# Patient Record
Sex: MALE | Race: WHITE | Employment: OTHER | URBAN - METROPOLITAN AREA
[De-identification: names, ages, dates, MRNs, and addresses within clinical notes are randomized per-mention and may not be internally consistent; named-entity substitution may affect disease eponyms.]

---

## 2018-12-08 ENCOUNTER — APPOINTMENT (OUTPATIENT)
Dept: ULTRASOUND IMAGING | Age: 78
DRG: 312 | End: 2018-12-08
Payer: MEDICARE

## 2018-12-08 ENCOUNTER — APPOINTMENT (OUTPATIENT)
Dept: CT IMAGING | Age: 78
DRG: 312 | End: 2018-12-08
Payer: MEDICARE

## 2018-12-08 ENCOUNTER — APPOINTMENT (OUTPATIENT)
Dept: GENERAL RADIOLOGY | Age: 78
DRG: 312 | End: 2018-12-08
Payer: MEDICARE

## 2018-12-08 ENCOUNTER — HOSPITAL ENCOUNTER (INPATIENT)
Age: 78
LOS: 1 days | Discharge: HOME | DRG: 312 | End: 2018-12-09
Attending: EMERGENCY MEDICINE | Admitting: INTERNAL MEDICINE
Payer: MEDICARE

## 2018-12-08 DIAGNOSIS — R55 SYNCOPE AND COLLAPSE: Primary | ICD-10-CM

## 2018-12-08 DIAGNOSIS — R53.1 GENERALIZED WEAKNESS: ICD-10-CM

## 2018-12-08 LAB
ALBUMIN SERPL-MCNC: 3.5 G/DL (ref 3.9–4.9)
ALP BLD-CCNC: 34 U/L (ref 35–104)
ALT SERPL-CCNC: 11 U/L (ref 0–41)
ANION GAP SERPL CALCULATED.3IONS-SCNC: 14 MEQ/L (ref 7–13)
AST SERPL-CCNC: 20 U/L (ref 0–40)
BACTERIA: NEGATIVE /HPF
BASOPHILS ABSOLUTE: 0 K/UL (ref 0–0.2)
BASOPHILS RELATIVE PERCENT: 0.6 %
BILIRUB SERPL-MCNC: 0.4 MG/DL (ref 0–1.2)
BILIRUBIN URINE: NEGATIVE
BLOOD, URINE: ABNORMAL
BUN BLDV-MCNC: 17 MG/DL (ref 8–23)
CALCIUM SERPL-MCNC: 8.4 MG/DL (ref 8.6–10.2)
CHLORIDE BLD-SCNC: 105 MEQ/L (ref 98–107)
CHP ED QC CHECK: YES
CLARITY: CLEAR
CO2: 18 MEQ/L (ref 22–29)
COLOR: YELLOW
CREAT SERPL-MCNC: 0.83 MG/DL (ref 0.7–1.2)
EKG ATRIAL RATE: 61 BPM
EKG P-R INTERVAL: 268 MS
EKG Q-T INTERVAL: 430 MS
EKG QRS DURATION: 90 MS
EKG QTC CALCULATION (BAZETT): 432 MS
EKG R AXIS: 62 DEGREES
EKG T AXIS: 38 DEGREES
EKG VENTRICULAR RATE: 61 BPM
EOSINOPHILS ABSOLUTE: 0.1 K/UL (ref 0–0.7)
EOSINOPHILS RELATIVE PERCENT: 2.3 %
EPITHELIAL CELLS, UA: ABNORMAL /HPF (ref 0–5)
GFR AFRICAN AMERICAN: >60
GFR NON-AFRICAN AMERICAN: >60
GLOBULIN: 2.6 G/DL (ref 2.3–3.5)
GLUCOSE BLD-MCNC: 102 MG/DL (ref 60–115)
GLUCOSE BLD-MCNC: 123 MG/DL (ref 74–109)
GLUCOSE BLD-MCNC: 125 MG/DL
GLUCOSE BLD-MCNC: 125 MG/DL (ref 60–115)
GLUCOSE BLD-MCNC: 126 MG/DL (ref 60–115)
GLUCOSE BLD-MCNC: 133 MG/DL (ref 60–115)
GLUCOSE URINE: NEGATIVE MG/DL
HCT VFR BLD CALC: 38.4 % (ref 42–52)
HEMOGLOBIN: 12.6 G/DL (ref 14–18)
HYALINE CASTS: ABNORMAL /HPF (ref 0–5)
KETONES, URINE: NEGATIVE MG/DL
LACTIC ACID: 2.4 MMOL/L (ref 0.5–2.2)
LEUKOCYTE ESTERASE, URINE: NEGATIVE
LYMPHOCYTES ABSOLUTE: 1.1 K/UL (ref 1–4.8)
LYMPHOCYTES RELATIVE PERCENT: 17.3 %
MAGNESIUM: 1.9 MG/DL (ref 1.7–2.3)
MCH RBC QN AUTO: 31.5 PG (ref 27–31.3)
MCHC RBC AUTO-ENTMCNC: 32.9 % (ref 33–37)
MCV RBC AUTO: 95.9 FL (ref 80–100)
MONOCYTES ABSOLUTE: 0.4 K/UL (ref 0.2–0.8)
MONOCYTES RELATIVE PERCENT: 6.8 %
NEUTROPHILS ABSOLUTE: 4.7 K/UL (ref 1.4–6.5)
NEUTROPHILS RELATIVE PERCENT: 73 %
NITRITE, URINE: NEGATIVE
PDW BLD-RTO: 13.8 % (ref 11.5–14.5)
PERFORMED ON: ABNORMAL
PERFORMED ON: NORMAL
PH UA: 5 (ref 5–9)
PLATELET # BLD: 279 K/UL (ref 130–400)
POTASSIUM SERPL-SCNC: 3.9 MEQ/L (ref 3.5–5.1)
PROTEIN UA: NEGATIVE MG/DL
RBC # BLD: 4.01 M/UL (ref 4.7–6.1)
RBC UA: ABNORMAL /HPF (ref 0–5)
SODIUM BLD-SCNC: 137 MEQ/L (ref 132–144)
SPECIFIC GRAVITY UA: 1.02 (ref 1–1.03)
TOTAL PROTEIN: 6.1 G/DL (ref 6.4–8.1)
TROPONIN: <0.01 NG/ML (ref 0–0.01)
TROPONIN: <0.01 NG/ML (ref 0–0.01)
URINE REFLEX TO CULTURE: YES
UROBILINOGEN, URINE: 0.2 E.U./DL
WBC # BLD: 6.4 K/UL (ref 4.8–10.8)
WBC UA: ABNORMAL /HPF (ref 0–5)

## 2018-12-08 PROCEDURE — 94640 AIRWAY INHALATION TREATMENT: CPT

## 2018-12-08 PROCEDURE — 84484 ASSAY OF TROPONIN QUANT: CPT

## 2018-12-08 PROCEDURE — 93978 VASCULAR STUDY: CPT

## 2018-12-08 PROCEDURE — 87040 BLOOD CULTURE FOR BACTERIA: CPT

## 2018-12-08 PROCEDURE — 87086 URINE CULTURE/COLONY COUNT: CPT

## 2018-12-08 PROCEDURE — 96368 THER/DIAG CONCURRENT INF: CPT

## 2018-12-08 PROCEDURE — 96365 THER/PROPH/DIAG IV INF INIT: CPT

## 2018-12-08 PROCEDURE — 81001 URINALYSIS AUTO W/SCOPE: CPT

## 2018-12-08 PROCEDURE — 99285 EMERGENCY DEPT VISIT HI MDM: CPT

## 2018-12-08 PROCEDURE — 2060000000 HC ICU INTERMEDIATE R&B

## 2018-12-08 PROCEDURE — 83735 ASSAY OF MAGNESIUM: CPT

## 2018-12-08 PROCEDURE — 6370000000 HC RX 637 (ALT 250 FOR IP): Performed by: STUDENT IN AN ORGANIZED HEALTH CARE EDUCATION/TRAINING PROGRAM

## 2018-12-08 PROCEDURE — 83605 ASSAY OF LACTIC ACID: CPT

## 2018-12-08 PROCEDURE — 70450 CT HEAD/BRAIN W/O DYE: CPT

## 2018-12-08 PROCEDURE — 2580000003 HC RX 258: Performed by: EMERGENCY MEDICINE

## 2018-12-08 PROCEDURE — 6360000002 HC RX W HCPCS: Performed by: EMERGENCY MEDICINE

## 2018-12-08 PROCEDURE — 6360000002 HC RX W HCPCS: Performed by: STUDENT IN AN ORGANIZED HEALTH CARE EDUCATION/TRAINING PROGRAM

## 2018-12-08 PROCEDURE — 80053 COMPREHEN METABOLIC PANEL: CPT

## 2018-12-08 PROCEDURE — 2580000003 HC RX 258: Performed by: STUDENT IN AN ORGANIZED HEALTH CARE EDUCATION/TRAINING PROGRAM

## 2018-12-08 PROCEDURE — 85025 COMPLETE CBC W/AUTO DIFF WBC: CPT

## 2018-12-08 PROCEDURE — 71045 X-RAY EXAM CHEST 1 VIEW: CPT

## 2018-12-08 PROCEDURE — 93005 ELECTROCARDIOGRAM TRACING: CPT

## 2018-12-08 PROCEDURE — 36415 COLL VENOUS BLD VENIPUNCTURE: CPT

## 2018-12-08 PROCEDURE — 93880 EXTRACRANIAL BILAT STUDY: CPT

## 2018-12-08 RX ORDER — FENOFIBRATE 145 MG/1
145 TABLET, COATED ORAL NIGHTLY
Status: DISCONTINUED | OUTPATIENT
Start: 2018-12-08 | End: 2018-12-08 | Stop reason: CLARIF

## 2018-12-08 RX ORDER — SODIUM CHLORIDE 0.9 % (FLUSH) 0.9 %
10 SYRINGE (ML) INJECTION PRN
Status: DISCONTINUED | OUTPATIENT
Start: 2018-12-08 | End: 2018-12-09 | Stop reason: HOSPADM

## 2018-12-08 RX ORDER — ASPIRIN 81 MG/1
81 TABLET, CHEWABLE ORAL DAILY
Status: DISCONTINUED | OUTPATIENT
Start: 2018-12-08 | End: 2018-12-09 | Stop reason: HOSPADM

## 2018-12-08 RX ORDER — ONDANSETRON 4 MG/1
4 TABLET, ORALLY DISINTEGRATING ORAL EVERY 8 HOURS PRN
Status: DISCONTINUED | OUTPATIENT
Start: 2018-12-08 | End: 2018-12-09 | Stop reason: HOSPADM

## 2018-12-08 RX ORDER — ACETAMINOPHEN 325 MG/1
650 TABLET ORAL EVERY 4 HOURS PRN
Status: DISCONTINUED | OUTPATIENT
Start: 2018-12-08 | End: 2018-12-09 | Stop reason: HOSPADM

## 2018-12-08 RX ORDER — NICOTINE POLACRILEX 4 MG
15 LOZENGE BUCCAL PRN
Status: DISCONTINUED | OUTPATIENT
Start: 2018-12-08 | End: 2018-12-09 | Stop reason: HOSPADM

## 2018-12-08 RX ORDER — DEXTROSE MONOHYDRATE 25 G/50ML
12.5 INJECTION, SOLUTION INTRAVENOUS PRN
Status: DISCONTINUED | OUTPATIENT
Start: 2018-12-08 | End: 2018-12-09 | Stop reason: HOSPADM

## 2018-12-08 RX ORDER — SODIUM CHLORIDE 0.9 % (FLUSH) 0.9 %
10 SYRINGE (ML) INJECTION EVERY 12 HOURS SCHEDULED
Status: DISCONTINUED | OUTPATIENT
Start: 2018-12-08 | End: 2018-12-09 | Stop reason: HOSPADM

## 2018-12-08 RX ORDER — ONDANSETRON 2 MG/ML
4 INJECTION INTRAMUSCULAR; INTRAVENOUS EVERY 6 HOURS PRN
Status: DISCONTINUED | OUTPATIENT
Start: 2018-12-08 | End: 2018-12-09 | Stop reason: HOSPADM

## 2018-12-08 RX ORDER — SODIUM CHLORIDE 0.9 % (FLUSH) 0.9 %
3 SYRINGE (ML) INJECTION EVERY 8 HOURS
Status: DISCONTINUED | OUTPATIENT
Start: 2018-12-08 | End: 2018-12-09 | Stop reason: HOSPADM

## 2018-12-08 RX ORDER — FENOFIBRATE 160 MG/1
160 TABLET ORAL DAILY
Status: DISCONTINUED | OUTPATIENT
Start: 2018-12-08 | End: 2018-12-09 | Stop reason: HOSPADM

## 2018-12-08 RX ORDER — 0.9 % SODIUM CHLORIDE 0.9 %
1000 INTRAVENOUS SOLUTION INTRAVENOUS ONCE
Status: COMPLETED | OUTPATIENT
Start: 2018-12-08 | End: 2018-12-08

## 2018-12-08 RX ORDER — NITROGLYCERIN 0.4 MG/1
0.4 TABLET SUBLINGUAL EVERY 5 MIN PRN
Status: DISCONTINUED | OUTPATIENT
Start: 2018-12-08 | End: 2018-12-09 | Stop reason: HOSPADM

## 2018-12-08 RX ORDER — DEXTROSE MONOHYDRATE 50 MG/ML
100 INJECTION, SOLUTION INTRAVENOUS PRN
Status: DISCONTINUED | OUTPATIENT
Start: 2018-12-08 | End: 2018-12-09 | Stop reason: HOSPADM

## 2018-12-08 RX ADMIN — CEFTRIAXONE SODIUM 1 G: 1 INJECTION, POWDER, FOR SOLUTION INTRAMUSCULAR; INTRAVENOUS at 02:21

## 2018-12-08 RX ADMIN — Medication 10 ML: at 21:31

## 2018-12-08 RX ADMIN — SODIUM CHLORIDE 1000 ML: 9 INJECTION, SOLUTION INTRAVENOUS at 01:46

## 2018-12-08 RX ADMIN — ALBUTEROL SULFATE 5 MG: 2.5 SOLUTION RESPIRATORY (INHALATION) at 01:54

## 2018-12-08 RX ADMIN — Medication 400 MG: at 14:07

## 2018-12-08 RX ADMIN — FENOFIBRATE 160 MG: 160 TABLET ORAL at 21:31

## 2018-12-08 RX ADMIN — ONDANSETRON 4 MG: 2 INJECTION INTRAMUSCULAR; INTRAVENOUS at 12:25

## 2018-12-08 RX ADMIN — ENOXAPARIN SODIUM 40 MG: 40 INJECTION SUBCUTANEOUS at 11:12

## 2018-12-08 RX ADMIN — ASPIRIN 81 MG 81 MG: 81 TABLET ORAL at 14:07

## 2018-12-08 RX ADMIN — METFORMIN HYDROCHLORIDE 1000 MG: 500 TABLET ORAL at 16:35

## 2018-12-08 RX ADMIN — AZITHROMYCIN MONOHYDRATE 500 MG: 500 INJECTION, POWDER, LYOPHILIZED, FOR SOLUTION INTRAVENOUS at 02:19

## 2018-12-08 NOTE — ED TRIAGE NOTES
Patient to ER for LOC. He states he felt dizzy this evening and fell to the floor. Positive LOC. Per squad he was in the 24V systolic PTA. Half a liter NS infused PTA. Denies hitting his head. Denies any chest pain or SOB. EKG completed at bedside: NSR with 1st degree block. POC glucose: 125.

## 2018-12-08 NOTE — ED NOTES
Bed: 15  Expected date: 12/8/18  Expected time: 12:38 AM  Means of arrival: [facility]  Comments:  706 Rafa Torres RN  12/08/18 [id number]

## 2018-12-08 NOTE — ED PROVIDER NOTES
Urine is not infected. Blood work is normal.  Patient nonetheless had syncopal episode and is quite weak. Hospitalist contacted regarding this situation. Patient now admits to having syncopal episodes in the past.  First-degree AV block with nothing to compare.  service is contacted, his partner answered, advised to admit to Dr. Brent Parish for syncope, observation status and monitored    CRITICAL CARE TIME   Total Critical Care time was  minutes, excluding separately reportableprocedures. There was a high probability of clinicallysignificant/life threatening deterioration in the patient's condition which required my urgent intervention. CONSULTS:  None    PROCEDURES:  Unless otherwise noted below, none     Procedures    FINAL IMPRESSION      1. Syncope and collapse    2. Generalized weakness          DISPOSITION/PLAN   DISPOSITION Decision To Admit 12/08/2018 04:53:47 AM      PATIENT REFERRED TO:  No follow-up provider specified.     DISCHARGE MEDICATIONS:  New Prescriptions    No medications on file          (Please note that portions of this note were completed with a voice recognition program.  Efforts were made to edit the dictations but occasionally words are mis-transcribed.)    Candace Freeman MD (electronically signed)  Attending Emergency Physician          Candace Freeman MD  12/08/18 [facility] Mone Vasquez MD  01/21/19 0274

## 2018-12-09 VITALS
WEIGHT: 147.9 LBS | TEMPERATURE: 97.1 F | DIASTOLIC BLOOD PRESSURE: 70 MMHG | HEART RATE: 72 BPM | OXYGEN SATURATION: 97 % | HEIGHT: 68 IN | RESPIRATION RATE: 16 BRPM | BODY MASS INDEX: 22.42 KG/M2 | SYSTOLIC BLOOD PRESSURE: 121 MMHG

## 2018-12-09 LAB
ALBUMIN SERPL-MCNC: 3.3 G/DL (ref 3.9–4.9)
ALP BLD-CCNC: 28 U/L (ref 35–104)
ALT SERPL-CCNC: 10 U/L (ref 0–41)
ANION GAP SERPL CALCULATED.3IONS-SCNC: 9 MEQ/L (ref 7–13)
AST SERPL-CCNC: 21 U/L (ref 0–40)
BASOPHILS ABSOLUTE: 0 K/UL (ref 0–0.2)
BASOPHILS RELATIVE PERCENT: 0.7 %
BILIRUB SERPL-MCNC: 0.7 MG/DL (ref 0–1.2)
BUN BLDV-MCNC: 13 MG/DL (ref 8–23)
CALCIUM SERPL-MCNC: 9 MG/DL (ref 8.6–10.2)
CHLORIDE BLD-SCNC: 106 MEQ/L (ref 98–107)
CO2: 26 MEQ/L (ref 22–29)
CREAT SERPL-MCNC: 1 MG/DL (ref 0.7–1.2)
EOSINOPHILS ABSOLUTE: 0.3 K/UL (ref 0–0.7)
EOSINOPHILS RELATIVE PERCENT: 5.2 %
GFR AFRICAN AMERICAN: >60
GFR NON-AFRICAN AMERICAN: >60
GLOBULIN: 2.6 G/DL (ref 2.3–3.5)
GLUCOSE BLD-MCNC: 101 MG/DL (ref 60–115)
GLUCOSE BLD-MCNC: 104 MG/DL (ref 60–115)
GLUCOSE BLD-MCNC: 105 MG/DL (ref 74–109)
GLUCOSE BLD-MCNC: 150 MG/DL (ref 60–115)
HCT VFR BLD CALC: 35.6 % (ref 42–52)
HEMOGLOBIN: 12.2 G/DL (ref 14–18)
LYMPHOCYTES ABSOLUTE: 1.3 K/UL (ref 1–4.8)
LYMPHOCYTES RELATIVE PERCENT: 20.4 %
MAGNESIUM: 2.1 MG/DL (ref 1.7–2.3)
MCH RBC QN AUTO: 32.4 PG (ref 27–31.3)
MCHC RBC AUTO-ENTMCNC: 34.4 % (ref 33–37)
MCV RBC AUTO: 94.3 FL (ref 80–100)
MONOCYTES ABSOLUTE: 0.6 K/UL (ref 0.2–0.8)
MONOCYTES RELATIVE PERCENT: 9.4 %
NEUTROPHILS ABSOLUTE: 4 K/UL (ref 1.4–6.5)
NEUTROPHILS RELATIVE PERCENT: 64.3 %
PDW BLD-RTO: 13.1 % (ref 11.5–14.5)
PERFORMED ON: ABNORMAL
PERFORMED ON: NORMAL
PERFORMED ON: NORMAL
PLATELET # BLD: 261 K/UL (ref 130–400)
POTASSIUM REFLEX MAGNESIUM: 4.4 MEQ/L (ref 3.5–5.1)
POTASSIUM SERPL-SCNC: 4.4 MEQ/L (ref 3.5–5.1)
RBC # BLD: 3.78 M/UL (ref 4.7–6.1)
SODIUM BLD-SCNC: 141 MEQ/L (ref 132–144)
TOTAL PROTEIN: 5.9 G/DL (ref 6.4–8.1)
TSH REFLEX: 1.56 UIU/ML (ref 0.27–4.2)
URINE CULTURE, ROUTINE: NORMAL
WBC # BLD: 6.3 K/UL (ref 4.8–10.8)

## 2018-12-09 PROCEDURE — 84443 ASSAY THYROID STIM HORMONE: CPT

## 2018-12-09 PROCEDURE — 6360000002 HC RX W HCPCS: Performed by: EMERGENCY MEDICINE

## 2018-12-09 PROCEDURE — 6370000000 HC RX 637 (ALT 250 FOR IP): Performed by: INTERNAL MEDICINE

## 2018-12-09 PROCEDURE — 2580000003 HC RX 258: Performed by: STUDENT IN AN ORGANIZED HEALTH CARE EDUCATION/TRAINING PROGRAM

## 2018-12-09 PROCEDURE — 83735 ASSAY OF MAGNESIUM: CPT

## 2018-12-09 PROCEDURE — 85025 COMPLETE CBC W/AUTO DIFF WBC: CPT

## 2018-12-09 PROCEDURE — 80053 COMPREHEN METABOLIC PANEL: CPT

## 2018-12-09 PROCEDURE — 36415 COLL VENOUS BLD VENIPUNCTURE: CPT

## 2018-12-09 PROCEDURE — 6370000000 HC RX 637 (ALT 250 FOR IP): Performed by: STUDENT IN AN ORGANIZED HEALTH CARE EDUCATION/TRAINING PROGRAM

## 2018-12-09 RX ORDER — METOPROLOL SUCCINATE 25 MG/1
25 TABLET, EXTENDED RELEASE ORAL 2 TIMES DAILY
Status: DISCONTINUED | OUTPATIENT
Start: 2018-12-09 | End: 2018-12-09

## 2018-12-09 RX ADMIN — Medication 10 ML: at 10:12

## 2018-12-09 RX ADMIN — METFORMIN HYDROCHLORIDE 1000 MG: 500 TABLET ORAL at 08:17

## 2018-12-09 RX ADMIN — ENOXAPARIN SODIUM 40 MG: 40 INJECTION SUBCUTANEOUS at 08:17

## 2018-12-09 RX ADMIN — ASPIRIN 81 MG 81 MG: 81 TABLET ORAL at 08:18

## 2018-12-09 RX ADMIN — Medication 400 MG: at 08:18

## 2018-12-09 RX ADMIN — METOPROLOL TARTRATE 25 MG: 25 TABLET ORAL at 12:23

## 2018-12-09 RX ADMIN — METFORMIN HYDROCHLORIDE 1000 MG: 500 TABLET ORAL at 17:13

## 2018-12-09 ASSESSMENT — PAIN SCALES - GENERAL: PAINLEVEL_OUTOF10: 0

## 2018-12-09 NOTE — CONSULTS
Consults   Assessment:  Pt with a falling episode last night. States he thinks he feinted, then not sure and describes \"tripping\". Has had two other syncopal episodes. Also states that he gets orthostatic regularly. Denies any anginal symptoms. Also states that he has a great deal of trouble with his balance and may have some element of diabetic neuropathy  No objective evidence of ischemia  Performed a carotid massage without evidence of significant slowing  History of coronary artery disease, diabetes, and percutaneous angioplasty.  Failed angioplasty in 2014, but the patient has been asymptomatic  Plan:  We'll continue to observe for significant arrhythmias  Recent echo showed LV function or roughly 45% without evidence of significant valvular pathology  Given the question of ataxia, orthostasis, or possible arrhythmia, consideration of loop monitoring device could be made  Carotid ultrasound  See orders and dictation on December 8, 2018  Gladys Dove MD
showed mild cerebral  atrophy and age-related findings, no acute findings are noted, and  suspicious for \"colloid cyst\". Chest x-ray seen and reviewed and has  evidence of prior open heart surgery but no particular disease is noted. He has got a few red cells in his urine. His electrolytes are  reasonable, although there is a mildly elevated glucose. His liver  functions are normal.  Troponins are negative. He has got mild anemia,  but other than that relatively normal CBC. Negative troponins. His EKG  is not in the chart for my review at the time of this dictation. ASSESSMENT:  This patient certainly had a falling spell. Certainly,  initially sounded like syncope, but indeed that might be part of it;  however, this is all \"colored\" by the fact that he has almost chronic  orthostasis and also he has got ataxia and diabetic neuropathy. As  described above, we performed a carotid massage with no significant  slowing. PLAN:  1. In general, I believe the patient does need a carotid ultrasound,  although etiology of these spells would be low. 2.  Continue to monitor. 3.  Abdominal ultrasound could be made at some point in time; however,  this would not be due to pulsatile mass; however this would not lead to  his syncopal spell. 4. In general, he would be a good candidate for [employer]Q or loop  recorder because the etiology of his falling spells is unclear and  happen rarely. This is described to the patient and patient's wife. He  has already volunteered to give a _____ until there is a solution. 5.  Further recommendations are pending.         Jaja Brink MD    D: 12/08/2018 16:11:29       T: 12/09/2018 0:41:47     [email or web address]  Job#: [id number]     Doc#: [id number]    CC:

## 2018-12-09 NOTE — FLOWSHEET NOTE
Discharge instructions reviewed with pt and wife. Reviewed meds and follow ups. SL and tele dc'd questions answered.  Transport called

## 2018-12-09 NOTE — PROGRESS NOTES
[facility] Note      Patient: Breanna Coldiron  Unit/Bed: G387/E673-61  YOB: 1940  MRN: [id number]  Admit Date:  12/8/2018  Hospital Day: 1    Subjective Complaint:   Denies any chest pain with exertion or at rest, palpitations, syncope, or edema. .     Physical Examination:     BP (!) 150/78   Pulse 66   Temp 97.8 °F (36.6 °C) (Oral)   Resp 16   Ht 5' 8\" (1.727 m)   Wt 147 lb 14.4 oz (67.1 kg)   SpO2 99%   BMI 22.49 kg/m²       Intake/Output Summary (Last 24 hours) at 12/09/18 1127  Last data filed at 12/09/18 0805   Gross per 24 hour   Intake              560 ml   Output              600 ml   Net              -40 ml                  Breanna Coldiron examined at bedside in in no apparent distress. Focused exam reveals:     Cardiac: Heart regular rate and rhythm     Lungs:  clear to auscultation bilaterally- no wheezes, rales or rhonchi, normal air movement, no respiratory distress    Extremities:   negative    Telemetry:      normal sinus  and ventricular tachycardia         LABS:   CBC:   Recent Labs      12/08/18   0146  12/09/18   0548   WBC  6.4  6.3   HGB  12.6*  12.2*   PLT  279  261      BMP:  Recent Labs      12/08/18   0058  12/08/18   0146  12/09/18   0548   NA   --   137  141   K   --   3.9  4.4  4.4   CL   --   105  106   CO2   --   18*  26   BUN   --   17  13   CREATININE   --   0.83  1.00   GLUCOSE  125  123*  105              Troponin: No results for input(s): TROPONINT in the last 72 hours. BNP: No results for input(s): PROBNP in the last 72 hours. INR: No results for input(s): INR in the last 72 hours. Mg:   Recent Labs      12/09/18   0548   MG  2.1       Cardiac Testing:     Carotid is normal but AAA of 4.5 cm  Orthostasis vs ataxia vs arrhythmia    Assessment:  Patient is just mildly orthostatic today, but did have some tachycardia upon ambulation.   Patient had a short burst of 4 beats of ventricular tachycardia, but nothing that would cause him to

## 2018-12-10 PROCEDURE — 93010 ELECTROCARDIOGRAM REPORT: CPT | Performed by: INTERNAL MEDICINE

## 2018-12-12 ENCOUNTER — HOSPITAL ENCOUNTER (OUTPATIENT)
Dept: CARDIAC CATH/INVASIVE PROCEDURES | Age: 78
Discharge: HOME | End: 2018-12-12
Attending: INTERNAL MEDICINE | Admitting: INTERNAL MEDICINE
Payer: MEDICARE

## 2018-12-12 VITALS
HEART RATE: 56 BPM | DIASTOLIC BLOOD PRESSURE: 67 MMHG | BODY MASS INDEX: 22.42 KG/M2 | HEIGHT: 68 IN | RESPIRATION RATE: 22 BRPM | WEIGHT: 147.93 LBS | SYSTOLIC BLOOD PRESSURE: 144 MMHG | OXYGEN SATURATION: 99 %

## 2018-12-12 PROCEDURE — C1764 EVENT RECORDER, CARDIAC: HCPCS

## 2018-12-12 PROCEDURE — 33282 HC IMPLANTABLE LOOP RECORDER: CPT | Performed by: INTERNAL MEDICINE

## 2018-12-12 PROCEDURE — 2500000003 HC RX 250 WO HCPCS

## 2018-12-12 PROCEDURE — 6370000000 HC RX 637 (ALT 250 FOR IP)

## 2018-12-12 RX ORDER — SODIUM CHLORIDE 9 MG/ML
INJECTION, SOLUTION INTRAVENOUS CONTINUOUS
Status: DISCONTINUED | OUTPATIENT
Start: 2018-12-12 | End: 2018-12-12 | Stop reason: HOSPADM

## 2018-12-12 RX ORDER — SODIUM CHLORIDE 0.9 % (FLUSH) 0.9 %
10 SYRINGE (ML) INJECTION PRN
Status: DISCONTINUED | OUTPATIENT
Start: 2018-12-12 | End: 2018-12-12 | Stop reason: HOSPADM

## 2018-12-12 RX ORDER — SULFAMETHOXAZOLE AND TRIMETHOPRIM 800; 160 MG/1; MG/1
1 TABLET ORAL EVERY 12 HOURS SCHEDULED
Qty: 6 TABLET | Refills: 0 | Status: SHIPPED | OUTPATIENT
Start: 2018-12-12 | End: 2018-12-15

## 2018-12-12 RX ORDER — SULFAMETHOXAZOLE AND TRIMETHOPRIM 800; 160 MG/1; MG/1
1 TABLET ORAL EVERY 12 HOURS SCHEDULED
Status: DISCONTINUED | OUTPATIENT
Start: 2018-12-12 | End: 2018-12-12 | Stop reason: HOSPADM

## 2018-12-12 RX ORDER — SODIUM CHLORIDE 0.9 % (FLUSH) 0.9 %
10 SYRINGE (ML) INJECTION EVERY 12 HOURS SCHEDULED
Status: DISCONTINUED | OUTPATIENT
Start: 2018-12-12 | End: 2018-12-12 | Stop reason: HOSPADM

## 2018-12-12 NOTE — PROGRESS NOTES
Returned from procedure. Pt is awake, alert, oriented x 4. Left upper chest wound dressing is dry and intact, no swelling noted. Christiano Martin, rep from [employer] at bedside to instruct pt and wife on instructions for Loop recorder.

## 2018-12-13 LAB
BLOOD CULTURE, ROUTINE: NORMAL
CULTURE, BLOOD 2: NORMAL

## 2019-02-11 LAB
ANION GAP SERPL CALCULATED.3IONS-SCNC: 15 MEQ/L (ref 9–15)
BUN BLDV-MCNC: 21 MG/DL (ref 8–23)
CALCIUM SERPL-MCNC: 9.9 MG/DL (ref 8.5–9.9)
CHLORIDE BLD-SCNC: 103 MEQ/L (ref 95–107)
CO2: 25 MEQ/L (ref 20–31)
CREAT SERPL-MCNC: 1.21 MG/DL (ref 0.7–1.2)
GFR AFRICAN AMERICAN: >60
GFR NON-AFRICAN AMERICAN: 57.7
GLUCOSE BLD-MCNC: 106 MG/DL (ref 70–99)
POTASSIUM SERPL-SCNC: 4.7 MEQ/L (ref 3.4–4.9)
SODIUM BLD-SCNC: 143 MEQ/L (ref 135–144)

## 2019-03-12 ENCOUNTER — HOSPITAL ENCOUNTER (OUTPATIENT)
Dept: CARDIOLOGY | Age: 79
Discharge: HOME | End: 2019-03-12
Payer: MEDICARE

## 2019-03-12 PROCEDURE — 93299 HC INTERROG REMOTE LOOP RECORDER: CPT

## 2019-06-10 ENCOUNTER — HOSPITAL ENCOUNTER (OUTPATIENT)
Dept: CARDIOLOGY | Age: 79
Discharge: HOME | End: 2019-06-10
Payer: MEDICARE

## 2019-06-10 PROCEDURE — 93299 HC INTERROG REMOTE LOOP RECORDER: CPT

## 2019-06-19 ENCOUNTER — HOSPITAL ENCOUNTER (INPATIENT)
Age: 79
LOS: 2 days | Discharge: HOME | DRG: 310 | End: 2019-06-21
Attending: INTERNAL MEDICINE | Admitting: INTERNAL MEDICINE
Payer: MEDICARE

## 2019-06-19 PROBLEM — Z79.899 HIGH RISK MEDICATION USE: Status: ACTIVE | Noted: 2019-06-19

## 2019-06-19 PROBLEM — I48.91 A-FIB (HCC): Status: ACTIVE | Noted: 2019-06-19

## 2019-06-19 LAB
GLUCOSE BLD-MCNC: 117 MG/DL (ref 60–115)
GLUCOSE BLD-MCNC: 92 MG/DL (ref 60–115)
PERFORMED ON: ABNORMAL
PERFORMED ON: NORMAL

## 2019-06-19 PROCEDURE — 2060000000 HC ICU INTERMEDIATE R&B

## 2019-06-19 PROCEDURE — 93005 ELECTROCARDIOGRAM TRACING: CPT | Performed by: INTERNAL MEDICINE

## 2019-06-19 PROCEDURE — 6370000000 HC RX 637 (ALT 250 FOR IP): Performed by: INTERNAL MEDICINE

## 2019-06-19 RX ORDER — NITROGLYCERIN 0.4 MG/1
0.4 TABLET SUBLINGUAL EVERY 5 MIN PRN
Status: DISCONTINUED | OUTPATIENT
Start: 2019-06-19 | End: 2019-06-22 | Stop reason: HOSPADM

## 2019-06-19 RX ORDER — ASPIRIN 81 MG/1
81 TABLET, CHEWABLE ORAL DAILY
Status: DISCONTINUED | OUTPATIENT
Start: 2019-06-19 | End: 2019-06-22 | Stop reason: HOSPADM

## 2019-06-19 RX ORDER — ACETAMINOPHEN 80 MG
TABLET,CHEWABLE ORAL ONCE
Status: DISCONTINUED | OUTPATIENT
Start: 2019-06-19 | End: 2019-06-22 | Stop reason: HOSPADM

## 2019-06-19 RX ORDER — FENOFIBRATE 54 MG/1
54 TABLET ORAL DAILY
Status: DISCONTINUED | OUTPATIENT
Start: 2019-06-19 | End: 2019-06-22 | Stop reason: HOSPADM

## 2019-06-19 RX ORDER — SOTALOL HYDROCHLORIDE 120 MG/1
60 TABLET ORAL 2 TIMES DAILY
Status: DISCONTINUED | OUTPATIENT
Start: 2019-06-19 | End: 2019-06-22 | Stop reason: HOSPADM

## 2019-06-19 RX ADMIN — METFORMIN HYDROCHLORIDE 1000 MG: 500 TABLET ORAL at 17:47

## 2019-06-19 RX ADMIN — APIXABAN 5 MG: 5 TABLET, FILM COATED ORAL at 22:03

## 2019-06-19 RX ADMIN — SOTALOL HYDROCHLORIDE 60 MG: 120 TABLET ORAL at 22:03

## 2019-06-19 ASSESSMENT — ENCOUNTER SYMPTOMS
VOMITING: 0
PHOTOPHOBIA: 0
WHEEZING: 0
CHOKING: 0
ABDOMINAL PAIN: 0
NAUSEA: 0
CHEST TIGHTNESS: 0
BLOOD IN STOOL: 0
TROUBLE SWALLOWING: 0
COUGH: 0
STRIDOR: 0
COLOR CHANGE: 0
SHORTNESS OF BREATH: 1
VOICE CHANGE: 0

## 2019-06-19 NOTE — CARE COORDINATION
LSW met with the pt. Pt states he is from home with his wife. Pt states he does not have home O2 and no equipment at home. Pt denies any discharge needs.   Electronically signed by Jaret Johnson on 6/19/19 at 4:23 PM

## 2019-06-19 NOTE — H&P
creatinine 0.96 GFR greater than 60 ALT 11 AST 24 CRP 5.8. He denies any chest pain symptoms. He denies any dizziness near-syncope or syncope. Reviewed current clinical status with patient and wife and discussed the above with Dr. Ty Sosa in office. In sinus rhythm his heart rate is 60 making it difficult to adjust beta blocker therapy for his A. fib episodes with RVR. Would recommend ischemic workup with getting a Lexiscan perfusion study as patient doesn't feel he can walk on the treadmill. Discussed with patient that he is at increased for CVA and will need to be anticoagulated. We'll initiate Eliquis 5 mg twice a day. He would benefit from antiarrhythmic therapy and will plan to admit patient next week for sotalol loading per Dr. Ty Sosa. Patient demonstrates understanding and agreement with plan of care with instructions to call for any interval problems. Past Medical History:    Past Medical History:   Diagnosis Date    CAD (coronary artery disease)     cardiac stents    Diabetes mellitus ([facility] [address]     Hyperlipidemia     Hypertension        Past Surgical History:    Past Surgical History:   Procedure Laterality Date    APPENDECTOMY      CORONARY ARTERY BYPASS GRAFT      triple bypass    EYE SURGERY Bilateral     cataract surgery    INSERTABLE CARDIAC MONITOR Left 12/2018        Allergies:    Lipitor [atorvastatin]; Niaspan [niacin er]; Vitamin e; and Zocor [simvastatin]    Home Medications:    Prior to Admission medications    Medication Sig Start Date End Date Taking?  Authorizing Provider   apixaban (ELIQUIS) 5 MG TABS tablet Take 5 mg by mouth 2 times daily   Yes Historical Provider, MD   metoprolol tartrate (LOPRESSOR) 25 MG tablet Take 1 tablet by mouth 2 times daily 12.5mg po bid 12/9/18  Yes Kalani Holder MD   magnesium oxide (MAG-OX) 400 MG tablet Take 400 mg by mouth daily   Yes Historical Provider, MD   pitavastatin (LIVALO) 4 MG TABS tablet Take 4 mg by mouth nightly   Yes Historical Provider, MD   fenofibrate (TRICOR) 145 MG tablet Take 145 mg by mouth nightly   Yes Historical Provider, MD   aspirin 81 MG tablet Take 81 mg by mouth daily   Yes Historical Provider, MD   metFORMIN (GLUCOPHAGE) 1000 MG tablet Take 1,000 mg by mouth 2 times daily (with meals)   Yes Historical Provider, MD   nitroGLYCERIN (NITROSTAT) 0.4 MG SL tablet Place 0.4 mg under the tongue every 5 minutes as needed for Chest pain    Historical Provider, MD       Current Medications: Reviewed patient's medications, which includes high risk medication Eliquis 5 mg twice a day, Glucophage aspirin and metoprolol tartarate 25 mg twice daily. No current facility-administered medications for this encounter. SocialHistory:   Social History     Tobacco Use    Smoking status: Current Every Day Smoker     Types: Cigarettes    Tobacco comment: 3 cigarettes/month   Substance Use Topics    Alcohol use: No        Family History:   No family history on file. Review of Systems   Constitutional: Positive for activity change and fatigue. Negative for appetite change, fever and unexpected weight change. HENT: Negative for congestion, trouble swallowing and voice change. Eyes: Negative for photophobia. Respiratory: Positive for shortness of breath. Negative for cough, choking, chest tightness, wheezing and stridor. Cardiovascular: Negative for chest pain, palpitations and leg swelling. Gastrointestinal: Negative for abdominal pain, blood in stool, nausea and vomiting. Endocrine: Negative for cold intolerance, heat intolerance, polydipsia, polyphagia and polyuria. Genitourinary: Negative for dysuria and hematuria. Musculoskeletal: Negative for arthralgias, myalgias and neck stiffness. Skin: Negative for color change, pallor and rash. Neurological: Negative for dizziness, syncope, speech difficulty, weakness and headaches. Hematological: Does not bruise/bleed easily.    Psychiatric/Behavioral: Negative for agitation, behavioral problems, confusion, hallucinations, sleep disturbance and suicidal ideas. Physical Examination:  /60   Pulse 55   Temp 97.2 °F (36.2 °C) (Oral)   Resp 18   Ht 5' 8\" (1.727 m)   Wt 142 lb (64.4 kg)   SpO2 99%   BMI 21.59 kg/m²      Intake/Output Summary (Last 24 hours) at 6/19/2019 1420  Last data filed at 6/19/2019 1227  Gross per 24 hour   Intake 360 ml   Output --   Net 360 ml     Patient Vitals for the past 96 hrs (Last 3 readings):   Weight   06/19/19 1034 142 lb (64.4 kg)        Physical Exam   Constitutional: He is oriented to person, place, and time. He appears well-nourished. No distress. HENT:   Head: Normocephalic and atraumatic. Eyes: No scleral icterus. Neck: Normal range of motion. Neck supple. No JVD present. No thyromegaly present. Cardiovascular: Regular rhythm. Exam reveals no gallop and no friction rub. No murmur heard. Pulmonary/Chest: Effort normal and breath sounds normal. No respiratory distress. He has no wheezes. He has no rales. Abdominal: Soft. He exhibits no distension and no mass. There is no tenderness. Musculoskeletal: He exhibits no edema or deformity. Neurological: He is alert and oriented to person, place, and time. No cranial nerve deficit. Skin: Skin is warm and dry. He is not diaphoretic. Psychiatric: He has a normal mood and affect. His behavior is normal. Judgment and thought content normal.            Diagnostics:    EKG: sinus bradycardia, unchanged from previous tracings, normal EKG, normalsinus rhythm. Telemetry: normal sinus . Lab Data:  BMP:  No results for input(s): NA, K, CL, CO2, BUN, CREATININE, LABGLOM in the last 72 hours. Invalid input(s): GLU,  CA    Cardiac Enzymes:  No results for input(s): CKTOTAL, CKMB, CKMBINDEX, TROPONINI in the last 72 hours.     CBC:   Lab Results   Component Value Date    WBC 6.6 05/24/2019    RBC 4.32 05/24/2019    RBC 4.79 10/19/2011    HGB 13.7 05/24/2019    HCT 40.7 05/24/2019     05/24/2019       CMP:    Lab Results   Component Value Date     05/24/2019    K 4.1 05/24/2019    K 4.4 12/09/2018     05/24/2019    CO2 23 05/24/2019    BUN 17 05/24/2019    CREATININE 0.96 05/24/2019    GFRAA >60.0 05/24/2019    LABGLOM >60.0 05/24/2019    GLUCOSE 99 05/24/2019    CALCIUM 9.2 05/24/2019       Hepatic Function Panel:   Lab Results   Component Value Date    ALKPHOS 35 05/24/2019    ALT 11 05/24/2019    AST 24 05/24/2019    PROT 6.6 05/24/2019    BILITOT 0.6 05/24/2019    BILIDIR 0.2 10/13/2015    IBILI 0.3 10/13/2015    LABALBU 3.9 05/24/2019    LABALBU 4.4 05/08/2012       Magnesium:    Lab Results   Component Value Date    MG 2.1 12/09/2018       PT/INR:   Lab Results   Component Value Date    PROTIME 10.7 06/01/2014    INR 1.0 06/01/2014     No results for input(s): INR in the last 72 hours. HgBA1c:    Lab Results   Component Value Date    LABA1C 6.0 05/24/2019       Lipid Profile:    Lab Results   Component Value Date    TRIG 118 10/22/2018    HDL 24 10/22/2018    LDLCALC 49 10/22/2018       TSH:    Lab Results   Component Value Date    TSH 1.330 05/24/2019       ABG:  No results found for: PH, PO2, PCO2    PRO-BNP:   Lab Results   Component Value Date    PROBNP 243 05/31/2014        Radiology:   No results found. .    Impression:    Active Problems:    * No active hospital problems. *  Resolved Problems:    * No resolved hospital problems. *  Patient with symptomatic atrial fibrillation, with tachycardia and bradycardia. Probable sinus node dysfunction, however heart rate variability may improve with sustenance of sinus rhythm. High risk medication-Eliquis  2 falls in the past year, both related to loss of balance. Last fall was over 6 months ago. Admitted for initiation of high risk medication sotalol. Atherosclerotic native vessel coronary artery disease with remote coronary artery bypass grafting.   Had perfusion imaging study recently, results are pending and are being requested. 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  2 falls in the last year. Continue to observe. Shortness of breath and fatigue over the last week, most likely related to atrial fibrillation with rapid ventricular rate. If perfusion imaging study warrants, low threshold for proceeding with cardiac catheterization    Patient Active Problem List   Diagnosis    Syncope and collapse       Recommendations:  1. Discontinue metoprolol  2. Initiate sotalol 60 mg p.o. twice daily with parameters to hold based on heart rate and QTc  3. Check d-dimer, and if elevated proceed with work-up for pulmonary embolism given new shortness of breath, which most likely is related to the atrial fibrillation. 4.   Mexitil 5 doses of sotalol are completed. 5.  I have discussed with patient that there is a possibility he may end up with a permanent pacemaker. He would like to avoid as much as possible. 6.   PT OT eval.  7.   Additional  recommendations to be based on clinical course. Orders Placed This Encounter   Procedures    DIET CARDIAC; Carb Control: 4 carb choices (60 gms)/meal; Low Sodium (2 GM)    Inpatient consult to Spiritual Services       Discussed with patient and nursing.     Electronically signed by Philippe France MD, [facility] on 6/19/2019 at 2:20 PM                                      Philippe France M.D.                              [phone]

## 2019-06-20 ENCOUNTER — APPOINTMENT (OUTPATIENT)
Dept: ULTRASOUND IMAGING | Age: 79
DRG: 310 | End: 2019-06-20
Attending: INTERNAL MEDICINE
Payer: MEDICARE

## 2019-06-20 ENCOUNTER — APPOINTMENT (OUTPATIENT)
Dept: CT IMAGING | Age: 79
DRG: 310 | End: 2019-06-20
Attending: INTERNAL MEDICINE
Payer: MEDICARE

## 2019-06-20 LAB
ANION GAP SERPL CALCULATED.3IONS-SCNC: 13 MEQ/L (ref 9–15)
BUN BLDV-MCNC: 23 MG/DL (ref 8–23)
CALCIUM SERPL-MCNC: 9.1 MG/DL (ref 8.5–9.9)
CHLORIDE BLD-SCNC: 104 MEQ/L (ref 95–107)
CO2: 22 MEQ/L (ref 20–31)
CREAT SERPL-MCNC: 0.94 MG/DL (ref 0.7–1.2)
D DIMER: 1.46 MG/L FEU (ref 0–0.5)
GFR AFRICAN AMERICAN: >60
GFR NON-AFRICAN AMERICAN: >60
GLUCOSE BLD-MCNC: 101 MG/DL (ref 70–99)
GLUCOSE BLD-MCNC: 120 MG/DL (ref 60–115)
GLUCOSE BLD-MCNC: 120 MG/DL (ref 60–115)
GLUCOSE BLD-MCNC: 139 MG/DL (ref 60–115)
GLUCOSE BLD-MCNC: 95 MG/DL (ref 60–115)
HCT VFR BLD CALC: 34.8 % (ref 42–52)
HEMOGLOBIN: 11.9 G/DL (ref 14–18)
MCH RBC QN AUTO: 31.9 PG (ref 27–31.3)
MCHC RBC AUTO-ENTMCNC: 34.3 % (ref 33–37)
MCV RBC AUTO: 93.3 FL (ref 80–100)
PDW BLD-RTO: 13.9 % (ref 11.5–14.5)
PERFORMED ON: ABNORMAL
PERFORMED ON: NORMAL
PLATELET # BLD: 315 K/UL (ref 130–400)
POTASSIUM SERPL-SCNC: 4.6 MEQ/L (ref 3.4–4.9)
PRO-BNP: 936 PG/ML
RBC # BLD: 3.74 M/UL (ref 4.7–6.1)
SODIUM BLD-SCNC: 139 MEQ/L (ref 135–144)
WBC # BLD: 6.5 K/UL (ref 4.8–10.8)

## 2019-06-20 PROCEDURE — 97165 OT EVAL LOW COMPLEX 30 MIN: CPT

## 2019-06-20 PROCEDURE — 85027 COMPLETE CBC AUTOMATED: CPT

## 2019-06-20 PROCEDURE — 6360000004 HC RX CONTRAST MEDICATION: Performed by: INTERNAL MEDICINE

## 2019-06-20 PROCEDURE — 6370000000 HC RX 637 (ALT 250 FOR IP): Performed by: INTERNAL MEDICINE

## 2019-06-20 PROCEDURE — 36415 COLL VENOUS BLD VENIPUNCTURE: CPT

## 2019-06-20 PROCEDURE — 2060000000 HC ICU INTERMEDIATE R&B

## 2019-06-20 PROCEDURE — 97161 PT EVAL LOW COMPLEX 20 MIN: CPT

## 2019-06-20 PROCEDURE — 71275 CT ANGIOGRAPHY CHEST: CPT

## 2019-06-20 PROCEDURE — 83880 ASSAY OF NATRIURETIC PEPTIDE: CPT

## 2019-06-20 PROCEDURE — 93005 ELECTROCARDIOGRAM TRACING: CPT | Performed by: INTERNAL MEDICINE

## 2019-06-20 PROCEDURE — 93970 EXTREMITY STUDY: CPT

## 2019-06-20 PROCEDURE — 85379 FIBRIN DEGRADATION QUANT: CPT

## 2019-06-20 PROCEDURE — 80048 BASIC METABOLIC PNL TOTAL CA: CPT

## 2019-06-20 RX ADMIN — SOTALOL HYDROCHLORIDE 60 MG: 120 TABLET ORAL at 21:34

## 2019-06-20 RX ADMIN — APIXABAN 5 MG: 5 TABLET, FILM COATED ORAL at 08:44

## 2019-06-20 RX ADMIN — ASPIRIN 81 MG 81 MG: 81 TABLET ORAL at 08:44

## 2019-06-20 RX ADMIN — APIXABAN 5 MG: 5 TABLET, FILM COATED ORAL at 21:34

## 2019-06-20 RX ADMIN — MAGNESIUM OXIDE TAB 400 MG (241.3 MG ELEMENTAL MG) 400 MG: 400 (241.3 MG) TAB at 08:44

## 2019-06-20 RX ADMIN — FENOFIBRATE 54 MG: 54 TABLET ORAL at 08:44

## 2019-06-20 RX ADMIN — IOPAMIDOL 100 ML: 755 INJECTION, SOLUTION INTRAVENOUS at 09:03

## 2019-06-20 RX ADMIN — SOTALOL HYDROCHLORIDE 60 MG: 120 TABLET ORAL at 08:44

## 2019-06-20 ASSESSMENT — PAIN SCALES - GENERAL
PAINLEVEL_OUTOF10: 0

## 2019-06-20 NOTE — PROGRESS NOTES
Physical Therapy Med Surg Initial Assessment  [facility]       NAME: Marc Stevens  : 1940 ([de-identified] y.o.)  MRN: [id number]  CODE STATUS: Prior    Date of Service: 2019    Patient Diagnosis(es): [facility]) [I48.91]  High risk medication use [Z79.899]   No chief complaint on file.     Patient Active Problem List    Diagnosis Date Noted    [facility]) 2019     Priority: High    High risk medication use 2019     Priority: High    Syncope and collapse 2018        Past Medical History:   Diagnosis Date    CAD (coronary artery disease)     cardiac stents    Diabetes mellitus ([facility] [address]     Hyperlipidemia     Hypertension      Past Surgical History:   Procedure Laterality Date    APPENDECTOMY      CORONARY ARTERY BYPASS GRAFT      triple bypass    EYE SURGERY Bilateral     cataract surgery    INSERTABLE CARDIAC MONITOR Left 2018       Chart Reviewed: Yes  Patient assessed for rehabilitation services?: Yes  Family / Caregiver Present: No  General Comment  Comments: Pt up at EOB, agreeable to PT eval    Restrictions:  Restrictions/Precautions: Up Ad Adina     SUBJECTIVE:    Pre Treatment Pain Screening  Pain at present: 0    Post Treatment Pain Screening:   Pain Screening  Patient Currently in Pain: No    Prior Level of Function:  Social/Functional History  Lives With: Spouse  Type of Home: House  Home Layout: One level  Home Access: Stairs to enter with rails  Entrance Stairs - Number of Steps: 3  Entrance Stairs - Rails: Both  Bathroom Shower/Tub: Walk-in shower  Home Equipment: Cane, Crutches  ADL Assistance: Independent(spouse provides SBA for shower transfers)  Homemaking Assistance: Independent(shares responsibilities with spouse)  Ambulation Assistance: Independent(no AD)  Transfer Assistance: Independent  Active : Yes(except at night)  Additional Comments: reports 1 fall about 6 months ago    OBJECTIVE:   Vision/Hearing:  Vision: MOBILITY  AM-PAC Inpatient Mobility Raw Score : 24     Therapy Time:   Individual   Time In 1328   Time Out 1340   Minutes 4000 Cheryl Sadler, 06/20/19 at 2:27 PM

## 2019-06-20 NOTE — PROGRESS NOTES
[facility] OCCUPATIONAL THERAPY EVALUATION - ACUTE     Date: 2019  Patient Name: Astrid Tobar        MRN: [id number]  Account: [de-identified]   : 1940  ([de-identified] y.o.)  Room: [place] [address]    Chart Review:  Diagnosis:  There were no encounter diagnoses. Past Medical History:   Diagnosis Date    CAD (coronary artery disease)     cardiac stents    Diabetes mellitus ([address]     Hyperlipidemia     Hypertension      Past Surgical History:   Procedure Laterality Date    APPENDECTOMY      CORONARY ARTERY BYPASS GRAFT      triple bypass    EYE SURGERY Bilateral     cataract surgery    INSERTABLE CARDIAC MONITOR Left 2018       Restrictions  Restrictions/Precautions: Up Ad Adina     Safety Devices: Safety Devices  Safety Devices in place: Not Applicable    Subjective       Pain Reassessment:           Orientation  Orientation  Overall Orientation Status: Within Functional Limits    Prior Level of Function:  Social/Functional History  Lives With: Spouse  Type of Home: House  Home Layout: One level  Home Access: Stairs to enter with rails  Entrance Stairs - Number of Steps: 3  Entrance Stairs - Rails: Both  Bathroom Shower/Tub: Walk-in shower  Home Equipment: Cane, Crutches  ADL Assistance: Independent  Homemaking Assistance: Independent  Homemaking Responsibilities: (pt completes meal prep and light cleaning)  Ambulation Assistance: Independent  Transfer Assistance: Independent  Active :  Yes  Additional Comments: reports 1 fall about 6 months ago    OBJECTIVE:     Orientation Status:  Orientation  Overall Orientation Status: Within Functional Limits    Observation:  Observation/Palpation  Posture: Good  Observation: Pt cooperative    Cognition Status:  Cognition  Overall Cognitive Status: WFL    Perception Status:  Perception  Overall Perceptual Status: WFL    Sensation Status:  Sensation  Overall Sensation Status: Impaired  Light Touch: Partial deficits in the LLE, Partial deficits in the RLE    Vision and Hearing Status:  Vision  Vision: Impaired  Vision Exceptions: Wears glasses for reading  Hearing  Hearing: Within functional limits     ROM:   LUE AROM (degrees)  LUE AROM : WFL  Left Hand AROM (degrees)  Left Hand AROM: WFL  RUE AROM (degrees)  RUE AROM : WFL  Right Hand AROM (degrees)  Right Hand AROM: WFL    Strength:  LUE Strength  Gross LUE Strength: WFL  L Hand General: 4+/5  RUE Strength  Gross RUE Strength: WFL  R Hand General: 4+/5    Coordination, Tone, Quality of Movement:    Tone RUE  RUE Tone: Normotonic  Tone LUE  LUE Tone: Normotonic  Coordination  Movements Are Fluid And Coordinated: Yes    Hand Dominance:  Hand Dominance  Hand Dominance: Right    ADL Status:  ADL  Feeding: Independent  Grooming: Independent  UE Bathing: Independent  LE Bathing: Independent  UE Dressing: Independent  LE Dressing: Independent  Toileting: Unable to assess(comment)          Functional Mobility:          Bed Mobility       Seated and Standing Balance:  Balance  Sitting Balance: Independent  Standing Balance: Independent    Functional Endurance:  Activity Tolerance  Activity Tolerance: Patient Tolerated treatment well    D/C Recommendations:home    Equipment Recommendations:      OT Follow Up:  OT D/C RECOMMENDATIONS  REQUIRES OT FOLLOW UP: No       Assessment/Discharge Disposition:     Prognosis: Good  Discharge Recommendations: Continue to assess pending progress  History: 3 complexities  Exam: no deficits  Assistance / Modification: no assist    Six Click Score    How much help for putting on and taking off regular lower body clothing?: None  How much help for Bathing?: None  How much help for Toileting?: None  How much help for putting on and taking off regular upper body clothing?: None  How much help for taking care of personal grooming?: None  How much help for eating meals?: None  AM-[facility] Inpatient Daily Activity Raw Score: 24  AM-PAC Inpatient ADL T-Scale Score : 57.54  ADL Inpatient CMS 0-100% Score: 0    Plan:  Plan  Times per week: N/A    Goals:   Patient will:      Patient Goal: Patient goals :  To return to home with spouse      Discussed and agreed upon: Yes Comments:     Therapy Time:   OT Individual Minutes  Time In: 7758  Time Out: [address] [facility]  Minutes: 12    Electronically signed by:    Paralee Simmonds, OTR/L Electronically signed by Paralee Simmonds, OTR/L on 0/88/09 at 2:00 PM

## 2019-06-20 NOTE — PROGRESS NOTES
[facility] OF [facility] Heart [place] Note      Patient: Laura Emanuel  Unit/Bed: R731/N263-93  YOB: 1940  MRN: [id number]  Admit Date:  6/19/2019  HOSPITAL day #       Subjective Complaint:   Denies any chest pain with exertion or at rest, palpitations, syncope, or edema. .     Physical Examination:     /71   Pulse 51   Temp 97.3 °F (36.3 °C)   Resp 18   Ht 5' 8\" (1.727 m)   Wt 142 lb (64.4 kg)   SpO2 96%   BMI 21.59 kg/m²     No intake or output data in the 24 hours ending 06/20/19 1257  Weights  Wt Readings from Last 3 Encounters:   06/19/19 142 lb (64.4 kg)   12/12/18 147 lb 14.9 oz (67.1 kg)   12/08/18 147 lb 14.4 oz (67.1 kg)       General:  Awake, alert, oriented X 3. No apparent distress. Heart:  Regular rate Regular rhythm, S1> S2 no murmurs, gallops, or rubs. Lungs: CTA bilaterally, bilat symmetrical expansion, no wheeze, rales, or rhonchi. Abdomen: Bowel sounds present, soft, nontender,  no peritoneal signs  Extremities:  No clubbing No edema Distal pulses are palpable  Skin: Warm and dry  Mood and affect: Appropriate for the circumstance. Telemetry:      sinus bradycardia  50s,no advanced AV block. WI 222msec. LABS:   CBC:   Recent Labs     06/20/19  0550   WBC 6.5   HGB 11.9*         BMP:    Recent Labs     06/20/19  0550      K 4.6      CO2 22   BUN 23   CREATININE 0.94   GLUCOSE 101*              Troponin: No results for input(s): TROPONINT in the last 72 hours. BNP:   Recent Labs     06/20/19  0550   PROBNP 936      INR: No results for input(s): INR in the last 72 hours. Mg: No results for input(s): MG in the last 72 hours. Cardiac Testing:    EKG today with Sinus bradycardia at 54bpm,WI interval 222msec,QRS 92msec,QTc 400msec. Assessment:      Patient with symptomatic atrial fibrillation, with tachycardia and bradycardia. Probable sinus node dysfunction, however heart rate variability may improve with sustenance of sinus rhythm.   High risk medication-Eliquis  2 falls in the past year, both related to loss of balance. Last fall was over 6 months ago. Admitted for initiation of high risk medication sotalol. Atherosclerotic native vessel coronary artery disease with remote coronary artery bypass grafting. Had perfusion imaging study recently, results are pending and are being requested. 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  2 falls in the last year. Continue to observe. Shortness of breath and fatigue over the last week, most likely related to atrial fibrillation with rapid ventricular rate. If perfusion imaging study warrants, low threshold for proceeding with cardiac catheterization,can be done as OP ,pt needs to hold Eliquis for 24hrs. He has PAF. So far no indication for pacemaker. Elevated d-dimer, recent increase in shortness of breath and decreased activity level. Spiral CT of the chest for pulmonary embolism was negative. Venous duplex of lower extremities was negative for deep vein thrombosis.       Plan:    Continue 5 doses of sotalol under telemetry. Anticipate discharge 6/21/2019, with outpatient Holter monitor, and to follow-up with me in the near future. Once I reconcile the initial post discharge medication and clinical scenario, patient will subsequently follow-up with Dr. Martha Segura  this primary cardiologist.  EKG in am   Have discussed possibility of permanent pacemaker with him, he would prefer to avoid at all costs at the present time, but will agree if absolutely needed.   Electronically signed by Tito Rivero MD on 6/20/2019 at 12:57 PM

## 2019-06-21 VITALS
HEIGHT: 68 IN | WEIGHT: 142 LBS | DIASTOLIC BLOOD PRESSURE: 66 MMHG | OXYGEN SATURATION: 98 % | TEMPERATURE: 98.1 F | SYSTOLIC BLOOD PRESSURE: 108 MMHG | HEART RATE: 56 BPM | RESPIRATION RATE: 18 BRPM | BODY MASS INDEX: 21.52 KG/M2

## 2019-06-21 LAB
ANION GAP SERPL CALCULATED.3IONS-SCNC: 10 MEQ/L (ref 9–15)
BUN BLDV-MCNC: 22 MG/DL (ref 8–23)
CALCIUM SERPL-MCNC: 8.8 MG/DL (ref 8.5–9.9)
CHLORIDE BLD-SCNC: 105 MEQ/L (ref 95–107)
CO2: 24 MEQ/L (ref 20–31)
CREAT SERPL-MCNC: 1.12 MG/DL (ref 0.7–1.2)
EKG ATRIAL RATE: 47 BPM
EKG ATRIAL RATE: 50 BPM
EKG ATRIAL RATE: 54 BPM
EKG ATRIAL RATE: 56 BPM
EKG ATRIAL RATE: 58 BPM
EKG ATRIAL RATE: 59 BPM
EKG P AXIS: 29 DEGREES
EKG P AXIS: 68 DEGREES
EKG P AXIS: 69 DEGREES
EKG P AXIS: 69 DEGREES
EKG P AXIS: 84 DEGREES
EKG P AXIS: 95 DEGREES
EKG P-R INTERVAL: 222 MS
EKG P-R INTERVAL: 224 MS
EKG P-R INTERVAL: 226 MS
EKG P-R INTERVAL: 236 MS
EKG P-R INTERVAL: 240 MS
EKG P-R INTERVAL: 242 MS
EKG Q-T INTERVAL: 422 MS
EKG Q-T INTERVAL: 428 MS
EKG Q-T INTERVAL: 436 MS
EKG Q-T INTERVAL: 454 MS
EKG Q-T INTERVAL: 480 MS
EKG Q-T INTERVAL: 494 MS
EKG QRS DURATION: 84 MS
EKG QRS DURATION: 88 MS
EKG QRS DURATION: 90 MS
EKG QRS DURATION: 90 MS
EKG QRS DURATION: 92 MS
EKG QRS DURATION: 92 MS
EKG QTC CALCULATION (BAZETT): 400 MS
EKG QTC CALCULATION (BAZETT): 413 MS
EKG QTC CALCULATION (BAZETT): 424 MS
EKG QTC CALCULATION (BAZETT): 428 MS
EKG QTC CALCULATION (BAZETT): 449 MS
EKG QTC CALCULATION (BAZETT): 450 MS
EKG R AXIS: 63 DEGREES
EKG R AXIS: 64 DEGREES
EKG R AXIS: 66 DEGREES
EKG R AXIS: 67 DEGREES
EKG R AXIS: 70 DEGREES
EKG R AXIS: 73 DEGREES
EKG T AXIS: 14 DEGREES
EKG T AXIS: 17 DEGREES
EKG T AXIS: 19 DEGREES
EKG T AXIS: 33 DEGREES
EKG T AXIS: 39 DEGREES
EKG T AXIS: 49 DEGREES
EKG VENTRICULAR RATE: 47 BPM
EKG VENTRICULAR RATE: 50 BPM
EKG VENTRICULAR RATE: 54 BPM
EKG VENTRICULAR RATE: 56 BPM
EKG VENTRICULAR RATE: 58 BPM
EKG VENTRICULAR RATE: 59 BPM
GFR AFRICAN AMERICAN: >60
GFR NON-AFRICAN AMERICAN: >60
GLUCOSE BLD-MCNC: 107 MG/DL (ref 70–99)
GLUCOSE BLD-MCNC: 121 MG/DL (ref 60–115)
GLUCOSE BLD-MCNC: 91 MG/DL (ref 60–115)
GLUCOSE BLD-MCNC: 92 MG/DL (ref 60–115)
PERFORMED ON: ABNORMAL
PERFORMED ON: NORMAL
PERFORMED ON: NORMAL
POTASSIUM SERPL-SCNC: 4.6 MEQ/L (ref 3.4–4.9)
SODIUM BLD-SCNC: 139 MEQ/L (ref 135–144)

## 2019-06-21 PROCEDURE — 80048 BASIC METABOLIC PNL TOTAL CA: CPT

## 2019-06-21 PROCEDURE — 36415 COLL VENOUS BLD VENIPUNCTURE: CPT

## 2019-06-21 PROCEDURE — 6370000000 HC RX 637 (ALT 250 FOR IP): Performed by: INTERNAL MEDICINE

## 2019-06-21 RX ORDER — SOTALOL HYDROCHLORIDE 120 MG/1
60 TABLET ORAL 2 TIMES DAILY
Qty: 60 TABLET | Refills: 3 | Status: ON HOLD | OUTPATIENT
Start: 2019-06-21 | End: 2020-03-15 | Stop reason: HOSPADM

## 2019-06-21 RX ORDER — FENOFIBRATE 54 MG/1
54 TABLET ORAL DAILY
Qty: 30 TABLET | Refills: 3 | Status: SHIPPED | OUTPATIENT
Start: 2019-06-22 | End: 2020-03-13 | Stop reason: ALTCHOICE

## 2019-06-21 RX ADMIN — SOTALOL HYDROCHLORIDE 60 MG: 120 TABLET ORAL at 08:16

## 2019-06-21 RX ADMIN — APIXABAN 5 MG: 5 TABLET, FILM COATED ORAL at 08:17

## 2019-06-21 RX ADMIN — FENOFIBRATE 54 MG: 54 TABLET ORAL at 08:16

## 2019-06-21 RX ADMIN — MAGNESIUM OXIDE TAB 400 MG (241.3 MG ELEMENTAL MG) 400 MG: 400 (241.3 MG) TAB at 08:17

## 2019-06-21 RX ADMIN — SOTALOL HYDROCHLORIDE 60 MG: 120 TABLET ORAL at 19:23

## 2019-06-21 RX ADMIN — ASPIRIN 81 MG 81 MG: 81 TABLET ORAL at 08:16

## 2019-06-21 ASSESSMENT — PAIN SCALES - GENERAL
PAINLEVEL_OUTOF10: 0

## 2019-06-21 NOTE — DISCHARGE SUMMARY
[facility] Heart Discharge Note        Patient: Meliton Draper  Unit/Bed: W309/R233-92  YOB: 1940  MRN: [id number]  Admit Date:  6/19/2019  Discharge Date: 6/21/2019     Final Diagnoses:  Symptomatic paroxysmal atrial fibrillation, with periods of tachycardia and bradycardia.  Probable sinus node dysfunction, however heart rate variability improved with maintenance of normal sinus rhythm.     High risk medication-Eliquis.     History of 2 previous falls the last being more than 6 months ago.     Atherosclerotic native vessel coronary artery disease with remote coronary artery bypass grafting.     Myocardial perfusion study done recently as an outpatient was negative for ischemia. Possible inferior myocardial infarctions versus diaphragmatic attenuation artifact. LV ejection fraction 47%.     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     Shortness of breath and fatigue over the last week, most likely related to atrial fibrillation with rapid ventricular rate.     Elevated d-dimer, recent increase in shortness of breath and decreased activity level. Spiral CT of the chest for pulmonary embolism was negative. Venous duplex of lower extremities was negative for deep vein thrombosis.       Subjective Complaint:              He feels well today. He would like to go home this evening.   No chest pain or shortness of breath.     Physical Examination:                BP (!) 101/58   Pulse 58   Temp 97.2 °F (36.2 °C) (Oral)   Resp 18   Ht 5' 8\" (1.727 m)   Wt 142 lb (64.4 kg)   SpO2 98%   BMI 21.59 kg/m²                     Intake/Output Summary (Last 24 hours) at 6/21/2019 1250  Last data filed at 6/21/2019 0824      Gross per 24 hour   Intake 240 ml   Output --   Net 240 ml      Weights      Wt Readings from Last 3 Encounters:   06/19/19 142 lb (64.4 kg)   12/12/18 147 lb 14.9 oz (67.1 kg)   12/08/18 147 lb 14.4 oz (67.1 kg)         General:  Awake, alert, oriented X 3.  No apparent distress. Heart:  Regular rate Regular rhythm, S1> S2 no murmurs, gallops, or rubs. Lungs: CTA bilaterally, bilat symmetrical expansion, no wheeze, rales, or rhonchi. Abdomen: Bowel sounds present, soft, nontender,  no peritoneal signs  Extremities:  No clubbing No edema Distal pulses are palpable  Skin: Warm and dry  Mood and affect: Appropriate for the circumstance.     Telemetry:                  sinus bradycardia  50's, no advanced AV block. OK 222msec. QT corrected less than 450 ms.                   LABS:              CBC:       Recent Labs     06/20/19  0550   WBC 6.5   HGB 11.9*                     BMP:         Recent Labs     06/20/19  0550 06/21/19  0544    139   K 4.6 4.6    105   CO2 22 24   BUN 23 22   CREATININE 0.94 1.12   GLUCOSE 101* 107*                            BNP:       Recent Labs     06/20/19  0550   PROBNP 936         Cardiac Testing:               EKG today with Sinus bradycardia at 54bpm,OK interval 222msec,QRS 92msec,QTc 400msec.     Assessment:     Symptomatic paroxysmal atrial fibrillation, with periods of tachycardia and bradycardia.  Probable sinus node dysfunction, however heart rate variability improved with maintenance of normal sinus rhythm.     High risk medication-Eliquis.     History of 2 previous falls the last being more than 6 months ago.     Atherosclerotic native vessel coronary artery disease with remote coronary artery bypass grafting.     Myocardial perfusion study done recently as an outpatient was negative for ischemia. Possible inferior myocardial infarctions versus diaphragmatic attenuation artifact. LV ejection fraction 47%.     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     Shortness of breath and fatigue over the last week, most likely related to atrial fibrillation with rapid ventricular rate.     Elevated d-dimer, recent increase in shortness of breath and decreased activity level.

## 2019-06-21 NOTE — DISCHARGE INSTR - DIET

## 2019-06-21 NOTE — PROGRESS NOTES
normal sinus rhythm. High risk medication-Eliquis. History of 2 previous falls the last being more than 6 months ago. Atherosclerotic native vessel coronary artery disease with remote coronary artery bypass grafting. Myocardial perfusion study done recently as an outpatient was negative for ischemia. Possible inferior myocardial infarctions versus diaphragmatic attenuation artifact. LV ejection fraction 47%. 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 Shortness of breath and fatigue over the last week, most likely related to atrial fibrillation with rapid ventricular rate. Elevated d-dimer, recent increase in shortness of breath and decreased activity level. Spiral CT of the chest for pulmonary embolism was negative. Venous duplex of lower extremities was negative for deep vein thrombosis.         Plan:  Okay to discharge home this evening. He will be scheduled for an outpatient Holter monitor and follow-up with Dr. José Luis Moreland. Eventual follow-up with Dr. Raymond Pena. The patient has expressed a desire to avoid a pacemaker if at all possible. Sotalol 60 mg p.o. twice daily and Eliquis 5 mg p.o. twice daily.       Electronically signed by Johnnie Mathur MD on 6/21/2019 at 12:50 PM

## 2019-09-09 ENCOUNTER — HOSPITAL ENCOUNTER (OUTPATIENT)
Dept: CARDIOLOGY | Age: 79
Discharge: HOME | End: 2019-09-09
Payer: MEDICARE

## 2019-09-09 PROCEDURE — 93299 HC INTERROG REMOTE LOOP RECORDER: CPT

## 2019-09-18 ENCOUNTER — HOSPITAL ENCOUNTER (EMERGENCY)
Age: 79
Discharge: HOME | End: 2019-09-18
Payer: MEDICARE

## 2019-09-18 VITALS
DIASTOLIC BLOOD PRESSURE: 72 MMHG | OXYGEN SATURATION: 97 % | BODY MASS INDEX: 22.73 KG/M2 | WEIGHT: 150 LBS | HEIGHT: 68 IN | SYSTOLIC BLOOD PRESSURE: 132 MMHG | TEMPERATURE: 97.6 F | HEART RATE: 60 BPM | RESPIRATION RATE: 16 BRPM

## 2019-09-18 DIAGNOSIS — S51.811A SKIN TEAR OF FOREARM WITHOUT COMPLICATION, RIGHT, INITIAL ENCOUNTER: Primary | ICD-10-CM

## 2019-09-18 PROCEDURE — 90715 TDAP VACCINE 7 YRS/> IM: CPT | Performed by: PHYSICIAN ASSISTANT

## 2019-09-18 PROCEDURE — 90471 IMMUNIZATION ADMIN: CPT | Performed by: PHYSICIAN ASSISTANT

## 2019-09-18 PROCEDURE — 6370000000 HC RX 637 (ALT 250 FOR IP): Performed by: PHYSICIAN ASSISTANT

## 2019-09-18 PROCEDURE — 99282 EMERGENCY DEPT VISIT SF MDM: CPT

## 2019-09-18 PROCEDURE — 6360000002 HC RX W HCPCS: Performed by: PHYSICIAN ASSISTANT

## 2019-09-18 RX ORDER — BACITRACIN, NEOMYCIN, POLYMYXIN B 400; 3.5; 5 [USP'U]/G; MG/G; [USP'U]/G
OINTMENT TOPICAL ONCE
Status: COMPLETED | OUTPATIENT
Start: 2019-09-18 | End: 2019-09-18

## 2019-09-18 RX ORDER — SULFAMETHOXAZOLE AND TRIMETHOPRIM 800; 160 MG/1; MG/1
1 TABLET ORAL 2 TIMES DAILY
Qty: 10 TABLET | Refills: 0 | Status: SHIPPED | OUTPATIENT
Start: 2019-09-18 | End: 2019-09-23

## 2019-09-18 RX ADMIN — TETANUS TOXOID, REDUCED DIPHTHERIA TOXOID AND ACELLULAR PERTUSSIS VACCINE, ADSORBED 0.5 ML: 5; 2.5; 8; 8; 2.5 SUSPENSION INTRAMUSCULAR at 09:18

## 2019-09-18 RX ADMIN — BACITRACIN ZINC, NEOMYCIN, POLYMYXIN B 1 G: 400; 3.5; 5 OINTMENT TOPICAL at 09:18

## 2019-09-18 ASSESSMENT — ENCOUNTER SYMPTOMS
ABDOMINAL DISTENTION: 0
RHINORRHEA: 0
ROS SKIN COMMENTS: SKIN TEAR RIGHT FOREARM
ABDOMINAL PAIN: 0
SORE THROAT: 0
COUGH: 0
CHOKING: 0
EYE DISCHARGE: 0
SHORTNESS OF BREATH: 0
CONSTIPATION: 0
COLOR CHANGE: 0

## 2019-09-18 NOTE — ED PROVIDER NOTES
Transportation needs:     Medical: None     Non-medical: None   Tobacco Use    Smoking status: Former Smoker     Types: Cigarettes    Smokeless tobacco: Never Used    Tobacco comment: 3 cigarettes/month   Substance and Sexual Activity    Alcohol use: No    Drug use: No    Sexual activity: None   Lifestyle    Physical activity:     Days per week: None     Minutes per session: None    Stress: None   Relationships    Social connections:     Talks on phone: None     Gets together: None     Attends Sikhism service: None     Active member of club or organization: None     Attends meetings of clubs or organizations: None     Relationship status: None    Intimate partner violence:     Fear of current or ex partner: None     Emotionally abused: None     Physically abused: None     Forced sexual activity: None   Other Topics Concern    None   Social History Narrative    None       SCREENINGS      @FLOW([id number])@      PHYSICAL EXAM    (up to 7 for level 4, 8 or more for level 5)     ED Triage Vitals [09/18/19 0849]   BP Temp Temp Source Pulse Resp SpO2 Height Weight   132/72 97.6 °F (36.4 °C) Oral 60 16 97 % 5' 8\" (1.727 m) 150 lb (68 kg)       Physical Exam   Constitutional: He is oriented to person, place, and time. He appears well-developed and well-nourished. HENT:   Head: Normocephalic. Eyes: Pupils are equal, round, and reactive to light. Neck: Neck supple. No JVD present. No tracheal deviation present. Cardiovascular: Normal rate. Pulmonary/Chest: Effort normal. No respiratory distress. He has no wheezes. He has no rales. He exhibits no tenderness. Abdominal: He exhibits no distension and no mass. There is no tenderness. There is no guarding. Musculoskeletal: He exhibits no edema or deformity. Neurological: He is oriented to person, place, and time. Coordination normal.   Skin:   2 cm area of skin tear noted to right dorsal forearm. No bleeding no discharge.   No signs of infection at this otherwise noted below, none     Procedures    FINAL IMPRESSION      1. Skin tear of forearm without complication, right, initial encounter          DISPOSITION/PLAN   DISPOSITION Decision To Discharge 09/18/2019 09:10:48 AM      PATIENT REFERRED TO:  Mayelin Corbin DO  [address] [facility]  [address]  [address] [facility] [address]  [phone]    In 2 days        DISCHARGE MEDICATIONS:  New Prescriptions    SULFAMETHOXAZOLE-TRIMETHOPRIM (BACTRIM DS) 800-160 MG PER TABLET    Take 1 tablet by mouth 2 times daily for 5 days          (Please note that portions of this note were completed with a voice recognition program.  Efforts were made to edit the dictations but occasionally words are mis-transcribed.)    Natali Mays PA-C (electronically signed)  Attending Emergency Physician         Natali Mays PA-C  09/18/19 0915    Attending Supervisory Note/Shared Visit   I have personally performed a face to face diagnostic evaluation on this patient. I have reviewed the mid-levels findings and agree.   History and Exam by me shows skin tear      Tessa Maynard DO  Attending Emergency Physician         Tessa Maynard DO  09/18/19 6968

## 2019-12-16 ENCOUNTER — HOSPITAL ENCOUNTER (OUTPATIENT)
Dept: CARDIOLOGY | Age: 79
Discharge: HOME | End: 2019-12-16
Payer: MEDICARE

## 2019-12-16 PROCEDURE — 93299 HC INTERROG REMOTE LOOP RECORDER: CPT

## 2020-02-03 ENCOUNTER — OFFICE VISIT (OUTPATIENT)
Dept: NEUROLOGY | Age: 80
End: 2020-02-03
Payer: MEDICARE

## 2020-02-03 VITALS
DIASTOLIC BLOOD PRESSURE: 76 MMHG | BODY MASS INDEX: 23.32 KG/M2 | WEIGHT: 153.4 LBS | SYSTOLIC BLOOD PRESSURE: 108 MMHG | HEART RATE: 65 BPM

## 2020-02-03 PROBLEM — R55 VASOVAGAL SYNCOPE: Status: ACTIVE | Noted: 2020-02-03

## 2020-02-03 PROBLEM — I25.10 ATHEROSCLEROSIS OF NATIVE CORONARY ARTERY OF NATIVE HEART WITHOUT ANGINA PECTORIS: Status: ACTIVE | Noted: 2018-11-26

## 2020-02-03 PROBLEM — Q04.6 COLLOID CYST OF THIRD VENTRICLE (HCC): Status: ACTIVE | Noted: 2020-02-03

## 2020-02-03 PROBLEM — R42 DIZZINESS AND GIDDINESS: Status: ACTIVE | Noted: 2020-02-03

## 2020-02-03 PROBLEM — Q04.6 COLLOID CYST OF BRAIN (HCC): Status: ACTIVE | Noted: 2020-02-03

## 2020-02-03 PROBLEM — I35.9 NONRHEUMATIC AORTIC VALVE DISORDER, UNSPECIFIED: Status: ACTIVE | Noted: 2018-11-26

## 2020-02-03 PROBLEM — Z95.1 PRESENCE OF AORTOCORONARY BYPASS GRAFT: Status: ACTIVE | Noted: 2018-11-26

## 2020-02-03 PROBLEM — I25.5 ISCHEMIC CARDIOMYOPATHY: Status: ACTIVE | Noted: 2018-11-26

## 2020-02-03 PROBLEM — I10 ESSENTIAL HYPERTENSION: Status: ACTIVE | Noted: 2018-11-26

## 2020-02-03 PROBLEM — Z87.891 PERSONAL HISTORY OF NICOTINE DEPENDENCE: Status: ACTIVE | Noted: 2018-11-26

## 2020-02-03 PROBLEM — I25.10 CORONARY ARTERY DISEASE INVOLVING NATIVE CORONARY ARTERY OF NATIVE HEART WITHOUT ANGINA PECTORIS: Status: ACTIVE | Noted: 2018-11-26

## 2020-02-03 PROBLEM — I25.10 CAD (CORONARY ARTERY DISEASE): Status: ACTIVE | Noted: 2018-11-26

## 2020-02-03 PROCEDURE — 99214 OFFICE O/P EST MOD 30 MIN: CPT | Performed by: PSYCHIATRY & NEUROLOGY

## 2020-02-03 PROCEDURE — G8420 CALC BMI NORM PARAMETERS: HCPCS | Performed by: PSYCHIATRY & NEUROLOGY

## 2020-02-03 PROCEDURE — G8427 DOCREV CUR MEDS BY ELIG CLIN: HCPCS | Performed by: PSYCHIATRY & NEUROLOGY

## 2020-02-03 PROCEDURE — 1036F TOBACCO NON-USER: CPT | Performed by: PSYCHIATRY & NEUROLOGY

## 2020-02-03 PROCEDURE — G8484 FLU IMMUNIZE NO ADMIN: HCPCS | Performed by: PSYCHIATRY & NEUROLOGY

## 2020-02-03 PROCEDURE — 4040F PNEUMOC VAC/ADMIN/RCVD: CPT | Performed by: PSYCHIATRY & NEUROLOGY

## 2020-02-03 PROCEDURE — 1123F ACP DISCUSS/DSCN MKR DOCD: CPT | Performed by: PSYCHIATRY & NEUROLOGY

## 2020-02-03 RX ORDER — CALCIUM CITRATE/VITAMIN D3 200MG-6.25
TABLET ORAL
Status: ON HOLD | COMMUNITY
Start: 2019-12-12 | End: 2022-04-21 | Stop reason: HOSPADM

## 2020-02-03 ASSESSMENT — ENCOUNTER SYMPTOMS
CHOKING: 0
TROUBLE SWALLOWING: 0
PHOTOPHOBIA: 0
BACK PAIN: 0
VOMITING: 0
NAUSEA: 0
SHORTNESS OF BREATH: 0

## 2020-02-03 NOTE — PROGRESS NOTES
Subjective:      Patient ID: Sandra Alicia is a [de-identified] y.o. male who presents today for:  Chief Complaint   Patient presents with    6 Month Follow-Up     Patient states that he has not had any episodes of syncope and he just feels like he has no peep. Patient states that he feels okay other then that but just gets tire quick. HPI syncopal episode like related to underlying medications. Patient since last seen has not been hypotensive we continue to discuss Northera patient also has a loop recorder. MRI showed a colloid cyst of the third ventricle is only 5 mm quite away from the foramen of Monro and likely not related to the passing out episode. Blood pressure is only 108/76 and has maintained his blood pressures on most occasions and has  Not had any further episodes of lightheadedness or dizziness. He is walking without a device. He feels safer when he is in the grocery store behind a cart. Is not developed any parkinsonian features.     Past Medical History:   Diagnosis Date    CAD (coronary artery disease)     cardiac stents    Diabetes mellitus ([address]     Hyperlipidemia     Hypertension      Past Surgical History:   Procedure Laterality Date    APPENDECTOMY      CORONARY ARTERY BYPASS GRAFT      triple bypass    EYE SURGERY Bilateral     cataract surgery    INSERTABLE CARDIAC MONITOR Left 12/2018     Social History     Socioeconomic History    Marital status:      Spouse name: Not on file    Number of children: Not on file    Years of education: Not on file    Highest education level: Not on file   Occupational History    Not on file   Social Needs    Financial resource strain: Not on file    Food insecurity:     Worry: Not on file     Inability: Not on file    Transportation needs:     Medical: Not on file     Non-medical: Not on file   Tobacco Use    Smoking status: Former Smoker     Types: Cigarettes    Smokeless tobacco: Never Used    Tobacco comment: 3 cigarettes/month for: [place] Phlegm, LABBENZ, CANNAB, COCAINESCRN, LABMETH, OPIATESCREENURINE, PHENCYCLIDINESCREENURINE, PPXUR, ETOH  No results found for: LITHIUM, DILFRTOT, VALPROATE    Assessment:       Diagnosis Orders   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 Vasovagal syncope     3. Dizziness and giddiness     Syncopal episode likely related to underlying medications. Patient has not any further hypotensive episodes. He does monitor his blood pressures at home and some of them have been systolic 125 and 168. He rarely gets below 100. He has a loop recorder which does not show any thing significant findings  followed by Dr. José Lazo. Pt has not   Developed any parkinsonian features and he actually is walking without a device is not of any major memory issues    Of note he has a colloid cyst of the third ventricle which is 5 mm and away from the foramen of Monro and not likely the cause of his syncope. This may require follow-up in a year or 2. I recommended that he continue to monitor his blood pressures and if they continue to remain low and he does not have systemic hypertension he may be a candidate for Northera. He though has blood pressures reaching 150 at times and we would like to know how many times that happens over the course of a month or 2 he will keep a diary and let me know. Plan:      No orders of the defined types were placed in this encounter. No orders of the defined types were placed in this encounter. Return in about 6 months (around 8/3/2020).       Emilia Calix MD

## 2020-03-13 ENCOUNTER — HOSPITAL ENCOUNTER (INPATIENT)
Age: 80
LOS: 2 days | Discharge: HOME | DRG: 155 | End: 2020-03-15
Attending: INTERNAL MEDICINE | Admitting: INTERNAL MEDICINE
Payer: MEDICARE

## 2020-03-13 ENCOUNTER — APPOINTMENT (OUTPATIENT)
Dept: MRI IMAGING | Age: 80
DRG: 155 | End: 2020-03-13
Payer: MEDICARE

## 2020-03-13 ENCOUNTER — APPOINTMENT (OUTPATIENT)
Dept: CT IMAGING | Age: 80
DRG: 155 | End: 2020-03-13
Payer: MEDICARE

## 2020-03-13 ENCOUNTER — APPOINTMENT (OUTPATIENT)
Dept: GENERAL RADIOLOGY | Age: 80
DRG: 155 | End: 2020-03-13
Payer: MEDICARE

## 2020-03-13 PROBLEM — I63.9 ACUTE CVA (CEREBROVASCULAR ACCIDENT) (HCC): Status: ACTIVE | Noted: 2020-03-13

## 2020-03-13 PROBLEM — R42 DIZZINESS: Status: ACTIVE | Noted: 2020-03-13

## 2020-03-13 LAB
ALBUMIN SERPL-MCNC: 4 G/DL (ref 3.5–4.6)
ALP BLD-CCNC: 63 U/L (ref 35–104)
ALT SERPL-CCNC: 9 U/L (ref 0–41)
ANION GAP SERPL CALCULATED.3IONS-SCNC: 11 MEQ/L (ref 9–15)
APTT: 31.7 SEC (ref 24.4–36.8)
AST SERPL-CCNC: 14 U/L (ref 0–40)
BASOPHILS ABSOLUTE: 0.1 K/UL (ref 0–0.2)
BASOPHILS RELATIVE PERCENT: 0.9 %
BILIRUB SERPL-MCNC: 0.3 MG/DL (ref 0.2–0.7)
BILIRUBIN URINE: NEGATIVE
BLOOD, URINE: ABNORMAL
BUN BLDV-MCNC: 23 MG/DL (ref 8–23)
C-REACTIVE PROTEIN, HIGH SENSITIVITY: 3.2 MG/L (ref 0–5)
CALCIUM SERPL-MCNC: 9.1 MG/DL (ref 8.5–9.9)
CHLORIDE BLD-SCNC: 103 MEQ/L (ref 95–107)
CLARITY: CLEAR
CO2: 26 MEQ/L (ref 20–31)
COLOR: YELLOW
CREAT SERPL-MCNC: 1.09 MG/DL (ref 0.7–1.2)
EKG ATRIAL RATE: 57 BPM
EKG ATRIAL RATE: 60 BPM
EKG P AXIS: 57 DEGREES
EKG P AXIS: 83 DEGREES
EKG P-R INTERVAL: 218 MS
EKG P-R INTERVAL: 232 MS
EKG Q-T INTERVAL: 426 MS
EKG Q-T INTERVAL: 466 MS
EKG QRS DURATION: 88 MS
EKG QRS DURATION: 92 MS
EKG QTC CALCULATION (BAZETT): 426 MS
EKG QTC CALCULATION (BAZETT): 453 MS
EKG R AXIS: 60 DEGREES
EKG R AXIS: 69 DEGREES
EKG T AXIS: 87 DEGREES
EKG T AXIS: 99 DEGREES
EKG VENTRICULAR RATE: 57 BPM
EKG VENTRICULAR RATE: 60 BPM
EOSINOPHILS ABSOLUTE: 0.5 K/UL (ref 0–0.7)
EOSINOPHILS RELATIVE PERCENT: 7.2 %
EPITHELIAL CELLS, UA: ABNORMAL /HPF
GFR AFRICAN AMERICAN: >60
GFR NON-AFRICAN AMERICAN: >60
GLOBULIN: 2.5 G/DL (ref 2.3–3.5)
GLUCOSE BLD-MCNC: 127 MG/DL (ref 60–115)
GLUCOSE BLD-MCNC: 178 MG/DL (ref 70–99)
GLUCOSE URINE: 100 MG/DL
HCT VFR BLD CALC: 41.4 % (ref 42–52)
HEMOGLOBIN: 13.7 G/DL (ref 14–18)
INR BLD: 1.3
KETONES, URINE: NEGATIVE MG/DL
LEUKOCYTE ESTERASE, URINE: NEGATIVE
LYMPHOCYTES ABSOLUTE: 1.4 K/UL (ref 1–4.8)
LYMPHOCYTES RELATIVE PERCENT: 19.1 %
MAGNESIUM: 2.2 MG/DL (ref 1.7–2.4)
MCH RBC QN AUTO: 30.2 PG (ref 27–31.3)
MCHC RBC AUTO-ENTMCNC: 33.1 % (ref 33–37)
MCV RBC AUTO: 91.2 FL (ref 80–100)
MONOCYTES ABSOLUTE: 0.7 K/UL (ref 0.2–0.8)
MONOCYTES RELATIVE PERCENT: 9.5 %
NEUTROPHILS ABSOLUTE: 4.7 K/UL (ref 1.4–6.5)
NEUTROPHILS RELATIVE PERCENT: 63.3 %
NITRITE, URINE: NEGATIVE
PDW BLD-RTO: 13.3 % (ref 11.5–14.5)
PERFORMED ON: ABNORMAL
PH UA: 6.5 (ref 5–9)
PLATELET # BLD: 260 K/UL (ref 130–400)
POTASSIUM SERPL-SCNC: 4.5 MEQ/L (ref 3.4–4.9)
PROTEIN UA: ABNORMAL MG/DL
PROTHROMBIN TIME: 16.8 SEC (ref 12.3–14.9)
RBC # BLD: 4.54 M/UL (ref 4.7–6.1)
RBC UA: ABNORMAL /HPF (ref 0–2)
SODIUM BLD-SCNC: 140 MEQ/L (ref 135–144)
SPECIFIC GRAVITY UA: 1.02 (ref 1–1.03)
TOTAL PROTEIN: 6.5 G/DL (ref 6.3–8)
TROPONIN: <0.01 NG/ML (ref 0–0.01)
URINE REFLEX TO CULTURE: YES
UROBILINOGEN, URINE: 0.2 E.U./DL
WBC # BLD: 7.5 K/UL (ref 4.8–10.8)
WBC UA: ABNORMAL /HPF (ref 0–5)

## 2020-03-13 PROCEDURE — 86141 C-REACTIVE PROTEIN HS: CPT

## 2020-03-13 PROCEDURE — 1210000000 HC MED SURG R&B

## 2020-03-13 PROCEDURE — 85610 PROTHROMBIN TIME: CPT

## 2020-03-13 PROCEDURE — 93005 ELECTROCARDIOGRAM TRACING: CPT | Performed by: PHYSICIAN ASSISTANT

## 2020-03-13 PROCEDURE — 6370000000 HC RX 637 (ALT 250 FOR IP): Performed by: PHYSICIAN ASSISTANT

## 2020-03-13 PROCEDURE — 70551 MRI BRAIN STEM W/O DYE: CPT

## 2020-03-13 PROCEDURE — 85025 COMPLETE CBC W/AUTO DIFF WBC: CPT

## 2020-03-13 PROCEDURE — 71046 X-RAY EXAM CHEST 2 VIEWS: CPT

## 2020-03-13 PROCEDURE — 99285 EMERGENCY DEPT VISIT HI MDM: CPT

## 2020-03-13 PROCEDURE — 2580000003 HC RX 258: Performed by: INTERNAL MEDICINE

## 2020-03-13 PROCEDURE — 84484 ASSAY OF TROPONIN QUANT: CPT

## 2020-03-13 PROCEDURE — 80053 COMPREHEN METABOLIC PANEL: CPT

## 2020-03-13 PROCEDURE — 70450 CT HEAD/BRAIN W/O DYE: CPT

## 2020-03-13 PROCEDURE — 70544 MR ANGIOGRAPHY HEAD W/O DYE: CPT

## 2020-03-13 PROCEDURE — 87086 URINE CULTURE/COLONY COUNT: CPT

## 2020-03-13 PROCEDURE — 81001 URINALYSIS AUTO W/SCOPE: CPT

## 2020-03-13 PROCEDURE — 83735 ASSAY OF MAGNESIUM: CPT

## 2020-03-13 PROCEDURE — 85730 THROMBOPLASTIN TIME PARTIAL: CPT

## 2020-03-13 PROCEDURE — 36415 COLL VENOUS BLD VENIPUNCTURE: CPT

## 2020-03-13 PROCEDURE — 6370000000 HC RX 637 (ALT 250 FOR IP): Performed by: INTERNAL MEDICINE

## 2020-03-13 RX ORDER — ASPIRIN 81 MG/1
81 TABLET ORAL DAILY
Status: DISCONTINUED | OUTPATIENT
Start: 2020-03-14 | End: 2020-03-15 | Stop reason: HOSPADM

## 2020-03-13 RX ORDER — NITROGLYCERIN 0.4 MG/1
0.4 TABLET SUBLINGUAL EVERY 5 MIN PRN
Status: DISCONTINUED | OUTPATIENT
Start: 2020-03-13 | End: 2020-03-15 | Stop reason: HOSPADM

## 2020-03-13 RX ORDER — SODIUM CHLORIDE 0.9 % (FLUSH) 0.9 %
10 SYRINGE (ML) INJECTION EVERY 12 HOURS SCHEDULED
Status: DISCONTINUED | OUTPATIENT
Start: 2020-03-13 | End: 2020-03-15 | Stop reason: HOSPADM

## 2020-03-13 RX ORDER — LANOLIN ALCOHOL/MO/W.PET/CERES
400 CREAM (GRAM) TOPICAL DAILY
Status: DISCONTINUED | OUTPATIENT
Start: 2020-03-14 | End: 2020-03-15 | Stop reason: HOSPADM

## 2020-03-13 RX ORDER — SOTALOL HYDROCHLORIDE 120 MG/1
60 TABLET ORAL 2 TIMES DAILY
Status: DISCONTINUED | OUTPATIENT
Start: 2020-03-13 | End: 2020-03-14

## 2020-03-13 RX ORDER — SODIUM CHLORIDE 0.9 % (FLUSH) 0.9 %
10 SYRINGE (ML) INJECTION PRN
Status: DISCONTINUED | OUTPATIENT
Start: 2020-03-13 | End: 2020-03-15 | Stop reason: HOSPADM

## 2020-03-13 RX ORDER — MECLIZINE HYDROCHLORIDE 25 MG/1
25 TABLET ORAL ONCE
Status: COMPLETED | OUTPATIENT
Start: 2020-03-13 | End: 2020-03-13

## 2020-03-13 RX ORDER — ACETAMINOPHEN 325 MG/1
650 TABLET ORAL EVERY 4 HOURS PRN
Status: DISCONTINUED | OUTPATIENT
Start: 2020-03-13 | End: 2020-03-15 | Stop reason: HOSPADM

## 2020-03-13 RX ADMIN — Medication 10 ML: at 23:06

## 2020-03-13 RX ADMIN — MECLIZINE HYDROCHLORIDE 25 MG: 25 TABLET ORAL at 17:27

## 2020-03-13 RX ADMIN — APIXABAN 5 MG: 5 TABLET, FILM COATED ORAL at 23:06

## 2020-03-13 ASSESSMENT — ENCOUNTER SYMPTOMS
APNEA: 0
SHORTNESS OF BREATH: 0
VOICE CHANGE: 0
ABDOMINAL DISTENTION: 0
PHOTOPHOBIA: 0
COUGH: 0
BACK PAIN: 0
EYE DISCHARGE: 0
ANAL BLEEDING: 0
NAUSEA: 0
VOMITING: 0

## 2020-03-13 NOTE — ACP (ADVANCE CARE PLANNING)
This patient has both a [address] document and a Living Will Declaration. A copy of both documents can be located in the [employer] Tab.     Shaunna Bey BCC

## 2020-03-13 NOTE — ED PROVIDER NOTES
MRI BRAIN WO CONTRAST    (Results Pending)   MRA HEAD WO CONTRAST    (Results Pending)         ED BEDSIDE ULTRASOUND:   Performed by ED Physician - none    LABS:  Labs Reviewed   COMPREHENSIVE METABOLIC PANEL - Abnormal; Notable for the following components:       Result Value    Glucose 178 (*)     All other components within normal limits   CBC WITH AUTO DIFFERENTIAL - Abnormal; Notable for the following components:    RBC 4.54 (*)     Hemoglobin 13.7 (*)     Hematocrit 41.4 (*)     All other components within normal limits   PROTIME-INR - Abnormal; Notable for the following components:    Protime 16.8 (*)     All other components within normal limits   TROPONIN   MAGNESIUM   APTT   HIGH SENSITIVITY CRP   URINE RT REFLEX TO CULTURE       All other labs were within normal range or not returned as of this dictation. EMERGENCY DEPARTMENT COURSE and DIFFERENTIAL DIAGNOSIS/MDM:   Vitals:    Vitals:    03/13/20 1600 03/13/20 1700 03/13/20 1751 03/13/20 1952   BP: (!) 155/101 (!) 157/94 (!) 155/101 (!) 157/104   Pulse: 55 58 59 58   Resp: 16 16 16 16   Temp:       TempSrc:       SpO2: 98% 97% 99% 98%   Weight:       Height:                    MDM  Number of Diagnoses or Management Options  Cerebrovascular accident (CVA), unspecified mechanism [facility]):   Diagnosis management comments: Denies stroke scale performed. TPA not considered appropriate patient on Eliquis and symptoms of 2 days duration. Discussed with neurology nurse practitioner she states if we admit patient there would like us to see if we can get the MRI MRI scheduled. As with Dr. Bartolo Mullins covering for Dr. Gokul France he accepts patient Dr. Delroy Arizmendi service MRI/MRA ordered. Amount and/or Complexity of Data Reviewed  Clinical lab tests: reviewed        CRITICAL CARE TIME       CONSULTS:  None    PROCEDURES:  Unless otherwise noted below, none     Procedures    FINAL IMPRESSION      1.  Cerebrovascular accident (CVA), unspecified mechanism [facility])          DISPOSITION/PLAN   DISPOSITION        PATIENT REFERRED TO:  Anai Ramsey,   [address] [facility]  [address]  [address] [place] [address] [phone]            DISCHARGE MEDICATIONS:  New Prescriptions    No medications on file          (Please note that portions of this note were completed with a voice recognition program.  Efforts were made to edit the dictations but occasionally words are mis-transcribed.)    Mahendra Saunders PA-C (electronically signed)  Attending Emergency Physician       Mahendra Saunders PA-C  03/13/20 2039

## 2020-03-14 ENCOUNTER — APPOINTMENT (OUTPATIENT)
Dept: ULTRASOUND IMAGING | Age: 80
DRG: 155 | End: 2020-03-14
Payer: MEDICARE

## 2020-03-14 LAB
ANION GAP SERPL CALCULATED.3IONS-SCNC: 12 MEQ/L (ref 9–15)
BUN BLDV-MCNC: 18 MG/DL (ref 8–23)
CALCIUM SERPL-MCNC: 8.9 MG/DL (ref 8.5–9.9)
CHLORIDE BLD-SCNC: 104 MEQ/L (ref 95–107)
CHOLESTEROL, TOTAL: 94 MG/DL (ref 0–199)
CO2: 26 MEQ/L (ref 20–31)
CREAT SERPL-MCNC: 0.89 MG/DL (ref 0.7–1.2)
GFR AFRICAN AMERICAN: >60
GFR NON-AFRICAN AMERICAN: >60
GLUCOSE BLD-MCNC: 101 MG/DL (ref 60–115)
GLUCOSE BLD-MCNC: 102 MG/DL (ref 60–115)
GLUCOSE BLD-MCNC: 107 MG/DL (ref 70–99)
GLUCOSE BLD-MCNC: 177 MG/DL (ref 60–115)
GLUCOSE BLD-MCNC: 183 MG/DL (ref 60–115)
HCT VFR BLD CALC: 39.3 % (ref 42–52)
HDLC SERPL-MCNC: 36 MG/DL (ref 40–59)
HEMOGLOBIN: 12.9 G/DL (ref 14–18)
LDL CHOLESTEROL CALCULATED: 39 MG/DL (ref 0–129)
MCH RBC QN AUTO: 30.3 PG (ref 27–31.3)
MCHC RBC AUTO-ENTMCNC: 33 % (ref 33–37)
MCV RBC AUTO: 91.9 FL (ref 80–100)
PDW BLD-RTO: 13.1 % (ref 11.5–14.5)
PERFORMED ON: ABNORMAL
PERFORMED ON: ABNORMAL
PERFORMED ON: NORMAL
PERFORMED ON: NORMAL
PLATELET # BLD: 233 K/UL (ref 130–400)
POTASSIUM REFLEX MAGNESIUM: 4.6 MEQ/L (ref 3.4–4.9)
RBC # BLD: 4.27 M/UL (ref 4.7–6.1)
SODIUM BLD-SCNC: 142 MEQ/L (ref 135–144)
TRIGL SERPL-MCNC: 93 MG/DL (ref 0–150)
WBC # BLD: 8 K/UL (ref 4.8–10.8)

## 2020-03-14 PROCEDURE — 6370000000 HC RX 637 (ALT 250 FOR IP): Performed by: INTERNAL MEDICINE

## 2020-03-14 PROCEDURE — 1210000000 HC MED SURG R&B

## 2020-03-14 PROCEDURE — 92523 SPEECH SOUND LANG COMPREHEN: CPT

## 2020-03-14 PROCEDURE — 93880 EXTRACRANIAL BILAT STUDY: CPT

## 2020-03-14 PROCEDURE — 97162 PT EVAL MOD COMPLEX 30 MIN: CPT

## 2020-03-14 PROCEDURE — 92610 EVALUATE SWALLOWING FUNCTION: CPT

## 2020-03-14 PROCEDURE — 97166 OT EVAL MOD COMPLEX 45 MIN: CPT

## 2020-03-14 PROCEDURE — 6370000000 HC RX 637 (ALT 250 FOR IP): Performed by: NURSE PRACTITIONER

## 2020-03-14 PROCEDURE — 95992 CANALITH REPOSITIONING PROC: CPT

## 2020-03-14 PROCEDURE — 80061 LIPID PANEL: CPT

## 2020-03-14 PROCEDURE — 99222 1ST HOSP IP/OBS MODERATE 55: CPT | Performed by: PSYCHIATRY & NEUROLOGY

## 2020-03-14 PROCEDURE — 85027 COMPLETE CBC AUTOMATED: CPT

## 2020-03-14 PROCEDURE — 97535 SELF CARE MNGMENT TRAINING: CPT

## 2020-03-14 PROCEDURE — 2580000003 HC RX 258: Performed by: INTERNAL MEDICINE

## 2020-03-14 PROCEDURE — 36415 COLL VENOUS BLD VENIPUNCTURE: CPT

## 2020-03-14 PROCEDURE — 80048 BASIC METABOLIC PNL TOTAL CA: CPT

## 2020-03-14 RX ORDER — MECLIZINE HYDROCHLORIDE 25 MG/1
25 TABLET ORAL 3 TIMES DAILY
Status: DISCONTINUED | OUTPATIENT
Start: 2020-03-14 | End: 2020-03-15 | Stop reason: HOSPADM

## 2020-03-14 RX ORDER — PREDNISONE 20 MG/1
40 TABLET ORAL DAILY
Status: DISCONTINUED | OUTPATIENT
Start: 2020-03-14 | End: 2020-03-15 | Stop reason: HOSPADM

## 2020-03-14 RX ORDER — DEXTROSE MONOHYDRATE 50 MG/ML
100 INJECTION, SOLUTION INTRAVENOUS PRN
Status: CANCELLED | OUTPATIENT
Start: 2020-03-14

## 2020-03-14 RX ORDER — NICOTINE POLACRILEX 4 MG
15 LOZENGE BUCCAL PRN
Status: CANCELLED | OUTPATIENT
Start: 2020-03-14

## 2020-03-14 RX ORDER — DEXTROSE MONOHYDRATE 25 G/50ML
12.5 INJECTION, SOLUTION INTRAVENOUS PRN
Status: CANCELLED | OUTPATIENT
Start: 2020-03-14

## 2020-03-14 RX ORDER — SOTALOL HYDROCHLORIDE 80 MG/1
40 TABLET ORAL 2 TIMES DAILY
Status: DISCONTINUED | OUTPATIENT
Start: 2020-03-14 | End: 2020-03-15 | Stop reason: HOSPADM

## 2020-03-14 RX ADMIN — Medication 10 ML: at 09:34

## 2020-03-14 RX ADMIN — ACETAMINOPHEN 650 MG: 325 TABLET ORAL at 13:58

## 2020-03-14 RX ADMIN — Medication 400 MG: at 09:34

## 2020-03-14 RX ADMIN — METFORMIN HYDROCHLORIDE 500 MG: 500 TABLET ORAL at 09:34

## 2020-03-14 RX ADMIN — PREDNISONE 40 MG: 20 TABLET ORAL at 13:50

## 2020-03-14 RX ADMIN — MECLIZINE HYDROCHLORIDE 25 MG: 25 TABLET ORAL at 21:30

## 2020-03-14 RX ADMIN — Medication 10 ML: at 21:30

## 2020-03-14 RX ADMIN — APIXABAN 5 MG: 5 TABLET, FILM COATED ORAL at 09:34

## 2020-03-14 RX ADMIN — APIXABAN 5 MG: 5 TABLET, FILM COATED ORAL at 21:30

## 2020-03-14 RX ADMIN — SOTALOL HYDROCHLORIDE 40 MG: 80 TABLET ORAL at 21:30

## 2020-03-14 RX ADMIN — ASPIRIN 81 MG: 81 TABLET, COATED ORAL at 09:34

## 2020-03-14 RX ADMIN — MECLIZINE HYDROCHLORIDE 25 MG: 25 TABLET ORAL at 13:50

## 2020-03-14 ASSESSMENT — PAIN SCALES - GENERAL
PAINLEVEL_OUTOF10: 0
PAINLEVEL_OUTOF10: 0
PAINLEVEL_OUTOF10: 7

## 2020-03-14 ASSESSMENT — ENCOUNTER SYMPTOMS
SINUS PRESSURE: 1
VOMITING: 0
ABDOMINAL DISTENTION: 0
ABDOMINAL PAIN: 0
DIARRHEA: 0
CONSTIPATION: 0
WHEEZING: 0
COUGH: 0
NAUSEA: 1
RHINORRHEA: 1
COLOR CHANGE: 0
SORE THROAT: 0
CHEST TIGHTNESS: 0
SHORTNESS OF BREATH: 0
TROUBLE SWALLOWING: 0
PHOTOPHOBIA: 0
EYE REDNESS: 0
SINUS PAIN: 1

## 2020-03-14 ASSESSMENT — PAIN - FUNCTIONAL ASSESSMENT: PAIN_FUNCTIONAL_ASSESSMENT: 0-10

## 2020-03-14 NOTE — CONSULTS
[id number] [facility] Neurology Consult Note  Name: Orly Lemus  Age: [de-identified] y.o. Gender: male  CodeStatus: Full Code  Allergies: Lipitor [Atorvastatin]  Niaspan [Niacin Er]  Vitamin E  Zocor [Simvastatin]    Chief Complaint:Dizziness    Primary Care Provider: Daisha Dominguez DO  InpatientTreatment Team: Treatment Team: Attending Provider: Daisha Dominguez DO; Consulting Physician: RACHEL Villafuerte CNP; Registered Nurse: Carol Masters RN; Consulting Physician: Pam Garces MD  Admission Date: 3/13/2020      HPI   Pt seen and examined for neurology consult. [de-identified] yr old cauc male with PMH of movement disorder, HTN ,HLD, DMII, COPD, CAD s/p CABG and stents, AF on eliquis and ASA who presented to [facility] [place] [facility] on 3/13 with c/o dizziness x 2 days with moving his head. ER doc noted mild facial droop. /91 on arrival. Pt with noted hx of vertigo and vasovagal syncope. Recent Loop recorder without significant findings. Pt was being considered for Northera but SBP too high. Pt seen last by neurology on 2/3/20 and no noted parkinson features. Admitted to the hospital on 6/19/19 for symptomatic AF with periods of tachy/jack and probable sinus node dysfunction. Also seen in hospital on 12/9/19 for syncope and orthostatic hypotension. Labs/diagnostics: CRP 3.2, INR 1.3, Mag 2.2, UA neg UTI, WBC 7.5, hgb 13.7, hct 41.4, platelets 580, Na 187, K 4.5, BUN 23, cr 1.09, CT head no acute findings, MRI brain no acute findings, MRA brain neg  Pt currently A&O x3, NAD, cooperative. Pt reports sudden onset dizziness/ sever spinning of room that started yesterday with associated nausea. Had another episode last night with turning of his head. He reports recent ear pain bilat. Denies tinnitus. C?o runny nose, sinus pain. Headache over frontal region. No focal deficits reported. No cp/pressure /palps.    Pt given meclizine in ED which he reports helped a lot      Vitals:    03/14/20 0930   BP: (!) 142/65   Pulse: 59   Resp: 16   Temp: Gastrointestinal: Positive for nausea. Negative for abdominal distention, abdominal pain, constipation, diarrhea and vomiting. Musculoskeletal: Negative for gait problem. Skin: Negative for color change and rash. Neurological: Positive for dizziness. Negative for tremors, seizures, syncope, facial asymmetry, speech difficulty, weakness, light-headedness, numbness and headaches. Psychiatric/Behavioral: Negative for agitation, confusion and hallucinations. The patient is not nervous/anxious. Physical Exam  Vitals signs and nursing note reviewed. Constitutional:       General: He is not in acute distress. Appearance: He is not diaphoretic. HENT:      Head: Normocephalic and atraumatic. Eyes:      General: No visual field deficit. Extraocular Movements: Extraocular movements intact. Pupils: Pupils are equal, round, and reactive to light. Cardiovascular:      Rate and Rhythm: Normal rate and regular rhythm. Pulmonary:      Effort: Pulmonary effort is normal. No respiratory distress. Breath sounds: Normal breath sounds. Abdominal:      General: Bowel sounds are normal. There is no distension. Palpations: Abdomen is soft. Tenderness: There is no abdominal tenderness. Skin:     General: Skin is warm and dry. Neurological:      General: No focal deficit present. Mental Status: He is alert and oriented to person, place, and time. Cranial Nerves: No cranial nerve deficit, dysarthria or facial asymmetry. Sensory: No sensory deficit. Motor: No weakness, tremor or seizure activity.       Coordination: Coordination normal.       Medications:  Reviewed    Infusion Medications:   Scheduled Medications:    sodium chloride flush  10 mL Intravenous 2 times per day    insulin lispro  0-6 Units Subcutaneous 4x Daily AC & HS    insulin lispro  0-3 Units Subcutaneous Nightly    apixaban  5 mg Oral BID    aspirin  81 mg Oral Daily    magnesium oxide  400 mg Oral Daily    metFORMIN  500 mg Oral Daily with breakfast    pitavastatin  4 mg Oral Nightly    sotalol  60 mg Oral BID     PRN Meds: sodium chloride flush, acetaminophen, nitroGLYCERIN    Labs:   Recent Labs     03/13/20  1530 03/14/20  0531   WBC 7.5 8.0   HGB 13.7* 12.9*   HCT 41.4* 39.3*    233     Recent Labs     03/13/20  1530 03/14/20  0531    142   K 4.5 4.6    104   CO2 26 26   BUN 23 18   CREATININE 1.09 0.89   CALCIUM 9.1 8.9     Recent Labs     03/13/20  1530   AST 14   ALT 9   BILITOT 0.3   ALKPHOS 63     Recent Labs     03/13/20  1530   INR 1.3     Recent Labs     03/13/20  1530   TROPONINI <0.010       Urinalysis:   Lab Results   Component Value Date    NITRU Negative 03/13/2020    WBCUA 0-2 03/13/2020    BACTERIA Negative 12/08/2018    RBCUA 3-5 03/13/2020    BLOODU TRACE 03/13/2020    SPECGRAV 1.021 03/13/2020    GLUCOSEU 100 03/13/2020       Radiology:   Most recent    EEG No procedure found. MRI of Brain No results found for this or any previous visit. Results for orders placed during the hospital encounter of 03/13/20   MRI BRAIN WO CONTRAST    Narrative EXAM:     MRI of the brain without contrast    MRA of the brain without contrast    History: CVA. Dizziness. Technique: Multiplanar multisequence MRI of the brain was performed without contrast. Axial 3-D time-of-flight images of the brain were obtained without contrast. 3-D volume rendered images were performed for evaluation of the cerebral vasculature. Comparison: CT brain from March 13, 2020    Findings:    MRI brain:      Confluent periventricular hyperintense T2/FLAIR signal, a few small hyperintense T2/FLAIR signal within the bilateral supratentorial white matter, and subtle patchy hyperintense T2/FLAIR signal within the akhil are nonspecific findings most better with   chronic small vessel ischemic changes in a patient of this age.     Prominence of the sulci and ventricles compatible with moderate high-grade stenosis of the visualized cerebral vasculature. CT of the Head:   Results for orders placed during the hospital encounter of 03/13/20   CT Head WO Contrast    Narrative EXAMINATION:  CT HEAD WO CONTRAST    HISTORY:   dizziness with facial droop    dizzy     TECHNIQUE:  Serial axial images without IV contrast were obtained from the vertex to the foramen magnum. Sagittal and coronal reconstructions. All CT scans at this facility use dose modulation, iterative reconstruction, and/or weight based dosing when appropriate to reduce radiation dose to as low as reasonably achievable. COMPARISON:  12/8/2013      RESULT:    Post-operative change:  None. Acute change:   No evidence of an acute infarct or other acute parenchymal process. Hemorrhage:    No evidence of acute intracranial hemorrhage. Mass Lesion / Mass Effect:   5 mm rounded hyperdensity in the region of the foramen of Monro, unchanged, likely colloid cyst. Otherwise there is no evidence of an intracranial mass or extraaxial fluid collection. No significant mass effect. Chronic change:   Scattered patchy foci of low attenuation are present within supratentorial white matter which is a nonspecific finding but likely represents mild microvascular ischemia. Parenchyma: There is mild generalized volume loss. The brain parenchyma is otherwise within normal limits for age. Ventricles:   Ventricular enlargement concordant with the degree of parenchymal volume loss. Paranasal sinuses and skull base: The visualized paranasal sinuses are grossly clear. Mastoid air cells are clear. The skull base is unremarkable. Soft tissues unremarkable. Impression No acute intracranial process or significant interval change from prior. No results found for this or any previous visit. No results found for this or any previous visit.     Carotid duplex: No results found for this or any previous visit. No results found for this or any previous visit. Results for orders placed during the hospital encounter of 12/08/18   US CAROTID ARTERY BILATERAL    Narrative EXAMINATION: US CAROTID ARTERY BILATERAL    DATE AND TIME:12/8/2018 4:15 PM    CLINICAL HISTORY: Carotid artery stenosis   CAROTID STENOSIS     COMPARISON:None    TECHNIQUE:Carotid duplex sonograms which include gray scale and color flow evaluation are complimented with spectral waveform analysis. Please refer to chart below for specific carotid velocity measurements. The degree of stenosis recorded on this exam   uses the same method of stratification used in the NASCET trials. This complies with ACR practice guidelines and the Society of radiology in ultrasound consensus statement and provides adequate information for clinical decision making. Society of   Radiologists in Ultrasound (SRU) consensus statement was used to estimate internal carotid artery stenosis. FINDINGS: There is no significant plaque identified within the internal carotid arteries bilaterally. No significant increase in systolic flow or turbulence to indicate any hemodynamically significant narrowing in the right or left internal carotid   arteries. There is antegrade flow identified in both vertebral arteries.     ARTERIAL BLOOD FLOW VELOCITY    RIGHT PS                                                   Prox CCA    62.3 cm/s               Mid CCA     82.5 cm/s                Dist CCA    78.6 cm/s                Prox ICA    92.9 cm/s                 Mid ICA     86.5 cm/s              Dist ICA    71.3 cm/s               Prox ECA    107 cm/s               Prox VERT   48.6 cm/s                  ICA/CCA     1.13                            LEFT PS    Prox CCA    82.9 cm/s  Mid CCA     60.9 cm/s  Dist CCA    68.0 cm/s  Prox ICA    75.7 cm/s  Mid ICA     106 cm/s  Dist ICA    74.6 cm/s  Prox ECA    137 cm/s  Prox VERT   51.0 cm/s    ICA/CCA     1.74        Impression  <50  %

## 2020-03-14 NOTE — CONSULTS
[facility] Heart consult Note      Patient:  Kristina Landers  YOB: 1940  MRN: [id number]   Acct: [de-identified]  Admit Date:  3/13/2020  Primary Cardiologist:Dr. Lam Anderson    Reason for Consult: bradycardia, persistent atr fib    Rounding Cardiologist: Cherelle Moreno      Impression/Plan:     1. Dizziness/vertigo: His symptoms are classic vertigo. No symptoms to suggest this is dysrhythmia. Indeed, recent evaluation of his loop recorder has shown no dysrhythmia. Does have sinus bradycardia but this is no different than when he was in outpatient setting in October. Vertigo evaluation as per neurology  2. Fatigue/sinus bradycardia: He does have sick sinus syndrome and heart rate is excessively controlled on this low dose of sotalol. At night heart rate further reduces to 40. He has a minimal heart rate variability and I suspect his fatigue is related to excess heart rate control. While he is here, would drop sotalol dose from 60 to 40 mg twice a day. If there were to be recurrent atrial fibrillation may need to consider pacemaker and then resume sotalol at a higher dose. This can be further evaluated as an outpatient he could still go home tomorrow if neurologic evaluation is complete. 3. Coronary artery disease: No angina. Perfusion study less than 9 months ago unremarkable    Chief Complaint/Active Symptoms: 28-year-old man with known coronary artery disease and persistent atrial fibrillation on chronic sotalol therapy. Scribed 2-day history of significant dizziness anytime he moved his head or rolled in the bed a certain way. Unsteady as well. Any motion of his upper body or head causes dizziness. Hence his presentation to the emergency room. Review of Systems:   12 point review neg except as above    CARDIAC HISTORY:  Persistent atrial fibrillation sotalol begun June 2019. Sick sinus syndrome  Coronary artery disease/remote CABG. 1990 7L-LAD. Radial-CX. SVG-RCA.   Attempted PCI CX 2014 unsuccessful. Perfusion scan June 2019 inferolateral infarct 47% EF.  12/2018 Loop recorder    Past Medical History:   Diagnosis Date    Atrial fibrillation ([facility] [address]     CAD (coronary artery disease)     cardiac stents    COPD (chronic obstructive pulmonary disease) (HCC)     Diabetes mellitus ([facility] [address]     Hyperlipidemia     Hypertension     Movement disorder     Pneumonia      Family History   1. Family history of Cancer of spine : Father   2. Family history of coronary artery disease (V17.3) (Z82.49) : Brother   3. Family history of exposure to 2,4,5-trichlorophenoxyacetic acid and   2,4-dichlorophenoxyacetic acid (V19.8) (Z84.89) : Brother   4. Family history of hypertension (V17.49) (Z82.49) : Mother   5. Family history of malignant neoplasm of urinary bladder (V16.52) (Z80.52) : Brother   6. Family history of myocardial infarction (V17.3) (Z82.49) : Father   7. Family history of pancreatic cancer (V16.0) (Z80.0) : Mother   8. Family history of valvular heart disease (V17.49) (Z82.49) : Brother   9. History of coronary artery stent placement : Brother    Social History   · Daily caffeine consumption, 2-3 servings a day   · Former smoker (V15.82) (B09.367)   · No alcohol use   · No drug use    Surgical History   1. History of Appendectomy   2. CABG (CABG)   3. History of cardiac event recorder insertion   4. History of Cataract Surgery   5. History of loop electrosurgical excision procedure    Current Meds   1. Acetaminophen 325 MG Oral Tablet; 1/2 - 1 tablet daily as needed; Therapy: (Recorded:20Mar2019) to Recorded   2. Aspirin EC 81 MG Oral Tablet Delayed Release; take 1 tablet daily; Therapy: [id number] to (Evaluate:35Skl6855)  Requested for: 39Bsu3375; Last   Rx:[id number] Ordered   3. Eliquis 5 MG Oral Tablet; TAKE 1 TABLET TWICE DAILY; Therapy: 57MSC2740 to (Evaluate:56Jzn4602)  Requested for: 13Jun2019; Last   Rx:13Jun2019 Ordered   4.  Livalo 4 MG Oral Tablet; take 1 tablet daily at (35.6 °C)   TempSrc: Oral Oral  Oral   SpO2: 97% 98% 99% 100%   Weight:       Height:          Wt Readings from Last 3 Encounters:   03/13/20 143 lb 15.4 oz (65.3 kg)   02/03/20 153 lb 6.4 oz (69.6 kg)   09/18/19 150 lb (68 kg)       TELEMETRY: normal sinus                             Rhythm Strip: Sinus jack to 40 with sleep. [address] awake. Physical Exam:  General Appearance: alert and oriented to person, place and time, in no acute distress, vertigo if moves  Cardiovascular: slow rate, regular rhythm, normal S1 and S2, no murmurs, rubs, clicks, or gallops,  no JVD  Pulmonary/Chest: clear to auscultation bilaterally- no wheezes, rales or rhonchi, normal air movement, no respiratory distress  Abdomen: soft, non-tender, non-distended, normal bowel sounds, no masses   Extremities: no cyanosis, clubbing or edema  Skin: warm and dry, no rash or erythema  Eyes: EOMI  Neck: supple and non-tender without mass, no thyromegaly   Neurological: alert, oriented, normal speech, no focal findings or movement disorder noted    Allergies: Allergies   Allergen Reactions    Lipitor [Atorvastatin] Other (See Comments)     Body pain    Niaspan [Niacin Er] Other (See Comments)     Body pain    Vitamin E Other (See Comments)     Nose bleeds    Zocor [Simvastatin] Other (See Comments)     Sharp body pain        Medications:    predniSONE  40 mg Oral Daily    meclizine  25 mg Oral TID    sodium chloride flush  10 mL Intravenous 2 times per day    insulin lispro  0-6 Units Subcutaneous 4x Daily AC & HS    insulin lispro  0-3 Units Subcutaneous Nightly    apixaban  5 mg Oral BID    aspirin  81 mg Oral Daily    magnesium oxide  400 mg Oral Daily    metFORMIN  500 mg Oral Daily with breakfast    pitavastatin  4 mg Oral Nightly    sotalol  60 mg Oral BID       sodium chloride flush, 10 mL, PRN  acetaminophen, 650 mg, Q4H PRN  nitroGLYCERIN, 0.4 mg, Q5 Min PRN        Diagnostics:  EKG:  Sinus braady with first degree av block. accident) [facility])    Dizziness  Resolved Problems:    * No resolved hospital problems.  *           Electronically signed by Levon Granda MD on 3/14/2020 at 3:52 PM

## 2020-03-14 NOTE — PROGRESS NOTES
Safety Devices  Safety Devices in place: Yes  Type of devices: All fall risk precautions in place    Subjective       Pain Reassessment:   Pain Assessment  Patient Currently in Pain: Denies       Prior Level of Function:  Social/Functional History  Lives With: Spouse  Type of Home: (Modular home )  Home Layout: One level  Home Access: Stairs to enter with rails  Entrance Stairs - Number of Steps: 3  Entrance Stairs - Rails: Both  Bathroom Shower/Tub: Tub/Shower unit  Bathroom Toilet: Standard  Home Equipment: Cane  ADL Assistance: Needs assistance(pt stated wife assist with LB dressing and tansfer into shower, pt reported able to bathe self )  Ambulation Assistance: Independent(with cane)  Transfer Assistance: Independent  Active : No(pt stated has stopped driving by choice for past month )  Additional Comments: pt stated in process of obtaining w/w     OBJECTIVE:     Orientation Status:  Orientation  Overall Orientation Status: Within Functional Limits    Observation:  Observation/Palpation  Observation: Pt supine in bed with no signs of distress. Cognition Status:  Cognition  Cognition Comment: pt follows commands consistently,    Perception Status:  Perception  Overall Perceptual Status: WFL    Sensation Status:  Sensation  Overall Sensation Status: WFL    Vision and Hearing Status:  Vision  Vision: Impaired  Hearing  Hearing: Exceptions to [facility]  Hearing Exceptions: Hard of hearing/hearing concerns     ROM:   LUE AROM (degrees)  LUE AROM : WFL  Left Hand AROM (degrees)  Left Hand AROM: WFL  RUE AROM (degrees)  RUE AROM : WFL  Right Hand AROM (degrees)  Right Hand AROM: WFL    Strength:  LUE Strength  L Hand General: 4/5  LUE Strength Comment: 4/5  RUE Strength  R Hand General: 4/5  RUE Strength Comment: 4/5    Coordination, Tone, Quality of Movement:    Tone RUE  RUE Tone: Normotonic  Tone LUE  LUE Tone: Normotonic  Coordination  Movements Are Fluid And Coordinated: Yes    Hand Dominance:  Hand

## 2020-03-14 NOTE — PROGRESS NOTES
Physical Therapy Med Surg Initial Assessment  [facility]  [facility]       NAME: Zulema Dc  : 1940 ([de-identified] y.o.)  MRN: [id number]  CODE STATUS: Full Code    Date of Service: 3/14/2020    Patient Diagnosis(es): Acute CVA (cerebrovascular accident) [facility]) [I63.9]  Dizziness [R42]   Chief Complaint   Patient presents with    Dizziness     Patient Active Problem List    Diagnosis Date Noted    A-fib [facility]) 2019     Priority: High    High risk medication use 2019     Priority: High    Acute CVA (cerebrovascular accident) ([facility] [address] 2020    Dizziness 2020    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[facility] [address] 2020    Vasovagal syncope 2020    Dizziness and giddiness 2020    Syncope and collapse 2018    Atherosclerosis of native coronary artery of native heart without angina pectoris 2018    CAD (coronary artery disease) 2018    Coronary artery disease involving native coronary artery of native heart without angina pectoris 2018    Essential hypertension 2018    Ischemic cardiomyopathy 2018    Nonrheumatic aortic valve disorder, unspecified 2018    Nonrheumatic aortic valve disorder, unspecified 2018    Personal history of nicotine dependence 2018    Presence of aortocoronary bypass graft 2018        Past Medical History:   Diagnosis Date    Atrial fibrillation ([facility] [address]     CAD (coronary artery disease)     cardiac stents    COPD (chronic obstructive pulmonary disease) ([facility] [address]     Diabetes mellitus ([facility] [address]     Hyperlipidemia     Hypertension     Movement disorder     Pneumonia      Past Surgical History:   Procedure Laterality Date    APPENDECTOMY      CORONARY ANGIOPLASTY WITH STENT PLACEMENT      4    CORONARY ARTERY BYPASS GRAFT      triple bypass    EYE SURGERY Bilateral     cataract surgery    INSERTABLE CARDIAC MONITOR Left 2018       Chart Reviewed: Yes  Family / Caregiver Present: No  General Comment  Comments: Pt resting in bed - agreeable to PT evaluation    Restrictions:  Restrictions/Precautions: Fall Risk     SUBJECTIVE: Subjective: \"My cem felt all plugged up and then I got up and my head started spinning! \"    Pain  Pre Treatment Pain Screening  Comments / Details: denies    Post Treatment Pain Screening:   Pain Assessment  Pain Assessment: (denies)    Prior Level of Function:  Social/Functional History  Lives With: Spouse  Type of Home: (Modular home )  Home Layout: One level  Home Access: Stairs to enter with rails  Entrance Stairs - Number of Steps: 3  Entrance Stairs - Rails: Both  Bathroom Shower/Tub: Tub/Shower unit  Bathroom Toilet: Standard  Home Equipment: Cane  ADL Assistance: Needs assistance(pt stated wife assist with LB dressing and tansfer into shower, pt reported able to bathe self )  Ambulation Assistance: Independent(with cane)  Transfer Assistance: Independent  Active : No(pt stated has stopped driving by choice for past month )  Additional Comments: pt stated in process of obtaining w/w       Current complaint: pt states that recently he had experienced feeling like both his ears were plugged. Then states that one day he went to stand up and he felt an overwhelming dizziness described as spinning come over him. Lasting aprox a few minutes. OBJECTIVE:   Vision: (smooth pursuits and saccades WFL. Unable to complete VOR/HIT d/t increased dizziness)  Hearing Exceptions: Hard of hearing/hearing concerns    Cognition:  Overall Orientation Status: Within Normal Limits  Follows Commands: Within Functional Limits    Observation/Palpation  Observation: Pt supine in bed with no signs of distress.      ROM:  RLE AROM: WFL  LLE AROM : WFL  Spine  Cervical: Limited extension to just beyond neutral. Rotation WFL    Strength:  Strength RLE  Strength RLE: WFL  Strength LLE  Strength LLE: WFL    Neuro:  Balance  Sitting - Static: Good  Sitting - Dynamic:

## 2020-03-14 NOTE — H&P
History of present illness  Patient presented to our facility for further evaluation and management of relatively sudden onset and progressive course of facial droop associated with dizziness  Prior to this episode the patient has been dizzy for sometimes described as instability and walking that was started to be noticed as the patient started to have upper respiratory symptoms in the form of rhinorrhea and ear aches patient had he remembered that when he got ear problems he got the dizziness as it happened before    System review  Denied any fever or chills no productive nonproductive cough no nausea emesis or diarrhea no dysuria no near syncope or syncope no dyspnea orthopnea or paroxysmal nocturnal dyspnea or chest pain no skin or joint involvement and no any other organ system related symptoms    Family history  No family history contribute to the patient current admission    Social history  No current substance abuse usually independent in his ADLs and instrumental ADL    Past medical history  Ischemic cardiomyopathy  Status post PTCA and stenting  Hypertension  A.  Fib    Clinical assessment    Patient is fully alert oriented providing history exchanging information in no apparent pain or distress no mood swings or abnormal pathological behavior sharp in providing details    General  No skin rash no joint deformity no central or peripheral cyanosis no jaundice no pallor no clubbing  Cardiopulmonary  Normal chest wall expansion with no chest wall deformity diminished air entry bibasilar no rub or distant heart sounds or gallop  Abdominal  No nodes lax positive bowel sounds no suprapubic tenderness or fullness  Extremity  No lower extremity edema    Vitals  Pressure 142/65 heart rate 59 respiratory rate 16 SPO2 99 temperature 36.3    Labs  No anemia or leukocytosis adequate platelets  No electrolyte or acid-base imbalance no hypercholesterolemia or dyslipidemia no elevation in troponin normal

## 2020-03-14 NOTE — PROGRESS NOTES
of Evaluation: 3/14/2020   Evaluating Therapist: Nick Oliver, SLP      Assessment:  Cognitive Diagnosis: Pt presents with STM deficits, which he states are premobid. Recommendations:  Requires SLP Intervention: No  Duration/Frequency of Treatment: N.A.             Goals:  Short-term Goals  Timeframe for Short-term Goals: NA  Long-term Goals  Timeframe for Long-term Goals: NA   Patient/family involved in developing goals and treatment plan:   Pt aware of  STM difficulties. He stated that he does crosswords and other   activities at home. Subjective:   Previous level of function and limitations:   Pt reports prior STM difficulties the past 2 years. Vision  Vision: Impaired  Hearing  Hearing: Exceptions to [facility]  Hearing Exceptions: Hard of hearing/hearing concerns            Objective:     Oral/Motor  Oral Motor: Within functional limits    Auditory Comprehension  Comprehension: Within Functional Limits  BDAE commands 4 step WFL  Pointing  To named objects in the room:  MTDDA Y/N: 15/15  Recalling a brief story was difficult for the patient secondary to his STM.  2/4 items with the patient stating that he could not remember. Expression  Primary Mode of Expression: Verbal  Speech automatics: GOLD, MO,  counting, alphabet:  WFL. Responsive naming: [place]:  correct  Confrontational naming: 10/10  Divergent abstract namin%. Verbal Expression  Verbal Expression: Within functional limits       Reading Comprehension  BDAE reading sentences and paragraphs:  9/10 correct. Written Expression  Dominant Hand: Right  Written Expression: (Not tested.)   Patient stated that he doesn't write much anymore. Motor Speech  Motor Speech:  Within Functional Limits    Pragmatics/Social Functioning  Pragmatics: Within functional limits    Cognition:      Orientation  Overall Orientation Status: Within Normal Limits  Memory  Memory: Exceptions to [facility]  Short-term Memory: Mild(Patient reports that he has been having difficulty for past couple of years and is at baseline. Pt reports that spouse does meds and finances.)  Problem Solving  Problem Solving: Within Functional Limits  Numeric Reasoning  Numeric Reasoning: (Not assessed.   Spouse does finances.)         Prognosis:  Individuals consulted  Consulted and agree with results and recommendations: Patient;RN(Jing RN)    Education:  Patient Education: Patient educated re: results of SLE  Patient Education Response: Verbalizes understanding  Safety Devices in place: Yes  Type of devices: Bed alarm in place    Pain:  Pain Assessment  Patient Currently in Pain: Denies  Pain Assessment: 0-10  Pain Level: 0      Therapy Time  SLP Individual Minutes  Time In: 1010  Time Out: 1030  Minutes: 20     Electronically signed by ANUJ Nguyen on 3/14/2020 at 12:00 PM

## 2020-03-14 NOTE — PROGRESS NOTES
[Atorvastatin] Other (See Comments)     Body pain    Niaspan [Niacin Er] Other (See Comments)     Body pain    Vitamin E Other (See Comments)     Nose bleeds    Zocor [Simvastatin] Other (See Comments)     Sharp body pain       DATE ONSET: 3/13/2020    Date of Evaluation: 3/14/2020   Evaluating Therapist: Luzmaria Oliver, SLP    Recommended Diet and Intervention  Diet Solids Recommendation: Dental Soft  Liquid Consistency Recommendation: Thin  Recommended Form of Meds: PO  Recommendations: Self feed       Compensatory Swallowing Strategies  Compensatory Swallowing Strategies: Upright as possible for all oral intake;Small bites/sips    Reason for Referral  Arpita Perez was referred for a bedside swallow evaluation to assess the efficiency of his swallow function, identify signs and symptoms of aspiration and make recommendations regarding safe dietary consistencies, effective compensatory strategies, and safe eating environment. General  Behavior/Cognition: Cooperative; Alert;Pleasant mood  Respiratory Status: Room air  O2 Device: None (Room air)  Follows Directions: Simple  Dentition: Dentures top;Dentures bottom(Pt only had uppers with him.)  Patient Positioning: Upright in bed  Baseline Vocal Quality: Normal  Prior Dysphagia History: None stated by patient. Consistencies Administered: Reg solid; Thin    Current Diet level:  Current Diet : (Dental soft)  Current Liquid Diet : Thin    Oral Motor Deficits  Oral/Motor  Oral Motor: Within functional limits    Oral Phase Dysfunction  Oral Phase  Oral Phase: Exceptions  Oral Phase Dysfunction  Impaired Mastication: Reg Solid(Prolonged.)  Oral Phase  Oral Phase - Comment: Patient presents with prolonged mastication secondary to missing bottom teeth. Indicators of Pharyngeal Phase Dysfunction   Pharyngeal Phase  Pharyngeal Phase: WFL  Pharyngeal Phase   Pharyngeal: No overt s/s of aspirataion noted.     Impression  Dysphagia Diagnosis: Mild oral stage

## 2020-03-15 VITALS
OXYGEN SATURATION: 98 % | HEART RATE: 75 BPM | SYSTOLIC BLOOD PRESSURE: 160 MMHG | TEMPERATURE: 97.9 F | RESPIRATION RATE: 16 BRPM | DIASTOLIC BLOOD PRESSURE: 81 MMHG | HEIGHT: 68 IN | WEIGHT: 143.96 LBS | BODY MASS INDEX: 21.82 KG/M2

## 2020-03-15 LAB
GLUCOSE BLD-MCNC: 165 MG/DL (ref 60–115)
GLUCOSE BLD-MCNC: 237 MG/DL (ref 60–115)
PERFORMED ON: ABNORMAL
PERFORMED ON: ABNORMAL
URINE CULTURE, ROUTINE: NORMAL

## 2020-03-15 PROCEDURE — 6370000000 HC RX 637 (ALT 250 FOR IP): Performed by: INTERNAL MEDICINE

## 2020-03-15 PROCEDURE — 6370000000 HC RX 637 (ALT 250 FOR IP): Performed by: NURSE PRACTITIONER

## 2020-03-15 PROCEDURE — 2580000003 HC RX 258: Performed by: INTERNAL MEDICINE

## 2020-03-15 PROCEDURE — 93005 ELECTROCARDIOGRAM TRACING: CPT | Performed by: INTERNAL MEDICINE

## 2020-03-15 RX ORDER — MECLIZINE HYDROCHLORIDE 25 MG/1
25 TABLET ORAL 3 TIMES DAILY
Qty: 30 TABLET | Refills: 0 | Status: SHIPPED | OUTPATIENT
Start: 2020-03-15 | End: 2020-03-25

## 2020-03-15 RX ORDER — SOTALOL HYDROCHLORIDE 80 MG/1
40 TABLET ORAL 2 TIMES DAILY
Qty: 60 TABLET | Refills: 3 | Status: ON HOLD | OUTPATIENT
Start: 2020-03-15 | End: 2021-01-26 | Stop reason: HOSPADM

## 2020-03-15 RX ADMIN — Medication 400 MG: at 08:12

## 2020-03-15 RX ADMIN — METFORMIN HYDROCHLORIDE 500 MG: 500 TABLET ORAL at 08:12

## 2020-03-15 RX ADMIN — PREDNISONE 40 MG: 20 TABLET ORAL at 08:12

## 2020-03-15 RX ADMIN — MECLIZINE HYDROCHLORIDE 25 MG: 25 TABLET ORAL at 08:12

## 2020-03-15 RX ADMIN — Medication 10 ML: at 08:13

## 2020-03-15 RX ADMIN — APIXABAN 5 MG: 5 TABLET, FILM COATED ORAL at 08:12

## 2020-03-15 RX ADMIN — SOTALOL HYDROCHLORIDE 40 MG: 80 TABLET ORAL at 08:12

## 2020-03-15 RX ADMIN — ASPIRIN 81 MG: 81 TABLET, COATED ORAL at 08:12

## 2020-03-15 ASSESSMENT — PAIN SCALES - GENERAL: PAINLEVEL_OUTOF10: 0

## 2020-03-15 NOTE — FLOWSHEET NOTE
0830 AM med pass and assessment completed at this time. Pt is sitting in bed comfortably with no c/o pain. Pt states he still get's very dizzy even in bed when he turns his head to the right. Very slight facial droop to the left noted. PERRLA.  moderate. NIH 1. Inspiratory wheezing heard on the left. Pulses all palpable. Bowel sounds active X4.     [id number] new #20 placed in the left forearm. 1992 pt would like to be d/c home with a script for the antivert. Pt req I speak with the MD regarding this. Page sent to Dr. Karolyn Whitehead called and stated pt could go home with a script for antivert. Perfect serve sent to Dr. Gabby Santiago who stated he would be in after [phone] pt's script's have been called into his pharmacy. I spoke with Dr. Gabby Santiago again who stated the pt could go before he sees him. D/c instructions went over with pt. He voiced understanding. IV and tele removed. Pt left with all belongings.

## 2020-03-15 NOTE — PLAN OF CARE
Problem: Falls - Risk of:  Goal: Will remain free from falls  Description: Will remain free from falls  3/15/2020 1108 by Iris Sloan RN  Outcome: Completed  3/15/2020 1107 by Iris Sloan RN  Outcome: Ongoing  3/15/2020 0821 by Iris Sloan RN  Outcome: Ongoing  Goal: Absence of physical injury  Description: Absence of physical injury  3/15/2020 1108 by Iris Sloan RN  Outcome: Completed  3/15/2020 1107 by Iris Sloan RN  Outcome: Ongoing  3/15/2020 0821 by Iris Sloan RN  Outcome: Ongoing     Problem: HEMODYNAMIC STATUS  Goal: Patient has stable vital signs and fluid balance  3/15/2020 1108 by Iris Sloan RN  Outcome: Completed  3/15/2020 1107 by Iris Sloan RN  Outcome: Ongoing  3/15/2020 0821 by Iris Sloan RN  Outcome: Ongoing     Problem: IP SWALLOWING  Goal: LTG - patient will tolerate the least restrictive diet consistency to allow for safe consumption of daily meals  3/15/2020 1108 by Iris Sloan RN  Outcome: Completed  3/15/2020 1107 by Iris Sloan RN  Outcome: Ongoing  3/15/2020 0821 by Iris Sloan RN  Outcome: Ongoing     Problem: IP BALANCE  Goal: LTG - patient will maintain standing balance to allow for completion of daily activities  3/15/2020 1108 by Iris Sloan RN  Outcome: Completed  3/15/2020 1107 by Iris Sloan RN  Outcome: Ongoing  3/15/2020 0821 by Iris Sloan RN  Outcome: Ongoing

## 2020-03-15 NOTE — PLAN OF CARE
Problem: Falls - Risk of:  Goal: Will remain free from falls  Description: Will remain free from falls  Outcome: Ongoing  Goal: Absence of physical injury  Description: Absence of physical injury  Outcome: Ongoing     Problem: HEMODYNAMIC STATUS  Goal: Patient has stable vital signs and fluid balance  Outcome: Ongoing     Problem: IP SWALLOWING  Goal: LTG - patient will tolerate the least restrictive diet consistency to allow for safe consumption of daily meals  Outcome: Ongoing     Problem: IP BALANCE  Goal: LTG - patient will maintain standing balance to allow for completion of daily activities  Outcome: Ongoing

## 2020-03-15 NOTE — PROGRESS NOTES
[facility] Heart consult Note      Patient:  Reta Weber  YOB: 1940  MRN: [id number]   Acct: [de-identified]  Admit Date:  3/13/2020  Primary Cardiologist:Dr. Tano Graff    Reason for Consult: bradycardia, persistent atr fib    Rounding Cardiologist: Antony Alicia      Impression/Plan:     1. Dizziness/vertigo: His symptoms are classic vertigo. No symptoms to suggest this is dysrhythmia. Indeed, recent evaluation of his loop recorder has shown no dysrhythmia. Has responded to antivert. 2. Fatigue/sinus bradycardia: Remains in sinus with ave rate now 70 just this am with decrease in past 2 doses of sotalol to 40. Would continue. 3. Coronary artery disease: No angina. Perfusion study less than 9 months ago unremarkable  4. HTN: controlled as outpatient. Will have him see cardio soon to further asses. 5. OK for discharge home. Sotalol reconciled for discharge. Follow with dr Rosa Knight will be arranged tomorrow. Chief Complaint/Active Symptoms: No angina/dyspnea/palpitations. Vertigo nearly resolved with antivert. Consult HX  76-year-old man with known coronary artery disease and persistent atrial fibrillation on chronic sotalol therapy. Scribed 2-day history of significant dizziness anytime he moved his head or rolled in the bed a certain way. Unsteady as well. Any motion of his upper body or head causes dizziness. Hence his presentation to the emergency room. Review of Systems:   12 point review neg except as above    CARDIAC HISTORY:  Persistent atrial fibrillation sotalol begun June 2019. Sick sinus syndrome  Coronary artery disease/remote CABG. 1990 7L-LAD. Radial-CX. SVG-RCA. Attempted PCI CX 2014 unsuccessful.   Perfusion scan June 2019 inferolateral infarct 47% EF.  12/2018 Loop recorder    Past Medical History:   Diagnosis Date    [facility] [address]     CAD (coronary artery disease)     cardiac stents    COPD (chronic obstructive pulmonary disease) [facility] [address]  Diabetes mellitus ([facility] [address]     Hyperlipidemia     Hypertension     Movement disorder     Pneumonia          Objective:   Vitals:    03/14/20 2024 03/15/20 0008 03/15/20 0434 03/15/20 0717   BP: (!) 175/79 (!) 154/93 (!) 151/94 (!) 175/80   Pulse: 57 64 68 59   Resp: 16 16 15 16   Temp: 97.3 °F (36.3 °C)   97.9 °F (36.6 °C)   TempSrc: Oral   Oral   SpO2: 98% 96% 95% 99%   Weight:       Height:          Wt Readings from Last 3 Encounters:   03/13/20 143 lb 15.4 oz (65.3 kg)   02/03/20 153 lb 6.4 oz (69.6 kg)   09/18/19 150 lb (68 kg)       TELEMETRY: normal sinus                             Rhythm Strip: Sinus jack now not <50. ave 70 awake. Physical Exam:  General Appearance: alert and oriented to person, place and time, in no acute distress, no further vertigo  Cardiovascular: slow rate, regular rhythm, normal S1 and S2, no murmurs, rubs, clicks, or gallops,  no JVD  Pulmonary/Chest: clear to auscultation bilaterally- no wheezes, rales or rhonchi, normal air movement, no respiratory distress  Abdomen: soft, non-tender, non-distended, normal bowel sounds, no masses   Extremities: no cyanosis, clubbing or edema  Skin: warm and dry, no rash or erythema  Eyes: EOMI  Neck: supple and non-tender without mass, no thyromegaly   Neurological: alert, oriented, normal speech, no focal findings or movement disorder noted    Allergies:   Allergies   Allergen Reactions    Lipitor [Atorvastatin] Other (See Comments)     Body pain    Niaspan [Niacin Er] Other (See Comments)     Body pain    Vitamin E Other (See Comments)     Nose bleeds    Zocor [Simvastatin] Other (See Comments)     Sharp body pain        Medications:    predniSONE  40 mg Oral Daily    meclizine  25 mg Oral TID    sotalol  40 mg Oral BID    sodium chloride flush  10 mL Intravenous 2 times per day    insulin lispro  0-6 Units Subcutaneous 4x Daily AC & HS    insulin lispro  0-3 Units Subcutaneous Nightly    apixaban  5 mg Oral BID    aspirin  81 mg Oral Daily    magnesium oxide  400 mg Oral Daily    metFORMIN  500 mg Oral Daily with breakfast    pitavastatin  4 mg Oral Nightly       sodium chloride flush, 10 mL, PRN  acetaminophen, 650 mg, Q4H PRN  nitroGLYCERIN, 0.4 mg, Q5 Min PRN        Diagnostics:  EKG:  3/15 sinus jack 60. QTc 426  Sinus braady with first degree av block. Rate 59    MRI brain:  MRI brain:       No acute ischemia or acute intracranial process.       Generalized parenchymal volume loss and nonspecific white matter findings most compatible with chronic small vessel ischemic changes in a patient of this age.       MRA brain:       No aneurysm or high-grade stenosis of the visualized cerebral vasculature. Carotid duplex  LESS THAN 50% STENOSIS OF THE BILATERAL INTERNAL CAROTID ARTERIES.       ANTEGRADE FLOW OF THE BILATERAL VERTEBRAL ARTERIES.               CXR: 2-view:   Results for orders placed during the hospital encounter of 03/13/20   XR CHEST STANDARD (2 VW)    Narrative EXAMINATION: XR CHEST (2 VW). DATE AND TIME:3/13/2020 3:30 PM     CLINICAL HISTORY: Shortness of breath   dizziness      COMPARISONS: December 8, 2018     FINDINGS: Clips in the left lung apex. Lungs clear of any fresh infiltrate. No signs of pulmonary edema. Heart and mediastinum are within normal limits. Bones intact. Impression NO  ACTIVE LUNG DISEASE.            Lab Data:    Cardiac Enzymes:  Recent Labs     03/13/20  1530   TROPONINI <0.010     ProBNP:   Lab Results   Component Value Date    PROBNP 936 06/20/2019       CBC:   Recent Labs     03/13/20  1530 03/14/20  0531   WBC 7.5 8.0   RBC 4.54* 4.27*   HGB 13.7* 12.9*   HCT 41.4* 39.3*    233       CMP:    Recent Labs     03/13/20  1530 03/14/20  0531    142   K 4.5 4.6    104   CO2 26 26   BUN 23 18   CREATININE 1.09 0.89   GFRAA >60.0 >60.0   LABGLOM >60.0 >60.0   GLUCOSE 178* 107*   CALCIUM 9.1 8.9       Hepatic Function Panel:    Recent Labs     03/13/20  1530 ALKPHOS 63   ALT 9   AST 14   PROT 6.5   BILITOT 0.3   LABALBU 4.0       Magnesium:    Recent Labs     03/13/20  1530   MG 2.2       PT/INR:    Recent Labs     03/13/20  1530   PROTIME 16.8*   INR 1.3       Lipids:    Recent Labs     03/14/20  0531   CHOL 94   TRIG 93   HDL 36*   LDLCALC 39       Active Problems:    Acute CVA (cerebrovascular accident) ([facility] [address]    Dizziness  Resolved Problems:    * No resolved hospital problems.  *           Electronically signed by Bill Bolaños MD on 3/15/2020 at 10:22 AM

## 2020-03-16 ENCOUNTER — HOSPITAL ENCOUNTER (OUTPATIENT)
Dept: CARDIOLOGY | Age: 80
Discharge: HOME | End: 2020-03-16
Payer: MEDICARE

## 2020-03-16 PROCEDURE — G2066 INTER DEVC REMOTE 30D: HCPCS

## 2020-05-06 ENCOUNTER — HOSPITAL ENCOUNTER (OUTPATIENT)
Dept: CARDIOLOGY | Age: 80
Discharge: HOME | End: 2020-05-06
Payer: MEDICARE

## 2020-05-06 PROCEDURE — G2066 INTER DEVC REMOTE 30D: HCPCS

## 2020-08-03 ENCOUNTER — OFFICE VISIT (OUTPATIENT)
Dept: NEUROLOGY | Age: 80
End: 2020-08-03
Payer: MEDICARE

## 2020-08-03 VITALS
DIASTOLIC BLOOD PRESSURE: 70 MMHG | SYSTOLIC BLOOD PRESSURE: 137 MMHG | WEIGHT: 154 LBS | BODY MASS INDEX: 23.42 KG/M2 | HEART RATE: 63 BPM

## 2020-08-03 PROBLEM — F19.21 H/O: DRUG DEPENDENCY (HCC): Status: ACTIVE | Noted: 2018-11-26

## 2020-08-03 PROBLEM — R42 DIZZINESS: Status: ACTIVE | Noted: 2020-02-03

## 2020-08-03 PROCEDURE — 1036F TOBACCO NON-USER: CPT | Performed by: PSYCHIATRY & NEUROLOGY

## 2020-08-03 PROCEDURE — 4040F PNEUMOC VAC/ADMIN/RCVD: CPT | Performed by: PSYCHIATRY & NEUROLOGY

## 2020-08-03 PROCEDURE — G8420 CALC BMI NORM PARAMETERS: HCPCS | Performed by: PSYCHIATRY & NEUROLOGY

## 2020-08-03 PROCEDURE — G8427 DOCREV CUR MEDS BY ELIG CLIN: HCPCS | Performed by: PSYCHIATRY & NEUROLOGY

## 2020-08-03 PROCEDURE — 99214 OFFICE O/P EST MOD 30 MIN: CPT | Performed by: PSYCHIATRY & NEUROLOGY

## 2020-08-03 PROCEDURE — 1123F ACP DISCUSS/DSCN MKR DOCD: CPT | Performed by: PSYCHIATRY & NEUROLOGY

## 2020-08-03 ASSESSMENT — ENCOUNTER SYMPTOMS
SHORTNESS OF BREATH: 0
BACK PAIN: 0
COLOR CHANGE: 0
NAUSEA: 0
CHOKING: 0
TROUBLE SWALLOWING: 0
PHOTOPHOBIA: 0
VOMITING: 0

## 2020-08-03 NOTE — PROGRESS NOTES
Subjective:      Patient ID: Cayden Jerez is a 79 y.o. male who presents today for:  Chief Complaint   Patient presents with    Follow-up       HPI 79 yrs old right-handed gentleman with a history of syncopal episodes like related to medications. Transition we continue to discuss Northera and patient also has a loop device. MRI shows a colloid cyst of the third ventricle which is only 5 mm quite away from the foramen of Monro. Loop device has not shown anything significant. Patient actually been doing quite well his blood pressure is 433 systolic this morning as he tells me. Is not been orthostatic at all. Has not passed out. He still has some loss of balance though not dizzy or vertiginous.   He denies any chest pain shortness of breath Akram bleeding bruising choking drooling    Past Medical History:   Diagnosis Date    Atrial fibrillation (HCC)     CAD (coronary artery disease)     cardiac stents    [facility]) [facility] [address]     Diabetes mellitus ([facility] [address]     Hyperlipidemia     Hypertension     Movement disorder     Pneumonia      Past Surgical History:   Procedure Laterality Date    APPENDECTOMY      CORONARY ANGIOPLASTY WITH STENT PLACEMENT      4    CORONARY ARTERY BYPASS GRAFT      triple bypass    EYE SURGERY Bilateral     cataract surgery    INSERTABLE CARDIAC MONITOR Left 12/2018     Social History     Socioeconomic History    Marital status:      Spouse name: Not on file    Number of children: Not on file    Years of education: Not on file    Highest education level: Not on file   Occupational History    Not on file   Social Needs    Financial resource strain: Not on file    Food insecurity     Worry: Not on file     Inability: Not on file   [employer] needs     Medical: Not on file     Non-medical: Not on file   Tobacco Use    Smoking status: Former Smoker     Types: Cigarettes    Smokeless tobacco: Never Used    Tobacco comment: 3 cigarettes/month   Substance and Sexual Activity    Alcohol use: No    Drug use: No    Sexual activity: Not on file   Lifestyle    Physical activity     Days per week: Not on file     Minutes per session: Not on file    Stress: Not on file   Relationships    Social connections     Talks on phone: Not on file     Gets together: Not on file     Attends [employer] service: Not on file     Active member of club or organization: Not on file     Attends meetings of clubs or organizations: Not on file     Relationship status: Not on file    Intimate partner violence     Fear of current or ex partner: Not on file     Emotionally abused: Not on file     Physically abused: Not on file     Forced sexual activity: Not on file   Other Topics Concern    Not on file   Social History Narrative    Not on file     No family history on file. Allergies   Allergen Reactions    Lipitor [Atorvastatin] Other (See Comments)     Body pain    Niacin Other (See Comments)    Niaspan [Niacin Er] Other (See Comments)     Body pain    Vitamin E Other (See Comments)     Nose bleeds    Zocor [Simvastatin] Other (See Comments)     Sharp body pain       Current Outpatient Medications   Medication Sig Dispense Refill    sotalol (BETAPACE) 80 MG tablet Take 0.5 tablets by mouth 2 times daily 60 tablet 3    metFORMIN (GLUCOPHAGE) 500 MG tablet Take 500 mg by mouth daily (with breakfast)       TRUE METRIX BLOOD GLUCOSE TEST strip check blood sugar twice daily as directed      apixaban (ELIQUIS) 5 MG TABS tablet Take 1 tablet by mouth 2 times daily 60 tablet 5    magnesium oxide (MAG-OX) 400 MG tablet Take 400 mg by mouth daily      pitavastatin (LIVALO) 4 MG TABS tablet Take 4 mg by mouth nightly      nitroGLYCERIN (NITROSTAT) 0.4 MG SL tablet Place 0.4 mg under the tongue every 5 minutes as needed for Chest pain      aspirin 81 MG tablet Take 81 mg by mouth daily       No current facility-administered medications for this visit. Review of Systems   Constitutional: Negative for fever. HENT: Negative for ear pain, tinnitus and trouble swallowing. Eyes: Negative for photophobia and visual disturbance. Respiratory: Negative for choking and shortness of breath. Cardiovascular: Negative for chest pain and palpitations. Gastrointestinal: Negative for nausea and vomiting. Musculoskeletal: Negative for back pain, gait problem, joint swelling, myalgias, neck pain and neck stiffness. Skin: Negative for color change. Allergic/Immunologic: Negative for food allergies. Neurological: Negative for dizziness, tremors, seizures, syncope, facial asymmetry, speech difficulty, weakness, light-headedness, numbness and headaches. Psychiatric/Behavioral: Negative for behavioral problems, confusion, hallucinations and sleep disturbance. Objective:   /70 (Site: Right Upper Arm, Position: Sitting, Cuff Size: Medium Adult)   Pulse 63   Wt 154 lb (69.9 kg)   BMI 23.42 kg/m²     Physical Exam  Vitals signs reviewed. Eyes:      Pupils: Pupils are equal, round, and reactive to light. Neck:      Musculoskeletal: Normal range of motion. Cardiovascular:      Rate and Rhythm: Normal rate and regular rhythm. Heart sounds: No murmur. Pulmonary:      Effort: Pulmonary effort is normal.      Breath sounds: Normal breath sounds. Abdominal:      General: Bowel sounds are normal.   Musculoskeletal: Normal range of motion. Skin:     General: Skin is warm. Neurological:      Mental Status: He is alert and oriented to person, place, and time. Cranial Nerves: No cranial nerve deficit. Sensory: No sensory deficit. Motor: No abnormal muscle tone. Coordination: Coordination normal.      Deep Tendon Reflexes: Reflexes are normal and symmetric. Babinski sign absent on the right side. Babinski sign absent on the left side.    Psychiatric:         Mood and Affect: Mood normal.     She is areflexic in the lower extremities with some loss of balance. No results found. Lab Results   Component Value Date    WBC 8.0 03/14/2020    RBC 4.27 03/14/2020    RBC 4.79 10/19/2011    HGB 12.9 03/14/2020    HCT 39.3 03/14/2020    MCV 91.9 03/14/2020    MCH 30.3 03/14/2020    MCHC 33.0 03/14/2020    RDW 13.1 03/14/2020     03/14/2020    MPV 9.7 10/13/2015     Lab Results   Component Value Date     03/14/2020    K 4.6 03/14/2020     03/14/2020    CO2 26 03/14/2020    BUN 18 03/14/2020    CREATININE 0.89 03/14/2020    GFRAA >60.0 03/14/2020    LABGLOM >60.0 03/14/2020    GLUCOSE 107 03/14/2020    PROT 6.5 03/13/2020    LABALBU 4.0 03/13/2020    LABALBU 4.4 05/08/2012    CALCIUM 8.9 03/14/2020    BILITOT 0.3 03/13/2020    ALKPHOS 63 03/13/2020    AST 14 03/13/2020    ALT 9 03/13/2020     Lab Results   Component Value Date    PROTIME 16.8 03/13/2020    INR 1.3 03/13/2020     Lab Results   Component Value Date    TSH 1.330 05/24/2019    IRON 105 05/24/2019    TIBC 361 05/24/2019     Lab Results   Component Value Date    TRIG 93 03/14/2020    HDL 36 03/14/2020    LDLCALC 39 03/14/2020     No results found for: LABAMPH, BARBSCNU, LABBENZ, CANNAB, COCAINESCRN, LABMETH, OPIATESCREENURINE, PHENCYCLIDINESCREENURINE, PPXUR, ETOH  No results found for: LITHIUM, DILFRTOT, VALPROATE    Assessment:       Diagnosis Orders   1. Orthostatic dizziness     2. 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HCC)     3. Vasovagal syncope     4. Dizziness and giddiness     Orthostatic hypotension  with loss of consciousness in the past.  Has not any further loss of consciousness is able to keep his blood pressures around 120 and above . Patient has a loop recorder at least over 2 years. Is not any cardiac arrhythmias is noted. He continues on blood thinners. Has recent falls injuries or trauma. His main issues appears to be loss of balance and he may harbor an underlying mild neuropathy which may cause orthostasis as well.   A primary doctor

## 2020-08-04 ENCOUNTER — HOSPITAL ENCOUNTER (OUTPATIENT)
Dept: CARDIOLOGY | Age: 80
Discharge: HOME | End: 2020-08-04
Payer: OTHER GOVERNMENT

## 2020-08-04 PROCEDURE — G2066 INTER DEVC REMOTE 30D: HCPCS

## 2020-11-03 PROBLEM — R55 SYNCOPE AND COLLAPSE: Status: RESOLVED | Noted: 2018-12-08 | Resolved: 2020-11-03

## 2020-11-03 PROBLEM — I25.10 CAD (CORONARY ARTERY DISEASE): Status: RESOLVED | Noted: 2018-11-26 | Resolved: 2020-11-03

## 2020-11-03 PROBLEM — I25.10 ARTERIOSCLEROSIS OF CORONARY ARTERY: Status: RESOLVED | Noted: 2018-11-26 | Resolved: 2020-11-03

## 2021-01-21 ENCOUNTER — APPOINTMENT (OUTPATIENT)
Dept: CT IMAGING | Age: 81
DRG: 149 | End: 2021-01-21
Payer: MEDICARE

## 2021-01-21 ENCOUNTER — HOSPITAL ENCOUNTER (INPATIENT)
Age: 81
LOS: 5 days | Discharge: HOME | DRG: 149 | End: 2021-01-26
Attending: EMERGENCY MEDICINE | Admitting: INTERNAL MEDICINE
Payer: MEDICARE

## 2021-01-21 ENCOUNTER — APPOINTMENT (OUTPATIENT)
Dept: GENERAL RADIOLOGY | Age: 81
DRG: 149 | End: 2021-01-21
Payer: MEDICARE

## 2021-01-21 ENCOUNTER — APPOINTMENT (OUTPATIENT)
Dept: MRI IMAGING | Age: 81
DRG: 149 | End: 2021-01-21
Payer: MEDICARE

## 2021-01-21 DIAGNOSIS — R26.9 GAIT DISTURBANCE: ICD-10-CM

## 2021-01-21 DIAGNOSIS — R42 DIZZINESS: Primary | ICD-10-CM

## 2021-01-21 DIAGNOSIS — H81.11 BENIGN PAROXYSMAL POSITIONAL VERTIGO OF RIGHT EAR: ICD-10-CM

## 2021-01-21 DIAGNOSIS — R27.0 ATAXIA: ICD-10-CM

## 2021-01-21 DIAGNOSIS — H81.01 MENIERE'S DISEASE OF RIGHT EAR: ICD-10-CM

## 2021-01-21 LAB
ALBUMIN SERPL-MCNC: 4.1 G/DL (ref 3.5–4.6)
ALP BLD-CCNC: 62 U/L (ref 35–104)
ALT SERPL-CCNC: 10 U/L (ref 0–41)
ANION GAP SERPL CALCULATED.3IONS-SCNC: 14 MEQ/L (ref 9–15)
AST SERPL-CCNC: 19 U/L (ref 0–40)
BASOPHILS ABSOLUTE: 0.1 K/UL (ref 0–0.2)
BASOPHILS RELATIVE PERCENT: 1.4 %
BILIRUB SERPL-MCNC: 0.4 MG/DL (ref 0.2–0.7)
BUN BLDV-MCNC: 18 MG/DL (ref 8–23)
CALCIUM SERPL-MCNC: 9.7 MG/DL (ref 8.5–9.9)
CHLORIDE BLD-SCNC: 101 MEQ/L (ref 95–107)
CO2: 25 MEQ/L (ref 20–31)
CREAT SERPL-MCNC: 0.95 MG/DL (ref 0.7–1.2)
EOSINOPHILS ABSOLUTE: 0.7 K/UL (ref 0–0.7)
EOSINOPHILS RELATIVE PERCENT: 7.9 %
GFR AFRICAN AMERICAN: >60
GFR NON-AFRICAN AMERICAN: >60
GLOBULIN: 3.1 G/DL (ref 2.3–3.5)
GLUCOSE BLD-MCNC: 185 MG/DL (ref 70–99)
HCT VFR BLD CALC: 40 % (ref 42–52)
HEMOGLOBIN: 13.4 G/DL (ref 14–18)
INR BLD: 1.3
LACTIC ACID: 1.8 MMOL/L (ref 0.5–2.2)
LYMPHOCYTES ABSOLUTE: 1.4 K/UL (ref 1–4.8)
LYMPHOCYTES RELATIVE PERCENT: 16.8 %
MAGNESIUM: 2 MG/DL (ref 1.7–2.4)
MCH RBC QN AUTO: 29.3 PG (ref 27–31.3)
MCHC RBC AUTO-ENTMCNC: 33.4 % (ref 33–37)
MCV RBC AUTO: 87.6 FL (ref 80–100)
MONOCYTES ABSOLUTE: 0.8 K/UL (ref 0.2–0.8)
MONOCYTES RELATIVE PERCENT: 9 %
NEUTROPHILS ABSOLUTE: 5.5 K/UL (ref 1.4–6.5)
NEUTROPHILS RELATIVE PERCENT: 64.9 %
PDW BLD-RTO: 13.7 % (ref 11.5–14.5)
PLATELET # BLD: 293 K/UL (ref 130–400)
POTASSIUM SERPL-SCNC: 4.5 MEQ/L (ref 3.4–4.9)
PROTHROMBIN TIME: 16.1 SEC (ref 12.3–14.9)
RBC # BLD: 4.57 M/UL (ref 4.7–6.1)
SARS-COV-2, NAAT: NOT DETECTED
SODIUM BLD-SCNC: 140 MEQ/L (ref 135–144)
TOTAL PROTEIN: 7.2 G/DL (ref 6.3–8)
TROPONIN: <0.01 NG/ML (ref 0–0.01)
WBC # BLD: 8.5 K/UL (ref 4.8–10.8)

## 2021-01-21 PROCEDURE — 70498 CT ANGIOGRAPHY NECK: CPT

## 2021-01-21 PROCEDURE — 84484 ASSAY OF TROPONIN QUANT: CPT

## 2021-01-21 PROCEDURE — U0002 COVID-19 LAB TEST NON-CDC: HCPCS

## 2021-01-21 PROCEDURE — 97166 OT EVAL MOD COMPLEX 45 MIN: CPT

## 2021-01-21 PROCEDURE — 6360000004 HC RX CONTRAST MEDICATION: Performed by: INTERNAL MEDICINE

## 2021-01-21 PROCEDURE — 83605 ASSAY OF LACTIC ACID: CPT

## 2021-01-21 PROCEDURE — 6370000000 HC RX 637 (ALT 250 FOR IP): Performed by: INTERNAL MEDICINE

## 2021-01-21 PROCEDURE — 83735 ASSAY OF MAGNESIUM: CPT

## 2021-01-21 PROCEDURE — 85025 COMPLETE CBC W/AUTO DIFF WBC: CPT

## 2021-01-21 PROCEDURE — 6360000002 HC RX W HCPCS: Performed by: INTERNAL MEDICINE

## 2021-01-21 PROCEDURE — 93005 ELECTROCARDIOGRAM TRACING: CPT | Performed by: EMERGENCY MEDICINE

## 2021-01-21 PROCEDURE — 80053 COMPREHEN METABOLIC PANEL: CPT

## 2021-01-21 PROCEDURE — 36415 COLL VENOUS BLD VENIPUNCTURE: CPT

## 2021-01-21 PROCEDURE — 70450 CT HEAD/BRAIN W/O DYE: CPT

## 2021-01-21 PROCEDURE — 97161 PT EVAL LOW COMPLEX 20 MIN: CPT

## 2021-01-21 PROCEDURE — 1210000000 HC MED SURG R&B

## 2021-01-21 PROCEDURE — 2580000003 HC RX 258: Performed by: INTERNAL MEDICINE

## 2021-01-21 PROCEDURE — 70496 CT ANGIOGRAPHY HEAD: CPT

## 2021-01-21 PROCEDURE — 70551 MRI BRAIN STEM W/O DYE: CPT

## 2021-01-21 PROCEDURE — 85610 PROTHROMBIN TIME: CPT

## 2021-01-21 PROCEDURE — 99283 EMERGENCY DEPT VISIT LOW MDM: CPT

## 2021-01-21 PROCEDURE — 71045 X-RAY EXAM CHEST 1 VIEW: CPT

## 2021-01-21 RX ORDER — ONDANSETRON 2 MG/ML
4 INJECTION INTRAMUSCULAR; INTRAVENOUS EVERY 6 HOURS PRN
Status: DISCONTINUED | OUTPATIENT
Start: 2021-01-21 | End: 2021-01-24

## 2021-01-21 RX ORDER — ATORVASTATIN CALCIUM 20 MG/1
20 TABLET, FILM COATED ORAL NIGHTLY
Status: DISCONTINUED | OUTPATIENT
Start: 2021-01-21 | End: 2021-01-22

## 2021-01-21 RX ORDER — SOTALOL HYDROCHLORIDE 80 MG/1
40 TABLET ORAL 2 TIMES DAILY
Status: DISCONTINUED | OUTPATIENT
Start: 2021-01-21 | End: 2021-01-24

## 2021-01-21 RX ORDER — PROMETHAZINE HYDROCHLORIDE 12.5 MG/1
12.5 TABLET ORAL EVERY 6 HOURS PRN
Status: DISCONTINUED | OUTPATIENT
Start: 2021-01-21 | End: 2021-01-26 | Stop reason: HOSPADM

## 2021-01-21 RX ORDER — ASPIRIN 81 MG/1
81 TABLET, CHEWABLE ORAL DAILY
Status: DISCONTINUED | OUTPATIENT
Start: 2021-01-21 | End: 2021-01-21 | Stop reason: SDUPTHER

## 2021-01-21 RX ORDER — SODIUM CHLORIDE 0.9 % (FLUSH) 0.9 %
10 SYRINGE (ML) INJECTION EVERY 12 HOURS SCHEDULED
Status: DISCONTINUED | OUTPATIENT
Start: 2021-01-21 | End: 2021-01-26 | Stop reason: HOSPADM

## 2021-01-21 RX ORDER — SODIUM CHLORIDE 0.9 % (FLUSH) 0.9 %
10 SYRINGE (ML) INJECTION PRN
Status: DISCONTINUED | OUTPATIENT
Start: 2021-01-21 | End: 2021-01-26 | Stop reason: HOSPADM

## 2021-01-21 RX ORDER — NITROGLYCERIN 0.4 MG/1
0.4 TABLET SUBLINGUAL EVERY 5 MIN PRN
Status: DISCONTINUED | OUTPATIENT
Start: 2021-01-21 | End: 2021-01-26 | Stop reason: HOSPADM

## 2021-01-21 RX ORDER — POLYETHYLENE GLYCOL 3350 17 G/17G
17 POWDER, FOR SOLUTION ORAL DAILY PRN
Status: DISCONTINUED | OUTPATIENT
Start: 2021-01-21 | End: 2021-01-26 | Stop reason: HOSPADM

## 2021-01-21 RX ORDER — LANOLIN ALCOHOL/MO/W.PET/CERES
400 CREAM (GRAM) TOPICAL DAILY
Status: DISCONTINUED | OUTPATIENT
Start: 2021-01-21 | End: 2021-01-25 | Stop reason: SDUPTHER

## 2021-01-21 RX ORDER — LABETALOL HYDROCHLORIDE 5 MG/ML
10 INJECTION, SOLUTION INTRAVENOUS EVERY 10 MIN PRN
Status: DISCONTINUED | OUTPATIENT
Start: 2021-01-21 | End: 2021-01-26 | Stop reason: HOSPADM

## 2021-01-21 RX ORDER — ASPIRIN 81 MG/1
81 TABLET ORAL DAILY
Status: DISCONTINUED | OUTPATIENT
Start: 2021-01-21 | End: 2021-01-26 | Stop reason: HOSPADM

## 2021-01-21 RX ORDER — ASPIRIN 300 MG/1
300 SUPPOSITORY RECTAL DAILY
Status: DISCONTINUED | OUTPATIENT
Start: 2021-01-21 | End: 2021-01-26 | Stop reason: HOSPADM

## 2021-01-21 RX ADMIN — Medication 10 ML: at 20:09

## 2021-01-21 RX ADMIN — SOTALOL HYDROCHLORIDE 40 MG: 80 TABLET ORAL at 20:08

## 2021-01-21 RX ADMIN — ASPIRIN 81 MG: 81 TABLET, COATED ORAL at 16:26

## 2021-01-21 RX ADMIN — APIXABAN 5 MG: 5 TABLET, FILM COATED ORAL at 20:08

## 2021-01-21 RX ADMIN — ENOXAPARIN SODIUM 40 MG: 100 INJECTION SUBCUTANEOUS at 16:26

## 2021-01-21 RX ADMIN — IOPAMIDOL 100 ML: 612 INJECTION, SOLUTION INTRAVENOUS at 18:48

## 2021-01-21 ASSESSMENT — ENCOUNTER SYMPTOMS
CHEST TIGHTNESS: 0
SORE THROAT: 0
VOMITING: 0
NAUSEA: 0
SHORTNESS OF BREATH: 0
EYE PAIN: 0
ABDOMINAL PAIN: 0

## 2021-01-21 ASSESSMENT — PAIN SCALES - GENERAL: PAINLEVEL_OUTOF10: 0

## 2021-01-21 NOTE — FLOWSHEET NOTE
1445- Pt arrived to floor via cart from ED, pt oriented to room and call light functions, bed alarm engaged-KGW  1456- Admission assessment completed at this time, pt A&Ox4, denies chest pain/SOB, denies nausea/vomiting, denies blurry vision/double vision, PERRLA, strength noted to be equal in bilateral upper and lower extremities, pt denies difficulty urinating and having bowel movements, pt denies further needs at this time-KGW  1620- Perfectserv message sent to Dr. Angela Berman to make her aware of pt blood pressure being 181/88 and 184/92 as well as the room spinning, no new orders received-KGW  Electronically signed by Carol Sellers RN on 1/21/2021

## 2021-01-21 NOTE — PROGRESS NOTES
Physical Therapy Med Surg Initial Assessment  Facility/Department: [facility]  Room: [place] [address]       NAME: Amanda Bird  : 1940 (79 y.o.)  MRN: [id number]  CODE STATUS: Full Code    Date of Service: 2021    Patient Diagnosis(es): Ataxia [R27.0]   Chief Complaint   Patient presents with    Dizziness     patient falling and feeling dizzy since , ear ringing and foggy hearing to right ear     Patient Active Problem List    Diagnosis Date Noted    Atrial fibrillation ([address] 2019     Priority: High    High risk medication use 2019     Priority: High    Ataxia 2021    Cerebrovascular accident ([address] 2020    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[address] 2020    Vasovagal syncope 2020    Dizziness and giddiness 2020    Orthostatic dizziness 2020    Atherosclerosis of native coronary artery of native heart without angina pectoris 2018    Essential hypertension 2018    Ischemic cardiomyopathy 2018    Nonrheumatic aortic valve disorder, unspecified 2018    Nonrheumatic aortic valve disorder, unspecified 2018    H/O: drug dependency ([address] 2018    Presence of aortocoronary bypass graft 2018        Past Medical History:   Diagnosis Date    Atrial fibrillation ([address]     CAD (coronary artery disease)     cardiac stents    COPD (chronic obstructive pulmonary disease) ([address]     Diabetes mellitus ([address]     Hyperlipidemia     Hypertension     Movement disorder     Pneumonia      Past Surgical History:   Procedure Laterality Date    APPENDECTOMY      CORONARY ANGIOPLASTY WITH STENT PLACEMENT      4    CORONARY ARTERY BYPASS GRAFT      triple bypass    EYE SURGERY Bilateral     cataract surgery    INSERTABLE CARDIAC MONITOR Left 2018       Chart Reviewed: Yes  Patient assessed for rehabilitation services?: Yes  Family / Caregiver Present: No  General Comment  Comments: PT/OT co-eval    Restrictions:  Restrictions/Precautions: Fall Risk(high cummings score)     SUBJECTIVE: Subjective: \"I can't handle this constant ringing in my ear\"    Pain  Pre Treatment Pain Screening  Pain at present: 0  Intervention List: Patient able to continue with treatment    Post Treatment Pain Screening:   Pain Screening  Patient Currently in Pain: No    Prior Level of Function:  Social/Functional History  Lives With: Spouse  Type of Home: Trailer  Home Layout: One level  Home Access: Stairs to enter with rails  Entrance Stairs - Number of Steps: 2  Entrance Stairs - Rails: Both  Bathroom Shower/Tub: Tub/Shower unit  Home Equipment: Rolling walker  ADL Assistance: Needs assistance(wife washes pts back, dries pt off)  Homemaking Assistance: Independent(cooks breakfast; wife completes all other ADLs)  Homemaking Responsibilities: Yes  Ambulation Assistance: Independent(WW)  Transfer Assistance: Independent  Active : No  Additional Comments: multiple falls at home-per pt report    OBJECTIVE:   Vision: Within Functional Limits  Hearing: Within functional limits    Cognition:  Overall Orientation Status: Within Functional Limits  Follows Commands: Within Functional Limits    Observation/Palpation  Observation: Pt exhibits SOB with minimal exertion  Edema: mild edema noted left LE near ankle    ROM:  RLE AROM: WFL  LLE AROM : WFL    Strength:  Strength RLE  Comment: grossly 4+/5  Strength LLE  Comment: grossly 4+/5    Neuro:  Balance  Posture: Fair(slouched posture)  Sitting - Static: Good  Sitting - Dynamic: Good  Standing - Static: Fair;-(mod increased sway without UE support)  Standing - Dynamic: Fair;-(pt requires B UE support to maintain steadiness during amb)     Tone RLE  RLE Tone: Normotonic  Tone LLE  LLE Tone: Normotonic  Motor Control  Gross Motor?: WFL  Sensation  Overall Sensation Status: WFL    Bed mobility  Supine to Sit: Stand by assistance  Sit to Supine: Stand by assistance  Comment: HOB elevated; c/o minimal dizziness- recovery in 10 sec    Transfers  Sit to Stand: Stand by assistance  Stand to sit: Stand by assistance  Comment: HHA; no c/o dizziness    Ambulation  Ambulation?: Yes  Ambulation 1  Surface: level tile  Device: No Device;Hand-Held Assist  Assistance: Contact guard assistance  Gait Deviations: Decreased step length;Decreased step height;Slow Cleo; Increased FERNANDO  Distance: 15 ft X 2  Comments: recommend use of [address] for pt safety    Activity Tolerance  Activity Tolerance: Patient limited by fatigue  Activity Tolerance: Pt exhibiting mod SOB after short distance gait- SPO2 assessed at 95%; pt also limited by dizziness with changes in position    PT Education  PT Education: Goals;PT Role;Transfer Training;Plan of Care;Gait Training;General Safety    ASSESSMENT:   Body structures, Functions, Activity limitations: Decreased functional mobility ; Decreased balance;Decreased endurance  Decision Making: Low Complexity  History: high  Exam: low  Clinical Presentation: med    Prognosis: Good    DISCHARGE RECOMMENDATIONS:  Discharge Recommendations: Continue to assess pending progress, Patient would benefit from continued therapy after discharge    Assessment: Pt exhibits dizziness with changes in position and decreased activity tolerance evidenced by increased SOB with minimal exertion, which increases pt need for hands on assistance to decrease risk for falls at home. Therefore, continued PT indicated.       REQUIRES PT FOLLOW UP: Yes      PLAN OF CARE:  Plan  Times per week: 3-6  Current Treatment Recommendations: Strengthening, Transfer Training, Endurance Training, Neuromuscular Re-education, Patient/Caregiver Education & Training, ROM, Manual Therapy - Soft Tissue Mobilization, Pain Management, Equipment Evaluation, Education, & procurement, Balance Training, Gait Training, Home Exercise Program, Functional Mobility Training, Stair training, Safety Education & Training  Safety Devices  Type of devices: Bed alarm in place, Call light within reach, Left in bed    Goals:  Patient goals : \"go home with my wife\"  Long term goals  Long term goal 1: Pt will demonstrate transfers mod indep with [facility] for safe return to OF. Long term goal 2: Pt will demonstrate amb mod indep 150ft with WW to allow pt to amb inside his home with safety. Long term goal 3: Pt will demonstrate stair negotiation 2 steps with 2 rails mod indep to allow pt to enter and exit his home safely. [facility] (6 CLICK) BASIC MOBILITY  AM-PAC Inpatient Mobility Raw Score : 20    Therapy Time:   Individual   Time In 1520   Time Out 7229   Minutes [address] [place], [place], 01/21/21 at 4:19 PM         Definitions for assistance levels  Independent = pt does not require any physical supervision or assistance from another person for activity completion. Device may be needed.   Stand by assistance = pt requires verbal cues or instructions from another person, close to but not touching, to perform the activity  Minimal assistance= pt performs 75% or more of the activity; assistance is required to complete the activity  Moderate assistance= pt performs 50% of the activity; assistance is required to complete the activity  Maximal assistance = pt performs 25% of the activity; assistance is required to complete the activity  Dependent = pt requires total physical assistance to accomplish the task

## 2021-01-21 NOTE — CARE COORDINATION
I SPOKE TO PT AND HIS WIFE ABOUT THE IMPORTANCE OF GRAB BARS AND SHOWER CHAIR THAT IS ESPECIALLY IMPORTANT FOR A PT WHO EXPERIENCES DIZZINESS. GRAB BARS ARE A CHALLENGE IN THEIR TRAILER HOME SO I MENTIONED THE KIND OF GRAB BARS THAT GO OVER THE SIDE OF THE BATH TUB.

## 2021-01-21 NOTE — PROGRESS NOTES
[facility] OCCUPATIONAL THERAPY EVALUATION - ACUTE     NAME: Tasia Nice  : 1940 (79 y.o.)  MRN: [id number]  CODE STATUS: Full Code  Room: K593/M432-73    Date of Service: 2021    Patient Diagnosis(es): Ataxia [R27.0]   Chief Complaint   Patient presents with    Dizziness     patient falling and feeling dizzy since , ear ringing and foggy hearing to right ear     Patient Active Problem List    Diagnosis Date Noted    Atrial fibrillation ([address] 2019     Priority: High    High risk medication use 2019     Priority: High    Ataxia 2021    Cerebrovascular accident ([address] 2020    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[address] 2020    Vasovagal syncope 2020    Dizziness and giddiness 2020    Orthostatic dizziness 2020    Atherosclerosis of native coronary artery of native heart without angina pectoris 2018    Essential hypertension 2018    Ischemic cardiomyopathy 2018    Nonrheumatic aortic valve disorder, unspecified 2018    Nonrheumatic aortic valve disorder, unspecified 2018    H/O: drug dependency ([address] 2018    Presence of aortocoronary bypass graft 2018        Past Medical History:   Diagnosis Date    Atrial fibrillation ([address]     CAD (coronary artery disease)     cardiac stents    COPD (chronic obstructive pulmonary disease) ([address]     Diabetes mellitus ([address]     Hyperlipidemia     Hypertension     Movement disorder     Pneumonia      Past Surgical History:   Procedure Laterality Date    APPENDECTOMY      CORONARY ANGIOPLASTY WITH STENT PLACEMENT      4    CORONARY ARTERY BYPASS GRAFT      triple bypass    EYE SURGERY Bilateral     cataract surgery    INSERTABLE CARDIAC MONITOR Left 2018        Restrictions:Fall        Safety Devices: Safety Devices  Safety Devices in place: Yes  Type of devices:  All fall risk precautions in place   Initially in place: No    Subjective:\"I get real short of breath easy. \"       Pain Reassessment: 0/10 pain reported during session. Prior Level of Function:  Social/Functional History  Lives With: Spouse  Type of Home: Trailer  Home Layout: One level  Home Access: Stairs to enter with rails  Entrance Stairs - Number of Steps: 2  Entrance Stairs - Rails: Both  Bathroom Shower/Tub: Tub/Shower unit  Home Equipment: Rolling walker  ADL Assistance: Needs assistance(wife washes pts back, dries pt off)  Homemaking Assistance: Independent(cooks breakfast; wife completes all other ADLs)  Homemaking Responsibilities: Yes  Ambulation Assistance: Independent(WW)  Transfer Assistance: Independent  Active : No  Additional Comments: multiple falls at home-per pt report    OBJECTIVE:     Orientation Status:  Orientation  Overall Orientation Status: Within Functional Limits    Observation:  Observation/Palpation  Posture: Good  Observation: Pt exhibits SOB with minimal exertion  Edema: mild edema noted left LE near ankle    Cognition Status:  Cognition  Overall Cognitive Status: WFL    Perception Status:  Perception  Overall Perceptual Status: WFL    Sensation Status:  Sensation  Overall Sensation Status: WFL    Vision and Hearing Status:  Vision  Vision: Within Functional Limits  Hearing  Hearing: Within functional limits     ROM:   LUE AROM (degrees)  LUE AROM : WFL  Left Hand AROM (degrees)  Left Hand AROM: WFL  RUE AROM (degrees)  RUE AROM : WFL  Right Hand AROM (degrees)  Right Hand AROM: WFL    Strength:  LUE Strength  Gross LUE Strength: WFL  L Hand General: 4/5  RUE Strength  Gross RUE Strength: WFL  R Hand General: 4/5    Coordination, Tone, Quality of Movement: Tone RUE  RUE Tone: Normotonic  Tone LUE  LUE Tone: Normotonic  Coordination  Movements Are Fluid And Coordinated: Yes    Hand Dominance:  Hand Dominance  Hand Dominance: Right    ADL Status:  ADL  Feeding: Unable to assess(comment)  Grooming: Setup  UE Bathing: Setup  LE Bathing:  Moderate assistance  UE Dressing: Setup  LE Dressing: Moderate assistance  Toileting: Unable to assess(comment)          Therapy key for assistance levels -   Independent = Pt. is able to perform task with no assistance but may require a device   Stand by assistance = Pt. does not perform task at an independent level but does not need physical assistance, requires verbal cues  Minimal, Moderate, Maximal Assistance = Pt. requires physical assistance (25%, 50%, 75% assist from helper) for task but is able to actively participate in task   Dependent = Pt. requires total assistance with task and is not able to actively participate with task completion     Functional Mobility:     Transfers  Sit to stand: Contact guard assistance  Stand to sit: Stand by assistance    Bed Mobility  Bed mobility  Supine to Sit: Stand by assistance  Sit to Supine: Stand by assistance    Seated and Standing Balance:  Balance  Sitting Balance: Supervision  Standing Balance: Stand by assistance    Functional Endurance:  Activity Tolerance  Activity Tolerance: Patient limited by fatigue    D/C Recommendations:  OT D/C RECOMMENDATIONS  REQUIRES OT FOLLOW UP: Yes    Equipment Recommendations:       OT Education:        OT Follow Up:  OT D/C RECOMMENDATIONS  REQUIRES OT FOLLOW UP: Yes       Assessment/Discharge Disposition:     Performance deficits / Impairments: Decreased functional mobility , Decreased strength, Decreased endurance, Decreased ADL status, Decreased high-level IADLs, Decreased balance  Prognosis: Good  Discharge Recommendations: Continue to assess pending progress  Decision Making: Medium Complexity  History: 8 complexities  Exam: 6 deficits  Assistance / Modification:  Mod A    Six Click Score    How much help for putting on and taking off regular lower body clothing?: A Lot  How much help for Bathing?: A Little  How much help for Toileting?: None  How much help for putting on and taking off regular upper body clothing?: None  How much help for taking care of personal grooming?: None  How much help for eating meals?: None  AM-PAC Inpatient Daily Activity Raw Score: 21  AM-PAC Inpatient ADL T-Scale Score : 44.27  ADL Inpatient CMS 0-100% Score: 32.79    Plan:  Plan  Times per week: 1-4x/wk  Current Treatment Recommendations: Strengthening, Functional Mobility Training, Endurance Training, Balance Training, Neuromuscular Re-education, Self-Care / ADL, Equipment Evaluation, Education, & procurement    Goals:   Patient will:    - Improve functional endurance to tolerate/complete 10-15 mins of ADL's  - Be independent in UB ADLs   - Be independenti in LB ADLs  - Be ndependent in ADL transfers without LOB  - Be indpendent in toileting tasks  - Improve bilateral UE strength and endurance to Fair+ in order to participate in self-care activities as projected. - Access appropriate D/C site with as few architectural barriers as possible. Patient Goal: Patient goals :  To return to home when ready      Discussed and agreed upon: Yes Comments:     Therapy Time:   OT Individual Minutes  Time In: 1520  Time Out: 1544  Minutes: 24    Eval: 24 minutes     Electronically signed by:    MCKAYLA Balderrama/NITHYA  8/98/7461, 3:55 PM Electronically signed by MCKAYLA Balderrama/NITHYA on 5/47/30 at 3:54 PM EST

## 2021-01-21 NOTE — ACP (ADVANCE CARE PLANNING)
Advance Care Planning     Advance Care Planning Activator (Inpatient)  Conversation Note      Date of ACP Conversation: 3/13/2020    Conversation Conducted with: Patient with Decision Making Capacity and his wife. ACP Activator: Cherie Gomez    *When Decision Maker makes decisions on behalf of the incapacitated patient: Decision Maker is asked to consider and make decisions based on patient values, known preferences, or best interests. Health Care Decision Maker:     Current Designated Health Care Decision Maker:   Primary Decision Maker: Lety Trinity - [phone]  (If there is a valid Health Care Decision Maker named in the [facility] Makers\" box in the ACP activity, but it is not visible above, be sure to open that field and then select the health care decision maker relationship (ie \"primary\") in the blank space to the right of the name.) Validate  this information as still accurate & up-to-date; edit [address] field as needed. )    PT HAS A HC POA AND LIVING WILL SCANNED INTO MEDIA SECTION OF EMR OF Epic. I WAS IN THE ROOM WITH THE PT AND THE WIFE WHEN DR Gayle Hartman EXPLAINED CODE STATUS FULL CODE VS DNRCC-A. PT AND HIS WIFE CHOSE DNRCC-A. Note: If the relationship of these Decision-Makers to the patient does NOT follow your state's Next of Kin hierarchy, recommend that patient complete ACP document that meets state-specific requirements to allow them to act on the patient's behalf when appropriate. NOTE: If the patient has a valid advance directive AND now provides care preference(s) that are inconsistent with that prior directive, advise the patient to consider either: creating a new advance directive that complies with state-specific requirements; or, if that is not possible, orally revoking that prior directive in accordance with state-specific requirements, which must be documented in the EHR. [x] Yes   [] No   Educated Patient / Gay Barreto regarding differences between Advance Directives and portable DNR orders.     Length of ACP Conversation in minutes:      Conversation Outcomes:  [x] ACP discussion completed  [] Existing advance directive reviewed with patient; no changes to patient's previously recorded wishes  [] New Advance Directive completed  [] Portable Do Not Rescitate prepared for Provider review and signature  [] POLST/POST/MOLST/MOST prepared for Provider review and signature      Follow-up plan:    [] Schedule follow-up conversation to continue planning  [] Referred individual to Provider for additional questions/concerns   [] Advised patient/agent/surrogate to review completed ACP document and update if needed with changes in condition, patient preferences or care setting    [x] This note routed to one or more involved healthcare providers

## 2021-01-21 NOTE — H&P
Department of Internal Medicine   History and Physical          HISTORY OF PRESENT ILLNESS:      The patient is a 79 y.o. male with significant past medical history of coronary artery disease, hypertension, hyperlipidemia, type 2 diabetes, COPD, atrial fibrillation who presents with dizziness and ataxia for several weeks. The patient states he periodically becomes dizzy- this is described as feeling as if the room is spinning, and is associated with diplopia. No relieving or exacerbating factors. He does report similar symptoms about 1 year ago when she was admitted and diagnosed with vertigo following a  neurological work-up which included a CT brain and MRI. CT brain this admission is nonacute. Patient also describes associated tinnitus of the right ear. 12 point review of systems was negative unless noted above    Past Medical History:        Diagnosis Date    [facility]     CAD (coronary artery disease)     cardiac stents    COPD (chronic obstructive pulmonary disease) ([facility]     Diabetes mellitus ([facility]     Hyperlipidemia     Hypertension     Movement disorder     Pneumonia      Past Surgical History:        Procedure Laterality Date    APPENDECTOMY      CORONARY ANGIOPLASTY WITH STENT PLACEMENT      4    CORONARY ARTERY BYPASS GRAFT      triple bypass    EYE SURGERY Bilateral     cataract surgery    INSERTABLE CARDIAC MONITOR Left 12/2018         Medications Prior to Admission:    Not in a hospital admission. Allergies:  Lipitor [atorvastatin], Niacin, Niaspan [niacin er], Vitamin e, and Zocor [simvastatin]    Social History: Former tobacco user, 50-pack-year history, quit 1 year ago  Denies alcohol or illicit drug use    Family History:   No family history on file.   REVIEW OF SYSTEMS:    PHYSICAL EXAM:    Vitals:  BP (!) 156/102   Pulse 61   Temp 97.3 °F (36.3 °C) (Oral)   Resp 18   Ht 5' 8\" (1.727 m)   Wt 153 lb (69.4 kg)   SpO2 98%   BMI 23.26 kg/m² CONSTITUTIONAL:  awake, alert, cooperative, no apparent distress, and appears stated age  EYES:  Lids and lashes normal, pupils equal, round and reactive to light, extra ocular muscles intact, sclera clear, conjunctiva normal  ENT:  tympanic membranes intact bilaterally, normal color, normal light reflex bilaterally  LUNGS:  No increased work of breathing, good air exchange, clear to auscultation bilaterally, no crackles or wheezing  CARDIOVASCULAR:  Normal apical impulse, regular rate and rhythm, normal S1 and S2, no S3 or S4, and no murmur noted  ABDOMEN: Abdomen is soft, nontender to palpation, bowel sounds are present  MUSCULOSKELETAL:  There is no redness, warmth, or swelling of the joints. Full range of motion noted. Motor strength is 5 out of 5 all extremities bilaterally. Tone is normal.  NEUROLOGIC:  Awake, alert, oriented to name, place and time. Cranial nerves II-XII are grossly intact. Motor is 5 out of 5 bilaterally. Sensory is intact.   Gait assessment deferred    DATA:  CBC:   Lab Results   Component Value Date    WBC 8.5 01/21/2021    RBC 4.57 01/21/2021    RBC 4.79 10/19/2011    HGB 13.4 01/21/2021    HCT 40.0 01/21/2021    MCV 87.6 01/21/2021    MCH 29.3 01/21/2021    MCHC 33.4 01/21/2021    RDW 13.7 01/21/2021     01/21/2021    MPV 9.7 10/13/2015     CMP:    Lab Results   Component Value Date     01/21/2021    K 4.5 01/21/2021    K 4.6 03/14/2020     01/21/2021    CO2 25 01/21/2021    BUN 18 01/21/2021    CREATININE 0.95 01/21/2021    GFRAA >60.0 01/21/2021    LABGLOM >60.0 01/21/2021    GLUCOSE 185 01/21/2021    PROT 7.2 01/21/2021    LABALBU 4.1 01/21/2021    LABALBU 4.4 05/08/2012    CALCIUM 9.7 01/21/2021    BILITOT 0.4 01/21/2021    ALKPHOS 62 01/21/2021    AST 19 01/21/2021    ALT 10 01/21/2021     BMP:    Lab Results   Component Value Date     01/21/2021    K 4.5 01/21/2021    K 4.6 03/14/2020     01/21/2021    CO2 25 01/21/2021    BUN 18 01/21/2021

## 2021-01-21 NOTE — ED PROVIDER NOTES
[address] ED  EMERGENCY DEPARTMENT ENCOUNTER      Pt Name: Helena Chen  MRN: [id number]  Catalinagfghanshyam 1940  Date of evaluation: 1/21/2021  Provider: Sundeep Pedersen, [address] [place] [address]       Chief Complaint   Patient presents with    Dizziness     patient falling and feeling dizzy since jan 6th, ear ringing and foggy hearing to right ear         HISTORY OF PRESENT ILLNESS   (Location/Symptom, Timing/Onset, Context/Setting, Quality, Duration, Modifying Factors, Severity)  Note limiting factors. Helena Chen is a 79 y.o. male who presents to the emergency department . Patient with history of atrial fibrillation, CAD, hypertension, ischemic cardiomyopathy, aortic valve disorder and CVA presents with increasing dizziness since January 6. Patient has had a couple of falls since then but has not hit his head. He is on Eliquis. Patient is describing a metallic beeping sound in his right ear. Movement makes him very dizzy and describes it as a spinning sensation. He feels very off-balance. Patient had vertigo once in the past and does have 1 dizzy pill left. Patient was able to get a lot of wax out of his left ear yesterday but did not get anything out of the right. Feels like his hearing is down in the right ear. No chest pain or palpitations. History of A. fib. HPI    Nursing Notes were reviewed. REVIEW OF SYSTEMS    (2-9 systems for level 4, 10 or more for level 5)     Review of Systems   Constitutional: Negative for activity change, appetite change and fatigue. HENT: Positive for hearing loss and tinnitus. Negative for congestion and sore throat. Eyes: Negative for pain and visual disturbance. Respiratory: Negative for chest tightness and shortness of breath. Cardiovascular: Negative for chest pain. Gastrointestinal: Negative for abdominal pain, nausea and vomiting. Endocrine: Negative for polydipsia. Genitourinary: Negative for flank pain and urgency. Musculoskeletal: Positive for gait problem. Negative for neck stiffness. Skin: Negative for rash. Neurological: Positive for dizziness. Negative for tremors, seizures, syncope, facial asymmetry, speech difficulty, weakness, light-headedness, numbness and headaches. Psychiatric/Behavioral: Negative for confusion and sleep disturbance. Except as noted above the remainder of the review of systems was reviewed and negative. PAST MEDICAL HISTORY     Past Medical History:   Diagnosis Date    [facility]     CAD (coronary artery disease)     cardiac stents    COPD (chronic obstructive pulmonary disease) ([facility]     Diabetes mellitus ([facility]     Hyperlipidemia     Hypertension     Movement disorder     Pneumonia          SURGICAL HISTORY       Past Surgical History:   Procedure Laterality Date    APPENDECTOMY      CORONARY ANGIOPLASTY WITH STENT PLACEMENT      4    CORONARY ARTERY BYPASS GRAFT      triple bypass    EYE SURGERY Bilateral     cataract surgery    INSERTABLE CARDIAC MONITOR Left 12/2018         CURRENT MEDICATIONS       Previous Medications    APIXABAN (ELIQUIS) 5 MG TABS TABLET    Take 1 tablet by mouth 2 times daily    ASPIRIN 81 MG TABLET    Take 81 mg by mouth daily    MAGNESIUM OXIDE (MAG-OX) 400 MG TABLET    Take 400 mg by mouth daily    METFORMIN (GLUCOPHAGE) 500 MG TABLET    Take 500 mg by mouth daily (with breakfast)     NITROGLYCERIN (NITROSTAT) 0.4 MG SL TABLET    Place 0.4 mg under the tongue every 5 minutes as needed for Chest pain    PITAVASTATIN (LIVALO) 4 MG TABS TABLET    Take 4 mg by mouth nightly    SOTALOL (BETAPACE) 80 MG TABLET    Take 0.5 tablets by mouth 2 times daily    TRUE METRIX BLOOD GLUCOSE TEST STRIP    check blood sugar twice daily as directed       ALLERGIES     Lipitor [atorvastatin], Niacin, Niaspan [niacin er], Vitamin e, and Zocor [simvastatin]    FAMILY HISTORY     No family history on file.        SOCIAL HISTORY       Social normal.      Left Ear: External ear normal.      Mouth/Throat:      Pharynx: No oropharyngeal exudate. Eyes:      Extraocular Movements: Extraocular movements intact. Conjunctiva/sclera: Conjunctivae normal.      Pupils: Pupils are equal, round, and reactive to light. Comments: No nystagmus. Neck:      Musculoskeletal: Normal range of motion and neck supple. Thyroid: No thyromegaly. Vascular: No JVD. Trachea: No tracheal deviation. Cardiovascular:      Rate and Rhythm: Normal rate. Heart sounds: Normal heart sounds. No murmur. Pulmonary:      Effort: Pulmonary effort is normal. No respiratory distress. Breath sounds: Normal breath sounds. No wheezing. Abdominal:      General: Bowel sounds are normal.      Palpations: Abdomen is soft. Tenderness: There is no abdominal tenderness. There is no guarding. Musculoskeletal: Normal range of motion. Skin:     General: Skin is warm and dry. Findings: No rash. Neurological:      General: No focal deficit present. Mental Status: He is alert and oriented to person, place, and time. Cranial Nerves: No cranial nerve deficit. Sensory: No sensory deficit. Psychiatric:         Behavior: Behavior normal.         DIAGNOSTIC RESULTS     EKG: All EKG's are interpreted by the Emergency Department Physician who either signs or Co-signs this chart in the absence of a cardiologist.    Sinus rhythm with first-degree block nonspecific anterior changes.     RADIOLOGY:   Non-plain film images such as CT, Ultrasound and MRI are read by the radiologist. Plain radiographic images are visualized and preliminarily interpreted by the emergency physician with the below findings:    Chest x-ray no acute pulmonary disease  CT brain no acute pathology    Interpretation per the Radiologist below, if available at the time of this note:    XR CHEST PORTABLE    (Results Pending)   CT Head WO Contrast    (Results Pending)         ED BEDSIDE ULTRASOUND:   Performed by ED Physician - none    LABS:  Labs Reviewed   CBC WITH AUTO DIFFERENTIAL   COMPREHENSIVE METABOLIC PANEL   LACTIC ACID, PLASMA   MAGNESIUM   TROPONIN   PROTIME-INR       All other labs were within normal range or not returned as of this dictation. EMERGENCY DEPARTMENT COURSE and DIFFERENTIAL DIAGNOSIS/MDM:   Vitals:    Vitals:    01/21/21 1047   BP: 128/77   Pulse: 67   Resp: 18   Temp: 97.3 °F (36.3 °C)   TempSrc: Oral   SpO2: 96%   Weight: 153 lb (69.4 kg)   Height: 5' 8\" (1.727 m)       Patient came in with severe dizziness since January 6. He has fallen 3 times. He may have some tinnitus on the right. Decreased hearing on the right. No nystagmus. Neurologically intact but patient not able to ambulate without falling at this time. Will admit for further evaluation    MDM      REASSESSMENT          CRITICAL CARE TIME   Total Critical Care time was 30 minutes, excluding separately reportable procedures. There was a high probability of clinically significant/life threatening deterioration in the patient's condition which required my urgent intervention. CONSULTS:  None    PROCEDURES:  Unless otherwise noted below, none     Procedures        FINAL IMPRESSION      1. Dizziness    2. Gait disturbance          DISPOSITION/PLAN   DISPOSITION  Admit      PATIENT REFERRED TO:  No follow-up provider specified. DISCHARGE MEDICATIONS:  New Prescriptions    No medications on file     Controlled Substances Monitoring:     No flowsheet data found.     (Please note that portions of this note were completed with a voice recognition program.  Efforts were made to edit the dictations but occasionally words are mis-transcribed.)    Jamil Cheney DO (electronically signed)  Attending Emergency Physician            Jamil Cheney DO  01/22/21 0934

## 2021-01-21 NOTE — ED TRIAGE NOTES
Pt c/o dizziness, ringing in ears and difficulty hearing. Pt was c/o dizziness when moving positions. A&Ox4, skin intact, msps intact, afebrile, breaths are equal and unlabored. Wife at bedside.

## 2021-01-21 NOTE — PLAN OF CARE
Problem: Falls - Risk of:  Goal: Will remain free from falls  Description: Will remain free from falls  Outcome: Ongoing  Goal: Absence of physical injury  Description: Absence of physical injury  Outcome: Ongoing     Problem: IP DRESSINGS LOWER EXTREMITIES  Goal: LTG - patient will dress lower body with or without assistive device  Outcome: Ongoing     Problem: IP BALANCE  Goal: LTG - patient will maintain standing balance to allow for completion of daily activities  Outcome: Ongoing     Problem: HEMODYNAMIC STATUS  Goal: Patient has stable vital signs and fluid balance  Outcome: Ongoing     Problem: ACTIVITY INTOLERANCE/IMPAIRED MOBILITY  Goal: Mobility/activity is maintained at optimum level for patient  Outcome: Ongoing     Problem: COMMUNICATION IMPAIRMENT  Goal: Ability to express needs and understand communication  Outcome: Ongoing

## 2021-01-22 LAB
CHOLESTEROL, TOTAL: 113 MG/DL (ref 0–199)
HBA1C MFR BLD: 6.9 % (ref 4.8–5.9)
HCT VFR BLD CALC: 36.8 % (ref 42–52)
HDLC SERPL-MCNC: 38 MG/DL (ref 40–59)
HEMOGLOBIN: 12 G/DL (ref 14–18)
LDL CHOLESTEROL CALCULATED: 54 MG/DL (ref 0–129)
LV EF: 50 %
LVEF MODALITY: NORMAL
MCH RBC QN AUTO: 28.6 PG (ref 27–31.3)
MCHC RBC AUTO-ENTMCNC: 32.7 % (ref 33–37)
MCV RBC AUTO: 87.4 FL (ref 80–100)
PDW BLD-RTO: 13.9 % (ref 11.5–14.5)
PLATELET # BLD: 260 K/UL (ref 130–400)
RBC # BLD: 4.2 M/UL (ref 4.7–6.1)
TRIGL SERPL-MCNC: 103 MG/DL (ref 0–150)
WBC # BLD: 7.6 K/UL (ref 4.8–10.8)

## 2021-01-22 PROCEDURE — 6370000000 HC RX 637 (ALT 250 FOR IP): Performed by: PSYCHIATRY & NEUROLOGY

## 2021-01-22 PROCEDURE — 80061 LIPID PANEL: CPT

## 2021-01-22 PROCEDURE — 93010 ELECTROCARDIOGRAM REPORT: CPT | Performed by: INTERNAL MEDICINE

## 2021-01-22 PROCEDURE — 97116 GAIT TRAINING THERAPY: CPT

## 2021-01-22 PROCEDURE — 6370000000 HC RX 637 (ALT 250 FOR IP): Performed by: INTERNAL MEDICINE

## 2021-01-22 PROCEDURE — 93306 TTE W/DOPPLER COMPLETE: CPT

## 2021-01-22 PROCEDURE — 85027 COMPLETE CBC AUTOMATED: CPT

## 2021-01-22 PROCEDURE — 92523 SPEECH SOUND LANG COMPREHEN: CPT

## 2021-01-22 PROCEDURE — 92610 EVALUATE SWALLOWING FUNCTION: CPT

## 2021-01-22 PROCEDURE — 99222 1ST HOSP IP/OBS MODERATE 55: CPT | Performed by: PSYCHIATRY & NEUROLOGY

## 2021-01-22 PROCEDURE — 1210000000 HC MED SURG R&B

## 2021-01-22 PROCEDURE — 2580000003 HC RX 258: Performed by: INTERNAL MEDICINE

## 2021-01-22 PROCEDURE — 83036 HEMOGLOBIN GLYCOSYLATED A1C: CPT

## 2021-01-22 PROCEDURE — 36415 COLL VENOUS BLD VENIPUNCTURE: CPT

## 2021-01-22 RX ORDER — MECLIZINE HCL 12.5 MG/1
12.5 TABLET ORAL 3 TIMES DAILY PRN
Status: DISCONTINUED | OUTPATIENT
Start: 2021-01-22 | End: 2021-01-22

## 2021-01-22 RX ORDER — MECLIZINE HYDROCHLORIDE 25 MG/1
25 TABLET ORAL 3 TIMES DAILY
Status: DISCONTINUED | OUTPATIENT
Start: 2021-01-22 | End: 2021-01-26 | Stop reason: HOSPADM

## 2021-01-22 RX ORDER — MECLIZINE HYDROCHLORIDE CHEWABLE TABLETS 25 MG/1
25 TABLET, CHEWABLE ORAL 3 TIMES DAILY
Status: DISCONTINUED | OUTPATIENT
Start: 2021-01-22 | End: 2021-01-22

## 2021-01-22 RX ADMIN — ASPIRIN 81 MG: 81 TABLET, COATED ORAL at 10:27

## 2021-01-22 RX ADMIN — APIXABAN 5 MG: 5 TABLET, FILM COATED ORAL at 10:27

## 2021-01-22 RX ADMIN — Medication 10 ML: at 10:29

## 2021-01-22 RX ADMIN — SOTALOL HYDROCHLORIDE 40 MG: 80 TABLET ORAL at 21:41

## 2021-01-22 RX ADMIN — Medication 400 MG: at 10:27

## 2021-01-22 RX ADMIN — SOTALOL HYDROCHLORIDE 40 MG: 80 TABLET ORAL at 10:27

## 2021-01-22 RX ADMIN — Medication 10 ML: at 21:44

## 2021-01-22 RX ADMIN — APIXABAN 5 MG: 5 TABLET, FILM COATED ORAL at 21:41

## 2021-01-22 RX ADMIN — MECLIZINE HYDROCHLORIDE 25 MG: 25 TABLET ORAL at 21:41

## 2021-01-22 ASSESSMENT — ENCOUNTER SYMPTOMS
TROUBLE SWALLOWING: 0
NAUSEA: 0
PHOTOPHOBIA: 0
COLOR CHANGE: 0
BACK PAIN: 0
SHORTNESS OF BREATH: 0
CHOKING: 0
VOMITING: 0

## 2021-01-22 ASSESSMENT — PAIN SCALES - GENERAL: PAINLEVEL_OUTOF10: 0

## 2021-01-22 NOTE — PROGRESS NOTES
[facility]   Facility/Department: [facility]  [facility]  Speech Language Pathology  Clinical Bedside Swallow Evaluation    Amy Mackey  : 1940 (79 y.o.)   MRN: [id number]  [facility]  ADMISSION DATE: 2021  PATIENT DIAGNOSIS(ES): Ataxia [R27.0]  Chief Complaint   Patient presents with    Dizziness     patient falling and feeling dizzy since , ear ringing and foggy hearing to right ear     Patient Active Problem List    Diagnosis Date Noted    Atrial fibrillation ([address] 2019     Priority: High    High risk medication use 2019     Priority: High    Ataxia 2021    Cerebrovascular accident ([address] 2020    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[address] 2020    Vasovagal syncope 2020    Dizziness and giddiness 2020    Orthostatic dizziness 2020    Atherosclerosis of native coronary artery of native heart without angina pectoris 2018    Essential hypertension 2018    Ischemic cardiomyopathy 2018    Nonrheumatic aortic valve disorder, unspecified 2018    Nonrheumatic aortic valve disorder, unspecified 2018    H/O: drug dependency ([address] 2018    Presence of aortocoronary bypass graft 2018     Past Medical History:   Diagnosis Date    Atrial fibrillation ([address]     CAD (coronary artery disease)     cardiac stents    COPD (chronic obstructive pulmonary disease) ([address]     Diabetes mellitus ([address]     Hyperlipidemia     Hypertension     Movement disorder     Pneumonia      Past Surgical History:   Procedure Laterality Date    APPENDECTOMY      CORONARY ANGIOPLASTY WITH STENT PLACEMENT      4    CORONARY ARTERY BYPASS GRAFT      triple bypass    EYE SURGERY Bilateral     cataract surgery    INSERTABLE CARDIAC MONITOR Left 2018     Allergies   Allergen Reactions    Lipitor [Atorvastatin] Other (See Comments)     Body pain    Niacin Other (See Comments)    Niaspan [Niacin Er] Other (See Comments)     Body pain    Vitamin E Other (See Comments)     Nose bleeds    Zocor [Simvastatin] Other (See Comments)     Sharp body pain       DATE ONSET: 1/21/2021    Date of Evaluation: 1/22/2021   Evaluating Therapist: Zulay Delong. Brown Yin, SLP    Recommended Diet and Intervention  Diet Solids Recommendation: Regular  Liquid Consistency Recommendation: Thin  Recommended Form of Meds: PO  Recommendations: Self feed       Compensatory Swallowing Strategies  Compensatory Swallowing Strategies: Upright as possible for all oral intake    Reason for Referral  Raghu Turpin was referred for a bedside swallow evaluation to assess the efficiency of his swallow function, identify signs and symptoms of aspiration and make recommendations regarding safe dietary consistencies, effective compensatory strategies, and safe eating environment. General  Chart Reviewed: Yes  Behavior/Cognition: Alert; Cooperative;Pleasant mood  Respiratory Status: Room air  Communication Observation: Functional  Follows Directions: Complex  Dentition: Dentures bottom; Dentures top  Patient Positioning: Upright in bed  Baseline Vocal Quality: Normal  Prior Dysphagia History: Pt denies  Consistencies Administered: Reg solid; Dysphagia Pureed (Dysphagia I); Thin - cup    Current Diet level:  Current Diet : Regular  Current Liquid Diet : Thin    Oral Motor Deficits  Oral/Motor  Oral Motor: Within functional limits    Oral Phase Dysfunction  Oral Phase  Oral Phase: WFL  Oral Phase  Oral Phase - Comment: Oral stage WFL     Indicators of Pharyngeal Phase Dysfunction   Pharyngeal Phase  Pharyngeal Phase: WFL  Pharyngeal Phase   Pharyngeal: Pharyngeal stage WFL    Impression  Dysphagia Diagnosis: Swallow function appears grossly intact  Dysphagia Impression : Functional oropharyngeal swallow. Adequate bolus prep and transfer and no overt s/s of aspiration.   Dysphagia Outcome Severity Scale: Level 7: Normal in all situations     Treatment Plan  Requires SLP Intervention: No       Prognosis  Individuals consulted  Consulted and agree with results and recommendations: Patient;RN(Gisela CHOUDHARY)    Education  Patient Education: Educated pt on results  Patient Education Response: Verbalizes understanding  Safety Devices in place: Yes  Type of devices: Bed alarm in place;Call light within reach    Pain Assessment:  Initial Assessment:  Patient denies pain. Re-assessment:  Patient denies pain. Therapy Time  SLP Individual Minutes  Time In: 1125  Time Out: 8958  Minutes: 10              Signature: Electronically signed by Henrique Nick.  ANUJ Koch on 1/22/2021 at 12:10 PM

## 2021-01-22 NOTE — CARE COORDINATION
Met with wife Kirit Jung. Pt is getting and ECHO at this time. She states she had HHC a couple of years ago and they didn't need it. She states if indicated they would do HHC, but don't prefer too.

## 2021-01-22 NOTE — FLOWSHEET NOTE
1000- Shift assessment completed at this time, Pt A&Ox4, denies chest pain/SOB, denies nausea/vomiting, PERRLA, pt denies dizziness/blurred vision, strength equal in bilateral upper and lower extremities, denies numbness/tingling to bilateral upper and lower extremities, denies difficulty urinating and having bowel movements, denies pain, bed alarm engaged, call light reach in reach-KGW  Electronically signed by Cecil Ward RN on 1/22/2021

## 2021-01-22 NOTE — PROGRESS NOTES
Physical Therapy Med Surg Daily Treatment Note  Facility/Department: [facility]  Room: [facility]       NAME: Suma Brown  : 1940 (79 y.o.)  MRN: [id number]  CODE STATUS: Full Code    Date of Service: 2021    Patient Diagnosis(es): Ataxia [R27.0]   Chief Complaint   Patient presents with    Dizziness     patient falling and feeling dizzy since , ear ringing and foggy hearing to right ear     Patient Active Problem List    Diagnosis Date Noted    Atrial fibrillation ([address] 2019     Priority: High    High risk medication use 2019     Priority: High    Ataxia 2021    Cerebrovascular accident ([address] 2020    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[address] 2020    Vasovagal syncope 2020    Dizziness and giddiness 2020    Orthostatic dizziness 2020    Atherosclerosis of native coronary artery of native heart without angina pectoris 2018    Essential hypertension 2018    Ischemic cardiomyopathy 2018    Nonrheumatic aortic valve disorder, unspecified 2018    Nonrheumatic aortic valve disorder, unspecified 2018    H/O: drug dependency ([address] 2018    Presence of aortocoronary bypass graft 2018        Past Medical History:   Diagnosis Date    Atrial fibrillation ([address]     CAD (coronary artery disease)     cardiac stents    COPD (chronic obstructive pulmonary disease) ([address]     Diabetes mellitus ([address]     Hyperlipidemia     Hypertension     Movement disorder     Pneumonia      Past Surgical History:   Procedure Laterality Date    APPENDECTOMY      CORONARY ANGIOPLASTY WITH STENT PLACEMENT      4    CORONARY ARTERY BYPASS GRAFT      triple bypass    EYE SURGERY Bilateral     cataract surgery    INSERTABLE CARDIAC MONITOR Left 2018       Restrictions  Restrictions/Precautions: Fall Risk(high cummings score)    SUBJECTIVE   General  Chart Reviewed: Yes  Family / Caregiver Present: Time   Individual   Time In 0819   Time Out 0834   Minutes 15      Gait: 10  BM/Trsf: 5        Thee Yepez PTA, 01/22/21 at 8:40 AM         Definitions for assistance levels  Independent = pt does not require any physical supervision or assistance from another person for activity completion. Device may be needed.   Stand by assistance = pt requires verbal cues or instructions from another person, close to but not touching, to perform the activity  Minimal assistance= pt performs 75% or more of the activity; assistance is required to complete the activity  Moderate assistance= pt performs 50% of the activity; assistance is required to complete the activity  Maximal assistance = pt performs 25% of the activity; assistance is required to complete the activity  Dependent = pt requires total physical assistance to accomplish the task

## 2021-01-22 NOTE — DISCHARGE SUMMARY
Discharge Summary    Suma Brown  :  1940  MRN:  [id number]    ADMIT DATE:  2021  DISCHARGE DATE:  2021    PRIMARY CARE PHYSICIAN:  Mor Santillan DO    VISIT STATUS: Admission    CODE STATUS:  Full Code    DISCHARGE DIAGNOSES:  Active Problems:    Ataxia  Resolved Problems:    * No resolved hospital problems. *      HOSPITAL COURSE:  Patient with a history of vertigo was admitted for evaluation of gait ataxia and frequent falls. CT of the brain was nonacute. CTA without stenosis. Echo without thrombosis EF of 50%, no valvular abnormality noted. MRI also was nonacute. Patient be continued on his home statin, Eliquis. Neurology consulted. Suspect the patient's symptoms are consistent with his underlying vertigo. He was started on meclizine, PT OT consulted. Patient is stable for discharge home once cleared by the neurology service. SIGNIFICANT DIAGNOSTIC STUDIES:  CT angiogram head with and without contrast   Final Result   The major intracranial arterial structures are patent without high-grade stenosis, large vessel cut off, or aneurysm. EXAMINATION: CTA HEAD W WO CONTRAST, CTA NECK W WO CONTRAST      DATE AND TIME:2021 6:40 PM      CLINICAL HISTORY: Stroke symptoms   ataxia        COMPARISON: None      TECHNIQUE: Helical CTA of the neck was performed from the aortic arch to the foramen magnum following the uneventful intravenous administration of 100 cc of nonionic contrast without incident. 2-D images were reconstructed in the sagittal and coronal    planes. Three Dimensional Maximum Intensity Projection (3D-MIP) images were created. All images were reviewed and primarily archived to PACS workstation. All CT scans at this facility use dose modulation, iterative reconstruction, and/or weight based    dosing when appropriate to reduce radiation dose to as low as reasonably achievable.  NASCET Criteria were utilized      FINDINGS CTA NECK:      Aortic Arch: The visualized portions of the aortic arch and proximal arch vessels demonstrates no focal stenosis aneurysm or dissection. Carotids: The common carotid arteries, proximal internal and external carotid arteries are normal in course and caliber. There is evidence of arteriosclerosis and calcifications of the bilateral carotid arteries without hemodynamically significant    stenoses. Right  Proximal Internal Carotid Stenosis (% by NASCET Criteria): There is no hemodynamically significant stenosis. Left  Proximal Internal Carotid Stenosis (% by NASCET Criteria): There is no hemodynamically significant stenosis. Vertebral Arteries:      Patency: The vertebral arteries are well visualized to the level of the basilar artery. There is no focal stenosis aneurysm or dissection. Vertebral arteries are codominant. IMPRESSION: There are no hemodynamically significant stenosis. 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 CT angiogram neck with and without contrast   Final Result   The major intracranial arterial structures are patent without high-grade stenosis, large vessel cut off, or aneurysm. EXAMINATION: CTA HEAD W WO CONTRAST, CTA NECK W WO CONTRAST      DATE AND TIME:1/21/2021 6:40 PM      CLINICAL HISTORY: Stroke symptoms   ataxia        COMPARISON: None      TECHNIQUE: Helical CTA of the neck was performed from the aortic arch to the foramen magnum following the uneventful intravenous administration of 100 cc of nonionic contrast without incident. 2-D images were reconstructed in the sagittal and coronal    planes. Three Dimensional Maximum Intensity Projection (3D-MIP) images were created. All images were reviewed and primarily archived to PACS workstation.   All CT scans at this facility use dose modulation, iterative reconstruction, and/or weight based    dosing when appropriate to reduce radiation dose to as low as reasonably achievable. NASCET Criteria were utilized      FINDINGS CTA NECK:      Aortic Arch: The visualized portions of the aortic arch and proximal arch vessels demonstrates no focal stenosis aneurysm or dissection. Carotids: The common carotid arteries, proximal internal and external carotid arteries are normal in course and caliber. There is evidence of arteriosclerosis and calcifications of the bilateral carotid arteries without hemodynamically significant    stenoses. Right  Proximal Internal Carotid Stenosis (% by NASCET Criteria): There is no hemodynamically significant stenosis. Left  Proximal Internal Carotid Stenosis (% by NASCET Criteria): There is no hemodynamically significant stenosis. Vertebral Arteries:      Patency: The vertebral arteries are well visualized to the level of the basilar artery. There is no focal stenosis aneurysm or dissection. Vertebral arteries are codominant. IMPRESSION: There are no hemodynamically significant stenosis. 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 MRI brain without contrast   Final Result      No acute ischemia or acute intracranial process. Generalized parenchymal volume loss and nonspecific white matter findings most compatible with chronic small vessel ischemic changes in a patient of this age. CT Head WO Contrast   Final Result      NO ACUTE INTRACRANIAL PROCESS OR SIGNIFICANT CHANGE FROM 3/13/2020 IDENTIFIED.                   XR CHEST PORTABLE   Final Result   NO ACUTE CARDIOPULMONARY DISEASE          CONSULTANTS:  Neurology  RECOMMENDED NEXT STEPS:        DISCHARGE MEDICATIONS:       Bucky Mello   Home Medication Instructions [id number]    Printed on:01/22/21 [id number]   Medication Information                      apixaban (ELIQUIS) 5 MG TABS tablet  Take 1 tablet by mouth 2 times daily             aspirin 81 MG tablet  Take 81 mg by mouth daily             magnesium oxide (MAG-OX) 400 MG tablet  Take 400 mg by mouth daily             metFORMIN (GLUCOPHAGE) 500 MG tablet  Take 500 mg by mouth daily (with breakfast)              nitroGLYCERIN (NITROSTAT) 0.4 MG SL tablet  Place 0.4 mg under the tongue every 5 minutes as needed for Chest pain             pitavastatin (LIVALO) 4 MG TABS tablet  Take 4 mg by mouth nightly             sotalol (BETAPACE) 80 MG tablet  Take 0.5 tablets by mouth 2 times daily             TRUE METRIX BLOOD GLUCOSE TEST strip  check blood sugar twice daily as directed                 GEN: Alert and oriented. NAD, well nourished  HEENT: Normocephalic, atraumatic. HUGO, Neck supple. Resp: CTA b/l no wheezing or rhonchi. No respiratory distress  Cardio: Regular rate and rhythm  Abd: Soft, nondistended. NTTP. BS present  Ext: No erythema or edema. LE NTTP  Cranial nerves II through XII intact. No motor or sensory deficits noted      DIET: DIET GENERAL;    ACTIVITY: No restriction. ______________________________________________________________________  COMPLEXITY OF FOLLOW UP:   [x] Moderate Complexity: follow up within 7-14 calendar days (21427)   [] Severe Complexity: follow up within 7 calendar days (24861)    FOLLOW UP TESTING, PENDING RESULTS OR REFERRALS AT TRANSITIONAL CARE VISIT:   []  Yes    []  No    PENDING STUDIES:       DISPOSITION: Home    FACILITY/HOME CARE AGENCY NAME:     Follow up with Joslyn Arreola DO  [phone] [phone]    In 1 week        INSTRUCTIONS TO MA/SW: Please call patient on day after discharge (must document patient  contacted within 2 business days of discharge). FOLLOW UP QUESTIONS FOR MA/SW:  1. Did you get medications filled and taking them as instructed from discharge? 2. Are you following your discharge instructions from your hospital stay? 3. Please confirm patient is scheduled for a follow up appointment within the above time frame.     DISCHARGE TIME: > 30 minutes    SIGNED:  Ruth Valdez DO   1/22/2021, 3:58 PM

## 2021-01-22 NOTE — CONSULTS
Subjective:      Patient ID: Noreen Vivas is a 79 y.o. male who presents today for dizziness    HPI 79year-old right-handed gentleman who we had seen for syncopal episodes related to medication. Patient has a loop recorder. 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 Loop device has not shown anything significant. Patient had dizzy spells reported fell 3 times in the last 2 weeks. Most of the symptoms appears to be mechanical.  The dizziness appears to be postural.  He is improved with some central gait ataxia. Does inform to me that he is becoming more irritable and somewhat angry at times according to the nurse from his wife I was not able to contact. Patient feels better. Is not any recent head injury trauma. Patient denies any neck pain. Patient has tinnitus in the right ear and some hearing loss    Review of Systems   Constitutional: Negative for fever. HENT: Negative for ear pain, tinnitus and trouble swallowing. Eyes: Negative for photophobia and visual disturbance. Respiratory: Negative for choking and shortness of breath. Cardiovascular: Negative for chest pain and palpitations. Gastrointestinal: Negative for nausea and vomiting. Musculoskeletal: Positive for gait problem. Negative for back pain, joint swelling, myalgias, neck pain and neck stiffness. Skin: Negative for color change. Allergic/Immunologic: Negative for food allergies. Neurological: Positive for dizziness, weakness and light-headedness. Negative for tremors, seizures, syncope, facial asymmetry, speech difficulty, numbness and headaches. Psychiatric/Behavioral: Negative for behavioral problems, confusion, hallucinations and sleep disturbance.        Past Medical History:   Diagnosis Date    [facility] ([facility]     CAD (coronary artery disease)     cardiac stents    COPD (chronic obstructive pulmonary disease) ([facility]     Diabetes mellitus ([facility]  Hyperlipidemia     Hypertension     Movement disorder     Pneumonia      Past Surgical History:   Procedure Laterality Date    APPENDECTOMY      CORONARY ANGIOPLASTY WITH STENT PLACEMENT      4    CORONARY ARTERY BYPASS GRAFT      triple bypass    EYE SURGERY Bilateral     cataract surgery    INSERTABLE CARDIAC MONITOR Left 12/2018     Social History     Socioeconomic History    Marital status:      Spouse name: Not on file    Number of children: Not on file    Years of education: Not on file    Highest education level: Not on file   Occupational History    Not on file   Social Needs    Financial resource strain: Not on file    Food insecurity     Worry: Not on file     Inability: Not on file    Transportation needs     Medical: Not on file     Non-medical: Not on file   Tobacco Use    Smoking status: Former Smoker     Types: Cigarettes    Smokeless tobacco: Never Used    Tobacco comment: 3 cigarettes/month   Substance and Sexual Activity    Alcohol use: No    Drug use: No    Sexual activity: Not on file   Lifestyle    Physical activity     Days per week: Not on file     Minutes per session: Not on file    Stress: Not on file   Relationships    Social connections     Talks on phone: Not on file     Gets together: Not on file     Attends Jew service: Not on file     Active member of club or organization: Not on file     Attends meetings of clubs or organizations: Not on file     Relationship status: Not on file    Intimate partner violence     Fear of current or ex partner: Not on file     Emotionally abused: Not on file     Physically abused: Not on file     Forced sexual activity: Not on file   Other Topics Concern    Not on file   Social History Narrative    Not on file     History reviewed. No pertinent family history.   Allergies   Allergen Reactions    Lipitor [Atorvastatin] Other (See Comments)     Body pain    Niacin Other (See Comments)    Niaspan [Niacin Er] Other (See Comments)     Body pain    Vitamin E Other (See Comments)     Nose bleeds    Zocor [Simvastatin] Other (See Comments)     Sharp body pain     Current Facility-Administered Medications   Medication Dose Route Frequency Provider Last Rate Last Admin    meclizine (ANTIVERT) tablet 25 mg  25 mg Oral TID King Hart MD        sodium chloride flush 0.9 % injection 10 mL  10 mL Intravenous 2 times per day Temitope Samayoa, DO   10 mL at 01/22/21 1029    sodium chloride flush 0.9 % injection 10 mL  10 mL Intravenous PRN Temitope Samayoa, DO        promethazine (PHENERGAN) tablet 12.5 mg  12.5 mg Oral Q6H PRN Temitope Samayoa, DO        Or    ondansetron (ZOFRAN) injection 4 mg  4 mg Intravenous Q6H PRN Temitope Samayoa, DO        polyethylene glycol (GLYCOLAX) packet 17 g  17 g Oral Daily PRN Temitope Samayoa, DO        aspirin EC tablet 81 mg  81 mg Oral Daily Temitope Samayoa, DO   81 mg at 01/22/21 1027    Or    aspirin suppository 300 mg  300 mg Rectal Daily Temitope Samayoa DO        labetalol (NORMODYNE;TRANDATE) injection 10 mg  10 mg Intravenous Q10 Min PRN Temitope Samayoa, DO        magnesium oxide (MAG-OX) tablet 400 mg  400 mg Oral Daily Temitope Samayoa, DO   400 mg at 01/22/21 1027    nitroGLYCERIN (NITROSTAT) SL tablet 0.4 mg  0.4 mg Sublingual Q5 Min PRN Temitope Samayoa, DO        apixaban (ELIQUIS) tablet 5 mg  5 mg Oral BID Temitope Samayoa DO   5 mg at 01/22/21 1027    [Held by provider] metFORMIN (GLUCOPHAGE) tablet 500 mg  500 mg Oral Daily with breakfast Temitope Samayoa DO   Stopped at 01/22/21 1028    sotalol (BETAPACE) tablet 40 mg  40 mg Oral BID Temitope Samayoa DO   40 mg at 01/22/21 1027        Objective:   BP (!) 142/89   Pulse 67   Temp 97.3 °F (36.3 °C) (Oral)   Resp 18   Ht 5' 8\" (1.727 m)   Wt 153 lb (69.4 kg)   SpO2 98%   BMI 23.26 kg/m²     Physical Exam  Vitals signs reviewed. Eyes:      Pupils: Pupils are equal, round, and reactive to light. Neck:      Musculoskeletal: Normal range of motion. Cardiovascular:      Rate and Rhythm: Normal rate and regular rhythm. Heart sounds: No murmur. Pulmonary:      Effort: Pulmonary effort is normal.      Breath sounds: Normal breath sounds. Abdominal:      General: Bowel sounds are normal.   Musculoskeletal: Normal range of motion. Skin:     General: Skin is warm. Neurological:      Mental Status: He is alert and oriented to person, place, and time. Cranial Nerves: No cranial nerve deficit. Sensory: No sensory deficit. Motor: Weakness present. No abnormal muscle tone. Coordination: Coordination normal.      Gait: Gait abnormal.      Deep Tendon Reflexes: Reflexes are normal and symmetric. Babinski sign absent on the right side. Babinski sign absent on the left side. Psychiatric:         Mood and Affect: Mood normal.     Patient has central gait ataxia. He has a positive Hallpike maneuver    Echocardiogram Complete 2d With Doppler With Color    Result Date: 1/22/2021  Transthoracic Echocardiography Report (TTE)  Demographics  Patient Name     Edward Carbone Gender                Male  Patient Number   [id number]     Race                                                  Ethnicity  Visit Number     [id number]    Room Number           W279  Corporate ID                  Date of Study         01/22/2021  Accession Number [id number]   Referring Physician  Date of Birth    1940   Sonographer           Enrique Madrigal  Age              79 year(s)   Interpreting          [id number]                                Physician             Opal Goel MD Procedure Type of Study  TTE procedure:ECHO COMPLETE 2D W/DOP W/COLOR. Procedure Date Date: 01/22/2021 Start: 10:42 AM Study Location: Portable Technical Quality: Adequate visualization Indications:CVA. Patient Status: Routine Height: 68 inches Weight: 153 pounds BSA: 1.82 m^2 BMI: 23.26 kg/m^2 BP: 181/99 mmHg Allergies   - Other allergy:(triplex lipitor, niaspan,zocor, vit e). 0.73 m/s  AV Mean Gradient: 3.62 mmHg  AV Area (Continuity):1.93 cm^2          MV P1/2t: 62.3 msec  TR Velocity:2.36 m/s                    MVA by PHT3.53 cm^2  TR Gradient:22.26 mmHg                  Estimated RAP:3 mmHg                                          RVSP:25.26 mmHg Valves  Mitral Valve  Peak E-Wave: 0.43 m/s                  Peak A-Wave: 0.73 m/s  P1/2t: 62.3 msec                       E/A Ratio: 0.59                                         Peak Gradient: 0.73 mmHg  Area (PHT): 3.53 cm^2                  Deceleration Time: 391 msec  Tissue Doppler  E' Septal Velocity: 0.06 m/s  E' Lateral Velocity: 0.07 m/s  Aortic Valve  Peak Velocity: 1.48 m/s                Mean Velocity: 0.85 m/s  Peak Gradient: 8.79 mmHg               Mean Gradient: 3.62 mmHg  Area (continuity): 1.93 cm^2  AV VTI: 20.65 cm  Cusp Separation: 1.68 cm  Tricuspid Valve  Estimated RVSP: 25.26 mmHg              Estimated RAP: 3 mmHg  TR Velocity: 2.36 m/s                   TR Gradient: 22.26 mmHg  Pulmonic Valve  Peak Velocity: 1.18 m/s           Peak Gradient: 5.54 mmHg                                    Estimated PASP: 25.26 mmHg  LVOT  Peak Velocity: 0.78 m/s              Mean Velocity: 0.49 m/s  Peak Gradient: 2.46 mmHg             Mean Gradient: 1.14 mmHg  LVOT Diameter: 1.99 cm               LVOT VTI: 12.84 cm Structures  Left Atrium  LA Dimension: 4.5 cm                         LA Area: 11.55 cm^2  LA/Aorta: 1.46  LA Volume/Index: 20.94 ml /12 m^2  Left Ventricle  Diastolic Dimension: 5.25 cm         Systolic Dimension: 3.04 cm  Septum Diastolic: 5.11 cm            Septum Systolic: 9.68 cm  PW Diastolic: 5.16 cm                PW Systolic: 5.05 cm                                       FS: 35.9 %  LV EDV/LV EDV Index: 62.63 ml/34 m^2 LV ESV/LV ESV Index: 21.22 ml/12 m^2  EF Calculated: 66.1 %                LV Length: 6.67 cm  LVOT Diameter: 1.99 cm  Right Atrium  RA Systolic Pressure: 3 mmHg  Right Ventricle  Diastolic Dimension: 3.63 cm                                    RV Systolic Pressure: 19.27 mmHg Aorta/ Miscellaneous Aorta  Aortic Root: 3.09 cm  LVOT Diameter: 1.99 cm    Ct Angiogram Head With And Without Contrast    Result Date: 1/21/2021  EXAMINATION: CTA HEAD W WO CONTRAST, CTA NECK W WO CONTRAST DATE AND TIME:1/21/2021 6:40 PM CLINICAL HISTORY: Stroke  ataxia  COMPARISON: None TECHNIQUE:Helical CTA of the head was performed from the vertex to the foramen magnum following the uneventful intravenous administration of 100 cc of nonionic contrast without incident. 2-D images were reconstructed in the sagittal and coronal planes. Three Dimensional Maximum Intensity Projection (3D-MIP) images were created. All images were reviewed and primarily archived to PACS workstation. All CT scans at this facility use dose modulation, iterative reconstruction, and/or weight based dosing when appropriate to reduce radiation dose to as low as reasonably achievable. FINDINGS CTA HEAD: Intracranial ICAs: Flow is visualized within the precavernous, cavernous, clinoid and supraclinoid segments of the internal carotid arteries bilaterally    Anterior Cerebral Arteries: The bilaterals  A1 and A2 segments are patent. Middle Cerebral Arteries: Bilateral horizontal, insular, opercular, and cortical segments of the right and left middle cerebral cerebral arteries are patent. Vertebral Arteries And Basilar Artery: There is adequate flow in the intracranial portions of the vertebral arteries and in the basilar artery. Posterior Cerebral Arteries: Bilateral posterior cerebral arteries are patent. Aneurysm No aneurysm or dissection in the anterior or posterior circulations. . Neurocranium The visualized neurocranium is intact. Dural Sinus: As visualized the opacified dural venous sinuses are unremarkable. The major intracranial arterial structures are patent without high-grade stenosis, large vessel cut off, or aneurysm.  EXAMINATION: CTA HEAD W WO CONTRAST, CTA NECK W WO CONTRAST DATE AND TIME:1/21/2021 6:40 PM CLINICAL HISTORY: Stroke symptoms   ataxia  COMPARISON: None TECHNIQUE: Helical CTA of the neck was performed from the aortic arch to the foramen magnum following the uneventful intravenous administration of 100 cc of nonionic contrast without incident. 2-D images were reconstructed in the sagittal and coronal planes. Three Dimensional Maximum Intensity Projection (3D-MIP) images were created. All images were reviewed and primarily archived to PACS workstation. All CT scans at this facility use dose modulation, iterative reconstruction, and/or weight based dosing when appropriate to reduce radiation dose to as low as reasonably achievable. NASCET Criteria were utilized FINDINGS CTA NECK: Aortic Arch: The visualized portions of the aortic arch and proximal arch vessels demonstrates no focal stenosis aneurysm or dissection. Carotids: The common carotid arteries, proximal internal and external carotid arteries are normal in course and caliber. There is evidence of arteriosclerosis and calcifications of the bilateral carotid arteries without hemodynamically significant stenoses. Right  Proximal Internal Carotid Stenosis (% by NASCET Criteria): There is no hemodynamically significant stenosis. Left  Proximal Internal Carotid Stenosis (% by NASCET Criteria): There is no hemodynamically significant stenosis. Vertebral Arteries: Patency: The vertebral arteries are well visualized to the level of the basilar artery. There is no focal stenosis aneurysm or dissection. Vertebral arteries are codominant. IMPRESSION: There are no hemodynamically significant stenosis. 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 Ct Head Wo Contrast    Result Date: 1/21/2021  CT HEAD WO CONTRAST : 1/21/2021 CLINICAL HISTORY:  dizzy . COMPARISON: Head CT and MRI 3/13/2020.  TECHNIQUE: Spiral unenhanced images were obtained of the head, with routine multiplanar reconstructions performed. All CT scans at this facility use dose modulation, iterative reconstruction, and/or weight based dosing when appropriate to reduce radiation dose to as low as reasonably achievable. FINDINGS: There is no intracranial hemorrhage, mass effect, midline shift, extra-axial collection, evidence of hydrocephalus, recent ischemic infarct, or skull fracture identified. 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 The mastoid air cells and visualized paranasal sinuses are essentially clear. NO ACUTE INTRACRANIAL PROCESS OR SIGNIFICANT CHANGE FROM 3/13/2020 IDENTIFIED. Ct Angiogram Neck With And Without Contrast    Result Date: 1/21/2021  EXAMINATION: CTA HEAD W WO CONTRAST, CTA NECK W WO CONTRAST DATE AND TIME:1/21/2021 6:40 PM CLINICAL HISTORY: Stroke  ataxia  COMPARISON: None TECHNIQUE:Helical CTA of the head was performed from the vertex to the foramen magnum following the uneventful intravenous administration of 100 cc of nonionic contrast without incident. 2-D images were reconstructed in the sagittal and coronal planes. Three Dimensional Maximum Intensity Projection (3D-MIP) images were created. All images were reviewed and primarily archived to PACS workstation. All CT scans at this facility use dose modulation, iterative reconstruction, and/or weight based dosing when appropriate to reduce radiation dose to as low as reasonably achievable. FINDINGS CTA HEAD: Intracranial ICAs: Flow is visualized within the precavernous, cavernous, clinoid and supraclinoid segments of the internal carotid arteries bilaterally    Anterior Cerebral Arteries: The bilaterals  A1 and A2 segments are patent.   Middle Cerebral Arteries: Bilateral horizontal, insular, opercular, and cortical segments of the right and left middle cerebral cerebral arteries are patent. Vertebral Arteries And Basilar Artery: There is adequate flow in the intracranial portions of the vertebral arteries and in the basilar artery. Posterior Cerebral Arteries: Bilateral posterior cerebral arteries are patent. Aneurysm No aneurysm or dissection in the anterior or posterior circulations. . Neurocranium The visualized neurocranium is intact. Dural Sinus: As visualized the opacified dural venous sinuses are unremarkable. The major intracranial arterial structures are patent without high-grade stenosis, large vessel cut off, or aneurysm. EXAMINATION: CTA HEAD W WO CONTRAST, CTA NECK W WO CONTRAST DATE AND TIME:1/21/2021 6:40 PM CLINICAL HISTORY: Stroke symptoms   ataxia  COMPARISON: None TECHNIQUE: Helical CTA of the neck was performed from the aortic arch to the foramen magnum following the uneventful intravenous administration of 100 cc of nonionic contrast without incident. 2-D images were reconstructed in the sagittal and coronal planes. Three Dimensional Maximum Intensity Projection (3D-MIP) images were created. All images were reviewed and primarily archived to PACS workstation. All CT scans at this facility use dose modulation, iterative reconstruction, and/or weight based dosing when appropriate to reduce radiation dose to as low as reasonably achievable. NASCET Criteria were utilized FINDINGS CTA NECK: Aortic Arch: The visualized portions of the aortic arch and proximal arch vessels demonstrates no focal stenosis aneurysm or dissection. Carotids: The common carotid arteries, proximal internal and external carotid arteries are normal in course and caliber. There is evidence of arteriosclerosis and calcifications of the bilateral carotid arteries without hemodynamically significant stenoses. Right  Proximal Internal Carotid Stenosis (% by NASCET Criteria): There is no hemodynamically significant stenosis. Left  Proximal Internal Carotid Stenosis (% by NASCET Criteria):   There is Component Value Date    WBC 7.6 01/22/2021    RBC 4.20 01/22/2021    RBC 4.79 10/19/2011    HGB 12.0 01/22/2021    HCT 36.8 01/22/2021    MCV 87.4 01/22/2021    MCH 28.6 01/22/2021    MCHC 32.7 01/22/2021    RDW 13.9 01/22/2021     01/22/2021    MPV 9.7 10/13/2015     Lab Results   Component Value Date     01/21/2021    K 4.5 01/21/2021    K 4.6 03/14/2020     01/21/2021    CO2 25 01/21/2021    BUN 18 01/21/2021    CREATININE 0.95 01/21/2021    GFRAA >60.0 01/21/2021    LABGLOM >60.0 01/21/2021    GLUCOSE 185 01/21/2021    PROT 7.2 01/21/2021    LABALBU 4.1 01/21/2021    LABALBU 4.4 05/08/2012    CALCIUM 9.7 01/21/2021    BILITOT 0.4 01/21/2021    ALKPHOS 62 01/21/2021    AST 19 01/21/2021    ALT 10 01/21/2021     Lab Results   Component Value Date    PROTIME 16.1 01/21/2021    INR 1.3 01/21/2021     Lab Results   Component Value Date    TSH 2.020 01/04/2021    IRON 58 08/24/2020    TIBC 356 08/24/2020     Lab Results   Component Value Date    TRIG 103 01/22/2021    HDL 38 01/22/2021    LDLCALC 54 01/22/2021     No results found for: LABAMPH, BARBSCNU, LABBENZ, CANNAB, COCAINESCRN, LABMETH, OPIATESCREENURINE, PHENCYCLIDINESCREENURINE, PPXUR, ETOH  No results found for: LITHIUM, DILFRTOT, VALPROATE    Assessment:   Dizziness consistent with a benign patient vertigo with Ménière's disease. Patient has had dizziness for quite some time with syncopal episodes he has a loop device recorder as he had syncopal episode in the past.  For now this appears to be different pathology. Also reported to me that he is having irritability and anger behaviors and may be developing very early dementia. We will need to address this as an outpatient. For now recommended Antivert continuously for a few days. We will arrange for orthostatic blood pressure recordings as well.   This patient has a colloid cyst of the third ventricle not contributing to his episodes as this is a 5 mm colloid cyst away from the ventricular system and foramen of Monro. Patient be watched today may be discharged tomorrow patient does have history status of atrial fibrillation and continues on Eliquis  Patient had MRI of the brain which does not show anything significant and his CT angiograms did not show any stenosis    Patient will be discharged tomorrow as I just finished evaluating his records he is somewhat ataxic      Rahul Ramirez MD, Kasey Keller, American Board of Psychiatry & Neurology  Board Certified in Vascular Neurology  Board Certified in Neuromuscular Medicine  Certified in [facility]:

## 2021-01-23 PROCEDURE — 6370000000 HC RX 637 (ALT 250 FOR IP): Performed by: PSYCHIATRY & NEUROLOGY

## 2021-01-23 PROCEDURE — 6370000000 HC RX 637 (ALT 250 FOR IP): Performed by: INTERNAL MEDICINE

## 2021-01-23 PROCEDURE — 2580000003 HC RX 258: Performed by: INTERNAL MEDICINE

## 2021-01-23 PROCEDURE — 1210000000 HC MED SURG R&B

## 2021-01-23 PROCEDURE — 99232 SBSQ HOSP IP/OBS MODERATE 35: CPT | Performed by: NURSE PRACTITIONER

## 2021-01-23 RX ORDER — MECLIZINE HYDROCHLORIDE 25 MG/1
25 TABLET ORAL 3 TIMES DAILY PRN
Qty: 30 TABLET | Refills: 0 | Status: SHIPPED | OUTPATIENT
Start: 2021-01-23 | End: 2021-02-02

## 2021-01-23 RX ADMIN — APIXABAN 5 MG: 5 TABLET, FILM COATED ORAL at 22:37

## 2021-01-23 RX ADMIN — SOTALOL HYDROCHLORIDE 40 MG: 80 TABLET ORAL at 18:04

## 2021-01-23 RX ADMIN — Medication 10 ML: at 10:04

## 2021-01-23 RX ADMIN — MECLIZINE HYDROCHLORIDE 25 MG: 25 TABLET ORAL at 13:37

## 2021-01-23 RX ADMIN — APIXABAN 5 MG: 5 TABLET, FILM COATED ORAL at 10:00

## 2021-01-23 RX ADMIN — Medication 10 ML: at 22:39

## 2021-01-23 RX ADMIN — MECLIZINE HYDROCHLORIDE 25 MG: 25 TABLET ORAL at 22:37

## 2021-01-23 RX ADMIN — MECLIZINE HYDROCHLORIDE 25 MG: 25 TABLET ORAL at 10:00

## 2021-01-23 RX ADMIN — Medication 400 MG: at 10:00

## 2021-01-23 RX ADMIN — ASPIRIN 81 MG: 81 TABLET, COATED ORAL at 10:01

## 2021-01-23 ASSESSMENT — ENCOUNTER SYMPTOMS
COLOR CHANGE: 0
NAUSEA: 0
WHEEZING: 0
CHEST TIGHTNESS: 0
TROUBLE SWALLOWING: 0
VOMITING: 0
COUGH: 0
SHORTNESS OF BREATH: 0

## 2021-01-23 NOTE — PROGRESS NOTES
Hospitalist Progress Note      PCP: Homer Art DO    Date of Admission: 1/21/2021    Chief Complaint:      Subjective: :    Pt seen and examined at bedside this morning. Pt denies complaints, CP or SOB. He does admit to dizziness with quick movement of his head to the left. Notified by nursing of PAC. Later notified of afib in 140s. Solatol held by nursing staff this am for /51. Will place cardiology consult. Medications:  Reviewed    Infusion Medications   Scheduled Medications    meclizine  25 mg Oral TID    sodium chloride flush  10 mL Intravenous 2 times per day    aspirin  81 mg Oral Daily    Or    aspirin  300 mg Rectal Daily    magnesium oxide  400 mg Oral Daily    apixaban  5 mg Oral BID    [Held by provider] metFORMIN  500 mg Oral Daily with breakfast    sotalol  40 mg Oral BID     PRN Meds: sodium chloride flush, promethazine **OR** ondansetron, polyethylene glycol, labetalol, nitroGLYCERIN    No intake or output data in the 24 hours ending 01/23/21 1740    Exam:    BP (!) 102/55   Pulse 68   Temp 97.5 °F (36.4 °C) (Oral)   Resp 18   Ht 5' 8\" (1.727 m)   Wt 153 lb (69.4 kg)   SpO2 100%   BMI 23.26 kg/m²       GEN: Alert and oriented. NAD, well nourished  HEENT: Normocephalic, atraumatic. HUGO, Neck supple. Resp: CTA b/l no wheezing or rhonchi. No respiratory distress  Cardio: RRR. No murmur  Abd: Soft, nondistended. NTTP. BS present  Ext: No erythema or edema.  LE NTTP  Skin is warm and dry      Labs:   Recent Labs     01/21/21  1100 01/22/21  0515   WBC 8.5 7.6   HGB 13.4* 12.0*   HCT 40.0* 36.8*    260     Recent Labs     01/21/21  1100      K 4.5      CO2 25   BUN 18   CREATININE 0.95   CALCIUM 9.7     Recent Labs     01/21/21  1100   AST 19   ALT 10   BILITOT 0.4   ALKPHOS 62     Recent Labs     01/21/21  1100   INR 1.3     Recent Labs     01/21/21  1100   TROPONINI <0.010       Urinalysis:      Lab Results   Component Value Date NITRU Negative 03/13/2020    WBCUA 0-2 03/13/2020    BACTERIA Negative 12/08/2018    RBCUA 3-5 03/13/2020    BLOODU TRACE 03/13/2020    SPECGRAV 1.021 03/13/2020    GLUCOSEU 100 03/13/2020       Radiology:  CT angiogram head with and without contrast   Final Result   The major intracranial arterial structures are patent without high-grade stenosis, large vessel cut off, or aneurysm. EXAMINATION: CTA HEAD W WO CONTRAST, CTA NECK W WO CONTRAST      DATE AND TIME:1/21/2021 6:40 PM      CLINICAL HISTORY: Stroke symptoms   ataxia        COMPARISON: None      TECHNIQUE: Helical CTA of the neck was performed from the aortic arch to the foramen magnum following the uneventful intravenous administration of 100 cc of nonionic contrast without incident. 2-D images were reconstructed in the sagittal and coronal    planes. Three Dimensional Maximum Intensity Projection (3D-MIP) images were created. All images were reviewed and primarily archived to PACS workstation. All CT scans at this facility use dose modulation, iterative reconstruction, and/or weight based    dosing when appropriate to reduce radiation dose to as low as reasonably achievable. NASCET Criteria were utilized      FINDINGS CTA NECK:      Aortic Arch: The visualized portions of the aortic arch and proximal arch vessels demonstrates no focal stenosis aneurysm or dissection. Carotids: The common carotid arteries, proximal internal and external carotid arteries are normal in course and caliber. There is evidence of arteriosclerosis and calcifications of the bilateral carotid arteries without hemodynamically significant    stenoses. Right  Proximal Internal Carotid Stenosis (% by NASCET Criteria): There is no hemodynamically significant stenosis. Left  Proximal Internal Carotid Stenosis (% by NASCET Criteria): There is no hemodynamically significant stenosis.               Vertebral Arteries:      Patency: The vertebral arteries are well visualized to the level of the basilar artery. There is no focal stenosis aneurysm or dissection. Vertebral arteries are codominant. IMPRESSION: There are no hemodynamically significant stenosis. 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 CT angiogram neck with and without contrast   Final Result   The major intracranial arterial structures are patent without high-grade stenosis, large vessel cut off, or aneurysm. EXAMINATION: CTA HEAD W WO CONTRAST, CTA NECK W WO CONTRAST      DATE AND TIME:1/21/2021 6:40 PM      CLINICAL HISTORY: Stroke symptoms   ataxia        COMPARISON: None      TECHNIQUE: Helical CTA of the neck was performed from the aortic arch to the foramen magnum following the uneventful intravenous administration of 100 cc of nonionic contrast without incident. 2-D images were reconstructed in the sagittal and coronal    planes. Three Dimensional Maximum Intensity Projection (3D-MIP) images were created. All images were reviewed and primarily archived to PACS workstation. All CT scans at this facility use dose modulation, iterative reconstruction, and/or weight based    dosing when appropriate to reduce radiation dose to as low as reasonably achievable. NASCET Criteria were utilized      FINDINGS CTA NECK:      Aortic Arch: The visualized portions of the aortic arch and proximal arch vessels demonstrates no focal stenosis aneurysm or dissection. Carotids: The common carotid arteries, proximal internal and external carotid arteries are normal in course and caliber. There is evidence of arteriosclerosis and calcifications of the bilateral carotid arteries without hemodynamically significant    stenoses. Right  Proximal Internal Carotid Stenosis (% by NASCET Criteria): There is no hemodynamically significant stenosis. Left  Proximal Internal Carotid Stenosis (% by NASCET Criteria):   There is no setting by pt PCP and other out pt providers. In addition to examining and evaluating pt, I spent additional time explaining care, normal and abnormal findings, and treatment plan. All of pt questions were answered. Counseling, diet and education were  provided. Case will be discussed with nursing staff when appropriate. Family will be updated if and when appropriate.       Diet: DIET GENERAL;    Code Status: Full Code    PT/OT Eval           Electronically signed by Marlin Sales DO on 1/23/2021 at 5:40 PM

## 2021-01-23 NOTE — PROGRESS NOTES
Spiritual Care Services     Summary of Visit:  Pt shared spiritual experiences from his life, lives of others around him, including a near death experience from his youth. Carries a place of peace and acceptance within because of that. Spiritual Assessment/Intervention/Outcomes:    Encounter Summary  Services provided to[de-identified] Patient  [facility]  Support System: Spouse, Children  Continue Visiting: No  Complexity of Encounter: Low  Length of Encounter: 15 minutes  Spiritual Assessment Completed: Yes        Spiritual/Scientologist  Type: Spiritual support  Assessment: Calm, Approachable, Hopeful  Intervention: Prayer, Communion, Ritual, Explored feelings, thoughts, concerns, Explored coping resources, Nurtured hope  Outcome: Shared life review, Expressed gratitude        Advance Directives (For Healthcare)  Pre-existing DNR Comfort Care/DNR Arrest/DNI Order: No  Healthcare Directive: Yes, patient has an advance directive for healthcare treatment  Type of Healthcare Directive: Living will, Health care treatment directive  Copy in Chart: Yes, copy in chart  Chart Copy Status [de-identified] Active  Information on Healthcare Directives Requested: No  Advance Directives: Living will completed           Values / Beliefs  Do you have any ethnic, cultural, sacramental, or spiritual Catholic needs you would like us to be aware of while you are in the hospital?: No    Care Plan:    Patient preparing for d/c he said. Spiritual Care Services   Electronically signed by Iona Gee on 1/23/21 at 2:54 PM EST     To reach a  for emotional and spiritual support, place an [facility] consult request.   If a  is needed immediately, dial 0 and ask to page the on-call .

## 2021-01-23 NOTE — PROGRESS NOTES
nervous/anxious. Physical Exam  Vitals signs and nursing note reviewed. Constitutional:       General: He is not in acute distress. Appearance: He is not diaphoretic. HENT:      Head: Normocephalic and atraumatic. Eyes:      Extraocular Movements: Extraocular movements intact. Pupils: Pupils are equal, round, and reactive to light. Cardiovascular:      Rate and Rhythm: Normal rate and regular rhythm. Pulmonary:      Effort: Pulmonary effort is normal. No respiratory distress. Breath sounds: Normal breath sounds. Abdominal:      General: Bowel sounds are normal.      Palpations: Abdomen is soft. Skin:     General: Skin is warm and dry. Neurological:      General: No focal deficit present. Mental Status: He is alert and oriented to person, place, and time. Mental status is at baseline. Cranial Nerves: No cranial nerve deficit.                Medications:  Reviewed    Infusion Medications:   Scheduled Medications:    meclizine  25 mg Oral TID    sodium chloride flush  10 mL Intravenous 2 times per day    aspirin  81 mg Oral Daily    Or    aspirin  300 mg Rectal Daily    magnesium oxide  400 mg Oral Daily    apixaban  5 mg Oral BID    [Held by provider] metFORMIN  500 mg Oral Daily with breakfast    sotalol  40 mg Oral BID     PRN Meds: sodium chloride flush, promethazine **OR** ondansetron, polyethylene glycol, labetalol, nitroGLYCERIN    Labs:   Recent Labs     01/21/21  1100 01/22/21  0515   WBC 8.5 7.6   HGB 13.4* 12.0*   HCT 40.0* 36.8*    260     Recent Labs     01/21/21  1100      K 4.5      CO2 25   BUN 18   CREATININE 0.95   CALCIUM 9.7     Recent Labs     01/21/21  1100   AST 19   ALT 10   BILITOT 0.4   ALKPHOS 62     Recent Labs     01/21/21  1100   INR 1.3     Recent Labs     01/21/21  1100   TROPONINI <0.010       Urinalysis:   Lab Results   Component Value Date    NITRU Negative 03/13/2020    WBCUA 0-2 03/13/2020    BACTERIA Negative 12/08/2018    RBCUA 3-5 03/13/2020    BLOODU TRACE 03/13/2020    SPECGRAV 1.021 03/13/2020    GLUCOSEU 100 03/13/2020       Radiology:   Most recent    EEG No procedure found. MRI of Brain No results found for this or any previous visit. Results for orders placed during the hospital encounter of 01/21/21   MRI brain without contrast    Narrative EXAM: MRI of the brain without contrast    History: Ataxia. Stroke. Technique: Multiplanar multisequence MRI of the brain was performed without     contrast.    Comparison: MRI of the brain from March 13, 2020 and CT of the brain from January 21, 2020    Findings:    Multiple small hyperintense T2/FLAIR signal foci are identified within the bilateral supratentorial white matter, as is patchy hyperintense T2/FLAIR signal within the akhil. Prominence of the sulci and ventricles compatible with moderate generalized parenchymal volume loss. No acute hemorrhage, mass, mass effect, midline shift, or abnormal extra-axial fluid collection. Midline structures are within normal limits. The   posterior fossa is within normal limits. There is no diffusion restriction. No susceptibility artifact is identified on the gradient echo sequence. The major intracranial vascular flow voids are maintained. Cranial nerves 7/8 complexes appear grossly unremarkable. The visualized paranasal sinuses and bilateral mastoid air cells are essentially clear. Impression No acute ischemia or acute intracranial process. Generalized parenchymal volume loss and nonspecific white matter findings most compatible with chronic small vessel ischemic changes in a patient of this age. MRA of the Head and Neck: No results found for this or any previous visit. No results found for this or any previous visit.   Results for orders placed during the hospital encounter of 03/13/20   MRA HEAD WO CONTRAST    Narrative EXAM:     MRI of the brain without contrast    MRA of process. Generalized parenchymal volume loss and nonspecific white matter findings most compatible with chronic small vessel ischemic changes in a patient of this age. MRA brain:    No aneurysm or high-grade stenosis of the visualized cerebral vasculature. CT of the Head:   Results for orders placed during the hospital encounter of 01/21/21   CT Head WO Contrast    Narrative CT HEAD WO CONTRAST : 1/21/2021    CLINICAL HISTORY:  dizzy . COMPARISON: Head CT and MRI 3/13/2020. TECHNIQUE: Spiral unenhanced images were obtained of the head, with routine multiplanar reconstructions performed. All CT scans at this facility use dose modulation, iterative reconstruction, and/or weight based dosing when appropriate to reduce radiation dose to as low as reasonably achievable. FINDINGS:    There is no intracranial hemorrhage, mass effect, midline shift, extra-axial collection, evidence of hydrocephalus, recent ischemic infarct, or skull fracture identified. 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 The mastoid air cells and visualized paranasal sinuses are essentially clear. Impression NO ACUTE INTRACRANIAL PROCESS OR SIGNIFICANT CHANGE FROM 3/13/2020 IDENTIFIED. No results found for this or any previous visit. No results found for this or any previous visit. Carotid duplex: No results found for this or any previous visit. No results found for this or any previous visit.   Results for orders placed during the hospital encounter of 03/13/20   US CAROTID ARTERY BILATERAL    Narrative BILATERAL DUPLEX CAROTID ULTRASOUND    CLINICAL INFORMATION:  STROKE    COMPARISON: Carotid artery ultrasound from December 8, 2018    FINDINGS:  The bilateral carotid duplex ultrasound study demonstrates the following: RIGHT            LEFT    Proximal common carotid artery           78 cm/s            90 cm/s    Mid common carotid artery                   88 cm/s            79 cm/s    Distal common carotid artery                69 cm/s           69 cm/s  Proximal internal carotid artery             106 cm/s            84 cm/s  Mid internal carotid artery                      71 cm/s           96 cm/s  Distal internal carotid artery                  71 cm/s             73 cm/s  External carotid artery                            110 cm/s           84 cm/s      ICA/CCA ratio                                         1.21                1.21         Vertebral arteries antegrade flow         Yes                     Yes            Impression LESS THAN 50% STENOSIS OF THE BILATERAL INTERNAL CAROTID ARTERIES. ANTEGRADE FLOW OF THE BILATERAL VERTEBRAL ARTERIES. Validated velocity measurements with angiographic measurements, velocity criteria are extrapolated from diameter data as defined by the Society of Radiologist in [address] [place] [address] Drive Radiology 2003; [id number]. Echo No results found for this or any previous visit. 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 Patient has had dizziness for quite some time with syncopal episodes he has a loop device recorder as he had syncopal episode in the past.  For now this appears to be different pathology. Also reported to me that he is having irritability and anger behaviors and may be developing very early dementia. We will need to address this as an outpatient. For now recommended Antivert continuously for a few days.     We will arrange for orthostatic blood pressure recordings as well. This patient has a colloid cyst of the third ventricle not contributing to his episodes as this is a 5 mm colloid cyst away from the ventricular system and foramen of Monro.   Patient be watched

## 2021-01-24 ENCOUNTER — APPOINTMENT (OUTPATIENT)
Dept: GENERAL RADIOLOGY | Age: 81
DRG: 149 | End: 2021-01-24
Payer: MEDICARE

## 2021-01-24 LAB
ANION GAP SERPL CALCULATED.3IONS-SCNC: 12 MEQ/L (ref 9–15)
BASOPHILS ABSOLUTE: 0.1 K/UL (ref 0–0.2)
BASOPHILS RELATIVE PERCENT: 0.6 %
BUN BLDV-MCNC: 18 MG/DL (ref 8–23)
CALCIUM SERPL-MCNC: 9.1 MG/DL (ref 8.5–9.9)
CHLORIDE BLD-SCNC: 106 MEQ/L (ref 95–107)
CO2: 27 MEQ/L (ref 20–31)
CREAT SERPL-MCNC: 0.91 MG/DL (ref 0.7–1.2)
EOSINOPHILS ABSOLUTE: 0.7 K/UL (ref 0–0.7)
EOSINOPHILS RELATIVE PERCENT: 8.2 %
GFR AFRICAN AMERICAN: >60
GFR NON-AFRICAN AMERICAN: >60
GLUCOSE BLD-MCNC: 136 MG/DL (ref 70–99)
HCT VFR BLD CALC: 37.8 % (ref 42–52)
HEMOGLOBIN: 12.3 G/DL (ref 14–18)
LYMPHOCYTES ABSOLUTE: 1.3 K/UL (ref 1–4.8)
LYMPHOCYTES RELATIVE PERCENT: 15.2 %
MCH RBC QN AUTO: 28.5 PG (ref 27–31.3)
MCHC RBC AUTO-ENTMCNC: 32.5 % (ref 33–37)
MCV RBC AUTO: 87.8 FL (ref 80–100)
MONOCYTES ABSOLUTE: 0.9 K/UL (ref 0.2–0.8)
MONOCYTES RELATIVE PERCENT: 10.9 %
NEUTROPHILS ABSOLUTE: 5.5 K/UL (ref 1.4–6.5)
NEUTROPHILS RELATIVE PERCENT: 65.1 %
PDW BLD-RTO: 13.7 % (ref 11.5–14.5)
PLATELET # BLD: 254 K/UL (ref 130–400)
POTASSIUM REFLEX MAGNESIUM: 4.8 MEQ/L (ref 3.4–4.9)
RBC # BLD: 4.3 M/UL (ref 4.7–6.1)
SODIUM BLD-SCNC: 145 MEQ/L (ref 135–144)
WBC # BLD: 8.5 K/UL (ref 4.8–10.8)

## 2021-01-24 PROCEDURE — 93005 ELECTROCARDIOGRAM TRACING: CPT | Performed by: INTERNAL MEDICINE

## 2021-01-24 PROCEDURE — 80048 BASIC METABOLIC PNL TOTAL CA: CPT

## 2021-01-24 PROCEDURE — 85025 COMPLETE CBC W/AUTO DIFF WBC: CPT

## 2021-01-24 PROCEDURE — 6370000000 HC RX 637 (ALT 250 FOR IP): Performed by: INTERNAL MEDICINE

## 2021-01-24 PROCEDURE — 2580000003 HC RX 258: Performed by: INTERNAL MEDICINE

## 2021-01-24 PROCEDURE — 71046 X-RAY EXAM CHEST 2 VIEWS: CPT

## 2021-01-24 PROCEDURE — 1210000000 HC MED SURG R&B

## 2021-01-24 PROCEDURE — 6370000000 HC RX 637 (ALT 250 FOR IP): Performed by: PSYCHIATRY & NEUROLOGY

## 2021-01-24 PROCEDURE — 99232 SBSQ HOSP IP/OBS MODERATE 35: CPT | Performed by: NURSE PRACTITIONER

## 2021-01-24 PROCEDURE — 36415 COLL VENOUS BLD VENIPUNCTURE: CPT

## 2021-01-24 RX ORDER — SOTALOL HYDROCHLORIDE 80 MG/1
40 TABLET ORAL ONCE
Status: COMPLETED | OUTPATIENT
Start: 2021-01-24 | End: 2021-01-24

## 2021-01-24 RX ORDER — SOTALOL HYDROCHLORIDE 80 MG/1
80 TABLET ORAL 2 TIMES DAILY
Status: DISCONTINUED | OUTPATIENT
Start: 2021-01-24 | End: 2021-01-26 | Stop reason: HOSPADM

## 2021-01-24 RX ADMIN — Medication 10 ML: at 21:49

## 2021-01-24 RX ADMIN — SOTALOL HYDROCHLORIDE 40 MG: 80 TABLET ORAL at 13:58

## 2021-01-24 RX ADMIN — MECLIZINE HYDROCHLORIDE 25 MG: 25 TABLET ORAL at 21:48

## 2021-01-24 RX ADMIN — MECLIZINE HYDROCHLORIDE 25 MG: 25 TABLET ORAL at 09:59

## 2021-01-24 RX ADMIN — APIXABAN 5 MG: 5 TABLET, FILM COATED ORAL at 09:59

## 2021-01-24 RX ADMIN — MECLIZINE HYDROCHLORIDE 25 MG: 25 TABLET ORAL at 13:58

## 2021-01-24 RX ADMIN — SOTALOL HYDROCHLORIDE 40 MG: 80 TABLET ORAL at 09:59

## 2021-01-24 RX ADMIN — APIXABAN 5 MG: 5 TABLET, FILM COATED ORAL at 21:48

## 2021-01-24 RX ADMIN — SOTALOL HYDROCHLORIDE 80 MG: 80 TABLET ORAL at 21:48

## 2021-01-24 RX ADMIN — ASPIRIN 81 MG: 81 TABLET, COATED ORAL at 09:59

## 2021-01-24 RX ADMIN — Medication 400 MG: at 09:59

## 2021-01-24 ASSESSMENT — ENCOUNTER SYMPTOMS
NAUSEA: 0
CHEST TIGHTNESS: 0
SHORTNESS OF BREATH: 0
COUGH: 0
WHEEZING: 0
COLOR CHANGE: 0
TROUBLE SWALLOWING: 0
VOMITING: 0

## 2021-01-24 NOTE — PROGRESS NOTES
Hospitalist Progress Note      PCP: Grupo Fox DO    Date of Admission: 1/21/2021    Chief Complaint:      Subjective: :    Doing well. Balance has improved. Medications:  Reviewed    Infusion Medications   Scheduled Medications    sotalol  80 mg Oral BID    meclizine  25 mg Oral TID    sodium chloride flush  10 mL Intravenous 2 times per day    aspirin  81 mg Oral Daily    Or    aspirin  300 mg Rectal Daily    magnesium oxide  400 mg Oral Daily    apixaban  5 mg Oral BID    [Held by provider] metFORMIN  500 mg Oral Daily with breakfast     PRN Meds: sodium chloride flush, promethazine **OR** [DISCONTINUED] ondansetron, polyethylene glycol, labetalol, nitroGLYCERIN      Intake/Output Summary (Last 24 hours) at 1/24/2021 1528  Last data filed at 1/24/2021 1345  Gross per 24 hour   Intake 780 ml   Output --   Net 780 ml       Exam:    /80   Pulse 64   Temp 97.2 °F (36.2 °C) (Oral)   Resp 18   Ht 5' 8\" (1.727 m)   Wt 153 lb (69.4 kg)   SpO2 99%   BMI 23.26 kg/m²       GEN: Alert and oriented. NAD, well nourished  HEENT: Normocephalic, atraumatic. HUGO, Neck supple. Resp: CTA b/l no wheezing or rhonchi. No respiratory distress  Cardio: RRR. No murmur  Abd: Soft, nondistended. NTTP. BS present  Ext: No erythema or edema. LE NTTP  Skin is warm and dry      Labs:   Recent Labs     01/22/21  0515 01/24/21  0507   WBC 7.6 8.5   HGB 12.0* 12.3*   HCT 36.8* 37.8*    254     Recent Labs     01/24/21  0507   *   K 4.8      CO2 27   BUN 18   CREATININE 0.91   CALCIUM 9.1     No results for input(s): AST, ALT, BILIDIR, BILITOT, ALKPHOS in the last 72 hours. No results for input(s): INR in the last 72 hours. No results for input(s): Shavon Gaster in the last 72 hours.     Urinalysis:      Lab Results   Component Value Date    NITRU Negative 03/13/2020    WBCUA 0-2 03/13/2020    BACTERIA Negative 12/08/2018    RBCUA 3-5 03/13/2020    BLOODU TRACE 03/13/2020    SPECGRAV 1.021 03/13/2020    GLUCOSEU 100 03/13/2020       Radiology:  CT angiogram head with and without contrast   Final Result   The major intracranial arterial structures are patent without high-grade stenosis, large vessel cut off, or aneurysm. EXAMINATION: CTA HEAD W WO CONTRAST, CTA NECK W WO CONTRAST      DATE AND TIME:1/21/2021 6:40 PM      CLINICAL HISTORY: Stroke symptoms   ataxia        COMPARISON: None      TECHNIQUE: Helical CTA of the neck was performed from the aortic arch to the foramen magnum following the uneventful intravenous administration of 100 cc of nonionic contrast without incident. 2-D images were reconstructed in the sagittal and coronal    planes. Three Dimensional Maximum Intensity Projection (3D-MIP) images were created. All images were reviewed and primarily archived to PACS workstation. All CT scans at this facility use dose modulation, iterative reconstruction, and/or weight based    dosing when appropriate to reduce radiation dose to as low as reasonably achievable. NASCET Criteria were utilized      FINDINGS CTA NECK:      Aortic Arch: The visualized portions of the aortic arch and proximal arch vessels demonstrates no focal stenosis aneurysm or dissection. Carotids: The common carotid arteries, proximal internal and external carotid arteries are normal in course and caliber. There is evidence of arteriosclerosis and calcifications of the bilateral carotid arteries without hemodynamically significant    stenoses. Right  Proximal Internal Carotid Stenosis (% by NASCET Criteria): There is no hemodynamically significant stenosis. Left  Proximal Internal Carotid Stenosis (% by NASCET Criteria): There is no hemodynamically significant stenosis. Vertebral Arteries:      Patency: The vertebral arteries are well visualized to the level of the basilar artery. There is no focal stenosis aneurysm or dissection. Vertebral arteries are codominant. IMPRESSION: There are no hemodynamically significant stenosis. 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 CT angiogram neck with and without contrast   Final Result   The major intracranial arterial structures are patent without high-grade stenosis, large vessel cut off, or aneurysm. EXAMINATION: CTA HEAD W WO CONTRAST, CTA NECK W WO CONTRAST      DATE AND TIME:1/21/2021 6:40 PM      CLINICAL HISTORY: Stroke symptoms   ataxia        COMPARISON: None      TECHNIQUE: Helical CTA of the neck was performed from the aortic arch to the foramen magnum following the uneventful intravenous administration of 100 cc of nonionic contrast without incident. 2-D images were reconstructed in the sagittal and coronal    planes. Three Dimensional Maximum Intensity Projection (3D-MIP) images were created. All images were reviewed and primarily archived to PACS workstation. All CT scans at this facility use dose modulation, iterative reconstruction, and/or weight based    dosing when appropriate to reduce radiation dose to as low as reasonably achievable. NASCET Criteria were utilized      FINDINGS CTA NECK:      Aortic Arch: The visualized portions of the aortic arch and proximal arch vessels demonstrates no focal stenosis aneurysm or dissection. Carotids: The common carotid arteries, proximal internal and external carotid arteries are normal in course and caliber. There is evidence of arteriosclerosis and calcifications of the bilateral carotid arteries without hemodynamically significant    stenoses. Right  Proximal Internal Carotid Stenosis (% by NASCET Criteria): There is no hemodynamically significant stenosis. Left  Proximal Internal Carotid Stenosis (% by NASCET Criteria): There is no hemodynamically significant stenosis.               Vertebral Arteries:      Patency: The vertebral arteries are well visualized to the level of the

## 2021-01-24 NOTE — CONSULTS
correlation between his episodic shortness of breath and rapid rates. He does have other episodes of shortness of breath not quite as severe consistent with his known COPD. Review of Systems:   12 point review negative except as described above    CARDIAC HISTORY:  Persistent atrial fibrillation on low-dose sotalol  Essential hypertension  Hyperlipidemia  Coronary artery disease with remote bypass graft surgery and coronary angioplasty  2019 myocardial perfusion imaging: LVEF 47%. No ischemia or infarction. 2018 echo 45-50%. No significant valvular heart disease  CAB. L-LAD. Radial-CX. SVG-RCA.  coronary angiography. LM-normal.  LAD-95%. CX-75%. RCA chronic occlusion. Vein graft to RCA occluded treated with angioplasty successfully. L-LAD patent radial patent  Loop recorder implantation 18 as of , minimal dysrhythmia    Past Medical History:   Diagnosis Date    Atrial fibrillation ([address]     CAD (coronary artery disease)     cardiac stents    COPD (chronic obstructive pulmonary disease) ([address]     Diabetes mellitus ([address]     Hyperlipidemia     Hypertension     Movement disorder     Pneumonia        Past Surgical History:   Procedure Laterality Date    APPENDECTOMY      CORONARY ANGIOPLASTY WITH STENT PLACEMENT      4    CORONARY ARTERY BYPASS GRAFT      triple bypass    EYE SURGERY Bilateral     cataract surgery    INSERTABLE CARDIAC MONITOR Left 2018         Family History   1. Family history of Cancer of spine : Father   2. Family history of coronary artery disease (V17.3) (Z82.49) : Brother   3. Family history of exposure to 2,4,5-trichlorophenoxyacetic acid and   2,4-dichlorophenoxyacetic acid (V19.8) (Z84.89) : Brother   4. Family history of hypertension (V17.49) (Z82.49) : Mother   5. Family history of malignant neoplasm of urinary bladder (V16.52) (Z80.52) : Brother   6. Family history of myocardial infarction (V17.3) (Z82.49) : Father   7. Family history of pancreatic cancer (V16.0) (Z80.0) : Mother   8. Family history of valvular heart disease (V17.49) (Z82.49) : Brother   9. History of coronary artery stent placement : Brother    Social History   · Daily caffeine consumption, 2-3 servings a day   · Former smoker (V15.82) (G74.182)   · No alcohol use   · No drug use    Current Meds   1. Acetaminophen 325 MG Oral Tablet; 1/2 - 1 tablet daily as needed; Therapy: (Recorded:20Mar2019) to Recorded   2. Aspirin EC 81 MG Oral Tablet Delayed Release; take 1 tablet daily; Therapy: 49Wyu7605 to (Evaluate:89Bpw8908)  Requested for: 87Vco6510; Last   Rx:[id number] Ordered   3. Eliquis 5 MG Oral Tablet; TAKE 1 TABLET TWICE DAILY; Therapy: 72YOP5837 to (Evaluate:10Jan2021)  Requested for: 78OAK0811; Last   Rx:[id number] Ordered   4. Livalo 4 MG Oral Tablet; take 1 tablet daily at bedtime; Therapy: 13SKL4951 to (60 [phone])  Requested for: 29CQT9691; Last   Rx:[id number] Ordered   5. Magnesium Oxide 400 MG Oral Tablet; TAKE 1 TABLET DAILY; Therapy: (Recorded:31Mar2020) to Recorded   6. metFORMIN HCl - 500 MG Oral Tablet; 1 tablet twice a day; Therapy: (Recorded:31Oct2018) to Recorded   7. Nitroglycerin 0.4 MG Sublingual Tablet Sublingual; 1 tab sublingual as needed for chest   pain, may repeat every 5 minutes x 3; Therapy: 50VCF2393 to (David Dejesus)  Requested for: 87PXV0745; Last   Rx:[id number] Ordered   8. Sotalol HCl - 80 MG Oral Tablet; take 1/2 tablet twice daily; Therapy: (Recorded:31Mar2020) to Recorded    Reviewed medication list verbally over the phone with pt's spouse. Alicia GARCIA. Allergies   1. Lescol CAPS   Myalgias; Updated By: Geena Esquivel; 27/41/3899 9:42:18 AM   2. Lipitor TABS   Myalgias; Updated By: Geena Esquivel; 58/56/5644 9:42:18 AM   3. Niaspan TBCR   Myalgias; Updated By: Geena Esquivel; 98/01/3083 9:42:18 AM   4. Pravachol TABS   Myalgias; Updated By: Geena Esquivel; 76/98/0338 9:42:18 AM   5. Trilipix CPDR   Myalgias; Updated By: Odell Cash; 43/02/7580 9:42:18 AM   6. Zocor TABS   Myalgias; Updated By: Odell Cash; 15/53/4396 9:42:18 AM   7. Vitamin E MTC CAPS   Recorded By: Zain Clark; 10/31/2007 8:39:00 AM      Objective:   Vitals:    01/22/21 1914 01/23/21 1000 01/23/21 1920 01/24/21 0700   BP: 130/68 (!) 102/55 (!) 161/85 133/80   Pulse: 65 68 71 64   Resp: 18  18 18   Temp: 97.5 °F (36.4 °C)  97.3 °F (36.3 °C) 97.2 °F (36.2 °C)   TempSrc: Oral  Oral Oral   SpO2: 96% 100% 98% 99%   Weight:       Height:          Wt Readings from Last 3 Encounters:   01/21/21 153 lb (69.4 kg)   08/03/20 154 lb (69.9 kg)   03/13/20 143 lb 15.4 oz (65.3 kg)       TELEMETRY: normal sinus                             Rhythm Strip: frequent runs of rapid a.fib    Physical Exam:  General Appearance: alert and oriented to person, place and time, in no acute distress  Cardiovascular: normal rate, regular rhythm, normal S1 and S2, no murmurs, rubs, clicks, or gallops,  no JVD  Pulmonary/Chest: clear to auscultation bilaterally- , rales or rhonchi, normal air movement, no respiratory distress. However some wheezing left posterior. Abdomen: soft, non-tender, non-distended, normal bowel sounds, no masses   Extremities: no cyanosis, clubbing or edema  Skin: warm and dry, no rash or erythema  Eyes: EOMI  Neck: supple and non-tender without mass, no thyromegaly   Neurological: alert, oriented, normal speech, no focal findings or movement disorder noted    Allergies:   Allergies   Allergen Reactions    Lipitor [Atorvastatin] Other (See Comments)     Body pain    Niacin Other (See Comments)    Niaspan [Niacin Er] Other (See Comments)     Body pain    Vitamin E Other (See Comments)     Nose bleeds    Zocor [Simvastatin] Other (See Comments)     Sharp body pain        Medications:    meclizine  25 mg Oral TID    sodium chloride flush  10 mL Intravenous 2 times per day    aspirin  81 mg Oral Daily    Or    aspirin 300 mg Rectal Daily    magnesium oxide  400 mg Oral Daily    apixaban  5 mg Oral BID    [Held by provider] metFORMIN  500 mg Oral Daily with breakfast    sotalol  40 mg Oral BID           sodium chloride flush, 10 mL, PRN      promethazine, 12.5 mg, Q6H PRN    Or      ondansetron, 4 mg, Q6H PRN      polyethylene glycol, 17 g, Daily PRN      labetalol, 10 mg, Q10 Min PRN      nitroGLYCERIN, 0.4 mg, Q5 Min PRN        Diagnostics:  EK/21 NSR QTC 440msec. Echo:   Left ventricular ejection fraction is visually estimated at 50%. Mild inferobasal hypokinesis   No thrombi   Normal right ventricle structure and function. Normal right ventricle systolic pressure. Aortic root dimension within normal limits. Mildly dilated left atrium. Normal mitral valve structure and function. Trace MR   Normal aortic valve structure and function. CXR:               Portable:   Results for orders placed during the hospital encounter of 21   XR CHEST PORTABLE    Narrative EXAMINATION: XR CHEST PORTABLE    CLINICAL HISTORY: CHEST    COMPARISONS: 2020    FINDINGS: Median sternotomy. Cardiopericardial normal. Lungs clear.       Impression NO ACUTE CARDIOPULMONARY DISEASE       CT Chest: no pe    Lab Data:    Cardiac Enzymes:  Recent Labs     21  1100   TROPONINI <0.010     ProBNP:   Lab Results   Component Value Date    PROBNP 936 2019       CBC:   Recent Labs     21  1100 21  0515 21  0507   WBC 8.5 7.6 8.5   RBC 4.57* 4.20* 4.30*   HGB 13.4* 12.0* 12.3*   HCT 40.0* 36.8* 37.8*    260 254       CMP:    Recent Labs     21  1100 21  0507    145*   K 4.5 4.8    106   CO2 25 27   BUN 18 18   CREATININE 0.95 0.91   GFRAA >60.0 >60.0   LABGLOM >60.0 >60.0   GLUCOSE 185* 136*   CALCIUM 9.7 9.1       Hepatic Function Panel:    Recent Labs     21  1100   ALKPHOS 62   ALT 10   AST 19   PROT 7.2   BILITOT 0.4   LABALBU 4.1       Magnesium: Recent Labs     01/21/21  1100   MG 2.0       PT/INR:    Recent Labs     01/21/21  1100   PROTIME 16.1*   INR 1.3       Lipids:    Recent Labs     01/22/21  0515   CHOL 113   TRIG 103   HDL 38*   LDLCALC 54       Active Problems:    Ataxia  Resolved Problems:    * No resolved hospital problems.  *           Electronically signed by Fara Peguero MD on 1/24/2021 at 9:55 AM

## 2021-01-24 NOTE — PROGRESS NOTES
[facility] Neurology Daily Progress Note  Name: Charo Infante  Age: 79 y.o. Gender: male  CodeStatus: Full Code  Allergies: Lipitor [Atorvastatin]  Niacin  Niaspan [Niacin Er]  Vitamin E  Zocor [Simvastatin]    Chief Complaint:Dizziness (patient falling and feeling dizzy since jan 6th, ear ringing and foggy hearing to right ear)    Primary Care Provider: Jeffrey Carrillo DO  InpatientTreatment Team: Treatment Team: Attending Provider: Barb Ruff DO; Consulting Physician: Dinah Calabrese MD; Utilization Reviewer: Amarilis Calix RN; Consulting Physician: Charan Shaffer DO; : Galdino Boyer, RN; Registered Nurse: Olga Johnson, RN; Patient Care Tech: Ely Pratt  Admission Date: 1/21/2021         Pt seen and examined for neuro follow up for vertigo with BPV/Ménière's. Patient currently alert and orient x3, no acute distress, cooperative. Wife at bedside. Dizziness has resolved. Continues to have a \"buzzing\" in his right ear. He is on meclizine 25 mg 3 times daily. No focal neuro deficits. MRI of the brain without significant findings. CTA of the head neck negative. Patient did not discharge home yesterday as he went into rapid A. Fib. No new strokelike symptoms. Vitals:    01/24/21 0700   BP: 133/80   Pulse: 64   Resp: 18   Temp: 97.2 °F (36.2 °C)   SpO2: 99%      Review of Systems   Constitutional: Negative for appetite change, chills, fatigue and fever. HENT: Positive for tinnitus. Negative for hearing loss and trouble swallowing. Eyes: Negative for visual disturbance. Respiratory: Negative for cough, chest tightness, shortness of breath and wheezing. Cardiovascular: Negative for chest pain, palpitations and leg swelling. Gastrointestinal: Negative for nausea and vomiting. Skin: Negative for color change and rash. Neurological: Positive for dizziness.  Negative for tremors, seizures, syncope, facial asymmetry, speech difficulty, weakness, light-headedness, numbness and headaches. Psychiatric/Behavioral: Negative for agitation, confusion and hallucinations. The patient is not nervous/anxious. Physical Exam  Vitals signs and nursing note reviewed. Constitutional:       General: He is not in acute distress. Appearance: He is not diaphoretic. HENT:      Head: Normocephalic and atraumatic. Eyes:      Extraocular Movements: Extraocular movements intact. Pupils: Pupils are equal, round, and reactive to light. Cardiovascular:      Rate and Rhythm: Normal rate and regular rhythm. Pulmonary:      Effort: Pulmonary effort is normal. No respiratory distress. Breath sounds: Normal breath sounds. Abdominal:      General: Bowel sounds are normal.      Palpations: Abdomen is soft. Skin:     General: Skin is warm and dry. Neurological:      General: No focal deficit present. Mental Status: He is alert and oriented to person, place, and time. Mental status is at baseline. Cranial Nerves: No cranial nerve deficit. Medications:  Reviewed    Infusion Medications:   Scheduled Medications:    sotalol  80 mg Oral BID    sotalol  40 mg Oral Once    meclizine  25 mg Oral TID    sodium chloride flush  10 mL Intravenous 2 times per day    aspirin  81 mg Oral Daily    Or    aspirin  300 mg Rectal Daily    magnesium oxide  400 mg Oral Daily    apixaban  5 mg Oral BID    [Held by provider] metFORMIN  500 mg Oral Daily with breakfast     PRN Meds: sodium chloride flush, promethazine **OR** [DISCONTINUED] ondansetron, polyethylene glycol, labetalol, nitroGLYCERIN    Labs:   Recent Labs     01/22/21  0515 01/24/21  0507   WBC 7.6 8.5   HGB 12.0* 12.3*   HCT 36.8* 37.8*    254     Recent Labs     01/24/21  0507   *   K 4.8      CO2 27   BUN 18   CREATININE 0.91   CALCIUM 9.1     No results for input(s): AST, ALT, BILIDIR, BILITOT, ALKPHOS in the last 72 hours. No results for input(s): INR in the last 72 hours.   No results for input(s): Raymundo Scrivener in the last 72 hours. Urinalysis:   Lab Results   Component Value Date    NITRU Negative 03/13/2020    WBCUA 0-2 03/13/2020    BACTERIA Negative 12/08/2018    RBCUA 3-5 03/13/2020    BLOODU TRACE 03/13/2020    SPECGRAV 1.021 03/13/2020    GLUCOSEU 100 03/13/2020       Radiology:   Most recent    EEG No procedure found. MRI of Brain No results found for this or any previous visit. Results for orders placed during the hospital encounter of 01/21/21   MRI brain without contrast    Narrative EXAM: MRI of the brain without contrast    History: Ataxia. Stroke. Technique: Multiplanar multisequence MRI of the brain was performed without     contrast.    Comparison: MRI of the brain from March 13, 2020 and CT of the brain from January 21, 2020    Findings:    Multiple small hyperintense T2/FLAIR signal foci are identified within the bilateral supratentorial white matter, as is patchy hyperintense T2/FLAIR signal within the akhil. Prominence of the sulci and ventricles compatible with moderate generalized parenchymal volume loss. No acute hemorrhage, mass, mass effect, midline shift, or abnormal extra-axial fluid collection. Midline structures are within normal limits. The   posterior fossa is within normal limits. There is no diffusion restriction. No susceptibility artifact is identified on the gradient echo sequence. The major intracranial vascular flow voids are maintained. Cranial nerves 7/8 complexes appear grossly unremarkable. The visualized paranasal sinuses and bilateral mastoid air cells are essentially clear. Impression No acute ischemia or acute intracranial process. Generalized parenchymal volume loss and nonspecific white matter findings most compatible with chronic small vessel ischemic changes in a patient of this age. MRA of the Head and Neck: No results found for this or any previous visit. No results found for this or any previous visit. Results for orders placed during the hospital encounter of 03/13/20   MRA HEAD WO CONTRAST    Narrative EXAM:     MRI of the brain without contrast    MRA of the brain without contrast    History: CVA. Dizziness. Technique: Multiplanar multisequence MRI of the brain was performed without contrast. Axial 3-D time-of-flight images of the brain were obtained without contrast. 3-D volume rendered images were performed for evaluation of the cerebral vasculature. Comparison: CT brain from March 13, 2020    Findings:    MRI brain:      Confluent periventricular hyperintense T2/FLAIR signal, a few small hyperintense T2/FLAIR signal within the bilateral supratentorial white matter, and subtle patchy hyperintense T2/FLAIR signal within the akhil are nonspecific findings most better with   chronic small vessel ischemic changes in a patient of this age. Prominence of the sulci and ventricles compatible with moderate generalized parenchymal volume loss. No acute hemorrhage, mass, mass effect, midline shift, or abnormal extra-axial fluid collection. Midline structures are within normal limits. The posterior fossa is within normal limits. There is no diffusion restriction. No susceptibility artifact is identified on the gradient echo sequence. Cranial nerves 7/8 complexes appear grossly unremarkable. The visualized paranasal sinuses and bilateral mastoid air cells are clear. MRA brain:    The bilateral distal cervical internal carotid arteries through the skull base are patent. The bilateral middle cerebral arteries through the trifurcation and opercular branches are patent. The vertebrobasilar system including the superior cerebellar and posterior cerebral arteries are patent. Posterior communicating arteries are patent. The bilateral anterior cerebral arteries and anterior communicating artery are patent.  The A1 segment of the right anterior cerebral artery is hypoplastic on a congenital basis. No aneurysm or high-grade stenosis of the visualized cerebral vasculature. Impression MRI brain:    No acute ischemia or acute intracranial process. Generalized parenchymal volume loss and nonspecific white matter findings most compatible with chronic small vessel ischemic changes in a patient of this age. MRA brain:    No aneurysm or high-grade stenosis of the visualized cerebral vasculature. CT of the Head:   Results for orders placed during the hospital encounter of 01/21/21   CT Head WO Contrast    Narrative CT HEAD WO CONTRAST : 1/21/2021    CLINICAL HISTORY:  dizzy . COMPARISON: Head CT and MRI 3/13/2020. TECHNIQUE: Spiral unenhanced images were obtained of the head, with routine multiplanar reconstructions performed. All CT scans at this facility use dose modulation, iterative reconstruction, and/or weight based dosing when appropriate to reduce radiation dose to as low as reasonably achievable. FINDINGS:    There is no intracranial hemorrhage, mass effect, midline shift, extra-axial collection, evidence of hydrocephalus, recent ischemic infarct, or skull fracture identified. 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 The mastoid air cells and visualized paranasal sinuses are essentially clear. Impression NO ACUTE INTRACRANIAL PROCESS OR SIGNIFICANT CHANGE FROM 3/13/2020 IDENTIFIED. No results found for this or any previous visit. No results found for this or any previous visit. Carotid duplex: No results found for this or any previous visit. No results found for this or any previous visit.   Results for orders placed during the hospital encounter of 03/13/20   US CAROTID ARTERY BILATERAL    Narrative BILATERAL DUPLEX CAROTID ULTRASOUND    CLINICAL INFORMATION: STROKE    COMPARISON: Carotid artery ultrasound from December 8, 2018    FINDINGS:  The bilateral carotid duplex ultrasound study demonstrates the following:                                                                        RIGHT            LEFT    Proximal common carotid artery           78 cm/s            90 cm/s    Mid common carotid artery                   88 cm/s            79 cm/s    Distal common carotid artery                69 cm/s           69 cm/s  Proximal internal carotid artery             106 cm/s            84 cm/s  Mid internal carotid artery                      71 cm/s           96 cm/s  Distal internal carotid artery                  71 cm/s             73 cm/s  External carotid artery                            110 cm/s           84 cm/s      ICA/CCA ratio                                         1.21                1.21         Vertebral arteries antegrade flow         Yes                     Yes            Impression LESS THAN 50% STENOSIS OF THE BILATERAL INTERNAL CAROTID ARTERIES. ANTEGRADE FLOW OF THE BILATERAL VERTEBRAL ARTERIES. Validated velocity measurements with angiographic measurements, velocity criteria are extrapolated from diameter data as defined by the Society of Radiologist in [address] [place] [address] Drive Radiology 2003; [phone]. Echo No results found for this or any previous visit. 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 Patient has had dizziness for quite some time with syncopal episodes he has a loop device recorder as he had syncopal episode in the past.  For now this appears to be different pathology. Also reported to me that he is having irritability and anger behaviors and may be developing very early dementia. We will need to address this as an outpatient.   For now recommended Antivert continuously for a few days.     We will arrange for orthostatic blood pressure recordings as well. This patient has a colloid cyst of the third ventricle not contributing to his episodes as this is a 5 mm colloid cyst away from the ventricular system and foramen of Monro. Patient be watched today may be discharged tomorrow patient does have history status of atrial fibrillation and continues on Eliquis  Patient had MRI of the brain which does not show anything significant and his CT angiograms did not show any stenosis     Okay to DC from neurology standpoint  We will give short course of meclizine as needed  Vestibular rehab  Follow-up 4 to 6 weeks    Patient with recurrent atrial fibrillation and went into rapid A. fib last night. Cardiology following. Sotalol increased. Continues on Eliquis.     Collaborating physicians: Dr Stuart Wang    Electronically signed by RACHEL Gutierrez CNP on 1/24/2021 at 11:13 AM

## 2021-01-24 NOTE — PROGRESS NOTES
Patient is up to the restroom. He is short of breath when he returns to the bed. He states its not new. He has expiratory wheezing on auscultation. Trace edema in the left foot and ankle. Pulses palpable. He said he is using an inhaler at home occasionally to help with his breathing. bed alarm on and rails up times two. Call light with patient.

## 2021-01-25 PROBLEM — R42 VERTIGO: Status: ACTIVE | Noted: 2021-01-25

## 2021-01-25 PROCEDURE — 93010 ELECTROCARDIOGRAM REPORT: CPT | Performed by: INTERNAL MEDICINE

## 2021-01-25 PROCEDURE — 6370000000 HC RX 637 (ALT 250 FOR IP): Performed by: PSYCHIATRY & NEUROLOGY

## 2021-01-25 PROCEDURE — 99233 SBSQ HOSP IP/OBS HIGH 50: CPT | Performed by: PSYCHIATRY & NEUROLOGY

## 2021-01-25 PROCEDURE — 6370000000 HC RX 637 (ALT 250 FOR IP): Performed by: INTERNAL MEDICINE

## 2021-01-25 PROCEDURE — 93005 ELECTROCARDIOGRAM TRACING: CPT | Performed by: INTERNAL MEDICINE

## 2021-01-25 PROCEDURE — 2580000003 HC RX 258: Performed by: INTERNAL MEDICINE

## 2021-01-25 PROCEDURE — 97116 GAIT TRAINING THERAPY: CPT

## 2021-01-25 PROCEDURE — APPSS15 APP SPLIT SHARED TIME 0-15 MINUTES: Performed by: NURSE PRACTITIONER

## 2021-01-25 PROCEDURE — 1210000000 HC MED SURG R&B

## 2021-01-25 RX ORDER — LANOLIN ALCOHOL/MO/W.PET/CERES
400 CREAM (GRAM) TOPICAL DAILY
Status: DISCONTINUED | OUTPATIENT
Start: 2021-01-26 | End: 2021-01-26 | Stop reason: HOSPADM

## 2021-01-25 RX ADMIN — Medication 10 ML: at 08:04

## 2021-01-25 RX ADMIN — MECLIZINE HYDROCHLORIDE 25 MG: 25 TABLET ORAL at 13:58

## 2021-01-25 RX ADMIN — MECLIZINE HYDROCHLORIDE 25 MG: 25 TABLET ORAL at 21:02

## 2021-01-25 RX ADMIN — APIXABAN 5 MG: 5 TABLET, FILM COATED ORAL at 08:03

## 2021-01-25 RX ADMIN — ASPIRIN 81 MG: 81 TABLET, COATED ORAL at 08:03

## 2021-01-25 RX ADMIN — APIXABAN 5 MG: 5 TABLET, FILM COATED ORAL at 21:02

## 2021-01-25 RX ADMIN — SOTALOL HYDROCHLORIDE 80 MG: 80 TABLET ORAL at 08:08

## 2021-01-25 RX ADMIN — Medication 10 ML: at 21:03

## 2021-01-25 RX ADMIN — MECLIZINE HYDROCHLORIDE 25 MG: 25 TABLET ORAL at 08:03

## 2021-01-25 RX ADMIN — Medication 400 MG: at 08:03

## 2021-01-25 RX ADMIN — SOTALOL HYDROCHLORIDE 80 MG: 80 TABLET ORAL at 21:02

## 2021-01-25 ASSESSMENT — ENCOUNTER SYMPTOMS
COUGH: 0
VOMITING: 0
WHEEZING: 0
CHEST TIGHTNESS: 0
TROUBLE SWALLOWING: 0
NAUSEA: 0
COLOR CHANGE: 0
SHORTNESS OF BREATH: 0

## 2021-01-25 NOTE — FLOWSHEET NOTE
Shift assessment complete. VS complete. EKG obtained, QT-470. Sotalol given. Denies chest pain, shortness of breath and nausea at this time. Will continue with plan of care.  Electronically signed by Chai Danielle RN on 1/25/21 at 8:10 AM EST

## 2021-01-25 NOTE — PROGRESS NOTES
Hospitalist Progress Note      PCP: Radha Degroot DO    Date of Admission: 1/21/2021    Chief Complaint:      Subjective: :    Doing well. Balance has improved. Medications:  Reviewed    Infusion Medications   Scheduled Medications    [START ON 1/26/2021] magnesium oxide  400 mg Oral Daily    sotalol  80 mg Oral BID    meclizine  25 mg Oral TID    sodium chloride flush  10 mL Intravenous 2 times per day    aspirin  81 mg Oral Daily    Or    aspirin  300 mg Rectal Daily    apixaban  5 mg Oral BID    [Held by provider] metFORMIN  500 mg Oral Daily with breakfast     PRN Meds: sodium chloride flush, promethazine **OR** [DISCONTINUED] ondansetron, polyethylene glycol, labetalol, nitroGLYCERIN      Intake/Output Summary (Last 24 hours) at 1/25/2021 1530  Last data filed at 1/24/2021 1727  Gross per 24 hour   Intake 120 ml   Output --   Net 120 ml       Exam:    BP (!) 130/90   Pulse 66   Temp 97.9 °F (36.6 °C) (Oral)   Resp 18   Ht 5' 8\" (1.727 m)   Wt 153 lb (69.4 kg)   SpO2 97%   BMI 23.26 kg/m²       GEN: Alert and oriented. NAD, well nourished  HEENT: Normocephalic, atraumatic. HUGO, Neck supple. Resp: CTA b/l no wheezing or rhonchi. No respiratory distress  Cardio: RRR. No murmur  Abd: Soft, nondistended. NTTP. BS present  Ext: No erythema or edema. LE NTTP  Skin is warm and dry      Labs:   Recent Labs     01/24/21  0507   WBC 8.5   HGB 12.3*   HCT 37.8*        Recent Labs     01/24/21  0507   *   K 4.8      CO2 27   BUN 18   CREATININE 0.91   CALCIUM 9.1     No results for input(s): AST, ALT, BILIDIR, BILITOT, ALKPHOS in the last 72 hours. No results for input(s): INR in the last 72 hours. No results for input(s): Adeline Foster in the last 72 hours.     Urinalysis:      Lab Results   Component Value Date    NITRU Negative 03/13/2020    WBCUA 0-2 03/13/2020    BACTERIA Negative 12/08/2018    RBCUA 3-5 03/13/2020    BLOODU TRACE 03/13/2020    SPECGRAV 1.021 03/13/2020    GLUCOSEU 100 03/13/2020       Radiology:  XR CHEST (2 VW)   Final Result      NO EVIDENCE OF ACTIVE CARDIOPULMONARY DISEASE OR SIGNIFICANT CHANGE FROM PRIOR STUDIES IDENTIFIED. COPD, WHICH IS BEST EVALUATED CLINICALLY. CT angiogram head with and without contrast   Final Result   The major intracranial arterial structures are patent without high-grade stenosis, large vessel cut off, or aneurysm. EXAMINATION: CTA HEAD W WO CONTRAST, CTA NECK W WO CONTRAST      DATE AND TIME:1/21/2021 6:40 PM      CLINICAL HISTORY: Stroke symptoms   ataxia        COMPARISON: None      TECHNIQUE: Helical CTA of the neck was performed from the aortic arch to the foramen magnum following the uneventful intravenous administration of 100 cc of nonionic contrast without incident. 2-D images were reconstructed in the sagittal and coronal    planes. Three Dimensional Maximum Intensity Projection (3D-MIP) images were created. All images were reviewed and primarily archived to PACS workstation. All CT scans at this facility use dose modulation, iterative reconstruction, and/or weight based    dosing when appropriate to reduce radiation dose to as low as reasonably achievable. NASCET Criteria were utilized      FINDINGS CTA NECK:      Aortic Arch: The visualized portions of the aortic arch and proximal arch vessels demonstrates no focal stenosis aneurysm or dissection. Carotids: The common carotid arteries, proximal internal and external carotid arteries are normal in course and caliber. There is evidence of arteriosclerosis and calcifications of the bilateral carotid arteries without hemodynamically significant    stenoses. Right  Proximal Internal Carotid Stenosis (% by NASCET Criteria): There is no hemodynamically significant stenosis. Left  Proximal Internal Carotid Stenosis (% by NASCET Criteria): There is no hemodynamically significant stenosis.               Vertebral Arteries:      Patency: The vertebral arteries are well visualized to the level of the basilar artery. There is no focal stenosis aneurysm or dissection. Vertebral arteries are codominant. IMPRESSION: There are no hemodynamically significant stenosis. 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 CT angiogram neck with and without contrast   Final Result   The major intracranial arterial structures are patent without high-grade stenosis, large vessel cut off, or aneurysm. EXAMINATION: CTA HEAD W WO CONTRAST, CTA NECK W WO CONTRAST      DATE AND TIME:1/21/2021 6:40 PM      CLINICAL HISTORY: Stroke symptoms   ataxia        COMPARISON: None      TECHNIQUE: Helical CTA of the neck was performed from the aortic arch to the foramen magnum following the uneventful intravenous administration of 100 cc of nonionic contrast without incident. 2-D images were reconstructed in the sagittal and coronal    planes. Three Dimensional Maximum Intensity Projection (3D-MIP) images were created. All images were reviewed and primarily archived to PACS workstation. All CT scans at this facility use dose modulation, iterative reconstruction, and/or weight based    dosing when appropriate to reduce radiation dose to as low as reasonably achievable. NASCET Criteria were utilized      FINDINGS CTA NECK:      Aortic Arch: The visualized portions of the aortic arch and proximal arch vessels demonstrates no focal stenosis aneurysm or dissection. Carotids: The common carotid arteries, proximal internal and external carotid arteries are normal in course and caliber. There is evidence of arteriosclerosis and calcifications of the bilateral carotid arteries without hemodynamically significant    stenoses. Right  Proximal Internal Carotid Stenosis (% by NASCET Criteria): There is no hemodynamically significant stenosis.              Left  Proximal Internal Carotid

## 2021-01-25 NOTE — PROGRESS NOTES
Physical Therapy Med Surg Daily Treatment Note  [id number]       NAME: Nati Wallace  : 1940 (79 y.o.)  MRN: [id number]  CODE STATUS: Full Code    Date of Service: 2021    Patient Diagnosis(es): Ataxia [R27.0]   Chief Complaint   Patient presents with    Dizziness     patient falling and feeling dizzy since , ear ringing and foggy hearing to right ear     Patient Active Problem List    Diagnosis Date Noted    Atrial fibrillation ([address] 2019     Priority: High    High risk medication use 2019     Priority: High    Vertigo 2021    Ataxia 2021    Cerebrovascular accident ([address] 2020    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[address] 2020    Vasovagal syncope 2020    Dizziness and giddiness 2020    Orthostatic dizziness 2020    Atherosclerosis of native coronary artery of native heart without angina pectoris 2018    Essential hypertension 2018    Ischemic cardiomyopathy 2018    Nonrheumatic aortic valve disorder, unspecified 2018    Nonrheumatic aortic valve disorder, unspecified 2018    H/O: drug dependency ([address] 2018    Presence of aortocoronary bypass graft 2018        Past Medical History:   Diagnosis Date    Atrial fibrillation ([address]     CAD (coronary artery disease)     cardiac stents    COPD (chronic obstructive pulmonary disease) ([address]     Diabetes mellitus ([address]     Hyperlipidemia     Hypertension     Movement disorder     Pneumonia      Past Surgical History:   Procedure Laterality Date    APPENDECTOMY      CORONARY ANGIOPLASTY WITH STENT PLACEMENT      4    CORONARY ARTERY BYPASS GRAFT      triple bypass    EYE SURGERY Bilateral     cataract surgery    INSERTABLE CARDIAC MONITOR Left 2018       Restrictions  Restrictions/Precautions: Fall Risk    SUBJECTIVE   General  Chart Reviewed: Yes  Family / Caregiver Present: demonstrate amb mod indep 150ft with WW to allow pt to amb inside his home with safety. Long term goal 3: Pt will demonstrate stair negotiation 2 steps with 2 rails mod indep to allow pt to enter and exit his home safely. Patient Goals   Patient goals : \"go home with my wife\"    PLAN    Times per week: 3-6  Plan Comment: Cont. POC     [facility] (6 CLICK) BASIC MOBILITY  AM-PAC Inpatient Mobility Raw Score : 20     Therapy Time   Individual   Time In 0904   Time Out 0922   Minutes 18      BM/Trsf: 5  Gait: 8  There ex: 5       Pernell Pardo PTA, 01/25/21 at 10:59 AM         Definitions for assistance levels  Independent = pt does not require any physical supervision or assistance from another person for activity completion. Device may be needed.   Stand by assistance = pt requires verbal cues or instructions from another person, close to but not touching, to perform the activity  Minimal assistance= pt performs 75% or more of the activity; assistance is required to complete the activity  Moderate assistance= pt performs 50% of the activity; assistance is required to complete the activity  Maximal assistance = pt performs 25% of the activity; assistance is required to complete the activity  Dependent = pt requires total physical assistance to accomplish the task

## 2021-01-25 NOTE — CARE COORDINATION
MET WITH PATIENT TO DISCUSS DC PLAN. PT TO RETURN HOME WITH WIFE, DENIES NEEDS. IMM REVIEWED WITH PATIENT. WILL FOLLOW IF NEEDED.

## 2021-01-25 NOTE — PROGRESS NOTES
[id number] [facility] Neurology Daily Progress Note  Name: Chiquita Cook  Age: 79 y.o. Gender: male  CodeStatus: Full Code  Allergies: Lipitor [Atorvastatin]  Niacin  Niaspan [Niacin Er]  Vitamin E  Zocor [Simvastatin]    Chief Complaint:Dizziness (patient falling and feeling dizzy since jan 6th, ear ringing and foggy hearing to right ear)    Primary Care Provider: Diya Cardoso DO  InpatientTreatment Team: Treatment Team: Attending Provider: Rachelle Pretty DO; Consulting Physician: Jamil Azul MD; Utilization Reviewer: Dot Jorgensen RN; Registered Nurse: Ludwin Leavitt RN; Consulting Physician: Nato Amezcua MD; Utilization Reviewer: Micaela Cruz RN  Admission Date: 1/21/2021         Pt seen and examined for neuro follow up for vertigo with BPV/Ménière's. Patient currently alert and orient x3, no acute distress, cooperative. Wife at bedside. Dizziness has resolved. Continues to have a \"buzzing\" in his right ear. He is on meclizine 25 mg 3 times daily. No focal neuro deficits. MRI of the brain without significant findings. CTA of the head neck negative. Patient now in normal sinus rhythm    Patient still has some pause in the right ear likely to be Ménière's  Vitals:    01/25/21 0709   BP: (!) 130/90   Pulse: 66   Resp: 18   Temp: 97.9 °F (36.6 °C)   SpO2: 97%      Review of Systems   Constitutional: Negative for appetite change, chills, fatigue and fever. HENT: Positive for tinnitus. Negative for hearing loss and trouble swallowing. Eyes: Negative for visual disturbance. Respiratory: Negative for cough, chest tightness, shortness of breath and wheezing. Cardiovascular: Negative for chest pain, palpitations and leg swelling. Gastrointestinal: Negative for nausea and vomiting. Skin: Negative for color change and rash. Neurological: Negative for dizziness, tremors, seizures, syncope, facial asymmetry, speech difficulty, weakness, light-headedness, numbness and headaches. Psychiatric/Behavioral: Negative for agitation, confusion and hallucinations. The patient is not nervous/anxious. Physical Exam  Vitals signs and nursing note reviewed. Constitutional:       General: He is not in acute distress. Appearance: He is not diaphoretic. HENT:      Head: Normocephalic and atraumatic. Eyes:      Extraocular Movements: Extraocular movements intact. Pupils: Pupils are equal, round, and reactive to light. Cardiovascular:      Rate and Rhythm: Normal rate and regular rhythm. Pulmonary:      Effort: Pulmonary effort is normal. No respiratory distress. Breath sounds: Normal breath sounds. Abdominal:      General: Bowel sounds are normal.      Palpations: Abdomen is soft. Skin:     General: Skin is warm and dry. Neurological:      General: No focal deficit present. Mental Status: He is alert and oriented to person, place, and time. Mental status is at baseline. Cranial Nerves: No cranial nerve deficit. Tinnitus noted only otherwise examination normal          Medications:  Reviewed    Infusion Medications:   Scheduled Medications:    sotalol  80 mg Oral BID    meclizine  25 mg Oral TID    sodium chloride flush  10 mL Intravenous 2 times per day    aspirin  81 mg Oral Daily    Or    aspirin  300 mg Rectal Daily    magnesium oxide  400 mg Oral Daily    apixaban  5 mg Oral BID    [Held by provider] metFORMIN  500 mg Oral Daily with breakfast     PRN Meds: sodium chloride flush, promethazine **OR** [DISCONTINUED] ondansetron, polyethylene glycol, labetalol, nitroGLYCERIN    Labs:   Recent Labs     01/24/21  0507   WBC 8.5   HGB 12.3*   HCT 37.8*        Recent Labs     01/24/21  0507   *   K 4.8      CO2 27   BUN 18   CREATININE 0.91   CALCIUM 9.1     No results for input(s): AST, ALT, BILIDIR, BILITOT, ALKPHOS in the last 72 hours. No results for input(s): INR in the last 72 hours.   No results for input(s): Katiana Rolle in the last 72 hours. Urinalysis:   Lab Results   Component Value Date    NITRU Negative 03/13/2020    WBCUA 0-2 03/13/2020    BACTERIA Negative 12/08/2018    RBCUA 3-5 03/13/2020    BLOODU TRACE 03/13/2020    SPECGRAV 1.021 03/13/2020    GLUCOSEU 100 03/13/2020       Radiology:   Most recent    EEG No procedure found. MRI of Brain No results found for this or any previous visit. Results for orders placed during the hospital encounter of 01/21/21   MRI brain without contrast    Narrative EXAM: MRI of the brain without contrast    History: Ataxia. Stroke. Technique: Multiplanar multisequence MRI of the brain was performed without     contrast.    Comparison: MRI of the brain from March 13, 2020 and CT of the brain from January 21, 2020    Findings:    Multiple small hyperintense T2/FLAIR signal foci are identified within the bilateral supratentorial white matter, as is patchy hyperintense T2/FLAIR signal within the akhil. Prominence of the sulci and ventricles compatible with moderate generalized parenchymal volume loss. No acute hemorrhage, mass, mass effect, midline shift, or abnormal extra-axial fluid collection. Midline structures are within normal limits. The   posterior fossa is within normal limits. There is no diffusion restriction. No susceptibility artifact is identified on the gradient echo sequence. The major intracranial vascular flow voids are maintained. Cranial nerves 7/8 complexes appear grossly unremarkable. The visualized paranasal sinuses and bilateral mastoid air cells are essentially clear. Impression No acute ischemia or acute intracranial process. Generalized parenchymal volume loss and nonspecific white matter findings most compatible with chronic small vessel ischemic changes in a patient of this age. MRA of the Head and Neck: No results found for this or any previous visit. No results found for this or any previous visit.   Results for orders placed during the hospital encounter of 03/13/20   MRA HEAD WO CONTRAST    Narrative EXAM:     MRI of the brain without contrast    MRA of the brain without contrast    History: CVA. Dizziness. Technique: Multiplanar multisequence MRI of the brain was performed without contrast. Axial 3-D time-of-flight images of the brain were obtained without contrast. 3-D volume rendered images were performed for evaluation of the cerebral vasculature. Comparison: CT brain from March 13, 2020    Findings:    MRI brain:      Confluent periventricular hyperintense T2/FLAIR signal, a few small hyperintense T2/FLAIR signal within the bilateral supratentorial white matter, and subtle patchy hyperintense T2/FLAIR signal within the akhil are nonspecific findings most better with   chronic small vessel ischemic changes in a patient of this age. Prominence of the sulci and ventricles compatible with moderate generalized parenchymal volume loss. No acute hemorrhage, mass, mass effect, midline shift, or abnormal extra-axial fluid collection. Midline structures are within normal limits. The posterior fossa is within normal limits. There is no diffusion restriction. No susceptibility artifact is identified on the gradient echo sequence. Cranial nerves 7/8 complexes appear grossly unremarkable. The visualized paranasal sinuses and bilateral mastoid air cells are clear. MRA brain:    The bilateral distal cervical internal carotid arteries through the skull base are patent. The bilateral middle cerebral arteries through the trifurcation and opercular branches are patent. The vertebrobasilar system including the superior cerebellar and posterior cerebral arteries are patent. Posterior communicating arteries are patent. The bilateral anterior cerebral arteries and anterior communicating artery are patent. The A1 segment of the right anterior cerebral artery is hypoplastic on a congenital basis.     No aneurysm or high-grade stenosis of the visualized cerebral vasculature. Impression MRI brain:    No acute ischemia or acute intracranial process. Generalized parenchymal volume loss and nonspecific white matter findings most compatible with chronic small vessel ischemic changes in a patient of this age. MRA brain:    No aneurysm or high-grade stenosis of the visualized cerebral vasculature. CT of the Head:   Results for orders placed during the hospital encounter of 01/21/21   CT Head WO Contrast    Narrative CT HEAD WO CONTRAST : 1/21/2021    CLINICAL HISTORY:  dizzy . COMPARISON: Head CT and MRI 3/13/2020. TECHNIQUE: Spiral unenhanced images were obtained of the head, with routine multiplanar reconstructions performed. All CT scans at this facility use dose modulation, iterative reconstruction, and/or weight based dosing when appropriate to reduce radiation dose to as low as reasonably achievable. FINDINGS:    There is no intracranial hemorrhage, mass effect, midline shift, extra-axial collection, evidence of hydrocephalus, recent ischemic infarct, or skull fracture identified. 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 The mastoid air cells and visualized paranasal sinuses are essentially clear. Impression NO ACUTE INTRACRANIAL PROCESS OR SIGNIFICANT CHANGE FROM 3/13/2020 IDENTIFIED. No results found for this or any previous visit. No results found for this or any previous visit. Carotid duplex: No results found for this or any previous visit. No results found for this or any previous visit.   Results for orders placed during the hospital encounter of 03/13/20   US CAROTID ARTERY BILATERAL    Narrative BILATERAL DUPLEX CAROTID ULTRASOUND    CLINICAL INFORMATION:  STROKE    COMPARISON: Carotid artery ultrasound from ventricle not contributing to his episodes as this is a 5 mm colloid cyst away from the ventricular system and foramen of Monro. Patient be watched today may be discharged tomorrow patient does have history status of atrial fibrillation and continues on Eliquis  Patient had MRI of the brain which does not show anything significant and his CT angiograms did not show any stenosis     Okay to DC from neurology standpoint  We will give short course of meclizine as needed  Vestibular rehab  Follow-up 4 to 6 weeks    Patient with recurrent atrial fibrillation and went into rapid A. fib last night. Cardiology following. Sotalol increased. Continues on Eliquis. Patient now in normal sinus rhythm  Okay to DC from neurology standpoint per above    I have personally performed a face to face diagnostic evaluation on this patient, reviewed all data and investigations, and am the sole provider of all clinical decisions on the neurological status of this patient. Neurologically patient only has the tinnitus and is not related to his atrial fibrillation. Patient is on Eliquis. Patient may be discharged from neurological standpoint      Rahul Brand MD, [id number] American Board of Psychiatry & Neurology  Board Certified in Vascular Neurology  Board Certified in Neuromuscular Medicine  Certified in Neurorehabilitation     Collaborating physicians: Dr Altagracia Brand    Electronically signed by RACHEL Edmond CNP on 1/25/2021 at 9:59 AM

## 2021-01-25 NOTE — PROGRESS NOTES
[facility] Heart [facility] Note      Patient: Vida Curiel    Unit/Bed: S216/Y429-76  YOB: 1940  MRN: [id number]  Admit Date:  1/21/2021  Hospital Day: 4    Rounding Date: 1/25/2021    Rounding Cardiologist:  HILDA Uriarte MD    PRIMARY Cardiologist: Ronny    Subjective Complaint:   Denies any chest pain with exertion or at rest, palpitations, syncope, or edema.,  Feels a little better. Physical Examination:     BP (!) 130/90   Pulse 66   Temp 97.9 °F (36.6 °C) (Oral)   Resp 18   Ht 5' 8\" (1.727 m)   Wt 153 lb (69.4 kg)   SpO2 97%   BMI 23.26 kg/m²         Intake/Output Summary (Last 24 hours) at 1/25/2021 1309  Last data filed at 1/24/2021 1727  Gross per 24 hour   Intake 480 ml   Output --   Net 480 ml                  Vida Curiel examined at bedside in in no apparent distress and cooperative. Focused exam reveals:     Cardiac: Heart regular rate and rhythm     Lungs:  clear to auscultation bilaterally- no wheezes, rales or rhonchi, normal air movement, no respiratory distress    Extremities:   none edema    Telemetry:      normal sinus          LABS:   CBC:   Recent Labs     01/24/21  0507   WBC 8.5   HGB 12.3*         BMP:    Recent Labs     01/24/21  0507   *   K 4.8      CO2 27   BUN 18   CREATININE 0.91   GLUCOSE 136*              Troponin: No results for input(s): TROPONINT in the last 72 hours. BNP: No results for input(s): PROBNP in the last 72 hours. INR: No results for input(s): INR in the last 72 hours. Mg: No results for input(s): MG in the last 72 hours. Cardiac Testing:   Summary   Left ventricular ejection fraction is visually estimated at 50%. Mild inferobasal hypokinesis   No thrombi   Normal right ventricle structure and function. Normal right ventricle systolic pressure. Aortic root dimension within normal limits. Mildly dilated left atrium. Normal mitral valve structure and function.    Trace MR   Normal aortic valve Millie Quick

## 2021-01-26 VITALS
DIASTOLIC BLOOD PRESSURE: 68 MMHG | HEIGHT: 68 IN | SYSTOLIC BLOOD PRESSURE: 125 MMHG | BODY MASS INDEX: 23.19 KG/M2 | OXYGEN SATURATION: 98 % | HEART RATE: 70 BPM | TEMPERATURE: 97.3 F | RESPIRATION RATE: 17 BRPM | WEIGHT: 153 LBS

## 2021-01-26 LAB
ANION GAP SERPL CALCULATED.3IONS-SCNC: 9 MEQ/L (ref 9–15)
BUN BLDV-MCNC: 21 MG/DL (ref 8–23)
CALCIUM SERPL-MCNC: 9.2 MG/DL (ref 8.5–9.9)
CHLORIDE BLD-SCNC: 105 MEQ/L (ref 95–107)
CO2: 25 MEQ/L (ref 20–31)
CREAT SERPL-MCNC: 0.91 MG/DL (ref 0.7–1.2)
EKG ATRIAL RATE: 59 BPM
EKG ATRIAL RATE: 64 BPM
EKG ATRIAL RATE: 65 BPM
EKG ATRIAL RATE: 69 BPM
EKG P AXIS: 60 DEGREES
EKG P AXIS: 65 DEGREES
EKG P AXIS: 68 DEGREES
EKG P AXIS: 75 DEGREES
EKG P AXIS: 78 DEGREES
EKG P AXIS: 82 DEGREES
EKG P AXIS: 92 DEGREES
EKG P-R INTERVAL: 232 MS
EKG P-R INTERVAL: 234 MS
EKG P-R INTERVAL: 238 MS
EKG P-R INTERVAL: 242 MS
EKG P-R INTERVAL: 248 MS
EKG P-R INTERVAL: 256 MS
EKG P-R INTERVAL: 256 MS
EKG P-R INTERVAL: 258 MS
EKG P-R INTERVAL: 262 MS
EKG Q-T INTERVAL: 422 MS
EKG Q-T INTERVAL: 424 MS
EKG Q-T INTERVAL: 440 MS
EKG Q-T INTERVAL: 452 MS
EKG Q-T INTERVAL: 458 MS
EKG Q-T INTERVAL: 464 MS
EKG Q-T INTERVAL: 466 MS
EKG Q-T INTERVAL: 470 MS
EKG Q-T INTERVAL: 494 MS
EKG QRS DURATION: 90 MS
EKG QRS DURATION: 92 MS
EKG QRS DURATION: 92 MS
EKG QRS DURATION: 94 MS
EKG QRS DURATION: 94 MS
EKG QTC CALCULATION (BAZETT): 437 MS
EKG QTC CALCULATION (BAZETT): 452 MS
EKG QTC CALCULATION (BAZETT): 457 MS
EKG QTC CALCULATION (BAZETT): 466 MS
EKG QTC CALCULATION (BAZETT): 466 MS
EKG QTC CALCULATION (BAZETT): 484 MS
EKG QTC CALCULATION (BAZETT): 489 MS
EKG QTC CALCULATION (BAZETT): 490 MS
EKG QTC CALCULATION (BAZETT): 497 MS
EKG R AXIS: 55 DEGREES
EKG R AXIS: 59 DEGREES
EKG R AXIS: 61 DEGREES
EKG R AXIS: 65 DEGREES
EKG R AXIS: 69 DEGREES
EKG R AXIS: 69 DEGREES
EKG R AXIS: 73 DEGREES
EKG R AXIS: 74 DEGREES
EKG R AXIS: 74 DEGREES
EKG T AXIS: 52 DEGREES
EKG T AXIS: 73 DEGREES
EKG T AXIS: 78 DEGREES
EKG T AXIS: 79 DEGREES
EKG T AXIS: 79 DEGREES
EKG T AXIS: 81 DEGREES
EKG T AXIS: 83 DEGREES
EKG T AXIS: 84 DEGREES
EKG T AXIS: 85 DEGREES
EKG VENTRICULAR RATE: 59 BPM
EKG VENTRICULAR RATE: 60 BPM
EKG VENTRICULAR RATE: 64 BPM
EKG VENTRICULAR RATE: 65 BPM
EKG VENTRICULAR RATE: 69 BPM
GFR AFRICAN AMERICAN: >60
GFR NON-AFRICAN AMERICAN: >60
GLUCOSE BLD-MCNC: 138 MG/DL (ref 70–99)
MAGNESIUM: 2.1 MG/DL (ref 1.7–2.4)
POTASSIUM SERPL-SCNC: 4.1 MEQ/L (ref 3.4–4.9)
SODIUM BLD-SCNC: 139 MEQ/L (ref 135–144)

## 2021-01-26 PROCEDURE — 6370000000 HC RX 637 (ALT 250 FOR IP): Performed by: PSYCHIATRY & NEUROLOGY

## 2021-01-26 PROCEDURE — 6370000000 HC RX 637 (ALT 250 FOR IP): Performed by: INTERNAL MEDICINE

## 2021-01-26 PROCEDURE — 80048 BASIC METABOLIC PNL TOTAL CA: CPT

## 2021-01-26 PROCEDURE — 93005 ELECTROCARDIOGRAM TRACING: CPT | Performed by: INTERNAL MEDICINE

## 2021-01-26 PROCEDURE — 83735 ASSAY OF MAGNESIUM: CPT

## 2021-01-26 PROCEDURE — 36415 COLL VENOUS BLD VENIPUNCTURE: CPT

## 2021-01-26 PROCEDURE — 2580000003 HC RX 258: Performed by: INTERNAL MEDICINE

## 2021-01-26 RX ORDER — LANOLIN ALCOHOL/MO/W.PET/CERES
400 CREAM (GRAM) TOPICAL DAILY
Qty: 30 TABLET | Refills: 3 | Status: SHIPPED | OUTPATIENT
Start: 2021-01-27

## 2021-01-26 RX ORDER — SOTALOL HYDROCHLORIDE 80 MG/1
80 TABLET ORAL 2 TIMES DAILY
Qty: 60 TABLET | Refills: 3 | Status: ON HOLD | OUTPATIENT
Start: 2021-01-26 | End: 2022-10-15 | Stop reason: HOSPADM

## 2021-01-26 RX ADMIN — ASPIRIN 81 MG: 81 TABLET, COATED ORAL at 09:55

## 2021-01-26 RX ADMIN — SOTALOL HYDROCHLORIDE 80 MG: 80 TABLET ORAL at 09:54

## 2021-01-26 RX ADMIN — MECLIZINE HYDROCHLORIDE 25 MG: 25 TABLET ORAL at 09:55

## 2021-01-26 RX ADMIN — APIXABAN 5 MG: 5 TABLET, FILM COATED ORAL at 09:55

## 2021-01-26 RX ADMIN — Medication 10 ML: at 09:57

## 2021-01-26 RX ADMIN — Medication 400 MG: at 09:54

## 2021-01-26 NOTE — PROGRESS NOTES
Physical Therapy  Facility/Department: [facility] MED SURG E034/F662-64  Physical Therapy Discharge      NAME: Suma Brown    : 1940 (79 y.o.)  MRN: [id number]    Account: [de-identified]  Gender: male      Patient has been discharged from acute care hospital. DC patient from current PT program.      Electronically signed by Deepa Anderson PT on 21 at 4:32 PM EST

## 2021-01-26 NOTE — DISCHARGE SUMMARY
distress.  elderly  Mental Status: oriented to person, place and time and normal affect  Lungs: clear to auscultation bilaterally, normal effort  Heart: regular rate and rhythm, no murmur  Abdomen: soft, nontender, nondistended, bowel sounds present, no masses  Extremities: no edema, redness, tenderness in the calves  Skin: no gross lesions, rashes    Discharge Medication List:   Felicitas Chaney, [address] [facility] Medication Instructions MTU:[id number]    Printed on:01/26/21 5066   Medication Information                      apixaban (ELIQUIS) 5 MG TABS tablet  Take 1 tablet by mouth 2 times daily             aspirin 81 MG tablet  Take 81 mg by mouth daily             magnesium oxide (MAG-OX) 400 (240 Mg) MG tablet  Take 1 tablet by mouth daily             magnesium oxide (MAG-OX) 400 MG tablet  Take 400 mg by mouth daily             meclizine (ANTIVERT) 25 MG tablet  Take 1 tablet by mouth 3 times daily as needed for Dizziness             metFORMIN (GLUCOPHAGE) 500 MG tablet  Take 500 mg by mouth daily (with breakfast)              nitroGLYCERIN (NITROSTAT) 0.4 MG SL tablet  Place 0.4 mg under the tongue every 5 minutes as needed for Chest pain             pitavastatin (LIVALO) 4 MG TABS tablet  Take 4 mg by mouth nightly             sotalol (BETAPACE) 80 MG tablet  Take 1 tablet by mouth 2 times daily             TRUE METRIX BLOOD GLUCOSE TEST strip  check blood sugar twice daily as directed                 DC time 37 minutes    Signed:  Vladimir Brunner  1/26/2021, 3:58 PM

## 2021-01-26 NOTE — PROGRESS NOTES
heaviness. He is unaware if his rapid heart rate but he is monitoring shows correlation between his episodic shortness of breath and rapid rates. He does have other episodes of shortness of breath not quite as severe consistent with his known COPD. Review of Systems:   12 point review negative except as described above    CARDIAC HISTORY:  Persistent atrial fibrillation on low-dose sotalol  Essential hypertension  Hyperlipidemia  Coronary artery disease with remote bypass graft surgery and coronary angioplasty  2019 myocardial perfusion imaging: LVEF 47%. No ischemia or infarction. 2018 echo 45-50%. No significant valvular heart disease  CAB. L-LAD. Radial-CX. SVG-RCA.  coronary angiography. LM-normal.  LAD-95%. CX-75%. RCA chronic occlusion. Vein graft to RCA occluded treated with angioplasty successfully. L-LAD patent radial patent  Loop recorder implantation 18 as of , minimal dysrhythmia    Past Medical History:   Diagnosis Date    Atrial fibrillation (HCC)     CAD (coronary artery disease)     cardiac stents    [facility] [address]     Diabetes mellitus ([facility] [address]     Hyperlipidemia     Hypertension     Movement disorder     Pneumonia                Objective:   Vitals:    21 0709 21 1915 21 2100 21 0732   BP: (!) 130/90 (!) 129/92 (!) 155/67 (!) 153/80   Pulse: 66 65 67 64   Resp: 18 16  17   Temp: 97.9 °F (36.6 °C) 97.7 °F (36.5 °C)  97.3 °F (36.3 °C)   TempSrc: Oral   Oral   SpO2: 97% 96%  98%   Weight:       Height:          Wt Readings from Last 3 Encounters:   21 153 lb (69.4 kg)   20 154 lb (69.9 kg)   20 143 lb 15.4 oz (65.3 kg)       TELEMETRY: normal sinus                             Rhythm Strip: No a.fib nearly 48 hours.     Physical Exam:  General Appearance: alert and oriented to person, place and time, in no acute distress  Cardiovascular: normal rate, regular rhythm, normal S1 and S2, no murmurs, rubs, clicks, or gallops,  no JVD  Pulmonary/Chest: clear to auscultation bilaterally- , rales or rhonchi, normal air movement, no respiratory distress. However some wheezing left posterior. Abdomen: soft, non-tender, non-distended, normal bowel sounds, no masses   Extremities: no cyanosis, clubbing or edema  Skin: warm and dry, no rash or erythema  Eyes: EOMI  Neck: supple and non-tender without mass, no thyromegaly   Neurological: alert, oriented, normal speech, no focal findings or movement disorder noted    Allergies: Allergies   Allergen Reactions    Lipitor [Atorvastatin] Other (See Comments)     Body pain    Niacin Other (See Comments)    Niaspan [Niacin Er] Other (See Comments)     Body pain    Vitamin E Other (See Comments)     Nose bleeds    Zocor [Simvastatin] Other (See Comments)     Sharp body pain        Medications:    magnesium oxide  400 mg Oral Daily    sotalol  80 mg Oral BID    meclizine  25 mg Oral TID    sodium chloride flush  10 mL Intravenous 2 times per day    aspirin  81 mg Oral Daily    Or    aspirin  300 mg Rectal Daily    apixaban  5 mg Oral BID    [Held by provider] metFORMIN  500 mg Oral Daily with breakfast           sodium chloride flush, 10 mL, PRN      promethazine, 12.5 mg, Q6H PRN      polyethylene glycol, 17 g, Daily PRN      labetalol, 10 mg, Q10 Min PRN      nitroGLYCERIN, 0.4 mg, Q5 Min PRN        Diagnostics:  EK/21 NSR QTC 440msec.  NSR QTc 440 msec.  had post dose ecg that computer calculated qtc 497, accurate measurement is 460 msec. Echo:   Left ventricular ejection fraction is visually estimated at 50%. Mild inferobasal hypokinesis   No thrombi   Normal right ventricle structure and function. Normal right ventricle systolic pressure. Aortic root dimension within normal limits. Mildly dilated left atrium. Normal mitral valve structure and function.    Trace MR   Normal aortic valve structure and function. CXR:               Portable:   Results for orders placed during the hospital encounter of 01/21/21   XR CHEST PORTABLE    Narrative EXAMINATION: XR CHEST PORTABLE    CLINICAL HISTORY: CHEST    COMPARISONS: MARCH 13, 2020    FINDINGS: Median sternotomy. Cardiopericardial normal. Lungs clear. Impression NO ACUTE CARDIOPULMONARY DISEASE       CT Chest: no pe    Lab Data:    Cardiac Enzymes:  No results for input(s): CKTOTAL, CKMB, CKMBINDEX, TROPONINI in the last 72 hours. ProBNP:   Lab Results   Component Value Date    PROBNP 936 06/20/2019       CBC:   Recent Labs     01/24/21  0507   WBC 8.5   RBC 4.30*   HGB 12.3*   HCT 37.8*          CMP:    Recent Labs     01/24/21  0507 01/26/21  0531   * 139   K 4.8 4.1    105   CO2 27 25   BUN 18 21   CREATININE 0.91 0.91   GFRAA >60.0 >60.0   LABGLOM >60.0 >60.0   GLUCOSE 136* 138*   CALCIUM 9.1 9.2       Hepatic Function Panel:    No results for input(s): ALKPHOS, ALT, AST, PROT, BILITOT, BILIDIR, IBILI, LABALBU in the last 72 hours. Magnesium:    Recent Labs     01/26/21  0531   MG 2.1       PT/INR:    No results for input(s): PROTIME, INR in the last 72 hours. Lipids:    No results for input(s): CHOL, TRIG, HDL, LDLCALC, LABVLDL in the last 72 hours. Principal Problem:    Vertigo  Active Problems:    Atrial fibrillation (HCC)    Ataxia  Resolved Problems:    * No resolved hospital problems.  *           Electronically signed by Yadi Smith MD on 1/26/2021 at 10:29 AM

## 2021-02-01 ENCOUNTER — HOSPITAL ENCOUNTER (OUTPATIENT)
Dept: CARDIOLOGY | Age: 81
Discharge: HOME | End: 2021-02-01
Payer: MEDICARE

## 2021-02-01 PROCEDURE — G2066 INTER DEVC REMOTE 30D: HCPCS

## 2021-03-02 ENCOUNTER — HOSPITAL ENCOUNTER (OUTPATIENT)
Dept: CARDIOLOGY | Age: 81
Discharge: HOME | End: 2021-03-02
Payer: MEDICARE

## 2021-03-02 PROCEDURE — G2066 INTER DEVC REMOTE 30D: HCPCS

## 2021-03-08 ENCOUNTER — OFFICE VISIT (OUTPATIENT)
Dept: NEUROLOGY | Age: 81
End: 2021-03-08
Payer: MEDICARE

## 2021-03-08 VITALS — DIASTOLIC BLOOD PRESSURE: 74 MMHG | SYSTOLIC BLOOD PRESSURE: 134 MMHG | HEART RATE: 64 BPM

## 2021-03-08 DIAGNOSIS — R55 VASOVAGAL SYNCOPE: ICD-10-CM

## 2021-03-08 DIAGNOSIS — H81.13 BENIGN PAROXYSMAL POSITIONAL VERTIGO DUE TO BILATERAL VESTIBULAR DISORDER: ICD-10-CM

## 2021-03-08 DIAGNOSIS — R42 VERTIGO: ICD-10-CM

## 2021-03-08 DIAGNOSIS — R42 DIZZINESS AND GIDDINESS: Primary | ICD-10-CM

## 2021-03-08 DIAGNOSIS — R42 ORTHOSTATIC DIZZINESS: ICD-10-CM

## 2021-03-08 DIAGNOSIS — Q04.6 COLLOID CYST OF THIRD VENTRICLE (HCC): ICD-10-CM

## 2021-03-08 PROCEDURE — G8427 DOCREV CUR MEDS BY ELIG CLIN: HCPCS | Performed by: PSYCHIATRY & NEUROLOGY

## 2021-03-08 PROCEDURE — 99214 OFFICE O/P EST MOD 30 MIN: CPT | Performed by: PSYCHIATRY & NEUROLOGY

## 2021-03-08 PROCEDURE — 1123F ACP DISCUSS/DSCN MKR DOCD: CPT | Performed by: PSYCHIATRY & NEUROLOGY

## 2021-03-08 PROCEDURE — 4040F PNEUMOC VAC/ADMIN/RCVD: CPT | Performed by: PSYCHIATRY & NEUROLOGY

## 2021-03-08 PROCEDURE — G8484 FLU IMMUNIZE NO ADMIN: HCPCS | Performed by: PSYCHIATRY & NEUROLOGY

## 2021-03-08 PROCEDURE — G8420 CALC BMI NORM PARAMETERS: HCPCS | Performed by: PSYCHIATRY & NEUROLOGY

## 2021-03-08 PROCEDURE — 1036F TOBACCO NON-USER: CPT | Performed by: PSYCHIATRY & NEUROLOGY

## 2021-03-08 ASSESSMENT — ENCOUNTER SYMPTOMS
VOMITING: 0
COLOR CHANGE: 0
BACK PAIN: 0
PHOTOPHOBIA: 0
NAUSEA: 0
TROUBLE SWALLOWING: 0
SHORTNESS OF BREATH: 0
CHOKING: 0

## 2021-03-08 NOTE — PROGRESS NOTES
Subjective:      Patient ID: Chiquita Cook is a 79 y.o. male who presents today for:  Chief Complaint   Patient presents with    Follow-up     Pt was in [place] [address]. 1/25/21 for an inner ear infection, which caused him ringing in his ear, and also was causing him dizziness. He says other than that he has been doing good. Wife says his hearing is getting a little worse. 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 Patient is on Eliquis 5 mg. Patient has not any postural headaches or syncopal episodes. Patient has orthostatic hypotension with loss of consciousness in the past.  He has orthostatic dizziness. He has had a loop recorder no cardiac rhythm is notable. Likely represents this at anomia. His blood pressure is not bottomed out and therefore we had not consider him for Northera. Patient has a colloid cyst of the third ventricle 5 mm quite away from the foramen of [place] and not causing a ball-valve effect  Is last seen she was admitted to the hospital with benign positional vertigo. He was not as orthostatic. When in the hospital he had reported being irritable and anger behaviors though he is not reporting this at this time and is doing quite well otherwise. He is finished his course of Antivert. His investigations occluded an MRI of the brain which was normal and we had further obtain a CT angiograms which are normal of the head and neck as well. This was compared to the ones done in 2020. Is not any orthostatic issues for now. Patient denies any recent falls and injuries. He is in the wheelchair but he only uses it for far distance walking as he reports that he pops out upon walking too much.   His wife does not report that he has major memory issues    Past Medical History:   Diagnosis Date    [facility] [address]     CAD (coronary artery disease)     cardiac stents    [facility]     Diabetes mellitus ([facility]     Hyperlipidemia     Hypertension     Movement disorder     Pneumonia      Past Surgical History:   Procedure Laterality Date    APPENDECTOMY      CORONARY ANGIOPLASTY WITH STENT PLACEMENT      4    CORONARY ARTERY BYPASS GRAFT      triple bypass    EYE SURGERY Bilateral     cataract surgery    INSERTABLE CARDIAC MONITOR Left 12/2018     Social History     Socioeconomic History    Marital status:      Spouse name: Not on file    Number of children: Not on file    Years of education: Not on file    Highest education level: Not on file   Occupational History    Not on file   Social Needs    Financial resource strain: Not on file    Food insecurity     Worry: Not on file     Inability: Not on file    Transportation needs     Medical: Not on file     Non-medical: Not on file   Tobacco Use    Smoking status: Former Smoker     Types: Cigarettes    Smokeless tobacco: Never Used    Tobacco comment: 3 cigarettes/month   Substance and Sexual Activity    Alcohol use: No    Drug use: No    Sexual activity: Not on file   Lifestyle    Physical activity     Days per week: Not on file     Minutes per session: Not on file    Stress: Not on file   Relationships    Social connections     Talks on phone: Not on file     Gets together: Not on file     Attends Christianity service: Not on file     Active member of club or organization: Not on file     Attends meetings of clubs or organizations: Not on file     Relationship status: Not on file    Intimate partner violence     Fear of current or ex partner: Not on file     Emotionally abused: Not on file     Physically abused: Not on file     Forced sexual activity: Not on file   Other Topics Concern    Not on file   Social History Narrative    Not on file     No family history on file.   Allergies   Allergen Reactions    Lipitor [Atorvastatin] Other (See Comments)     Body pain    Niacin Other (See Comments)    Niaspan [Niacin Er] Other (See Comments)     Body pain    Vitamin E Other (See Comments)     Nose bleeds    Zocor [Simvastatin] Other (See Comments)     Sharp body pain       Current Outpatient Medications   Medication Sig Dispense Refill    sotalol (BETAPACE) 80 MG tablet Take 1 tablet by mouth 2 times daily 60 tablet 3    magnesium oxide (MAG-OX) 400 (240 Mg) MG tablet Take 1 tablet by mouth daily 30 tablet 3    metFORMIN (GLUCOPHAGE) 500 MG tablet Take 500 mg by mouth 2 times daily       TRUE METRIX BLOOD GLUCOSE TEST strip check blood sugar twice daily as directed      apixaban (ELIQUIS) 5 MG TABS tablet Take 1 tablet by mouth 2 times daily 60 tablet 5    pitavastatin (LIVALO) 4 MG TABS tablet Take 4 mg by mouth nightly      nitroGLYCERIN (NITROSTAT) 0.4 MG SL tablet Place 0.4 mg under the tongue every 5 minutes as needed for Chest pain      aspirin 81 MG tablet Take 81 mg by mouth daily       No current facility-administered medications for this visit. Review of Systems   Constitutional: Negative for fever. HENT: Negative for ear pain, tinnitus and trouble swallowing. Eyes: Negative for photophobia and visual disturbance. Respiratory: Negative for choking and shortness of breath. Cardiovascular: Negative for chest pain and palpitations. Gastrointestinal: Negative for nausea and vomiting. Musculoskeletal: Positive for gait problem. Negative for back pain, joint swelling, myalgias, neck pain and neck stiffness. Skin: Negative for color change. Allergic/Immunologic: Negative for food allergies. Neurological: Positive for dizziness. Negative for tremors, seizures, syncope, facial asymmetry, speech difficulty, weakness, light-headedness, numbness and headaches. Psychiatric/Behavioral: Negative for behavioral problems, confusion, hallucinations and sleep disturbance.        Objective:   /74 (Site: Left Upper Arm, Position: Sitting, Cuff Size: Medium Adult) Pulse 64     Physical Exam  Vitals signs reviewed. Eyes:      Pupils: Pupils are equal, round, and reactive to light. Neck:      Musculoskeletal: Normal range of motion. Cardiovascular:      Rate and Rhythm: Normal rate and regular rhythm. Heart sounds: No murmur. Pulmonary:      Effort: Pulmonary effort is normal.      Breath sounds: Normal breath sounds. Abdominal:      General: Bowel sounds are normal.   Musculoskeletal: Normal range of motion. Skin:     General: Skin is warm. Neurological:      Mental Status: He is alert and oriented to person, place, and time. Cranial Nerves: No cranial nerve deficit. Sensory: No sensory deficit. Motor: No abnormal muscle tone. Coordination: Coordination normal.      Deep Tendon Reflexes: Reflexes are normal and symmetric. Babinski sign absent on the right side. Babinski sign absent on the left side. Psychiatric:         Mood and Affect: Mood normal.         No results found.     Lab Results   Component Value Date    WBC 8.5 01/24/2021    RBC 4.30 01/24/2021    RBC 4.79 10/19/2011    HGB 12.3 01/24/2021    HCT 37.8 01/24/2021    MCV 87.8 01/24/2021    MCH 28.5 01/24/2021    MCHC 32.5 01/24/2021    RDW 13.7 01/24/2021     01/24/2021    MPV 9.7 10/13/2015     Lab Results   Component Value Date     01/26/2021    K 4.1 01/26/2021    K 4.8 01/24/2021     01/26/2021    CO2 25 01/26/2021    BUN 21 01/26/2021    CREATININE 0.91 01/26/2021    GFRAA >60.0 01/26/2021    LABGLOM >60.0 01/26/2021    GLUCOSE 138 01/26/2021    PROT 7.2 01/21/2021    LABALBU 4.1 01/21/2021    LABALBU 4.4 05/08/2012    CALCIUM 9.2 01/26/2021    BILITOT 0.4 01/21/2021    ALKPHOS 62 01/21/2021    AST 19 01/21/2021    ALT 10 01/21/2021     Lab Results   Component Value Date    PROTIME 16.1 01/21/2021    INR 1.3 01/21/2021     Lab Results   Component Value Date    TSH 2.020 01/04/2021    IRON 58 08/24/2020    TIBC 356 08/24/2020     Lab Results Component Value Date    TRIG 103 01/22/2021    HDL 38 01/22/2021    LDLCALC 54 01/22/2021     No results found for: Freeda Blades, LABBENZ, CANNAB, COCAINESCRN, LABMETH, OPIATESCREENURINE, PHENCYCLIDINESCREENURINE, PPXUR, ETOH  No results found for: LITHIUM, DILFRTOT, VALPROATE    Assessment:       Diagnosis Orders   1. Dizziness and giddiness     2. 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HCC)     3. Vertigo     4. Benign paroxysmal positional vertigo due to bilateral vestibular disorder     5. Vasovagal syncope     6. Orthostatic dizziness     Dizziness with initial presentation of orthostatic dizziness with primary autonomic failure. Patient actually doing quite well he had a loop recorder and no cardiac arrhythmias notable. In the interim patient was admitted to hospital  Patient had dizziness but this dizziness appeared to be mostly benign patient vertigo. To the [facility] he had an MRI of the brain which we reviewed and he does have a colloid cyst which is 5 mm away from the foramen of [place] and not causing a ball-valve effect. We have always continue to ask him regarding his headaches in an erect posture but he does not have headaches but has not passed out either. Is not any further syncopal episodes. In the hospital he reported that he was irritable and angry though he is not reporting this now I did discuss with his wife and he has been doing good on this side. He may have some age-related early memory issues. He still functioning well. His walking abilities are decreasing due to fatigue. Inpatient hospital records were reviewed in detail with him and his wife and will follow him in a few months. Plan:      No orders of the defined types were placed in this encounter. No orders of the defined types were placed in this encounter. No follow-ups on file.       Kiara Olsen MD

## 2021-05-03 ENCOUNTER — HOSPITAL ENCOUNTER (OUTPATIENT)
Dept: CARDIOLOGY | Age: 81
Discharge: HOME | End: 2021-05-03
Payer: MEDICARE

## 2021-05-03 PROCEDURE — G2066 INTER DEVC REMOTE 30D: HCPCS

## 2021-06-30 ENCOUNTER — HOSPITAL ENCOUNTER (OUTPATIENT)
Dept: CARDIOLOGY | Age: 81
Discharge: HOME | End: 2021-06-30
Payer: MEDICARE

## 2021-06-30 PROCEDURE — G2066 INTER DEVC REMOTE 30D: HCPCS

## 2021-08-02 ENCOUNTER — HOSPITAL ENCOUNTER (OUTPATIENT)
Dept: CARDIOLOGY | Age: 81
Discharge: HOME | End: 2021-08-02
Payer: MEDICARE

## 2021-08-02 PROCEDURE — G2066 INTER DEVC REMOTE 30D: HCPCS

## 2021-09-10 PROBLEM — Z98.61 CORONARY ANGIOPLASTY STATUS: Status: ACTIVE | Noted: 2018-11-26

## 2021-09-13 ENCOUNTER — OFFICE VISIT (OUTPATIENT)
Dept: NEUROLOGY | Age: 81
End: 2021-09-13
Payer: MEDICARE

## 2021-09-13 VITALS
DIASTOLIC BLOOD PRESSURE: 77 MMHG | BODY MASS INDEX: 22.91 KG/M2 | SYSTOLIC BLOOD PRESSURE: 134 MMHG | HEART RATE: 68 BPM | WEIGHT: 150.7 LBS | OXYGEN SATURATION: 97 %

## 2021-09-13 DIAGNOSIS — I63.512 CEREBROVASCULAR ACCIDENT (CVA) DUE TO STENOSIS OF LEFT MIDDLE CEREBRAL ARTERY (HCC): ICD-10-CM

## 2021-09-13 DIAGNOSIS — M41.9 KYPHOSCOLIOSIS: ICD-10-CM

## 2021-09-13 DIAGNOSIS — R26.0 ATAXIC GAIT: ICD-10-CM

## 2021-09-13 DIAGNOSIS — M48.062 SPINAL STENOSIS OF LUMBAR REGION WITH NEUROGENIC CLAUDICATION: ICD-10-CM

## 2021-09-13 DIAGNOSIS — Q04.6 COLLOID CYST OF THIRD VENTRICLE (HCC): Primary | ICD-10-CM

## 2021-09-13 PROCEDURE — 4040F PNEUMOC VAC/ADMIN/RCVD: CPT | Performed by: PSYCHIATRY & NEUROLOGY

## 2021-09-13 PROCEDURE — G8420 CALC BMI NORM PARAMETERS: HCPCS | Performed by: PSYCHIATRY & NEUROLOGY

## 2021-09-13 PROCEDURE — G8427 DOCREV CUR MEDS BY ELIG CLIN: HCPCS | Performed by: PSYCHIATRY & NEUROLOGY

## 2021-09-13 PROCEDURE — 99214 OFFICE O/P EST MOD 30 MIN: CPT | Performed by: PSYCHIATRY & NEUROLOGY

## 2021-09-13 PROCEDURE — 1123F ACP DISCUSS/DSCN MKR DOCD: CPT | Performed by: PSYCHIATRY & NEUROLOGY

## 2021-09-13 PROCEDURE — 1036F TOBACCO NON-USER: CPT | Performed by: PSYCHIATRY & NEUROLOGY

## 2021-09-13 RX ORDER — ALBUTEROL SULFATE 90 UG/1
AEROSOL, METERED RESPIRATORY (INHALATION)
COMMUNITY
Start: 2021-07-29

## 2021-09-13 ASSESSMENT — ENCOUNTER SYMPTOMS
NAUSEA: 0
COLOR CHANGE: 0
CHOKING: 0
PHOTOPHOBIA: 0
BACK PAIN: 0
TROUBLE SWALLOWING: 0
VOMITING: 0
SHORTNESS OF BREATH: 0

## 2021-09-13 NOTE — PROGRESS NOTES
Subjective:      Patient ID: Sea Rutledge is a 79 y.o. male who presents today for:  Chief Complaint   Patient presents with    6 Month Follow-Up     Pt states his dizziness has been okay he states he gets tired even with his walker and cant walk far distances because he gets short winded and tired. He states he has no questions or concerns. HPI 79 a right-handed gentleman history of choroid cyst of the third ventricle. Patient is developing some difficulty with walking but has not any dizzy spells. Last seen he has been walking with a walker which is new for him he is lying down in his gait as well. He is not complaining of any cyclic back issues but his posture is changed considerably since last seen. Patient denies any incontinence is not any headache from the colloid cyst.  He denies any numbness or tingling of his extremities.        Past Medical History:   Diagnosis Date    [facility]rial [facility] [address]     CAD (coronary artery disease)     cardiac stents    COPD (chronic obstructive pulmonary disease) [facility] [facility]     Diabetes mellitus [facility] [address]     Hyperlipidemia     Hypertension     Movement disorder     Pneumonia      Past Surgical History:   Procedure Laterality Date    APPENDECTOMY      CORONARY ANGIOPLASTY WITH STENT PLACEMENT      4    CORONARY ARTERY BYPASS GRAFT      triple bypass    EYE SURGERY Bilateral     cataract surgery    INSERTABLE CARDIAC MONITOR Left 12/2018     Social History     Socioeconomic History    Marital status:      Spouse name: Not on file    Number of children: Not on file    Years of education: Not on file    Highest education level: Not on file   Occupational History    Not on file   Tobacco Use    Smoking status: Former Smoker     Types: Cigarettes    Smokeless tobacco: Never Used    Tobacco comment: 3 cigarettes/month   Vaping Use    Vaping Use: Never used   Substance and Sexual Activity    Alcohol use: No    Drug use: No    Sexual activity: Not on file   Other Topics Concern    Not on file   Social History Narrative    Not on file     Social Determinants of Health     Financial Resource Strain:     Difficulty of Paying Living Expenses:    Food Insecurity:     Worried About Running Out of Food in the Last Year:     [address] in the Last Year:    Transportation Needs:     Lack of Transportation (Medical):  Lack of Transportation (Non-Medical):    Physical Activity:     Days of Exercise per Week:     Minutes of Exercise per Session:    Stress:     Feeling of Stress :    Social Connections:     Frequency of Communication with Friends and Family:     Frequency of Social Gatherings with Friends and Family:     Attends [employer] Services:     Active Member of Clubs or Organizations:     Attends Club or Organization Meetings:     Marital Status:    Intimate Partner Violence:     Fear of Current or Ex-Partner:     Emotionally Abused:     Physically Abused:     Sexually Abused:      No family history on file. Allergies   Allergen Reactions    Lipitor [Atorvastatin] Other (See Comments)     Body pain    Niacin Other (See Comments)    Niaspan [Niacin Er] Other (See Comments)     Body pain    Vitamin E Other (See Comments)     Nose bleeds    Zocor [Simvastatin] Other (See Comments)     Sharp body pain       Current Outpatient Medications   Medication Sig Dispense Refill    albuterol sulfate  (90 Base) MCG/ACT inhaler use 1 puff as needed twice daily for 90 days.       sotalol (BETAPACE) 80 MG tablet Take 1 tablet by mouth 2 times daily 60 tablet 3    magnesium oxide (MAG-OX) 400 (240 Mg) MG tablet Take 1 tablet by mouth daily 30 tablet 3    metFORMIN (GLUCOPHAGE) 500 MG tablet Take 500 mg by mouth 2 times daily       apixaban (ELIQUIS) 5 MG TABS tablet Take 1 tablet by mouth 2 times daily 60 tablet 5    pitavastatin (LIVALO) 4 MG TABS tablet Take 4 mg by mouth nightly      aspirin 81 MG tablet Take 81 mg by mouth daily  TRUE METRIX BLOOD GLUCOSE TEST strip check blood sugar twice daily as directed (Patient not taking: Reported on 9/13/2021)      nitroGLYCERIN (NITROSTAT) 0.4 MG SL tablet Place 0.4 mg under the tongue every 5 minutes as needed for Chest pain (Patient not taking: Reported on 9/13/2021)       No current facility-administered medications for this visit. Review of Systems   Constitutional: Negative for fever. HENT: Negative for ear pain, tinnitus and trouble swallowing. Eyes: Negative for photophobia and visual disturbance. Respiratory: Negative for choking and shortness of breath. Cardiovascular: Negative for chest pain and palpitations. Gastrointestinal: Negative for nausea and vomiting. Musculoskeletal: Negative for back pain, gait problem, joint swelling, myalgias, neck pain and neck stiffness. Skin: Negative for color change. Allergic/Immunologic: Negative for food allergies. Neurological: Negative for dizziness, tremors, seizures, syncope, facial asymmetry, speech difficulty, weakness, light-headedness, numbness and headaches. Psychiatric/Behavioral: Negative for behavioral problems, confusion, hallucinations and sleep disturbance. Objective:   /77 (Site: Left Upper Arm, Position: Sitting, Cuff Size: Medium Adult)   Pulse 68   Wt 150 lb 11.2 oz (68.4 kg)   SpO2 97%   BMI 22.91 kg/m²     Physical Exam  Vitals reviewed. Eyes:      Pupils: Pupils are equal, round, and reactive to light. Cardiovascular:      Rate and Rhythm: Normal rate and regular rhythm. Heart sounds: No murmur heard. Pulmonary:      Effort: Pulmonary effort is normal.      Breath sounds: Normal breath sounds. Abdominal:      General: Bowel sounds are normal.   Musculoskeletal:         General: Normal range of motion. Cervical back: Normal range of motion. Skin:     General: Skin is warm. Neurological:      Mental Status: He is alert and oriented to person, place, and time. Cranial Nerves: No cranial nerve deficit. Sensory: No sensory deficit. Motor: No abnormal muscle tone. Coordination: Coordination normal.      Deep Tendon Reflexes: Reflexes are normal and symmetric. Babinski sign absent on the right side. Babinski sign absent on the left side. Psychiatric:         Mood and Affect: Mood normal.       Patient's gait is stooped but he has some arm swings is decreased blink responses today but no parkinsonian features are noted. He seems like he is buckling his legs  No results found. Lab Results   Component Value Date    WBC 8.6 07/19/2021    RBC 4.45 07/19/2021    RBC 4.79 10/19/2011    HGB 12.5 07/19/2021    HCT 38.5 07/19/2021    MCV 86.5 07/19/2021    MCH 28.1 07/19/2021    MCHC 32.5 07/19/2021    RDW 14.7 07/19/2021     07/19/2021    MPV 9.7 10/13/2015     Lab Results   Component Value Date     07/19/2021    K 4.5 07/19/2021    K 4.8 01/24/2021     07/19/2021    CO2 26 07/19/2021    BUN 18 07/19/2021    CREATININE 1.02 07/19/2021    GFRAA >60.0 07/19/2021    LABGLOM >60.0 07/19/2021    GLUCOSE 111 07/19/2021    PROT 6.9 07/19/2021    LABALBU 4.2 07/19/2021    LABALBU 4.4 05/08/2012    CALCIUM 9.6 07/19/2021    BILITOT 0.6 07/19/2021    ALKPHOS 55 07/19/2021    AST 15 07/19/2021    ALT 10 07/19/2021     Lab Results   Component Value Date    PROTIME 16.1 01/21/2021    INR 1.3 01/21/2021     Lab Results   Component Value Date    TSH 1.910 07/19/2021    IRON 71 07/19/2021    TIBC 357 07/19/2021     Lab Results   Component Value Date    TRIG 118 07/19/2021    HDL 40 07/19/2021    LDLCALC 63 07/19/2021     No results found for: LABAMPH, BARBSCNU, LABBENZ, CANNAB, COCAINESCRN, LABMETH, OPIATESCREENURINE, PHENCYCLIDINESCREENURINE, PPXUR, ETOH  No results found for: LITHIUM, DILFRTOT, VALPROATE    Assessment:       Diagnosis Orders   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  Cerebrovascular accident (CVA) due to stenosis of left middle cerebral artery ([address]     3. Kyphoscoliosis     4. Ataxic gait     5. Spinal stenosis of lumbar region with neurogenic claudication     Alert cyst of the of the ventricle which is stable. Patient is 89 symptoms of headache from the same. We will see him on regular basis for the same. The new issues appears to be his gait which is new and is using a walker to walk which she was not using before he stooped and walking slow with some shuffle. This may be early parkinsonian features or lumbar canal stenosis. Recommended a lumbar spine MRI for now and if this is normal we may consider dopamine agonist at a low dose. The other etiology to be considered is hydrocephalus which can occur with colloid cyst of the third ventricle and therefore recommended a CT scan of the head as well. We will see him somewhat earlier than his scheduled appointments due to these new findings. Plan:      No orders of the defined types were placed in this encounter. No orders of the defined types were placed in this encounter. No follow-ups on file.       Lilibeth Burton MD

## 2021-09-14 ENCOUNTER — HOSPITAL ENCOUNTER (OUTPATIENT)
Dept: CARDIOLOGY | Age: 81
Discharge: HOME | End: 2021-09-14
Payer: MEDICARE

## 2021-09-14 PROCEDURE — 93296 REM INTERROG EVL PM/IDS: CPT

## 2021-10-12 ENCOUNTER — HOSPITAL ENCOUNTER (OUTPATIENT)
Dept: CT IMAGING | Age: 81
Discharge: HOME | End: 2021-10-14
Payer: MEDICARE

## 2021-10-12 ENCOUNTER — HOSPITAL ENCOUNTER (EMERGENCY)
Age: 81
Discharge: HOME | End: 2021-10-12
Attending: EMERGENCY MEDICINE
Payer: MEDICARE

## 2021-10-12 ENCOUNTER — HOSPITAL ENCOUNTER (OUTPATIENT)
Dept: MRI IMAGING | Age: 81
Discharge: HOME | End: 2021-10-14
Payer: MEDICARE

## 2021-10-12 VITALS
HEIGHT: 69 IN | OXYGEN SATURATION: 99 % | TEMPERATURE: 98 F | RESPIRATION RATE: 17 BRPM | BODY MASS INDEX: 22.22 KG/M2 | HEART RATE: 62 BPM | SYSTOLIC BLOOD PRESSURE: 136 MMHG | DIASTOLIC BLOOD PRESSURE: 91 MMHG | WEIGHT: 150 LBS

## 2021-10-12 DIAGNOSIS — M48.062 SPINAL STENOSIS OF LUMBAR REGION WITH NEUROGENIC CLAUDICATION: ICD-10-CM

## 2021-10-12 DIAGNOSIS — I71.40 ABDOMINAL AORTIC ANEURYSM (AAA) WITHOUT RUPTURE: Primary | ICD-10-CM

## 2021-10-12 DIAGNOSIS — Q04.6 COLLOID CYST OF THIRD VENTRICLE (HCC): ICD-10-CM

## 2021-10-12 LAB
ALBUMIN SERPL-MCNC: 3.9 G/DL (ref 3.5–4.6)
ALP BLD-CCNC: 60 U/L (ref 35–104)
ALT SERPL-CCNC: 38 U/L (ref 0–41)
ANION GAP SERPL CALCULATED.3IONS-SCNC: 13 MEQ/L (ref 9–15)
AST SERPL-CCNC: 37 U/L (ref 0–40)
BASOPHILS ABSOLUTE: 0.1 K/UL (ref 0–0.2)
BASOPHILS RELATIVE PERCENT: 0.6 %
BILIRUB SERPL-MCNC: 0.4 MG/DL (ref 0.2–0.7)
BUN BLDV-MCNC: 20 MG/DL (ref 8–23)
CALCIUM SERPL-MCNC: 9.2 MG/DL (ref 8.5–9.9)
CHLORIDE BLD-SCNC: 101 MEQ/L (ref 95–107)
CO2: 21 MEQ/L (ref 20–31)
CREAT SERPL-MCNC: 0.95 MG/DL (ref 0.7–1.2)
EOSINOPHILS ABSOLUTE: 0.5 K/UL (ref 0–0.7)
EOSINOPHILS RELATIVE PERCENT: 5.4 %
GFR AFRICAN AMERICAN: >60
GFR NON-AFRICAN AMERICAN: >60
GLOBULIN: 2.9 G/DL (ref 2.3–3.5)
GLUCOSE BLD-MCNC: 116 MG/DL (ref 70–99)
HCT VFR BLD CALC: 31.8 % (ref 42–52)
HEMOGLOBIN: 10.2 G/DL (ref 14–18)
INR BLD: 1.7
LYMPHOCYTES ABSOLUTE: 1.5 K/UL (ref 1–4.8)
LYMPHOCYTES RELATIVE PERCENT: 15.6 %
MCH RBC QN AUTO: 26.6 PG (ref 27–31.3)
MCHC RBC AUTO-ENTMCNC: 32 % (ref 33–37)
MCV RBC AUTO: 83.3 FL (ref 80–100)
MONOCYTES ABSOLUTE: 1 K/UL (ref 0.2–0.8)
MONOCYTES RELATIVE PERCENT: 10 %
NEUTROPHILS ABSOLUTE: 6.7 K/UL (ref 1.4–6.5)
NEUTROPHILS RELATIVE PERCENT: 68.4 %
PDW BLD-RTO: 14.2 % (ref 11.5–14.5)
PLATELET # BLD: 353 K/UL (ref 130–400)
POTASSIUM SERPL-SCNC: 4.6 MEQ/L (ref 3.4–4.9)
PROTHROMBIN TIME: 19.4 SEC (ref 12.3–14.9)
RBC # BLD: 3.82 M/UL (ref 4.7–6.1)
SODIUM BLD-SCNC: 135 MEQ/L (ref 135–144)
TOTAL PROTEIN: 6.8 G/DL (ref 6.3–8)
WBC # BLD: 9.8 K/UL (ref 4.8–10.8)

## 2021-10-12 PROCEDURE — 85610 PROTHROMBIN TIME: CPT

## 2021-10-12 PROCEDURE — 36415 COLL VENOUS BLD VENIPUNCTURE: CPT

## 2021-10-12 PROCEDURE — 80053 COMPREHEN METABOLIC PANEL: CPT

## 2021-10-12 PROCEDURE — 85025 COMPLETE CBC W/AUTO DIFF WBC: CPT

## 2021-10-12 PROCEDURE — 70450 CT HEAD/BRAIN W/O DYE: CPT

## 2021-10-12 PROCEDURE — 99285 EMERGENCY DEPT VISIT HI MDM: CPT

## 2021-10-12 PROCEDURE — 72148 MRI LUMBAR SPINE W/O DYE: CPT

## 2021-10-12 ASSESSMENT — ENCOUNTER SYMPTOMS
EYE PAIN: 0
NAUSEA: 0
ABDOMINAL PAIN: 0
VOMITING: 0
SORE THROAT: 0
CHEST TIGHTNESS: 0
SHORTNESS OF BREATH: 0

## 2021-10-12 NOTE — ED NOTES
Pt resting in ED cot w/no s/s of distress noted. Resp even and unlabored. Report to Cesilia Moy RN. Wife at bedside.       Thony Killian RN  10/12/21 [id number]

## 2021-10-12 NOTE — ED TRIAGE NOTES
Pt presents to ED from MRI with c/o mass. Per MRI, pt had MRI of lumbar spine and was found to have a AAA. Upon arrival to ED, pt denies chest pain, n/v/d, fever, or chills; however, reports SOB which he states has been ongoing for months. Pt is A/Ox4, skin p/w/d, resp even and unlabored, msp's intact.

## 2021-10-12 NOTE — ED PROVIDER NOTES
[address] [facility] ED  EMERGENCY DEPARTMENT ENCOUNTER      Pt Name: Amanda Bender  MRN: [id number]  [place] 1940  Date of evaluation: 10/12/2021  Provider: Alyssia Garcia DO    CHIEF COMPLAINT     No chief complaint on file. HISTORY OF PRESENT ILLNESS   (Location/Symptom, Timing/Onset, Context/Setting, Quality, Duration, Modifying Factors, Severity)  Note limiting factors. Amanda Bender is a 79 y.o. male who presents to the emergency department . Patient was sent to the ER from MRI. Apparently Dr. Mikel Yusuf the neurologist ordered an MRI of the spine because patient walks bent over and cannot stand up straight. During the exam, technologist noted a 7 cm abdominal aneurysm and immediately sent him to the ER for evaluation. Patient denies any abdominal pain or back pain. HPI    Nursing Notes were reviewed. REVIEW OF SYSTEMS    (2-9 systems for level 4, 10 or more for level 5)     Review of Systems   Constitutional: Negative for activity change, appetite change and fatigue. HENT: Negative for congestion and sore throat. Eyes: Negative for pain and visual disturbance. Respiratory: Negative for chest tightness and shortness of breath. Cardiovascular: Negative for chest pain. Gastrointestinal: Negative for abdominal pain, nausea and vomiting. Endocrine: Negative for polydipsia. Genitourinary: Negative for flank pain and urgency. Musculoskeletal: Positive for gait problem. Negative for neck stiffness. Skin: Negative for rash. Neurological: Negative for weakness, light-headedness and headaches. Psychiatric/Behavioral: Negative for confusion and sleep disturbance. Except as noted above the remainder of the review of systems was reviewed and negative.        PAST MEDICAL HISTORY     Past Medical History:   Diagnosis Date    Atrial fibrillation [facility] [address]     CAD (coronary artery disease)     cardiac stents    COPD (chronic obstructive pulmonary disease) ([facility] [address] [facility]     Diabetes mellitus [facility] [address]     Hyperlipidemia     Hypertension     Movement disorder     Pneumonia          SURGICAL HISTORY       Past Surgical History:   Procedure Laterality Date    APPENDECTOMY      CORONARY ANGIOPLASTY WITH STENT PLACEMENT      4    CORONARY ARTERY BYPASS GRAFT      triple bypass    EYE SURGERY Bilateral     cataract surgery    INSERTABLE CARDIAC MONITOR Left 12/2018         CURRENT MEDICATIONS       Previous Medications    ALBUTEROL SULFATE  (90 BASE) MCG/ACT INHALER    use 1 puff as needed twice daily for 90 days. APIXABAN (ELIQUIS) 5 MG TABS TABLET    Take 1 tablet by mouth 2 times daily    ASPIRIN 81 MG TABLET    Take 81 mg by mouth daily    MAGNESIUM OXIDE (MAG-OX) 400 (240 MG) MG TABLET    Take 1 tablet by mouth daily    METFORMIN (GLUCOPHAGE) 500 MG TABLET    Take 500 mg by mouth 2 times daily     NITROGLYCERIN (NITROSTAT) 0.4 MG SL TABLET    Place 0.4 mg under the tongue every 5 minutes as needed for Chest pain    PITAVASTATIN (LIVALO) 4 MG TABS TABLET    Take 4 mg by mouth nightly    SOTALOL (BETAPACE) 80 MG TABLET    Take 1 tablet by mouth 2 times daily    TRUE METRIX BLOOD GLUCOSE TEST STRIP    check blood sugar twice daily as directed       ALLERGIES     Lipitor [atorvastatin], Niacin, Niaspan [niacin er], Vitamin e, and Zocor [simvastatin]    FAMILY HISTORY     No family history on file.        SOCIAL HISTORY       Social History     Socioeconomic History    Marital status:      Spouse name: Not on file    Number of children: Not on file    Years of education: Not on file    Highest education level: Not on file   Occupational History    Not on file   Tobacco Use    Smoking status: Former Smoker     Types: Cigarettes    Smokeless tobacco: Never Used    Tobacco comment: 3 cigarettes/month   Vaping Use    Vaping Use: Never used   Substance and Sexual Activity    Alcohol use: No    Drug use: No    Sexual activity: Not on file   Other Topics Concern    Not on file   Social History Narrative    Not on file     Social Determinants of Health     Financial Resource Strain:     Difficulty of Paying Living Expenses:    Food Insecurity:     Worried About Running Out of Food in the Last Year:     [address] in the Last Year:    Transportation Needs:     Lack of Transportation (Medical):  Lack of Transportation (Non-Medical):    Physical Activity:     Days of Exercise per Week:     Minutes of Exercise per Session:    Stress:     Feeling of Stress :    Social Connections:     Frequency of Communication with Friends and Family:     Frequency of Social Gatherings with Friends and Family:     Attends [employer] Services:     Active Member of Clubs or Organizations:     Attends Club or Organization Meetings:     Marital Status:    Intimate Partner Violence:     Fear of Current or Ex-Partner:     Emotionally Abused:     Physically Abused:     Sexually Abused:        SCREENINGS                        PHYSICAL EXAM    (up to 7 for level 4, 8 or more for level 5)     ED Triage Vitals   BP Temp Temp src Pulse Resp SpO2 Height Weight   -- -- -- -- -- -- -- --       Physical Exam  Vitals and nursing note reviewed. Constitutional:       General: He is not in acute distress. Appearance: He is well-developed. He is not diaphoretic. HENT:      Head: Normocephalic and atraumatic. Right Ear: External ear normal.      Left Ear: External ear normal.      Nose: Nose normal.      Mouth/Throat:      Pharynx: No oropharyngeal exudate. Eyes:      Conjunctiva/sclera: Conjunctivae normal.      Pupils: Pupils are equal, round, and reactive to light. Neck:      Thyroid: No thyromegaly. Vascular: No JVD. Trachea: No tracheal deviation. Cardiovascular:      Rate and Rhythm: Normal rate and regular rhythm. Heart sounds: Normal heart sounds. No murmur heard. Pulmonary:      Effort: Pulmonary effort is normal. No respiratory distress.       Breath sounds: Normal breath sounds. No wheezing. Abdominal:      General: Bowel sounds are normal. There is no distension. Palpations: Abdomen is soft. There is mass. Tenderness: There is no abdominal tenderness. There is no guarding. Musculoskeletal:         General: Normal range of motion. Cervical back: Normal range of motion and neck supple. Comments: Equal femoral pulses equal pulses dorsalis pedis and posterior tibial good perfusion   Skin:     General: Skin is warm and dry. Findings: No rash. Neurological:      Mental Status: He is alert and oriented to person, place, and time. Cranial Nerves: No cranial nerve deficit. Psychiatric:         Behavior: Behavior normal.         DIAGNOSTIC RESULTS     EKG: All EKG's are interpreted by the Emergency Department Physician who either signs or Co-signs this chart in the absence of a cardiologist.        RADIOLOGY:   Non-plain film images such as CT, Ultrasound and MRI are read by the radiologist. Plain radiographic images are visualized and preliminarily interpreted by the emergency physician with the below findings:    MRI lumbar spine incidentally found 6 cm AAA. No evidence of dissection. Thoracic aorta normal.    Interpretation per the Radiologist below, if available at the time of this note:    No orders to display         ED BEDSIDE ULTRASOUND:   Performed by ED Physician - none    LABS:  Labs Reviewed   CBC WITH AUTO DIFFERENTIAL   COMPREHENSIVE METABOLIC PANEL   PROTIME-INR       All other labs were within normal range or not returned as of this dictation. EMERGENCY DEPARTMENT COURSE and DIFFERENTIAL DIAGNOSIS/MDM:   Vitals: There were no vitals filed for this visit. Patient has incidental finding of 6 cm AAA on MRI of the spine. Contacting vascular surgery. Patient asymptomatic.     MDM      REASSESSMENT          CRITICAL CARE TIME   Total Critical Care time was 0 minutes, excluding separately reportable procedures. There was a high probability of clinically significant/life threatening deterioration in the patient's condition which required my urgent intervention. CONSULTS:  None    PROCEDURES:  Unless otherwise noted below, none     Procedures        FINAL IMPRESSION      1. Abdominal aortic aneurysm (AAA) without rupture [facility])          DISPOSITION/PLAN   DISPOSITION        PATIENT REFERRED TO:  Jerolyn Fleischer, MD  [address]    Schedule an appointment as soon as possible for a visit         DISCHARGE MEDICATIONS:  New Prescriptions    No medications on file     Controlled Substances Monitoring:     No flowsheet data found.     (Please note that portions of this note were completed with a voice recognition program.  Efforts were made to edit the dictations but occasionally words are mis-transcribed.)    Austin Segundo DO (electronically signed)  Attending Emergency Physician           Austin Segundo DO  10/12/21 [id number]

## 2021-10-17 DIAGNOSIS — I71.60 THORACOABDOMINAL AORTIC ANEURYSM (TAAA) WITHOUT RUPTURE: Primary | ICD-10-CM

## 2021-10-28 ENCOUNTER — HOSPITAL ENCOUNTER (OUTPATIENT)
Dept: CARDIOLOGY | Age: 81
Discharge: HOME | End: 2021-10-28
Payer: MEDICARE

## 2021-10-28 PROCEDURE — G2066 INTER DEVC REMOTE 30D: HCPCS

## 2021-11-05 ENCOUNTER — HOSPITAL ENCOUNTER (OUTPATIENT)
Dept: CT IMAGING | Age: 81
Discharge: HOME | End: 2021-11-07
Payer: MEDICARE

## 2021-11-05 DIAGNOSIS — I71.40 ABDOMINAL AORTIC ANEURYSM WITHOUT RUPTURE: ICD-10-CM

## 2021-11-05 PROCEDURE — 6360000004 HC RX CONTRAST MEDICATION: Performed by: SURGERY

## 2021-11-05 PROCEDURE — 2580000003 HC RX 258: Performed by: SURGERY

## 2021-11-05 PROCEDURE — 74174 CTA ABD&PLVS W/CONTRAST: CPT

## 2021-11-05 RX ORDER — SODIUM CHLORIDE 0.9 % (FLUSH) 0.9 %
10 SYRINGE (ML) INJECTION ONCE
Status: COMPLETED | OUTPATIENT
Start: 2021-11-05 | End: 2021-11-05

## 2021-11-29 ENCOUNTER — HOSPITAL ENCOUNTER (OUTPATIENT)
Dept: CARDIOLOGY | Age: 81
Discharge: HOME | End: 2021-11-29
Payer: MEDICARE

## 2021-11-29 PROCEDURE — G2066 INTER DEVC REMOTE 30D: HCPCS

## 2021-12-27 PROBLEM — I49.3 VENTRICULAR PREMATURE BEATS: Status: ACTIVE | Noted: 2021-12-27

## 2021-12-27 PROBLEM — R63.6 UNDERWEIGHT: Status: ACTIVE | Noted: 2021-12-27

## 2021-12-27 PROBLEM — R06.09 DYSPNEA ON EXERTION: Status: ACTIVE | Noted: 2021-12-27

## 2021-12-27 PROBLEM — R53.83 FATIGUE: Status: ACTIVE | Noted: 2021-12-27

## 2021-12-27 PROBLEM — J44.9 CHRONIC OBSTRUCTIVE PULMONARY DISEASE (HCC): Status: ACTIVE | Noted: 2021-12-27

## 2021-12-27 PROBLEM — R63.4 WEIGHT LOSS: Status: ACTIVE | Noted: 2021-12-27

## 2021-12-27 PROBLEM — E78.5 HYPERLIPIDEMIA: Status: ACTIVE | Noted: 2021-12-27

## 2021-12-27 PROBLEM — E11.9 DIABETES MELLITUS (HCC): Status: ACTIVE | Noted: 2021-12-27

## 2021-12-27 PROBLEM — I71.40 ABDOMINAL AORTIC ANEURYSM (AAA): Status: ACTIVE | Noted: 2021-12-27

## 2021-12-27 PROBLEM — I21.19 ACUTE INFERIOR MYOCARDIAL INFARCTION (HCC): Status: ACTIVE | Noted: 2021-12-27

## 2021-12-27 PROBLEM — M79.10 MUSCLE PAIN: Status: ACTIVE | Noted: 2021-12-27

## 2021-12-27 PROBLEM — R09.89 CAROTID BRUIT: Status: ACTIVE | Noted: 2021-12-27

## 2021-12-27 PROBLEM — I44.0 FIRST DEGREE ATRIOVENTRICULAR BLOCK: Status: ACTIVE | Noted: 2021-12-27

## 2021-12-27 PROBLEM — H83.09 INFECTION OF THE INNER EAR: Status: ACTIVE | Noted: 2021-12-27

## 2021-12-29 ENCOUNTER — OFFICE VISIT (OUTPATIENT)
Dept: NEUROLOGY | Age: 81
End: 2021-12-29
Payer: MEDICARE

## 2021-12-29 VITALS
WEIGHT: 151.5 LBS | SYSTOLIC BLOOD PRESSURE: 124 MMHG | BODY MASS INDEX: 22.37 KG/M2 | HEART RATE: 74 BPM | DIASTOLIC BLOOD PRESSURE: 64 MMHG

## 2021-12-29 DIAGNOSIS — M48.062 SPINAL STENOSIS OF LUMBAR REGION WITH NEUROGENIC CLAUDICATION: ICD-10-CM

## 2021-12-29 DIAGNOSIS — M41.9 KYPHOSCOLIOSIS: ICD-10-CM

## 2021-12-29 DIAGNOSIS — R26.0 ATAXIC GAIT: ICD-10-CM

## 2021-12-29 DIAGNOSIS — I63.512 CEREBROVASCULAR ACCIDENT (CVA) DUE TO STENOSIS OF LEFT MIDDLE CEREBRAL ARTERY (HCC): ICD-10-CM

## 2021-12-29 DIAGNOSIS — Q04.6 COLLOID CYST OF THIRD VENTRICLE (HCC): Primary | ICD-10-CM

## 2021-12-29 DIAGNOSIS — R55 VASOVAGAL SYNCOPE: ICD-10-CM

## 2021-12-29 PROCEDURE — 99214 OFFICE O/P EST MOD 30 MIN: CPT | Performed by: PSYCHIATRY & NEUROLOGY

## 2021-12-29 PROCEDURE — G8427 DOCREV CUR MEDS BY ELIG CLIN: HCPCS | Performed by: PSYCHIATRY & NEUROLOGY

## 2021-12-29 PROCEDURE — 1123F ACP DISCUSS/DSCN MKR DOCD: CPT | Performed by: PSYCHIATRY & NEUROLOGY

## 2021-12-29 PROCEDURE — 1036F TOBACCO NON-USER: CPT | Performed by: PSYCHIATRY & NEUROLOGY

## 2021-12-29 PROCEDURE — 4040F PNEUMOC VAC/ADMIN/RCVD: CPT | Performed by: PSYCHIATRY & NEUROLOGY

## 2021-12-29 PROCEDURE — G8484 FLU IMMUNIZE NO ADMIN: HCPCS | Performed by: PSYCHIATRY & NEUROLOGY

## 2021-12-29 PROCEDURE — G8420 CALC BMI NORM PARAMETERS: HCPCS | Performed by: PSYCHIATRY & NEUROLOGY

## 2021-12-29 ASSESSMENT — ENCOUNTER SYMPTOMS
SHORTNESS OF BREATH: 0
TROUBLE SWALLOWING: 0
BACK PAIN: 1
VOMITING: 0
CHOKING: 0
COLOR CHANGE: 0
PHOTOPHOBIA: 0
NAUSEA: 0

## 2021-12-29 NOTE — PROGRESS NOTES
Subjective:      Patient ID: Melissa Oliveira is a 79 y.o. male who presents today for:  Chief Complaint   Patient presents with    Follow-up     Pt feels he is doing pretty good. Pt states that he is not having any dizziness. No recent falls. 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 Patient is here for follow-up for the same. Is not any dizziness. Actually doing well is continue to be stable and he has not developed any other parkinsonian features though this is a differential diagnosis. Further obtain an MRI of the sacral spine which does not show any stenoses in fact we found an aneurysm which is abdominal and he had further obtain a CT angiogram and shows a 5.7 x 5.6 cm at the aortic bifurcation. This is followed by Dr. Heron Santoro. Otherwise is doing well and is not reporting any headaches. He still walks with a walker and is not any falls.   Past Medical History:   Diagnosis Date    Atrial fibrillation (HCC)     CAD (coronary artery disease)     cardiac stents    [facility]) [facility]     Diabetes mellitus ([facility] [facility]     Hyperlipidemia     Hypertension     Movement disorder     Pneumonia      Past Surgical History:   Procedure Laterality Date    APPENDECTOMY      CORONARY ANGIOPLASTY WITH STENT PLACEMENT      4    CORONARY ARTERY BYPASS GRAFT      triple bypass    EYE SURGERY Bilateral     cataract surgery    INSERTABLE CARDIAC MONITOR Left 12/2018     Social History     Socioeconomic History    Marital status:      Spouse name: Not on file    Number of children: Not on file    Years of education: Not on file    Highest education level: Not on file   Occupational History    Not on file   Tobacco Use    Smoking status: Former Smoker     Types: Cigarettes    Smokeless tobacco: Never Used    Tobacco comment: 3 cigarettes/month   Vaping Use    Vaping Use: Never used Substance and Sexual Activity    Alcohol use: No    Drug use: No    Sexual activity: Not on file   Other Topics Concern    Not on file   Social History Narrative    Not on file     Social Determinants of Health     Financial Resource Strain:     Difficulty of Paying Living Expenses: Not on file   Food Insecurity:     Worried About Running Out of Food in the Last Year: Not on file    Thony of Food in the Last Year: Not on file   Transportation Needs:     Lack of Transportation (Medical): Not on file    Lack of Transportation (Non-Medical): Not on file   Physical Activity:     Days of Exercise per Week: Not on file    Minutes of Exercise per Session: Not on file   Stress:     Feeling of Stress : Not on file   Social Connections:     Frequency of Communication with Friends and Family: Not on file    Frequency of Social Gatherings with Friends and Family: Not on file    Attends [employer] Services: Not on file    Active Member of [address] [place] [address] or Organizations: Not on file    Attends Club or Organization Meetings: Not on file    Marital Status: Not on file   Intimate Partner Violence:     Fear of Current or Ex-Partner: Not on file    Emotionally Abused: Not on file    Physically Abused: Not on file    Sexually Abused: Not on file   Housing Stability:     Unable to Pay for Housing in the Last Year: Not on file    Number of [place] in the Last Year: Not on file    Unstable Housing in the Last Year: Not on file     No family history on file.   Allergies   Allergen Reactions    Fenofibrate     Lipitor [Atorvastatin] Other (See Comments)     Body pain    Niacin Other (See Comments)    Niaspan [Niacin Er] Other (See Comments)     Body pain    Vitamin E Other (See Comments)     Nose bleeds    Zocor [Simvastatin] Other (See Comments)     Sharp body pain       Current Outpatient Medications   Medication Sig Dispense Refill    albuterol sulfate  (90 Base) MCG/ACT inhaler use 1 puff as needed twice daily for 90 days.  sotalol (BETAPACE) 80 MG tablet Take 1 tablet by mouth 2 times daily 60 tablet 3    magnesium oxide (MAG-OX) 400 (240 Mg) MG tablet Take 1 tablet by mouth daily 30 tablet 3    metFORMIN (GLUCOPHAGE) 500 MG tablet Take 500 mg by mouth 2 times daily       TRUE METRIX BLOOD GLUCOSE TEST strip check blood sugar twice daily as directed      apixaban (ELIQUIS) 5 MG TABS tablet Take 1 tablet by mouth 2 times daily 60 tablet 5    pitavastatin (LIVALO) 4 MG TABS tablet Take 4 mg by mouth nightly      aspirin 81 MG tablet Take 81 mg by mouth daily      nitroGLYCERIN (NITROSTAT) 0.4 MG SL tablet Place 0.4 mg under the tongue every 5 minutes as needed for Chest pain (Patient not taking: Reported on 9/13/2021)       No current facility-administered medications for this visit. Review of Systems   Constitutional: Negative for fever. HENT: Negative for ear pain, tinnitus and trouble swallowing. Eyes: Negative for photophobia and visual disturbance. Respiratory: Negative for choking and shortness of breath. Cardiovascular: Negative for chest pain and palpitations. Gastrointestinal: Negative for nausea and vomiting. Musculoskeletal: Positive for back pain, gait problem and myalgias. Negative for joint swelling, neck pain and neck stiffness. Skin: Negative for color change. Allergic/Immunologic: Negative for food allergies. Neurological: Positive for tremors and weakness. Negative for dizziness, seizures, syncope, facial asymmetry, speech difficulty, light-headedness, numbness and headaches. Psychiatric/Behavioral: Negative for behavioral problems, confusion, hallucinations and sleep disturbance. Objective:   /64 (Site: Left Upper Arm, Position: Sitting, Cuff Size: Medium Adult)   Pulse 74   Wt 151 lb 8 oz (68.7 kg)   BMI 22.37 kg/m²     Physical Exam  Vitals reviewed. Eyes:      Pupils: Pupils are equal, round, and reactive to light. Cardiovascular:      Rate and Rhythm: Normal rate and regular rhythm. Heart sounds: No murmur heard. Pulmonary:      Effort: Pulmonary effort is normal.      Breath sounds: Normal breath sounds. Abdominal:      General: Bowel sounds are normal.   Musculoskeletal:         General: Normal range of motion. Cervical back: Normal range of motion. Skin:     General: Skin is warm. Neurological:      Mental Status: He is alert and oriented to person, place, and time. Cranial Nerves: No cranial nerve deficit. Sensory: No sensory deficit. Motor: No abnormal muscle tone. Coordination: Coordination normal.      Deep Tendon Reflexes: Reflexes are normal and symmetric. Babinski sign absent on the right side. Babinski sign absent on the left side. Comments: Patient is a central gait ataxia and walks with a walker. Psychiatric:         Mood and Affect: Mood normal.         No results found.     Lab Results   Component Value Date    WBC 8.7 12/28/2021    RBC 4.36 12/28/2021    RBC 4.79 10/19/2011    HGB 10.6 12/28/2021    HCT 34.7 12/28/2021    MCV 79.7 12/28/2021    MCH 24.4 12/28/2021    MCHC 30.6 12/28/2021    RDW 16.2 12/28/2021     12/28/2021    MPV 9.7 10/13/2015     Lab Results   Component Value Date     12/28/2021    K 4.7 12/28/2021    K 4.8 01/24/2021     12/28/2021    CO2 25 12/28/2021    BUN 18 12/28/2021    CREATININE 0.95 12/28/2021    GFRAA >60.0 12/28/2021    LABGLOM >60.0 12/28/2021    GLUCOSE 130 12/28/2021    PROT 7.0 12/28/2021    LABALBU 4.2 12/28/2021    LABALBU 4.4 05/08/2012    CALCIUM 9.4 12/28/2021    BILITOT 0.4 12/28/2021    ALKPHOS 48 12/28/2021    AST 14 12/28/2021    ALT 8 12/28/2021     Lab Results   Component Value Date    PROTIME 19.4 10/12/2021    INR 1.7 10/12/2021     Lab Results   Component Value Date    TSH 1.480 10/26/2021    IRON 71 07/19/2021    TIBC 357 07/19/2021     Lab Results   Component Value Date    TRIG 112 12/28/2021    HDL 48 12/28/2021    LDLCALC 58 12/28/2021     No results found for: Mitra Vicente, LABBENZ, CANNAB, COCAINESCRN, LABMETH, OPIATESCREENURINE, PHENCYCLIDINESCREENURINE, PPXUR, ETOH  No results found for: LITHIUM, DILFRTOT, VALPROATE    Assessment:       Diagnosis Orders   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 Cerebrovascular accident (CVA) due to stenosis of left middle cerebral artery (HCC)     3. Vasovagal syncope     4. Spinal stenosis of lumbar region with neurogenic claudication     5. Kyphoscoliosis     6. Ataxic gait     COVID cyst of the third ventricle. Patient suddenly had developed some gait issues and we are quite concerned about hydrocephalus. Further obtain is CT scan which does not show anything changed there does not appear to be hydrocephalus and the ball-valve effect is not that he does not have erect headaches. His gait issues could be parkinsonian though he has not developed a full-fledged extrapyramidal symptoms. I have not recommended treatment for now this can wait. We further obtain a lumbar spine MRI which does not show canal stenosis. He does though have a abdominal aneurysm which is now being followed is more than 5 cm. We will keep an eye on his examination is at some point in time we may consider dopamine challenge. Follow me in few months. Plan:      No orders of the defined types were placed in this encounter. No orders of the defined types were placed in this encounter. No follow-ups on file.       Lucio Abdul MD

## 2022-01-04 ENCOUNTER — HOSPITAL ENCOUNTER (OUTPATIENT)
Dept: CARDIOLOGY | Age: 82
Discharge: HOME | End: 2022-01-04
Payer: MEDICARE

## 2022-01-04 PROCEDURE — G2066 INTER DEVC REMOTE 30D: HCPCS

## 2022-02-07 ENCOUNTER — HOSPITAL ENCOUNTER (OUTPATIENT)
Dept: CARDIOLOGY | Age: 82
Discharge: HOME | End: 2022-02-07
Payer: MEDICARE

## 2022-02-07 PROCEDURE — G2066 INTER DEVC REMOTE 30D: HCPCS

## 2022-03-09 ENCOUNTER — HOSPITAL ENCOUNTER (OUTPATIENT)
Dept: CARDIOLOGY | Age: 82
Discharge: HOME | End: 2022-03-09
Payer: MEDICARE

## 2022-03-09 PROCEDURE — G2066 INTER DEVC REMOTE 30D: HCPCS

## 2022-04-12 ENCOUNTER — HOSPITAL ENCOUNTER (INPATIENT)
Age: 82
LOS: 10 days | Discharge: HOME | DRG: 949 | End: 2022-04-22
Attending: PHYSICAL MEDICINE & REHABILITATION | Admitting: PHYSICAL MEDICINE & REHABILITATION
Payer: MEDICARE

## 2022-04-12 PROBLEM — I73.9 PVD (PERIPHERAL VASCULAR DISEASE) (HCC): Status: ACTIVE | Noted: 2022-04-12

## 2022-04-12 LAB
GLUCOSE BLD-MCNC: 114 MG/DL (ref 70–99)
HBA1C MFR BLD: 6.1 % (ref 4.8–5.9)
PERFORMED ON: ABNORMAL

## 2022-04-12 PROCEDURE — 6370000000 HC RX 637 (ALT 250 FOR IP): Performed by: PHYSICAL MEDICINE & REHABILITATION

## 2022-04-12 PROCEDURE — 1180000000 HC REHAB R&B

## 2022-04-12 PROCEDURE — 36415 COLL VENOUS BLD VENIPUNCTURE: CPT

## 2022-04-12 PROCEDURE — 83036 HEMOGLOBIN GLYCOSYLATED A1C: CPT

## 2022-04-12 RX ORDER — DEXTROSE MONOHYDRATE 50 MG/ML
100 INJECTION, SOLUTION INTRAVENOUS PRN
Status: DISCONTINUED | OUTPATIENT
Start: 2022-04-12 | End: 2022-04-13 | Stop reason: SDUPTHER

## 2022-04-12 RX ORDER — NICOTINE POLACRILEX 4 MG
15 LOZENGE BUCCAL PRN
Status: DISCONTINUED | OUTPATIENT
Start: 2022-04-12 | End: 2022-04-12 | Stop reason: SDUPTHER

## 2022-04-12 RX ORDER — POLYETHYLENE GLYCOL 3350 17 G/17G
17 POWDER, FOR SOLUTION ORAL DAILY PRN
Status: DISCONTINUED | OUTPATIENT
Start: 2022-04-12 | End: 2022-04-22 | Stop reason: HOSPADM

## 2022-04-12 RX ORDER — DEXTROSE MONOHYDRATE 25 G/50ML
12.5 INJECTION, SOLUTION INTRAVENOUS PRN
Status: DISCONTINUED | OUTPATIENT
Start: 2022-04-12 | End: 2022-04-12

## 2022-04-12 RX ORDER — SOTALOL HYDROCHLORIDE 80 MG/1
80 TABLET ORAL 2 TIMES DAILY
Status: DISCONTINUED | OUTPATIENT
Start: 2022-04-12 | End: 2022-04-22 | Stop reason: HOSPADM

## 2022-04-12 RX ORDER — ALBUTEROL SULFATE 90 UG/1
1 AEROSOL, METERED RESPIRATORY (INHALATION) 2 TIMES DAILY
Status: DISCONTINUED | OUTPATIENT
Start: 2022-04-12 | End: 2022-04-13

## 2022-04-12 RX ORDER — LANOLIN ALCOHOL/MO/W.PET/CERES
400 CREAM (GRAM) TOPICAL DAILY
Status: DISCONTINUED | OUTPATIENT
Start: 2022-04-13 | End: 2022-04-22 | Stop reason: HOSPADM

## 2022-04-12 RX ORDER — DEXTROSE MONOHYDRATE 50 MG/ML
100 INJECTION, SOLUTION INTRAVENOUS PRN
Status: DISCONTINUED | OUTPATIENT
Start: 2022-04-12 | End: 2022-04-12 | Stop reason: SDUPTHER

## 2022-04-12 RX ORDER — CLOPIDOGREL BISULFATE 75 MG/1
75 TABLET ORAL DAILY
Status: DISCONTINUED | OUTPATIENT
Start: 2022-04-13 | End: 2022-04-22 | Stop reason: HOSPADM

## 2022-04-12 RX ORDER — ACETAMINOPHEN 325 MG/1
650 TABLET ORAL EVERY 4 HOURS PRN
Status: DISCONTINUED | OUTPATIENT
Start: 2022-04-12 | End: 2022-04-12 | Stop reason: SDUPTHER

## 2022-04-12 RX ORDER — ACETAMINOPHEN 325 MG/1
650 TABLET ORAL EVERY 4 HOURS PRN
Status: DISCONTINUED | OUTPATIENT
Start: 2022-04-12 | End: 2022-04-22 | Stop reason: HOSPADM

## 2022-04-12 RX ORDER — TRAMADOL HYDROCHLORIDE 50 MG/1
50 TABLET ORAL EVERY 6 HOURS PRN
Status: DISCONTINUED | OUTPATIENT
Start: 2022-04-12 | End: 2022-04-22 | Stop reason: HOSPADM

## 2022-04-12 RX ORDER — NICOTINE POLACRILEX 4 MG
15 LOZENGE BUCCAL PRN
Status: DISCONTINUED | OUTPATIENT
Start: 2022-04-12 | End: 2022-04-13 | Stop reason: SDUPTHER

## 2022-04-12 RX ADMIN — APIXABAN 5 MG: 5 TABLET, FILM COATED ORAL at 22:25

## 2022-04-12 RX ADMIN — METFORMIN HYDROCHLORIDE 500 MG: 500 TABLET ORAL at 19:48

## 2022-04-12 NOTE — FLOWSHEET NOTE
Patient arrived via ambulet transfer. Assisted into bed with one. Noted patient has red bottom. Two puncture sites corrine groin. Open to air. Noted patient is hard of hearing. Patient oriented to unit and use of call light. Wife at the bedside.  Electronically signed by Nirali Kaba RN on 4/12/2022 at 6:22 PM

## 2022-04-13 LAB
GLUCOSE BLD-MCNC: 114 MG/DL (ref 70–99)
GLUCOSE BLD-MCNC: 115 MG/DL (ref 70–99)
GLUCOSE BLD-MCNC: 126 MG/DL (ref 70–99)
GLUCOSE BLD-MCNC: 150 MG/DL (ref 70–99)
HBA1C MFR BLD: 6.3 % (ref 4.8–5.9)
PERFORMED ON: ABNORMAL

## 2022-04-13 PROCEDURE — 97162 PT EVAL MOD COMPLEX 30 MIN: CPT

## 2022-04-13 PROCEDURE — 97166 OT EVAL MOD COMPLEX 45 MIN: CPT

## 2022-04-13 PROCEDURE — 97535 SELF CARE MNGMENT TRAINING: CPT

## 2022-04-13 PROCEDURE — 97129 THER IVNTJ 1ST 15 MIN: CPT

## 2022-04-13 PROCEDURE — 97110 THERAPEUTIC EXERCISES: CPT

## 2022-04-13 PROCEDURE — 97530 THERAPEUTIC ACTIVITIES: CPT

## 2022-04-13 PROCEDURE — 92610 EVALUATE SWALLOWING FUNCTION: CPT

## 2022-04-13 PROCEDURE — 83036 HEMOGLOBIN GLYCOSYLATED A1C: CPT

## 2022-04-13 PROCEDURE — 36415 COLL VENOUS BLD VENIPUNCTURE: CPT

## 2022-04-13 PROCEDURE — 92523 SPEECH SOUND LANG COMPREHEN: CPT

## 2022-04-13 PROCEDURE — 96125 COGNITIVE TEST BY HC PRO: CPT

## 2022-04-13 PROCEDURE — 94664 DEMO&/EVAL PT USE INHALER: CPT

## 2022-04-13 PROCEDURE — 97116 GAIT TRAINING THERAPY: CPT

## 2022-04-13 PROCEDURE — 1180000000 HC REHAB R&B

## 2022-04-13 PROCEDURE — 6370000000 HC RX 637 (ALT 250 FOR IP): Performed by: PHYSICAL MEDICINE & REHABILITATION

## 2022-04-13 RX ORDER — ALBUTEROL SULFATE 90 UG/1
1 AEROSOL, METERED RESPIRATORY (INHALATION) EVERY 4 HOURS PRN
Status: DISCONTINUED | OUTPATIENT
Start: 2022-04-13 | End: 2022-04-22 | Stop reason: HOSPADM

## 2022-04-13 RX ORDER — LEVALBUTEROL TARTRATE 45 UG/1
1 AEROSOL, METERED ORAL EVERY 6 HOURS PRN
Status: DISCONTINUED | OUTPATIENT
Start: 2022-04-13 | End: 2022-04-22 | Stop reason: HOSPADM

## 2022-04-13 RX ORDER — NICOTINE POLACRILEX 4 MG
15 LOZENGE BUCCAL PRN
Status: DISCONTINUED | OUTPATIENT
Start: 2022-04-13 | End: 2022-04-22 | Stop reason: HOSPADM

## 2022-04-13 RX ORDER — DEXTROSE MONOHYDRATE 25 G/50ML
12.5 INJECTION, SOLUTION INTRAVENOUS PRN
Status: DISCONTINUED | OUTPATIENT
Start: 2022-04-13 | End: 2022-04-13 | Stop reason: SDUPTHER

## 2022-04-13 RX ORDER — DEXTROSE MONOHYDRATE 50 MG/ML
100 INJECTION, SOLUTION INTRAVENOUS PRN
Status: DISCONTINUED | OUTPATIENT
Start: 2022-04-13 | End: 2022-04-22 | Stop reason: HOSPADM

## 2022-04-13 RX ADMIN — METFORMIN HYDROCHLORIDE 500 MG: 500 TABLET ORAL at 16:30

## 2022-04-13 RX ADMIN — SOTALOL HYDROCHLORIDE 80 MG: 80 TABLET ORAL at 20:59

## 2022-04-13 RX ADMIN — CLOPIDOGREL BISULFATE 75 MG: 75 TABLET ORAL at 08:52

## 2022-04-13 RX ADMIN — METFORMIN HYDROCHLORIDE 500 MG: 500 TABLET ORAL at 08:52

## 2022-04-13 RX ADMIN — APIXABAN 5 MG: 5 TABLET, FILM COATED ORAL at 20:59

## 2022-04-13 RX ADMIN — Medication 400 MG: at 08:52

## 2022-04-13 RX ADMIN — APIXABAN 5 MG: 5 TABLET, FILM COATED ORAL at 08:52

## 2022-04-13 RX ADMIN — SOTALOL HYDROCHLORIDE 80 MG: 80 TABLET ORAL at 08:55

## 2022-04-13 RX ADMIN — SOTALOL HYDROCHLORIDE 80 MG: 80 TABLET ORAL at 00:14

## 2022-04-13 ASSESSMENT — PAIN SCALES - GENERAL
PAINLEVEL_OUTOF10: 0
PAINLEVEL_OUTOF10: 0

## 2022-04-13 NOTE — PROGRESS NOTES
Pt. Admitted to [facility] rehab from [address] dx. Impaired mobility secondary to severe PVD with lower extremity ischemia. Pt noted with Abdominal aortic Aneurysm repair  without rupture 4/7/22. Incision to left and right groin. Closed well approximated slight bruising no drainage,MARGE. Hx of A Fib. On Eliquis . COPD, albuterol treatments. IDDM, ACHS. Alert oriented. Oriented to the rehab unit. Call light within reach. Will continue to monitor for pt. Needs. Dr Sanam Tavares wanted to ask Dr. Remy Norris if pt. Could be ordered Theophylline, waiting for a call back from Dr. Remy Norris. Dr. Remy Norris returned my call. Do not put pt. On the Theophylline at this time.

## 2022-04-13 NOTE — PROGRESS NOTES
[facility]   Facility/Department: [facility]  Speech Language Pathology  Clinical Bedside Swallow Evaluation    Kristine Mcfadden  : 1940 (79 y.o.)   MRN: [id number]  [id number]  ADMISSION DATE: 2022  PATIENT DIAGNOSIS(ES): PVD (peripheral vascular disease) ([address] [I73.9]  No chief complaint on file.     Patient Active Problem List    Diagnosis Date Noted    Atrial fibrillation ([address] 2019    High risk medication use 2019    PVD (peripheral vascular disease) ([address] 2022    Abdominal aortic aneurysm (AAA) ([address] 2021    Acute inferior myocardial infarction ([address] 2021    Carotid bruit 2021    Chronic obstructive pulmonary disease ([address] 2021    Diabetes mellitus ([address] 2021    Dyspnea on exertion 2021    Fatigue 2021    First degree atrioventricular block 2021    Hyperlipidemia 2021    Infection of the inner ear 2021    Muscle pain 2021    Underweight 2021    Ventricular premature beats 2021    Weight loss 2021    Ataxic gait 2021    Kyphoscoliosis 2021    Spinal stenosis of lumbar region with neurogenic claudication 2021    Meniere disease, bilateral     Benign paroxysmal positional vertigo due to bilateral vestibular disorder     Vertigo 2021    Ataxia 2021    Cerebrovascular accident ([address] 2020    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[address] 2020    Vasovagal syncope 2020    Dizziness and giddiness 2020    Orthostatic dizziness 2020    Atherosclerosis of native coronary artery of native heart without angina pectoris 2018    Hypertension 2018    Ischemic myocardial dysfunction 2018    Nonrheumatic aortic valve disorder, unspecified 2018    Aortic valve disorder 2018    Personal history of nicotine dependence 2018    Presence of aortocoronary bypass graft 2018    Coronary angioplasty status 11/26/2018     Past Medical History:   Diagnosis Date    Atrial fibrillation (HCC)     CAD (coronary artery disease)     cardiac stents    [facility]) [facility] [facility]     Diabetes mellitus ([facility] [address]     Hyperlipidemia     Hypertension     Movement disorder     Pneumonia      Past Surgical History:   Procedure Laterality Date    APPENDECTOMY      CORONARY ANGIOPLASTY WITH STENT PLACEMENT      4    CORONARY ARTERY BYPASS GRAFT      triple bypass    EYE SURGERY Bilateral     cataract surgery    INSERTABLE CARDIAC MONITOR Left 12/2018     Allergies   Allergen Reactions    Fenofibrate     Lipitor [Atorvastatin] Other (See Comments)     Body pain    Niacin Other (See Comments)    Niaspan [Niacin Er] Other (See Comments)     Body pain    Vitamin E Other (See Comments)     Nose bleeds    Zocor [Simvastatin] Other (See Comments)     Sharp body pain       DATE ONSET: 04/07/2022    Date of Evaluation: 4/13/2022   Evaluating Therapist: Ursula Ansari SLP      Recommended Diet and Intervention  Diet Solids Recommendation: Regular  Liquid Consistency Recommendation: Thin  Recommended Form of Meds: Meds in puree          Compensatory Swallowing Strategies  Compensatory Swallowing Strategies: Upright as possible for all oral intake;Eat/Feed slowly; Small bites/sips    Reason for Referral  Ravindra Wright was referred for a bedside swallow evaluation to assess the efficiency of his swallow function, identify signs and symptoms of aspiration, identify risk factors, and make recommendations regarding safe dietary consistencies, effective compensatory strategies, and safe eating environment. General  Chart Reviewed: Yes  Subjective  Subjective: Patient reported intermittent difficulty with pills. ST suggested placing pills in applesauce or pudding. patient agreed that may help.  Patient also reported he has phlegm throughout the day, which is not related to swallowing difficulties. ST noted phlegm like vocal quality X1 during the SLE. Behavior/Cognition: Alert; Cooperative  Respiratory Status: Room air  O2 Device: None (Room air)  Communication Observation: Functional  Follows Directions: Simple  Dentition: Edentulous (patient reported his dentures are at home)  Patient Positioning: Upright in chair  Baseline Vocal Quality: Normal (Pt reported being SOB x1 during the session. Patient reported this sometimes happens. SOB went away after approx 1 minute)  Prior Dysphagia History: BSE 1/22/2021 recommending Regular solid with thin liquids  Consistencies Administered: Reg solid; Dysphagia Pureed (Dysphagia I); Thin - straw    Current Diet level:  Current Diet : Regular  Current Liquid Diet : Thin    Oral Motor Deficits  Oral/Motor  Oral Motor: Within functional limits    Oral Phase Dysfunction  Oral Phase  Oral Phase: WFL  Oral Phase  Oral Phase - Comment: oral phase of the swallow mechanism was [id number]     Indicators of Pharyngeal Phase Dysfunction   Pharyngeal Phase  Pharyngeal Phase: WFL  Pharyngeal Phase   Pharyngeal: Suspect pharyngeal phase of the swallow mechanism is WFL. Impression  Dysphagia Diagnosis: Swallow function appears grossly intact  Dysphagia Impression : Oral phase of the swallow mechanism appeared [facility] including good lingual and labial strength/coordination, good mastication and good oral clearance of solids. No overt s/s of aspiration was noted with solids, single sips of thin liquids and multiple, consecutive sips of 3oz of thin liquids via straw. Patient presented with clear vocal quality throughout the eval. As noted above phlegm like vocal quality was noted X1 during the SLE. Patient was able to clear on his own. Patient reported since being in the hospital he has been coughing up phlegm. Hyolaryngeal elevation was noted during the BSE.   Dysphagia Outcome Severity Scale: Level 6: Within functional limits/Modified independence     Treatment Plan  Requires SLP Intervention: No  Duration/Frequency of Treatment: No f/u for dysphagia  D/C Recommendations: To be determined       Treatment/Goals  Long-term Goals  Timeframe for Long-term Goals: N/A    Prognosis  Prognosis  Prognosis for safe diet advancement: good  Individuals consulted  Consulted and agree with results and recommendations: Patient;KENRICK Alberto    Education  Patient Education: Patient educated on BSE results  Patient Education Response: Verbalizes understanding  Safety Devices in place: Yes  Type of devices: Call light within reach; Chair alarm in place    [x]  Jessica CHOUDHARY notified       Pain Assessment:  Pre-Treatment  Pain assessment: 0-10  Pain level: 0  Intervention:  Patient denies pain. Post-Treatment  Pain assessment: 0-10  Pain level: 0  Intervention:  Patient denies pain.          DYSPHAGIA OUTCOMES SEVERITY SCALE:    SWALLOWING  Ratin    Therapy Time  SLP Individual Minutes  Time In: 1300  Time Out: 1310  Minutes: 10            Signature: Electronically signed by Lequita Dubin, SLP on 2022 at 1:46 PM

## 2022-04-13 NOTE — CARE COORDINATION
Spoke with patient and explained role in the team. Patient questions answered appropriately. Explained discharge process. Patient stated understanding. Patient lives with wife in [facility] with 3 steps to enter. Wife assists as needed with dressing, Supervision with showers. Patient ambulates with RW.    Medications through mail order and Drug [facility].      Social/Functional Status:  Social/Functional History  Lives With: Spouse  Type of Home: House  Home Layout: One level  Home Access: Stairs to enter with rails  Entrance Stairs - Number of Steps: 3  Entrance Stairs - Rails: Both  Bathroom Shower/Tub: Tub/Shower unit  Bathroom Toilet: Handicap height  Bathroom Equipment: Shower chair (neighbor to install grab bars)  Home Equipment: Rolling walker  Receives Help From: Family  ADL Assistance: Needs assistance  Bath: Supervision  Dressing:  (assists with pants)  Homemaking Assistance: Needs assistance (wife completes housework)  Homemaking Responsibilities: No  Ambulation Assistance: Independent (with RW)  Transfer Assistance: Independent  Active : No  Patient's  Info: wife  Mode of Transportation: Car ([facility])  Education: high school  Occupation: Retired  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

## 2022-04-13 NOTE — H&P
HISTORY & PHYSICAL       DATE OF ADMISSION:  4/12/2022    DATE OF SERVICE:  4/12/22    Subjective:    Jack Tobias, 79 y.o. male presents today with:     CHIEF COMPLAINT:  weakness     HPI    I reviewed recent nursing note, \" see notes\". has a past medical history of Atrial fibrillation ([facility] CAD (coronary artery disease), COPD (chronic obstructive pulmonary disease) ([facility] Diabetes mellitus ([facility] Hyperlipidemia, Hypertension, Movement disorder, and Pneumonia. has a past surgical history that includes Coronary artery bypass graft; Appendectomy; Eye surgery (Bilateral); Insertable Cardiac Monitor (Left, 12/2018); and Coronary angioplasty with stent    The patient has stabilized medically andis able to participate at acute level rehab but is too medically complex for SNF due to need for therapy at the acute level with at least 15 hours a week of PT OT and cognitive and recreational therapy at an acute level with daily medical monitoring. AAA repair, current day 5 post op  With functional loss     Imaging:    Imaging and other studies reviewed and discussed with patient and staff    No results found.            Labs:     labs reviewed and discussed with patient and staff    Lab Results   Component Value Date    POCGLU 116 04/12/2022    POCGLU 112 04/12/2022    POCGLU 212 04/11/2022    POCGLU 219 04/11/2022    POCGLU 189 04/10/2022     Lab Results   Component Value Date     04/11/2022    K 3.9 04/11/2022    K 4.8 01/24/2021     04/11/2022    CO2 25 12/28/2021    BUN 18 12/28/2021    CREATININE 1.10 04/11/2022    CALCIUM 7.9 04/11/2022    LABALBU 2.8 04/11/2022    BILITOT 1.0 04/11/2022    ALKPHOS 59 04/11/2022    AST 24 04/11/2022    ALT 20 04/11/2022     Lab Results   Component Value Date    WBC 15.4 04/10/2022    WBC 8.7 12/28/2021    RBC 4.05 04/10/2022    RBC 4.79 10/19/2011    HGB 10.3 04/10/2022    HCT 32.9 04/10/2022    MCV 81 04/10/2022    MCH 24.4 12/28/2021    MCHC 31.3 04/10/2022 (Non-Medical): Not on file   Physical Activity:     Days of Exercise per Week: Not on file    Minutes of Exercise per Session: Not on file   Stress:     Feeling of Stress : Not on file   Social Connections:     Frequency of Communication with Friends and Family: Not on file    Frequency of Social Gatherings with Friends and Family: Not on file    Attends Yazdanism Services: Not on file    Active Member of [address] [place] [address] or Organizations: Not on file    Attends Club or Organization Meetings: Not on file    Marital Status: Not on file   Intimate Partner Violence:     Fear of Current or Ex-Partner: Not on file    Emotionally Abused: Not on file    Physically Abused: Not on file    Sexually Abused: Not on file   Housing Stability:     Unable to Pay for Housing in the Last Year: Not on file    Number of [place] in the Last Year: Not on file    Unstable Housing in the Last Year: Not on file     Social supports listed above. Prior Device(s) used:  As above    History of falls:  Rarely falls    In depth analysis of complex functional data; the patient has been:    Current Rehabilitation Assessments:    Physical therapy: FIMS:  Bed Mobility:      Transfers: ,  ,      FIMS:  , ,      Occupational therapy: FIMS:   ,  ,      OCCUPATIONAL THERAPY                   Speech therapy: FIMS:       SPEECH THERAPY               Diet/Swallow:                           COGNITION  OT:    SP:          Prior to admission patient was independent with all ADLs and mobilityand did not require any outside services.        Past Medical History:   Diagnosis Date    Atrial fibrillation ([facility]     CAD (coronary artery disease)     cardiac stents    COPD (chronic obstructive pulmonary disease) ([facility]     [facility]     Hyperlipidemia     Hypertension     Movement disorder     Pneumonia        Past Surgical History:   Procedure Laterality Date    APPENDECTOMY      CORONARY ANGIOPLASTY WITH STENT PLACEMENT      4  CORONARY ARTERY BYPASS GRAFT      triple bypass    EYE SURGERY Bilateral     cataract surgery    INSERTABLE CARDIAC MONITOR Left 12/2018       Current Facility-Administered Medications   Medication Dose Route Frequency Provider Last Rate Last Admin    albuterol sulfate  (90 Base) MCG/ACT inhaler 1 puff  1 puff Inhalation BID Josefa Munoz MD        [START ON 4/13/2022] clopidogrel (PLAVIX) tablet 75 mg  75 mg Oral Daily Josefa Munoz MD        apixaban (ELIQUIS) tablet 5 mg  5 mg Oral BID Josefa Munoz MD        pitavastatin (LIVALO) tablet 4 mg  4 mg Oral Nightly MD Cuco Garcia ON 4/13/2022] magnesium oxide (MAG-OX) tablet 400 mg  400 mg Oral Daily Josefa Munoz MD        metFORMIN (GLUCOPHAGE) tablet 500 mg  500 mg Oral BID  Josefa Munoz MD   500 mg at 04/12/22 1948    sotalol (BETAPACE) tablet 80 mg  80 mg Oral BID Josefa Munoz MD        acetaminophen (TYLENOL) tablet 650 mg  650 mg Oral Q4H PRN Josefa Munoz MD        traMADol Kirsten Ripper) tablet 50 mg  50 mg Oral Q6H PRN Josefa Munoz MD        glucose (GLUTOSE) 40 % oral gel 15 g  15 g Oral PRN Josefa Munoz MD        glucagon (rDNA) injection 1 mg  1 mg IntraMUSCular PRN Josefa Munoz MD        dextrose 5 % solution  100 mL/hr IntraVENous PRN Josefa Munoz MD        dextrose bolus (hypoglycemia) 10% 125 mL  125 mL IntraVENous PRN Josefa Munoz MD        Or    dextrose bolus (hypoglycemia) 10% 250 mL  250 mL IntraVENous PRN Josefa Munoz MD        glucose (GLUTOSE) 40 % oral gel 15 g  15 g Oral PRN Josefa Munoz MD        dextrose 50 % IV solution  12.5 g IntraVENous PRN Josefa Munoz MD        glucagon (rDNA) injection 1 mg  1 mg IntraMUSCular PRN Josefa Munoz MD        dextrose 5 % solution  100 mL/hr IntraVENous PRN Colette Mcguire MD       Hardin Ulysses Pacer ON 4/13/2022] insulin lispro (HUMALOG) injection vial 0-6 Units  0-6 Units SubCUTAneous TID WC Colette Mcguire MD        insulin lispro (HUMALOG) injection vial 0-3 Units  0-3 Units SubCUTAneous Nightly Colette Mgcuire MD        acetaminophen (TYLENOL) tablet 650 mg  650 mg Oral Q4H PRN Colette Mcguire MD        polyethylene glycol (GLYCOLAX) packet 17 g  17 g Oral Daily PRN Colette Mcguire MD           Allergies   Allergen Reactions    Fenofibrate     Lipitor [Atorvastatin] Other (See Comments)     Body pain    Niacin Other (See Comments)    Niaspan [Niacin Er] Other (See Comments)     Body pain    Vitamin E Other (See Comments)     Nose bleeds    Zocor [Simvastatin] Other (See Comments)     Sharp body pain                      FAMILY HISTORY:  Does not pertain tochief complaint. Review of Systems       Objective  BP (!) 165/81   Pulse 65   Temp 97.9 °F (36.6 °C) (Oral)   Resp 18   Ht 5' 8\" (1.727 m)   Wt 160 lb (72.6 kg)   SpO2 98%   BMI 24.33 kg/m² *    Physical Exam  Ortho Exam  Neurologic Exam         ROS x10: The patient also complains of severely impaired mobility and activities of daily living. Otherwise no new problems with vision, hearing, nose, mouth, throat, dermal, cardiovascular, GI, , pulmonary, musculoskeletal, psychiatric or neurological. See Rehab H&P on Rehab chart dated . Bowel:   continent   Bladder: continent    General:  Patient is well developed,   adequately nourished, and    well kempt. HEENT:    Pupils equal, hearing intact to loud voice, external inspection of ear     and nose benign. Inspection of lips, tongue and gums benign  Musculoskeletal: No significant change in strength or tone. All joints stable. Inspection and palpation of digits and nails show no clubbing,       cyanosis or inflammatory conditions.    Neuro/Psychiatric: Affect: flat but pleasant. Alert and oriented to person, place and     Situation with    cues. No significant change in deep tendon reflexes or     sensation  Lungs:  Diminished, CTA-B. Respiration effort is   normal at rest.     Heart:   S1 = S2,     RRR. Abdomen:  Soft, non-tender, no enlargement of liver or spleen. Extremities:  Plus 1 lower extremity edema    Skin:   Intact to general survey,           After extensive review of the records and above physical exam, I have formulated the followingdiagnoses and plan:      DIAGNOSES:    1. The patient was admitted to the acute rehabilitation unit with the primary rehab diagnoses being severe abnormality of gait and mobility and impaired self care and ADL's due to  Severe PVD s/p AAA repain    Compared to Pre-Admission Assessment, patients medical and functional status remain challengingly complex and patient continues to requireintensive therapeutic intervention from multiple therapies, therefore, initiate acute intensive comprehensive inpatient rehabilitation program including PT/OT to improve balance, ambulation, ADLs, and to improve the P/AROM. Functional and medical status reassessed regarding patients ability to participate in therapies and patient found to be able to participate in acute intensivecomprehensive inpatient rehabilitation program.  Therapeutic modifications regarding activities in therapies, place, amount of time per day and intensity of therapy made daily. Enroll in acute course of therapy program to include 1/2 hours per day of PT 5 to 7days per week and 1 1/2 hours per day of OT 5 to 7 days per week,   SLP and Rec T 1/2 hour per day 3-5 days per week. The patient is stable medically and physically on previous exam.  When necessary therapy will be spread out over a 7-day window.      This patient present with significant new onset decreased mobility andinability to perform activities of daily living skills independently and is at significant risk for prolonged disability  For this reason they have been admitted to [facility]. Thepatient's current functional and medical status are highly complex but the patient is able to participate in intensive rehabilitation. A comprehensive inpatient rehabilitation program is appropriate. The patient [place] initial evaluation by the rehabilitation team and be discussed at regular treatment team meetings to assess progress, mobility, self care, mood and discharge issues. Physical therapy will be consulted for mobilityand endurance issues and should be performed 1 to 2 times per day, 7 days per week for the length of stay. Occupational therapy will be consulted for activities of daily living and should be performed 1 to 2 times per day, 7 days per week for the length of stay. Their capacity to participate at an acute level, decision to be treated in the gym, room or on the unit, their activity goals for the day and the number of minutes of activeparticipation will be reassessed and re-prescribed daily. Because this patient is medically complex, I will check a CBC, BMP, UA and orthostatic blood pressures. They will be reassessed daily regarding their ability toparticipate in an acute level rehabilitation program.  Recreational Therapy will be consulted for community re-entry and adjustment to disability. Communication, cognitive and emotional issues will also be addressed duringthis rehabilitation stay by rehabilitation psychologist or speech therapist as appropriate. I reviewed the patient's old and current charts and discussed other rehabilitation options with the rehabilitation team including the rehab RN and the admission team as well as the patient.   I feel that the patient's functional recovery would be best served at an acute inpatient rehabilitation program because the patient needs intense therapy three hours per day, direct RN supervision and daily monitoring by a physician for medical status. This cannot be sufficiently provided by home health care, a skilled nursing facility or in an outpatient setting. I further feel that the patient has the potential to improve functional abilities in an acute intensive rehabilitation program.    Old records were reviewed and summarized. 2.  Other diagnoses which complicate rehabilitation stay include:     Principal Problem:    PVD (peripheral vascular disease) ([address]  Resolved Problems:    * No resolved hospital problems. *    Patient Active Problem List   Diagnosis    Atrial fibrillation ([address]    High risk medication use    Atherosclerosis of native coronary artery of native heart without angina pectoris    Hypertension    Ischemic myocardial dysfunction    Nonrheumatic aortic valve disorder, unspecified    Aortic valve disorder    Personal history of nicotine dependence    Presence of aortocoronary bypass graft    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HCC)    Vasovagal syncope    Dizziness and giddiness    Cerebrovascular accident ([address]    Orthostatic dizziness    Ataxia    Vertigo    Meniere disease, bilateral    Benign paroxysmal positional vertigo due to bilateral vestibular disorder    Coronary angioplasty status    Ataxic gait    Kyphoscoliosis    Spinal stenosis of lumbar region with neurogenic claudication    Abdominal aortic aneurysm (AAA) ([address]    Acute inferior myocardial infarction ([address]    Carotid bruit    Chronic obstructive pulmonary disease ([address]    Diabetes mellitus ([address]    Dyspnea on exertion    Fatigue    First degree atrioventricular block    Hyperlipidemia    Infection of the inner ear    Muscle pain    Underweight    Ventricular premature beats    Weight loss    PVD (peripheral vascular disease) ([address]         I am especially concerned about their recent medical complexities.     The patient's high risk medication use includes  See mar, breathing tx.    The patient is high risk for urinary tract infection, an admission urinalysis has been ordered. I will have the nurses check post void residual bladder volumes and place acatheter if excessive urine is retained in the bladder after voiding. The patient is risk for deep venous thromosis, complex deep venous thrombosis protocol prophylaxis has been ordered  See mar . The patient is high risk for orthostasis and a hydration program and orthostatic blood pressure screening have been ordered. I will attempt to get old records from the patient's previous hospital stay. [facility]. 3.  Current and previous medications were reviewed and summarized and compared to old medication lists from home and from the acute floor. 4.  Complex labs and x-rays were reviewed. I will review patient's old EKG and labs. 5.  Will provide emotional support for this patient regarding adjustment to their disability. Cognition and mood will be screened daily and addressed by rehabilitationpsychology and/or speech therapy as appropriate. I have encouraged the patient to attend the Rehab Adjustment to Disability Support Group and recreational therapy. 6.  Estimated length of stay is   2-3 weeks. Discharge to home with help from family and home health PT, OT, RN, and aide. Patient should be independent at discharge. 7.  The patient's medical and rehab prognosis are good. [address] regarding the patient's back up to general medical needs. A welcome letter was presented with an explanation of my services, my specialty and what to expect during the rehabilitation process. As well as introducing myself, I also wrote my name on their bedside marker board with their name as well as the names of the other physicians with an explanation of our individual roles in their care, as well as the rehab process.         Valerio Del Castillo D.O., LG ARGUELLO[facility]LINDA

## 2022-04-13 NOTE — PROGRESS NOTES
[facility]   Facility/Department: [facility]  Speech Language Pathology  Initial Speech/Language/Cognitive Assessment    Abeba Dejesus  : 1940 (79 y.o.)   MRN: [id number]  [id number]  ADMISSION DATE: 2022  PATIENT DIAGNOSIS(ES): Diagnosis: Impaired Mobility d/t Severe PVD with Lower Extremity Ischemia. [facility] rehab admit 22  No chief complaint on file.     Patient Active Problem List    Diagnosis Date Noted    Atrial fibrillation ([address] 2019    High risk medication use 2019    PVD (peripheral vascular disease) ([address] 2022    Abdominal aortic aneurysm (AAA) ([address] 2021    Acute inferior myocardial infarction ([address] 2021    Carotid bruit 2021    Chronic obstructive pulmonary disease ([address] 2021    Diabetes mellitus ([address] 2021    Dyspnea on exertion 2021    Fatigue 2021    First degree atrioventricular block 2021    Hyperlipidemia 2021    Infection of the inner ear 2021    Muscle pain 2021    Underweight 2021    Ventricular premature beats 2021    Weight loss 2021    Ataxic gait 2021    Kyphoscoliosis 2021    Spinal stenosis of lumbar region with neurogenic claudication 2021    Meniere disease, bilateral     Benign paroxysmal positional vertigo due to bilateral vestibular disorder     Vertigo 2021    Ataxia 2021    Cerebrovascular accident ([address] 2020    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[address] 2020    Vasovagal syncope 2020    Dizziness and giddiness 2020    Orthostatic dizziness 2020    Atherosclerosis of native coronary artery of native heart without angina pectoris 2018    Hypertension 2018    Ischemic myocardial dysfunction 2018    Nonrheumatic aortic valve disorder, unspecified 2018    Aortic valve disorder 2018    Personal history of nicotine dependence 11/26/2018    Presence of aortocoronary bypass graft 11/26/2018    Coronary angioplasty status 11/26/2018     Past Medical History:   Diagnosis Date    Atrial fibrillation (HCC)     CAD (coronary artery disease)     cardiac stents    [facility]) [facility]     Diabetes mellitus ([facility]     Hyperlipidemia     Hypertension     Movement disorder     Pneumonia      Past Surgical History:   Procedure Laterality Date    APPENDECTOMY      CORONARY ANGIOPLASTY WITH STENT PLACEMENT      4    CORONARY ARTERY BYPASS GRAFT      triple bypass    EYE SURGERY Bilateral     cataract surgery    INSERTABLE CARDIAC MONITOR Left 12/2018       Restrictions/Precautions:Restrictions/Precautions: Fall Risk  Weight: 160 lb (72.6 kg) / Body mass index is 24.33 kg/m². ADULT DIET; Regular; 4 carb choices (60 gm/meal)    SpO2: 96 % (04/13/22 0728)  No active isolations    DATE ONSET: 04/07/2022      Date of Evaluation: 4/13/2022   Evaluating Therapist: ANUJ Youngblood    Assessment:      Diagnosis: Mild cognitive-liguistic deficits were noted in the areas of divergent naming, short term/recent memory, immediate recall of numbers and delayed recall of 1/3 items. Patient reported the memory deficits that were noted during the eval are baseline for him. He reported he already utilizes memory strategies at home to improve recall. Patient feels he does not need ST intervention at  this time. Patient's wife manages medication and finance management at home. Recommendations:  Requires SLP Intervention: No  Duration/Frequency of Treatment: No f/u cognition/speech/language  D/C Recommendations: To be determined          Goals:  Short-term Goals  Timeframe for Short-term Goals: N/A   Patient's goals: Patient denies need for SLP intervention at this time.      Subjective:   Previous level of function and limitations: patient reports baseline memory deficits  General  Chart Reviewed: Yes  Patient assessed for rehabilitation services?: Yes  Family / Caregiver Present: No  Subjective  Subjective: Patient was pleasant and cooperative. Patient reported mild baseline memory deficits PTA. Patient denies worsening memory deficits since being admitted to the hospital. Patient reports utilizing memory strategies to improve recall     Vision  Vision: Impaired  Vision Exceptions: Wears glasses for reading  Hearing  Hearing: Exceptions to [facility]  Hearing Exceptions: Hard of hearing/hearing concerns           Objective:     Oral/Motor  Oral Motor: Within functional limits    Auditory Comprehension  Comprehension: Within Functional Limits (Intermittent repetition was required secondary to patient being hard of hearing)  Yes/No Questions:  [facility])  Basic Questions:  [facility])  One Step Basic Commands:  (% I)  Two Step Basic Commands:  (% I)  Multistep Basic Commands:  (% I with 3-4 step directions)  Conversation:  (WFL)  Interfering Components: Hearing  Effective Techniques: Repetition    Reading Comprehension  Reading Status: Unable to assess (Due to time constraint)    Expression  Primary Mode of Expression: Verbal    Verbal Expression  Verbal Expression: Exceptions to functional limits  Initiation:  (WFL)  Convergent:  (WFL named 2/2 concrete categories)  Divergent: Mild (WFL naming 3/3 abstract items. 13 concrete items in 1 minute)  Conversation:  (WFL no word finding deficits wer enoted)    Written Expression  Dominant Hand: Right  Written Expression: Unable to assess (Due to time constraint)    Motor Speech  Motor Speech: Within Functional Limits    Pragmatics/Social Functioning  Pragmatics: Within functional limits    Cognition:      Orientation  Overall Orientation Status: Impaired  Orientation Level: Oriented to place;Oriented to situation;Oriented to person;Disoriented to time (Disoriented to most recent holiday.  Oriented to year, month, shanell and date)  Attention  Attention: Within Functional Limits  Memory  Memory: [facility] to [facility]  Short-term Memory: Mild (28/30 on recent memory portion of the eval. Recalled 3/3 items I with 100% accuracy after 2 minutes, 2/3 items recalled after 5 minutes)  Working Memory: Mild (repeated 3-7 numbers I with 40% accuracy; 28/30 on temporal orientation portion of the eval)  Problem Solving  Problem Solving: Within Functional Limits  Managing Finances:  (Wife manages)  Managing Medications:  (Wife manages)  Simple Functional Tasks:  (WFL 30/30 on problem solving portion)  Verbal Reasoning Skills:  (WFL 30/30 on reasoning portion)  Abstract Reasoning  Abstract Reasoning: Exceptions to [facility]  Divergent Thinking: Mild  Safety/Judgement  Safety/Judgement: Within Functional Limits  Routine Tasks:  [facility] with 3/3 safety questions)    Additional Assessments:       Portions of the RIPA-2 [facility] Information Processing Assessment-2nd Edition ) were administered. Scores are as follows:  Subtest:    Raw Score Standard Score   II. Recent Memory 28 13   III. Temporal Orientation 28 12   VIII. Problem Solving and          Abstract Reasoning 30 16      Patient exhibited   , reduced hearing with the need for direct facial cues and repetition. Prognosis:  Speech Therapy Prognosis  Prognosis: Good  Individuals consulted  Consulted and agree with results and recommendations: Patient;RN Annalisa Humphrey)    Education:  Patient Education: Patient was educated on SLE results  Patient Education Response: Verbalizes understanding  Safety Devices in place: Yes  Type of devices: Call light within reach; Chair alarm in place    Pain Assessment:  Pre-Treatment  Pain assessment: 0-10  Pain level: 0  Intervention:  Patient denies pain. Post-Treatment  Pain assessment: 0-10  Pain level: 0  Intervention:  Patient denies pain.     Speech Therapy Level of Assistance Scale:  AUDITORY COMPREHENSION  Rating: Modified Independent    VERBAL EXPRESSION  Rating: Supervised Assistance    MOTOR SPEECH  Rating: Independent    PROBLEM SOLVING  Rating: Modified Independent    MEMORY  Rating: Supervised Assistance             Therapy Time  SLP Individual Minutes  Time In: 1310  Time Out: 1330  Minutes: 20          Signature: Electronically signed by ANUJ Cox on 4/13/2022 at 2:03 PM

## 2022-04-13 NOTE — PROGRESS NOTES
Physical Therapy Rehab Treatment Note  Facility/Department: [facility]  [id number]       NAME: Martha Robles  : 1940 (79 y.o.)  MRN: [id number]  CODE STATUS: Full Code    Date of Service: 2022  Chart Reviewed: Yes  Family / Caregiver Present: No  Restrictions:  Restrictions/Precautions: Fall Risk    SUBJECTIVE:  Pt states he can't believe how quick he gets tired. Pre and Post Treatment Pain Screenin/10     OBJECTIVE:   Overall Orientation Status: Within Functional Limits  Follows Commands: Within Functional Limits             Transfers  Sit to Stand: Stand by assistance  Stand to sit: Stand by assistance    Ambulation  Ambulation?: Yes  Ambulation 1  Surface: carpet  Device: Rolling Walker  Other Apparatus: O2  Assistance: Stand by assistance  Quality of Gait: Fwd fled posture; Increased SOB and fatigue after  Distance: 50ft        Activity Tolerance  Activity Tolerance: Patient limited by endurance  Activity Tolerance: RPE at rest = 11 RPE with activity=15, SaO2 = 95% on RA at rest.  PT reports SPO2 80% after ambulation on RA. 2L O2 placed on pt with activity. SPO2 96% with activity on 2L. RN notified. Exercises  Comments: 2 min step test= 56steps in 57sec  RPE at rest prior to test 11; after completeing 2 min step test RPE 15; SPO2 96% on 2L O2  Other exercises  Other exercises?: Yes  Other exercises 1: STS x3; x5 Required UE support     ASSESSMENT/PROGRESS TOWARDS GOALS: Pt limited by fatigue and decreased endurance. SPO2 destats on room air with activity. Maintained >92% on 2L O2. Goals:  Long term goals  Long term goal 1: Pt to complete bed mobility with indep  Long term goal 2: Pt to complete transfers with indep  Long term goal 3: Pt to ambulate 50-150ft with [employer] and indep  Long term goal 4: Pt to complete 4 steps with HR and supervision  Long term goal 5: Pt to complete HEP with indep     PLAN OF CARE/Safety: Cont per POC.        Therapy Time:   Individual   Time In

## 2022-04-13 NOTE — PROGRESS NOTES
Occupational Therapy   Occupational Therapy Initial Assessment  Date: 2022   Patient Name: Baudilio Wei  MRN: [id number]     : 1940    Date of Service: 2022    Discharge Recommendations:  Continue to assess pending progress  OT Equipment Recommendations  Equipment Needed: Yes    Assessment   Performance deficits / Impairments: Decreased functional mobility ; Decreased endurance;Decreased ADL status; Decreased fine motor control;Decreased safe awareness;Decreased high-level IADLs;Decreased strength;Decreased balance  Assessment: Pt is a 78 y/o male who presents with above deficits which also impacts ADL/IADL function. Pt s/p AAA repair. Pt very limited by fatigue. Pt requires continued OT to address ADL/IADL function  Prognosis: Good  Decision Making: Medium Complexity  History: moderate chart review  Exam: 8 perf deficits  Assistance / Modification: min-mod  OT Education: Plan of Care;OT Role  REQUIRES OT FOLLOW UP: Yes  Activity Tolerance  Activity Tolerance: Patient limited by fatigue;Patient Tolerated treatment well  Activity Tolerance: frequently SOB with any exertion           Patient Diagnosis(es): There were no encounter diagnoses. has a past medical history of Atrial fibrillation ([facility] [address] CAD (coronary artery disease), COPD (chronic obstructive pulmonary disease) ([facility] [address] Diabetes mellitus ([facility] [address] Hyperlipidemia, Hypertension, Movement disorder, and Pneumonia. has a past surgical history that includes Coronary artery bypass graft; Appendectomy; Eye surgery (Bilateral); Insertable Cardiac Monitor (Left, 2018); and Coronary angioplasty with stent.            Restrictions  Restrictions/Precautions  Restrictions/Precautions: Fall Risk    Subjective   General  Chart Reviewed: Yes  Patient assessed for rehabilitation services?: Yes  Family / Caregiver Present: No  Referring Practitioner: Dr. Buckner Second  Diagnosis: Impaired mobility d/t severe PVD w/ LE ischemia  Patient Currently in Pain: No  Pain Assessment  Pain Assessment: 0-10  Pain Level: 0  Pre Treatment Pain Screening  Pain at present: 0  Vital Signs  Level of Consciousness: Alert (0)  Patient Currently in Pain: No  Social/Functional History  Social/Functional History  Lives With: Spouse  Type of Home: House  Home Layout: One level  Home Access: Stairs to enter with rails  Entrance Stairs - Number of Steps: 3  Entrance Stairs - Rails: Both  Bathroom Shower/Tub: Tub/Shower unit,Shower chair with back  Bathroom Toilet: Handicap height  Bathroom Equipment: Shower chair (neighbor to install grab bars)  Home Equipment: Interneer Help From: Family  ADL Assistance: Needs assistance  Bath: Minimal assistance (pt washes self up, wife assisting in drying. Assists also needed to get into shower)  Dressing: Minimal assistance (assist over feet from wife \"I do about 80%\")  Toileting: Independent  Homemaking Assistance: Needs assistance  Homemaking Responsibilities: Yes (wife completes most IADLs. 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 Pt pleasant and motivated.   Edema: RUE edema- bruising  Balance  Sitting Balance: Stand by assistance  Standing Balance: Contact guard assistance  Functional Mobility  Functional - Mobility Device: Rolling Walker  Activity: To/from bathroom  Assist Level: Contact guard assistance  Shower Transfers  Shower - Transfer From: Arthur Hobbs - Transfer Type: To and From  Shower - Transfer To: Shower seat with back  Shower - Technique: Ambulating  Shower Transfers: Contact Guard  ADL  Feeding: Setup  Grooming: Minimal assistance  UE Bathing: Minimal assistance  LE Bathing: Moderate assistance  UE Dressing: Minimal assistance  LE Dressing: Maximum assistance  Toileting: Unable to assess(comment)  Additional Comments: Shower completed- nurse approved shower. Toileting not completed- did not have to go. Pt very SOB during session- frequent rest breaks needed  Tone RUE  RUE Tone: Normotonic;Hypertonic  Coordination  Movements Are Fluid And Coordinated: No  Coordination and Movement description: Fine motor impairments        Transfers  Stand Pivot Transfers: Contact guard assistance  Sit to stand: Contact guard assistance  Stand to sit: Contact guard assistance     Cognition  Overall Cognitive Status: Exceptions  Arousal/Alertness: Appropriate responses to stimuli  Following Commands:  Follows one step commands consistently  Attention Span: Attends with cues to redirect  Memory: Decreased recall of precautions  Safety Judgement: Decreased awareness of need for safety  Problem Solving: Assistance required to generate solutions  Insights: Decreased awareness of deficits  Initiation: Requires cues for some  Sequencing: Requires cues for some  Cognition Comment: compre- Kadie, express- indeo, social inter- Kadie, problem solv- SUP, memory-SUP        Sensation  Overall Sensation Status: Impaired (numbness in feet)        LUE AROM (degrees)  LUE AROM : WFL  RUE AROM (degrees)  RUE AROM : WFL  LUE Strength  Gross LUE Strength: Exceptions to [facility]  LUE Strength Comment: 4-/5  RUE Strength  Gross RUE Strength: Exceptions to [facility]  RUE Strength Comment: 4-/5     Hand Dominance  Hand Dominance: Right             Plan   Plan  Times per week: 5-7x/week  Plan weeks: 1.5-2 weeks  Current Treatment Recommendations: Strengthening,Home Management Training,Balance Training,Functional Mobility Training,Endurance Training,Safety Education & Training,Self-Care / ADL               Goals  Long term goals  Time Frame for Long term goals : Within 1.5-2 weeks, pt will demonstrate progress in the following areas listed below to achieve specific LTGs as stated in the initial evaluations  Long term goal 1: Pt will demo improved ADL/IADL function for d/c at highest potential  Long term goal 2: Pt will demo improved activity tolerance for ADLs  Long term goal 3: Pt will demo improved UE strength  Long term goal 4: Pt will demo improved standing balance/tolerance for increased LB ADLs status  Patient Goals   Patient goals : \"just to get out of here and fend for myself. Aryan Gore I used to cook a lot\"    - Patient will complete self care as followed using the recommended adaptive equipment and/or adaptive techniques as instructed:  Feeding: Independent  Grooming: Independent  Bathing: Min A  UE Dressing: Mod I  LE Dressing: Min A  Toileting: Mod I  Toilet Transfer: Mod I  Shower/Tub Transfer: Supervision  - Patient will sequence self-care routine with out assistance and no verbal/tactile cues  - Patient will improve static and dynamic standing balance to complete pants management at indep level  - Patient will improve B UE Function (AROM, strength, motor control, tone normalization) to complete ADLs as projected. - Patient will improve functional endurance to tolerate/complete 60 minutes of ADLs. - Patient will improve B UE strength and endurance to good in order to participate in self-care activities as projected. - Patient will improve B hand fine motor coordination to good in order to manage clothing fasteners/self-care containers in a timely manner  - Patient will perform kitchen mobility at device level without episodes of LOB and good safety awareness   - Patient will perform basic room mobility at indep level.   - Patient will access appropriate D/C site with as few architectural barriers as possible. - Patient and/or caregiver will demonstrate understanding of energy conservation techniques with minimal verbal/tactile cues.    - Patient and/or caregiver will demonstrate understanding of recommended HEP for strengthening .   - Patient's cognition will improve or maintain baseline to safely perform ADLs:  Comprehension: Independent  Expression: Independent  Social Interaction: Independent  Problem Solving: Independent  Memory: Independent        Therapy Time   Individual Concurrent Group Co-treatment   Time In 1100         Time Out 1200         Minutes 60              Eval: 60 minutes      Raj Patel OT     Electronically signed by Raj Patel OT on 4/13/2022 at 12:46 PM

## 2022-04-13 NOTE — PROGRESS NOTES
Nephrology Consult Note dictated    Assessment:  S/P Replacement  OHD CAD Hx AF  S/p cabgx  Hypertension  DM type-2  Hx CVA  AAA  Hyperlipdemia      Plan: resume nadir  rehab    Patient Active Problem List:     Atrial fibrillation ([facility] [address]     High risk medication use     Atherosclerosis of native coronary artery of native heart without angina pectoris     Hypertension     Ischemic myocardial dysfunction     Nonrheumatic aortic valve disorder, unspecified     Aortic valve disorder     Personal history of nicotine dependence     Presence of aortocoronary bypass graft     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HCC)     Vasovagal syncope     Dizziness and giddiness     Cerebrovascular accident ([facility] [address]     Orthostatic dizziness     Ataxia     Vertigo     Meniere disease, bilateral     Benign paroxysmal positional vertigo due to bilateral vestibular disorder     Coronary angioplasty status     Ataxic gait     Kyphoscoliosis     Spinal stenosis of lumbar region with neurogenic claudication     Abdominal aortic aneurysm (AAA) ([facility] [address]     Acute inferior myocardial infarction (HCC)     Carotid bruit     Chronic obstructive pulmonary disease (HCC)     Diabetes mellitus (HCC)     Dyspnea on exertion     Fatigue     First degree atrioventricular block     Hyperlipidemia     Infection of the inner ear     Muscle pain     Underweight     Ventricular premature beats     Weight loss     PVD (peripheral vascular disease) ([facility])      Subjective:  Admit Date: 4/12/2022    Interval History:feeling fine    Medications:  Scheduled Meds:   clopidogrel  75 mg Oral Daily    apixaban  5 mg Oral BID    pitavastatin  4 mg Oral Nightly    magnesium oxide  400 mg Oral Daily    metFORMIN  500 mg Oral BID WC    sotalol  80 mg Oral BID    insulin lispro  0-6 Units SubCUTAneous TID WC    insulin lispro  0-3 Units SubCUTAneous Nightly     Continuous Infusions:   dextrose         CBC: No results for input(s): WBC, HGB, PLT in the last 72 hours.   CMP: Recent Labs     04/11/22  1350      K 3.9      CREATININE 1.10   GLUCOSE 216*   CALCIUM 7.9*     Troponin: No results for input(s): TROPONINI in the last 72 hours. BNP: No results for input(s): BNP in the last 72 hours. INR: No results for input(s): INR in the last 72 hours. Lipids: No results for input(s): CHOL, LDLDIRECT, TRIG, HDL, AMYLASE, LIPASE in the last 72 hours. Liver:   Recent Labs     04/11/22  1350   AST 24   ALT 20   ALKPHOS 59   PROT 5.1*   LABALBU 2.8*   BILITOT 1.0     Iron:  No results for input(s): IRONS, FERRITIN in the last 72 hours. Invalid input(s): LABIRONS  Urinalysis: No results for input(s): UA in the last 72 hours.     Objective:  Vitals: BP (!) 156/73   Pulse 62   Temp 97.7 °F (36.5 °C) (Oral)   Resp 17   Ht 5' 8\" (1.727 m)   Wt 160 lb (72.6 kg)   SpO2 96%   BMI 24.33 kg/m²    Wt Readings from Last 3 Encounters:   04/12/22 160 lb (72.6 kg)   12/29/21 151 lb 8 oz (68.7 kg)   10/12/21 150 lb (68 kg)      24HR INTAKE/OUTPUT:  No intake or output data in the 24 hours ending 04/13/22 0915    General: alert, in no apparent distress  HEENT: normocephalic, atraumatic, anicteric  Neck: supple, no mass  Lungs: non-labored respirations, clear to auscultation bilaterally  Heart: regular rate and rhythm, no murmurs or rubs  Abdomen: soft, non-tender, non-distended  Ext: no cyanosis, no peripheral edema  Neuro: alert and oriented, no gross abnormalities  Psych: normal mood and affect  Skin: no rash      Electronically signed by Isabel Sheth DO, MD

## 2022-04-13 NOTE — PLAN OF CARE
Problem: Falls - Risk of:  Goal: Will remain free from falls  Description: Will remain free from falls  Outcome: Met This Shift  Goal: Absence of physical injury  Description: Absence of physical injury  Outcome: Met This Shift     Problem: Mobility - Impaired:  Goal: Mobility will improve  Description: Mobility will improve  Outcome: Ongoing     Problem: Nutritional:  Goal: Ability to tolerate tube feedings without aspirating will improve  Description: Ability to tolerate tube feedings without aspirating will improve  Outcome: Completed not applicable   Goal: Consumption of liquid of appropriate consistency  Description: Consumption of liquid of appropriate consistency  Outcome: Met This Shift     Problem: Respiratory:  Goal: Ability to maintain a clear airway will improve  Description: Ability to maintain a clear airway will improve  Outcome: Met This Shift  Goal: Will remain free from infection  Description: Will remain free from infection  Outcome: Ongoing  Goal: Absence of aspiration  Description: Absence of aspiration  Outcome: Met This Shift

## 2022-04-13 NOTE — PROGRESS NOTES
Occupational Therapy  Facility/Department: [facility]  Daily Treatment Note  NAME: Marco A Fuchs  : 1940  MRN: [id number]    Date of Service: 2022    Discharge Recommendations:  Continue to assess pending progress  OT Equipment Recommendations  Equipment Needed: Yes    Assessment   Performance deficits / Impairments: Decreased functional mobility ; Decreased endurance;Decreased ADL status; Decreased fine motor control;Decreased safe awareness;Decreased high-level IADLs;Decreased strength;Decreased balance  Assessment: Pt is a 80 y/o male who presents with above deficits which also impacts ADL/IADL function. Pt s/p AAA repair. Pt very limited by fatigue. Pt requires continued OT to address ADL/IADL function  Prognosis: Good  Decision Making: Medium Complexity  History: moderate chart review  Exam: 8 perf deficits  Assistance / Modification: min-mod  OT Education: Plan of Care;OT Role  REQUIRES OT FOLLOW UP: Yes  Activity Tolerance  Activity Tolerance: Patient limited by fatigue;Patient Tolerated treatment well  Activity Tolerance: frequently SOB with any exertion. Vitals monitored. With activity. Pt 98% spO2 with HR of 66 bpm  Safety Devices  Safety Devices in place: Yes  Type of devices: All fall risk precautions in place         Patient Diagnosis(es): There were no encounter diagnoses. has a past medical history of Atrial fibrillation ([address] CAD (coronary artery disease), COPD (chronic obstructive pulmonary disease) ([address] Diabetes mellitus ([address] Hyperlipidemia, Hypertension, Movement disorder, and Pneumonia. has a past surgical history that includes Coronary artery bypass graft; Appendectomy; Eye surgery (Bilateral); Insertable Cardiac Monitor (Left, 2018); and Coronary angioplasty with stent.     Restrictions  Restrictions/Precautions  Restrictions/Precautions: Fall Risk  Subjective   General  Chart Reviewed: Yes  Patient assessed for rehabilitation services?: Yes  Family / Caregiver Present: No  Referring Practitioner: Dr. Kayley Lazcano  Diagnosis: Impaired mobility d/t severe PVD w/ LE ischemia  Pre Treatment Pain Screening  Pain at present: 0  Intervention List: Patient able to continue with treatment  Vital Signs  Patient Currently in Pain: No   Orientation  Orientation  Overall Orientation Status: Within Functional Limits  Objective      While seated, completed fine motor task with focus on coordination, activity tolerance, and strength in order to improve general function. Challenged pt through usage of theraclips of varying resistance levels. Pt required to manipulate clips through pinching/grasping, reaching and placing according to instruction onto vertical and horizontal dowel. Pt then required to retrieve. Repetitive bilateral UE reaches completed to various planes and directions and at times required >90 degrees of shoulder flexion. Pt tolerated task well but fatigued very easily. Pt required frequent rest breaks due to SOB. Cues also needed for activity pacing. Some difficulty with clips of highest resistance. Vitals remained [facility]    Cognitive assessment      The patient completed the [address] [place] [address] ([facility]) Examination on this date, which is a useful screening tool for detecting mild cognitive impairment and signs of dementia. Range of impairments based on score are indicated in the chart below:      High school education  Scoring Less than High School Education   27-30 Normal 25-30   21-26 Mild Neurocognitive disorder 20-24   1-20 Dementia 1-19     Patient's level of education: high school  Patient scored 19/30 on this date, which indicates a possible cognitive deficits     Pt. with cognitive deficits which will be addressed in OT    Results of SLUMS assessment will be shared in team meeting to assess need for further action.      Plan   Plan  Times per week: 5-7x/week  Plan weeks: 1.5-2 weeks  Current Treatment Recommendations: Strengthening,Home Management Training,Balance Training,Functional Mobility Training,Endurance Training,Safety Education & Training,Self-Care / ADL             Goals  Long term goals  Time Frame for Long term goals : Within 1.5-2 weeks, pt will demonstrate progress in the following areas listed below to achieve specific LTGs as stated in the initial evaluations  Long term goal 1: Pt will demo improved ADL/IADL function for d/c at highest potential  Long term goal 2: Pt will demo improved activity tolerance for ADLs  Long term goal 3: Pt will demo improved UE strength  Long term goal 4: Pt will demo improved standing balance/tolerance for increased LB ADLs status  Patient Goals   Patient goals : \"just to get out of here and fend for myself. Blanchie Bevels Blanchie Bevels Blanchie Bevels I used to cook a lot\"       Therapy Time   Individual Concurrent Group Co-treatment   Time In 1530         Time Out 1600         Minutes 30              Therapeutic activities: 20 minutes  Cognitive Retraining: 10 minutes      Tor Guajardo OT     Electronically signed by Tor Guajardo OT on 4/13/2022 at 3:58 PM

## 2022-04-13 NOTE — CONSULTS
[address]                      [address], [address] [facility]                                  CONSULTATION    PATIENT NAME: Erin Arechiga                       :        1940  MED REC NO:   [id number]                            ROOM:       [id number]  ACCOUNT NO:   [de-identified]                           ADMIT DATE: 2022  PROVIDER:     Ferd Bosworth, DO    CONSULT DATE:  2022    MEDICAL CONSULTATION    HISTORY OF PRESENT ILLNESS:  An 79-year-old male transferred from [address] [place] [address] after abdominal aneursym repair  The patient is here for rehab to  increase his function. He has a known history of diabetes, atrial  fibrillation, peripheral vascular disease, abdominal aortic aneurysm,  prior CVA, underlying organic heart disease with stent placements in the  past.  The patient is stable at this time with vital signs and lab work. PAST MEDICAL HISTORY:  Atrial fibrillation, coronary artery disease,  COPD, diabetes mellitus type 2, hypertension, hyperlipidemia. PAST SURGICAL HISTORY: Recent AAA Appendectomy, coronary artery bypass grafting,  history of stent placements, _____ cardiac monitor for coronary artery  disease. HABITS:  No smoking. No alcohol use at this time. ALLERGIES:  FENOFIBRATE, LIPITOR, NIACIN, NIASPAN, VITAMIN E, and ZOCOR. PHYSICAL EXAMINATION:  VITAL SIGNS:  The patient is 5 feet and 8 inches, 160 pounds. Blood  pressure at the time of my evaluation is 150/70, heart rate 70,  respirations 18, afebrile. HEENT:  Normocephalic. Pupils equal and reactive to light. Extraocular  muscles intact. NECK:  Supple. No JVD, bruits, or adenopathy. CHEST:  Lungs are clear. CARDIOVASCULAR:  Heart is regular without murmurs, gallops, clicks, or  rubs. ABDOMEN:  Soft. No guarding or rigidity. Bowel sounds are present. Surgical site fine  EXTREMITIES:  Showed no edema. SKIN:  Warm and dry. IMPRESSION:  1. S/p AAA repair.   2.  Organic heart disease. 3.  History of coronary artery disease. 4.  History of atrial fibrillation, status post coronary artery bypass  grafting. 5.  Hypertension. 6.  Diabetes mellitus type 2.  7.  Hyperlipidemia. PLAN:  Resume medications. Watch one touches with coverage. Blood  pressure control. Further treatment as needed. Use Xopenex if the  patient develops difficulty with breathing from his underlying COPD.         Celeste Blevins DO    D: 04/13/2022 15:27:05       T: 04/13/2022 15:31:00     [id number]  Job#: [id number]     Doc#: [id number]    CC:

## 2022-04-13 NOTE — PROGRESS NOTES
Pt resting quietly, shift assessment complete, Pt Covid test Negative, Pt  no insulin needed, Pt transfers up with one assist pivot to wheelchair, LBM 4/12/2022 call light in place bed alarm on. Electronically signed by Carol Martinez LPN on 1/90/4017 at 2:94 AM

## 2022-04-13 NOTE — PROGRESS NOTES
Physical Therapy Rehab Treatment Note  Facility/Department: [facility]  Room: [place] [address]       NAME: Homer Shin  : 1940 (79 y.o.)  MRN: [id number]  CODE STATUS: Full Code    Date of Service: 2022  Chart Reviewed: Yes  Family / Caregiver Present: No    Restrictions:  Restrictions/Precautions: Fall Risk    SUBJECTIVE: Subjective: \"I'm a little tired. \"  Pain Screening  Patient Currently in Pain: No  Pre Treatment Pain Screening  Pain at present: 0     Post Treatment Pain Screenin/10     OBJECTIVE:   Overall Orientation Status: Within Functional Limits  Follows Commands: Within Functional Limits             Transfers  Sit to Stand: Stand by assistance  Stand to sit: Stand by assistance  Comment: Verbal cues for hand placement and sequencing with sit to stand. Ambulation  Ambulation?: Yes  Ambulation 1  Surface: carpet  Device: Rolling Walker  Other Apparatus: O2  Assistance: Stand by assistance  Quality of Gait: Fwd fled posture; Increased SOB and fatigue after  Distance: 50ftx2  Comments: Post ambulatory SaO2 = 95% on RA;  VCs for upright posture and breathing technique. Activity Tolerance  Activity Tolerance: Patient limited by endurance  Activity Tolerance: Pt presented to therapy on room air with SPO2 98% at rest.  SPO2 maintained 95-98% with 30sec STS test and gait however increased SOB and fatigued noted after. Pt rates RPE 13 some what hard    Exercises  Comments: 30sec STS= 3  Other exercises  Other exercises?: Yes  Other exercises 1: STS x3; x5 Required UE support  Other exercises 2: standing march at [facility] x30 sec for endurance     ASSESSMENT/PROGRESS TOWARDS GOALS:  Assessment: Fatigued quickly with increased SOB with activity however maintained SPO2 levels >95% on room air. Pt demo'd decreased endurance with ability to complete 3 sit to stands in 30 sec.     Goals:  Long term goals  Long term goal 1: Pt to complete bed mobility with indep  Long term goal 2: Pt to complete transfers with indep  Long term goal 3: Pt to ambulate 50-150ft with [employer] and indep  Long term goal 4: Pt to complete 4 steps with HR and supervision  Long term goal 5: Pt to complete HEP with indep  Long term goal 6: Pt to complete 50reps on 2min Step test to demonstrate improved activity tolerance  Long term goal 7: Pt to complete 10reps 30sec STS    PLAN OF CARE/Safety:   Safety Devices  Type of devices:  All fall risk precautions in place    Therapy Time:   Individual   Time In 1330   Time Out 1400   Minutes 30     Minutes:      Transfer/Bed mobility trainin      Gait training:15      Neuro re education:0     Therapeutic ex:10    Micky Sandra PTA, 22 at 1:57 PM

## 2022-04-13 NOTE — PROGRESS NOTES
Facility/Department: [facility]  [facility] Initial Assessment: Physical Therapy  Room: R247/R247-01    NAME: Karen Christianson  : 1940  MRN: [id number]    Date of Service: 2022    Rehab Diagnosis(es): Impaired Mobility d/t Severe PVD with Lower Extremity Ischemia.  [facility] rehab admit 22  Patient Active Problem List    Diagnosis Date Noted    Atrial fibrillation ([address] 2019    High risk medication use 2019    PVD (peripheral vascular disease) ([address] 2022    Abdominal aortic aneurysm (AAA) ([address] 2021    Acute inferior myocardial infarction ([address] 2021    Carotid bruit 2021    Chronic obstructive pulmonary disease ([address] 2021    Diabetes mellitus ([address] 2021    Dyspnea on exertion 2021    Fatigue 2021    First degree atrioventricular block 2021    Hyperlipidemia 2021    Infection of the inner ear 2021    Muscle pain 2021    Underweight 2021    Ventricular premature beats 2021    Weight loss 2021    Ataxic gait 2021    Kyphoscoliosis 2021    Spinal stenosis of lumbar region with neurogenic claudication 2021    Meniere disease, bilateral     Benign paroxysmal positional vertigo due to bilateral vestibular disorder     Vertigo 2021    Ataxia 2021    Cerebrovascular accident ([address] 2020    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[address] 2020    Vasovagal syncope 2020    Dizziness and giddiness 2020    Orthostatic dizziness 2020    Atherosclerosis of native coronary artery of native heart without angina pectoris 2018    Hypertension 2018    Ischemic myocardial dysfunction 2018    Nonrheumatic aortic valve disorder, unspecified 2018    Aortic valve disorder 2018    Personal history of nicotine dependence 2018    Presence of aortocoronary bypass graft 2018    Coronary angioplasty status 11/26/2018       Past Medical History:   Diagnosis Date    Atrial fibrillation ([address]     CAD (coronary artery disease)     cardiac stents    COPD (chronic obstructive pulmonary disease) ([address]     Diabetes mellitus ([address]     Hyperlipidemia     Hypertension     Movement disorder     Pneumonia      Past Surgical History:   Procedure Laterality Date    APPENDECTOMY      CORONARY ANGIOPLASTY WITH STENT PLACEMENT      4    CORONARY ARTERY BYPASS GRAFT      triple bypass    EYE SURGERY Bilateral     cataract surgery    INSERTABLE CARDIAC MONITOR Left 12/2018       Chart Reviewed: Yes  Patient assessed for rehabilitation services?: Yes  Family / Caregiver Present: No  Diagnosis: Impaired Mobility d/t Severe PVD with Lower Extremity Ischemia. [facility] rehab admit 4/12/22  General Comment  Comments: Pt resting in bed - agreeable to PT evaluation    Restrictions:  Restrictions/Precautions: Fall Risk     SUBJECTIVE: Subjective: \"Will this be strenuous? \"    Pre Treatment Pain Screening  Comments / Details: denies    Post Treatment Pain Screening:  Pain Assessment  Pain Assessment:  (denies)    Prior Level of Function:  Social/Functional History  Lives With: Spouse  Type of Home: House  Home Layout: One level  Home Access: Stairs to enter with rails  Entrance Stairs - Number of Steps: 3  Entrance Stairs - Rails: Both  Bathroom Shower/Tub: Tub/Shower unit  Bathroom Toilet: Handicap height  Bathroom Equipment: Shower chair (neighbor to install grab bars)  Home Equipment: [email or web address] SHIFT Help From: Family  ADL Assistance: Needs assistance  Bath: Supervision  Dressing:  (assists with pants)  Homemaking Assistance: Needs assistance (wife completes housework)  Homemaking Responsibilities: No  Ambulation Assistance: Independent [employer])  Transfer Assistance: Independent  Active : No  Patient's  Info: wife  Mode of Transportation: Car ([place])  Education: high school  Occupation: Retired  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 Pt pleasant and motivated. ROM:  RLE General PROM: Hamstring tightness limited 40degrees from full extension in supine 90/90 test. Significantly limted hip rotation IR>ER. Ankle DF to neutral only. LLE General PROM: Hamstring tightness limited 40degrees from full extension in supine 90/90 test. Significantly limted hip rotation IR>ER. Ankle DF to neutral only. Strength:  Strength RLE  Comment: Grossly 3-/5 (2/5 hip ext)  Strength LLE  Comment: Grossly 3-/5 (2/5 hip ext)  Strength Other  Other: Trunk strength grossly 2-/5    Neuro:  Sensation  Overall Sensation Status: WFL     Balance  Sitting - Static: Good  Sitting - Dynamic: Good  Standing - Static: Good;-  Standing - Dynamic: Fair;+  Comments: Delayed righting responses in standing. Motor Control  Gross Motor?: WFL    Bed mobility  Bridging: Supervision  Rolling to Left: Modified independent  Rolling to Right: Modified independent  Supine to Sit: Contact guard assistance  Sit to Supine: Stand by assistance  Scooting: Stand by assistance  Comment: Increased effort and time to complete. Instructed in logroll technique and use of UEs to assist in lifting trunk from bed surface. Transfers  Sit to Stand: Stand by assistance;Contact guard assistance  Stand to sit: Stand by assistance  Bed to Chair: Contact guard assistance;Stand by assistance  Car Transfer: Contact guard assistance  Comment: Verbal cues for hand placement and sequencing, especially when approaching chair.     Ambulation  Ambulation?: Yes  Ambulation 1  Surface: carpet  Device: Rolling Walker  Other Apparatus: O2 (1 trial without O2, 1 trial with 2L O2)  Assistance: Stand by assistance  Quality of Gait: Pt with FF over [employer], however demonstrates consistent pattern throughout. C/o SOB and fatigue. Distance: 50ft without O2; and 25ft with O2  Comments: Post ambulatory SaO2 = 80% on RA; with 2L O2, SaO2 = 99% following 25ft. Stairs/Curb  Stairs?: No (Fatigued)         Activity Tolerance  Activity Tolerance: Patient limited by endurance  Activity Tolerance: RPE at rest = 13, SaO2 = 95% on RA; Following ambulation without O2, pt RPE = 13 and SaO2 = 80%. pt recovers quickly with administration of 2L O2 (pt on PRN per orders). Quality Indicators (IRF-MARSHA):  Rolling L and R: Independent - 6  Sit>Supine: Supervision or Touching Assistance - 4  Supine>Sit: Supervision or Touching Assistance - 4  Sit>Stand: Supervision or Touching Assistance - 4  Chair/Bed>Chair Transfer: Supervision or Touching Assistance - 4  Car Transfers: Supervision or Touching Assistance - 4  Walk 10 ft: Supervision or Touching Assistance - 4  Walk 50 ft with two 90 degree turns: Supervision or Touching Assistance - 4  Walk 150 ft in [address]: Not attempted due to Medical Condition or Safety Concerns (I.e. unsafe or physician orders) - 80  Walking 10 ft on Unlevel Surface: Not attempted due to Medical Condition or Safety Concerns (I.e. unsafe or physician orders) - 80  Picking up Objects from Standing Position: Not attempted due to Medical Condition or Safety Concerns (I.e. unsafe or physician orders) - 80  Stairs: No Not attempted due to medical condition or safety concerns (i.e. unsafe or physician order) - 88  WC Mobility: No Not Applicable (pt did not complete item prior to admission) - 9    ASSESSMENT:  Body structures, Functions, Activity limitations: Decreased functional mobility ; Decreased safe awareness;Decreased balance;Decreased ADL status; Decreased strength;Decreased endurance;Decreased vision/visual deficit; Decreased coordination;Decreased ROM  Decision Making: Medium Complexity  History: High  Exam: High  Clinical Presentation: Med    Prognosis: Good  PT Education: Goals;PT Role;Plan of Care;Transfer Training;Gait Training  Barriers to Learning: none      CLINICAL IMPRESSION: Pt presenting with increased fatigue and SOB which has negatively impacted his functional mobility and increased his risk of falls. Continued PT indicated to progress mobility and facilitate DC at highest level of indep and safety.     PLAN OF CARE:  Frequency: 1-2 treatment sessions per day, 5-7 days per week     Current Treatment Recommendations: Flaco Tobar Mobility Training,Transfer Cayla Fear Re-education,Patient/Caregiver Education & Training,Equipment Evaluation, Education, & procurement,Home Exercise Program,Safety Education & Training,ADL/Self-care Training,Gait Training,Stair training    Patient's Goal:  Get home    GOALS:  Long term goals  Long term goal 1: Pt to complete bed mobility with indep  Long term goal 2: Pt to complete transfers with indep  Long term goal 3: Pt to ambulate 50-150ft with [employer] and indep  Long term goal 4: Pt to complete 4 steps with HR and supervision  Long term goal 5: Pt to complete HEP with indep  Long term goal 6: Pt to complete 50reps on 2min Step test to demonstrate improved activity tolerance  Long term goal 7: Pt to complete 10reps 30sec STS    ELOS:   Plan weeks: 1-1.5    Therapy Time:    Individual   Time In  900   Time Out 930   Minutes 30     8 Minutes (transfers 7min; gait 1min)       Tamela Pelletier, PT, 04/13/22 at 12:59 PM

## 2022-04-13 NOTE — PROGRESS NOTES
[facility] Respiratory Therapy Evaluation   Current Order:  Albuterol MDI BID      Home Regimen: PRN      Ordering Physician: Lexii  Re-evaluation Date:  n/a     Diagnosis: Rehab      Patient Status: Stable / Unstable + Physician notified    The following MDI Criteria must be met in order to convert aerosol to MDI with spacer. If unable to meet, MDI will be converted to aerosol:  []  Patient able to demonstrate the ability to use MDI effectively  []  Patient alert and cooperative  []  Patient able to take deep breath with 5-10 second hold  []  Medication(s) available in this delivery method   []  Peak flow greater than or equal to 200 ml/min            Current Order Substituted To  (same drug, same frequency)   Aerosol to MDI [] Albuterol Sulfate 0.083% unit dose by aerosol Albuterol Sulfate MDI 2 puffs by inhalation with spacer    [] Levalbuterol 1.25 mg unit dose by aerosol Levalbuterol MDI 2 puffs by inhalation with spacer    [] Levalbuterol 0.63 mg unit dose by aerosol Levalbuterol MDI 2 puffs by inhalation with spacer    [] Ipratropium Bromide 0.02% unit dose by aerosol Ipratropium Bromide MDI 2 puffs by inhalation with spacer    [] Duoneb (Ipratropium + Albuterol) unit dose by aerosol Ipratropium MDI + Albuterol MDI 2 puffs by inhalation w/spacer   MDI to Aerosol [] Albuterol Sulfate MDI Albuterol Sulfate 0.083% unit dose by aerosol    [] Levalbuterol MDI 2 puffs by inhalation Levalbuterol 1.25 mg unit dose by aerosol    [] Ipratropium Bromide MDI by inhalation Ipratropium Bromide 0.02% unit dose by aerosol    [] Combivent (Ipratropium + Albuterol) MDI by inhalation Duoneb (Ipratropium + Albuterol) unit dose by aerosol       Treatment Assessment [Frequency/Schedule]:  Change frequency to: _____Albuterol PRN_____________________________________________per Protocol, P&T, MEC      Points 0 1 2 3 4   Pulmonary Status  Non-Smoker  []   Smoking history   < 20 pack years  []   Smoking history  ?  20 pack years  []   Pulmonary Disorder  (acute or chronic)  [x]   Severe or Chronic w/ Exacerbation  []     Surgical Status No [x]   Surgeries     General []   Surgery Lower []   Abdominal Thoracic or []   Upper Abdominal Thoracic with  PulmonaryDisorder  []     Chest X-ray Clear/Not  Ordered     [x]  Chronic Changes  Results Pending  []  Infiltrates, atelectasis, pleural effusion, or edema  []  Infiltrates in more than one lobe []  Infiltrate + Atelectasis, &/or pleural effusion  []    Respiratory Pattern Regular,  RR = 12-20 [x]  Increased,  RR = 21-25 []  POWERS, irregular,  or RR = 26-30 []  Decreased FEV1  or RR = 31-35 []  Severe SOB, use  of accessory muscles, or RR ? 35  []    Mental Status Alert, oriented,  Cooperative [x]  Confused but Follows commands []  Lethargic or unable to follow commands []  Obtunded  []  Comatose  []    Breath Sounds Clear to  auscultation  []  Decreased unilaterally or  in bases only [x]  Decreased  bilaterally  []  Crackles or intermittent wheezes []  Wheezes []    Cough Strong, Spontan., & nonproductive [x]  Strong,  spontaneous, &  productive []  Weak,  Nonproductive []  Weak, productive or  with wheezes []  No spontaneous  cough or may require suctioning []    Level of Activity Ambulatory []  Ambulatory w/ Assist  [x]  Non-ambulatory []  Paraplegic []  Quadriplegic []    Total    Score:___5____     Triage Score:___5_____      Tri       Triage:     1. (>20) Freq: Q3    2. (16-20) Freq: Q4   3. (11-15) Freq: QID & Albuterol Q2 PRN    4. (6-10) Freq: TID & Albuterol Q2 PRN    5. (0-5) Freq Q4prn

## 2022-04-14 LAB
ANION GAP SERPL CALCULATED.3IONS-SCNC: 9 MEQ/L (ref 9–15)
BUN BLDV-MCNC: 28 MG/DL (ref 8–23)
CALCIUM SERPL-MCNC: 8.3 MG/DL (ref 8.5–9.9)
CHLORIDE BLD-SCNC: 105 MEQ/L (ref 95–107)
CO2: 25 MEQ/L (ref 20–31)
CREAT SERPL-MCNC: 0.97 MG/DL (ref 0.7–1.2)
GFR AFRICAN AMERICAN: >60
GFR NON-AFRICAN AMERICAN: >60
GLUCOSE BLD-MCNC: 107 MG/DL (ref 70–99)
GLUCOSE BLD-MCNC: 125 MG/DL (ref 70–99)
GLUCOSE BLD-MCNC: 127 MG/DL (ref 70–99)
GLUCOSE BLD-MCNC: 139 MG/DL (ref 70–99)
GLUCOSE BLD-MCNC: 97 MG/DL (ref 70–99)
PERFORMED ON: ABNORMAL
PERFORMED ON: NORMAL
POTASSIUM SERPL-SCNC: 4.3 MEQ/L (ref 3.4–4.9)
SODIUM BLD-SCNC: 139 MEQ/L (ref 135–144)

## 2022-04-14 PROCEDURE — 97110 THERAPEUTIC EXERCISES: CPT

## 2022-04-14 PROCEDURE — 6370000000 HC RX 637 (ALT 250 FOR IP): Performed by: PHYSICAL MEDICINE & REHABILITATION

## 2022-04-14 PROCEDURE — 6370000000 HC RX 637 (ALT 250 FOR IP): Performed by: INTERNAL MEDICINE

## 2022-04-14 PROCEDURE — 2500000003 HC RX 250 WO HCPCS: Performed by: INTERNAL MEDICINE

## 2022-04-14 PROCEDURE — 36415 COLL VENOUS BLD VENIPUNCTURE: CPT

## 2022-04-14 PROCEDURE — 97535 SELF CARE MNGMENT TRAINING: CPT

## 2022-04-14 PROCEDURE — 80048 BASIC METABOLIC PNL TOTAL CA: CPT

## 2022-04-14 PROCEDURE — 97530 THERAPEUTIC ACTIVITIES: CPT

## 2022-04-14 PROCEDURE — 94640 AIRWAY INHALATION TREATMENT: CPT

## 2022-04-14 PROCEDURE — 97116 GAIT TRAINING THERAPY: CPT

## 2022-04-14 PROCEDURE — 1180000000 HC REHAB R&B

## 2022-04-14 PROCEDURE — 97112 NEUROMUSCULAR REEDUCATION: CPT

## 2022-04-14 PROCEDURE — 94761 N-INVAS EAR/PLS OXIMETRY MLT: CPT

## 2022-04-14 RX ORDER — AMLODIPINE BESYLATE 5 MG/1
5 TABLET ORAL DAILY
Status: DISCONTINUED | OUTPATIENT
Start: 2022-04-14 | End: 2022-04-20

## 2022-04-14 RX ADMIN — CLOPIDOGREL BISULFATE 75 MG: 75 TABLET ORAL at 09:11

## 2022-04-14 RX ADMIN — Medication 400 MG: at 09:11

## 2022-04-14 RX ADMIN — APIXABAN 5 MG: 5 TABLET, FILM COATED ORAL at 20:46

## 2022-04-14 RX ADMIN — SOTALOL HYDROCHLORIDE 80 MG: 80 TABLET ORAL at 09:11

## 2022-04-14 RX ADMIN — ALBUTEROL SULFATE 1 PUFF: 90 AEROSOL, METERED RESPIRATORY (INHALATION) at 10:44

## 2022-04-14 RX ADMIN — TRAMADOL HYDROCHLORIDE 50 MG: 50 TABLET, COATED ORAL at 20:54

## 2022-04-14 RX ADMIN — SOTALOL HYDROCHLORIDE 80 MG: 80 TABLET ORAL at 20:46

## 2022-04-14 RX ADMIN — AMLODIPINE BESYLATE 5 MG: 5 TABLET ORAL at 09:19

## 2022-04-14 RX ADMIN — APIXABAN 5 MG: 5 TABLET, FILM COATED ORAL at 09:11

## 2022-04-14 RX ADMIN — METFORMIN HYDROCHLORIDE 500 MG: 500 TABLET ORAL at 09:11

## 2022-04-14 RX ADMIN — LEVALBUTEROL TARTRATE 1 PUFF: 45 AEROSOL, METERED ORAL at 16:24

## 2022-04-14 RX ADMIN — METFORMIN HYDROCHLORIDE 500 MG: 500 TABLET ORAL at 16:33

## 2022-04-14 ASSESSMENT — PAIN SCALES - GENERAL
PAINLEVEL_OUTOF10: 0
PAINLEVEL_OUTOF10: 5
PAINLEVEL_OUTOF10: 0
PAINLEVEL_OUTOF10: 0

## 2022-04-14 NOTE — PROGRESS NOTES
Subjective: The patient complains of ,\" moderate acute on chronic progressive fatigue and   weakness partially relieved by rest,medications, PT,  OT,   SLP and rest and exacerbated by recent illness. I am concerned about patients medical complexities and barriers to advancing in rehab goals including recent surgery . I reviewed current care and plans for further care with other rehab providers including nursing and case management. According to recent nursing note, \"  See notes  \". ROS x10: The patient also complains of severely impaired mobility and activities of daily living. Otherwise no new problems with vision, hearing, nose, mouth, throat, dermal, cardiovascular, GI, , pulmonary, musculoskeletal, psychiatric or neurological. See Rehab H&P on Rehab chart dated . Vital signs:  BP (!) 167/82   Pulse 54   Temp 98.2 °F (36.8 °C) (Oral)   Resp 17   Ht 5' 8\" (1.727 m)   Wt 160 lb (72.6 kg)   SpO2 99%   BMI 24.33 kg/m²   I/O:   PO/Intake:  fair PO intake,      Bowel:   continent   Bladder: continent    General:  Patient is well developed,   adequately nourished, and    well kempt. HEENT:    Pupils equal, hearing intact to loud voice, external inspection of ear     and nose benign. Inspection of lips, tongue and gums benign  Musculoskeletal: No significant change in strength or tone. All joints stable. Inspection and palpation of digits and nails show no clubbing,       cyanosis or inflammatory conditions. Neuro/Psychiatric: Affect: flat but pleasant. Alert and oriented to person, place and     Situation with    cues. No significant change in deep tendon reflexes or     sensation  Lungs:  Diminished, CTA-B. Respiration effort is   normal at rest.     Heart:   S1 = S2,   RRR. Abdomen:  Soft, non-tender, no enlargement of liver or spleen.   Extremities:  Plus 1 lower extremity edema    Skin:   Intact to general survey,      Rehabilitation:  Physical therapy:   Bed Mobility: Scooting: Stand by assistance    Transfers: Sit to Stand: Stand by assistance,Contact guard assistance  Stand to sit: Contact guard assistance,Stand by assistance  Bed to Chair: Contact guard assistance,Stand by assistance, Ambulation 1  Surface: carpet  Device: Rolling Walker  Other Apparatus: O2  Assistance: Stand by assistance,Contact guard assistance  Quality of Gait: flexed trunk, downward gaze, very sob with gt. 98% sats room air. Gait Deviations: Decreased step length,Increased FERNANDO  Distance: 15 feet x 2  Comments: distance not focus. endurance work.,      FIMS:  ,  , Assessment: Fatigued quickly with increased SOB with activity however maintained SPO2 levels >95% on room air. Pt demo'd decreased endurance with ability to complete 3 sit to stands in 30 sec. Occupational therapy:    ,  , Assessment: Pt is a 78 y/o male who presents with above deficits which also impacts ADL/IADL function. Pt s/p AAA repair. Pt very limited by fatigue.  Pt requires continued OT to address ADL/IADL function    Speech therapy:        Lab/X-ray studies reviewed, analyzed and discussed with patient and staff:   Recent Results (from the past 24 hour(s))   POCT Glucose    Collection Time: 04/13/22  7:43 PM   Result Value Ref Range    POC Glucose 126 (H) 70 - 99 mg/dl    Performed on ACCU-CHEK    POCT Glucose    Collection Time: 04/14/22  5:55 AM   Result Value Ref Range    POC Glucose 97 70 - 99 mg/dl    Performed on ACCU-CHEK    Basic Metabolic Panel    Collection Time: 04/14/22  9:26 AM   Result Value Ref Range    Sodium 139 135 - 144 mEq/L    Potassium 4.3 3.4 - 4.9 mEq/L    Chloride 105 95 - 107 mEq/L    CO2 25 20 - 31 mEq/L    Anion Gap 9 9 - 15 mEq/L    Glucose 139 (H) 70 - 99 mg/dL    BUN 28 (H) 8 - 23 mg/dL    CREATININE 0.97 0.70 - 1.20 mg/dL    GFR Non-African American >60.0 >60    GFR  >60.0 >60    Calcium 8.3 (L) 8.5 - 9.9 mg/dL   POCT Glucose    Collection Time: 04/14/22 11:44 AM   Result Value Ref Range    POC Glucose 127 (H) 70 - 99 mg/dl    Performed on ACCU-CHEK    POCT Glucose    Collection Time: 04/14/22  4:16 PM   Result Value Ref Range    POC Glucose 125 (H) 70 - 99 mg/dl    Performed on ACCU-CHEK        No results found. Previous extensive, complex labs, notes and diagnostics reviewed and analyzed. ALLERGIES:    Allergies as of 04/12/2022 - Fully Reviewed 04/12/2022   Allergen Reaction Noted    Fenofibrate  12/29/2021    Lipitor [atorvastatin] Other (See Comments) 12/19/2016    Niacin Other (See Comments) 12/19/2016    Niaspan [niacin er] Other (See Comments) 12/19/2016    Vitamin e Other (See Comments) 12/19/2016    Zocor [simvastatin] Other (See Comments) 12/19/2016      (please also verify by checking [facility])     Complex Physical Medicine & Rehab Issues Assess & Plan:   1. Severe abnormality of gait and mobility and impaired self-care and ADL's secondary to progressive  Severe PVD . Functional and medical status reassessed regarding patients ability to participate in therapies and patient found to be able to participate in acute intensive comprehensive inpatient rehabilitation program including PT/OT to improve balance, ambulation, ADLs, and to improve the P/AROM. Therapeutic modifications regarding activities in therapies, place, amount of time per day and intensity of therapy made daily. In bed therapies or bedside therapies prn.   2. Bowel and Bladder dysfunction  :  frequent toileting, ambulate to bathroom with assistance, check post void residuals. Check for C.difficile x1 if >2 loose stools in 24 hours, continue bowel & bladder program.  Monitor bowel and bladder function. Lactinex 2 PO every AC. MOM prn, Brown Bomb prn, Glycerin suppository prn, enema prn.   3. Moderate   pain as well as generalized OA pain: reassess pain every shift and prior to and after each therapy session, give prn Tylenol and   See mar , modalities prn in therapy, masage, Lidoderm, K-pad prn. Consider scheduled AM pain meds. 4. Skin healing   and breakdown risk:  continue pressure relief program.  Daily skin exams and reports from nursing. 5. Fatigue due to nutritional and hydration deficiency: Add and titrate vitamin B12 vitamin D and CoQ10 continue to monitor I&Os, calorie counts prn, dietary consult prn.  6. Acute episodic insomnia with situational adjustment disorder:  prn Ambien, monitor for day time sedation. 7. Falls risk elevated:  patient to use call light to get nursing assistance to get up, bed and chair alarm. 8. Elevated DVT risk: progressive activities in PT, continue prophylaxis ABRAM hose, elevation and se mar  . 9. Complex discharge planning:  Weekly team meeting every Thursday to assess progress towards goals, discuss and address social, psychological and medical comorbidities and to address difficulties they may be having progressing in therapy. Patient and family education is in progress. The patient is to follow-up with their family physician after discharge.         Complex Active General Medical Issues that complicate care Assess & Plan:    Patient Active Problem List   Diagnosis    Atrial fibrillation ([address]    High risk medication use    Atherosclerosis of native coronary artery of native heart without angina pectoris    Hypertension    Ischemic myocardial dysfunction    Nonrheumatic aortic valve disorder, unspecified    Aortic valve disorder    Personal history of nicotine dependence    Presence of aortocoronary bypass graft    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HCC)    Vasovagal syncope    Dizziness and giddiness    Cerebrovascular accident ([address]    Orthostatic dizziness    Ataxia    Vertigo    Meniere disease, bilateral    Benign paroxysmal positional vertigo due to bilateral vestibular disorder    Coronary angioplasty status    Ataxic gait    Kyphoscoliosis    Spinal stenosis of lumbar region with neurogenic claudication    [facility] (AAA) [facility]    Acute inferior myocardial infarction ([facility]    Carotid bruit    Chronic obstructive pulmonary disease (HCC)    Diabetes mellitus (HCC)    Dyspnea on exertion    Fatigue    First degree atrioventricular block    Hyperlipidemia    Infection of the inner ear    Muscle pain    Underweight    Ventricular premature beats    Weight loss    PVD (peripheral vascular disease) ([facility]   1.   2.        Focus of today's plan-   Therapy evals  Cont meds , see orders      MD Didi Walker D.O., PM&R     Attending    [address]

## 2022-04-14 NOTE — CARE COORDINATION
[address] [place] [address] NOTE  Room: R247/R247-01  Admit Date: 2022       Date: 2022  Patient Name: Christian Christianson        MRN: [id number]    : 1940  (79 y.o.)  Gender: male        REHAB DIAGNOSIS:   Diagnosis: Impaired Mobility d/t Severe PVD with Lower Extremity Ischemia. [id number] [facility] rehab admit 22    CO MORBIDITIES:      Past Medical History:   Diagnosis Date    [facility]     CAD (coronary artery disease)     cardiac stents    COPD (chronic obstructive pulmonary disease) (HCC)     Diabetes mellitus ([facility]     Hyperlipidemia     Hypertension     Movement disorder     Pneumonia      Past Surgical History:   Procedure Laterality Date    APPENDECTOMY      CORONARY ANGIOPLASTY WITH STENT PLACEMENT      4    CORONARY ARTERY BYPASS GRAFT      triple bypass    EYE SURGERY Bilateral     cataract surgery    INSERTABLE CARDIAC MONITOR Left 2018        Restrictions  Restrictions/Precautions: Fall Risk  CASE MANAGEMENT    Social/Functional History  Social/Functional History  Lives With: Spouse  Type of Home: House  Home Layout: One level  Home Access: Stairs to enter with rails  Entrance Stairs - Number of Steps: 3  Entrance Stairs - Rails: Both  Bathroom Shower/Tub: Tub/Shower unit,Shower chair with back  Bathroom Toilet: Handicap height  Bathroom Equipment: Shower chair (neighbor to install grab bars)  Home Equipment: Rolling walker,Cane  Receives Help From: Family  ADL Assistance: Needs assistance  Bath: Minimal assistance (pt washes self up, wife assisting in drying. Assists also needed to get into shower)  Dressing: Minimal assistance (assist over feet from wife \"I do about 80%\")  Toileting: Independent  Homemaking Assistance: Needs assistance  Homemaking Responsibilities: Yes (wife completes most IADLs.  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 ADULT DIET; Regular; 4 carb choices (60 gm/meal)    SpO2: 98 % (04/14/22 0619)  O2 Flow Rate (L/min): 2 L/min (04/14/22 8342)  No active isolations    Skin Issues: Yes, bilateral groin sites, bruises    Pain Managed: Yes,Tramadol prn     Bladder continence: Yes    Bowel continence: Yes      Other:   Plavix and Eliquis OAC        PHYSICAL THERAPY  Bed mobility:  Supine to Sit: Contact guard assistance (04/13/22 0922)  Sit to Supine: Stand by assistance (04/13/22 1896)  Transfers:  Sit to Stand: Stand by assistance (04/13/22 1054)  Bed to Chair: Contact guard assistance;Stand by assistance (04/13/22 6196)  Gait:   Device: Rolling Walker (04/13/22 1348)  Other Apparatus: O2 (04/13/22 1054)  Assistance: Stand by assistance (04/13/22 1348)  Distance: 50ftx2 (04/13/22 1348)  Quality of Gait: Fwd fled posture; Increased SOB and fatigue after (04/13/22 1348)  Comments: Post ambulatory SaO2 = 95% on RA;  VCs for upright posture and breathing technique.  (04/13/22 1348)  Stairs:     W/C mobility:     LTG:  Long term goal 1: Pt to complete bed mobility with indep  Long term goal 2: Pt to complete transfers with indep  Long term goal 3: Pt to ambulate 50-150ft with [employer] and indep  Long term goal 4: Pt to complete 4 steps with HR and supervision  Long term goal 5: Pt to complete HEP with indep  PT Treatment Time:  1.5 hrs      OCCUPATIONAL THERAPY  Hand Dominance: Right  ADL  Feeding: Setup (04/13/22 1146)  Grooming: Minimal assistance (04/13/22 1146)  UE Bathing: Minimal assistance (04/13/22 1146)  LE Bathing: Moderate assistance (04/13/22 1146)  UE Dressing: Minimal assistance (04/13/22 1146)  LE Dressing: Maximum assistance (04/13/22 1146)  Toileting: Unable to assess(comment) (04/13/22 1146)  Additional Comments: Shower completed- nurse approved shower. Toileting not completed. Pt very SOB during session- frequent rest breaks needed (04/13/22 1146)        Shower Transfers  Shower - Transfer From: Jacques Bazan (04/13/22 1148)  Shower - Transfer Type: To and From (04/13/22 1148)  Shower - Transfer To: Shower seat with back (04/13/22 1148)  Shower - Technique: Ambulating (04/13/22 1148)  Shower Transfers: Contact Guard (04/13/22 1148)  LTG:  Eating  Assistance Needed: Setup or clean-up assistance  CARE Score: 5  Discharge Goal: Independent, Oral Hygiene  Assistance Needed: Partial/moderate assistance  CARE Score: 3  Discharge Goal: Independent, Toileting Hygiene  Reason if not Attempted: Not attempted due to environmental limitations  CARE Score: 10  Discharge Goal: Substantial/maximal assistance, Shower/Bathe Self  Discharge Goal: Partial/moderate assistance  Upper Body Dressing  Assistance Needed: Partial/moderate assistance  CARE Score: 3  Discharge Goal: Independent, Lower Body Dressing  Assistance Needed: Substantial/maximal assistance  CARE Score: 2  Discharge Goal: Partial/moderate assistance, Putting On/Taking Off Footwear  Assistance Needed: Substantial/maximal assistance  CARE Score: 2  Discharge Goal: Partial/moderate assistance, Toilet Transfer  Reason if not Attempted: Not attempted due to environmental limitations  CARE Score: 10  Discharge Goal: Independent  OT Treatment Time: 1.5 hrs      SPEECH THERAPY  Motor Speech:  Within Functional Limits  Comprehension: Within Functional Limits (Intermittent repetition was required secondary to patient being hard of hearing)  Verbal Expression: Exceptions to functional limits  Mild cognitive-liguistic deficits were noted in the areas of divergent naming, short term/recent memory, immediate recall of numbers and delayed recall of 1/3 items. Patient reported the memory deficits that were noted during the eval are baseline for him. He reported he already utilizes memory strategies at home to improve recall. Patient feels he does not need ST intervention at  this time. Patient's wife manages medication and finance management at home. Diet/Swallow:  Diet Solids Recommendation: Regular  Liquid Consistency Recommendation: Thin  Dysphagia Outcome Severity Scale: Level 6: Within functional limits/Modified independence    Compensatory Swallowing Strategies: Upright as possible for all oral intake,Eat/Feed slowly,Small bites/sips         LTG:     Long-term Goals  Timeframe for Long-term Goals: N/A          COGNITION  OT: Cognition Comment: compre- Kadie, express- indeo, social inter- Kadie, problem solv- SUP, memory-SUP  SP:Memory: Exceptions to [facility]  Problem Solving: Within Functional Limits    RECREATIONAL THERAPY  Attendance to recreational therapy programs:    []  Pet Therapy  [] Music Therapy  [] Art Therapy    [] Recreation Therapy Group [] Support Group           Patient social interaction (mood, participation): good    Patient strengths: motivated, good support at home    Patients goal: return home    Problems/Barriers: SOB with all activities        1. Safety:          - Intervention / Plan:    [x]  falls protocol     [x]  PT/OT    []  SP        - Results:         2. Potential DME needs:         - Intervention / Plan:  [x]  PT/OT     [x]  Assess equipment needs/access       - Results:         3. Weakness:          - Intervention / Plan:  [x]  PT/OT      []  Other:         - Results:         4.   Discharge planning needs:          - Intervention / Plan:  [x]  Weekly team conference      [x]  family training        - Results:         5.            - Intervention / Plan:          - Results:         6.            - Intervention / Plan:         - Results:         7.            - Intervention / Plan:         - Results:           Discharge Plan   Estimated Length of Stay: 15 per PAS    Tentative Discharge date: 4/22/22      Anticipated Discharge Destination:  Home      Team recommendations:    1. Follow up Therapy :    PT  OT  RN  [place] Aide    2. Home Health    Other:     Equipment needed at Discharge:n/a.  Has RW and cane at home      Team Members Present at Conference:    Physician: Dr. Karyn Rowe  : Darcie Santana, MSW, LSW  RN: Donnie Trimble RN  Physical Therapist: Loan Arreola, LING  Occupational Therapist: Amy Baldwin OTR  Speech Therapist: Inocente Jay, SLP  Nurse Manager: Constance Kanner, RN  : Ramses Bernard RN    Electronically signed by Ramses Bernard RN on 4/14/22 at 10:49 AM EDT

## 2022-04-14 NOTE — PROGRESS NOTES
Occupational Therapy  Facility/Department: [facility]  Daily Treatment Note  NAME: Keerthi Pemberton  : 1940  MRN: [id number]    Date of Service: 2022    Discharge Recommendations:  Continue to assess pending progress       Assessment      OT Education: Equipment;ADL Adaptive Strategies  Patient Education: Patient was educated in use of a sock aid to increase independence as patient's wife was donning his socks for him PTA  Activity Tolerance  Activity Tolerance: Patient had SOB at times during the session. He was reminded to perform pursed lip breathing and was able to complete this when he was reminded although he was then noted to transition back to mouth breathing  Safety Devices  Safety Devices in place: Yes  Type of devices: All fall risk precautions in place         Patient Diagnosis(es): There were no encounter diagnoses. has a past medical history of Atrial fibrillation ([facility] CAD (coronary artery disease), COPD (chronic obstructive pulmonary disease) ([facility] Diabetes mellitus ([facility] Hyperlipidemia, Hypertension, Movement disorder, and Pneumonia. has a past surgical history that includes Coronary artery bypass graft; Appendectomy; Eye surgery (Bilateral); Insertable Cardiac Monitor (Left, 2018); and Coronary angioplasty with stent. Restrictions  Restrictions/Precautions  Restrictions/Precautions: Fall Risk  Subjective   General  Chart Reviewed: Yes  Patient assessed for rehabilitation services?: Yes  Family / Caregiver Present: No  Referring Practitioner: Dr. Sujit Lechuga  Diagnosis: Impaired mobility d/t severe PVD w/ LE ischemia  Subjective  Subjective:  Sandra Mora said to make sure that my legs touched before I sit down\" patient reported when approaching a chair in front of the sink after walking into the bathroom with a ww  Pain Assessment  Pain Level: 0  Pre Treatment Pain Screening  Pain at present: 0   Orientation  Orientation  Overall Orientation Status: Within Functional Limits  Objective ADL  Feeding: Independent (patient was able to open all packets on his lunch tray with no difficulty)  Grooming: Minimal assistance (therapist shaved patient due to patient on blood thinners and no electric razor available)  UE Bathing: Supervision  LE Bathing:  (Patient declined due to showering yesterday and feeling as though he is still clean)  UE Dressing: Setup  LE Dressing:  (Patient declined a need to change his pants or underwear. He did doff his non skid socks and don regular socks before donning his own shoes. Patient reported his wife puts on his socks daily. Patient was educated in use of sock aid and was able to use it)  Toileting: Supervision        Balance  Sitting Balance: Supervision  Standing Balance: Stand by assistance  Functional Mobility  Functional - Mobility Device: Rolling Walker  Activity: To/from bathroom  Assist Level: Supervision  Toilet Transfers  Toilet Transfers Comments: Patient did not sit on the toilet but did stand at the toilet to urinate with no assistance needed. Patient used the grab bars to stabilize himself  Shower Transfers  Shower Transfers Comments: Patient declined a shower on this date reporting that he had one yesterday     Transfers  Sit to stand: Supervision  Stand to sit: [address]  Times per week: 5-7x/week  Plan weeks: 1.5-2 weeks  Current Treatment Recommendations: Strengthening,Home Management Training,Balance Training,Functional Mobility Training,Endurance Training,Safety Education & Training,Self-Care / ADL  Plan Comment: Continue with OT plan of care    Goals  Long term goals  Time Frame for Long term goals :  Within 1.5-2 weeks, pt will demonstrate progress in the following areas listed below to achieve specific LTGs as stated in the initial evaluations  Long term goal 1: Pt will demo improved ADL/IADL function for d/c at highest potential  Long term goal 2: Pt will demo improved activity tolerance for ADLs  Long term goal 3: Pt will demo improved UE strength  Long term goal 4: Pt will demo improved standing balance/tolerance for increased LB ADLs status  Patient Goals   Patient goals : \"just to get out of here and fend for myself. Cherry Carey Jasmin Cherry Castellanos I used to cook a lot\"       Therapy Time   Individual Concurrent Group Co-treatment   Time In 1100         Time Out 1145         Minutes 45             Session was ended 15 minutes early due to arrival of patient's lunch and patient wishing to eat it while it was hot. Patient was independent for feeding so no intervention was needed at that time. Will schedule patient for time later this date in order to make up this missed time.      ADL/IADL trainin minutes        MCKAYLA Vines/L    Electronically signed by AZAM Vines on 2022 at 12:13 PM

## 2022-04-14 NOTE — PROGRESS NOTES
Assessment complete, VSS. Norvasc added for hypertension, 167/82 this morning. BMP stable. Noted 98% off PRN oxygen, does have episodes of SOB. Lungs with expiratory wheezes. Does have 2 prn inhalers, prefers the Albuterol. Given this morning. LBM 4/12.  Electronically signed by Yeny Rich RN on 4/14/22 at 1:32 PM EDT

## 2022-04-14 NOTE — PROGRESS NOTES
Occupational Therapy  Facility/Department: [facility]  Daily Treatment Note  NAME: Maria Dolores Pulido  : 1940  MRN: [id number]    Date of Service: 2022    Discharge Recommendations:  Continue to assess pending progress       Assessment      Activity Tolerance  Activity Tolerance: Patient Tolerated treatment well  Safety Devices  Safety Devices in place: Yes  Type of devices: All fall risk precautions in place;Gait belt  Restraints  Initially in place: No       Patient Diagnosis(es): There were no encounter diagnoses. has a past medical history of Atrial fibrillation ([facility] [address] CAD (coronary artery disease), COPD (chronic obstructive pulmonary disease) ([facility] [address] Diabetes mellitus ([facility] [address] Hyperlipidemia, Hypertension, Movement disorder, and Pneumonia. has a past surgical history that includes Coronary artery bypass graft; Appendectomy; Eye surgery (Bilateral); Insertable Cardiac Monitor (Left, 2018); and Coronary angioplasty with stent. Restrictions  Restrictions/Precautions  Restrictions/Precautions: Fall Risk     Subjective   General  Chart Reviewed: Yes  Patient assessed for rehabilitation services?: Yes  Family / Caregiver Present: No  Referring Practitioner: Dr. Noni Atwood  Diagnosis: Impaired mobility d/t severe PVD w/ LE ischemia    Subjective  Subjective: \"I don't want to hold you ladies up. \"    Pain Assessment  Pain Level: 0  Pre Treatment Pain Screening  Pain at present: 0  Intervention List: Patient able to continue with treatment     Orientation  Orientation  Overall Orientation Status: Within Functional Limits     Objective        Instructed patient to don B shoes while seated in wheelchair at start of session. Patient able to don B shoes with MOD I. Facilitated dynamic standing task using B UE to place/remove clothes pins and cards on yardstick with SBA. SOB noted during dynamic standing, 2 seated rest breaks required.  Patient demonstrated difficulty with reaching in higher levels to place clothes pins and cards, task downgraded to patient completing 1.5 feet on each end of yardstick. Plan   Plan  Times per week: 5-7x/week  Plan weeks: 1.5-2 weeks  Current Treatment Recommendations: Strengthening,Home Management Training,Balance Training,Functional Mobility Training,Endurance Training,Safety Education & Training,Self-Care / ADL  Plan Comment: Continue with OT plan of care    Goals  Long term goals  Time Frame for Long term goals : Within 1.5-2 weeks, pt will demonstrate progress in the following areas listed below to achieve specific LTGs as stated in the initial evaluations  Long term goal 1: Pt will demo improved ADL/IADL function for d/c at highest potential  Long term goal 2: Pt will demo improved activity tolerance for ADLs  Long term goal 3: Pt will demo improved UE strength  Long term goal 4: Pt will demo improved standing balance/tolerance for increased LB ADLs status  Patient Goals   Patient goals : \"just to get out of here and fend for myself. [place] I used to cook a lot\"       Therapy Time   Individual Concurrent Group Co-treatment   Time In 1430         Time Out 1445         Minutes 15             Therapeutic activities: 15 minutes     Electronically signed by MARGE Howell on 4/14/22 at 4:23 PM EDT    MARGE Howell     This treatment session was supervised by Isabell GARCIA     Electronically signed by MARGE Duncan on 4/14/22 at 4:32 PM EDT

## 2022-04-14 NOTE — PROGRESS NOTES
Physical Therapy Rehab Treatment Note  Facility/Department: [facility]  [facility]       NAME: Harish Longoria  : 1940 (79 y.o.)  MRN: [id number]  CODE STATUS: Full Code    Date of Service: 2022  Chart Reviewed: Yes  Family / Caregiver Present: No  General Comment  Comments: agreeable to tx    Restrictions:  Restrictions/Precautions: Fall Risk       SUBJECTIVE: Subjective: \"im getting bored\"  Pain Screening  Patient Currently in Pain: Denies       Post Treatment Pain Screenin  Pain Assessment  Pain Assessment: 0-10  Pain Level: 0    OBJECTIVE:   Overall Orientation Status: Within Functional Limits             Bed mobility  Rolling to Right: Modified independent  Supine to Sit: Stand by assistance  Comment: logroll technique for safety. slow and steady    Transfers  Sit to Stand: Stand by assistance;Contact guard assistance  Stand to sit: Contact guard assistance;Stand by assistance  Stand Pivot Transfers: Contact guard assistance  Comment: improved transfer technique this pm.  Pt stood with cga while he held urinal to urinate. Ambulation  Ambulation?: Yes  Ambulation 1  Surface: carpet  Device: Rolling Walker  Assistance: Stand by assistance;Contact guard assistance  Quality of Gait: flexed trunk, downward gaze, very sob with gt. 95 sats room air. Gait Deviations: Decreased step length; Increased FERNANDO  Distance: 60 feet x 2  Comments: this distance was too much for patient at this time. Long rest needed. ASSESSMENT/PROGRESS TOWARDS GOALS: limited by sob.         Goals:  Long term goals  Long term goal 1: Pt to complete bed mobility with indep  Long term goal 2: Pt to complete transfers with indep  Long term goal 3: Pt to ambulate 50-150ft with [employer] and indep  Long term goal 4: Pt to complete 4 steps with HR and supervision  Long term goal 5: Pt to complete HEP with indep  Long term goal 6: Pt to complete 50reps on 2min Step test to demonstrate improved activity tolerance  Long term goal 7: Pt to complete 10reps 30sec STS    PLAN OF CARE/Safety: pt very limited by sob.           Therapy Time:   Individual   Time In 0930   Time Out 1000   Minutes 30     Minutes:30      Transfers: 20      Gait training:10      Neuro re education:0     Therapeutic ex:0      Jonathan Nash PTA, 04/14/22 at 1:21 PM

## 2022-04-14 NOTE — PROGRESS NOTES
Physical Therapy Rehab Treatment Note  Facility/Department: [facility]  Room: R2/R247-01       NAME: Joellen Escobar  : 1940 (79 y.o.)  MRN: [id number]  CODE STATUS: Full Code    Date of Service: 2022  Chart Reviewed: Yes  Family / Caregiver Present: No  General Comment  Comments: agreeable to tx    Restrictions:  Restrictions/Precautions: Fall Risk       SUBJECTIVE: Subjective: \"im getting bored\"  Pain Screening  Patient Currently in Pain: Denies       Post Treatment Pain Screening:  Pain Assessment  Pain Assessment: 0-10  Pain Level: 0    OBJECTIVE:   Overall Orientation Status: Within Functional Limits           Neuromuscular Education  Neuromuscular Comments: static standing at ww. pt tolerates approx 2-3 minutes before needing to sit down. Pt able to perform multiple static standing episodes for approx 1-2 minutes before needing to sit down. 5x  Bed mobility  Rolling to Right: Modified independent  Supine to Sit: Stand by assistance  Comment: logroll technique for safety. slow and steady    Transfers  Sit to Stand: Stand by assistance;Contact guard assistance  Stand to sit: Contact guard assistance;Stand by assistance  Stand Pivot Transfers: Contact guard assistance  Comment: improved transfer technique this pm.    Ambulation  Ambulation?: Yes  More Ambulation?: Yes  Ambulation 1  Surface: carpet  Device: Rolling Walker  Assistance: Stand by assistance;Contact guard assistance  Quality of Gait: flexed trunk, downward gaze, very sob with gt. 98% sats room air. Gait Deviations: Decreased step length; Increased FERNANDO  Distance: 15 feet x 2  Comments: distance not focus. endurance work. Ambulation 2  Surface - 2: carpet  Device 2: Rolling Walker  Assistance 2: Contact guard assistance  Quality of Gait 2: heavily flexed trunk  Gait Deviations: Increased FERNANDO; Decreased step length  Distance: 20 feet x 2 with stop in the restroom. Comments: pt incontinent of bm while ambulating. Exercises  Knee Long Arc Quad: x 10 to 15 each leg  Physio/Swiss Ball: longseated position. lateral motions and knee flexion. ASSESSMENT/PROGRESS TOWARDS GOALS: pt continues to struggle with breathing. sats remain in 95-98% on room air and with activity but needs lots of time to recover between each activity. Pt very motivated to do his best and get home but wants to build his endurance. Goals:  Long term goals  Long term goal 1: Pt to complete bed mobility with indep  Long term goal 2: Pt to complete transfers with indep  Long term goal 3: Pt to ambulate 50-150ft with [employer] and indep  Long term goal 4: Pt to complete 4 steps with HR and supervision  Long term goal 5: Pt to complete HEP with indep  Long term goal 6: Pt to complete 50reps on 2min Step test to demonstrate improved activity tolerance  Long term goal 7: Pt to complete 10reps 30sec STS    PLAN OF CARE/Safety: good.           Therapy Time:   Individual   Time In 1300   Time Out 1400   Minutes 60     Minutes:60      Transfer/Bed mobility training:15      Gait training:10      Neuro re education:20     Therapeutic ex:15      Sara [facility]oto PTA, 04/14/22 at 1:44 PM

## 2022-04-14 NOTE — PLAN OF CARE
Problem: Falls - Risk of:  Goal: Will remain free from falls  Description: Will remain free from falls  Outcome: Ongoing  Goal: Absence of physical injury  Description: Absence of physical injury  Outcome: Ongoing     Problem: Skin Integrity:  Goal: Will show no infection signs and symptoms  Description: Will show no infection signs and symptoms  Outcome: Ongoing  Goal: Absence of new skin breakdown  Description: Absence of new skin breakdown  Outcome: Ongoing     Problem: Mobility - Impaired:  Goal: Mobility will improve  Description: Mobility will improve  Outcome: Ongoing     Problem: Nutritional:  Goal: Consumption of food in small portions  Description: Consumption of food in small portions  Outcome: Ongoing  Goal: Consumption of liquid of appropriate consistency  Description: Consumption of liquid of appropriate consistency  Outcome: Ongoing     Problem: Respiratory:  Goal: Ability to maintain a clear airway will improve  Description: Ability to maintain a clear airway will improve  Outcome: Ongoing  Goal: Will remain free from infection  Description: Will remain free from infection  Outcome: Ongoing  Goal: Absence of aspiration  Description: Absence of aspiration  Outcome: Ongoing     Problem: Safety:  Goal: Ability to chew and swallow food without choking will improve  Description: Ability to chew and swallow food without choking will improve  Outcome: Ongoing  Goal: Ability to demonstrate good, daily oral hygiene techniques will improve  Description: Ability to demonstrate good, daily oral hygiene techniques will improve  Outcome: Ongoing  Goal: Maintenance of upright position during and after feeding  Description: Maintenance of upright position during and after feeding  Outcome: Ongoing     Problem: IP COMMUNICATION/DYSARTHRIA  Goal: LTG - patient will improve expressive language skills to allow for communication of wants and needs in daily activities  Outcome: Ongoing     Problem: IP SWALLOWING  Goal: LTG - patient will tolerate the least restrictive diet consistency to allow for safe consumption of daily meals  Outcome: Ongoing

## 2022-04-14 NOTE — CARE COORDINATION
Met with patient after Team meeting regarding anticipated discharge date of 4/22/22 with current recommendation for [facility] upon discharge. Patient in agreement with date but requested call be placed to wife regarding HHC. Spoke with Chato Hart and she states they live in a mobile home with 3-4 steps in with railing and she feels confident that she can assist patient without [facility]. Chato Hart is declining [facility] assistance at this time and stated she will let us know if any changes or concerns prior to DC. Will continue to follow up with patient and wife.  Electronically signed by Sterling Alonso RN on 4/14/22 at 2:54 PM EDT

## 2022-04-14 NOTE — PROGRESS NOTES
Occupational Therapy  [facility]  Daily Treatment Note  NAME: Brandie Otero  : 1940  MRN: [id number]    Date of Service: 2022    Discharge Recommendations:  Continue to assess pending progress       Assessment      Activity Tolerance  Activity Tolerance: Patient had SOB at times during the session. Tex Calix RN educated patient that he cannot have the inhaler that he would like to have until 4:45. She reached out to respiratory dept and they are aware. Patient noted to wheeze when returned to bed. Patient was offered a face shield to wear instead of a face mask to see if he would have improved breathing and although he reported he was thankful that therapist was thinking of him he declined he needed to change  Safety Devices  Safety Devices in place: Yes  Type of devices: All fall risk precautions in place         Patient Diagnosis(es): There were no encounter diagnoses. has a past medical history of Atrial fibrillation ([facility] [address] CAD (coronary artery disease), COPD (chronic obstructive pulmonary disease) ([facility] [address] Diabetes mellitus ([facility] [address] Hyperlipidemia, Hypertension, Movement disorder, and Pneumonia. has a past surgical history that includes Coronary artery bypass graft; Appendectomy; Eye surgery (Bilateral); Insertable Cardiac Monitor (Left, 2018); and Coronary angioplasty with stent. Restrictions  Restrictions/Precautions  Restrictions/Precautions: Fall Risk  Subjective   General  Chart Reviewed: Yes  Patient assessed for rehabilitation services?: Yes  Family / Caregiver Present: No  Referring Practitioner: Dr. Maximo Chew  Diagnosis: Impaired mobility d/t severe PVD w/ LE ischemia  Subjective  Subjective: \"I am supposed to breathe in through my nose but doesn't it make sense that I would get more air if I breathe through my mouth? \" Patient was reeducated in pursed lip breathing and reported that he will continue to try  Pain Assessment  Pain Level: 0  Pre Treatment Pain Screening  Pain at present: 0   Orientation  Orientation  Overall Orientation Status: Within Functional Limits  Objective     Patient was kept at a seated level due to fatigue this pm with SOB noted at times of exertion. Patient exhibits 97% O2 level even when he appears SOB including when he is wheezing. Patient on room air during the session. Patient was able to place all pieces for the Antony grooved peg board using primarily his right hand to place them. Patient had occasional struggle to place the pieces but required no assistance. Patient's difficulty was manipulating the small pieces. Patient was returned to his room and assisted to bed. Patient ambulated from the doorway to the bed with supervision with the ww. He slipped his shoes off and was able to get into bed without assistance. Patient laid down without assist and was assisted to cover up per report that he was very cold. Plan   Plan  Times per week: 5-7x/week  Plan weeks: 1.5-2 weeks  Current Treatment Recommendations: Strengthening,Home Management Training,Balance Training,Functional Mobility Training,Endurance Training,Safety Education & Training,Self-Care / ADL  Plan Comment: Continue with OT plan of care    Goals  Long term goals  Time Frame for Long term goals : Within 1.5-2 weeks, pt will demonstrate progress in the following areas listed below to achieve specific LTGs as stated in the initial evaluations  Long term goal 1: Pt will demo improved ADL/IADL function for d/c at highest potential  Long term goal 2: Pt will demo improved activity tolerance for ADLs  Long term goal 3: Pt will demo improved UE strength  Long term goal 4: Pt will demo improved standing balance/tolerance for increased LB ADLs status  Patient Goals   Patient goals : \"just to get out of here and fend for myself. [place] Bula Dayhoff Bula Dayhoff I used to cook a lot\"       Therapy Time   Individual Concurrent Group Co-treatment   Time In 1500         Time Out 1530         Minutes 30

## 2022-04-14 NOTE — PROGRESS NOTES
cues.  No significant change in deep tendon reflexes or     sensation  Lungs:  Diminished, CTA-B. Respiration effort is   normal at rest.     Heart:   S1 = S2,   RRR. Abdomen:  Soft, non-tender, no enlargement of liver or spleen. Extremities:  Plus 1 lower extremity edema    Skin:   Intact to general survey,      Rehabilitation:  Physical therapy:   Bed Mobility: Scooting: Stand by assistance    Transfers: Sit to Stand: Stand by assistance,Contact guard assistance  Stand to sit: Contact guard assistance,Stand by assistance  Bed to Chair: Contact guard assistance,Stand by assistance, Ambulation 1  Surface: carpet  Device: Rolling Walker  Other Apparatus: O2  Assistance: Stand by assistance,Contact guard assistance  Quality of Gait: flexed trunk, downward gaze, very sob with gt. 98% sats room air. Gait Deviations: Decreased step length,Increased FERNANDO  Distance: 15 feet x 2  Comments: distance not focus. endurance work.,      FIMS:  ,  , Assessment: Fatigued quickly with increased SOB with activity however maintained SPO2 levels >95% on room air. Pt demo'd decreased endurance with ability to complete 3 sit to stands in 30 sec. Occupational therapy:    ,  , Assessment: Pt is a 78 y/o male who presents with above deficits which also impacts ADL/IADL function. Pt s/p AAA repair. Pt very limited by fatigue.  Pt requires continued OT to address ADL/IADL function    Speech therapy:        Lab/X-ray studies reviewed, analyzed and discussed with patient and staff:   Recent Results (from the past 24 hour(s))   POCT Glucose    Collection Time: 04/13/22  7:43 PM   Result Value Ref Range    POC Glucose 126 (H) 70 - 99 mg/dl    Performed on ACCU-CHEK    POCT Glucose    Collection Time: 04/14/22  5:55 AM   Result Value Ref Range    POC Glucose 97 70 - 99 mg/dl    Performed on ACCU-CHEK    Basic Metabolic Panel    Collection Time: 04/14/22  9:26 AM   Result Value Ref Range    Sodium 139 135 - 144 mEq/L    Potassium 4.3 3.4 - 4.9 mEq/L    Chloride 105 95 - 107 mEq/L    CO2 25 20 - 31 mEq/L    Anion Gap 9 9 - 15 mEq/L    Glucose 139 (H) 70 - 99 mg/dL    BUN 28 (H) 8 - 23 mg/dL    CREATININE 0.97 0.70 - 1.20 mg/dL    GFR Non-African American >60.0 >60    GFR  >60.0 >60    Calcium 8.3 (L) 8.5 - 9.9 mg/dL   POCT Glucose    Collection Time: 04/14/22 11:44 AM   Result Value Ref Range    POC Glucose 127 (H) 70 - 99 mg/dl    Performed on ACCU-CHEK    POCT Glucose    Collection Time: 04/14/22  4:16 PM   Result Value Ref Range    POC Glucose 125 (H) 70 - 99 mg/dl    Performed on ACCU-CHEK        No results found. Previous extensive, complex labs, notes and diagnostics reviewed and analyzed. ALLERGIES:    Allergies as of 04/12/2022 - Fully Reviewed 04/12/2022   Allergen Reaction Noted    Fenofibrate  12/29/2021    Lipitor [atorvastatin] Other (See Comments) 12/19/2016    Niacin Other (See Comments) 12/19/2016    Niaspan [niacin er] Other (See Comments) 12/19/2016    Vitamin e Other (See Comments) 12/19/2016    Zocor [simvastatin] Other (See Comments) 12/19/2016      (please also verify by checking [facility])     Complex Physical Medicine & Rehab Issues Assess & Plan:   1. Severe abnormality of gait and mobility and impaired self-care and ADL's secondary to progressive  Severe PVD . Functional and medical status reassessed regarding patients ability to participate in therapies and patient found to be able to participate in acute intensive comprehensive inpatient rehabilitation program including PT/OT to improve balance, ambulation, ADLs, and to improve the P/AROM. Therapeutic modifications regarding activities in therapies, place, amount of time per day and intensity of therapy made daily. In bed therapies or bedside therapies prn.   2. Bowel and Bladder dysfunction  :  frequent toileting, ambulate to bathroom with assistance, check post void residuals.   Check for C.difficile x1 if >2 loose stools in 24 hours, continue bowel & bladder program.  Monitor bowel and bladder function. Lactinex 2 PO every AC. MOM prn, Brown Bomb prn, Glycerin suppository prn, enema prn. 3. Moderate   pain as well as generalized OA pain: reassess pain every shift and prior to and after each therapy session, give prn Tylenol and   See mar , modalities prn in therapy, masage, Lidoderm, K-pad prn. Consider scheduled AM pain meds. 4. Skin healing   and breakdown risk:  continue pressure relief program.  Daily skin exams and reports from nursing. 5. Fatigue due to nutritional and hydration deficiency: Add and titrate vitamin B12 vitamin D and CoQ10 continue to monitor I&Os, calorie counts prn, dietary consult prn.  6. Acute episodic insomnia with situational adjustment disorder:  prn Ambien, monitor for day time sedation. 7. Falls risk elevated:  patient to use call light to get nursing assistance to get up, bed and chair alarm. 8. Elevated DVT risk: progressive activities in PT, continue prophylaxis ABRAM hose, elevation and se mar  . 9. Complex discharge planning:  Weekly team meeting every Thursday to assess progress towards goals, discuss and address social, psychological and medical comorbidities and to address difficulties they may be having progressing in therapy. Patient and family education is in progress. The patient is to follow-up with their family physician after discharge.         Complex Active General Medical Issues that complicate care Assess & Plan:    Patient Active Problem List   Diagnosis    Atrial fibrillation ([facility] [address]    High risk medication use    Atherosclerosis of native coronary artery of native heart without angina pectoris    Hypertension    Ischemic myocardial dysfunction    Nonrheumatic aortic valve disorder, unspecified    Aortic valve disorder    Personal history of nicotine dependence    Presence of aortocoronary bypass graft    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HCC)    Vasovagal syncope    Dizziness and giddiness    Cerebrovascular accident ([address]    Orthostatic dizziness    Ataxia    Vertigo    Meniere disease, bilateral    Benign paroxysmal positional vertigo due to bilateral vestibular disorder    Coronary angioplasty status    Ataxic gait    Kyphoscoliosis    Spinal stenosis of lumbar region with neurogenic claudication    Abdominal aortic aneurysm (AAA) ([address]    Acute inferior myocardial infarction ([address]    Carotid bruit    Chronic obstructive pulmonary disease (HCC)    Diabetes mellitus ([address]    Dyspnea on exertion    Fatigue    First degree atrioventricular block    Hyperlipidemia    Infection of the inner ear    Muscle pain    Underweight    Ventricular premature beats    Weight loss    PVD (peripheral vascular disease) ([address]     1.         Focus of today's plan-   Therapy evals  Cont meds , see orders  See team note  Still sob  norvasc added       MD Chilo Chan D.O., PM&R     Attending    [address] [facility]

## 2022-04-15 LAB
ALBUMIN SERPL-MCNC: 3.3 G/DL (ref 3.5–4.6)
ALP BLD-CCNC: 85 U/L (ref 35–104)
ALT SERPL-CCNC: 18 U/L (ref 0–41)
ANION GAP SERPL CALCULATED.3IONS-SCNC: 9 MEQ/L (ref 9–15)
AST SERPL-CCNC: 18 U/L (ref 0–40)
BACTERIA: NEGATIVE /HPF
BILIRUB SERPL-MCNC: 0.7 MG/DL (ref 0.2–0.7)
BILIRUBIN URINE: NEGATIVE
BLOOD, URINE: ABNORMAL
BUN BLDV-MCNC: 23 MG/DL (ref 8–23)
CALCIUM SERPL-MCNC: 8.7 MG/DL (ref 8.5–9.9)
CHLORIDE BLD-SCNC: 103 MEQ/L (ref 95–107)
CLARITY: CLEAR
CO2: 25 MEQ/L (ref 20–31)
COLOR: YELLOW
CREAT SERPL-MCNC: 0.94 MG/DL (ref 0.7–1.2)
EPITHELIAL CELLS, UA: ABNORMAL /HPF (ref 0–5)
GFR AFRICAN AMERICAN: >60
GFR NON-AFRICAN AMERICAN: >60
GLOBULIN: 3.1 G/DL (ref 2.3–3.5)
GLUCOSE BLD-MCNC: 109 MG/DL (ref 70–99)
GLUCOSE BLD-MCNC: 117 MG/DL (ref 70–99)
GLUCOSE BLD-MCNC: 129 MG/DL (ref 70–99)
GLUCOSE BLD-MCNC: 140 MG/DL (ref 70–99)
GLUCOSE BLD-MCNC: 170 MG/DL (ref 70–99)
GLUCOSE URINE: 100 MG/DL
HCT VFR BLD CALC: 35.9 % (ref 42–52)
HEMOGLOBIN: 11.4 G/DL (ref 14–18)
HYALINE CASTS: ABNORMAL /HPF (ref 0–5)
KETONES, URINE: NEGATIVE MG/DL
LEUKOCYTE ESTERASE, URINE: NEGATIVE
NITRITE, URINE: NEGATIVE
PERFORMED ON: ABNORMAL
PH UA: 7 (ref 5–9)
POTASSIUM SERPL-SCNC: 3.9 MEQ/L (ref 3.4–4.9)
PROTEIN UA: NEGATIVE MG/DL
RBC UA: ABNORMAL /HPF (ref 0–5)
SODIUM BLD-SCNC: 137 MEQ/L (ref 135–144)
SPECIFIC GRAVITY UA: 1.02 (ref 1–1.03)
TOTAL PROTEIN: 6.4 G/DL (ref 6.3–8)
UROBILINOGEN, URINE: 2 E.U./DL
WBC UA: ABNORMAL /HPF (ref 0–5)

## 2022-04-15 PROCEDURE — 1180000000 HC REHAB R&B

## 2022-04-15 PROCEDURE — 81001 URINALYSIS AUTO W/SCOPE: CPT

## 2022-04-15 PROCEDURE — 80053 COMPREHEN METABOLIC PANEL: CPT

## 2022-04-15 PROCEDURE — 6370000000 HC RX 637 (ALT 250 FOR IP): Performed by: PHYSICAL MEDICINE & REHABILITATION

## 2022-04-15 PROCEDURE — 2700000000 HC OXYGEN THERAPY PER DAY

## 2022-04-15 PROCEDURE — 87086 URINE CULTURE/COLONY COUNT: CPT

## 2022-04-15 PROCEDURE — 97535 SELF CARE MNGMENT TRAINING: CPT

## 2022-04-15 PROCEDURE — 85018 HEMOGLOBIN: CPT

## 2022-04-15 PROCEDURE — 94640 AIRWAY INHALATION TREATMENT: CPT

## 2022-04-15 PROCEDURE — 97530 THERAPEUTIC ACTIVITIES: CPT

## 2022-04-15 PROCEDURE — 97110 THERAPEUTIC EXERCISES: CPT

## 2022-04-15 PROCEDURE — 97116 GAIT TRAINING THERAPY: CPT

## 2022-04-15 PROCEDURE — 85014 HEMATOCRIT: CPT

## 2022-04-15 PROCEDURE — 36415 COLL VENOUS BLD VENIPUNCTURE: CPT

## 2022-04-15 PROCEDURE — 94761 N-INVAS EAR/PLS OXIMETRY MLT: CPT

## 2022-04-15 PROCEDURE — 6370000000 HC RX 637 (ALT 250 FOR IP): Performed by: INTERNAL MEDICINE

## 2022-04-15 RX ADMIN — METFORMIN HYDROCHLORIDE 500 MG: 500 TABLET ORAL at 16:39

## 2022-04-15 RX ADMIN — METFORMIN HYDROCHLORIDE 500 MG: 500 TABLET ORAL at 07:26

## 2022-04-15 RX ADMIN — APIXABAN 5 MG: 5 TABLET, FILM COATED ORAL at 21:25

## 2022-04-15 RX ADMIN — CLOPIDOGREL BISULFATE 75 MG: 75 TABLET ORAL at 07:26

## 2022-04-15 RX ADMIN — AMLODIPINE BESYLATE 5 MG: 5 TABLET ORAL at 07:26

## 2022-04-15 RX ADMIN — SOTALOL HYDROCHLORIDE 80 MG: 80 TABLET ORAL at 07:26

## 2022-04-15 RX ADMIN — ALBUTEROL SULFATE 1 PUFF: 90 AEROSOL, METERED RESPIRATORY (INHALATION) at 16:49

## 2022-04-15 RX ADMIN — APIXABAN 5 MG: 5 TABLET, FILM COATED ORAL at 07:26

## 2022-04-15 RX ADMIN — SOTALOL HYDROCHLORIDE 80 MG: 80 TABLET ORAL at 21:25

## 2022-04-15 RX ADMIN — Medication 400 MG: at 07:26

## 2022-04-15 ASSESSMENT — PAIN SCALES - GENERAL
PAINLEVEL_OUTOF10: 0
PAINLEVEL_OUTOF10: 0

## 2022-04-15 NOTE — PROGRESS NOTES
Occupational Therapy  Facility/Department: [facility]  Daily Treatment Note  NAME: Ja Santoyo  : 1940  MRN: [id number]    Date of Service: 4/15/2022    Discharge Recommendations:  Continue to assess pending progress       Assessment      Activity Tolerance  Activity Tolerance: Patient Tolerated treatment well  Safety Devices  Safety Devices in place: Yes  Type of devices: All fall risk precautions in place         Patient Diagnosis(es): There were no encounter diagnoses. has a past medical history of Atrial fibrillation ([facility] [address] CAD (coronary artery disease), COPD (chronic obstructive pulmonary disease) ([facility] [address] Diabetes mellitus ([facility] [address] Hyperlipidemia, Hypertension, Movement disorder, and Pneumonia. has a past surgical history that includes Coronary artery bypass graft; Appendectomy; Eye surgery (Bilateral); Insertable Cardiac Monitor (Left, 2018); and Coronary angioplasty with stent. Restrictions  Restrictions/Precautions  Restrictions/Precautions: Fall Risk  Subjective   General  Chart Reviewed: Yes  Patient assessed for rehabilitation services?: Yes  Family / Caregiver Present: No  Referring Practitioner: Dr. Carmella Mays  Diagnosis: Impaired mobility d/t severe PVD w/ LE ischemia  Subjective  Subjective: \"I am always cold\"  Pain Assessment  Pain Level: 0  Pre Treatment Pain Screening  Pain at present: 0   Orientation  Orientation  Overall Orientation Status: Within Functional Limits  Objective    ADL  Toileting: Supervision        Functional Mobility  Functional - Mobility Device: Rolling Walker  Activity: To/from bathroom  Assist Level: Supervision  Toilet Transfers  Toilet Transfers Comments: Patient did not sit on the toilet but did stand at the toilet to urinate with no assistance needed. Patient used the grab bars to stabilize himself      Patient placed shower curtain rings onto horizontal dowels one at a time. Patient needed a rest break after the task due to SOB.  Patient was then asked to move them from the lowest level and place them on the 3rd of 4 levels. Patient was able to complete the task but reported that he felt it across his chest but also reported that it just felt like he was working out and out of shape. No change in ability to participate was noted. Patient was able to connect pop beads using B hands and extra time. Patient reported that it hurt his fingers after a while        Plan   Plan  Times per week: 5-7x/week  Plan weeks: 1.5-2 weeks  Current Treatment Recommendations: Strengthening,Home Management Training,Balance Training,Functional Mobility Training,Endurance Training,Safety Education & Training,Self-Care / ADL  Plan Comment: continue with OT plan of care    Goals  Long term goals  Time Frame for Long term goals : Within 1.5-2 weeks, pt will demonstrate progress in the following areas listed below to achieve specific LTGs as stated in the initial evaluations  Long term goal 1: Pt will demo improved ADL/IADL function for d/c at highest potential  Long term goal 2: Pt will demo improved activity tolerance for ADLs  Long term goal 3: Pt will demo improved UE strength  Long term goal 4: Pt will demo improved standing balance/tolerance for increased LB ADLs status  Patient Goals   Patient goals : \"just to get out of here and fend for myself. Autumn  Autumn  Autumn  I used to cook a lot\"       Therapy Time   Individual Concurrent Group Co-treatment   Time In 1300         Time Out 1330         Minutes 30              ADL/IADL training: 10 minutes  Therapeutic activities: 20 minutes    MCKALYA Shepherd/L    Electronically signed by AZAM Shepherd on 4/15/2022 at 2:15 PM

## 2022-04-15 NOTE — PROGRESS NOTES
Physical Therapy Rehab Treatment Note  [facility]Department[facility]       NAME: Joellen Escobar  : 1940 (79 y.o.)  MRN: [id number]  CODE STATUS: Full Code    Date of Service: 4/15/2022  Chart Reviewed: Yes  Family / Caregiver Present: No    Restrictions:  Restrictions/Precautions: Fall Risk     SUBJECTIVE:    Pain Screening  Patient Currently in Pain: No     Post Treatment Pain Screenin/10     OBJECTIVE:              Bed mobility  Rolling to Left: Modified independent  Rolling to Right: Modified independent  Sit to Supine: Supervision  Comment: Completed with bed flat requiring increased tme and effort. Transfers  Sit to Stand: Stand by assistance  Stand to sit: Stand by assistance  Bed to Chair: Stand by assistance    Ambulation  Ambulation?: Yes  Ambulation 1  Surface: carpet  Device: Rolling Walker  Assistance: Stand by assistance  Quality of Gait: flexed trunk, downward gaze, slow robert, fatigues quickly with increased dyspena  Distance: 60ft  Comments: Pt reports RPE 11 fairly light at rest.  15 Hard after ambulation. SPO2 96% HR 68BPM prior to ambulation. SPO2 95% HR 72BPM after. Fatigued with increased dyspena after. Exercises  Hamstring Sets: culrls seated RTB x10  Hip Flexion: seated 2x10; standing x20  Hip Abduction: seated RTB x20; hip add with ball x20  Knee Long Arc Quad: 2x10  Ankle Pumps: x20  Other exercises  Other exercises 1: STS x5  Other exercises 2: standing march at [employer] x40 sec for endurance     ASSESSMENT/PROGRESS TOWARDS GOALS:  Assessment: Fatigues with increased dyspena with activity requiring rest breaks and increased tie to recover.     Goals:  Long term goals  Long term goal 1: Pt to complete bed mobility with indep  Long term goal 2: Pt to complete transfers with indep  Long term goal 3: Pt to ambulate 50-150ft with [employer] and indep  Long term goal 4: Pt to complete 4 steps with HR and supervision  Long term goal 5: Pt to complete HEP with indep  Long term goal 6: Pt to complete 50reps on 2min Step test to demonstrate improved activity tolerance  Long term goal 7: Pt to complete 10reps 30sec STS    PLAN OF CARE/Safety:   Plan Comment: Cont per POC.      Therapy Time:   Individual   Time In 1400   Time Out 1500   Minutes 60     Minutes:      Transfer/Bed mobility training:15      Gait training:15      Neuro re education:0     Therapeutic ex:30    Graham Cutler PTA, 04/15/22 at 3:58 PM

## 2022-04-15 NOTE — PROGRESS NOTES
Occupational Therapy  Facility/Department: [facility]  Daily Treatment Note  NAME: Dangelo Humphrey  : 1940  MRN: [id number]    Date of Service: 4/15/2022    Discharge Recommendations:  Continue to assess pending progress       Assessment      Activity Tolerance  Activity Tolerance: Patient Tolerated treatment well  Safety Devices  Safety Devices in place: Yes  Type of devices: All fall risk precautions in place;Gait belt         Patient Diagnosis(es): There were no encounter diagnoses. has a past medical history of Atrial fibrillation ([facility] [address] CAD (coronary artery disease), COPD (chronic obstructive pulmonary disease) ([facility] [address] Diabetes mellitus ([facility] [address] Hyperlipidemia, Hypertension, Movement disorder, and Pneumonia. has a past surgical history that includes Coronary artery bypass graft; Appendectomy; Eye surgery (Bilateral); Insertable Cardiac Monitor (Left, 2018); and Coronary angioplasty with stent. Restrictions  Restrictions/Precautions  Restrictions/Precautions: Fall Risk  Subjective   General  Chart Reviewed: Yes  Patient assessed for rehabilitation services?: Yes  Family / Caregiver Present: No  Referring Practitioner: Dr. Vipin Roblero  Diagnosis: Impaired mobility d/t severe PVD w/ LE ischemia  Subjective  Subjective: \"I am always cold\"  Pain Assessment  Pain Level: 0  Pre Treatment Pain Screening  Pain at present: 0   Orientation  Orientation  Overall Orientation Status: Within Functional Limits  Objective             Standing Balance  Comment: Patient was able to walk about 4 feet using a ww and stood at the clothesline. Patient maintained his standing balance while using his right hand to place clothespins on the clothesline. Patient fatigued quickly but wanted to continue on the task. Patient was encouraged to take a seated rest break. Patient appeared SOB and pulse ox was 97%      Patient was able to locate and remove 14 beads in the blue most resistive theraputty with min extra time. Patient completed repetitive reaching to place rubber washers onto vertical dowels using primarily his right hand. Patient also noted to appear SOB during this task but was 97% O2 and 66 HR        Plan   Plan  Times per week: 5-7x/week  Plan weeks: 1.5-2 weeks  Current Treatment Recommendations: Strengthening,Home Management Training,Balance Training,Functional Mobility Training,Endurance Training,Safety Education & Training,Self-Care / ADL  Plan Comment: continue with OT plan of care    Goals  Long term goals  Time Frame for Long term goals : Within 1.5-2 weeks, pt will demonstrate progress in the following areas listed below to achieve specific LTGs as stated in the initial evaluations  Long term goal 1: Pt will demo improved ADL/IADL function for d/c at highest potential  Long term goal 2: Pt will demo improved activity tolerance for ADLs  Long term goal 3: Pt will demo improved UE strength  Long term goal 4: Pt will demo improved standing balance/tolerance for increased LB ADLs status  Patient Goals   Patient goals : \"just to get out of here and fend for myself. Bula Dayhosakshi Bula Dayhosakshi Bula Dayhosakshi I used to cook a lot\"       Therapy Time   Individual Concurrent Group Co-treatment   Time In 1030         Time Out 1130         Minutes 60              ADL/IADL trainin minutes  Therapeutic activities: 40 minutes    MCKAYLA Mitchell/L    Electronically signed by AZAM Mitchell on 4/15/2022 at 1:29 PM

## 2022-04-15 NOTE — PROGRESS NOTES
Subjective: The patient complains of ,\" moderate acute on chronic progressive fatigue and   weakness partially relieved by rest,medications, PT,  OT,   SLP and rest and exacerbated by recent illness. I am concerned about patients medical complexities and barriers to advancing in rehab goals including recent surgery . I reviewed current care and plans for further care with other rehab providers including nursing and case management. According to recent nursing note, \"  See notes  \". Patient denies pain this morning. BP did come down after having morning meds, now 133/75. Still having SOB with exertion, 99% without oxygen. Offered prn inhaler, declined this morning. Labs stable. Electronically signed by Elena Noel RN on 4/15/22 at 11:15 AM EDT  Patient is resting in bed with c/o pain noted to surgical incision and was medicated with prn pain meds with good pain relief noted. ROS x10: The patient also complains of severely impaired mobility and activities of daily living. Otherwise no new problems with vision, hearing, nose, mouth, throat, dermal, cardiovascular, GI, , pulmonary, musculoskeletal, psychiatric or neurological. See Rehab H&P on Rehab chart dated . Vital signs:  /75   Pulse 64   Temp 98.1 °F (36.7 °C) (Oral)   Resp 20   Ht 5' 8\" (1.727 m)   Wt 160 lb (72.6 kg)   SpO2 99%   BMI 24.33 kg/m²   I/O:   PO/Intake:  fair PO intake,      Bowel:   continent   Bladder: continent    General:  Patient is well developed,   adequately nourished, and    well kempt. HEENT:    Pupils equal, hearing intact to loud voice, external inspection of ear     and nose benign. Inspection of lips, tongue and gums benign  Musculoskeletal: No significant change in strength or tone. All joints stable. Inspection and palpation of digits and nails show no clubbing,       cyanosis or inflammatory conditions. Neuro/Psychiatric: Affect: flat but pleasant.   Alert and oriented to person, place and     Situation with    cues. No significant change in deep tendon reflexes or     sensation  Lungs:  Diminished, CTA-B. Respiration effort is   normal at rest.     Heart:   S1 = S2,   RRR. Abdomen:  Soft, non-tender, no enlargement of liver or spleen. Extremities:  Plus 1 lower extremity edema    Skin:   Intact to general survey,      Rehabilitation:  Physical therapy:   Bed Mobility: Scooting: Stand by assistance    Transfers: Sit to Stand: Stand by assistance  Stand to sit: Stand by assistance  Bed to Chair: Stand by assistance, Ambulation 1  Surface: carpet  Device: Rolling Walker  Other Apparatus: O2  Assistance: Stand by assistance  Quality of Gait: flexed trunk, downward gaze, slow robert, fatigues quickly with increased dyspena  Gait Deviations: Decreased step length,Increased FERNANDO  Distance: 60ft  Comments: Pt reports RPE 11 fairly light at rest.  15 Hard after ambulation. SPO2 96% HR 68BPM prior to ambulation. SPO2 95% HR 72BPM after. Fatigued with increased dyspena after., Stairs  # Steps : 4  Stairs Height: 6\"  Rails: Bilateral  Assistance: Contact guard assistance  Comment: Initiated stair training. RPE 13 somewhat hard. FIMS:  ,  , Assessment: Fatigues with increased dyspena with activity requiring rest breaks and increased tie to recover. Occupational therapy:    ,  , Assessment: Pt is a 78 y/o male who presents with above deficits which also impacts ADL/IADL function. Pt s/p AAA repair. Pt very limited by fatigue.  Pt requires continued OT to address ADL/IADL function    Speech therapy:        Lab/X-ray studies reviewed, analyzed and discussed with patient and staff:   Recent Results (from the past 24 hour(s))   POCT Glucose    Collection Time: 04/14/22  4:16 PM   Result Value Ref Range    POC Glucose 125 (H) 70 - 99 mg/dl    Performed on ACCU-CHEK    POCT Glucose    Collection Time: 04/14/22  8:20 PM   Result Value Ref Range    POC Glucose 107 (H) 70 - 99 mg/dl Performed on ACCU-CHEK    POCT Glucose    Collection Time: 04/15/22  6:11 AM   Result Value Ref Range    POC Glucose 109 (H) 70 - 99 mg/dl    Performed on ACCU-CHEK    Hemoglobin and Hematocrit    Collection Time: 04/15/22  9:53 AM   Result Value Ref Range    Hemoglobin 11.4 (L) 14.0 - 18.0 g/dL    Hematocrit 35.9 (L) 42.0 - 52.0 %   Comprehensive Metabolic Panel    Collection Time: 04/15/22  9:53 AM   Result Value Ref Range    Sodium 137 135 - 144 mEq/L    Potassium 3.9 3.4 - 4.9 mEq/L    Chloride 103 95 - 107 mEq/L    CO2 25 20 - 31 mEq/L    Anion Gap 9 9 - 15 mEq/L    Glucose 170 (H) 70 - 99 mg/dL    BUN 23 8 - 23 mg/dL    CREATININE 0.94 0.70 - 1.20 mg/dL    GFR Non-African American >60.0 >60    GFR  >60.0 >60    Calcium 8.7 8.5 - 9.9 mg/dL    Total Protein 6.4 6.3 - 8.0 g/dL    Albumin 3.3 (L) 3.5 - 4.6 g/dL    Total Bilirubin 0.7 0.2 - 0.7 mg/dL    Alkaline Phosphatase 85 35 - 104 U/L    ALT 18 0 - 41 U/L    AST 18 0 - 40 U/L    Globulin 3.1 2.3 - 3.5 g/dL   POCT Glucose    Collection Time: 04/15/22 11:43 AM   Result Value Ref Range    POC Glucose 140 (H) 70 - 99 mg/dl    Performed on ACCU-CHEK        No results found. Previous extensive, complex labs, notes and diagnostics reviewed and analyzed. ALLERGIES:    Allergies as of 04/12/2022 - Fully Reviewed 04/12/2022   Allergen Reaction Noted    Fenofibrate  12/29/2021    Lipitor [atorvastatin] Other (See Comments) 12/19/2016    Niacin Other (See Comments) 12/19/2016    Niaspan [niacin er] Other (See Comments) 12/19/2016    Vitamin e Other (See Comments) 12/19/2016    Zocor [simvastatin] Other (See Comments) 12/19/2016      (please also verify by checking [facility])     Complex Physical Medicine & Rehab Issues Assess & Plan:   1. Severe abnormality of gait and mobility and impaired self-care and ADL's secondary to progressive  Severe PVD .   Functional and medical status reassessed regarding patients ability to participate in therapies and patient found to be able to participate in acute intensive comprehensive inpatient rehabilitation program including PT/OT to improve balance, ambulation, ADLs, and to improve the P/AROM. Therapeutic modifications regarding activities in therapies, place, amount of time per day and intensity of therapy made daily. In bed therapies or bedside therapies prn.   2. Bowel and Bladder dysfunction  :  frequent toileting, ambulate to bathroom with assistance, check post void residuals. Check for C.difficile x1 if >2 loose stools in 24 hours, continue bowel & bladder program.  Monitor bowel and bladder function. Lactinex 2 PO every AC. MOM prn, Brown Bomb prn, Glycerin suppository prn, enema prn. 3. Moderate   pain as well as generalized OA pain: reassess pain every shift and prior to and after each therapy session, give prn Tylenol and   See mar , modalities prn in therapy, masage, Lidoderm, K-pad prn. Consider scheduled AM pain meds. 4. Skin healing   and breakdown risk:  continue pressure relief program.  Daily skin exams and reports from nursing. 5. Fatigue due to nutritional and hydration deficiency: Add and titrate vitamin B12 vitamin D and CoQ10 continue to monitor I&Os, calorie counts prn, dietary consult prn.  6. Acute episodic insomnia with situational adjustment disorder:  prn Ambien, monitor for day time sedation. 7. Falls risk elevated:  patient to use call light to get nursing assistance to get up, bed and chair alarm. 8. Elevated DVT risk: progressive activities in PT, continue prophylaxis ABRAM hose, elevation and se mar  . 9. Complex discharge planning:  Weekly team meeting every Thursday to assess progress towards goals, discuss and address social, psychological and medical comorbidities and to address difficulties they may be having progressing in therapy. Patient and family education is in progress. The patient is to follow-up with their family physician after discharge.         Complex Active General Medical Issues that complicate care Assess & Plan:    Patient Active Problem List   Diagnosis    Atrial fibrillation ([address]    High risk medication use    Atherosclerosis of native coronary artery of native heart without angina pectoris    Hypertension    Ischemic myocardial dysfunction    Nonrheumatic aortic valve disorder, unspecified    Aortic valve disorder    Personal history of nicotine dependence    Presence of aortocoronary bypass graft    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HCC)    Vasovagal syncope    Dizziness and giddiness    Cerebrovascular accident ([address]    Orthostatic dizziness    Ataxia    Vertigo    Meniere disease, bilateral    Benign paroxysmal positional vertigo due to bilateral vestibular disorder    Coronary angioplasty status    Ataxic gait    Kyphoscoliosis    Spinal stenosis of lumbar region with neurogenic claudication    Abdominal aortic aneurysm (AAA) ([address]    Acute inferior myocardial infarction ([address]    Carotid bruit    Chronic obstructive pulmonary disease ([address]    Diabetes mellitus ([address]    Dyspnea on exertion    Fatigue    First degree atrioventricular block    Hyperlipidemia    Infection of the inner ear    Muscle pain    Underweight    Ventricular premature beats    Weight loss    PVD (peripheral vascular disease) ([address]     1.         Focus of today's plan-   Therapy evals  Cont breathing meds  Cont HTN meds, norvasc added   bg 107-170  GFR above 60  Mild leukocytosis, check ua    Cont meds , see orders      MD Gina Hoyt D.O., PM&R     Attending    [address]

## 2022-04-15 NOTE — PROGRESS NOTES
Physical Therapy Rehab Treatment Note  Facility/Department: [facility]  [facility]       NAME: Bravo Cash  : 1940 (79 y.o.)  MRN: [id number]  CODE STATUS: Full Code    Date of Service: 4/15/2022     Restrictions:  Restrictions/Precautions: Fall Risk  SUBJECTIVE:          Post Treatment Pain Screenin/10     OBJECTIVE:              Bed mobility  Rolling to Left: Modified independent  Rolling to Right: Modified independent  Supine to Sit: Modified independent  Comment: Completed with bed flat requiring increased tme and effort. Good log roll technique. Transfers  Sit to Stand: Stand by assistance  Stand to sit: Stand by assistance  Bed to Chair: Stand by assistance    Ambulation  Ambulation?: Yes  Ambulation 1  Surface: carpet  Device: Rolling Walker  Quality of Gait: flexed trunk, downward gaze, slow robert  Distance: 60ft  Comments: Pt reports RPE 11 fairly light at rest.  15 Hard after ambulation. SPO2 96% HR 68BPM prior to ambulation. SPO2 95% HR 72BPM after. Fatigued with increased dyspena after. Stairs/Curb  Stairs?: Yes  Stairs  # Steps : 4  Stairs Height: 6\"  Rails: Bilateral  Assistance: Contact guard assistance  Comment: Initiated stair training. RPE 13 somewhat hard. ASSESSMENT/PROGRESS TOWARDS GOALS:  Assessment: Initiated stair training. Required B rails. Pt only has 1 rail at home. Will progress to 1 rail. Cont to fatiue with increased SOB with activity requiring rest breaks. Maintains SPO2 levels WNL on room air. Reports RPE 13-15 with activity.     Goals:  Long term goals  Long term goal 1: Pt to complete bed mobility with indep  Long term goal 2: Pt to complete transfers with indep  Long term goal 3: Pt to ambulate 50-150ft with [employer] and indep  Long term goal 4: Pt to complete 4 steps with HR and supervision  Long term goal 5: Pt to complete HEP with indep  Long term goal 6: Pt to complete 50reps on 2min Step test to demonstrate improved activity tolerance  Long term goal 7: Pt to complete 10reps 30sec STS     PLAN OF CARE/Safety: Cont per POC.        Therapy Time:   Individual   Time In 0900   Time Out 0930   Minutes 30     Minutes:      Transfer/Bed mobility trainin      Gait trainin      Neuro re education:0     Therapeutic ex:0    Homa Whitmore PTA, 04/15/22 at 12:35 PM

## 2022-04-15 NOTE — PROGRESS NOTES
Nephrology Progress Note    Assessment:  S/P AAA  DM type-2  COPD  OHDx CAD AF hx stents        Plan: stable continue rehab check H&H  Patient Active Problem List:     Atrial fibrillation ([facility] [address]     High risk medication use     Atherosclerosis of native coronary artery of native heart without angina pectoris     Hypertension     Ischemic myocardial dysfunction     Nonrheumatic aortic valve disorder, unspecified     Aortic valve disorder     Personal history of nicotine dependence     Presence of aortocoronary bypass graft     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HCC)     Vasovagal syncope     Dizziness and giddiness     Cerebrovascular accident ([address]     Orthostatic dizziness     Ataxia     Vertigo     Meniere disease, bilateral     Benign paroxysmal positional vertigo due to bilateral vestibular disorder     Coronary angioplasty status     Ataxic gait     Kyphoscoliosis     Spinal stenosis of lumbar region with neurogenic claudication     Abdominal aortic aneurysm (AAA) ([facility] [address]     Acute inferior myocardial infarction (HCC)     Carotid bruit     Chronic obstructive pulmonary disease (HCC)     Diabetes mellitus (HCC)     Dyspnea on exertion     Fatigue     First degree atrioventricular block     Hyperlipidemia     Infection of the inner ear     Muscle pain     Underweight     Ventricular premature beats     Weight loss     PVD (peripheral vascular disease) ([facility])      Subjective:  Admit Date: 4/12/2022    Interval History: feeling well    Medications:  Scheduled Meds:   amLODIPine  5 mg Oral Daily    pitavastatin  4 mg Oral Nightly    clopidogrel  75 mg Oral Daily    apixaban  5 mg Oral BID    magnesium oxide  400 mg Oral Daily    metFORMIN  500 mg Oral BID WC    sotalol  80 mg Oral BID    insulin lispro  0-6 Units SubCUTAneous TID WC    insulin lispro  0-3 Units SubCUTAneous Nightly     Continuous Infusions:   dextrose         CBC: No results for input(s): WBC, HGB, PLT in the last 72 hours.   CMP:    Recent Labs 04/14/22  0926      K 4.3      CO2 25   BUN 28*   CREATININE 0.97   GLUCOSE 139*   CALCIUM 8.3*   LABGLOM >60.0     Troponin: No results for input(s): TROPONINI in the last 72 hours. BNP: No results for input(s): BNP in the last 72 hours. INR: No results for input(s): INR in the last 72 hours. Lipids: No results for input(s): CHOL, LDLDIRECT, TRIG, HDL, AMYLASE, LIPASE in the last 72 hours. Liver: No results for input(s): AST, ALT, ALKPHOS, PROT, LABALBU, BILITOT in the last 72 hours. Invalid input(s): BILDIR  Iron:  No results for input(s): IRONS, FERRITIN in the last 72 hours. Invalid input(s): LABIRONS  Urinalysis: No results for input(s): UA in the last 72 hours.     Objective:  Vitals: BP (!) 151/64   Pulse 63   Temp 98.1 °F (36.7 °C) (Oral)   Resp 17   Ht 5' 8\" (1.727 m)   Wt 160 lb (72.6 kg)   SpO2 99%   BMI 24.33 kg/m²    Wt Readings from Last 3 Encounters:   04/12/22 160 lb (72.6 kg)   12/29/21 151 lb 8 oz (68.7 kg)   10/12/21 150 lb (68 kg)      24HR INTAKE/OUTPUT:      Intake/Output Summary (Last 24 hours) at 4/15/2022 0931  Last data filed at 4/15/2022 0815  Gross per 24 hour   Intake --   Output 180 ml   Net -180 ml       General: alert, in no apparent distress  HEENT: normocephalic, atraumatic, anicteric  Neck: supple, no mass  Lungs: non-labored respirations, clear to auscultation bilaterally  Heart: regular rate and rhythm, no murmurs or rubs  Abdomen: soft, non-tender, non-distended  Ext: no cyanosis, no peripheral edema  Neuro: alert and oriented, no gross abnormalities  Psych: normal mood and affect  Skin: no rash      Electronically signed by Kamilah Mcdowell DO, MD

## 2022-04-15 NOTE — PROGRESS NOTES
Patient denies pain this morning. BP did come down after having morning meds, now 133/75. Still having SOB with exertion, 99% without oxygen. Offered prn inhaler, declined this morning. Labs stable.   Electronically signed by Rickey Dey RN on 4/15/22 at 11:15 AM EDT

## 2022-04-15 NOTE — PROGRESS NOTES
Patient is resting in bed with c/o pain noted to surgical incision and was medicated with prn pain meds with good pain relief noted.

## 2022-04-16 LAB
GLUCOSE BLD-MCNC: 122 MG/DL (ref 70–99)
GLUCOSE BLD-MCNC: 128 MG/DL (ref 70–99)
GLUCOSE BLD-MCNC: 132 MG/DL (ref 70–99)
GLUCOSE BLD-MCNC: 136 MG/DL (ref 70–99)
PERFORMED ON: ABNORMAL
URINE CULTURE, ROUTINE: NORMAL

## 2022-04-16 PROCEDURE — 1180000000 HC REHAB R&B

## 2022-04-16 PROCEDURE — 6370000000 HC RX 637 (ALT 250 FOR IP): Performed by: PHYSICAL MEDICINE & REHABILITATION

## 2022-04-16 PROCEDURE — 6370000000 HC RX 637 (ALT 250 FOR IP): Performed by: INTERNAL MEDICINE

## 2022-04-16 PROCEDURE — 97530 THERAPEUTIC ACTIVITIES: CPT

## 2022-04-16 PROCEDURE — 97116 GAIT TRAINING THERAPY: CPT

## 2022-04-16 PROCEDURE — 97110 THERAPEUTIC EXERCISES: CPT

## 2022-04-16 PROCEDURE — 94640 AIRWAY INHALATION TREATMENT: CPT

## 2022-04-16 PROCEDURE — 94761 N-INVAS EAR/PLS OXIMETRY MLT: CPT

## 2022-04-16 PROCEDURE — 97535 SELF CARE MNGMENT TRAINING: CPT

## 2022-04-16 RX ORDER — VALSARTAN 40 MG/1
160 TABLET ORAL DAILY
Status: DISCONTINUED | OUTPATIENT
Start: 2022-04-16 | End: 2022-04-22 | Stop reason: HOSPADM

## 2022-04-16 RX ADMIN — APIXABAN 5 MG: 5 TABLET, FILM COATED ORAL at 20:50

## 2022-04-16 RX ADMIN — TRAMADOL HYDROCHLORIDE 50 MG: 50 TABLET, COATED ORAL at 20:49

## 2022-04-16 RX ADMIN — SOTALOL HYDROCHLORIDE 80 MG: 80 TABLET ORAL at 09:07

## 2022-04-16 RX ADMIN — ALBUTEROL SULFATE 1 PUFF: 90 AEROSOL, METERED RESPIRATORY (INHALATION) at 13:51

## 2022-04-16 RX ADMIN — METFORMIN HYDROCHLORIDE 500 MG: 500 TABLET ORAL at 16:47

## 2022-04-16 RX ADMIN — APIXABAN 5 MG: 5 TABLET, FILM COATED ORAL at 09:07

## 2022-04-16 RX ADMIN — AMLODIPINE BESYLATE 5 MG: 5 TABLET ORAL at 09:07

## 2022-04-16 RX ADMIN — METFORMIN HYDROCHLORIDE 500 MG: 500 TABLET ORAL at 09:07

## 2022-04-16 RX ADMIN — Medication 400 MG: at 09:07

## 2022-04-16 RX ADMIN — SOTALOL HYDROCHLORIDE 80 MG: 80 TABLET ORAL at 20:49

## 2022-04-16 RX ADMIN — CLOPIDOGREL BISULFATE 75 MG: 75 TABLET ORAL at 09:07

## 2022-04-16 RX ADMIN — VALSARTAN 160 MG: 40 TABLET, FILM COATED ORAL at 12:23

## 2022-04-16 ASSESSMENT — PAIN SCALES - GENERAL
PAINLEVEL_OUTOF10: 5
PAINLEVEL_OUTOF10: 0

## 2022-04-16 NOTE — PROGRESS NOTES
0700  Rested quietly throughout shift. No distress observed. Denies pain/discomforts. See flowsheets for assessment information.

## 2022-04-16 NOTE — PROGRESS NOTES
Occupational Therapy  Facility/Department: [facility]  Daily Treatment Note  NAME: Keerthi Pemberton  : 1940  MRN: [id number]    Date of Service: 2022    Discharge Recommendations:  Continue to assess pending progress       Assessment   Activity Tolerance  Activity Tolerance: Patient Tolerated treatment well  Safety Devices  Safety Devices in place: Yes  Type of devices: All fall risk precautions in place         Patient Diagnosis(es): There were no encounter diagnoses. has a past medical history of Atrial fibrillation ([facility] [address] CAD (coronary artery disease), COPD (chronic obstructive pulmonary disease) ([facility] [address] Diabetes mellitus ([facility] [address] Hyperlipidemia, Hypertension, Movement disorder, and Pneumonia. has a past surgical history that includes Coronary artery bypass graft; Appendectomy; Eye surgery (Bilateral); Insertable Cardiac Monitor (Left, 2018); and Coronary angioplasty with stent. Restrictions  Restrictions/Precautions  Restrictions/Precautions: Fall Risk     Subjective   General  Chart Reviewed: Yes  Patient assessed for rehabilitation services?: Yes  Response to previous treatment: Patient with no complaints from previous session  Family / Caregiver Present: No  Referring Practitioner: Dr. Sujit Lechuga  Diagnosis: Impaired mobility d/t severe PVD w/ LE ischemia  Pre Treatment Pain Screening  Pain at present: 0  Scale Used: Numeric Score  Intervention List: Patient able to continue with treatment  Vital Signs  Patient Currently in Pain: No     Objective    Coordination  Fine Motor:   Buttons: Pt used alternating hands to  various sized buttons that were spread out across the tabletop and place them in a par at midline. Pt had Min difficulty with activity and worked at a slow and steady pace without rest breaks. Pt completed task to improve hand fine motor coordination for mgmt of clothing fasteners/ADL containers in a timely manner.      Upper Extremity Function  UE Strengthing:   \"[place]\" Puzzle: Pt wore 1# wrist weights to complete \"[place]\" puzzle. Pt was able to correctly place 45 out of 45 pieces with Min A for problem solving. Pt had Min difficulty physically with activity and worked at a slow and steady pace without rest breaks. Pt completed activity to increase BUE strength/endurance for improved transfers. Plan  Plan  Times per week: 5-7x/week  Plan weeks: 1.5-2 weeks  Current Treatment Recommendations: Strengthening,Home Management Training,Balance Training,Functional Mobility Training,Endurance Training,Safety Education & Training,Self-Care / ADL  Plan Comment: continue with OT plan of care    Goals  Long term goals  Time Frame for Long term goals : Within 1.5-2 weeks, pt will demonstrate progress in the following areas listed below to achieve specific LTGs as stated in the initial evaluations  Long term goal 1: Pt will demo improved ADL/IADL function for d/c at highest potential  Long term goal 2: Pt will demo improved activity tolerance for ADLs  Long term goal 3: Pt will demo improved UE strength  Long term goal 4: Pt will demo improved standing balance/tolerance for increased LB ADLs status  Patient Goals   Patient goals : \"just to get out of here and fend for myself. [place] I used to cook a lot\"    Therapy Time   Individual Concurrent Group Co-treatment   Time In 1400         Time Out 1430         Minutes 30         Therapeutic activities: 30 minutes    Electronically signed by MARGE Lovelace on 4/16/2022 at 2:36 PM    MARGE Lovelace

## 2022-04-16 NOTE — PROGRESS NOTES
Occupational Therapy   Facility/Department: [facility]  Daily Treatment Note  NAME: Fauiza Dominguez  : 1940  MRN: [id number]    Date of Service: 2022    Discharge Recommendations:  Continue to assess pending progress       Assessment      Activity Tolerance  Activity Tolerance: Patient Tolerated treatment well  Safety Devices  Safety Devices in place: Yes  Type of devices: All fall risk precautions in place         Patient Diagnosis(es): There were no encounter diagnoses. has a past medical history of Atrial fibrillation ([facility] [address] CAD (coronary artery disease), COPD (chronic obstructive pulmonary disease) ([facility] [address] Diabetes mellitus ([facility] [address] Hyperlipidemia, Hypertension, Movement disorder, and Pneumonia. has a past surgical history that includes Coronary artery bypass graft; Appendectomy; Eye surgery (Bilateral); Insertable Cardiac Monitor (Left, 2018); and Coronary angioplasty with stent. Restrictions  Restrictions/Precautions  Restrictions/Precautions: Fall Risk  Subjective   General  Chart Reviewed: Yes  Patient assessed for rehabilitation services?: Yes  Family / Caregiver Present: No  Referring Practitioner: Dr. Amarilis Zamora  Diagnosis: Impaired mobility d/t severe PVD w/ LE ischemia  Subjective  Subjective: \"My wife is coming this afternoon. She does not like to sit at home. \"  Pain Assessment  Pain Assessment: 0-10  Pain Level: 0  Pre Treatment Pain Screening  Pain at present: 0  Scale Used: Numeric Score  Intervention List: Patient able to continue with treatment  Vital Signs  Patient Currently in Pain: No   Orientation     Objective      Patient engaged in B FM coordination and strengthening to increase I with fasteners and small objects for ADL's and IADL's. Patient able to assemble large conduit and connector pieces with min-no difficulty with initial threading. Patient with no difficulty identifying correct matching pieces. Patient able to disassemble pieces with no difficulty.     Patient

## 2022-04-16 NOTE — PROGRESS NOTES
Nephrology Progress Note    Assessment:  AAA  OHDx CAD  AF  HxCVA  COPD  Hypertension uncontrolled        Plan: same  Treatment  diovan 160mg qd added    Patient Active Problem List:     Atrial fibrillation ([address]     High risk medication use     Atherosclerosis of native coronary artery of native heart without angina pectoris     Hypertension     Ischemic myocardial dysfunction     Nonrheumatic aortic valve disorder, unspecified     Aortic valve disorder     Personal history of nicotine dependence     Presence of aortocoronary bypass graft     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HCC)     Vasovagal syncope     Dizziness and giddiness     Cerebrovascular accident ([address]     Orthostatic dizziness     Ataxia     Vertigo     Meniere disease, bilateral     Benign paroxysmal positional vertigo due to bilateral vestibular disorder     Coronary angioplasty status     Ataxic gait     Kyphoscoliosis     Spinal stenosis of lumbar region with neurogenic claudication     Abdominal aortic aneurysm (AAA) ([address]     Acute inferior myocardial infarction (HCC)     Carotid bruit     Chronic obstructive pulmonary disease (HCC)     Diabetes mellitus (HCC)     Dyspnea on exertion     Fatigue     First degree atrioventricular block     Hyperlipidemia     Infection of the inner ear     Muscle pain     Underweight     Ventricular premature beats     Weight loss     PVD (peripheral vascular disease) ([facility])      Subjective:  Admit Date: 4/12/2022    Interval History: doing well    Medications:  Scheduled Meds:   amLODIPine  5 mg Oral Daily    pitavastatin  4 mg Oral Nightly    clopidogrel  75 mg Oral Daily    apixaban  5 mg Oral BID    magnesium oxide  400 mg Oral Daily    metFORMIN  500 mg Oral BID WC    sotalol  80 mg Oral BID    insulin lispro  0-6 Units SubCUTAneous TID WC    insulin lispro  0-3 Units SubCUTAneous Nightly     Continuous Infusions:   dextrose         CBC:   Recent Labs     04/15/22  0953   HGB 11.4*     CMP:    Recent Labs     04/14/22  0926 04/15/22  0953    137   K 4.3 3.9    103   CO2 25 25   BUN 28* 23   CREATININE 0.97 0.94   GLUCOSE 139* 170*   CALCIUM 8.3* 8.7   LABGLOM >60.0 >60.0     Troponin: No results for input(s): TROPONINI in the last 72 hours. BNP: No results for input(s): BNP in the last 72 hours. INR: No results for input(s): INR in the last 72 hours. Lipids: No results for input(s): CHOL, LDLDIRECT, TRIG, HDL, AMYLASE, LIPASE in the last 72 hours. Liver:   Recent Labs     04/15/22  0953   AST 18   ALT 18   ALKPHOS 85   PROT 6.4   LABALBU 3.3*   BILITOT 0.7     Iron:  No results for input(s): IRONS, FERRITIN in the last 72 hours. Invalid input(s): LABIRONS  Urinalysis: No results for input(s): UA in the last 72 hours. Objective:  Vitals: BP (!) 172/106 Comment: Abnormal BP reported to [employer].   Pulse 58   Temp 97.7 °F (36.5 °C) (Oral)   Resp 19   Ht 5' 8\" (1.727 m)   Wt 160 lb (72.6 kg)   SpO2 97%   BMI 24.33 kg/m²    Wt Readings from Last 3 Encounters:   04/12/22 160 lb (72.6 kg)   12/29/21 151 lb 8 oz (68.7 kg)   10/12/21 150 lb (68 kg)      24HR INTAKE/OUTPUT:  No intake or output data in the 24 hours ending 04/16/22 1010    General: alert, in no apparent distress  HEENT: normocephalic, atraumatic, anicteric  Neck: supple, no mass  Lungs: non-labored respirations, clear to auscultation bilaterally  Heart: regular rate and rhythm, no murmurs or rubs  Abdomen: soft, non-tender, non-distended  Ext: no cyanosis, no peripheral edema  Neuro: alert and oriented, no gross abnormalities  Psych: normal mood and affect  Skin: no rash      Electronically signed by Jose David Waddell DO, MD

## 2022-04-16 NOTE — PLAN OF CARE
Problem: Falls - Risk of:  Goal: Will remain free from falls  Description: Will remain free from falls  Outcome: Met This Shift  Goal: Absence of physical injury  Description: Absence of physical injury  Outcome: Met This Shift     Problem: Skin Integrity:  Goal: Will show no infection signs and symptoms  Description: Will show no infection signs and symptoms  Outcome: Met This Shift  Goal: Absence of new skin breakdown  Description: Absence of new skin breakdown  Outcome: Met This Shift     Problem: Mobility - Impaired:  Goal: Mobility will improve  Description: Mobility will improve  Outcome: Met This Shift     Problem: Nutritional:  Goal: Consumption of food in small portions  Description: Consumption of food in small portions  Outcome: Ongoing  Goal: Consumption of liquid of appropriate consistency  Description: Consumption of liquid of appropriate consistency  Outcome: Met This Shift     Problem: Respiratory:  Goal: Ability to maintain a clear airway will improve  Description: Ability to maintain a clear airway will improve  Outcome: Met This Shift  Goal: Will remain free from infection  Description: Will remain free from infection  Outcome: Met This Shift  Goal: Absence of aspiration  Description: Absence of aspiration  Outcome: Met This Shift     Problem: Safety:  Goal: Ability to chew and swallow food without choking will improve  Description: Ability to chew and swallow food without choking will improve  Outcome: Met This Shift  Goal: Ability to demonstrate good, daily oral hygiene techniques will improve  Description: Ability to demonstrate good, daily oral hygiene techniques will improve  Outcome: Met This Shift  Goal: Maintenance of upright position during and after feeding  Description: Maintenance of upright position during and after feeding  Outcome: Met This Shift

## 2022-04-16 NOTE — PROGRESS NOTES
deficit; Decreased coordination;Decreased ROM  Assessment: Continued fatigue and dyspnea with activity. Several rest breaks required. Patient challenged with stairs, gait and working toward goals of poc. Goals:  Long term goals  Long term goal 1: Pt to complete bed mobility with indep  Long term goal 2: Pt to complete transfers with indep  Long term goal 3: Pt to ambulate 50-150ft with [employer] and indep  Long term goal 4: Pt to complete 4 steps with HR and supervision  Long term goal 5: Pt to complete HEP with indep  Long term goal 6: Pt to complete 50reps on 2min Step test to demonstrate improved activity tolerance  Long term goal 7: Pt to complete 10reps 30sec STS    PLAN OF CARE/Safety:   Safety Devices  Type of devices:  All fall risk precautions in place      Therapy Time:   Individual   Time In 930   Time Out 1030   Minutes 60     Minutes:60      Transfer/Bed mobility training:15      Gait trainin      Neuro re education:0     Therapeutic ex:20      Jon Cruz PTA, 22 at 10:30 AM

## 2022-04-16 NOTE — PROGRESS NOTES
Physical Therapy Rehab Treatment Note  [facility]       NAME: Christian Christianson  : 1940 (79 y.o.)  MRN: [id number]  CODE STATUS: Full Code    Date of Service: 2022  Chart Reviewed: Yes  Family / Caregiver Present: No  General Comment  Comments: agreeable to tx    Restrictions:  Restrictions/Precautions: Fall Risk    SUBJECTIVE: Subjective: Patient agreeable to treatment. States he had a good lunch. Pain Screening  Patient Currently in Pain: No  Pre Treatment Pain Screening  Pain at present: 0  Scale Used: Numeric Score  Intervention List: Patient able to continue with treatment    Post Treatment Pain Screening:  Pain Assessment  Pain Assessment: 0-10  Pain Level: 0    OBJECTIVE:              Transfers  Sit to Stand: Stand by assistance  Stand to sit: Stand by assistance  Bed to Chair: Stand by assistance  Car Transfer: Contact guard assistance  Comment: improved carryover this session    Ambulation  Ambulation?: Yes  More Ambulation?: No  Ambulation 1  Surface: carpet  Device: Rolling Walker  Assistance: Stand by assistance  Quality of Gait: cues for upright posture, fatigues quickly  Gait Deviations: Decreased step length; Increased FERNANDO  Distance: 30 feet x2  Comments: RPE at 10 at rest, up to 17 after gait 30 feet sp02 96%, Hr 68    Stairs/Curb  Stairs?: No (too fatigued)       Neuro: stood x 2 min, march x58 seconds RPE 16 / 97sp02  / 65 bmp       Exercises  Hamstring Sets: culrls seated RTB x20  Hip Flexion: x20  Hip Abduction: seated RTB x20; hip add with ball x20  Knee Long Arc Quad: x20  Ankle Pumps: x20     ASSESSMENT/PROGRESS TOWARDS GOALS:  Body structures, Functions, Activity limitations: Decreased functional mobility ; Decreased safe awareness;Decreased balance;Decreased ADL status; Decreased strength;Decreased endurance;Decreased vision/visual deficit; Decreased coordination;Decreased ROM  Assessment: Patient with increased dyspnea this pm. RN notified patient reqested a breathing treatment. Patient challenged with standing static balance, transfers, gait and standing march this session. Held stair training this pm dt sob. Goals:  Long term goals  Long term goal 1: Pt to complete bed mobility with indep  Long term goal 2: Pt to complete transfers with indep  Long term goal 3: Pt to ambulate 50-150ft with [address] and indep  Long term goal 4: Pt to complete 4 steps with HR and supervision  Long term goal 5: Pt to complete HEP with indep  Long term goal 6: Pt to complete 50reps on 2min Step test to demonstrate improved activity tolerance  Long term goal 7: Pt to complete 10reps 30sec STS    PLAN OF CARE/Safety:   Safety Devices  Type of devices:  All fall risk precautions in place      Therapy Time:   Individual   Time In 1300   Time Out 1330   Minutes 30     Minutes:30      Transfer/Bed mobility trainin      Gait training:10      Neuro re education:5     Therapeutic ex:10      Elvis Miller PTA, 22 at 1:24 PM

## 2022-04-17 LAB
BASOPHILS ABSOLUTE: 0 K/UL (ref 0–0.2)
BASOPHILS RELATIVE PERCENT: 0.3 %
BILIRUBIN URINE: NEGATIVE
BLOOD, URINE: NEGATIVE
CLARITY: CLEAR
COLOR: YELLOW
EOSINOPHILS ABSOLUTE: 0.3 K/UL (ref 0–0.7)
EOSINOPHILS RELATIVE PERCENT: 3.9 %
GLUCOSE BLD-MCNC: 112 MG/DL (ref 70–99)
GLUCOSE BLD-MCNC: 114 MG/DL (ref 70–99)
GLUCOSE BLD-MCNC: 162 MG/DL (ref 70–99)
GLUCOSE BLD-MCNC: 202 MG/DL (ref 70–99)
GLUCOSE URINE: 100 MG/DL
HCT VFR BLD CALC: 31.6 % (ref 42–52)
HEMOGLOBIN: 10.2 G/DL (ref 14–18)
KETONES, URINE: NEGATIVE MG/DL
LEUKOCYTE ESTERASE, URINE: NEGATIVE
LYMPHOCYTES ABSOLUTE: 0.8 K/UL (ref 1–4.8)
LYMPHOCYTES RELATIVE PERCENT: 10 %
MCH RBC QN AUTO: 26.3 PG (ref 27–31.3)
MCHC RBC AUTO-ENTMCNC: 32.2 % (ref 33–37)
MCV RBC AUTO: 81.8 FL (ref 80–100)
MONOCYTES ABSOLUTE: 0.7 K/UL (ref 0.2–0.8)
MONOCYTES RELATIVE PERCENT: 8.2 %
NEUTROPHILS ABSOLUTE: 6.6 K/UL (ref 1.4–6.5)
NEUTROPHILS RELATIVE PERCENT: 77.6 %
NITRITE, URINE: NEGATIVE
PDW BLD-RTO: 19.3 % (ref 11.5–14.5)
PERFORMED ON: ABNORMAL
PH UA: 5 (ref 5–9)
PLATELET # BLD: 315 K/UL (ref 130–400)
PROTEIN UA: NEGATIVE MG/DL
RBC # BLD: 3.86 M/UL (ref 4.7–6.1)
SPECIFIC GRAVITY UA: 1.02 (ref 1–1.03)
URIC ACID, SERUM: 4.5 MG/DL (ref 3.4–7)
UROBILINOGEN, URINE: 1 E.U./DL
WBC # BLD: 8.5 K/UL (ref 4.8–10.8)

## 2022-04-17 PROCEDURE — 81003 URINALYSIS AUTO W/O SCOPE: CPT

## 2022-04-17 PROCEDURE — 6370000000 HC RX 637 (ALT 250 FOR IP): Performed by: INTERNAL MEDICINE

## 2022-04-17 PROCEDURE — 84550 ASSAY OF BLOOD/URIC ACID: CPT

## 2022-04-17 PROCEDURE — 94761 N-INVAS EAR/PLS OXIMETRY MLT: CPT

## 2022-04-17 PROCEDURE — 85025 COMPLETE CBC W/AUTO DIFF WBC: CPT

## 2022-04-17 PROCEDURE — 94640 AIRWAY INHALATION TREATMENT: CPT

## 2022-04-17 PROCEDURE — 6370000000 HC RX 637 (ALT 250 FOR IP): Performed by: PHYSICAL MEDICINE & REHABILITATION

## 2022-04-17 PROCEDURE — 1180000000 HC REHAB R&B

## 2022-04-17 PROCEDURE — 36415 COLL VENOUS BLD VENIPUNCTURE: CPT

## 2022-04-17 RX ADMIN — TRAMADOL HYDROCHLORIDE 50 MG: 50 TABLET, COATED ORAL at 20:52

## 2022-04-17 RX ADMIN — ALBUTEROL SULFATE 1 PUFF: 90 AEROSOL, METERED RESPIRATORY (INHALATION) at 21:10

## 2022-04-17 RX ADMIN — CLOPIDOGREL BISULFATE 75 MG: 75 TABLET ORAL at 07:57

## 2022-04-17 RX ADMIN — SOTALOL HYDROCHLORIDE 80 MG: 80 TABLET ORAL at 20:52

## 2022-04-17 RX ADMIN — Medication 400 MG: at 07:57

## 2022-04-17 RX ADMIN — METFORMIN HYDROCHLORIDE 500 MG: 500 TABLET ORAL at 16:39

## 2022-04-17 RX ADMIN — AMLODIPINE BESYLATE 5 MG: 5 TABLET ORAL at 07:57

## 2022-04-17 RX ADMIN — APIXABAN 5 MG: 5 TABLET, FILM COATED ORAL at 20:51

## 2022-04-17 RX ADMIN — METFORMIN HYDROCHLORIDE 500 MG: 500 TABLET ORAL at 07:57

## 2022-04-17 RX ADMIN — VALSARTAN 160 MG: 40 TABLET, FILM COATED ORAL at 07:57

## 2022-04-17 RX ADMIN — APIXABAN 5 MG: 5 TABLET, FILM COATED ORAL at 07:57

## 2022-04-17 RX ADMIN — SOTALOL HYDROCHLORIDE 80 MG: 80 TABLET ORAL at 07:57

## 2022-04-17 ASSESSMENT — PAIN SCALES - GENERAL
PAINLEVEL_OUTOF10: 0
PAINLEVEL_OUTOF10: 0
PAINLEVEL_OUTOF10: 4

## 2022-04-17 ASSESSMENT — PAIN DESCRIPTION - LOCATION: LOCATION: GENERALIZED

## 2022-04-17 NOTE — PLAN OF CARE
Problem: Falls - Risk of:  Goal: Will remain free from falls  Description: Will remain free from falls  Outcome: Met This Shift  Goal: Absence of physical injury  Description: Absence of physical injury  Outcome: Met This Shift     Problem: Skin Integrity:  Goal: Will show no infection signs and symptoms  Description: Will show no infection signs and symptoms  Outcome: Met This Shift  Goal: Absence of new skin breakdown  Description: Absence of new skin breakdown  Outcome: Met This Shift     Problem: Mobility - Impaired:  Goal: Mobility will improve  Description: Mobility will improve  Outcome: Met This Shift

## 2022-04-17 NOTE — PLAN OF CARE
Problem: Falls - Risk of:  Goal: Will remain free from falls  Description: Will remain free from falls  4/16/2022 2247 by Stephen Gleason RN  Outcome: Ongoing     Problem: Falls - Risk of:  Goal: Absence of physical injury  Description: Absence of physical injury  4/16/2022 2247 by Stephen Gleason RN  Outcome: Ongoing     Problem: Skin Integrity:  Goal: Will show no infection signs and symptoms  Description: Will show no infection signs and symptoms  4/16/2022 2247 by Stephen Gleason RN  Outcome: Ongoing     Problem: Skin Integrity:  Goal: Absence of new skin breakdown  Description: Absence of new skin breakdown  4/16/2022 2247 by Stephen Gleason RN  Outcome: Ongoing     Problem: Skin Integrity:  Goal: Absence of new skin breakdown  Description: Absence of new skin breakdown  4/16/2022 2247 by Stephen Gleason RN  Outcome: Ongoing

## 2022-04-17 NOTE — PROGRESS NOTES
spleen. Extremities:  Plus 1 lower extremity edema    Skin:   Intact to general survey,      Rehabilitation:  Physical therapy:   Bed Mobility: Scooting: Stand by assistance    Transfers: Sit to Stand: Stand by assistance  Stand to sit: Stand by assistance  Bed to Chair: Stand by assistance, Ambulation 1  Surface: carpet  Device: Rolling Walker  Other Apparatus: O2  Assistance: Stand by assistance  Quality of Gait: cues for upright posture, fatigues quickly  Gait Deviations: Decreased step length,Increased FERNANDO  Distance: 30 feet x2  Comments: RPE at 10 at rest, up to 17 after gait 30 feet sp02 96%, Hr 68, Stairs  # Steps : 4  Stairs Height: 6\"  Rails: Bilateral  Assistance: Contact guard assistance  Comment: stair training:  RPE 17 very hard /  97% Sp02--- 65bpm    FIMS:  ,  , Assessment: Patient with increased dyspnea this pm. RN notified patient reqested a breathing treatment. Patient challenged with standing static balance, transfers, gait and standing march this session. Held stair training this pm dt sob. Occupational therapy:    ,  , Assessment: Pt is a 78 y/o male who presents with above deficits which also impacts ADL/IADL function. Pt s/p AAA repair. Pt very limited by fatigue. Pt requires continued OT to address ADL/IADL function    Speech therapy:        Lab/X-ray studies reviewed, analyzed and discussed with patient and staff:   Recent Results (from the past 24 hour(s))   POCT Glucose    Collection Time: 04/16/22  6:19 AM   Result Value Ref Range    POC Glucose 122 (H) 70 - 99 mg/dl    Performed on ACCU-CHEK    POCT Glucose    Collection Time: 04/16/22 12:00 PM   Result Value Ref Range    POC Glucose 128 (H) 70 - 99 mg/dl    Performed on ACCU-CHEK    POCT Glucose    Collection Time: 04/16/22  4:17 PM   Result Value Ref Range    POC Glucose 132 (H) 70 - 99 mg/dl    Performed on ACCU-CHEK        No results found. Previous extensive, complex labs, notes and diagnostics reviewed and analyzed. ALLERGIES:    Allergies as of 04/12/2022 - Fully Reviewed 04/12/2022   Allergen Reaction Noted    Fenofibrate  12/29/2021    Lipitor [atorvastatin] Other (See Comments) 12/19/2016    Niacin Other (See Comments) 12/19/2016    Niaspan [niacin er] Other (See Comments) 12/19/2016    Vitamin e Other (See Comments) 12/19/2016    Zocor [simvastatin] Other (See Comments) 12/19/2016      (please also verify by checking [facility])     Complex Physical Medicine & Rehab Issues Assess & Plan:   1. Severe abnormality of gait and mobility and impaired self-care and ADL's secondary to progressive  Severe PVD . Functional and medical status reassessed regarding patients ability to participate in therapies and patient found to be able to participate in acute intensive comprehensive inpatient rehabilitation program including PT/OT to improve balance, ambulation, ADLs, and to improve the P/AROM. Therapeutic modifications regarding activities in therapies, place, amount of time per day and intensity of therapy made daily. In bed therapies or bedside therapies prn.   2. Bowel and Bladder dysfunction  :  frequent toileting, ambulate to bathroom with assistance, check post void residuals. Check for C.difficile x1 if >2 loose stools in 24 hours, continue bowel & bladder program.  Monitor bowel and bladder function. Lactinex 2 PO every AC. MOM prn, Brown Bomb prn, Glycerin suppository prn, enema prn. 3. Moderate   pain as well as generalized OA pain: reassess pain every shift and prior to and after each therapy session, give prn Tylenol and   See mar , modalities prn in therapy, masage, Lidoderm, K-pad prn. Consider scheduled AM pain meds. 4. Skin healing   and breakdown risk:  continue pressure relief program.  Daily skin exams and reports from nursing.   5. Fatigue due to nutritional and hydration deficiency: Add and titrate vitamin B12 vitamin D and CoQ10 continue to monitor I&Os, calorie counts prn, dietary consult prn.  6. Acute episodic insomnia with situational adjustment disorder:  prn Ambien, monitor for day time sedation. 7. Falls risk elevated:  patient to use call light to get nursing assistance to get up, bed and chair alarm. 8. Elevated DVT risk: progressive activities in PT, continue prophylaxis ABRAM hose, elevation and se mar  . 9. Complex discharge planning:  Weekly team meeting every Thursday to assess progress towards goals, discuss and address social, psychological and medical comorbidities and to address difficulties they may be having progressing in therapy. Patient and family education is in progress. The patient is to follow-up with their family physician after discharge.         Complex Active General Medical Issues that complicate care Assess & Plan:    Patient Active Problem List   Diagnosis    Atrial fibrillation ([address]    High risk medication use    Atherosclerosis of native coronary artery of native heart without angina pectoris    Hypertension    Ischemic myocardial dysfunction    Nonrheumatic aortic valve disorder, unspecified    Aortic valve disorder    Personal history of nicotine dependence    Presence of aortocoronary bypass graft    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HCC)    Vasovagal syncope    Dizziness and giddiness    Cerebrovascular accident ([address]    Orthostatic dizziness    Ataxia    Vertigo    Meniere disease, bilateral    Benign paroxysmal positional vertigo due to bilateral vestibular disorder    Coronary angioplasty status    Ataxic gait    Kyphoscoliosis    Spinal stenosis of lumbar region with neurogenic claudication    Abdominal aortic aneurysm (AAA) ([address]    Acute inferior myocardial infarction ([address]    Carotid bruit    Chronic obstructive pulmonary disease ([address]    Diabetes mellitus ([address]    Dyspnea on exertion    Fatigue    First degree atrioventricular block    Hyperlipidemia    Infection of the inner ear    Muscle pain    Underweight    Ventricular premature beats    Weight loss    PVD (peripheral vascular disease) [facility]     1.         Focus of today's plan-   Therapy evals  Cont breathing meds  Cont HTN meds, norvasc added , see orders, monitor closely    bg 122-132  GFR above 60  Mild leukocytosis, check ua, neg, await culture  Repeat CBC    Cont meds , see orders      MD Larry Martínez D.O., PM&R     Attending    [address]

## 2022-04-17 NOTE — PROGRESS NOTES
Nephrology Progress Note    Assessment:  S/P AAA  OHDx Hx AF  HF/CAD  CABGX  Hypertension  DM type-2      Plan:  Doing well  Patient Active Problem List:     Atrial fibrillation ([facility]     High risk medication use     Atherosclerosis of native coronary artery of native heart without angina pectoris     Hypertension     Ischemic myocardial dysfunction     Nonrheumatic aortic valve disorder, unspecified     Aortic valve disorder     Personal history of nicotine dependence     Presence of aortocoronary bypass graft     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HCC)     Vasovagal syncope     Dizziness and giddiness     Cerebrovascular accident ([facility] [facility]     Orthostatic dizziness     Ataxia     Vertigo     Meniere disease, bilateral     Benign paroxysmal positional vertigo due to bilateral vestibular disorder     Coronary angioplasty status     Ataxic gait     Kyphoscoliosis     Spinal stenosis of lumbar region with neurogenic claudication     Abdominal aortic aneurysm (AAA) (HCC)     Acute inferior myocardial infarction (HCC)     Carotid bruit     Chronic obstructive pulmonary disease (HCC)     Diabetes mellitus (HCC)     Dyspnea on exertion     Fatigue     First degree atrioventricular block     Hyperlipidemia     Infection of the inner ear     Muscle pain     Underweight     Ventricular premature beats     Weight loss     PVD [facility]peripheral vascular disease) ([facility])      Subjective:  Admit Date: 4/12/2022    Interval History: no issues    Medications:  Scheduled Meds:   valsartan  160 mg Oral Daily    amLODIPine  5 mg Oral Daily    pitavastatin  4 mg Oral Nightly    clopidogrel  75 mg Oral Daily    apixaban  5 mg Oral BID    magnesium oxide  400 mg Oral Daily    metFORMIN  500 mg Oral BID WC    sotalol  80 mg Oral BID    insulin lispro  0-6 Units SubCUTAneous TID WC    insulin lispro  0-3 Units SubCUTAneous Nightly     Continuous Infusions:   dextrose         CBC:   Recent Labs     04/15/22  0953 04/17/22  0527   WBC  --  8.5 HGB 11.4* 10.2*   PLT  --  315     CMP:    Recent Labs     04/14/22  0926 04/15/22  0953    137   K 4.3 3.9    103   CO2 25 25   BUN 28* 23   CREATININE 0.97 0.94   GLUCOSE 139* 170*   CALCIUM 8.3* 8.7   LABGLOM >60.0 >60.0     Troponin: No results for input(s): TROPONINI in the last 72 hours. BNP: No results for input(s): BNP in the last 72 hours. INR: No results for input(s): INR in the last 72 hours. Lipids: No results for input(s): CHOL, LDLDIRECT, TRIG, HDL, AMYLASE, LIPASE in the last 72 hours. Liver:   Recent Labs     04/15/22  0953   AST 18   ALT 18   ALKPHOS 85   PROT 6.4   LABALBU 3.3*   BILITOT 0.7     Iron:  No results for input(s): IRONS, FERRITIN in the last 72 hours. Invalid input(s): LABIRONS  Urinalysis: No results for input(s): UA in the last 72 hours.     Objective:  Vitals: BP (!) 153/90   Pulse 56   Temp 97.9 °F (36.6 °C) (Oral)   Resp 16   Ht 5' 8\" (1.727 m)   Wt 160 lb (72.6 kg)   SpO2 97%   BMI 24.33 kg/m²    Wt Readings from Last 3 Encounters:   04/12/22 160 lb (72.6 kg)   12/29/21 151 lb 8 oz (68.7 kg)   10/12/21 150 lb (68 kg)      24HR INTAKE/OUTPUT:      Intake/Output Summary (Last 24 hours) at 4/17/2022 9578  Last data filed at 4/16/2022 2130  Gross per 24 hour   Intake 240 ml   Output --   Net 240 ml       General: alert, in no apparent distress  HEENT: normocephalic, atraumatic, anicteric  Neck: supple, no mass  Lungs: non-labored respirations, clear to auscultation bilaterally  Heart: regular rate and rhythm, no murmurs or rubs  Abdomen: soft, non-tender, non-distended  Ext: no cyanosis, no peripheral edema  Neuro: alert and oriented, no gross abnormalities  Psych: normal mood and affect  Skin: no rash      Electronically signed by Danny Gerber DO, MD

## 2022-04-17 NOTE — PROGRESS NOTES
Subjective: The patient complains of ,\" moderate acute on chronic progressive fatigue and   weakness partially relieved by rest,medications, PT,  OT,   SLP and rest and exacerbated by recent illness. I am concerned about patients medical complexities and barriers to advancing in rehab goals including recent surgery . I reviewed current care and plans for further care with other rehab providers including nursing and case management. According to recent nursing note, \"  See notes  \". rn   Assumed care of patient at 2300. Patient resting in bed with no voiced complaints. Patient medicated by prior RN with prn ultram for complaints of 5/10 generalized pain. No changes to previously documented assessment. VSS. LBM 4/14. HS OT- 136. No distress noted. Call light within reach and bed alarm activated. Electronically signed by Syeda Rodgers RN on 4/17/2022 at 2:03 AM.  ROS x10: The patient also complains of severely impaired mobility and activities of daily living. Otherwise no new problems with vision, hearing, nose, mouth, throat, dermal, cardiovascular, GI, , pulmonary, musculoskeletal, psychiatric or neurological. See Rehab H&P on Rehab chart dated . Vital signs:  BP (!) 153/90   Pulse 56   Temp 97.9 °F (36.6 °C) (Oral)   Resp 16   Ht 5' 8\" (1.727 m)   Wt 160 lb (72.6 kg)   SpO2 97%   BMI 24.33 kg/m²   I/O:   PO/Intake:  fair PO intake,      Bowel:   continent   Bladder: continent    General:  Patient is well developed,   adequately nourished, and    well kempt. HEENT:    Pupils equal, hearing intact to loud voice, external inspection of ear     and nose benign. Inspection of lips, tongue and gums benign  Musculoskeletal: No significant change in strength or tone. All joints stable. Inspection and palpation of digits and nails show no clubbing,       cyanosis or inflammatory conditions. Neuro/Psychiatric: Affect: flat but pleasant.   Alert and oriented to person, place and     Situation with    cues. No significant change in deep tendon reflexes or     sensation  Lungs:  Diminished, CTA-B. Respiration effort is   normal at rest.     Heart:   S1 = S2,   RRR. Abdomen:  Soft, non-tender, no enlargement of liver or spleen. Extremities:  Plus 1 lower extremity edema    Skin:   Intact to general survey,      Rehabilitation:  Physical therapy:   Bed Mobility: Scooting: Stand by assistance    Transfers: Sit to Stand: Stand by assistance  Stand to sit: Stand by assistance  Bed to Chair: Stand by assistance, Ambulation 1  Surface: carpet  Device: Rolling Walker  Other Apparatus: O2  Assistance: Stand by assistance  Quality of Gait: cues for upright posture, fatigues quickly  Gait Deviations: Decreased step length,Increased FERNANDO  Distance: 30 feet x2  Comments: RPE at 10 at rest, up to 17 after gait 30 feet sp02 96%, Hr 68, Stairs  # Steps : 4  Stairs Height: 6\"  Rails: Bilateral  Assistance: Contact guard assistance  Comment: stair training:  RPE 17 very hard /  97% Sp02--- 65bpm    FIMS:  ,  , Assessment: Patient with increased dyspnea this pm. RN notified patient reqested a breathing treatment. Patient challenged with standing static balance, transfers, gait and standing march this session. Held stair training this pm dt sob. Occupational therapy:    ,  , Assessment: Pt is a 80 y/o male who presents with above deficits which also impacts ADL/IADL function. Pt s/p AAA repair. Pt very limited by fatigue.  Pt requires continued OT to address ADL/IADL function    Speech therapy:        Lab/X-ray studies reviewed, analyzed and discussed with patient and staff:   Recent Results (from the past 24 hour(s))   POCT Glucose    Collection Time: 04/16/22  7:53 PM   Result Value Ref Range    POC Glucose 136 (H) 70 - 99 mg/dl    Performed on ACCU-CHEK    CBC with Auto Differential    Collection Time: 04/17/22  5:27 AM   Result Value Ref Range    WBC 8.5 4.8 - 10.8 K/uL    RBC 3.86 (L) 4.70 - 6.10 M/uL    Hemoglobin 10.2 (L) 14.0 - 18.0 g/dL    Hematocrit 31.6 (L) 42.0 - 52.0 %    MCV 81.8 80.0 - 100.0 fL    MCH 26.3 (L) 27.0 - 31.3 pg    MCHC 32.2 (L) 33.0 - 37.0 %    RDW 19.3 (H) 11.5 - 14.5 %    Platelets 267 382 - 699 K/uL    Neutrophils % 77.6 %    Lymphocytes % 10.0 %    Monocytes % 8.2 %    Eosinophils % 3.9 %    Basophils % 0.3 %    Neutrophils Absolute 6.6 (H) 1.4 - 6.5 K/uL    Lymphocytes Absolute 0.8 (L) 1.0 - 4.8 K/uL    Monocytes Absolute 0.7 0.2 - 0.8 K/uL    Eosinophils Absolute 0.3 0.0 - 0.7 K/uL    Basophils Absolute 0.0 0.0 - 0.2 K/uL   Uric Acid    Collection Time: 04/17/22  5:27 AM   Result Value Ref Range    Uric Acid, Serum 4.5 3.4 - 7.0 mg/dL   POCT Glucose    Collection Time: 04/17/22  6:26 AM   Result Value Ref Range    POC Glucose 114 (H) 70 - 99 mg/dl    Performed on ACCU-CHEK    POCT Glucose    Collection Time: 04/17/22 11:06 AM   Result Value Ref Range    POC Glucose 202 (H) 70 - 99 mg/dl    Performed on ACCU-CHEK    POCT Glucose    Collection Time: 04/17/22  4:13 PM   Result Value Ref Range    POC Glucose 112 (H) 70 - 99 mg/dl    Performed on ACCU-CHEK        No results found. Previous extensive, complex labs, notes and diagnostics reviewed and analyzed. ALLERGIES:    Allergies as of 04/12/2022 - Fully Reviewed 04/12/2022   Allergen Reaction Noted    Fenofibrate  12/29/2021    Lipitor [atorvastatin] Other (See Comments) 12/19/2016    Niacin Other (See Comments) 12/19/2016    Niaspan [niacin er] Other (See Comments) 12/19/2016    Vitamin e Other (See Comments) 12/19/2016    Zocor [simvastatin] Other (See Comments) 12/19/2016      (please also verify by checking [facility])     Complex Physical Medicine & Rehab Issues Assess & Plan:   1. Severe abnormality of gait and mobility and impaired self-care and ADL's secondary to progressive  Severe PVD .   Functional and medical status reassessed regarding patients ability to participate in therapies and patient found to be able to participate in acute intensive comprehensive inpatient rehabilitation program including PT/OT to improve balance, ambulation, ADLs, and to improve the P/AROM. Therapeutic modifications regarding activities in therapies, place, amount of time per day and intensity of therapy made daily. In bed therapies or bedside therapies prn.   2. Bowel and Bladder dysfunction  :  frequent toileting, ambulate to bathroom with assistance, check post void residuals. Check for C.difficile x1 if >2 loose stools in 24 hours, continue bowel & bladder program.  Monitor bowel and bladder function. Lactinex 2 PO every AC. MOM prn, Brown Bomb prn, Glycerin suppository prn, enema prn. 3. Moderate   pain as well as generalized OA pain: reassess pain every shift and prior to and after each therapy session, give prn Tylenol and   See mar , modalities prn in therapy, masage, Lidoderm, K-pad prn. Consider scheduled AM pain meds. 4. Skin healing   and breakdown risk:  continue pressure relief program.  Daily skin exams and reports from nursing. 5. Fatigue due to nutritional and hydration deficiency: Add and titrate vitamin B12 vitamin D and CoQ10 continue to monitor I&Os, calorie counts prn, dietary consult prn.  6. Acute episodic insomnia with situational adjustment disorder:  prn Ambien, monitor for day time sedation. 7. Falls risk elevated:  patient to use call light to get nursing assistance to get up, bed and chair alarm. 8. Elevated DVT risk: progressive activities in PT, continue prophylaxis ABRAM hose, elevation and se mar  . 9. Complex discharge planning:  Weekly team meeting every Thursday to assess progress towards goals, discuss and address social, psychological and medical comorbidities and to address difficulties they may be having progressing in therapy. Patient and family education is in progress. The patient is to follow-up with their family physician after discharge.         Complex Active General

## 2022-04-17 NOTE — PROGRESS NOTES
Assumed care of patient at 2300. Patient resting in bed with no voiced complaints. Patient medicated by prior RN with prn ultram for complaints of 5/10 generalized pain. No changes to previously documented assessment. VSS. LBM 4/14. HS OT- 136. No distress noted. Call light within reach and bed alarm activated.   Electronically signed by Josue Douglass RN on 4/17/2022 at 2:03 AM

## 2022-04-18 PROBLEM — I95.9 HYPOTENSION: Status: ACTIVE | Noted: 2022-04-18

## 2022-04-18 PROBLEM — Z78.9 IMPAIRED MOBILITY AND ACTIVITIES OF DAILY LIVING: Status: ACTIVE | Noted: 2022-04-18

## 2022-04-18 PROBLEM — I69.90 LATE EFFECTS OF CVA (CEREBROVASCULAR ACCIDENT): Status: ACTIVE | Noted: 2022-04-18

## 2022-04-18 PROBLEM — Z74.09 IMPAIRED MOBILITY AND ACTIVITIES OF DAILY LIVING: Status: ACTIVE | Noted: 2022-04-18

## 2022-04-18 LAB
ANION GAP SERPL CALCULATED.3IONS-SCNC: 10 MEQ/L (ref 9–15)
BUN BLDV-MCNC: 26 MG/DL (ref 8–23)
CALCIUM SERPL-MCNC: 8.6 MG/DL (ref 8.5–9.9)
CHLORIDE BLD-SCNC: 109 MEQ/L (ref 95–107)
CO2: 24 MEQ/L (ref 20–31)
CREAT SERPL-MCNC: 0.98 MG/DL (ref 0.7–1.2)
GFR AFRICAN AMERICAN: >60
GFR NON-AFRICAN AMERICAN: >60
GLUCOSE BLD-MCNC: 108 MG/DL (ref 70–99)
GLUCOSE BLD-MCNC: 108 MG/DL (ref 70–99)
GLUCOSE BLD-MCNC: 114 MG/DL (ref 70–99)
GLUCOSE BLD-MCNC: 120 MG/DL (ref 70–99)
GLUCOSE BLD-MCNC: 154 MG/DL (ref 70–99)
HCT VFR BLD CALC: 33.6 % (ref 42–52)
HEMOGLOBIN: 10.8 G/DL (ref 14–18)
PERFORMED ON: ABNORMAL
POTASSIUM SERPL-SCNC: 4.5 MEQ/L (ref 3.4–4.9)
SODIUM BLD-SCNC: 143 MEQ/L (ref 135–144)

## 2022-04-18 PROCEDURE — 6370000000 HC RX 637 (ALT 250 FOR IP): Performed by: PHYSICAL MEDICINE & REHABILITATION

## 2022-04-18 PROCEDURE — 1180000000 HC REHAB R&B

## 2022-04-18 PROCEDURE — 80048 BASIC METABOLIC PNL TOTAL CA: CPT

## 2022-04-18 PROCEDURE — 94761 N-INVAS EAR/PLS OXIMETRY MLT: CPT

## 2022-04-18 PROCEDURE — 97535 SELF CARE MNGMENT TRAINING: CPT

## 2022-04-18 PROCEDURE — 94640 AIRWAY INHALATION TREATMENT: CPT

## 2022-04-18 PROCEDURE — 6370000000 HC RX 637 (ALT 250 FOR IP): Performed by: INTERNAL MEDICINE

## 2022-04-18 PROCEDURE — 99233 SBSQ HOSP IP/OBS HIGH 50: CPT | Performed by: PHYSICAL MEDICINE & REHABILITATION

## 2022-04-18 PROCEDURE — 36415 COLL VENOUS BLD VENIPUNCTURE: CPT

## 2022-04-18 PROCEDURE — 97530 THERAPEUTIC ACTIVITIES: CPT

## 2022-04-18 PROCEDURE — 85018 HEMOGLOBIN: CPT

## 2022-04-18 PROCEDURE — 97116 GAIT TRAINING THERAPY: CPT

## 2022-04-18 PROCEDURE — 85014 HEMATOCRIT: CPT

## 2022-04-18 PROCEDURE — 97110 THERAPEUTIC EXERCISES: CPT

## 2022-04-18 RX ADMIN — Medication 400 MG: at 08:42

## 2022-04-18 RX ADMIN — APIXABAN 5 MG: 5 TABLET, FILM COATED ORAL at 20:57

## 2022-04-18 RX ADMIN — VALSARTAN 160 MG: 40 TABLET, FILM COATED ORAL at 08:42

## 2022-04-18 RX ADMIN — METFORMIN HYDROCHLORIDE 500 MG: 500 TABLET ORAL at 08:42

## 2022-04-18 RX ADMIN — APIXABAN 5 MG: 5 TABLET, FILM COATED ORAL at 08:42

## 2022-04-18 RX ADMIN — SOTALOL HYDROCHLORIDE 80 MG: 80 TABLET ORAL at 08:42

## 2022-04-18 RX ADMIN — METFORMIN HYDROCHLORIDE 500 MG: 500 TABLET ORAL at 16:46

## 2022-04-18 RX ADMIN — AMLODIPINE BESYLATE 5 MG: 5 TABLET ORAL at 08:42

## 2022-04-18 RX ADMIN — ALBUTEROL SULFATE 1 PUFF: 90 AEROSOL, METERED RESPIRATORY (INHALATION) at 15:54

## 2022-04-18 RX ADMIN — CLOPIDOGREL BISULFATE 75 MG: 75 TABLET ORAL at 08:42

## 2022-04-18 RX ADMIN — TRAMADOL HYDROCHLORIDE 50 MG: 50 TABLET, COATED ORAL at 20:57

## 2022-04-18 RX ADMIN — SOTALOL HYDROCHLORIDE 80 MG: 80 TABLET ORAL at 20:57

## 2022-04-18 ASSESSMENT — PAIN SCALES - GENERAL
PAINLEVEL_OUTOF10: 0
PAINLEVEL_OUTOF10: 6
PAINLEVEL_OUTOF10: 0
PAINLEVEL_OUTOF10: 0

## 2022-04-18 ASSESSMENT — PAIN DESCRIPTION - LOCATION: LOCATION: GENERALIZED

## 2022-04-18 NOTE — PROGRESS NOTES
Nephrology Progress Note    Assessment:  S/P AAA  OHDx CAD HFrEF  S/P CABG  DM type-2  Hypertension  COPD      Plan:  No issues  Doing well    Patient Active Problem List:     Atrial fibrillation ([facility]     High risk medication use     Atherosclerosis of native coronary artery of native heart without angina pectoris     Hypertension     Ischemic myocardial dysfunction     Nonrheumatic aortic valve disorder, unspecified     Aortic valve disorder     Personal history of nicotine dependence     Presence of aortocoronary bypass graft     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HCC)     Vasovagal syncope     Dizziness and giddiness     Cerebrovascular accident ([facility] [address]     Orthostatic dizziness     Ataxia     Vertigo     Meniere disease, bilateral     Benign paroxysmal positional vertigo due to bilateral vestibular disorder     Coronary angioplasty status     Ataxic gait     Kyphoscoliosis     Spinal stenosis of lumbar region with neurogenic claudication     Abdominal aortic aneurysm (AAA) (HCC)     Acute inferior myocardial infarction (HCC)     Carotid bruit     Chronic obstructive pulmonary disease (HCC)     Diabetes mellitus (HCC)     Dyspnea on exertion     Fatigue     First degree atrioventricular block     Hyperlipidemia     Infection of the inner ear     Muscle pain     Underweight     Ventricular premature beats     Weight loss     PVD (peripheral vascular disease) ([facility])     Impaired mobility and activities of daily living -sp Severe PVD s/p AAA repain     Hypotension     Late effects of CVA (cerebrovascular accident)      Subjective:  Admit Date: 4/12/2022    Interval History: no issues    Medications:  Scheduled Meds:   valsartan  160 mg Oral Daily    amLODIPine  5 mg Oral Daily    pitavastatin  4 mg Oral Nightly    clopidogrel  75 mg Oral Daily    apixaban  5 mg Oral BID    magnesium oxide  400 mg Oral Daily    metFORMIN  500 mg Oral BID WC    sotalol  80 mg Oral BID    insulin lispro  0-6 Units SubCUTAneous TID WC    insulin lispro  0-3 Units SubCUTAneous Nightly     Continuous Infusions:   dextrose         CBC:   Recent Labs     04/17/22  0527 04/18/22  0504   WBC 8.5  --    HGB 10.2* 10.8*     --      CMP:    Recent Labs     04/15/22  0953 04/18/22  0504    143   K 3.9 4.5    109*   CO2 25 24   BUN 23 26*   CREATININE 0.94 0.98   GLUCOSE 170* 120*   CALCIUM 8.7 8.6   LABGLOM >60.0 >60.0     Troponin: No results for input(s): TROPONINI in the last 72 hours. BNP: No results for input(s): BNP in the last 72 hours. INR: No results for input(s): INR in the last 72 hours. Lipids: No results for input(s): CHOL, LDLDIRECT, TRIG, HDL, AMYLASE, LIPASE in the last 72 hours. Liver:   Recent Labs     04/15/22  0953   AST 18   ALT 18   ALKPHOS 85   PROT 6.4   LABALBU 3.3*   BILITOT 0.7     Iron:  No results for input(s): IRONS, FERRITIN in the last 72 hours. Invalid input(s): LABIRONS  Urinalysis: No results for input(s): UA in the last 72 hours.     Objective:  Vitals: BP (!) 153/80   Pulse 61   Temp 97.3 °F (36.3 °C) (Oral)   Resp 17   Ht 5' 8\" (1.727 m)   Wt 160 lb (72.6 kg)   SpO2 99%   BMI 24.33 kg/m²    Wt Readings from Last 3 Encounters:   04/12/22 160 lb (72.6 kg)   12/29/21 151 lb 8 oz (68.7 kg)   10/12/21 150 lb (68 kg)      24HR INTAKE/OUTPUT:      Intake/Output Summary (Last 24 hours) at 4/18/2022 0825  Last data filed at 4/17/2022 2115  Gross per 24 hour   Intake 240 ml   Output --   Net 240 ml       General: alert, in no apparent distress  HEENT: normocephalic, atraumatic, anicteric  Neck: supple, no mass  Lungs: non-labored respirations, clear to auscultation bilaterally  Heart: regular rate and rhythm, no murmurs or rubs  Abdomen: soft, non-tender, non-distended  Ext: no cyanosis, no peripheral edema  Neuro: alert and oriented, no gross abnormalities  Psych: normal mood and affect  Skin: no rash      Electronically signed by Umesh Castellanos DO, MD

## 2022-04-18 NOTE — PROGRESS NOTES
Assessment completed earlier in shift, VSS. MD aware of labs, noted stable. Denies pain thus far. Wheezing noted after therapy session, called for prn inhaler. Able to maintain Sp02 on room air, 98%. Occasional moist cough noted. LBM 4/16, continent.  Electronically signed by Ricardo Multani RN on 4/18/2022 at 1:23 PM

## 2022-04-18 NOTE — PROGRESS NOTES
CARE/Safety:   Plan Comment: Cont per POC.     Therapy Time:   Individual   Time In 0900   Time Out 0930   Minutes 30     Minutes:      Transfer/Bed mobility trainin      Gait trainin      Neuro re education:0     Therapeutic ex:5    Deonte Chavez PTA, 22 at 9:28 AM

## 2022-04-18 NOTE — PROGRESS NOTES
Physical Therapy Rehab Treatment Note  Facility/Department: [facility]   Room: [place] [address]       NAME: Danyelle Mays  : 1940 (79 y.o.)  MRN: [id number]  CODE STATUS: Full Code    Date of Service: 2022  Chart Reviewed: Yes  Family / Caregiver Present: No    Restrictions:  Restrictions/Precautions: Fall Risk    SUBJECTIVE: Subjective: Pt states he is tired. States his breathing is ok if he takes rests. Pain Screening  Patient Currently in Pain: No  Pre Treatment Pain Screening  Pain at present: 0    Post Treatment Pain Screening:  Pain Assessment  Pain Level: 0    OBJECTIVE:              Bed mobility  Rolling to Left: Modified independent  Rolling to Right: Modified independent  Supine to Sit: Modified independent  Sit to Supine: Modified independent     Transfers  Sit to Stand: Supervision  Stand to sit: Supervision  Bed to Chair: Supervision    Ambulation  Ambulation?: Yes  Ambulation 1  Surface: carpet  Device: Rolling Walker  Assistance: Supervision  Quality of Gait: cues for upright posture, fatigues quickly, mild increased SOB, decreased heel strike  Distance: 30ftx3  Comments: Focused on ambulating multiple short distances to destinations. Excercises:  Seated hamcurls RTB x20; seated hip flexion x20; LAQs x20, hip abd RTB x20; hip add with ball x20  2min step test=32 steps in 38sec    ASSESSMENT/PROGRESS TOWARDS GOALS:  Assessment: Pt tolerated gait and stair training reporting decreased RPE between 11-13. Cont to require rest breaks between standing activity.     Goals:  Long term goals  Long term goal 1: Pt to complete bed mobility with indep  Long term goal 2: Pt to complete transfers with indep  Long term goal 3: Pt to ambulate 50-150ft with [employer] and indep  Long term goal 4: Pt to complete 4 steps with HR and supervision  Long term goal 5: Pt to complete HEP with indep  Long term goal 6: Pt to complete 50reps on 2min Step test to demonstrate improved activity tolerance  Long term goal 7: Pt to complete 10reps 30sec STS    PLAN OF CARE/Safety:   Plan Comment: Cont per POC.     Therapy Time:   Individual   Time In 1400   Time Out 1500   Minutes 60     Minutes:      Transfer/Bed mobility training:10      Gait trainin      Neuro re education:0     Therapeutic ex:25    Alvaro Blair PTA, 22 at 3:05 PM

## 2022-04-18 NOTE — PLAN OF CARE
Problem: Falls - Risk of:  Goal: Will remain free from falls  Description: Will remain free from falls  4/18/2022 0843 by Neel Martin RN  Outcome: Ongoing  4/17/2022 2306 by Dimitri Humphreys RN  Outcome: Ongoing  Goal: Absence of physical injury  Description: Absence of physical injury  4/18/2022 0843 by Neel Martin RN  Outcome: Ongoing  4/17/2022 2306 by Dimitri Humphreys RN  Outcome: Ongoing     Problem: Skin Integrity:  Goal: Will show no infection signs and symptoms  Description: Will show no infection signs and symptoms  Outcome: Ongoing  Goal: Absence of new skin breakdown  Description: Absence of new skin breakdown  Outcome: Ongoing     Problem: Mobility - Impaired:  Goal: Mobility will improve  Description: Mobility will improve  Outcome: Ongoing     Problem: Nutritional:  Goal: Consumption of food in small portions  Description: Consumption of food in small portions  Outcome: Ongoing  Goal: Consumption of liquid of appropriate consistency  Description: Consumption of liquid of appropriate consistency  Outcome: Ongoing     Problem: Respiratory:  Goal: Ability to maintain a clear airway will improve  Description: Ability to maintain a clear airway will improve  Outcome: Ongoing  Goal: Will remain free from infection  Description: Will remain free from infection  Outcome: Ongoing  Goal: Absence of aspiration  Description: Absence of aspiration  Outcome: Ongoing     Problem: Safety:  Goal: Ability to chew and swallow food without choking will improve  Description: Ability to chew and swallow food without choking will improve  Outcome: Ongoing  Goal: Ability to demonstrate good, daily oral hygiene techniques will improve  Description: Ability to demonstrate good, daily oral hygiene techniques will improve  Outcome: Ongoing  Goal: Maintenance of upright position during and after feeding  Description: Maintenance of upright position during and after feeding  Outcome: Ongoing     Problem: IP COMMUNICATION/DYSARTHRIA  Goal: LTG - patient will improve expressive language skills to allow for communication of wants and needs in daily activities  Outcome: Ongoing     Problem: IP SWALLOWING  Goal: LTG - patient will tolerate the least restrictive diet consistency to allow for safe consumption of daily meals  Outcome: Ongoing

## 2022-04-18 NOTE — PROGRESS NOTES
Occupational Therapy  Facility/Department: [facility]  Daily Treatment Note  NAME: Marco A Fuchs  : 1940  MRN: [id number]    Date of Service: 2022    Discharge Recommendations:  Continue to assess pending progress       Assessment      Activity Tolerance  Activity Tolerance: Patient Tolerated treatment well  Activity Tolerance: Patient was noted to have increased wheezing during LE dressing. Patient also was SOB throughout the session. O2 saturation was 97-99%  Safety Devices  Safety Devices in place: Yes  Type of devices: All fall risk precautions in place         Patient Diagnosis(es): There were no encounter diagnoses. has a past medical history of Atrial fibrillation ([facility] [address] CAD (coronary artery disease), COPD (chronic obstructive pulmonary disease) ([facility] [address] Diabetes mellitus ([facility] [address] Hyperlipidemia, Hypertension, Movement disorder, and Pneumonia. has a past surgical history that includes Coronary artery bypass graft; Appendectomy; Eye surgery (Bilateral); Insertable Cardiac Monitor (Left, 2018); and Coronary angioplasty with stent.     Restrictions  Restrictions/Precautions  Restrictions/Precautions: Fall Risk  Subjective   General  Chart Reviewed: Yes  Patient assessed for rehabilitation services?: Yes  Response to previous treatment: Patient with no complaints from previous session  Family / Caregiver Present: No  Referring Practitioner: Dr. Bishop Jacome  Diagnosis: Impaired mobility d/t severe PVD w/ LE ischemia  Subjective  Subjective: \"I would like a treatment for breathing\"  Pain Assessment  Pain Level: 0  Pre Treatment Pain Screening  Pain at present: 0  Intervention List: Patient able to continue with treatment   Orientation   WFL  Objective    ADL  Feeding: Independent  Grooming: Minimal assistance (requested therapist to shave him since he is on a blood thinner and cannot use a safety razor and does not have an electric razor)  UE Bathing: Supervision  LE Bathing: Supervision  UE Dressing: Supervision  LE Dressing: Supervision        Balance  Sitting Balance: Supervision  Standing Balance: Supervision  Functional Mobility  Functional - Mobility Device: Rolling Walker  Activity: To/from bathroom  Assist Level: Supervision  Toilet Transfers  Toilet Transfers Comments: Patient did not need to utilize the commode on this date  Shower Transfers  Shower Transfers Comments: Patient declined a shower on this date     Transfers  Sit to stand: Supervision  Stand to sit: Supervision      Patient stood and completed towel folding activity to work on IADL tasks while working on his balance. Patient was able to fold 3 towels, unfold them and then fold them again all while standing with no LOB. Patient did fatigue with the task and required time in sitting to recover. Cognition  Cognition Comment: compre- supervision, express- indep, social inter- Mod indep, problem solv- SUP, memory-SUP                                         Plan   Plan  Times per week: 5-7x/week  Plan weeks: 1.5-2 weeks  Current Treatment Recommendations: Strengthening,Home Management Training,Balance Training,Functional Mobility Training,Endurance Training,Safety Education & Training,Self-Care / ADL  Plan Comment: continue with OT plan of care       Goals  Long term goals  Time Frame for Long term goals : Within 1.5-2 weeks, pt will demonstrate progress in the following areas listed below to achieve specific LTGs as stated in the initial evaluations  Long term goal 1: Pt will demo improved ADL/IADL function for d/c at highest potential  Long term goal 2: Pt will demo improved activity tolerance for ADLs  Long term goal 3: Pt will demo improved UE strength  Long term goal 4: Pt will demo improved standing balance/tolerance for increased LB ADLs status  Patient Goals   Patient goals : \"just to get out of here and fend for myself. Tank Harvey I used to cook a lot\"       Therapy Time   Individual Concurrent Group Co-treatment   Time In 1030

## 2022-04-18 NOTE — PROGRESS NOTES
Patient is resting in bed with c/o gen pain and was medicated with prn pain meds with good pain relief noted. Denies dyspnea and SOB at this time. Urinalysis collected results are pending.  Bilat groin incisions are MARGE with no drainage noted

## 2022-04-18 NOTE — PROGRESS NOTES
Occupational Therapy  Facility/Department: [facility]  Daily Treatment Note  NAME: Martha Robles  : 1940  MRN: [id number]    Date of Service: 2022    Discharge Recommendations:  Continue to assess pending progress       Assessment      Activity Tolerance  Activity Tolerance: Patient Tolerated treatment well  Safety Devices  Safety Devices in place: Yes  Type of devices: All fall risk precautions in place         Patient Diagnosis(es): There were no encounter diagnoses. has a past medical history of Atrial fibrillation ([facility] [address] CAD (coronary artery disease), COPD (chronic obstructive pulmonary disease) ([facility] [address] Diabetes mellitus ([facility] [address] Hyperlipidemia, Hypertension, Movement disorder, and Pneumonia. has a past surgical history that includes Coronary artery bypass graft; Appendectomy; Eye surgery (Bilateral); Insertable Cardiac Monitor (Left, 2018); and Coronary angioplasty with stent. Restrictions  Restrictions/Precautions  Restrictions/Precautions: Fall Risk  Subjective   General  Chart Reviewed: Yes  Patient assessed for rehabilitation services?: Yes  Response to previous treatment: Patient with no complaints from previous session  Family / Caregiver Present: No  Referring Practitioner: Dr. Supriya Slaughter  Diagnosis: Impaired mobility d/t severe PVD w/ LE ischemia  Subjective  Subjective: \"I can't beleive just that little bit can make me so tired\"  Pain Assessment  Pain Level: 0  Pre Treatment Pain Screening  Pain at present: 0  Intervention List: Patient able to continue with treatment   Orientation   [facility]  Objective        Patient completed fine motor coordination task with connecting nuts and bolts with no difficulty. Patient was provided with a sample and asked to make 10 more. Patient made 9 and then reported that was done. Patient was counting the sample as one of the pieces. Patient then made the 10 th piece after therapist gave him a verbal cue.        Patient was issued towel on table exercise HEP. Patient was educated in each exercise and he completed it with occasional verbal cue for technique. Patient was also educated in importance to move slowly during these exercises and to breathe throughout the exercises. Patient did well with moving slowly during completion of the exercises but needed rest breaks to recover his breathing. Plan   Plan  Times per week: 5-7x/week  Plan weeks: 1.5-2 weeks  Current Treatment Recommendations: Strengthening,Home Management Training,Balance Training,Functional Mobility Training,Endurance Training,Safety Education & Training,Self-Care / ADL  Plan Comment: continue with OT plan of care    Goals  Long term goals  Time Frame for Long term goals : Within 1.5-2 weeks, pt will demonstrate progress in the following areas listed below to achieve specific LTGs as stated in the initial evaluations  Long term goal 1: Pt will demo improved ADL/IADL function for d/c at highest potential  Long term goal 2: Pt will demo improved activity tolerance for ADLs  Long term goal 3: Pt will demo improved UE strength  Long term goal 4: Pt will demo improved standing balance/tolerance for increased LB ADLs status  Patient Goals   Patient goals : \"just to get out of here and fend for myself. Aryan Gore I used to cook a lot\"       Therapy Time   Individual Concurrent Group Co-treatment   Time In 1300         Time Out 1330         Minutes 30              Therapeutic activities: 30 minutes    MCKAYLA Faustin/L    Electronically signed by MCKAYLA Faustin/L on 4/18/2022 at 1:31 PM

## 2022-04-18 NOTE — PROGRESS NOTES
Subjective: The patient complains of  moderate to severe acute partially relieved by rest, PT, OT, recent AAA repair and exacerbated by recent illness and exertion. I am concerned about patients medical complexities and current active problems including Severe PVD s/p AAA repain. Recheck UA was negative x2. I discussed current functional, rehabilitation, medical status with other rehabilitation providers including nursing and case management. According to recent nursing note, \" Patient is resting in bed with c/o gen pain and was medicated with prn pain meds with good pain relief noted. Denies dyspnea and SOB at this time. Urinalysis collected results are pending. Bilat groin incisions are MARGE with no drainage noted \". ROS x10: The patient also complains of severely impaired mobility and activities of daily living. Otherwise no new problems with vision, hearing, nose, mouth, throat, dermal, cardiovascular, GI, , pulmonary, musculoskeletal, psychiatric or neurological. See Rehab H&P on Rehab chart dated . Vital signs:  BP (!) 153/80   Pulse 61   Temp 97.3 °F (36.3 °C) (Oral)   Resp 17   Ht 5' 8\" (1.727 m)   Wt 160 lb (72.6 kg)   SpO2 99%   BMI 24.33 kg/m²   I/O:   PO/Intake:  fair PO intake, no problems observed or reported. Bowel/Bladder:  continent, constipation and urinary urgency. General:  Patient is well developed, adequately nourished, non-obese and     well kempt. HEENT:    PERRLA, hearing intact to loud voice, external inspection of ear     and nose benign. Inspection of lips, tongue and gums benign  Musculoskeletal: No significant change in strength or tone. All joints stable. Inspection and palpation of digits and nails show no clubbing,       cyanosis or inflammatory conditions. Neuro/Psychiatric: Affect: flat. Alert and oriented to person, place and     situation.   No significant change in deep tendon reflexes or     sensation  Lungs:  Diminished, CTA-B. Respiration effort is normal at rest.     Heart:   S1 = S2, RRR. No loud murmurs. Abdomen:  Soft, non-tender, no enlargement of liver or spleen. Extremities:  No significant lower extremity edema or tenderness. Skin:   Intact  AAA repair-healing bilateral groin incisions. Thin skin bilateral upper extremity bruising. Rehabilitation:  Physical therapy: FIMS:  Bed Mobility: Scooting: Stand by assistance    Transfers: Sit to Stand: Stand by assistance  Stand to sit: Stand by assistance  Bed to Chair: Stand by assistance, Ambulation 1  Surface: carpet  Device: Rolling Walker  Other Apparatus: O2  Assistance: Stand by assistance  Quality of Gait: cues for upright posture, fatigues quickly  Gait Deviations: Decreased step length,Increased FERNANDO  Distance: 30 feet x2  Comments: RPE at 10 at rest, up to 17 after gait 30 feet sp02 96%, Hr 68, Stairs  # Steps : 4  Stairs Height: 6\"  Rails: Bilateral  Assistance: Contact guard assistance  Comment: stair training:  RPE 17 very hard /  97% Sp02--- 65bpm    FIMS:  ,  , Assessment: Patient with increased dyspnea this pm. RN notified patient reqested a breathing treatment. Patient challenged with standing static balance, transfers, gait and standing march this session. Held stair training this pm dt sob. Occupational therapy: FIMS:   ,  , Assessment: Pt is a 78 y/o male who presents with above deficits which also impacts ADL/IADL function. Pt s/p AAA repair. Pt very limited by fatigue.  Pt requires continued OT to address ADL/IADL function    Speech therapy: FIMS:        Lab/X-ray studies reviewed, analyzed and discussed with patient and staff:   Recent Results (from the past 24 hour(s))   POCT Glucose    Collection Time: 04/17/22 11:06 AM   Result Value Ref Range    POC Glucose 202 (H) 70 - 99 mg/dl    Performed on ACCU-CHEK    POCT Glucose    Collection Time: 04/17/22  4:13 PM   Result Value Ref Range    POC Glucose 112 (H) 70 - 99 mg/dl    Performed on ACCU-CHEK POCT Glucose    Collection Time: 04/17/22  7:44 PM   Result Value Ref Range    POC Glucose 162 (H) 70 - 99 mg/dl    Performed on ACCU-CHEK    Urinalysis    Collection Time: 04/17/22 10:59 PM   Result Value Ref Range    Color, UA Yellow Straw/Yellow    Clarity, UA Clear Clear    Glucose, Ur 100 (A) Negative mg/dL    Bilirubin Urine Negative Negative    Ketones, Urine Negative Negative mg/dL    Specific Gravity, UA 1.022 1.005 - 1.030    Blood, Urine Negative Negative    pH, UA 5.0 5.0 - 9.0    Protein, UA Negative Negative mg/dL    Urobilinogen, Urine 1.0 <2.0 E.U./dL    Nitrite, Urine Negative Negative    Leukocyte Esterase, Urine Negative Negative   Hemoglobin and Hematocrit    Collection Time: 04/18/22  5:04 AM   Result Value Ref Range    Hemoglobin 10.8 (L) 14.0 - 18.0 g/dL    Hematocrit 33.6 (L) 42.0 - 52.0 %   Basic Metabolic Panel    Collection Time: 04/18/22  5:04 AM   Result Value Ref Range    Sodium 143 135 - 144 mEq/L    Potassium 4.5 3.4 - 4.9 mEq/L    Chloride 109 (H) 95 - 107 mEq/L    CO2 24 20 - 31 mEq/L    Anion Gap 10 9 - 15 mEq/L    Glucose 120 (H) 70 - 99 mg/dL    BUN 26 (H) 8 - 23 mg/dL    CREATININE 0.98 0.70 - 1.20 mg/dL    GFR Non-African American >60.0 >60    GFR  >60.0 >60    Calcium 8.6 8.5 - 9.9 mg/dL   POCT Glucose    Collection Time: 04/18/22  6:24 AM   Result Value Ref Range    POC Glucose 108 (H) 70 - 99 mg/dl    Performed on ACCU-CHEK        Previous extensive, complex labs, notes and diagnostics reviewed and analyzed.      ALLERGIES:    Allergies as of 04/12/2022 - Fully Reviewed 04/12/2022   Allergen Reaction Noted    Fenofibrate  12/29/2021    Lipitor [atorvastatin] Other (See Comments) 12/19/2016    Niacin Other (See Comments) 12/19/2016    Niaspan [niacin er] Other (See Comments) 12/19/2016    Vitamin e Other (See Comments) 12/19/2016    Zocor [simvastatin] Other (See Comments) 12/19/2016      (please also verify by checking MAR)     Today I evaluated this patient for periodic reassessment of medical and functional status. The patient was discussed in detail at the treatment team meeting focusing on current medical issues, progress in therapies, social issues, psychological issues, barriers to progress and strategies to address these barriers, and discharge planning. See the addendum to rehab progress note-as a second progress note in the chart. The patient continues to be high risk for future disability and their medical and rehabilitation prognosis continue to be good and therefore, we will continue the patient's rehabilitation course as planned. The patient's tentative discharge date was set. Patient and family education was discussed. The patient was made aware of the team discussion regarding their progress. Complex Physical Medicine & Rehab Issues Assess & Plan:   1. Severe abnormality of gait and mobility and impaired self-care and ADL's secondary to progressive weakness dt  Severe PVD s/p AAA repain . Functional and medical status reassessed regarding patients ability to participate in therapies and patient found to be able to participate in acute intensive comprehensive inpatient rehabilitation program including PT/OT to improve balance, ambulation, ADLs, and to improve the P/AROM. Therapeutic modifications regarding activities in therapies, place, amount of time per day and intensity of therapy made daily. In bed therapies or bedside therapies prn.   2. Bowel progressive constipation, and Bladder dysfunction monitoring neurogenic bladder:  frequent toileting, ambulate to bathroom with assistance, check post void residuals. Check for C.difficile x1 if >2 loose stools in 24 hours, continue bowel & bladder program.  Monitor bowel and bladder function. Lactinex 2 PO every AC.   MOM prn, Brown Bomb prn, Glycerin suppository prn, enema prn.  3. Severe postop bilateral groin pain as well as generalized OA pain,   Kyphoscoliosis: reassess pain every shift and prior to and after each therapy session, give prn Tylenol and Ultram, modalities prn in therapy, Lidoderm, K-pad prn.   4. Skin healing and breakdown risk:  continue pressure relief program.  Daily skin exams and reports from nursing. 5. Severe fatigue due to nutritional and hydration deficiency: Add vitamin B12 vitamin D and CoQ10 continue to monitor I&Os, calorie counts prn, dietary consult prn. Add healthy HS snack. 6. Acute episodic insomnia with situational adjustment disorder:  prn Ambien, monitor for day time sedation. Add HS \"Tuck In\"  7. Falls risk elevated:  patient to use call light to get nursing assistance to get up, bed and chair alarm. 8. Elevated DVT risk: progressive activities in PT, continue prophylaxis ABRAM hose, elevation and Eliquis. 9. Complex discharge planning: Discharge April 22, 2022 to home with wife and home health care. SP weekly team meeting every Monday to assess progress towards goals, discuss and address social, psychological and medical comorbidities and to address difficulties they may be having progressing in therapy. Patient and family education is in progress. The patient is to follow-up with their family physician after discharge. Complex Active General Medical Issues that complicate care Assess & Plan:     1. Principal Problem:    PVD (peripheral vascular disease) ([facility] [address]  Active Problems:  1. Atrial fibrillation,  Hypertension-Acute rehab to monitor heart rate and rhythm with the option of telemetry and the effects of chronotropic medication with respect to increasing physical activity and exercise in PT, OT, ADLs with medication titration to lowest effective dosing. Continue blood signs every shift focusing on heart rate and blood pressure checks, consult hospitalist for backup medical and adjust/add medications (Norvasc, Livalo, Betapace, Diovan, Eliquis, Plavix).   Monitor heart rate and blood pressure as well as medications effects on vital signs before during and after therapy with especial focus on preventing orthostasis and falls risk. 2.   Dizziness and giddiness, Orthostatic dizziness-focus on balance and therapy add abdominal binder and teds as needed  3. Diabetes mellitus -stick blood sugars before every meal and at bedtime 4 carb diet  4.    Late effects of CVA (cerebrovascular accident)-cognitive issues to be addressed in OT           Alannah Neely D.O., PM&R     Attending    [address]

## 2022-04-18 NOTE — PROGRESS NOTES
INDIVIDUALIZED OVERALL REHAB PLAN OF CARE  ADDENDUM TO REHAB PROGRESS NOTE-for audit purposes must also refer to this day's clinical note and combine the information      Patient Name: Karen Christianson   Room: O513/L706-57    MRN: [id number]    : 1940  (79 y.o.)  Gender: male        during weekly team meeting, I reviewed the patient Karen Christianson in detail with the therapists and nurses involved in patient's care gathering complex physiatric data regarding current medical issues, progress in therapies, factors limiting progress, social issues, psychological issues, ongoing therapeutic plans and discharge planning. Legend:  I= independent Im =Modified independent  S=Supervised SB=stand by OLIVARES=set up CG=contact arie Min= minimal Mod=Moderate Max=maximal Max of 2 =maximal assist of 2 people      CURRENT FUNCTIONAL STATUS:    NURSING ISSUES:         Nursing will continue to focus on bowel and bladder continence transitioning toward independence by time of discharge. Monitoring post void residuals monitoring for severe constipation and bowel obstruction. Focus on achieving ADL goals with co-treating with OT when possible. Focus on cognition and co treat with SLP when possible.     PHYSICAL THERAPY  Bed mobility:  Supine to Sit: Modified independent (22 1029)  Sit to Supine: Supervision (22 102)  Transfers:  Sit to Stand: Stand by assistance (22 1303)  Bed to Chair: Stand by assistance (22 1303)  Gait:   Device: Rolling Walker (22 1305)  Other Apparatus: O2 (22 1054)  Assistance: Stand by assistance (22 1305)  Distance: 30 feet x2 (22 130)  Quality of Gait: cues for upright posture, fatigues quickly (22 130)  Comments: RPE at 10 at rest, up to 17 after gait 30 feet sp02 96%, Hr 68 (22 1305)  Stairs:  # Steps : 4 (22 1003)  Rails: Bilateral (22 1003)  Assistance: Contact guard assistance (22 1003)  Comment: stair training:  RPE 17 very hard /  97% Sp02--- 65bpm (04/16/22 1003)  W/C mobility:       Focus on energy conservation and consistency of function. OCCUPATIONAL THERAPY  Hand Dominance: Right  ADL  Feeding: Independent (patient was able to open all packets on his lunch tray with no difficulty) (04/14/22 1134)  Grooming: Minimal assistance (therapist shaved patient due to patient on blood thinners and no electric razor available) (04/14/22 1134)  UE Bathing: Supervision (04/14/22 1134)  LE Bathing:  (Patient declined due to showering yesterday and feeling as though he is still clean) (04/14/22 1134)  UE Dressing: Setup (04/14/22 1134)  LE Dressing:  (Patient declined a need to change his pants or underwear. He did doff his non skid socks and don regular socks before donning his own shoes. Patient reported his wife puts on his socks daily. Patient was educated in use of sock aid and was able to use it) (04/14/22 1134)  Toileting: Supervision (04/15/22 1332)  Additional Comments: Shower completed- nurse approved shower. Toileting not completed. Pt very SOB during session- frequent rest breaks needed (04/13/22 1146)  Toilet Transfers  Toilet Transfers Comments: Patient did not sit on the toilet but did stand at the toilet to urinate with no assistance needed. Patient used the grab bars to stabilize himself (04/15/22 1332)     Shower Transfers  Shower - Transfer From: [place] [facility] (04/13/22 1148)  Shower - Transfer Type: To and From (04/13/22 1148)  Shower - Transfer To: Shower seat with back (04/13/22 1148)  Shower - Technique: Ambulating (04/13/22 1148)  Shower Transfers: Contact Guard (04/13/22 1148)  Shower Transfers Comments: Patient declined a shower on this date reporting that he had one yesterday (04/14/22 1205)    Focus on sequencing. SPEECH THERAPY/SLP  Motor Speech:  Within Functional Limits  Comprehension: Within Functional Limits (Intermittent repetition was required secondary to patient being hard of hearing)  Verbal Expression: Exceptions to functional limits      Diet/Swallow:  Diet Solids Recommendation: Regular  Liquid Consistency Recommendation: Thin  Dysphagia Outcome Severity Scale: Level 6: Within functional limits/Modified independence    Compensatory Swallowing Strategies: Upright as possible for all oral intake,Eat/Feed slowly,Small bites/sips             COGNITION  OT: Cognition Comment: compre- Kadie, express- indeo, social inter- Kadie, problem solv- SUP, memory-SUP  SP:Memory: Exceptions to [facility]  Problem Solving: Within Functional Limits    Social History     Socioeconomic History    Marital status:      Spouse name: Not on file    Number of children: Not on file    Years of education: Not on file    Highest education level: Not on file   Occupational History    Not on file   Tobacco Use    Smoking status: Former Smoker     Types: Cigarettes    Smokeless tobacco: Never Used    Tobacco comment: 3 cigarettes/month   Vaping Use    Vaping Use: Never used   Substance and Sexual Activity    Alcohol use: No    Drug use: No    Sexual activity: Not on file   Other Topics Concern    Not on file   Social History Narrative    Not on file     Social Determinants of Health     Financial Resource Strain:     Difficulty of Paying Living Expenses: Not on file   Food Insecurity:     Worried About Running Out of Food in the Last Year: Not on file    Thony of Food in the Last Year: Not on file   Transportation Needs:     Lack of Transportation (Medical): Not on file    Lack of Transportation (Non-Medical):  Not on file   Physical Activity:     Days of Exercise per Week: Not on file    Minutes of Exercise per Session: Not on file   Stress:     Feeling of Stress : Not on file   Social Connections:     Frequency of Communication with Friends and Family: Not on file    Frequency of Social Gatherings with Friends and Family: Not on file    Attends Mosque Services: Not on file   [employer] of Clubs or Organizations: Not on file    Attends Club or Organization Meetings: Not on file    Marital Status: Not on file   Intimate Partner Violence:     Fear of Current or Ex-Partner: Not on file    Emotionally Abused: Not on file    Physically Abused: Not on file    Sexually Abused: Not on file   Housing Stability:     Unable to Pay for Housing in the Last Year: Not on file    Number of [place] in the Last Year: Not on file    Unstable Housing in the Last Year: Not on file       THERAPY, MEDICAL AND NURSING COORDINATION:    [x]  Pain medication before therapies     [x]  Check orthostatic BP and monitor heart rate and medications effects with therapy      [x]  Ambulate to the bathroom in room    [x]  Add scheduled rest beaks     []  In room therapies      Discharge date set for:              See [address] with:   thomas  with help from   thomas            And:      Home Health Care:     [x]  PT    []  OT    []  ST   [x]  Aide   []  SW    [x]  RN               Or preferably     Outpatient Therapy:  []  PT    []  OT    []  ST   []  Rehab Psych                 Equipment:  [employer]      At D/C their function is goaled at:   PT:Long term goal 1: Pt to complete bed mobility with indep  Long term goal 2: Pt to complete transfers with indep  Long term goal 3: Pt to ambulate 50-150ft with [employer] and indep  Long term goal 4: Pt to complete 4 steps with HR and supervision  Long term goal 5: Pt to complete HEP with indep  OT:Eating  Assistance Needed: Setup or clean-up assistance  CARE Score: 5  Discharge Goal: Independent, Oral Hygiene  Assistance Needed: Partial/moderate assistance  CARE Score: 3  Discharge Goal: Independent, [address]  Reason if not Attempted: Not attempted due to environmental limitations  CARE Score: 10  Discharge Goal: Substantial/maximal assistance, Shower/Bathe Self  Discharge Goal: Partial/moderate assistance  Upper Body Dressing  Assistance Needed: Partial/moderate assistance  CARE Score: 3  Discharge Goal: Independent, Lower Body Dressing  Assistance Needed: Substantial/maximal assistance  CARE Score: 2  Discharge Goal: Partial/moderate assistance, Putting On/Taking Off Footwear  Assistance Needed: Substantial/maximal assistance  CARE Score: 2  Discharge Goal: Partial/moderate assistance, Toilet Transfer  Reason if not Attempted: Not attempted due to environmental limitations  CARE Score: 10  Discharge Goal: Independent  SP:   Long-term Goals  Timeframe for Long-term Goals: N/A           From a cognitive standpoint they will need:        24 hr supervision  --progress to occasional           Significant problems/ barriers to functional progress include: Pt is at a high risk for functional loss,      []  Acute infection/UTI    []  Low BP's     []  COPD flare-up   []  Uncontrolled blood sugar     []  Progressive anemia     []  poor endurance    []  Severe pain exacerbation     []  Impaired mental status    []  Urinary incontinence    []  Bowel incontinence           Plan to correct barriers to functional progress: Add scheduled rest breaks, CoQ 10 and Vit B 12, control pain by using ice Lidoderm rest and massage as well as pain medications prior to therapy. Spread therapy of 15 hours out over a 7 day window to accommodate rest breaks and medical interventions. Patient seems to be making fair to good response to these interventions. Based on a comprehensive evaluation of the above, the individualized therapy and Discharge plan will be:    -Times stated are an average that will be varied based on the patient's daily need.        PT    1 1/2  hrs/day 5-7 days per week      OT    1 1/2 hrs per day 5-7 days per week     ST     1/2    hrs /day 3-5 days per week       Estimated LOS   2 week(s)    - Overall functional prognosis:     [x]  Good    []  Fair    []  Poor -Medical Prognosis:   [x]  Good    []  Fair    []  Poor    This patient was made aware of the discussion of Plan of Care, their projected dicharge date and their projected function at discharge.        Christine Sen MD

## 2022-04-18 NOTE — PLAN OF CARE
Problem: Falls - Risk of:  Goal: Will remain free from falls  Description: Will remain free from falls  4/17/2022 2306 by Nelson Coker RN  Outcome: Ongoing     Problem: Falls - Risk of:  Goal: Absence of physical injury  Description: Absence of physical injury  4/17/2022 2306 by Nelson Coker RN  Outcome: Ongoing

## 2022-04-19 LAB
GLUCOSE BLD-MCNC: 108 MG/DL (ref 70–99)
GLUCOSE BLD-MCNC: 110 MG/DL (ref 70–99)
GLUCOSE BLD-MCNC: 110 MG/DL (ref 70–99)
GLUCOSE BLD-MCNC: 163 MG/DL (ref 70–99)
PERFORMED ON: ABNORMAL

## 2022-04-19 PROCEDURE — 6370000000 HC RX 637 (ALT 250 FOR IP): Performed by: PHYSICAL MEDICINE & REHABILITATION

## 2022-04-19 PROCEDURE — 97129 THER IVNTJ 1ST 15 MIN: CPT

## 2022-04-19 PROCEDURE — 97535 SELF CARE MNGMENT TRAINING: CPT

## 2022-04-19 PROCEDURE — 1180000000 HC REHAB R&B

## 2022-04-19 PROCEDURE — 97110 THERAPEUTIC EXERCISES: CPT

## 2022-04-19 PROCEDURE — 6370000000 HC RX 637 (ALT 250 FOR IP): Performed by: INTERNAL MEDICINE

## 2022-04-19 PROCEDURE — 97112 NEUROMUSCULAR REEDUCATION: CPT

## 2022-04-19 PROCEDURE — 97530 THERAPEUTIC ACTIVITIES: CPT

## 2022-04-19 PROCEDURE — 97116 GAIT TRAINING THERAPY: CPT

## 2022-04-19 PROCEDURE — 99232 SBSQ HOSP IP/OBS MODERATE 35: CPT | Performed by: PHYSICAL MEDICINE & REHABILITATION

## 2022-04-19 PROCEDURE — 97130 THER IVNTJ EA ADDL 15 MIN: CPT

## 2022-04-19 RX ADMIN — CLOPIDOGREL BISULFATE 75 MG: 75 TABLET ORAL at 08:49

## 2022-04-19 RX ADMIN — AMLODIPINE BESYLATE 5 MG: 5 TABLET ORAL at 08:48

## 2022-04-19 RX ADMIN — SOTALOL HYDROCHLORIDE 80 MG: 80 TABLET ORAL at 08:49

## 2022-04-19 RX ADMIN — METFORMIN HYDROCHLORIDE 500 MG: 500 TABLET ORAL at 17:32

## 2022-04-19 RX ADMIN — APIXABAN 5 MG: 5 TABLET, FILM COATED ORAL at 08:49

## 2022-04-19 RX ADMIN — APIXABAN 5 MG: 5 TABLET, FILM COATED ORAL at 21:27

## 2022-04-19 RX ADMIN — Medication 400 MG: at 08:49

## 2022-04-19 RX ADMIN — SOTALOL HYDROCHLORIDE 80 MG: 80 TABLET ORAL at 21:27

## 2022-04-19 RX ADMIN — METFORMIN HYDROCHLORIDE 500 MG: 500 TABLET ORAL at 08:49

## 2022-04-19 RX ADMIN — VALSARTAN 160 MG: 40 TABLET, FILM COATED ORAL at 08:50

## 2022-04-19 ASSESSMENT — PAIN SCALES - GENERAL
PAINLEVEL_OUTOF10: 0
PAINLEVEL_OUTOF10: 0

## 2022-04-19 NOTE — PROGRESS NOTES
Subjective: The patient complains of  moderate to severe acute partially relieved by rest, PT, OT, recent AAA repair and exacerbated by recent illness and exertion. I am concerned about patients medical complexities and current active problems including Severe PVD s/p AAA repain. Recheck UA was negative x2. I discussed current functional, rehabilitation, medical status with other rehabilitation providers including nursing and case management. According to recent nursing note, \"Bilateral groin incisions are MARGE and healing. No s/s of infection noted this time  No SOB/ Dyspnea. LS are clear with diminished bases. Patient room was changed from 247 to room 246. Patient's wife was made aware. \".    ROS x10: The patient also complains of severely impaired mobility and activities of daily living. Otherwise no new problems with vision, hearing, nose, mouth, throat, dermal, cardiovascular, GI, , pulmonary, musculoskeletal, psychiatric or neurological. See Rehab H&P on Rehab chart dated . Vital signs:  /70   Pulse 55   Temp 98.4 °F (36.9 °C) (Oral)   Resp 15   Ht 5' 8\" (1.727 m)   Wt 160 lb (72.6 kg)   SpO2 100%   BMI 24.33 kg/m²   I/O:   PO/Intake:  fair PO intake, no problems observed or reported. Bowel/Bladder:  continent, constipation and urinary urgency. General:  Patient is well developed, adequately nourished, non-obese and     well kempt. HEENT:    PERRLA, hearing intact to loud voice, external inspection of ear     and nose benign. Inspection of lips, tongue and gums benign  Musculoskeletal: No significant change in strength or tone. All joints stable. Inspection and palpation of digits and nails show no clubbing,       cyanosis or inflammatory conditions. Neuro/Psychiatric: Affect: flat. Alert and oriented to person, place and     situation. No significant change in deep tendon reflexes or     Sensation-slowed processing.   Lungs:  Diminished, CTA-B. Respiration effort is normal at rest.     Heart:   S1 = S2,  irreg. No loud murmurs. Abdomen:  Soft, non-tender, no enlargement of liver or spleen. Extremities:  No significant lower extremity edema or tenderness. Skin:   Intact  AAA repair-healing bilateral groin incisions. Thin skin bilateral upper extremity bruising. Rehabilitation:  Physical therapy: FIMS:  Bed Mobility: Scooting: Stand by assistance    Transfers: Sit to Stand: Supervision  Stand to sit: Supervision  Bed to Chair: Supervision, Ambulation 1  Surface: carpet  Device: Rolling Walker  Other Apparatus: O2  Assistance: Supervision  Quality of Gait: cues for upright posture, fatigues quickly, mild increased SOB, decreased heel strike  Gait Deviations: Decreased step length,Increased FERNANDO  Distance: 30ftx3  Comments: Focused on ambulating multiple short distances to destinations. , Stairs  # Steps : 4  Stairs Height: 6\"  Rails: Bilateral  Assistance: Stand by assistance  Comment: Pt reports PRE 13 somewhat hard. FIMS:  ,  , Assessment: Pt tolerated gait and stair training reporting decreased RPE between 11-13. Improved 30sec STS from 3 reps to 7. Cont to require rest breaks between standing activity. Occupational therapy: FIMS:   ,  , Assessment: Pt is a 78 y/o male who presents with above deficits which also impacts ADL/IADL function. Pt s/p AAA repair. Pt very limited by fatigue.  Pt requires continued OT to address ADL/IADL function    Speech therapy: FIMS:        Lab/X-ray studies reviewed, analyzed and discussed with patient and staff:   Recent Results (from the past 24 hour(s))   POCT Glucose    Collection Time: 04/18/22 11:38 AM   Result Value Ref Range    POC Glucose 154 (H) 70 - 99 mg/dl    Performed on ACCU-CHEK    POCT Glucose    Collection Time: 04/18/22  4:06 PM   Result Value Ref Range    POC Glucose 108 (H) 70 - 99 mg/dl    Performed on ACCU-CHEK    POCT Glucose    Collection Time: 04/18/22  8:58 PM   Result Value Ref Range POC Glucose 114 (H) 70 - 99 mg/dl    Performed on ACCU-CHEK    POCT Glucose    Collection Time: 04/19/22  6:14 AM   Result Value Ref Range    POC Glucose 108 (H) 70 - 99 mg/dl    Performed on ACCU-CHEK        Previous extensive, complex labs, notes and diagnostics reviewed and analyzed. ALLERGIES:    Allergies as of 04/12/2022 - Fully Reviewed 04/12/2022   Allergen Reaction Noted    Fenofibrate  12/29/2021    Lipitor [atorvastatin] Other (See Comments) 12/19/2016    Niacin Other (See Comments) 12/19/2016    Niaspan [niacin er] Other (See Comments) 12/19/2016    Vitamin e Other (See Comments) 12/19/2016    Zocor [simvastatin] Other (See Comments) 12/19/2016      (please also verify by checking MAR)      Yesterday I evaluated this patient for periodic reassessment of medical and functional status. The patient was discussed in detail at the treatment team meeting focusing on current medical issues, progress in therapies, social issues, psychological issues, barriers to progress and strategies to address these barriers, and discharge planning. See the hand written addendum to rehab progress note. The patient continues to be high risk for future disability and their medical and rehabilitation prognosis continue to be good and therefore, we will continue the patient's rehabilitation course as planned. The patient's tentative discharge date was set. Patient and family education was discussed. The patient was made aware of the team discussion regarding their progress. Discharge plans were discussed along with barriers to progress and strategies to address these barriers, patient encouraged to continue to discuss discharge plans with . Complex Physical Medicine & Rehab Issues Assess & Plan:   1. Severe abnormality of gait and mobility and impaired self-care and ADL's secondary to progressive weakness dt  Severe PVD s/p AAA repain .   Functional and medical status reassessed regarding patients ability to participate in therapies and patient found to be able to participate in acute intensive comprehensive inpatient rehabilitation program including PT/OT to improve balance, ambulation, ADLs, and to improve the P/AROM. Therapeutic modifications regarding activities in therapies, place, amount of time per day and intensity of therapy made daily. In bed therapies or bedside therapies prn.   2. Bowel progressive constipation, and Bladder dysfunction monitoring neurogenic bladder:  frequent toileting, ambulate to bathroom with assistance, check post void residuals. Check for C.difficile x1 if >2 loose stools in 24 hours, continue bowel & bladder program.  Monitor bowel and bladder function. Lactinex 2 PO every AC. MOM prn, Brown Bomb prn, Glycerin suppository prn, enema prn.  3. Severe postop bilateral groin pain as well as generalized OA pain,   Kyphoscoliosis: reassess pain every shift and prior to and after each therapy session, give prn Tylenol and Ultram, modalities prn in therapy, Lidoderm, K-pad prn.   4. Skin healing and breakdown risk:  continue pressure relief program.  Daily skin exams and reports from nursing. 5. Severe fatigue due to nutritional and hydration deficiency: Add vitamin B12 vitamin D and CoQ10 continue to monitor I&Os, calorie counts prn, dietary consult prn. Add healthy HS snack. 6. Acute episodic insomnia with situational adjustment disorder:  prn Ambien, monitor for day time sedation. Add HS \"Tuck In\"  7. Falls risk elevated:  patient to use call light to get nursing assistance to get up, bed and chair alarm. 8. Elevated DVT risk: progressive activities in PT, continue prophylaxis ABRAM hose, elevation and Eliquis. 9. Complex discharge planning: Discharge April 22, 2022 to home with wife and home health care.   SP weekly team meeting  Mondays to assess progress towards goals, discuss and address social, psychological and medical comorbidities and to address difficulties they may be having progressing in therapy. Patient and family education is in progress. The patient is to follow-up with their family physician after discharge. Complex Active General Medical Issues that complicate care Assess & Plan:     1. Principal Problem:    PVD (peripheral vascular disease) ([facility] [address]  Active Problems:  1. Atrial fibrillation,  Hypertension-Acute rehab to monitor heart rate and rhythm with the option of telemetry and the effects of chronotropic medication with respect to increasing physical activity and exercise in PT, OT, ADLs with medication titration to lowest effective dosing. Continue blood signs every shift focusing on heart rate and blood pressure checks, consult hospitalist for backup medical and adjust/add medications (Norvasc, Livalo, Betapace, Diovan, Eliquis, Plavix). Monitor heart rate and blood pressure as well as medications effects on vital signs before during and after therapy with especial focus on preventing orthostasis and falls risk. 2.   Dizziness and giddiness, Orthostatic dizziness-focus on balance and therapy add abdominal binder and teds as needed  3. Diabetes mellitus -stick blood sugars before every meal and at bedtime 4 carb diet  4.    Late effects of CVA (cerebrovascular accident)-slowed processing and motor planning -continue to focus on cognitive issues to be addressed in OT       Agus Ng D.O., PM&R     Attending    [address]

## 2022-04-19 NOTE — PROGRESS NOTES
Physical Therapy Rehab Treatment Note  Facility/Department: [facility]  Room: Z806/Y949-59       NAME: Pily Lujan  : 1940 (79 y.o.)  MRN: [id number]  CODE STATUS: Full Code    Date of Service: 2022  Family / Caregiver Present: No    Restrictions:  Restrictions/Precautions: Fall Risk    SUBJECTIVE: Subjective: Pt reports he is doing ok. Pain Screening  Patient Currently in Pain: No  Pre Treatment Pain Screening  Pain at present: 0    Post Treatment Pain Screenin/10     OBJECTIVE:                 Transfers  Sit to Stand: Modified independent  Stand to sit: Modified independent    Ambulation  Ambulation?: Yes  Ambulation 1  Surface: carpet  Device: Rolling Walker  Assistance: Supervision  Quality of Gait: cues for upright posture, fatigues quickly, mild increased SOB, decreased heel strike  Distance: 70ft     Stairs/Curb  Stairs?: Yes  Stairs  # Steps : 4  Stairs Height: 6\"  Rails: Bilateral  Assistance: Supervision        Other exercises  Other exercises 2: 2min step test 46 steps in 42sec     ASSESSMENT/PROGRESS TOWARDS GOALS:  Assessment: Pt tolerated increased gait distance. Demo'd improved endurance with improved reps and duration during 2min step test.     Goals:  Long term goals  Long term goal 1: Pt to complete bed mobility with indep  Long term goal 2: Pt to complete transfers with indep  Long term goal 3: Pt to ambulate 50-150ft with [employer] and indep  Long term goal 4: Pt to complete 4 steps with HR and supervision  Long term goal 5: Pt to complete HEP with indep  Long term goal 6: Pt to complete 50reps on 2min Step test to demonstrate improved activity tolerance  Long term goal 7: Pt to complete 10reps 30sec STS    PLAN OF CARE/Safety:   Plan Comment: Cont per POC.     Therapy Time:   Individual   Time In 0900   Time Out 0930   Minutes 30     Minutes:      Transfer/Bed mobility trainin      Gait trainin      Neuro re education:0     Therapeutic ex:5    Susie Rolon PTA, 22 at 11:22 AM

## 2022-04-19 NOTE — PROGRESS NOTES
Physical Therapy Rehab Treatment Note  [facility]  [facility]       NAME: Maria Dolores Pulido  : 1940 (79 y.o.)  MRN: [id number]  CODE STATUS: Full Code    Date of Service: 2022  Chart Reviewed: Yes  Family / Caregiver Present: No    Restrictions:  Restrictions/Precautions: Fall Risk     SUBJECTIVE: Subjective: Pt states he is getting a little tired. Pain Screening  Patient Currently in Pain: No  Pre Treatment Pain Screening  Pain at present: 0    Post Treatment Pain Screenin/10     OBJECTIVE:              Bed mobility  Rolling to Left: Modified independent  Rolling to Right: Modified independent  Supine to Sit: Modified independent  Sit to Supine: Modified independent    Transfers  Sit to Stand: Modified independent  Stand to sit: Modified independent  Bed to Chair: Modified independent    Ambulation  Ambulation?: Yes  Ambulation 1  Surface: carpet  Device: Rolling Walker  Assistance: Stand by assistance  Quality of Gait: fatigued quickly needing chair brought up  Distance: 40ft        Exercises  Heelslides: x20  Hip Flexion: seated march x60sec for endurance  Hip Abduction: supine hip abd x20  Knee Short Arc Quad: x20  Other exercises  Other exercises?: Yes  Other exercises 1: STS x10  Other exercises 3: seated shld shrugs x10, retro rolls x10, scap retract YTB x10, and lat pull downs YTB x10 to improve postural strength     ASSESSMENT/PROGRESS TOWARDS GOALS:  Assessment: Fatigue limited gait distance requiring chair be brought up after 40ft. Pt has met transfer and bed mobility goals.     Goals:  Long term goals  Long term goal 1: Pt to complete bed mobility with indep  Long term goal 2: Pt to complete transfers with indep  Long term goal 3: Pt to ambulate 50-150ft with [employer] and indep  Long term goal 4: Pt to complete 4 steps with HR and supervision  Long term goal 5: Pt to complete HEP with indep  Long term goal 6: Pt to complete 50reps on 2min Step test to demonstrate improved activity tolerance  Long term goal 7: Pt to complete 10reps 30sec STS    PLAN OF CARE/Safety:   Plan Comment: Cont per POC.     Therapy Time:   Individual   Time In 9644   Time Out 1505   Minutes 60     Minutes:      Transfer/Bed mobility training:10      Gait training:10      Neuro re education:0     Therapeutic ex:40    Maureen Peterson PTA, 04/19/22 at 3:26 PM

## 2022-04-19 NOTE — PROGRESS NOTES
Bilateral groin incisions are MARGE and healing. No s/s of infection noted this time  No SOB/ Dyspnea. LS are clear with diminished bases. Patient room was changed from 247 to room 246. Patient's wife was made aware.

## 2022-04-19 NOTE — PROGRESS NOTES
Nutrition Assessment     Type and Reason for Visit: Initial,RD Nutrition Re-Screen/LOS (LOS DAY #7)    Nutrition Recommendations/Plan:   · Modify to ADEBAYO diet   · Continue carb 4    Nutrition Assessment:  Pt seen for LOS day #7. Stable from a nutrition standpoint with adequate intakes >75%. Noted trace edema, will modify to ADEBAYO diet. Malnutrition Assessment:  Malnutrition Status: No malnutrition    Estimated Daily Nutrient Needs:  Energy (kcal): 1800-1872kcal (25-26kcal/kg); Weight Used for Energy Requirements:  Current (72.6kg)       Nutrition Related Findings: pmhx: CAD, COPD, DM, HLD, HTN. Intakes >75%. Trace BLE edema noted. BM 4/18. Gluc 108-163 x 3 days  BSE 4/13=regular     Current Nutrition Therapies:    ADULT DIET; Regular; 4 carb choices (60 gm/meal)    Anthropometric Measures:  · Height: 5' 8\" (172.7 cm)  · Current Body Wt: 160 lb (72.6 kg) (4/12 stated)   · BMI: 24.3    Nutrition Diagnosis:   No nutrition diagnosis at this time     Nutrition Interventions:   Food and/or Nutrient Delivery:  Continue Current Diet  Nutrition Education/Counseling:  No recommendation at this time   Coordination of Nutrition Care:  Continue to monitor while inpatient    Goals:  Po intake continues to be >75%.  Glucose <160       Nutrition Monitoring and Evaluation:   Behavioral-Environmental Outcomes:  None Identified   Food/Nutrient Intake Outcomes:  Food and Nutrient Intake  Physical Signs/Symptoms Outcomes:  Biochemical Data,Weight     Discharge Planning:    No discharge needs at this time     Electronically signed by Debby Lara, MS, RD, LD on 4/19/22 at 3:26 PM EDT

## 2022-04-19 NOTE — PLAN OF CARE
COMMUNICATION/DYSARTHRIA  Goal: LTG - patient will improve expressive language skills to allow for communication of wants and needs in daily activities  Outcome: Ongoing     Problem: IP SWALLOWING  Goal: LTG - patient will tolerate the least restrictive diet consistency to allow for safe consumption of daily meals  Outcome: Ongoing

## 2022-04-19 NOTE — PROGRESS NOTES
Nephrology Progress Note    Assessment:  CKD-3  OHDx s/p cabgx  DM type-2  S/P AAA  Hx CVA  PAD      Plan: following discharge site in view  Vitals and labs stable    Patient Active Problem List:     Atrial fibrillation ([facility] [address]     High risk medication use     Atherosclerosis of native coronary artery of native heart without angina pectoris     Hypertension     Ischemic myocardial dysfunction     Nonrheumatic aortic valve disorder, unspecified     Aortic valve disorder     Personal history of nicotine dependence     Presence of aortocoronary bypass graft     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HCC)     Vasovagal syncope     Dizziness and giddiness     Cerebrovascular accident ([facility] [address]     Orthostatic dizziness     Ataxia     Vertigo     Meniere disease, bilateral     Benign paroxysmal positional vertigo due to bilateral vestibular disorder     Coronary angioplasty status     Ataxic gait     Kyphoscoliosis     Spinal stenosis of lumbar region with neurogenic claudication     Abdominal aortic aneurysm (AAA) ([facility] [address]     Acute inferior myocardial infarction (HCC)     Carotid bruit     Chronic obstructive pulmonary disease (HCC)     Diabetes mellitus (HCC)     Dyspnea on exertion     Fatigue     First degree atrioventricular block     Hyperlipidemia     Infection of the inner ear     Muscle pain     Underweight     Ventricular premature beats     Weight loss     PVD (peripheral vascular disease) (HCC)     Impaired mobility and activities of daily living -sp Severe PVD s/p AAA repain     Hypotension     Late effects of CVA (cerebrovascular accident)      Subjective:  Admit Date: 4/12/2022    Interval History: no issues    Medications:  Scheduled Meds:   valsartan  160 mg Oral Daily    amLODIPine  5 mg Oral Daily    pitavastatin  4 mg Oral Nightly    clopidogrel  75 mg Oral Daily    apixaban  5 mg Oral BID    magnesium oxide  400 mg Oral Daily    metFORMIN  500 mg Oral BID WC    sotalol  80 mg Oral BID    insulin lispro 0-6 Units SubCUTAneous TID WC    insulin lispro  0-3 Units SubCUTAneous Nightly     Continuous Infusions:   dextrose         CBC:   Recent Labs     04/17/22  0527 04/18/22  0504   WBC 8.5  --    HGB 10.2* 10.8*     --      CMP:    Recent Labs     04/18/22  0504      K 4.5   *   CO2 24   BUN 26*   CREATININE 0.98   GLUCOSE 120*   CALCIUM 8.6   LABGLOM >60.0     Troponin: No results for input(s): TROPONINI in the last 72 hours. BNP: No results for input(s): BNP in the last 72 hours. INR: No results for input(s): INR in the last 72 hours. Lipids: No results for input(s): CHOL, LDLDIRECT, TRIG, HDL, AMYLASE, LIPASE in the last 72 hours. Liver: No results for input(s): AST, ALT, ALKPHOS, PROT, LABALBU, BILITOT in the last 72 hours. Invalid input(s): BILDIR  Iron:  No results for input(s): IRONS, FERRITIN in the last 72 hours. Invalid input(s): LABIRONS  Urinalysis: No results for input(s): UA in the last 72 hours.     Objective:  Vitals: /70   Pulse 55   Temp 98.4 °F (36.9 °C) (Oral)   Resp 15   Ht 5' 8\" (1.727 m)   Wt 160 lb (72.6 kg)   SpO2 100%   BMI 24.33 kg/m²    Wt Readings from Last 3 Encounters:   04/12/22 160 lb (72.6 kg)   12/29/21 151 lb 8 oz (68.7 kg)   10/12/21 150 lb (68 kg)      24HR INTAKE/OUTPUT:      Intake/Output Summary (Last 24 hours) at 4/19/2022 1134  Last data filed at 4/18/2022 2125  Gross per 24 hour   Intake 240 ml   Output --   Net 240 ml       General: alert, in no apparent distress  HEENT: normocephalic, atraumatic, anicteric  Neck: supple, no mass  Lungs: non-labored respirations, clear to auscultation bilaterally  Heart: regular rate and rhythm, no murmurs or rubs  Abdomen: soft, non-tender, non-distended  Ext: no cyanosis, no peripheral edema  Neuro: alert and oriented, no gross abnormalities  Psych: normal mood and affect  Skin: no rash      Electronically signed by Catina Lopez DO, MD

## 2022-04-19 NOTE — PROGRESS NOTES
HPI     DLS 1/5/18    Pt states his [facility] OU is unchanged. Denies new F/F.  BS is stable.     DM 2  Mod NPDR OU w/ME OS                  Last edited by Kimberly Jones on 2/16/2018  9:15 AM. (History)          [place] SDOCT:   OD: good quality, good contour, no ME, stable since January scan  OS: good quality, exudates, IRF, min thickening, fovea preserved, improved since January scan    FA:  OS: AA 18 s with symmetric transit,  min cap dropout, ma's without sig leak, vessels sl leaky, nl ONH fl  OD:  min cap dropout, ma's without sig leak, vessels sl leaky, nl ONH fl    Assessment /Plan     For exam results, see Encounter Report.    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, with long-term current use of insulin  -     Posterior Segment OCT Retina-Both eyes    Type 2 diabetes mellitus with right eye affected by moderate nonproliferative retinopathy without macular edema, with long-term current use of insulin  -     Posterior Segment OCT Retina-Both eyes    ME improving therefore observe  FA suggestive of Sev NPDR changes but level of heme appears sl less    Diabetic Retinopathy discussed in detail, all questions answered  Stressed importance of good BS/BP/Chol Control  RTC 2 months, sooner PRN                  Occupational Therapy  Facility/Department: [facility]  Daily Treatment Note  NAME: Tanesha Kidd  : 1940  MRN: [id number]    Date of Service: 2022    Discharge Recommendations:  Continue to assess pending progress       Assessment      Activity Tolerance  Activity Tolerance: Patient Tolerated treatment well  Activity Tolerance: Limited d/t decreased functional act tolerance and increased BUE fatigue  Safety Devices  Safety Devices in place: Yes  Type of devices: All fall risk precautions in place         Patient Diagnosis(es): There were no encounter diagnoses. has a past medical history of Atrial fibrillation ([address] CAD (coronary artery disease), COPD (chronic obstructive pulmonary disease) ([facility] [address] Diabetes mellitus ([facility] [address] Hyperlipidemia, Hypertension, Movement disorder, and Pneumonia. has a past surgical history that includes Coronary artery bypass graft; Appendectomy; Eye surgery (Bilateral); Insertable Cardiac Monitor (Left, 2018); and Coronary angioplasty with stent. Restrictions  Restrictions/Precautions  Restrictions/Precautions: Fall Risk  Subjective   General  Chart Reviewed: Yes  Patient assessed for rehabilitation services?: Yes  Response to previous treatment: Patient with no complaints from previous session  Family / Caregiver Present: No  Referring Practitioner: Dr. Taiwo Freeman  Diagnosis: Impaired mobility d/t severe PVD w/ LE ischemia  Subjective  Subjective: \" I use to be a . \"  Pain Assessment  Pain Assessment: 0-10  Pain Level: 0  Pre Treatment Pain Screening  Pain at present: 0  Scale Used: Numeric Score  Intervention List: Patient able to continue with treatment  Vital Signs  Patient Currently in Pain: No   Orientation  Orientation  Overall Orientation Status: Within Functional Limits  Objective        Pt completed BUE pegboard activity using a large tripod  on both hands when completing task, placing pegs in peg board, with instruction to task and with . 5# wrist wts and Sup. Once pt was done, pt completed task continuation of placing marbles on top of each peg, with smaller fine pincher grasp motor skills, completing the L side of the board with L hand and R side of the board with the R hand per instruction to task, good follow through. Then pt picked all marbles off and took all pegs out with same tech per hand/arm as above, 1 by 1 or bilaterally per pt initiation, to place back into their individual containers. Completed above task to increase fine motor skills to improve gripping to improve functional Ind with ADL/IADL tasks. Provided pt with PVC pipe tree, allowing pt look through diagrams and pick one, at w/c level via table top. Provided instruction to task with pipe size/diagram sheet to allow pt to complete task with as much Ind as possible. Provided instruction to pipe size pieces. Allowed pt to place together with SBA, but initially starting pt out with 2nd pipe piece for pt to understand what he was doing. Pt complete task with 1 error. Pt unable to identify error. Provided error identification, but questiond pt \"what size pc was used vs what size of piece should have been used? \" Pt required showing of size of pipe used, and discussed looking at pipe size diagram per size used and then compared pipe design diagram and pipe size diagram to initiate problem solving with pt. After mod A to problem solve pt was able to identify the piece that needed to be used and placed the diagram together again correctly. Pt then tore down structure and placed all pieces in the bag and cleaned up the work area with setting task to the side. Provided task to increase cognition to improve functional follow through with safety sequencing and ADL/IADL tasks.                                                                              Plan   Plan  Times per week: 5-7x/week  Plan weeks: 1.5-2 weeks  Current Treatment Recommendations: Strengthening,Home Management Training,Balance Training,Functional Mobility Training,Endurance Training,Safety Education & Training,Self-Care / ADL  Plan Comment: continue with OT plan of care 22               Goals  Long term goals  Time Frame for Long term goals : Within 1.5-2 weeks, pt will demonstrate progress in the following areas listed below to achieve specific LTGs as stated in the initial evaluations  Long term goal 1: Pt will demo improved ADL/IADL function for d/c at highest potential  Long term goal 2: Pt will demo improved activity tolerance for ADLs  Long term goal 3: Pt will demo improved UE strength  Long term goal 4: Pt will demo improved standing balance/tolerance for increased LB ADLs status  Patient Goals   Patient goals : \"just to get out of here and fend for myself. Johnson Carter I used to cook a lot\"       Therapy Time   Individual Concurrent Group Co-treatment   Time In 1030         Time Out 1130         Minutes 60              Neuromuscular reeducation: 30 minutes  Cognitive Retrainin minutes      MARGE Mccain    Electronically signed by MARGE Mccain on 2022 at 11:58 AM

## 2022-04-19 NOTE — PROGRESS NOTES
Occupational Therapy  Facility/Department: [facility]  Daily Treatment Note  NAME: Homer Shin  : 1940  MRN: [id number]    Date of Service: 2022    Discharge Recommendations:  Continue to assess pending progress       Assessment      Activity Tolerance  Activity Tolerance: Patient Tolerated treatment well  Activity Tolerance: Limited d/t decreased functional act tolerance and increased BUE fatigue  Safety Devices  Safety Devices in place: Yes  Type of devices: All fall risk precautions in place         Patient Diagnosis(es): There were no encounter diagnoses. has a past medical history of Atrial fibrillation ([facility] [address] CAD (coronary artery disease), COPD (chronic obstructive pulmonary disease) ([facility] [address] Diabetes mellitus ([facility] [address] Hyperlipidemia, Hypertension, Movement disorder, and Pneumonia. has a past surgical history that includes Coronary artery bypass graft; Appendectomy; Eye surgery (Bilateral); Insertable Cardiac Monitor (Left, 2018); and Coronary angioplasty with stent. Restrictions  Restrictions/Precautions  Restrictions/Precautions: Fall Risk  Subjective   General  Chart Reviewed: Yes  Patient assessed for rehabilitation services?: Yes  Response to previous treatment: Patient with no complaints from previous session  Family / Caregiver Present: No  Referring Practitioner: Dr. Randene Soulier  Diagnosis: Impaired mobility d/t severe PVD w/ LE ischemia  Subjective  Subjective: \"I got to use the bathroom first.\"  Pre Treatment Pain Screening  Pain at present: 0  Scale Used: Numeric Score  Intervention List: Patient able to continue with treatment  Vital Signs  Patient Currently in Pain: Denies   Orientation     Objective    Pt completed toileting at the below level. Pt completed therapeutic activities to improve BUE strength/endurance for functional tasks. Pt completed 12 piece block and bolt design with 1# wrist weights bilaterally to increase strength.  Pt completed task repetitively reaching across table top level to retrieve pieces requiring min verbal cues to assemble correctly and min difficulty manipulating wing nuts. ADL  Toileting: Supervision        Toilet Transfers  Toilet Transfers Comments: NT stood to urinate                                Plan   Plan  Times per week: 5-7x/week  Plan weeks: 1.5-2 weeks  Current Treatment Recommendations: Strengthening,Home Management Training,Balance Training,Functional Mobility Training,Endurance Training,Safety Education & Training,Self-Care / ADL  Plan Comment: continue with OT plan of care 4-22-22    Goals  Long term goals  Time Frame for Long term goals : Within 1.5-2 weeks, pt will demonstrate progress in the following areas listed below to achieve specific LTGs as stated in the initial evaluations  Long term goal 1: Pt will demo improved ADL/IADL function for d/c at highest potential  Long term goal 2: Pt will demo improved activity tolerance for ADLs  Long term goal 3: Pt will demo improved UE strength  Long term goal 4: Pt will demo improved standing balance/tolerance for increased LB ADLs status  Patient Goals   Patient goals : \"just to get out of here and fend for myself. Tank Harvey I used to cook a lot\"       Therapy Time   Individual Concurrent Group Co-treatment   Time In 1300         Time Out 1330         Minutes 30           ADL/IADL training: 10 minutes  Therapeutic activities: 20 minutes       Margaret Murillo OT    Electronically signed by Margaret Murillo OT on 4/19/2022 at 3:58 PM

## 2022-04-20 LAB
GLUCOSE BLD-MCNC: 104 MG/DL (ref 70–99)
GLUCOSE BLD-MCNC: 121 MG/DL (ref 70–99)
GLUCOSE BLD-MCNC: 126 MG/DL (ref 70–99)
GLUCOSE BLD-MCNC: 137 MG/DL (ref 70–99)
PERFORMED ON: ABNORMAL

## 2022-04-20 PROCEDURE — 6370000000 HC RX 637 (ALT 250 FOR IP): Performed by: PHYSICAL MEDICINE & REHABILITATION

## 2022-04-20 PROCEDURE — 97535 SELF CARE MNGMENT TRAINING: CPT

## 2022-04-20 PROCEDURE — 1180000000 HC REHAB R&B

## 2022-04-20 PROCEDURE — 97530 THERAPEUTIC ACTIVITIES: CPT

## 2022-04-20 PROCEDURE — 97110 THERAPEUTIC EXERCISES: CPT

## 2022-04-20 PROCEDURE — 94762 N-INVAS EAR/PLS OXIMTRY CONT: CPT

## 2022-04-20 PROCEDURE — 99232 SBSQ HOSP IP/OBS MODERATE 35: CPT | Performed by: PHYSICAL MEDICINE & REHABILITATION

## 2022-04-20 PROCEDURE — 97116 GAIT TRAINING THERAPY: CPT

## 2022-04-20 RX ORDER — INSULIN LISPRO 100 [IU]/ML
0-6 INJECTION, SOLUTION INTRAVENOUS; SUBCUTANEOUS
Status: DISCONTINUED | OUTPATIENT
Start: 2022-04-20 | End: 2022-04-22 | Stop reason: HOSPADM

## 2022-04-20 RX ORDER — INSULIN LISPRO 100 [IU]/ML
0-3 INJECTION, SOLUTION INTRAVENOUS; SUBCUTANEOUS NIGHTLY
Status: DISCONTINUED | OUTPATIENT
Start: 2022-04-20 | End: 2022-04-22 | Stop reason: HOSPADM

## 2022-04-20 RX ADMIN — Medication 400 MG: at 08:08

## 2022-04-20 RX ADMIN — METFORMIN HYDROCHLORIDE 500 MG: 500 TABLET ORAL at 08:08

## 2022-04-20 RX ADMIN — METFORMIN HYDROCHLORIDE 500 MG: 500 TABLET ORAL at 16:14

## 2022-04-20 RX ADMIN — SOTALOL HYDROCHLORIDE 80 MG: 80 TABLET ORAL at 22:18

## 2022-04-20 RX ADMIN — CLOPIDOGREL BISULFATE 75 MG: 75 TABLET ORAL at 08:08

## 2022-04-20 RX ADMIN — APIXABAN 5 MG: 5 TABLET, FILM COATED ORAL at 08:12

## 2022-04-20 RX ADMIN — APIXABAN 5 MG: 5 TABLET, FILM COATED ORAL at 22:18

## 2022-04-20 ASSESSMENT — PAIN SCALES - GENERAL
PAINLEVEL_OUTOF10: 0
PAINLEVEL_OUTOF10: 0

## 2022-04-20 NOTE — PROGRESS NOTES
Physical Therapy Rehab Treatment Note  Facility/Department: [facility]  Room: X712/B526-56       NAME: Bravo Cash  : 1940 (79 y.o.)  MRN: [id number]  CODE STATUS: Full Code    Date of Service: 2022     Restrictions:  Restrictions/Precautions: Fall Risk     SUBJECTIVE:   Subjective: Pt reports he slept well. RN reports pt's BP was low this morning. Pain  Pain: Pt reports 0/10 pain    OBJECTIVE:   Orientation  Overall Orientation Status: Within Normal Limits  Orientation Level: Oriented X4        Transfers  Sit to Stand: Modified independent  Stand to sit: Modified independent    Ambulation  Surface: Carpet  Device: Rolling walker  Activity: Within Unit  Activity Comments: Mild increased SOB  Additional Factors: Increased time to complete  Assistance Level: Stand by assist  Gait Deviations: Slow robert    Stairs    Comments: NT today d/t decreased BP/orthostatic BP        PT Exercises  Exercise Treatment: seated LE therex with 2# ankle wts: marching 2x10, LAQs 2x10, hip add with ball x20; hip abd RTB x20; hamcurls x20  Circulation/Endurance Exercises: seated marching x60sec; 30sec STS=8 Increased SOB. Pt reports RPE 13 somewhat hard      Activity Tolerance  Seated /68 HR 65BPM  Standing BP 98/60 HR 72BPM  Pt wearing lucas hose and abdominal binder. ASSESSMENT/PROGRESS TOWARDS GOALS:   Assessment  Assessment: BP low today however WFL and not orthostatic with sit to stand. Pt able to tolerate ambulating short distances. Pt demo's good safety with transfers. Cont to fatigue with activity.   Activity Tolerance:  (Pt reports RPE 11at rest and 13 with activity)    Goals:  Long Term Goals  Long term goal 1: Pt to complete bed mobility with indep  Long term goal 2: Pt to complete transfers with indep  Long term goal 3: Pt to ambulate 50-150ft with [employer] and indep  Long term goal 4: Pt to complete 4 steps with HR and supervision  Long term goal 5: Pt to complete HEP with indep  Long term goal 6: Pt to complete 50reps on 2min Step test to demonstrate improved activity tolerance  Long term goal 7: Pt to complete 10reps 30sec STS  Patient Goals   Patient goals : Get home    PLAN OF CARE/Safety:   Plan Comment: Cont per POC.     Therapy Time:   Individual   Time In 1430   Time Out 1530   Minutes 60     Minutes:  Transfer/Bed mobility training:10  Gait training:10  Neuro re education:0  Therapeutic ex:40    Ulysses Avalos PTA, 04/20/22 at 4:06 PM

## 2022-04-20 NOTE — PROGRESS NOTES
Nephrology Progress Note    Assessment:  CKD-3  OHDx s/p cabgx  DM type-2  S/P AAA  Hx CVA  PAD      Plan: d/c norvasc due to low bp  Hold diovan just for now  Resume betapace (was held this am)    Patient Active Problem List:     Atrial fibrillation ([facility] [address]     High risk medication use     Atherosclerosis of native coronary artery of native heart without angina pectoris     Hypertension     Ischemic myocardial dysfunction     Nonrheumatic aortic valve disorder, unspecified     Aortic valve disorder     Personal history of nicotine dependence     Presence of aortocoronary bypass graft     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HCC)     Vasovagal syncope     Dizziness and giddiness     Cerebrovascular accident ([address]     Orthostatic dizziness     Ataxia     Vertigo     Meniere disease, bilateral     Benign paroxysmal positional vertigo due to bilateral vestibular disorder     Coronary angioplasty status     Ataxic gait     Kyphoscoliosis     Spinal stenosis of lumbar region with neurogenic claudication     Abdominal aortic aneurysm (AAA) ([address]     Acute inferior myocardial infarction (HCC)     Carotid bruit     Chronic obstructive pulmonary disease (HCC)     Diabetes mellitus ([address]     Dyspnea on exertion     Fatigue     First degree atrioventricular block     Hyperlipidemia     Infection of the inner ear     Muscle pain     Underweight     Ventricular premature beats     Weight loss     PVD (peripheral vascular disease) ([facility])     Impaired mobility and activities of daily living -sp Severe PVD s/p AAA repain     Hypotension     Late effects of CVA (cerebrovascular accident)      Subjective:  Admit Date: 4/12/2022    Interval History: low bp. Diovan/norvasc and betapace all held.   No fevers    Medications:  Scheduled Meds:   insulin lispro  0-6 Units SubCUTAneous TID WC    insulin lispro  0-3 Units SubCUTAneous Nightly    valsartan  160 mg Oral Daily    amLODIPine  5 mg Oral Daily    pitavastatin  4 mg Oral Nightly    clopidogrel  75 mg Oral Daily    apixaban  5 mg Oral BID    magnesium oxide  400 mg Oral Daily    metFORMIN  500 mg Oral BID WC    sotalol  80 mg Oral BID     Continuous Infusions:   dextrose         CBC:   Recent Labs     04/18/22  0504   HGB 10.8*     CMP:    Recent Labs     04/18/22  0504      K 4.5   *   CO2 24   BUN 26*   CREATININE 0.98   GLUCOSE 120*   CALCIUM 8.6   LABGLOM >60.0     Troponin: No results for input(s): TROPONINI in the last 72 hours. BNP: No results for input(s): BNP in the last 72 hours. INR: No results for input(s): INR in the last 72 hours. Lipids: No results for input(s): CHOL, LDLDIRECT, TRIG, HDL, AMYLASE, LIPASE in the last 72 hours. Liver: No results for input(s): AST, ALT, ALKPHOS, PROT, LABALBU, BILITOT in the last 72 hours. Invalid input(s): BILDIR  Iron:  No results for input(s): IRONS, FERRITIN in the last 72 hours. Invalid input(s): LABIRONS  Urinalysis: No results for input(s): UA in the last 72 hours.     Objective:  Vitals: /68   Pulse 65   Temp 97.9 °F (36.6 °C) (Oral)   Resp 16   Ht 5' 8\" (1.727 m)   Wt 160 lb (72.6 kg)   SpO2 99%   BMI 24.33 kg/m²    Wt Readings from Last 3 Encounters:   04/12/22 160 lb (72.6 kg)   12/29/21 151 lb 8 oz (68.7 kg)   10/12/21 150 lb (68 kg)      24HR INTAKE/OUTPUT:    No intake or output data in the 24 hours ending 04/20/22 1610    General: alert, in no apparent distress  HEENT: normocephalic, atraumatic, anicteric  Neck: supple, no mass  Lungs: non-labored respirations, clear to auscultation bilaterally  Heart: regular rate and rhythm, no murmurs or rubs  Abdomen: soft, non-tender, non-distended  Ext: no cyanosis, no peripheral edema  Neuro: alert and oriented, no gross abnormalities  Psych: normal mood and affect  Skin: no rash      Electronically signed by Meliton José MD, MD

## 2022-04-20 NOTE — PLAN OF CARE
Problem: Falls - Risk of:  Goal: Will remain free from falls  Description: Will remain free from falls  4/20/2022 0059 by Jersey Hope. Yaneth Beatty RN  Outcome: Ongoing  4/19/2022 1147 by Sheela Kwan RN  Outcome: Ongoing  Goal: Absence of physical injury  Description: Absence of physical injury  4/20/2022 0059 by Jersey Hope. Francesco Poe RN  Outcome: Ongoing  4/19/2022 1147 by Sheela Kwan RN  Outcome: Ongoing     Problem: Skin Integrity:  Goal: Will show no infection signs and symptoms  Description: Will show no infection signs and symptoms  4/20/2022 0059 by Jersey Hope. Yaneth Beatty RN  Outcome: Ongoing  4/19/2022 1147 by Sheela Kwan RN  Outcome: Ongoing  Goal: Absence of new skin breakdown  Description: Absence of new skin breakdown  4/20/2022 0059 by Jersey Hope. Yaneth Beatty RN  Outcome: Ongoing  4/19/2022 1147 by Sheela Kwan RN  Outcome: Ongoing     Problem: Mobility - Impaired:  Goal: Mobility will improve  Description: Mobility will improve  4/20/2022 0059 by Jersey Hope. Yaneth Beatty RN  Outcome: Ongoing  4/19/2022 1147 by Sheela Kwan RN  Outcome: Ongoing     Problem: Nutritional:  Goal: Consumption of food in small portions  Description: Consumption of food in small portions  4/20/2022 0059 by Jersey Hope. Yaneth Beatty RN  Outcome: Ongoing  4/19/2022 1147 by Sheela Kwan RN  Outcome: Ongoing  Goal: Consumption of liquid of appropriate consistency  Description: Consumption of liquid of appropriate consistency  4/20/2022 0059 by Jersey Hope. Yaneth Beatty RN  Outcome: Ongoing  4/19/2022 1147 by Sheela Kwan RN  Outcome: Ongoing     Problem: Respiratory:  Goal: Ability to maintain a clear airway will improve  Description: Ability to maintain a clear airway will improve  4/20/2022 0059 by Jersey Herminia. Francesco Poe RN  Outcome: Ongoing  4/19/2022 1147 by Sheela Kwan RN  Outcome: Ongoing  Goal: Will remain free from infection  Description: Will remain free from infection  [facility]  Francesco Poe RN  Outcome: Ongoing  4/19/2022 1147 by Shireen Villarreal RN  Outcome: Ongoing  Goal: Absence of aspiration  Description: Absence of aspiration  4/20/2022 0059 by Rock Stern. Dane Heredia RN  Outcome: Ongoing  4/19/2022 1147 by Shireen Villarreal RN  Outcome: Ongoing     Problem: Safety:  Goal: Ability to chew and swallow food without choking will improve  Description: Ability to chew and swallow food without choking will improve  4/20/2022 0059 by Rock Stern. Dane Heredia RN  Outcome: Ongoing  4/19/2022 1147 by Shireen Villarreal RN  Outcome: Ongoing  Goal: Ability to demonstrate good, daily oral hygiene techniques will improve  Description: Ability to demonstrate good, daily oral hygiene techniques will improve  4/20/2022 0059 by Rock Stern. Dane Heredia RN  Outcome: Ongoing  4/19/2022 1147 by Shireen Villarreal RN  Outcome: Ongoing  Goal: Maintenance of upright position during and after feeding  Description: Maintenance of upright position during and after feeding  4/20/2022 0059 by Rock Stern. Marcelo Maza RN  Outcome: Ongoing  4/19/2022 1147 by Shireen Villarreal RN  Outcome: Ongoing     Problem: IP COMMUNICATION/DYSARTHRIA  Goal: LTG - patient will improve expressive language skills to allow for communication of wants and needs in daily activities  4/20/2022 0059 by Rock Stern. Dane Heredia RN  Outcome: Ongoing  4/19/2022 1147 by Shireen Villarreal RN  Outcome: Ongoing     Problem: IP SWALLOWING  Goal: LTG - patient will tolerate the least restrictive diet consistency to allow for safe consumption of daily meals  4/20/2022 0059 by Rock Stern.  Dane Heredia RN  Outcome: Ongoing  4/19/2022 1147 by Shireen Villarreal RN  Outcome: Ongoing

## 2022-04-20 NOTE — PLAN OF CARE
Problem: Falls - Risk of:  Goal: Will remain free from falls  Description: Will remain free from falls  4/20/2022 0804 by Cristian Ibarra RN  Outcome: Progressing  4/20/2022 0059 by Sowmya Nunez. Chelsey Simon RN  Outcome: Ongoing  Goal: Absence of physical injury  Description: Absence of physical injury  4/20/2022 0804 by Cristian Ibarra RN  Outcome: Progressing  4/20/2022 0059 by Sowmya Nunez. Chelsey Simon RN  Outcome: Ongoing     Problem: Skin Integrity:  Goal: Will show no infection signs and symptoms  Description: Will show no infection signs and symptoms  4/20/2022 0804 by Cristian Ibarra RN  Outcome: Progressing  4/20/2022 0059 by Sowmya Nunez. Chelsey Simon RN  Outcome: Ongoing  Goal: Absence of new skin breakdown  Description: Absence of new skin breakdown  4/20/2022 0804 by Cristian Ibarra RN  Outcome: Progressing  4/20/2022 0059 by Sowmya Nunez. Chelsey Simon RN  Outcome: Ongoing     Problem: Mobility - Impaired:  Goal: Mobility will improve  Description: Mobility will improve  4/20/2022 0804 by Cristian Ibarra RN  Outcome: Progressing  4/20/2022 0059 by Sowmya Nunez. Chelsey Simon RN  Outcome: Ongoing     Problem: Nutritional:  Goal: Consumption of food in small portions  Description: Consumption of food in small portions  4/20/2022 0804 by Cristian Ibarra RN  Outcome: Progressing  4/20/2022 0059 by Sowmya Nunez. Chelsey Simon RN  Outcome: Ongoing  Goal: Consumption of liquid of appropriate consistency  Description: Consumption of liquid of appropriate consistency  4/20/2022 0804 by Cristian Ibarra RN  Outcome: Progressing  4/20/2022 0059 by Sowmya Nunez. Chelsey Simon RN  Outcome: Ongoing     Problem: Respiratory:  Goal: Ability to maintain a clear airway will improve  Description: Ability to maintain a clear airway will improve  4/20/2022 0804 by Cristian Ibarra RN  Outcome: Progressing  4/20/2022 0059 by Sowmya Nunez.  Chelsey Simon RN  Outcome: Ongoing  Goal: Will remain free from infection  Description: Will remain free from infection  4/20/2022 0804 by

## 2022-04-20 NOTE — PROGRESS NOTES
Physical Therapy Rehab Treatment Note  [facility]  [facility]       NAME: Sea Rutledge  : 1940 (79 y.o.)  MRN: [id number]  CODE STATUS: Full Code    Date of Service: 2022     Restrictions:  Restrictions/Precautions: Fall Risk     SUBJECTIVE:   Subjective: Pt reports he slept well. RN reports pt's BP was low this morning. Pain  Pain: Pt reports 0/10 pain    OBJECTIVE:   Orientation  Overall Orientation Status: Within Normal Limits  Orientation Level: Oriented X4    Transfers  Sit to Stand: Modified independent  Stand to sit: Modified independent     PT Exercises  Exercise Treatment: seated LE therex with 2# ankle wts: marching 2x10, LAQs 2x10, hip add with ball x20; hip abd RTB x20; hamcurls x20  Circulation/Endurance Exercises: seated marching x60sec     Vitals  BP:104/66  Pulse: 60  Seated  L upper arm    Pulse: 70  BP: (!) 86/54  BP Location: Left upper arm  Patient Position: Standing  MAP (Calculated): 64.67      ASSESSMENT/PROGRESS TOWARDS GOALS:   Assessment  Assessment: Pt with orthostatic BP this AM.  Non-symptomatic. Seated BP WFL. Decreased with standing. Standing and ambulatory activities contraindicated. RN notified. Encouraged pt to drink fluids. Focused on seated strengthening and endurance activities. Activity Tolerance: Treatment limited secondary to medical complications    Goals:  Long Term Goals  Long term goal 1: Pt to complete bed mobility with indep  Long term goal 2: Pt to complete transfers with indep  Long term goal 3: Pt to ambulate 50-150ft with [employer] and indep  Long term goal 4: Pt to complete 4 steps with HR and supervision  Long term goal 5: Pt to complete HEP with indep  Long term goal 6: Pt to complete 50reps on 2min Step test to demonstrate improved activity tolerance  Long term goal 7: Pt to complete 10reps 30sec STS  Patient Goals   Patient goals : Get home    PLAN OF CARE/Safety:   Plan Comment: Cont per POC.      Therapy Time: Individual   Time In 0900   Time Out 0930   Minutes 30     Minutes:  Transfer/Bed mobility trainin  Gait trainin  Neuro re education:0  Therapeutic ex:25    Justin Bone PTA, 22 at 9:53 AM

## 2022-04-20 NOTE — PROGRESS NOTES
Occupational Therapy  Facility/Department: [facility] Daily Treatment Note    Date: 22  Patient Name: Coretz Heller       Room: W986/Y807-89  MRN: [id number]  Account: [de-identified]   : 1940  (79 y.o.) Gender: male       Past Medical History:  has a past medical history of Atrial fibrillation ([facility] [address] CAD (coronary artery disease), COPD (chronic obstructive pulmonary disease) ([facility] [address] Diabetes mellitus ([facility] [address] Hyperlipidemia, Hypertension, Movement disorder, and Pneumonia. Past Surgical History:   has a past surgical history that includes Coronary artery bypass graft; Appendectomy; Eye surgery (Bilateral); Insertable Cardiac Monitor (Left, 2018); and Coronary angioplasty with stent. Subjective    Subjective: \"I washed my teeth 2 days ago so I'm ok. \"   Pain Assessment  Pain Level: 0  Patient reported no pain during pre- and post-treatment assessment. Objective      ADL  Grooming/Oral Hygiene  Assistance Level: Supervision (Verbal cues for initiation of oral hygiene. Patient stated most recent completion of oral hygiene was 2 days prior.)    Upper Extremity Bathing  Assistance Level: Modified independent    Lower Extremity Bathing  Assistance Level: Supervision (Verbal cues for thoroughness in jacqueline care.)    Upper Extremity Dressing  Assistance Level: Independent    Lower Extremity Dressing  Assistance Level: Supervision (1 verbal cue provided for energy conservation technique while donning pants/briefs due to patient demonstrating increased SOB.)    Putting On/Taking Off Footwear  Assistance Level: Independent    Increased rest breaks required during ADL tasks due to dyspnea on exertion. Tub/Shower Transfers  Type: Shower  Transfer From: Wheelchair  Transfer To:  Shower chair with back  Assistance Level: Modified independent (Patient utilized grab bars.)       Cognition  Overall Cognitive Status: Exceptions  Arousal/Alertness: Appropriate responses to stimuli  Following Commands: Follows one step commands consistently; Follows multistep commands with repitition; Follows multistep commands with increased time  Attention Span: Appears intact  Memory: Appears intact  Safety Judgement: Decreased awareness of need for safety  Problem Solving: Assistance required to generate solutions  Insights: Decreased awareness of deficits  Initiation: Requires cues for some  Sequencing: Requires cues for some  Cognition Comment: compre- supervision, express- indep, social inter- Mod indep, problem solv- SUP, memory-SUP    Patient engaged in B UE ROM/strengthening to increase functional endurance for ADL's, IADL's and transfers. Patient completed ergometer X 5 minutes with 2 rest breaks required. Patient engaged in B UE FM activity to increase I in dressing tasks including fasteners, zippers, and buttons. Patient able to assemble 10 wheels with nuts and bolts with MOD difficulty. Patient able to place/remove circular wooden piece onto bolt with MIN difficulty. Patient able to thread/remove bolt onto nut with MOD difficulty. Orientation  Overall Orientation Status: Within Functional Limits  Orientation Level: Oriented to person      Assessment  Assessment  Activity Tolerance: Treatment limited secondary to medical complications  Safety Devices  Safety Devices in place: Yes  Type of devices: All fall risk precautions in place;Gait belt  Restraints  Initially in place: No    Therapy Time   Individual Concurrent Group Co-treatment   Time In 930         Time Out 1030         Minutes 60             ADL/IADL trainin minutes  Therapeutic activities: 20 minutes     Electronically signed by MARGE Teague on 22 at 12:44 PM EDT    MARGE Teague     This treatment session was supervised by Taylor GARCIA     Electronically signed by MARGE Castillo on 22 at 1:18 PM EDT

## 2022-04-20 NOTE — PROGRESS NOTES
Assumed care of pt at [address] [place] [address] on 4/19/22. Pt was in bed sleeping on and off. Pt encouraged to help scoot to top of bed to reposition with assistance by RN. Pt sob with exertion. 02 applied at 2 liters per nasal cannula at hs and pt hob elevated. Pt stated that the 02 really helped him get his breath and he felt better sitting with hob elevated. One touch at hs was 110. Pt did not require any insulin. Lungs clear but diminished. No distress. Call light in reach and bed alarm on. Electronically signed by Sharif Renee.  Clarissa Fajardo on 4/20/2022 at 12:58 AM

## 2022-04-20 NOTE — PROGRESS NOTES
Subjective: The patient complains of  moderate to severe acute partially relieved by rest, PT, OT, recent AAA repair and exacerbated by recent illness and exertion. I am concerned about patients medical complexities and current active problems including Severe PVD s/p AAA repain. Recheck UA was negative x2. He is refusing home care. His wife is also refusing home care. He is occasionally using oxygen at night and we will do a nocturnal O2 eval prior to discharge. I discussed current functional, rehabilitation, medical status with other rehabilitation providers including nursing and case management. According to recent nursing note, \" Patient noted with low BP this morning, 97/58. Held cardiac meds and updated MD. Will see patient this afternoon. Also noted orthostatic with therapy, asymptomatic. Abdominal binder and ABRAM hose to be applied after ADL. Has been using oxygen at night prn for SOB. Does not have oxygen at home. Overnight pulse ox ordered. Groin incisions CDI, MARGE. LBM today, continent. \".    ROS x10: The patient also complains of severely impaired mobility and activities of daily living. Otherwise no new problems with vision, hearing, nose, mouth, throat, dermal, cardiovascular, GI, , pulmonary, musculoskeletal, psychiatric or neurological. See Rehab H&P on Rehab chart dated . Vital signs:  BP (!) 97/58   Pulse 61   Temp 97.9 °F (36.6 °C) (Oral)   Resp 16   Ht 5' 8\" (1.727 m)   Wt 160 lb (72.6 kg)   SpO2 99%   BMI 24.33 kg/m²   I/O:   PO/Intake:  fair PO intake, no problems observed or reported. Bowel/Bladder:  continent, constipation and urinary urgency. General:  Patient is well developed, adequately nourished, non-obese and     well kempt. HEENT:    PERRLA, hearing intact to loud voice, external inspection of ear     and nose benign. Inspection of lips, tongue and gums benign  Musculoskeletal: No significant change in strength or tone. All joints stable. Inspection and palpation of digits and nails show no clubbing,       cyanosis or inflammatory conditions. Neuro/Psychiatric: Affect: flat. Alert and oriented to person, place and     situation. No significant change in deep tendon reflexes or     Sensation-slowed processing. Lungs:  Diminished, CTA-B. Respiration effort is normal at rest.     Heart:   S1 = S2,  irreg. No loud murmurs. Abdomen:  Soft, non-tender, no enlargement of liver or spleen. Extremities:  No significant lower extremity edema or tenderness. Skin:   Intact  AAA repair-healing bilateral groin incisions. Thin skin bilateral upper extremity bruising. Rehabilitation:  Physical therapy: FIMS:  Bed Mobility: Scooting: Stand by assistance    Transfers: Sit to Stand: Modified independent  Stand to sit: Modified independent  Bed to Chair: Modified independent, Ambulation  Surface: carpet  Device: Rolling Walker  Other Apparatus: O2  Assistance: Stand by assistance  Quality of Gait: fatigued quickly needing chair brought up  Gait Deviations: Decreased step length,Increased FERNANDO  Distance: 40ft  Comments: Focused on ambulating multiple short distances to destinations. More Ambulation?: No, Stairs  # Steps : 4  Stairs Height: 6\"  Rails: Bilateral  Assistance: Supervision  Comment: Pt reports PRE 13 somewhat hard. FIMS:  ,  , Assessment: Fatigue limited gait distance requiring chair be brought up after 40ft. Pt has met transfer and bed mobility goals. Occupational therapy: FIMS:   ,  , Assessment: Pt is a 80 y/o male who presents with above deficits which also impacts ADL/IADL function. Pt s/p AAA repair. Pt very limited by fatigue.  Pt requires continued OT to address ADL/IADL function    Speech therapy: FIMS:        Lab/X-ray studies reviewed, analyzed and discussed with patient and staff:   Recent Results (from the past 24 hour(s))   POCT Glucose    Collection Time: 04/19/22 11:23 AM   Result Value Ref Range    POC Glucose 163 (H) 70 - 99 mg/dl    Performed on ACCU-CHEK    POCT Glucose    Collection Time: 04/19/22  4:23 PM   Result Value Ref Range    POC Glucose 110 (H) 70 - 99 mg/dl    Performed on ACCU-CHEK    POCT Glucose    Collection Time: 04/19/22  9:34 PM   Result Value Ref Range    POC Glucose 110 (H) 70 - 99 mg/dl    Performed on ACCU-CHEK    POCT Glucose    Collection Time: 04/20/22  6:19 AM   Result Value Ref Range    POC Glucose 104 (H) 70 - 99 mg/dl    Performed on ACCU-CHEK        Previous extensive, complex labs, notes and diagnostics reviewed and analyzed. ALLERGIES:    Allergies as of 04/12/2022 - Fully Reviewed 04/12/2022   Allergen Reaction Noted    Fenofibrate  12/29/2021    Lipitor [atorvastatin] Other (See Comments) 12/19/2016    Niacin Other (See Comments) 12/19/2016    Niaspan [niacin er] Other (See Comments) 12/19/2016    Vitamin e Other (See Comments) 12/19/2016    Zocor [simvastatin] Other (See Comments) 12/19/2016      (please also verify by checking MAR)       I reviewed her [facility] prescription monitoring service data sheets in hopes of eliminating polypharmacy and weaning to the lowest effective dose of pain medications and eliminating the concomitant use of benzodiazepines. I see no medications of concern. I see no habits of combining sedatives and narcotics. Complex Physical Medicine & Rehab Issues Assess & Plan:   1. Severe abnormality of gait and mobility and impaired self-care and ADL's secondary to progressive weakness dt  Severe PVD s/p AAA repain . Functional and medical status reassessed regarding patients ability to participate in therapies and patient found to be able to participate in acute intensive comprehensive inpatient rehabilitation program including PT/OT to improve balance, ambulation, ADLs, and to improve the P/AROM. Therapeutic modifications regarding activities in therapies, place, amount of time per day and intensity of therapy made daily.   In bed therapies or bedside therapies prn.   2. Bowel progressive constipation, and Bladder dysfunction monitoring neurogenic bladder:  frequent toileting, ambulate to bathroom with assistance, check post void residuals. Check for C.difficile x1 if >2 loose stools in 24 hours, continue bowel & bladder program.  Monitor bowel and bladder function. Lactinex 2 PO every AC. MOM prn, Brown Bomb prn, Glycerin suppository prn, enema prn.  3. Severe postop bilateral groin pain as well as generalized OA pain,   Kyphoscoliosis: reassess pain every shift and prior to and after each therapy session, give prn Tylenol and Ultram, modalities prn in therapy, Lidoderm, K-pad prn.   4. Skin healing and breakdown risk:  continue pressure relief program.  Daily skin exams and reports from nursing. 5. Severe fatigue due to nutritional and hydration deficiency: Add vitamin B12 vitamin D and CoQ10 continue to monitor I&Os, calorie counts prn, dietary consult prn. Add healthy HS snack. 6. Acute episodic insomnia with situational adjustment disorder:  prn Ambien, monitor for day time sedation. Add HS \"Tuck In\"  7. Falls risk elevated:  patient to use call light to get nursing assistance to get up, bed and chair alarm. 8. Elevated DVT risk: progressive activities in PT, continue prophylaxis ABRAM hose, elevation and Eliquis. 9. Complex discharge planning: Begin medication reconciliation and simplification Discharge April 22, 2022 to home with wife and home health care. Note patient and his wife are both refusing home health care. SP weekly team meeting  Mondays to assess progress towards goals, discuss and address social, psychological and medical comorbidities and to address difficulties they may be having progressing in therapy. Patient and family education is in progress. The patient is to follow-up with their family physician after discharge. Complex Active General Medical Issues that complicate care Assess & Plan:     1.  Principal Problem:    PVD (peripheral vascular disease) ([facility] [address]  Active Problems:  1. Atrial fibrillation,  Hypertension-Acute rehab to monitor heart rate and rhythm with the option of telemetry and the effects of chronotropic medication with respect to increasing physical activity and exercise in PT, OT, ADLs with medication titration to lowest effective dosing. Continue blood signs every shift focusing on heart rate and blood pressure checks, consult hospitalist for backup medical and adjust/add medications (Norvasc, Livalo, Betapace, Diovan, Eliquis, Plavix). Monitor heart rate and blood pressure as well as medications effects on vital signs before during and after therapy with especial focus on preventing orthostasis and falls risk. Monitor for nocturnal hypoxemia check nocturnal pulse ox patient may need home O2 at discharge. 2.   Dizziness and giddiness, Orthostatic dizziness-focus on balance and therapy add abdominal binder and teds as needed  3. Diabetes mellitus -stick blood sugars before every meal and at bedtime 4 carb diet  4.    Late effects of CVA (cerebrovascular accident)-slowed processing and motor planning -continue to focus on cognitive issues to be addressed in OT       Karla Trimble D.O., PM&R     Attending    [address] [facility]

## 2022-04-20 NOTE — PROGRESS NOTES
Occupational Therapy  Fac[facility]Department: [facility]  Daily Treatment Note  NAME: Fantasma Pacheco  : 1940  MRN: [id number]    Date of Service: 2022    Discharge Recommendations:  Continue to assess pending progress         Patient Diagnosis(es): There were no encounter diagnoses. Assessment    Activity Tolerance: Treatment limited secondary to medical complications      Plan         Subjective   Subjective  Subjective: \"My wife is hanging in there\"  Pain: no pain reports pre or post session  Cognition  Overall Cognitive Status: Exceptions  Arousal/Alertness: Appropriate responses to stimuli  Following Commands: Follows one step commands consistently; Follows multistep commands with repitition; Follows multistep commands with increased time  Attention Span: Appears intact  Memory: Appears intact  Safety Judgement: Decreased awareness of need for safety  Problem Solving: Assistance required to generate solutions  Insights: Decreased awareness of deficits  Initiation: Requires cues for some  Sequencing: Requires cues for some  Cognition Comment: compre- supervision, express- indep, social inter- Mod indep, problem solv- SUP, memory-SUP        Objective    Vitals    While seated, completed fine motor task with focus on coordination, activity tolerance, and strength in order to improve general function. Challenged pt through usage of small dowels. Pt required to manipulate dowels, through pinching/grasping, reaching and placing according to instruction into vertical peg tree. Repetitive bilateral UE reaches completed to forward/vertical planes and at times required >90 degrees of shoulder flexion. Graded up difficulty by also having pt place small rings onto pegs  Placed wrist weights of 1# onto the BUEs in order to further address UE strengthening in form of added resistance for the task  Pt tolerated the task well but required frequent rest breaks due to fatigue.      While seated, challenged strength, activity tolerance, ROM, and flexibility for improved ADL performance. Challenged pt through usage of 1# wrist weights to complete BUE exercises. 2 sets x 10 reps completed. Exercises focused on all UE joints and planes of motion including scapular protraction/retraction, shoulder flexion/extension/rotation/horizontal abduction, elbow flexion/extension    Goals  Long Term Goals  Time Frame for Long term goals : Within 1.5-2 weeks, pt will demonstrate progress in the following areas listed below to achieve specific LTGs as stated in the initial evaluations  Long Term Goal 1: Pt will demo improved ADL/IADL function for d/c at highest potential  Long Term Goal 2: Pt will demo improved activity tolerance for ADLs  Long Term Goal 3: Pt will demo improved UE strength  Long Term Goal 4: Pt will demo improved standing balance/tolerance for increased LB ADLs status  Patient Goals   Patient goals : \"just to get out of here and fend for myself. Yana [place] Yana [place] Yana [place] I used to cook a lot\"       Therapy Time   Individual Concurrent Group Co-treatment   Time In 1300         Time Out 1330         Minutes 30              Therapeutic activities: 30 minutes      Laura Santos OT   Electronically signed by Laura Santos OT on 4/20/2022 at 1:27 PM

## 2022-04-20 NOTE — PROGRESS NOTES
Pt slept most of night with no complaints. Call light in reach. Bed alarm on. 02 remains on at 2 liters n/c while sleeping. Electronically signed by Vera Barrera on 4/20/2022 at 5:29 AM

## 2022-04-21 LAB
ANION GAP SERPL CALCULATED.3IONS-SCNC: 11 MEQ/L (ref 9–15)
BUN BLDV-MCNC: 28 MG/DL (ref 8–23)
CALCIUM SERPL-MCNC: 8.5 MG/DL (ref 8.5–9.9)
CHLORIDE BLD-SCNC: 106 MEQ/L (ref 95–107)
CO2: 22 MEQ/L (ref 20–31)
CREAT SERPL-MCNC: 0.92 MG/DL (ref 0.7–1.2)
GFR AFRICAN AMERICAN: >60
GFR NON-AFRICAN AMERICAN: >60
GLUCOSE BLD-MCNC: 106 MG/DL (ref 70–99)
GLUCOSE BLD-MCNC: 108 MG/DL (ref 70–99)
GLUCOSE BLD-MCNC: 111 MG/DL (ref 70–99)
GLUCOSE BLD-MCNC: 114 MG/DL (ref 70–99)
GLUCOSE BLD-MCNC: 135 MG/DL (ref 70–99)
PERFORMED ON: ABNORMAL
POTASSIUM SERPL-SCNC: 5.2 MEQ/L (ref 3.4–4.9)
SODIUM BLD-SCNC: 139 MEQ/L (ref 135–144)

## 2022-04-21 PROCEDURE — 6370000000 HC RX 637 (ALT 250 FOR IP): Performed by: INTERNAL MEDICINE

## 2022-04-21 PROCEDURE — 6370000000 HC RX 637 (ALT 250 FOR IP): Performed by: PHYSICAL MEDICINE & REHABILITATION

## 2022-04-21 PROCEDURE — 97535 SELF CARE MNGMENT TRAINING: CPT

## 2022-04-21 PROCEDURE — 97116 GAIT TRAINING THERAPY: CPT

## 2022-04-21 PROCEDURE — 99233 SBSQ HOSP IP/OBS HIGH 50: CPT | Performed by: PHYSICAL MEDICINE & REHABILITATION

## 2022-04-21 PROCEDURE — 80048 BASIC METABOLIC PNL TOTAL CA: CPT

## 2022-04-21 PROCEDURE — 97110 THERAPEUTIC EXERCISES: CPT

## 2022-04-21 PROCEDURE — 97530 THERAPEUTIC ACTIVITIES: CPT

## 2022-04-21 PROCEDURE — 97112 NEUROMUSCULAR REEDUCATION: CPT

## 2022-04-21 PROCEDURE — 1180000000 HC REHAB R&B

## 2022-04-21 PROCEDURE — 36415 COLL VENOUS BLD VENIPUNCTURE: CPT

## 2022-04-21 RX ORDER — CLOPIDOGREL BISULFATE 75 MG/1
75 TABLET ORAL DAILY
Qty: 30 TABLET | Refills: 3 | Status: ON HOLD | OUTPATIENT
Start: 2022-04-22 | End: 2022-10-15 | Stop reason: HOSPADM

## 2022-04-21 RX ORDER — VALSARTAN 160 MG/1
160 TABLET ORAL DAILY
Qty: 30 TABLET | Refills: 3 | Status: ON HOLD | OUTPATIENT
Start: 2022-04-22 | End: 2022-10-15 | Stop reason: HOSPADM

## 2022-04-21 RX ADMIN — APIXABAN 5 MG: 5 TABLET, FILM COATED ORAL at 20:13

## 2022-04-21 RX ADMIN — SOTALOL HYDROCHLORIDE 80 MG: 80 TABLET ORAL at 07:59

## 2022-04-21 RX ADMIN — METFORMIN HYDROCHLORIDE 500 MG: 500 TABLET ORAL at 18:12

## 2022-04-21 RX ADMIN — SODIUM ZIRCONIUM CYCLOSILICATE 10 G: 5 POWDER, FOR SUSPENSION ORAL at 11:01

## 2022-04-21 RX ADMIN — Medication 400 MG: at 07:59

## 2022-04-21 RX ADMIN — CLOPIDOGREL BISULFATE 75 MG: 75 TABLET ORAL at 07:59

## 2022-04-21 RX ADMIN — METFORMIN HYDROCHLORIDE 500 MG: 500 TABLET ORAL at 07:59

## 2022-04-21 RX ADMIN — APIXABAN 5 MG: 5 TABLET, FILM COATED ORAL at 07:59

## 2022-04-21 RX ADMIN — SOTALOL HYDROCHLORIDE 80 MG: 80 TABLET ORAL at 20:13

## 2022-04-21 ASSESSMENT — PAIN SCALES - GENERAL
PAINLEVEL_OUTOF10: 0
PAINLEVEL_OUTOF10: 0

## 2022-04-21 NOTE — PROGRESS NOTES
Subjective: The patient complains of  moderate to severe acute partially relieved by rest, PT, OT, recent AAA repair and exacerbated by recent illness and exertion. I am concerned about patients medical complexities and current active problems including Severe PVD s/p AAA repain. Recheck UA was negative x2. He is refusing home care. His wife is also refusing home care. He is occasionally using oxygen at night and we will do a nocturnal O2 eval prior to discharge. I discussed current functional, rehabilitation, medical status with other rehabilitation providers including nursing and case management. According to recent  note, \"  Pt had 4s of desat time below 89%\". Graylon Johnson Graylon Johnson Graylon Johnson Low BP improved. Nephrology notes reviewed and discussed with team.  K is elevated. ROS x10: The patient also complains of severely impaired mobility and activities of daily living. Otherwise no new problems with vision, hearing, nose, mouth, throat, dermal, cardiovascular, GI, , pulmonary, musculoskeletal, psychiatric or neurological. See Rehab H&P on Rehab chart dated . Vital signs:  /79   Pulse 65   Temp 97.5 °F (36.4 °C) (Oral)   Resp 18   Ht 5' 8\" (1.727 m)   Wt 160 lb (72.6 kg)   SpO2 99%   BMI 24.33 kg/m²   I/O:   PO/Intake:  fair PO intake, no problems observed or reported. Bowel/Bladder:  continent, constipation and urinary urgency. General:  Patient is well developed, adequately nourished, non-obese and     well kempt. HEENT:    PERRLA, [place]-hearing intact to loud voice, external inspection of ear     and nose benign. Inspection of lips, tongue and gums benign  Musculoskeletal: No significant change in strength or tone. All joints stable. Inspection and palpation of digits and nails show no clubbing,       cyanosis or inflammatory conditions. Neuro/Psychiatric: Affect: flat. Alert and oriented to person, place and     situation.   No significant change in deep tendon reflexes or     Sensation-slowed processing. Lungs:  Diminished, CTA-B. Respiration effort is normal at rest.     Heart:   S1 = S2,  irreg. No loud murmurs. Abdomen:  Soft, non-tender, no enlargement of liver or spleen. Extremities:  No significant lower extremity edema or tenderness. Skin:   Intact  AAA repair-healing bilateral groin incisions. Thin skin bilateral upper extremity bruising. Rehabilitation:  Physical therapy: FIMS:  Bed Mobility: Scooting: Stand by assistance    Transfers: Sit to Stand: Modified independent  Stand to sit: Modified independent  Bed to Chair: Modified independent, Ambulation  Surface: carpet  Device: Rolling Walker  Other Apparatus: O2  Assistance: Stand by assistance  Quality of Gait: fatigued quickly needing chair brought up  Gait Deviations: Decreased step length,Increased FERNANDO  Distance: 40ft  Comments: Focused on ambulating multiple short distances to destinations. More Ambulation?: No, Stairs  # Steps : 4  Stairs Height: 6\"  Rails: Bilateral  Assistance: Supervision  Comment: Pt reports PRE 13 somewhat hard. FIMS:  ,  , Assessment: Fatigue limited gait distance requiring chair be brought up after 40ft. Pt has met transfer and bed mobility goals. Occupational therapy: FIMS:   ,  , Assessment: Pt is a 80 y/o male who presents with above deficits which also impacts ADL/IADL function. Pt s/p AAA repair. Pt very limited by fatigue.  Pt requires continued OT to address ADL/IADL function    Speech therapy: FIMS:        Lab/X-ray studies reviewed, analyzed and discussed with patient and staff:   Recent Results (from the past 24 hour(s))   POCT Glucose    Collection Time: 04/20/22 11:19 AM   Result Value Ref Range    POC Glucose 137 (H) 70 - 99 mg/dl    Performed on ACCU-CHEK    POCT Glucose    Collection Time: 04/20/22  4:06 PM   Result Value Ref Range    POC Glucose 121 (H) 70 - 99 mg/dl    Performed on ACCU-CHEK    POCT Glucose    Collection Time: 04/20/22  8:57 PM   Result Value Ref Range    POC Glucose 126 (H) 70 - 99 mg/dl    Performed on ACCU-CHEK    Basic Metabolic Panel    Collection Time: 04/21/22  4:55 AM   Result Value Ref Range    Sodium 139 135 - 144 mEq/L    Potassium 5.2 (H) 3.4 - 4.9 mEq/L    Chloride 106 95 - 107 mEq/L    CO2 22 20 - 31 mEq/L    Anion Gap 11 9 - 15 mEq/L    Glucose 114 (H) 70 - 99 mg/dL    BUN 28 (H) 8 - 23 mg/dL    CREATININE 0.92 0.70 - 1.20 mg/dL    GFR Non-African American >60.0 >60    GFR  >60.0 >60    Calcium 8.5 8.5 - 9.9 mg/dL   POCT Glucose    Collection Time: 04/21/22  6:07 AM   Result Value Ref Range    POC Glucose 135 (H) 70 - 99 mg/dl    Performed on ACCU-CHEK      Noct POX-Pt had 4s of desat time below 89%      Previous extensive, complex labs, notes and diagnostics reviewed and analyzed. ALLERGIES:    Allergies as of 04/12/2022 - Fully Reviewed 04/12/2022   Allergen Reaction Noted    Fenofibrate  12/29/2021    Lipitor [atorvastatin] Other (See Comments) 12/19/2016    Niacin Other (See Comments) 12/19/2016    Niaspan [niacin er] Other (See Comments) 12/19/2016    Vitamin e Other (See Comments) 12/19/2016    Zocor [simvastatin] Other (See Comments) 12/19/2016      (please also verify by checking MAR)      Today I evaluated this patient for periodic reassessment of medical and functional status. The patient was discussed in detail at the treatment team meeting focusing on current medical issues, progress in therapies, social issues, psychological issues, barriers to progress and strategies to address these barriers, and discharge planning. See the addendum to rehab progress note-as a second progress note in the chart. The patient continues to be high risk for future disability and their medical and rehabilitation prognosis continue to be good and therefore, we will continue the patient's rehabilitation course as planned. The patient's tentative discharge date was set. Patient and family education was discussed. The patient was made aware of the team discussion regarding their progress. Complex Physical Medicine & Rehab Issues Assess & Plan:   1. Severe abnormality of gait and mobility and impaired self-care and ADL's secondary to progressive weakness dt  Severe PVD s/p AAA repain . Functional and medical status reassessed regarding patients ability to participate in therapies and patient found to be able to participate in acute intensive comprehensive inpatient rehabilitation program including PT/OT to improve balance, ambulation, ADLs, and to improve the P/AROM. Therapeutic modifications regarding activities in therapies, place, amount of time per day and intensity of therapy made daily. In bed therapies or bedside therapies prn.   2. Bowel progressive constipation, and Bladder dysfunction monitoring neurogenic bladder:  frequent toileting, ambulate to bathroom with assistance, check post void residuals. Check for C.difficile x1 if >2 loose stools in 24 hours, continue bowel & bladder program.  Monitor bowel and bladder function. Lactinex 2 PO every AC. MOM prn, Brown Bomb prn, Glycerin suppository prn, enema prn.  3. Severe postop bilateral groin pain as well as generalized OA pain,   Kyphoscoliosis: reassess pain every shift and prior to and after each therapy session, give prn Tylenol and Ultram, modalities prn in therapy, Lidoderm, K-pad prn.   4. Skin healing and breakdown risk:  continue pressure relief program.  Daily skin exams and reports from nursing. 5. Severe fatigue due to nutritional and hydration deficiency: Add vitamin B12 vitamin D and CoQ10 continue to monitor I&Os, calorie counts prn, dietary consult prn. Add healthy HS snack. 6. Acute episodic insomnia with situational adjustment disorder:  prn Ambien, monitor for day time sedation. Add HS \"Tuck In\"  7. Falls risk elevated:  patient to use call light to get nursing assistance to get up, bed and chair alarm.   8. Elevated DVT risk: progressive activities in PT, continue prophylaxis ABRAM hose, elevation and Eliquis. 9. Complex discharge planning:  Complete medication reconciliation and simplification Discharge April 22, 2022 to home with wife and home health care. Note patient and his wife are both refusing home health care. SP weekly team meeting  Mondays to assess progress towards goals, discuss and address social, psychological and medical comorbidities and to address difficulties they may be having progressing in therapy. Patient and family education is in progress. The patient is to follow-up with their family physician after discharge. Complex Active General Medical Issues that complicate care Assess & Plan:     1. Principal Problem:    PVD (peripheral vascular disease) ([facility] [address]  Active Problems:  1. Atrial fibrillation,  Hypertension-Acute rehab to monitor heart rate and rhythm with the option of telemetry and the effects of chronotropic medication with respect to increasing physical activity and exercise in PT, OT, ADLs with medication titration to lowest effective dosing. Continue blood signs every shift focusing on heart rate and blood pressure checks, consult hospitalist for backup medical and adjust/add medications (Norvasc, Livalo, Betapace, Diovan, Eliquis, Plavix). Monitor heart rate and blood pressure as well as medications effects on vital signs before during and after therapy with especial focus on preventing orthostasis and falls risk. Monitor for nocturnal hypoxemia check nocturnal pulse ox patient  Does not need home O2 at discharge. Pt had 4s of desat time below 89%  2. Dizziness and giddiness, Orthostatic dizziness-focus on balance and therapy add abdominal binder and teds as needed  3. Diabetes mellitus -stick blood sugars before every meal and at bedtime 4 carb diet  4.    Late effects of CVA (cerebrovascular accident)-slowed processing and motor planning -continue to focus on cognitive issues to be

## 2022-04-21 NOTE — PROGRESS NOTES
Physical Therapy Rehab Treatment Note  Facility/Department: [facility]  Room: X092/W229-75       NAME: Harish Longoria  : 1940 (79 y.o.)  MRN: [id number]  CODE STATUS: Full Code    Date of Service: 2022       Restrictions:  Restrictions/Precautions: Fall Risk       SUBJECTIVE:   Subjective: Pt reports he didn't get any good rest last night. Pain  Pain: Pt reports no pain pre/post tx      OBJECTIVE:      Bed Mobility  Overall Assistance Level: Independent (HOB flat with no HR.)  Roll Left  Assistance Level: Independent  Roll Right  Assistance Level: Independent  Sit to Supine  Assistance Level: Independent  Supine to Sit  Assistance Level: Independent  Scooting  Assistance Level: Independent    Sit to Stand  Assistance Level: Modified independent  Stand to Sit  Assistance Level: Modified independent  Sit Pivot  Assistance Level: Modified independent  Car Transfer  Assistance Level: Modified independent  Skilled Clinical Factors: No cues required, pt demonstrates good technique and safety.     Ambulation  Surface: Carpet  Device: Rolling walker  Distance: 40' x 2  Activity: Within Unit  Activity Comments: Mild increased SOB  Assistance Level: Stand by assist;Supervision  Gait Deviations: Slow robert;Decreased step length bilateral;Narrow base of support  Skilled Clinical Factors: corrine flexed knees, ff posture    Stairs  Stair Height: 6''  Device: Bilateral handrails  Number of Stairs: 4  Additional Factors: Reciprocal going up;Reciprocal going down  Assistance Level: Stand by assist  Skilled Clinical Factors: Mild instability during descend, pt descends slightly lateral         Neuromuscular Education  PNF: pt able to maintain standing without UE support x1 min x2 trials, postrual sway exhibited  NDT Treatment: Standing  Neuromuscular Comments: Pt able to  bean bags with reacher and put in bucket independently    PT Exercises  Exercise Treatment: Seated ther ex: LAQ, ritu, side kicks x20 ea Vitals  BP: 94/60 (BP seated : 121/71 mmHG, standing 94/60 mmHG)  BP Location: Right upper arm  O2 Device: None (Room air)  Pt expresses no concerns of dizziness or light headedness             ASSESSMENT/PROGRESS TOWARDS GOALS:   Assessment  Assessment: Pt requires occ seated RB's between tasks secondary to decreased endurance with vc's for proper technique. Performed bed mobility without any HR's with indep. It demonstrates good technique and safety throughout tx. Goals:  Long Term Goals  Long term goal 1: Pt to complete bed mobility with indep  Long term goal 2: Pt to complete transfers with indep  Long term goal 3: Pt to ambulate 50-150ft with [address] and indep  Long term goal 4: Pt to complete 4 steps with HR and supervision  Long term goal 5: Pt to complete HEP with indep  Long term goal 6: Pt to complete 50reps on 2min Step test to demonstrate improved activity tolerance  Long term goal 7: Pt to complete 10reps 30sec STS  Patient Goals   Patient goals : Get home    PLAN OF CARE/Safety:   Plan Comment: Cont per POC.       Therapy Time:   Individual   Time In 930   Time Out 1030   Minutes 60     Minutes:  Transfer/Bed mobility trainin  Gait trainin  Neuro re education: 12  Therapeutic [facility], 22 at 12:07 PM

## 2022-04-21 NOTE — PROGRESS NOTES
OT REHAB TREATMENT NOTE        NAME: Ravindra Wright  : 1940 (79 y.o.)  MRN: [id number]  CODE STATUS: Full Code  Room: [place] [id number]    Date of Service: 2022    Restrictions  Restrictions/Precautions  Restrictions/Precautions: Fall Risk   Safety Devices in place: Yes  Type of devices: All fall risk precautions in place,Gait belt    SUBJECTIVE:  Subjective: \"Did you know I am leaving tomorrow? \"  Pain: no pain reports pre or post session    Pain at beginning of session  No    Pain at end of session  No      COGNITION:     Cognition Comment: compre- supervision, express- indep, social inter- Mod indep, problem solv- SUP, memory-SUP    OBJECTIVE:    SELF CARE:  FEEDING:  Assistance Level: Independent  GROOMING:  Assistance Level: Supervision,Modified independent  UE BATHING:  Assistance Level: Modified independent  LE BATHING:  Assistance Level: Modified independent  UE DRESSING:  Assistance Level: Independent  LE DRESSING:  Assistance Level: Modified independent  PUTTING ON AND TAKING OFF FOOTWEAR:  Assistance Level: Independent  TOILETING:  Assistance Level: Independent  TOILET TRANSFER:  Equipment: Grab bars  Assistance Level: Modified independent  TUB? SHOWER TRANSFER  Type: Shower  Transfer From: Wheelchair  Transfer To: Shower chair with back  Assistance Level: Modified independent (Patient utilized grab bars.)           Device: Rolling walker  Activity: To/From bathroom  Assistance Level: Modified independent       Patient was able to manipulate buttons to pick them up from the table and place them into a container. Patient did not need to slide them to the edge of the table      Patient practiced HEP and was able to complete the first half of it with no verbal cues using the handout previously provided.         Education Given To: Patient  Education Provided: Energy Conservation  Education Method: Verbal  Barriers to Learning: None  Education Outcome: Verbalized understanding    ASSESSMENT:    Patient exhibited increased abiltiy to complete self care on this date  Patient tolerated treatment well    PLAN OF CARE:  Strengthening,Home Management Training,Balance Training,Functional Mobility Training,Endurance Training,Safety Education & Training,Self-Care / ADL  continue with OT plan of care 22      Therapy Time:   OT Individual Minutes  Time In: 0830  Time Out: 30  Minutes: 60    ADL/IADL trainin minutes  Therapeutic activities: 15 minutes     Electronically signed by:    MCKAYLA Contreras/L,   2022, 9:31 AM

## 2022-04-21 NOTE — CARE COORDINATION
LSW spoke with Belkis Feldman (wife) and scheduled family training for 1:30PM with OT and 2PM with PT. At this time, both pt and wife decline [place] [address] services. Both are aware to follow up with Dr. Jackie Bennett if they change their decision after discharge.  Electronically signed by ABDIAS Garcia, CAROLINE on 4/21/2022 at 11:04 AM

## 2022-04-21 NOTE — CARE COORDINATION
[address]  TEAM CONFERENCE NOTE  Room: R246/R246-01  Admit Date: 2022       Date: 2022  Patient Name: Sobia Smith        MRN: [id number]    : 1940  (79 y.o.)  Gender: male        REHAB DIAGNOSIS:   Diagnosis: Impaired Mobility d/t Severe PVD with Lower Extremity Ischemia. [facility] admit [facility]    CO MORBIDITIES:      Past Medical History:   Diagnosis Date    Atrial fibrillation ([facility]     CAD (coronary artery disease)     cardiac stents    [facility]     Diabetes mellitus ([facility]     Hyperlipidemia     Hypertension     Movement disorder     Pneumonia      Past Surgical History:   Procedure Laterality Date    APPENDECTOMY      CORONARY ANGIOPLASTY WITH STENT PLACEMENT      4    CORONARY ARTERY BYPASS GRAFT      triple bypass    EYE SURGERY Bilateral     cataract surgery    INSERTABLE CARDIAC MONITOR Left 2018        Restrictions  Restrictions/Precautions: Fall Risk  CASE MANAGEMENT    Social/Functional History  Social/Functional History  Lives With: Spouse  Type of Home: House  Home Layout: One level  Home Access: Stairs to enter with rails  Entrance Stairs - Number of Steps: 3  Entrance Stairs - Rails: Both  Bathroom Shower/Tub: Tub/Shower unit,Shower chair with back  Bathroom Toilet: Handicap height  Bathroom Equipment: Shower chair (neighbor to install grab bars)  Home Equipment: Rolling walker,Cane  Has the patient had two or more falls in the past year or any fall with injury in the past year?: No  Receives Help From: Family  ADL Assistance: Needs assistance  Bath: Minimal assistance (pt washes self up, wife assisting in drying. Assists also needed to get into shower)  Dressing: Minimal assistance (assist over feet from wife \"I do about 80%\")  Toileting: Independent  Homemaking Assistance: Needs assistance  Homemaking Responsibilities: Yes (wife completes most IADLs.  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 ADULT DIET; Regular; 4 carb choices (60 gm/meal); No Added Salt (3-4 gm)    SpO2: 99 % (04/21/22 0617)  O2 Flow Rate (L/min): 0 L/min (04/14/22 0900)  No active isolations    Skin Issues: Yes, incisions MARGE    Pain Managed: Yes    Bladder continence: Yes    Bowel continence: Yes      Other: SANTO 5.2, RN calling nephrology  Nocturnal Oxygen study negative      PHYSICAL THERAPY  Bed mobility:  Supine to Sit: Modified independent (04/19/22 1415)  Sit to Supine: Modified independent (04/19/22 1415)  Transfers:  Sit to Stand: Modified independent (04/20/22 0921)  Bed to Chair: Modified independent (04/19/22 1415)  Gait:   Device: Rolling Walker (04/19/22 1436)  Other Apparatus: O2 (04/13/22 1054)  Assistance: Stand by assistance (04/19/22 1436)  Distance: 40ft (04/19/22 1436)  Quality of Gait: fatigued quickly needing chair brought up (04/19/22 1436)  Comments: Focused on ambulating multiple short distances to destinations.  (04/18/22 1414)  Stairs:  # Steps : 4 (04/19/22 0934)  Rails: Bilateral (04/19/22 0934)  Assistance: Supervision (04/19/22 0934)  Comment: Pt reports PRE 13 somewhat hard. (04/18/22 0916)  W/C mobility:     LTG:  Long term goal 1: Pt to complete bed mobility with indep  Long term goal 2: Pt to complete transfers with indep  Long term goal 3: Pt to ambulate 50-150ft Independent  OT Treatment Time: 1.5 hrs      SPEECH THERAPY    Motor Speech: Within Functional Limits  Comprehension: Within Functional Limits (Intermittent repetition was required secondary to patient being [facility])  Verbal Expression: Exceptions to functional limits (deficits with divergent naming)      Diet/Swallow:  Diet Solids Recommendation: Regular  Liquid Consistency Recommendation: Thin  Dysphagia Outcome Severity Scale: Level 6: Within functional limits/Modified independence    Compensatory Swallowing Strategies : Upright as possible for all oral intake,Eat/Feed slowly,Small bites/sips         LTG:     Long-term Goals  Timeframe for Long-term Goals: N/A    Additional Comments: Mild cognitive linguistic deficits noted. Pt declined therapy at this time. Wife manages meds/finances. COGNITION  OT: Cognition Comment: compre- supervision, express- indep, social inter- Mod indep, problem solv- SUP, memory-SUP  SP:Memory: Exceptions to [facility]  Problem Solving: Within Functional Limits    RECREATIONAL THERAPY  Attendance to recreational therapy programs:    []  Pet Therapy  [] Music Therapy  [] Art Therapy    [] Recreation Therapy Group [] Support Group           Patient social interaction (mood, participation): good    Patient strengths: motivated, good support at home    Patients goal: return home    Problems/Barriers: endurance, orthostatics          1. Safety:          - Intervention / Plan:    [x]  falls protocol     [x]  PT/OT    []  SP        - Results:         2. Potential DME needs:         - Intervention / Plan:  [x]  PT/OT     [x]  Assess equipment needs/access       - Results:         3. Weakness:          - Intervention / Plan:  [x]  PT/OT      []  Other:         - Results:         4.   Discharge planning needs:          - Intervention / Plan:  [x]  Weekly team conference      []  family training        - Results:         5.            - Intervention / Plan:          - Results:         6.            - Intervention / Plan:         - Results:         7.            - Intervention / Plan:         - Results:           Discharge Plan   Estimated Length of Stay: 13 days    Tentative Discharge date: 4/22/22       Anticipated Discharge Destination:  Home      Team recommendations:     1. Follow up Therapy :    PT  OT  RN    2.  Home Health    Other: [facility] recommended but pt declines     Equipment needed at Discharge: n/a      Team Members Present at Conference:    Physician: Dr. Doyle Davis Worker: Carmel Townsend MSW, LSW  RN: Waqar De Jesus RN  Physical Therapist: Philippe Scruggs PT  Occupational Therapist: Bonita Cooks, OTR  Speech Therapist: Nurys Triana, SLP  : Gian Figueroa RN    Electronically signed by Gian Figueroa RN on 4/21/22 at 10:01 AM EDT

## 2022-04-21 NOTE — PROGRESS NOTES
INDIVIDUALIZED OVERALL REHAB PLAN OF CARE  ADDENDUM TO REHAB PROGRESS NOTE-for audit purposes must also refer to this day's clinical note and combine the information      Date: 2022  Patient Name: Timothy Prieto   Room: Y645/N498-08    MRN: [id number]    : 1940  (79 y.o.)  Gender: male       Today 2022 during weekly team meeting, I reviewed the patient Timothy Prieto in detail with the therapists and nurses involved in patient's care gathering complex physiatric data regarding current medical issues, progress in therapies, factors limiting progress, social issues, psychological issues, ongoing therapeutic plans and discharge planning. Legend:  I= independent Im =Modified independent  S=Supervised SB=stand by OLIVARES=set up CG=contact arie Min= minimal Mod=Moderate Max=maximal Max of 2 =maximal assist of 2 people      CURRENT FUNCTIONAL STATUS:    NURSING ISSUES:      Pt had 4s of desat time below 89%. Wife will come in for Tx in PT OT. Nursing will continue to focus on bowel and bladder continence transitioning toward independence by time of discharge. Monitoring post void residuals monitoring for severe constipation and bowel obstruction. Focus on achieving ADL goals with co-treating with OT when possible. Focus on cognition and co treat with SLP when possible. PHYSICAL THERAPY  Bed mobility:  Supine to Sit: Modified independent (22 1415)  Sit to Supine: Modified independent (22 1415)  Transfers:  Sit to Stand: Modified independent (22 0921)  Bed to Chair: Modified independent (22 1415)  Gait:   Device: Rolling Walker (22 1436)  Other Apparatus: O2 (22 1054)  Assistance: Stand by assistance (22 1436)  Distance: 40ft (22 1436)  Quality of Gait: fatigued quickly needing chair brought up (22 1436)  Comments: Focused on ambulating multiple short distances to destinations.  (22 1414)  Stairs:  # Steps : 4 (22 0934)  Rails: Bilateral (04/19/22 7939)  Assistance: Supervision (04/19/22 0975)  Comment: Pt reports PRE 13 somewhat hard. (04/18/22 0916)  W/C mobility:       Focus on energy conservation and consistency of function. OCCUPATIONAL THERAPY  Hand Dominance: Right  ADL  Feeding: Independent (04/18/22 1148)  Grooming: Minimal assistance (requested therapist to shave him since he is on a blood thinner and cannot use a safety razor and does not have an electric razor) (04/18/22 1148)  UE Bathing: Supervision (04/18/22 1148)  LE Bathing: Supervision (04/18/22 1148)  UE Dressing: Supervision (04/18/22 1148)  LE Dressing: Supervision (04/18/22 1148)  Toileting: Supervision (04/19/22 1350)  Additional Comments: Shower completed- nurse approved shower. Toileting not completed. Pt very SOB during session- frequent rest breaks needed (04/13/22 1146)  Toilet Transfers  Toilet Transfers Comments: NT stood to urinate (04/19/22 1350)     Shower Transfers  Shower - Transfer From: Alice Villavicencio (04/13/22 1148)  Shower - Transfer Type: To and From (04/13/22 1148)  Shower - Transfer To: Shower seat with back (04/13/22 1148)  Shower - Technique: Ambulating (04/13/22 1148)  Shower Transfers: Contact Guard (04/13/22 1148)  Shower Transfers Comments: Patient declined a shower on this date (04/18/22 1150)    Focus on sequencing. SPEECH THERAPY/SLP  Motor Speech: Within Functional Limits  Comprehension: Within Functional Limits (Intermittent repetition was required secondary to patient being hard of hearing)  Verbal Expression: Exceptions to functional limits      Diet/Swallow:  Diet Solids Recommendation: Regular  Liquid Consistency Recommendation:  Thin  Dysphagia Outcome Severity Scale: Level 6: Within functional limits/Modified independence    Compensatory Swallowing Strategies : Upright as possible for all oral intake,Eat/Feed slowly,Small bites/sips             COGNITION  OT: Cognition Comment: compre- supervision, express- indep, social inter- Mod indep, problem solv- SUP, memory-SUP  SP:Memory: Exceptions to [facility] [place]  Problem Solving: Within Functional Limits    Social History     Socioeconomic History    Marital status:      Spouse name: Not on file    Number of children: Not on file    Years of education: Not on file    Highest education level: Not on file   Occupational History    Not on file   Tobacco Use    Smoking status: Former Smoker     Types: Cigarettes    Smokeless tobacco: Never Used    Tobacco comment: 3 cigarettes/month   Vaping Use    Vaping Use: Never used   Substance and Sexual Activity    Alcohol use: No    Drug use: No    Sexual activity: Not on file   Other Topics Concern    Not on file   Social History Narrative    Lives With: Spouse Kami    Type of Home: House in [address]: One level    Home Access: Stairs to enter with rails-- Number of Steps: 3- Rails: Both    Bathroom Shower/Tub: Tub/Shower unit,Shower chair with back    Bathroom Toilet: Handicap height    Bathroom Equipment: Shower chair (neighbor to install grab bars)    Home Equipment: [employer] Help From: Family    ADL Assistance: Needs assistance    Bath: Minimal assistance (pt washes self up, wife assisting in drying. Assists also needed to get into shower)    Dressing: Minimal assistance (assist over feet from wife \"I do about 80%\")    Toileting: Independent    Homemaking Assistance: Needs assistance    Homemaking Responsibilities: Yes (wife completes most IADLs.  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    Leisure & Hobbies: Reading    Additional Comments: Son lives in [address] [place] [address] [address] Strain:     Difficulty of Paying Living Expenses: Not on file   Food Insecurity:     Worried About Running Out of Food in the Last Year: Not on file    Thony of Food in the Last Year: Not on file   Transportation Needs:     Lack of Transportation (Medical): Not on file    Lack of Transportation (Non-Medical): Not on file   Physical Activity:     Days of Exercise per Week: Not on file    Minutes of Exercise per Session: Not on file   Stress:     Feeling of Stress : Not on file   Social Connections:     Frequency of Communication with Friends and Family: Not on file    Frequency of Social Gatherings with Friends and Family: Not on file    Attends Gnosticist Services: Not on file    Active Member of [address] [place] [address] [employer] or Organizations: Not on file    Attends Club or Organization Meetings: Not on file    Marital Status: Not on file   Intimate Partner Violence:     Fear of Current or Ex-Partner: Not on file    Emotionally Abused: Not on file    Physically Abused: Not on file    Sexually Abused: Not on file   Housing Stability:     Unable to Pay for Housing in the Last Year: Not on file    Number of [place] in the Last Year: Not on file    Unstable Housing in the Last Year: Not on file       THERAPY, MEDICAL AND NURSING COORDINATION:    [x]  Pain medication before therapies     [x]  Check orthostatic BP and monitor heart rate and medications effects with therapy      [x]  Ambulate to the bathroom in room    [x]  Add scheduled rest beaks     []  In room therapies      Discharge date set for:              4/21/22      Home with:    wife  with help from   wife            And:      Home Health Care:     [x]  PT    [x]  OT         [x]  RN                           Equipment:  WW      At D/C their function is goaled at:   PT:Long term goal 1: Pt to complete bed mobility with indep  Long term goal 2: Pt to complete transfers with indep  Long term goal 3: Pt to ambulate 50-150ft with [employer] and indep  Long term goal 4: Pt to complete 4 steps with HR and supervision  Long term goal 5: Pt to complete HEP with indep  OT:Eating  CARE Score: 5  Discharge Goal: Independent, Oral Hygiene  CARE Score: 3  Discharge Goal: Independent, [address] [place]  Reason if not Attempted: Not attempted due to environmental limitations  CARE Score: 10  Discharge Goal: Substantial/maximal assistance, Shower/Bathe Self  Discharge Goal: Partial/moderate assistance  Upper Body Dressing  CARE Score: 3  Discharge Goal: Independent, Lower Body Dressing  CARE Score: 2  Discharge Goal: Partial/moderate assistance, Putting On/Taking Off Footwear  CARE Score: 2  Discharge Goal: Partial/moderate assistance, Toilet Transfer  Reason if not Attempted: Not attempted due to environmental limitations  CARE Score: 10  Discharge Goal: Independent  SP:   Long-term Goals  Timeframe for Long-term Goals: N/A           From a cognitive standpoint they will need:        24 hr supervision at first --progress to occasional           Significant problems/ barriers to functional progress include: Pt is at a high risk for functional loss,      []  Acute infection/UTI    []  Low BP's     []  COPD flare-up   []  Uncontrolled blood sugar     []  Progressive anemia     [x]  poor endurance    []  Severe pain exacerbation     []  Impaired mental status    []  Urinary incontinence    []  Bowel incontinence           Plan to correct barriers to functional progress: Add scheduled rest breaks, CoQ 10 and Vit B 12, control pain by using ice Lidoderm rest and massage as well as pain medications prior to therapy. Spread therapy of 15 hours out over a 7 day window to accommodate rest breaks and medical interventions. Patient seems to be making fair to good response to these interventions. Based on a comprehensive evaluation of the above, the individualized therapy and Discharge plan will be:    -Times stated are an average that will be varied based on the patient's daily need. PT    1 1/2  hrs/day 5-7 days per week      OT    1 1/2 hrs per day 5-7 days per week            Estimated LOS   2 week(s)    - Overall functional prognosis:     [x]  Good    []  Fair    []  Poor -Medical Prognosis:   [x]  Good    []  Fair    []  Poor    This patient was made aware of the discussion of Plan of Care, their projected dicharge date and their projected function at discharge.        Erroll Snellen, DO

## 2022-04-21 NOTE — PROGRESS NOTES
Physical Therapy Rehab Treatment Note  Facility/Department: [facility]  Room: L577/H756-85       NAME: Anatoliy Thomas  : 1940 (79 y.o.)  MRN: [id number]  CODE STATUS: Full Code    Date of Service: 2022       Restrictions:  Restrictions/Precautions: Fall Risk       SUBJECTIVE:   Subjective: \"This weather is messing with my breathing\"    Pain  Pain: 0/10 pain pre and post treatment    OBJECTIVE:   Bed Mobility  Overall Assistance Level: Independent  Overall Assistance Level: Independent  Roll Left  Assistance Level: Independent  Roll Right  Assistance Level: Independent  Sit to Supine  Assistance Level: Independent  Supine to Sit  Assistance Level: Independent  Scooting  Assistance Level: Independent    Sit to Stand  Assistance Level: Independent  Stand to Sit  Assistance Level: Independent  Bed To/From Chair  Assistance Level: Independent  Car Transfer  Assistance Level: Modified independent    Ambulation  Surface: Carpet  Device: Rolling walker  Distance: 50' x 2  Activity: Within Unit  Activity Comments: Mild increased SOB  Assistance Level: Supervision  Gait Deviations: Slow robert  Skilled Clinical Factors: corrine flexed knees, ff posture    Stairs  Stair Height: 6''  Device: Bilateral handrails  Number of Stairs: 4  Additional Factors: Reciprocal going up;Reciprocal going down  Assistance Level: Supervision  Skilled Clinical Factors: Slow pace cues for forward hand placement descending stairs    Education Provided: Family Education   Wife observed the therapy session including gait, transfers, bed mobility and stair negotiation. Educated patient and wife on breathing technique for SOB post activity Wife pleased with patient progress and feels comfortable to return home at [facility] [place]     ASSESSMENT/PROGRESS TOWARDS GOALS:   Assessment  Assessment: Family training completed with patient's spouse observing all functional mobility.  Patient shows good safety throughout with gait transfer and bed mobility and recognizes limitations with SOB and utilizes rest breaks appropriately    Goals:  Long Term Goals  Long term goal 1: Pt to complete bed mobility with indep  Long term goal 2: Pt to complete transfers with indep  Long term goal 3: Pt to ambulate 50-150ft with [employer] and indep  Long term goal 4: Pt to complete 4 steps with HR and supervision  Long term goal 5: Pt to complete HEP with indep  Long term goal 6: Pt to complete 50reps on 2min Step test to demonstrate improved activity tolerance  Long term goal 7: Pt to complete 10reps 30sec STS  Patient Goals   Patient goals : Get home    PLAN OF CARE/Safety:   Plan Comment: Cont per POC.       Therapy Time:   Individual   Time In 1400   Time Out 1430   Minutes 30     Minutes: 30  Transfer/Bed mobility training: 15  Gait training:15    Presley Garza PTA, 04/21/22 at 3:49 PM

## 2022-04-21 NOTE — PLAN OF CARE
Problem: Falls - Risk of:  Goal: Will remain free from falls  Description: Will remain free from falls  Outcome: Progressing  Goal: Absence of physical injury  Description: Absence of physical injury  Outcome: Progressing     Problem: Skin Integrity:  Goal: Will show no infection signs and symptoms  Description: Will show no infection signs and symptoms  Outcome: Progressing  Goal: Absence of new skin breakdown  Description: Absence of new skin breakdown  Outcome: Progressing     Problem: Mobility - Impaired:  Goal: Mobility will improve  Description: Mobility will improve  Outcome: Progressing     Problem: Nutritional:  Goal: Consumption of food in small portions  Description: Consumption of food in small portions  Outcome: Progressing  Goal: Consumption of liquid of appropriate consistency  Description: Consumption of liquid of appropriate consistency  Outcome: Progressing     Problem: Respiratory:  Goal: Ability to maintain a clear airway will improve  Description: Ability to maintain a clear airway will improve  Outcome: Progressing  Goal: Will remain free from infection  Description: Will remain free from infection  Outcome: Progressing  Goal: Absence of aspiration  Description: Absence of aspiration  Outcome: Progressing     Problem: Safety:  Goal: Ability to chew and swallow food without choking will improve  Description: Ability to chew and swallow food without choking will improve  Outcome: Progressing  Goal: Ability to demonstrate good, daily oral hygiene techniques will improve  Description: Ability to demonstrate good, daily oral hygiene techniques will improve  Outcome: Progressing  Goal: Maintenance of upright position during and after feeding  Description: Maintenance of upright position during and after feeding  Outcome: Progressing     Problem: IP COMMUNICATION/DYSARTHRIA  Goal: LTG - patient will improve expressive language skills to allow for communication of wants and needs in daily activities  Outcome: Progressing     Problem: IP SWALLOWING  Goal: LTG - patient will tolerate the least restrictive diet consistency to allow for safe consumption of daily meals  Outcome: Progressing     Problem: Pain  Goal: Verbalizes/displays adequate comfort level or baseline comfort level  Outcome: Progressing     Problem: Discharge Planning  Goal: Discharge to home or other facility with appropriate resources  Outcome: Progressing     Problem: Chronic Conditions and Co-morbidities  Goal: Patient's chronic conditions and co-morbidity symptoms are monitored and maintained or improved  Outcome: Progressing     Problem: ABCDS Injury Assessment  Goal: Absence of physical injury  Outcome: Progressing

## 2022-04-21 NOTE — PROGRESS NOTES
Provider Dr. Michael Orona was rounding on pt, update physician of elevated potassium from previous result. No current S/S of hyperkalemia or distress at this time.  Electronically signed by Randall Valdez RN on 4/21/2022 at 9:30 AM

## 2022-04-21 NOTE — PROGRESS NOTES
OT REHAB TREATMENT NOTE        NAME: Cricket Ambrosio  : 1940 (79 y.o.)  MRN: [id number]  CODE STATUS: Full Code  Room: [place] [address]    Date of Service: 2022    Restrictions  Restrictions/Precautions  Restrictions/Precautions: Fall Risk   Safety Devices in place: Yes  Type of devices: All fall risk precautions in place,Gait belt    SUBJECTIVE:  Subjective: \"That was the best information ever\" patient reported when he was educated in new way to complete tub transfer  Pain: no pain reports pre or post session      OBJECTIVE:    PUTTING ON AND TAKING OFF FOOTWEAR:  Assistance Level: Independent  TOILETING:  Assistance Level: Independent  Skilled Clinical Factors: stood to urinate  TOILET TRANSFER:  Equipment: Grab bars  Assistance Level: Modified independent  TUB/SHOWER TRANSFER  Patient declined a need to practice the tub transfer after the education on new technique         Device: Rolling walker  Activity: To/From bathroom  Assistance Level: Modified independent          Education Given To: Patient,Family  Education Provided: Transfer Training,Equipment,ADL Function  Education Provided Comments: Patient's wife was educated that patient is able to complete all of self care without physical assistance. Patient and his wife were educated that he should continue to do most of his self care in order to continue to be able to complete it. They were educated in sit down and swing legs in for tub transfer and were very excited about the new way to complete it.  Written paper with reminders on how to do tub transfer and to remind patient to do everything he can for himself and to complete HEP was issued  Education Method: Verbal,Demonstration  Barriers to Learning: None  Education Outcome: Verbalized understanding    ASSESSMENT:    Patient and his wife were very interested in family training on this date  Patient tolerated treatment well    PLAN OF CARE:  Strengthening,Home Management Training,Balance Training,Functional Mobility Training,Endurance Training,Safety Education & Training,Self-Care / ADL  Plan for patient to discharge to home with his wife on 22    Time Frame for Long term goals :  Within 1.5-2 weeks, pt will demonstrate progress in the following areas listed below to achieve specific LTGs as stated in the initial evaluations  Long Term Goal 1: Pt will demo improved ADL/IADL function for d/c at highest potential  Long Term Goal 2: Pt will demo improved activity tolerance for ADLs  Long Term Goal 3: Pt will demo improved UE strength  Long Term Goal 4: Pt will demo improved standing balance/tolerance for increased LB ADLs status      Therapy Time:   OT Individual Minutes  Time In: 1330  Time Out: 1400  Minutes: 30    ADL/IADL trainin minutes     Electronically signed by:    AZAM Eli,   2022, 3:58 PM

## 2022-04-21 NOTE — PROGRESS NOTES
Nephrology Progress Note    Assessment:  Hyperkalemia  CKD-3  AAA repaired  OHD CAD AF HF   COPd  Hx CVA      Plan: possible discharge today  repeat K+ in morning if stays  continue rehab     Patient Active Problem List:     Atrial fibrillation ([facility]     High risk medication use     Atherosclerosis of native coronary artery of native heart without angina pectoris     Hypertension     Ischemic myocardial dysfunction     Nonrheumatic aortic valve disorder, unspecified     Aortic valve disorder     Personal history of nicotine dependence     Presence of aortocoronary bypass graft     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HCC)     Vasovagal syncope     Dizziness and giddiness     Cerebrovascular accident ([facility]     Orthostatic dizziness     Ataxia     Vertigo     Meniere disease, bilateral     Benign paroxysmal positional vertigo due to bilateral vestibular disorder     Coronary angioplasty status     Ataxic gait     Kyphoscoliosis     Spinal stenosis of lumbar region with neurogenic claudication     Abdominal aortic aneurysm (AAA) ([facility]     Acute inferior myocardial infarction (HCC)     Carotid bruit     Chronic obstructive pulmonary disease (HCC)     Diabetes mellitus (HCC)     Dyspnea on exertion     Fatigue     First degree atrioventricular block     Hyperlipidemia     Infection of the inner ear     Muscle pain     Underweight     Ventricular premature beats     Weight loss     PVD (peripheral vascular disease) ([facility])     Impaired mobility and activities of daily living -sp Severe PVD s/p AAA repain     Hypotension     Late effects of CVA (cerebrovascular accident)      Subjective:  Admit Date: 4/12/2022    Interval History: no issues    Medications:  Scheduled Meds:   insulin lispro  0-6 Units SubCUTAneous TID WC    insulin lispro  0-3 Units SubCUTAneous Nightly    [Held by provider] valsartan  160 mg Oral Daily    pitavastatin  4 mg Oral Nightly    clopidogrel  75 mg Oral Daily    apixaban  5 mg Oral BID    magnesium oxide  400 mg Oral Daily    metFORMIN  500 mg Oral BID WC    sotalol  80 mg Oral BID     Continuous Infusions:   dextrose         CBC: No results for input(s): WBC, HGB, PLT in the last 72 hours. CMP:    Recent Labs     04/21/22  0455      K 5.2*      CO2 22   BUN 28*   CREATININE 0.92   GLUCOSE 114*   CALCIUM 8.5   LABGLOM >60.0     Troponin: No results for input(s): TROPONINI in the last 72 hours. BNP: No results for input(s): BNP in the last 72 hours. INR: No results for input(s): INR in the last 72 hours. Lipids: No results for input(s): CHOL, LDLDIRECT, TRIG, HDL, AMYLASE, LIPASE in the last 72 hours. Liver: No results for input(s): AST, ALT, ALKPHOS, PROT, LABALBU, BILITOT in the last 72 hours. Invalid input(s): BILDIR  Iron:  No results for input(s): IRONS, FERRITIN in the last 72 hours. Invalid input(s): LABIRONS  Urinalysis: No results for input(s): UA in the last 72 hours.     Objective:  Vitals: /79   Pulse 65   Temp 97.5 °F (36.4 °C) (Oral)   Resp 18   Ht 5' 8\" (1.727 m)   Wt 160 lb (72.6 kg)   SpO2 99%   BMI 24.33 kg/m²    Wt Readings from Last 3 Encounters:   04/12/22 160 lb (72.6 kg)   12/29/21 151 lb 8 oz (68.7 kg)   10/12/21 150 lb (68 kg)      24HR INTAKE/OUTPUT:      Intake/Output Summary (Last 24 hours) at 4/21/2022 0836  Last data filed at 4/21/2022 7425  Gross per 24 hour   Intake 60 ml   Output 600 ml   Net -540 ml       General: alert, in no apparent distress  HEENT: normocephalic, atraumatic, anicteric  Neck: supple, no mass  Lungs: non-labored respirations, clear to auscultation bilaterally  Heart: regular rate and rhythm, no murmurs or rubs  Abdomen: soft, non-tender, non-distended  Ext: no cyanosis, no peripheral edema  Neuro: alert and oriented, no gross abnormalities  Psych: normal mood and affect  Skin: no rash      Electronically signed by Umesh Castellanos DO, MD

## 2022-04-22 VITALS
DIASTOLIC BLOOD PRESSURE: 67 MMHG | OXYGEN SATURATION: 99 % | HEIGHT: 68 IN | SYSTOLIC BLOOD PRESSURE: 127 MMHG | RESPIRATION RATE: 16 BRPM | HEART RATE: 61 BPM | BODY MASS INDEX: 24.25 KG/M2 | WEIGHT: 160 LBS | TEMPERATURE: 98.1 F

## 2022-04-22 LAB
ANION GAP SERPL CALCULATED.3IONS-SCNC: 13 MEQ/L (ref 9–15)
BUN BLDV-MCNC: 23 MG/DL (ref 8–23)
CALCIUM SERPL-MCNC: 8.6 MG/DL (ref 8.5–9.9)
CHLORIDE BLD-SCNC: 106 MEQ/L (ref 95–107)
CO2: 22 MEQ/L (ref 20–31)
CREAT SERPL-MCNC: 0.9 MG/DL (ref 0.7–1.2)
GFR AFRICAN AMERICAN: >60
GFR NON-AFRICAN AMERICAN: >60
GLUCOSE BLD-MCNC: 136 MG/DL (ref 70–99)
GLUCOSE BLD-MCNC: 93 MG/DL (ref 70–99)
GLUCOSE BLD-MCNC: 95 MG/DL (ref 70–99)
PERFORMED ON: ABNORMAL
PERFORMED ON: NORMAL
POTASSIUM SERPL-SCNC: 4.3 MEQ/L (ref 3.4–4.9)
SODIUM BLD-SCNC: 141 MEQ/L (ref 135–144)

## 2022-04-22 PROCEDURE — 80048 BASIC METABOLIC PNL TOTAL CA: CPT

## 2022-04-22 PROCEDURE — 97535 SELF CARE MNGMENT TRAINING: CPT

## 2022-04-22 PROCEDURE — 97116 GAIT TRAINING THERAPY: CPT

## 2022-04-22 PROCEDURE — 99238 HOSP IP/OBS DSCHRG MGMT 30/<: CPT | Performed by: PHYSICAL MEDICINE & REHABILITATION

## 2022-04-22 PROCEDURE — 6370000000 HC RX 637 (ALT 250 FOR IP): Performed by: PHYSICAL MEDICINE & REHABILITATION

## 2022-04-22 PROCEDURE — 97530 THERAPEUTIC ACTIVITIES: CPT

## 2022-04-22 PROCEDURE — 97110 THERAPEUTIC EXERCISES: CPT

## 2022-04-22 PROCEDURE — 36415 COLL VENOUS BLD VENIPUNCTURE: CPT

## 2022-04-22 RX ADMIN — SOTALOL HYDROCHLORIDE 80 MG: 80 TABLET ORAL at 08:52

## 2022-04-22 RX ADMIN — APIXABAN 5 MG: 5 TABLET, FILM COATED ORAL at 08:52

## 2022-04-22 RX ADMIN — CLOPIDOGREL BISULFATE 75 MG: 75 TABLET ORAL at 08:52

## 2022-04-22 RX ADMIN — Medication 400 MG: at 08:52

## 2022-04-22 RX ADMIN — METFORMIN HYDROCHLORIDE 500 MG: 500 TABLET ORAL at 08:52

## 2022-04-22 NOTE — PROGRESS NOTES
OT REHAB TREATMENT NOTE    NAME: Harish Longoria  : 1940 (79 y.o.)  MRN: [id number]  CODE STATUS: Full Code  Room: [place] [address]    Date of Service: 2022    Restrictions  Restrictions/Precautions  Restrictions/Precautions: Fall Risk   Safety Devices in place: Yes  Type of devices: All fall risk precautions in place    SUBJECTIVE:  Subjective: \"I broke my elbow before so I can't get my arm all the way straight. \"  Pain: no pain reports pre or post assessment    Pain at start of treatment:  No  Location: Rehab gym    Pain at end of treatment:   No    Orientation:  Overall Orientation Status: Within Functional Limits  Orientation Level: Oriented to person    OBJECTIVE:    TUB/SHOWER TRANSFER  Type: Tub  Transfer From: Rolling walker  Transfer To: Shower chair with back  Additional Factors: Cues for hand placement,Increased time to complete (Verbal cues for hand placement and body alignment throughout transfer. Patient attempting to use towel rack as grab bar.)  Assistance Level: Stand by assist  Skilled Clinical Factors: Patient required MAX verbal cues for safe technique during tub transfer. Patient required MOD verbal cues to scoot back in shower chair due to patient sitting on edge of seat after descent. Seated rest break required before transitioning out of tub due to SOB. UE Strengthening: Patient engaged in B UE ROM/strengthening to increase I with ADL's and transfers. Patient issued written HEP. Patient able to utilize 1/2 # hand weight 1 X 10 repetitions in various planes. Patient required MOD verbal/tactile cues for proper technique. Patient required no verbal cues to keep correct count. Rest breaks as needed.      ASSESSMENT:    Patient tolerated treatment well    PLAN OF CARE:  Strengthening,Home Management Training,Balance Training,Functional Mobility [facility] Education & Training,Self-Care / ADL  Plan for patient to discharge to home with his wife on 22    Time Frame for Long term goals : Within 1.5-2 weeks, pt will demonstrate progress in the following areas listed below to achieve specific LTGs as stated in the initial evaluations  Long Term Goal 1: Pt will demo improved ADL/IADL function for d/c at highest potential  Long Term Goal 2: Pt will demo improved activity tolerance for ADLs  Long Term Goal 3: Pt will demo improved UE strength  Long Term Goal 4: Pt will demo improved standing balance/tolerance for increased LB ADLs status      Therapy Time:   OT Individual Minutes  Time In: 1030  Time Out: 1100  Minutes: 30    ADL/IADL trainin minutes  Therapeutic activities: 10 minutes     Electronically signed by:    MARGE Mendosa,   2022, 12:22 PM     This treatment session was supervised by Papo GARCIA     Electronically signed by MARGE Duque on 22 at 1:52 PM EDT

## 2022-04-22 NOTE — PROGRESS NOTES
Physical Therapy Rehab Treatment Note  Facility/Department: [facility]  [facility]       NAME: Bravo Cash  : 1940 (79 y.o.)  MRN: [id number]  CODE STATUS: Full Code    Date of Service: 2022     Restrictions:  Restrictions/Precautions: Fall Risk    SUBJECTIVE:   Subjective: Pt reports he feels good about going home. States he is not going to have home health. Pain  Pain: 0/10 pre and post    OBJECTIVE:            Sit to Stand  Assistance Level: Independent  Stand to Sit  Assistance Level: Independent    Ambulation  Surface: Carpet; Level surface; Uneven surface  Device: Rolling walker  Distance: 50' x 2  Activity Comments: Flexed trunk; fatigues quickly with increased SOB  Assistance Level: Modified independent  Gait Deviations: Slow robert  Skilled Clinical Factors: Alexei RPE at rest 11; 13 after ambulation        Standing Balance  Activity: Pt picked objects up off floor from standing utililzing reacher indep. PT Exercises  Exercise Treatment: Reviewed and issued HEP for seated LE therex x20 ea and standing marches x10  Circulation/Endurance Exercises: 2min step test 25 reps in 45sec; 30sec STS=8      Activity Tolerance  Activity Tolerance Comments: Pt reports RPE 11 fairly light at rest and 13 somewhat hard with activity. Pt fatigues with increased SOB during activity taking increased time to recover. Pt is aware of limitations and manages with rest breaks appropriately. Education Provided: Home Exercise Program     ASSESSMENT/PROGRESS TOWARDS GOALS:   Assessment  Assessment: Pt has met goals 1-5. Is nearing goals 6 and 7 however limited by endurance.   Goals:  Long Term Goals  Long term goal 1: Pt to complete bed mobility with indep-met  Long term goal 2: Pt to complete transfers with indep-met  Long term goal 3: Pt to ambulate 50-150ft with [employer] and indep- met indep with 50ft  Long term goal 4: Pt to complete 4 steps with HR and supervision-met  Long term goal 5: Pt to complete HEP with indep-met  Long term goal 6: Pt to complete 50reps on 2min Step test to demonstrate improved activity tolerance-25 reps 45 sec  Long term goal 7: Pt to complete 10reps 30sec STS- 8 reps modified requiring UE support  Patient Goals   Patient goals : Get home    PLAN OF CARE/Safety:   Plan Comment: Pt scheduled to D/C today.      Therapy Time:   Individual   Time In 0900   Time Out 1000   Minutes 60     Minutes:    Transfer/Bed mobility trainin  Gait training:15  Neuro re education:0  Therapeutic ex: [address] , TOMMY, 22 at 9:58 AM

## 2022-04-22 NOTE — PLAN OF CARE
Problem: Falls - Risk of:  Goal: Will remain free from falls  Description: Will remain free from falls  4/22/2022 1326 by Willian Deleon RN  Outcome: Completed  4/21/2022 2355 by Zulema Scott RN  Outcome: Progressing  Goal: Absence of physical injury  Description: Absence of physical injury  4/22/2022 1326 by Willian Deleon RN  Outcome: Completed  4/21/2022 2355 by Zulema Scott RN  Outcome: Progressing     Problem: Skin Integrity:  Goal: Will show no infection signs and symptoms  Description: Will show no infection signs and symptoms  4/22/2022 1326 by Willian Deleon RN  Outcome: Completed  4/21/2022 2355 by Zulema Scott RN  Outcome: Progressing  Goal: Absence of new skin breakdown  Description: Absence of new skin breakdown  4/22/2022 1326 by Willian Deleon RN  Outcome: Completed  4/21/2022 2355 by Zulema Scott RN  Outcome: Progressing     Problem: Mobility - Impaired:  Goal: Mobility will improve  Description: Mobility will improve  4/22/2022 1326 by Willian Deleon RN  Outcome: Completed  4/21/2022 2355 by Zulema Scott RN  Outcome: Progressing     Problem: Nutritional:  Goal: Consumption of food in small portions  Description: Consumption of food in small portions  Outcome: Completed  Goal: Consumption of liquid of appropriate consistency  Description: Consumption of liquid of appropriate consistency  Outcome: Completed     Problem: Respiratory:  Goal: Ability to maintain a clear airway will improve  Description: Ability to maintain a clear airway will improve  Outcome: Completed  Goal: Will remain free from infection  Description: Will remain free from infection  Outcome: Completed  Goal: Absence of aspiration  Description: Absence of aspiration  Outcome: Completed     Problem: Safety:  Goal: Ability to chew and swallow food without choking will improve  Description: Ability to chew and swallow food without choking will improve  Outcome: Completed  Goal: Ability to demonstrate good, daily oral hygiene techniques will improve  Description: Ability to demonstrate good, daily oral hygiene techniques will improve  Outcome: Completed  Goal: Maintenance of upright position during and after feeding  Description: Maintenance of upright position during and after feeding  Outcome: Completed     Problem: IP COMMUNICATION/DYSARTHRIA  Goal: LTG - patient will improve expressive language skills to allow for communication of wants and needs in daily activities  Outcome: Completed     Problem: IP SWALLOWING  Goal: LTG - patient will tolerate the least restrictive diet consistency to allow for safe consumption of daily meals  Outcome: Completed     Problem: Pain  Goal: Verbalizes/displays adequate comfort level or baseline comfort level  4/22/2022 1326 by Yoanna Thomas RN  Outcome: Completed  4/21/2022 2355 by Kacie العراقي RN  Outcome: Progressing     Problem: Discharge Planning  Goal: Discharge to home or other facility with appropriate resources  Outcome: Completed     Problem: Chronic Conditions and Co-morbidities  Goal: Patient's chronic conditions and co-morbidity symptoms are monitored and maintained or improved  Outcome: Completed     Problem: ABCDS Injury Assessment  Goal: Absence of physical injury  4/22/2022 1326 by Yoanna Thomas RN  Outcome: Completed  4/21/2022 2355 by Kacie العراقي RN  Outcome: Progressing

## 2022-04-22 NOTE — CARE COORDINATION
LSW spoke with wife per pt request and wife stated that she would be here around 2PM to discharge pt. Both pt and wife are still declining HHC/OP. Wife stated that after observing pt in therapy yesterday, she feels pt is at baseline. Wife and pt are aware that if they change their decision, then they would request services through Dr. Lucas Mccann office. ABADW also discussed f/u appointment. Wife requested for it to be scheduled by rehab and that she prefers morning appointments. LSW notified Jacoby Dowd (unit secretary) and she stated that she would schedule it. No new DME is needed.  Electronically signed by ABDIAS Figueroa, CAROLINE on 4/22/2022 at 9:37 AM

## 2022-04-22 NOTE — PROGRESS NOTES
Assessment completed. VSS. LBM 4/21. Denies pain. Bilat groin surgical areas healing without complication and remain MARGE. HS OT- 106. K+ level was elevated 4/21 and patient received lokelma. BMP in AM. No distress noted. Call light within reach and bed alarm activated.   Electronically signed by Kamilah Joaquin RN on 4/22/2022 at 12:32 AM

## 2022-04-22 NOTE — PROGRESS NOTES
CLINICAL PHARMACY NOTE: MEDS TO BEDS    Total # of Prescriptions Filled: 2   The following medications were delivered to the patient:  · Clopidogrel 75mg tab  · Valsartan 160mg tab    Additional Documentation:

## 2022-04-22 NOTE — PROGRESS NOTES
[facility] OCCUPATIONAL THERAPY DISCHARGE SUMMARY- REHAB     Date: 2022  Patient Name: Keerthi Pemberton        MRN: [id number]  Account: [de-identified]   : 1940  (79 y.o.)  Room: [place] [address]    Diagnosis:  Impaired mobility d/t severe PVD w/ LE ischemia    Past Medical History:   Diagnosis Date    [facility] fibrillation ([facility]     CAD (coronary artery disease)     cardiac stents    COPD [facility]chronic obstructive pulmonary disease) ([facility]     Diabetes mellitus ([facility]     Hyperlipidemia     Hypertension     Movement disorder     Pneumonia      Past Surgical History:   Procedure Laterality Date    APPENDECTOMY      CORONARY ANGIOPLASTY WITH STENT PLACEMENT      4    CORONARY ARTERY BYPASS GRAFT      triple bypass    EYE SURGERY Bilateral     cataract surgery    INSERTABLE CARDIAC MONITOR Left 2018       Precautions:   Restrictions/Precautions: Fall Risk     Social/Functional History:  Social/Functional History  Lives With: Spouse  Type of Home: House  Home Layout: One level  Home Access: Stairs to enter with rails  Entrance Stairs - Number of Steps: 3  Entrance Stairs - Rails: Both  Bathroom Shower/Tub: Tub/Shower unit,Shower chair with back  Bathroom Toilet: Handicap height  Bathroom Equipment: Shower chair (neighbor to install grab bars)  Home Equipment: Rolling walker,Cane  Has the patient had two or more falls in the past year or any fall with injury in the past year?: No  Receives Help From: Family  ADL Assistance: Needs assistance  Bath: Minimal assistance (pt washes self up, wife assisting in drying. Assists also needed to get into shower)  Dressing: Minimal assistance (assist over feet from wife \"I do about 80%\")  Toileting: Independent  Homemaking Assistance: Needs assistance  Homemaking Responsibilities: Yes (wife completes most IADLs.  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  Leisure & Hobbies: Reading  Additional Comments: Son lives in [place]    Current Functional Status:  ADL  Feeding: Independent  Grooming: Modified independent   UE Bathing: Modified independent   LE Bathing: Modified independent   UE Dressing: Modified independent   LE Dressing: Modified independent   Toileting: Modified independent   Toilet Transfers  Toilet - Technique: Ambulating  Toilet Transfer: Modified independent       [address] - Transfer From: [place] - Transfer Type: To and From  Shower - Transfer To: Shower seat with back  Shower - Technique: Ambulating  Shower Transfers: Supervision    Orientation Status:  Orientation  Overall Orientation Status: Within Functional Limits    Cognition Status:  Cognition  Overall Cognitive Status: Exceptions  Arousal/Alertness: Appropriate responses to stimuli  Following Commands:  Follows one step commands consistently,Follows multistep commands with repitition,Follows multistep commands with increased time  Attention Span: Appears intact  Memory: Appears intact  Safety Judgement: Decreased awareness of need for safety  Problem Solving: Assistance required to generate solutions  Insights: Decreased awareness of deficits  Initiation: Requires cues for some  Sequencing: Requires cues for some  Cognition Comment: compre- supervision, express- indep, social inter- Mod indep, problem solv- SUP, memory-SUP    Perception Status:       Sensation Status:  Sensation  Overall Sensation Status: Impaired (numbness in feet)    Vision and Hearing Status:  Vision  Vision: Impaired  Vision Exceptions: Wears glasses for reading  Hearing  Hearing: Exceptions to [facility]  Hearing Exceptions: Hard of hearing/hearing concerns     UE Function Status:    ROM:   LUE AROM (degrees)  LUE AROM : WFL  RUE AROM (degrees)  RUE AROM : WFL    Strength:  LUE Strength  Gross LUE Strength: Exceptions to [facility]  LUE Strength Comment: 4-/5  RUE Strength  Gross RUE Strength: Exceptions to [facility]  RUE Strength Comment: 4-/5    Coordination, Tone, Quality of Movement:    Tone RUE  RUE Tone: Normotonic  Tone LUE  LUE Tone: Normotonic  Coordination  Movements Are Fluid And Coordinated: No  Coordination and Movement Description: Fine motor impairments    D/C Recommendations:    Equipment Recommendations:   patient has needed equipment    OT Follow Up:  OT D/C RECOMMENDATIONS  REQUIRES OT FOLLOW-UP: Yes  Type: Home OT    Home Exercise Program Provided: [x] Yes [] No  If yes, type of HEP: towel on table exercises     Electronically signed by:    MCKAYLA Mitchell/L,    4/22/2022, 12:16 PM

## 2022-04-22 NOTE — PROGRESS NOTES
Nephrology Progress Note    Assessment:  CKDF-3 stable  AAA repair  OHDX  Hx CVA remote  COPD  DM type-2  CABGx history    Plan:  Home today scheduled yesterday    Patient Active Problem List:     Atrial fibrillation ([address]     High risk medication use     Atherosclerosis of native coronary artery of native heart without angina pectoris     Hypertension     Ischemic myocardial dysfunction     Nonrheumatic aortic valve disorder, unspecified     Aortic valve disorder     Personal history of nicotine dependence     Presence of aortocoronary bypass graft     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HCC)     Vasovagal syncope     Dizziness and giddiness     Cerebrovascular accident ([address]     Orthostatic dizziness     Ataxia     Vertigo     Meniere disease, bilateral     Benign paroxysmal positional vertigo due to bilateral vestibular disorder     Coronary angioplasty status     Ataxic gait     Kyphoscoliosis     Spinal stenosis of lumbar region with neurogenic claudication     Abdominal aortic aneurysm (AAA) ([address]     Acute inferior myocardial infarction ([address]     Carotid bruit     Chronic obstructive pulmonary disease (HCC)     Diabetes mellitus (HCC)     Dyspnea on exertion     Fatigue     First degree atrioventricular block     Hyperlipidemia     Infection of the inner ear     Muscle pain     Underweight     Ventricular premature beats     Weight loss     PVD (peripheral vascular disease) (HCC)     Impaired mobility and activities of daily living -sp Severe PVD s/p AAA repain     Hypotension     Late effects of CVA (cerebrovascular accident)      Subjective:  Admit Date: 4/12/2022    Interval History:doing well    Medications:  Scheduled Meds:   insulin lispro  0-6 Units SubCUTAneous TID WC    insulin lispro  0-3 Units SubCUTAneous Nightly    [Held by provider] valsartan  160 mg Oral Daily    pitavastatin  4 mg Oral Nightly    clopidogrel  75 mg Oral Daily    apixaban  5 mg Oral BID    magnesium oxide  400 mg Oral Daily  metFORMIN  500 mg Oral BID WC    sotalol  80 mg Oral BID     Continuous Infusions:   dextrose         CBC: No results for input(s): WBC, HGB, PLT in the last 72 hours. CMP:    Recent Labs     04/21/22  0455 04/22/22  0547    141   K 5.2* 4.3    106   CO2 22 22   BUN 28* 23   CREATININE 0.92 0.90   GLUCOSE 114* 93   CALCIUM 8.5 8.6   LABGLOM >60.0 >60.0     Troponin: No results for input(s): TROPONINI in the last 72 hours. BNP: No results for input(s): BNP in the last 72 hours. INR: No results for input(s): INR in the last 72 hours. Lipids: No results for input(s): CHOL, LDLDIRECT, TRIG, HDL, AMYLASE, LIPASE in the last 72 hours. Liver: No results for input(s): AST, ALT, ALKPHOS, PROT, LABALBU, BILITOT in the last 72 hours. Invalid input(s): BILDIR  Iron:  No results for input(s): IRONS, FERRITIN in the last 72 hours. Invalid input(s): LABIRONS  Urinalysis: No results for input(s): UA in the last 72 hours.     Objective:  Vitals: BP (!) 149/84   Pulse 55   Temp 97.5 °F (36.4 °C) (Oral)   Resp 15   Ht 5' 8\" (1.727 m)   Wt 160 lb (72.6 kg)   SpO2 99%   BMI 24.33 kg/m²    Wt Readings from Last 3 Encounters:   04/12/22 160 lb (72.6 kg)   12/29/21 151 lb 8 oz (68.7 kg)   10/12/21 150 lb (68 kg)      24HR INTAKE/OUTPUT:  No intake or output data in the 24 hours ending 04/22/22 0847    General: alert, in no apparent distress  HEENT: normocephalic, atraumatic, anicteric  Neck: supple, no mass  Lungs: non-labored respirations, clear to auscultation bilaterally  Heart: regular rate and rhythm, no murmurs or rubs  Abdomen: soft, non-tender, non-distended  Ext: no cyanosis, no peripheral edema  Neuro: alert and oriented, no gross abnormalities  Psych: normal mood and affect  Skin: no rash      Electronically signed by Ton Echavarria DO, MD

## 2022-04-22 NOTE — DISCHARGE INSTR - DIET
Good nutrition is important when healing from an illness, injury, or surgery. Follow any nutrition recommendations given to you during your hospital stay. If you were given an oral nutrition supplement while in the hospital, continue to take this supplement at home. You can take it with meals, in-between meals, and/or before bedtime. These supplements can be purchased at most local grocery stores, pharmacies, and chain [employer]stores. If you have any questions about your diet or nutrition, call the hospital and ask for the dietitian. Regular; 4 carb choices (60 gm/ meal);  No added salt (3-4gm)

## 2022-04-22 NOTE — DISCHARGE SUMMARY
Subjective: The patient complains of  moderate to severe acute partially relieved by rest, PT, OT, recent AAA repair and exacerbated by recent illness and exertion. I am concerned about patients medical complexities and current active problems including Severe PVD s/p AAA repain. Recheck UA was negative x2. He has recovered well from AAA-electrolytes are now normal. He is refusing home care. His wife is also refusing home care. He is occasionally using oxygen at night and we will do a nocturnal O2 eval prior to discharge. I discussed current functional, rehabilitation, medical status with other rehabilitation providers including nursing and case management. According to recent  note, \"VSS. LBM 4/21. Denies pain. Bilat groin surgical areas healing without complication and remain MARGE. HS OT- 106. K+ level was elevated 4/21 and patient received lokelma. BMP in AM. No distress noted. Call light within reach and bed alarm activated. \"    [address] Course: The patient was admitted to the Rehabilitation Unit to address ADL and mobility deficits. The patient was enrolled in acute PT, OT program.  Weekly team meetings were held to assess functional progress toward their goals. The patient's medical issues were addressed. The patient progressed in the rehab program and is now ready for discharge. Refer to functional assessments summary report for detailed functional status. Greater than 25 minutes was spent on coordinating patients discharge including follow-up care, medications and patient/family education. Extended time needed because of the potential use of opiate medications are high risk medications and a high risk population individual.  Patient and family were instructed to use lowest effective dose of these medications and slowly titrate off over the next 2 to 4 weeks. They are not to combine opiates with sedatives.      I reviewed her [facility] prescription monitoring service data sheets in hopes of eliminating polypharmacy and weaning to the lowest effective dose of pain medications and eliminating the concomitant use of benzodiazepines. I see   no medications of concern. I see   no habits of combining sedatives and narcotics. ROS x10: The patient also complains of severely impaired mobility and activities of daily living. Otherwise no new problems with vision, hearing, nose, mouth, throat, dermal, cardiovascular, GI, , pulmonary, musculoskeletal, psychiatric or neurological. See Rehab H&P on Rehab chart dated . Vital signs:  BP (!) 149/84   Pulse 55   Temp 97.5 °F (36.4 °C) (Oral)   Resp 15   Ht 5' 8\" (1.727 m)   Wt 160 lb (72.6 kg)   SpO2 99%   BMI 24.33 kg/m²   I/O:   PO/Intake:  fair PO intake, no problems observed or reported. Bowel/Bladder:  continent, constipation and urinary urgency. General:  Patient is well developed, adequately nourished, non-obese and     well kempt. HEENT:    PERRLA, [place]-hearing intact to loud voice, external inspection of ear     and nose benign. Inspection of lips, tongue and gums benign  Musculoskeletal: No significant change in strength or tone. All joints stable. Inspection and palpation of digits and nails show no clubbing,       cyanosis or inflammatory conditions. Neuro/Psychiatric: Affect: flat. Alert and oriented to person, place and     situation. No significant change in deep tendon reflexes or     Sensation-slowed processing. Lungs:  Diminished, CTA-B. Respiration effort is normal at rest.     Heart:   S1 = S2,  irreg. No loud murmurs. Abdomen:  Soft, non-tender, no enlargement of liver or spleen. Extremities:  No significant lower extremity edema or tenderness. Skin:   Intact  AAA repair-healing bilateral groin incisions. Thin skin bilateral upper extremity bruising.     Rehabilitation:  Physical therapy: FIMS:  Bed Mobility: Scooting: Stand by assistance    Transfers: Sit to Stand: Modified independent  Stand to sit: Modified independent  Bed to Chair: Modified independent, Ambulation  Surface: carpet  Device: Rolling Walker  Other Apparatus: O2  Assistance: Stand by assistance  Quality of Gait: fatigued quickly needing chair brought up  Gait Deviations: Decreased step length,Increased FERNANDO  Distance: 40ft  Comments: Focused on ambulating multiple short distances to destinations. More Ambulation?: No, Stairs  # Steps : 4  Stairs Height: 6\"  Rails: Bilateral  Assistance: Supervision  Comment: Pt reports PRE 13 somewhat hard. FIMS:  ,  , Assessment: Fatigue limited gait distance requiring chair be brought up after 40ft. Pt has met transfer and bed mobility goals. Occupational therapy: FIMS:   ,  , Assessment: Pt is a 78 y/o male who presents with above deficits which also impacts ADL/IADL function. Pt s/p AAA repair. Pt very limited by fatigue.  Pt requires continued OT to address ADL/IADL function    Speech therapy: FIMS:        Lab/X-ray studies reviewed, analyzed and discussed with patient and staff:   Recent Results (from the past 24 hour(s))   POCT Glucose    Collection Time: 04/21/22 11:37 AM   Result Value Ref Range    POC Glucose 108 (H) 70 - 99 mg/dl    Performed on ACCU-CHEK    POCT Glucose    Collection Time: 04/21/22  4:29 PM   Result Value Ref Range    POC Glucose 111 (H) 70 - 99 mg/dl    Performed on ACCU-CHEK    POCT Glucose    Collection Time: 04/21/22  8:15 PM   Result Value Ref Range    POC Glucose 106 (H) 70 - 99 mg/dl    Performed on ACCU-CHEK    Basic Metabolic Panel    Collection Time: 04/22/22  5:47 AM   Result Value Ref Range    Sodium 141 135 - 144 mEq/L    Potassium 4.3 3.4 - 4.9 mEq/L    Chloride 106 95 - 107 mEq/L    CO2 22 20 - 31 mEq/L    Anion Gap 13 9 - 15 mEq/L    Glucose 93 70 - 99 mg/dL    BUN 23 8 - 23 mg/dL    CREATININE 0.90 0.70 - 1.20 mg/dL    GFR Non-African American >60.0 >60    GFR  >60.0 >60    Calcium 8.6 8.5 - 9.9 mg/dL   POCT Glucose Collection Time: 04/22/22  6:07 AM   Result Value Ref Range    POC Glucose 95 70 - 99 mg/dl    Performed on ACCU-CHEK      Noct POX-Pt had 4s of desat time below 89%      Previous extensive, complex labs, notes and diagnostics reviewed and analyzed. ALLERGIES:    Allergies as of 04/12/2022 - Fully Reviewed 04/12/2022   Allergen Reaction Noted    Fenofibrate  12/29/2021    Lipitor [atorvastatin] Other (See Comments) 12/19/2016    Niacin Other (See Comments) 12/19/2016    Niaspan [niacin er] Other (See Comments) 12/19/2016    Vitamin e Other (See Comments) 12/19/2016    Zocor [simvastatin] Other (See Comments) 12/19/2016      (please also verify by checking MAR)      Yesterday I evaluated this patient for periodic reassessment of medical and functional status. The patient was discussed in detail at the treatment team meeting focusing on current medical issues, progress in therapies, social issues, psychological issues, barriers to progress and strategies to address these barriers, and discharge planning. See the hand written addendum to rehab progress note. The patient continues to be high risk for future disability and their medical and rehabilitation prognosis continue to be good and therefore, we will continue the patient's rehabilitation course as planned. The patient's tentative discharge date was set. Patient and family education was discussed. The patient was made aware of the team discussion regarding their progress. Discharge plans were discussed along with barriers to progress and strategies to address these barriers, patient encouraged to continue to discuss discharge plans with . Complex Physical Medicine & Rehab Issues Assess & Plan:   1. Severe abnormality of gait and mobility and impaired self-care and ADL's secondary to progressive weakness dt  Severe PVD s/p AAA repain .   Functional and medical status have improved greatly status post acute rehab at [facility].  2. Bowel progressive constipation, and Bladder dysfunction monitoring neurogenic bladder:  frequent toileting, ambulate to bathroom with assistance, check post void residuals. Check for C.difficile x1 if >2 loose stools in 24 hours, continue bowel & bladder program.  Monitor bowel and bladder function. Lactinex 2 PO every AC. MOM prn, Brown Bomb prn, Glycerin suppository prn, enema prn.  3. Severe postop bilateral groin pain as well as generalized OA pain,   Kyphoscoliosis: reassess pain every shift and prior to and after each therapy session, give prn Tylenol and Ultram, modalities prn in therapy, Lidoderm, K-pad prn. OK for Ultram at IN. 4. Skin healing and breakdown risk:  continue pressure relief program.  Daily skin exams and reports from nursing. 5. Severe fatigue due to nutritional and hydration deficiency: Add vitamin B12 vitamin D and CoQ10 continue to monitor I&Os, calorie counts prn, dietary consult prn. Add healthy HS snack. 6. Acute episodic insomnia with situational adjustment disorder:  prn Ambien, monitor for day time sedation. Add HS \"Tuck In\"  7. Falls risk elevated:  patient to use call light to get nursing assistance to get up, bed and chair alarm. 8. Elevated DVT risk: progressive activities in PT, continue prophylaxis ABRAM hose, elevation and Eliquis. 9. Complex discharge planning:   SP medication reconciliation and simplification Discharge April 22, 2022 to home with wife and home health care. Note patient and his wife are both refusing home health care. SP weekly team meeting  Mondays to assess progress towards goals, discuss and address social, psychological and medical comorbidities and to address difficulties they may be having progressing in therapy. Patient and family education is in progress. The patient is to follow-up with their family physician after discharge. Complex Active General Medical Issues that complicated care Assess & Plan:        Active Problems:  1. Atrial fibrillation,   PVD (peripheral vascular disease)  Hypertension-Acute rehab to monitor heart rate and rhythm with the option of telemetry and the effects of chronotropic medication with respect to increasing physical activity and exercise in PT, OT, ADLs with medication titration to lowest effective dosing. Continue blood signs every shift focusing on heart rate and blood pressure checks, consult hospitalist for backup medical and adjust/add medications (Norvasc, Livalo, Betapace, Diovan, Eliquis, Plavix). Monitor heart rate and blood pressure as well as medications effects on vital signs before during and after therapy with especial focus on preventing orthostasis and falls risk. Monitor for nocturnal hypoxemia check nocturnal pulse ox patient  Does not need home O2 at discharge. Pt had 4s of desat time below 89%  2. SP AAA repair-FU with vasc surg-[facility]'s after abdominal aneursym repair  3. Dizziness and giddiness, Orthostatic dizziness-focus on balance and therapy add abdominal binder and teds as needed  4. Diabetes mellitus -stick blood sugars before every meal and at bedtime 4 carb diet  5. Late effects of CVA (cerebrovascular accident)-slowed processing and motor planning -continue to focus on cognitive issues to be addressed in OT  6. Acute on Chronic renal disease-with elevated K-consult nephrology--add low K restriction.        Tracy Mcwilliams D.O., PM&R     Attending    [address] [facility]

## 2022-04-22 NOTE — FLOWSHEET NOTE
Discharge instructions given to patient and his wife with both verbalizing understanding. Patient taken to vehicle driven by wife per wheelchair and staff escort.

## 2022-04-22 NOTE — PLAN OF CARE
Problem: Falls - Risk of:  Goal: Will remain free from falls  Description: Will remain free from falls  4/21/2022 2355 by Kamilah Joaquin RN  Outcome: Progressing     Problem: Falls - Risk of:  Goal: Absence of physical injury  Description: Absence of physical injury  4/21/2022 2355 by Kamilah Joaquin RN  Outcome: Progressing     Problem: Skin Integrity:  Goal: Will show no infection signs and symptoms  Description: Will show no infection signs and symptoms  4/21/2022 2355 by Kamilah Joaquin RN  Outcome: Progressing     Problem: Skin Integrity:  Goal: Absence of new skin breakdown  Description: Absence of new skin breakdown  4/21/2022 2355 by Kamilah Joaquin RN  Outcome: Progressing     Problem: Mobility - Impaired:  Goal: Mobility will improve  Description: Mobility will improve  4/21/2022 2355 by Kamilah Joaquin RN  Outcome: Progressing     Problem: Pain  Goal: Verbalizes/displays adequate comfort level or baseline comfort level  4/21/2022 2355 by Kamilah Joaquin RN  Outcome: Progressing     Problem: ABCDS Injury Assessment  Goal: Absence of physical injury  4/21/2022 2355 by Kamilah Joaquin RN  Outcome: Progressing

## 2022-04-25 NOTE — PROGRESS NOTES
Facility/Department: [facility]  Physical Therapy Acute Rehab Discharge Summary  [facility]    NAME: Marky Patel  : 1940  MRN: [id number]    Admission Date: 2022  2:33 PM  Discharge Date: 2022    Rehab Diagnosis(es): Impaired Mobility d/t Severe PVD with Lower Extremity Ischemia.  [facility] rehab admit 22  Patient Active Problem List    Diagnosis Date Noted    Atrial fibrillation ([address] 2019    High risk medication use 2019    Impaired mobility and activities of daily living -sp Severe PVD s/p AAA repain 2022    Hypotension 2022    Late effects of CVA (cerebrovascular accident) 2022    PVD (peripheral vascular disease) ([address] 2022    Abdominal aortic aneurysm (AAA) ([address] 2021    Acute inferior myocardial infarction ([address] 2021    Carotid bruit 2021    Chronic obstructive pulmonary disease ([address] 2021    Diabetes mellitus ([address] 2021    Dyspnea on exertion 2021    Fatigue 2021    First degree atrioventricular block 2021    Hyperlipidemia 2021    Infection of the inner ear 2021    Muscle pain 2021    Underweight 2021    Ventricular premature beats 2021    Weight loss 2021    Ataxic gait 2021    Kyphoscoliosis 2021    Spinal stenosis of lumbar region with neurogenic claudication 2021    Meniere disease, bilateral     Benign paroxysmal positional vertigo due to bilateral vestibular disorder     Vertigo 2021    Ataxia 2021    Cerebrovascular accident ([address] 2020    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[address] 2020    Vasovagal syncope 2020    Dizziness and giddiness 2020    Orthostatic dizziness 2020    Atherosclerosis of native coronary artery of native heart without angina pectoris 2018    Hypertension 2018    Ischemic myocardial dysfunction 2018    Nonrheumatic aortic valve procurement,Home Exercise Program,Safety Education & Training,ADL/Self-care Training,Gait Training,Stair training (Please refer to daily notes for all treatment details)    ASSESSMENT: Pt achieving mobility goals as outlined above. Still requires additional time d/t continued impaired endurance and strength. Would benefit from f/u PT. Discharge Plan: DC home with f/u PT recommended.     No Melton, PT, 04/25/22 at 1:32 PM

## 2022-05-12 ENCOUNTER — HOSPITAL ENCOUNTER (OUTPATIENT)
Dept: CARDIOLOGY | Age: 82
Discharge: HOME | End: 2022-05-12
Payer: MEDICARE

## 2022-05-12 PROCEDURE — G2066 INTER DEVC REMOTE 30D: HCPCS

## 2022-06-07 ENCOUNTER — HOSPITAL ENCOUNTER (INPATIENT)
Age: 82
LOS: 1 days | Discharge: HOME | DRG: 316 | End: 2022-06-08
Attending: INTERNAL MEDICINE | Admitting: INTERNAL MEDICINE
Payer: MEDICARE

## 2022-06-07 DIAGNOSIS — N17.9 AKI (ACUTE KIDNEY INJURY) (HCC): Primary | ICD-10-CM

## 2022-06-07 DIAGNOSIS — Z79.01 ANTICOAGULATED: ICD-10-CM

## 2022-06-07 LAB
ALBUMIN SERPL-MCNC: 3.6 G/DL (ref 3.5–4.6)
ALP BLD-CCNC: 85 U/L (ref 35–104)
ALT SERPL-CCNC: 8 U/L (ref 0–41)
ANION GAP SERPL CALCULATED.3IONS-SCNC: 12 MEQ/L (ref 9–15)
APTT: 34.5 SEC (ref 24.4–36.8)
AST SERPL-CCNC: 15 U/L (ref 0–40)
BACTERIA: ABNORMAL /HPF
BASOPHILS ABSOLUTE: 0.1 K/UL (ref 0–0.2)
BASOPHILS RELATIVE PERCENT: 1 %
BILIRUB SERPL-MCNC: 0.3 MG/DL (ref 0.2–0.7)
BILIRUBIN URINE: NEGATIVE
BLOOD, URINE: NEGATIVE
BUN BLDV-MCNC: 34 MG/DL (ref 8–23)
CALCIUM SERPL-MCNC: 8.7 MG/DL (ref 8.5–9.9)
CHLORIDE BLD-SCNC: 102 MEQ/L (ref 95–107)
CLARITY: CLEAR
CO2: 28 MEQ/L (ref 20–31)
COLOR: YELLOW
CREAT SERPL-MCNC: 1.6 MG/DL (ref 0.7–1.2)
CREATININE URINE: 181.2 MG/DL
EOSINOPHILS ABSOLUTE: 0.3 K/UL (ref 0–0.7)
EOSINOPHILS RELATIVE PERCENT: 3 %
EPITHELIAL CELLS, UA: ABNORMAL /HPF (ref 0–5)
GFR AFRICAN AMERICAN: 50.2
GFR NON-AFRICAN AMERICAN: 41.5
GLOBULIN: 3 G/DL (ref 2.3–3.5)
GLUCOSE BLD-MCNC: 155 MG/DL (ref 70–99)
GLUCOSE URINE: NEGATIVE MG/DL
HCT VFR BLD CALC: 29.2 % (ref 42–52)
HEMOGLOBIN: 9.3 G/DL (ref 14–18)
HYALINE CASTS: ABNORMAL /HPF (ref 0–5)
INR BLD: 1.6
KETONES, URINE: ABNORMAL MG/DL
LEUKOCYTE ESTERASE, URINE: ABNORMAL
LYMPHOCYTES ABSOLUTE: 1.2 K/UL (ref 1–4.8)
LYMPHOCYTES RELATIVE PERCENT: 13 %
MCH RBC QN AUTO: 27.7 PG (ref 27–31.3)
MCHC RBC AUTO-ENTMCNC: 31.9 % (ref 33–37)
MCV RBC AUTO: 86.8 FL (ref 80–100)
MONOCYTES ABSOLUTE: 0.7 K/UL (ref 0.2–0.8)
MONOCYTES RELATIVE PERCENT: 7.7 %
NEUTROPHILS ABSOLUTE: 7 K/UL (ref 1.4–6.5)
NEUTROPHILS RELATIVE PERCENT: 75.3 %
NITRITE, URINE: NEGATIVE
PDW BLD-RTO: 18.4 % (ref 11.5–14.5)
PH UA: 7.5 (ref 5–9)
PLATELET # BLD: 396 K/UL (ref 130–400)
POTASSIUM SERPL-SCNC: 4.4 MEQ/L (ref 3.4–4.9)
PROTEIN UA: ABNORMAL MG/DL
PROTHROMBIN TIME: 18.8 SEC (ref 12.3–14.9)
RBC # BLD: 3.36 M/UL (ref 4.7–6.1)
RBC UA: ABNORMAL /HPF (ref 0–5)
SODIUM BLD-SCNC: 142 MEQ/L (ref 135–144)
SODIUM URINE: 106 MEQ/L
SPECIFIC GRAVITY UA: 1.02 (ref 1–1.03)
TOTAL PROTEIN: 6.6 G/DL (ref 6.3–8)
UROBILINOGEN, URINE: 1 E.U./DL
WBC # BLD: 9.3 K/UL (ref 4.8–10.8)
WBC UA: ABNORMAL /HPF (ref 0–5)

## 2022-06-07 PROCEDURE — 82570 ASSAY OF URINE CREATININE: CPT

## 2022-06-07 PROCEDURE — 80053 COMPREHEN METABOLIC PANEL: CPT

## 2022-06-07 PROCEDURE — 94664 DEMO&/EVAL PT USE INHALER: CPT

## 2022-06-07 PROCEDURE — 2580000003 HC RX 258: Performed by: PHYSICIAN ASSISTANT

## 2022-06-07 PROCEDURE — 6370000000 HC RX 637 (ALT 250 FOR IP): Performed by: INTERNAL MEDICINE

## 2022-06-07 PROCEDURE — 99285 EMERGENCY DEPT VISIT HI MDM: CPT

## 2022-06-07 PROCEDURE — 84300 ASSAY OF URINE SODIUM: CPT

## 2022-06-07 PROCEDURE — 81001 URINALYSIS AUTO W/SCOPE: CPT

## 2022-06-07 PROCEDURE — 36415 COLL VENOUS BLD VENIPUNCTURE: CPT

## 2022-06-07 PROCEDURE — 85025 COMPLETE CBC W/AUTO DIFF WBC: CPT

## 2022-06-07 PROCEDURE — 85730 THROMBOPLASTIN TIME PARTIAL: CPT

## 2022-06-07 PROCEDURE — 85610 PROTHROMBIN TIME: CPT

## 2022-06-07 PROCEDURE — 1210000000 HC MED SURG R&B

## 2022-06-07 RX ORDER — VALSARTAN 80 MG/1
80 TABLET ORAL DAILY
Status: DISCONTINUED | OUTPATIENT
Start: 2022-06-07 | End: 2022-06-08 | Stop reason: HOSPADM

## 2022-06-07 RX ORDER — ALBUTEROL SULFATE 2.5 MG/3ML
2.5 SOLUTION RESPIRATORY (INHALATION) EVERY 6 HOURS PRN
Status: DISCONTINUED | OUTPATIENT
Start: 2022-06-07 | End: 2022-06-08 | Stop reason: HOSPADM

## 2022-06-07 RX ORDER — ACETAMINOPHEN 650 MG/1
650 SUPPOSITORY RECTAL EVERY 6 HOURS PRN
Status: DISCONTINUED | OUTPATIENT
Start: 2022-06-07 | End: 2022-06-08 | Stop reason: HOSPADM

## 2022-06-07 RX ORDER — SOTALOL HYDROCHLORIDE 80 MG/1
80 TABLET ORAL 2 TIMES DAILY
Status: DISCONTINUED | OUTPATIENT
Start: 2022-06-07 | End: 2022-06-08 | Stop reason: HOSPADM

## 2022-06-07 RX ORDER — SODIUM CHLORIDE 9 MG/ML
INJECTION, SOLUTION INTRAVENOUS CONTINUOUS
Status: DISCONTINUED | OUTPATIENT
Start: 2022-06-07 | End: 2022-06-08 | Stop reason: HOSPADM

## 2022-06-07 RX ORDER — FUROSEMIDE 40 MG/1
40 TABLET ORAL DAILY
Status: ON HOLD | COMMUNITY
End: 2022-10-15 | Stop reason: HOSPADM

## 2022-06-07 RX ORDER — 0.9 % SODIUM CHLORIDE 0.9 %
500 INTRAVENOUS SOLUTION INTRAVENOUS ONCE
Status: DISCONTINUED | OUTPATIENT
Start: 2022-06-07 | End: 2022-06-08 | Stop reason: HOSPADM

## 2022-06-07 RX ORDER — HYDRALAZINE HYDROCHLORIDE 25 MG/1
25 TABLET, FILM COATED ORAL 2 TIMES DAILY
Status: ON HOLD | COMMUNITY
End: 2022-10-15 | Stop reason: HOSPADM

## 2022-06-07 RX ORDER — ONDANSETRON 4 MG/1
4 TABLET, ORALLY DISINTEGRATING ORAL EVERY 8 HOURS PRN
Status: DISCONTINUED | OUTPATIENT
Start: 2022-06-07 | End: 2022-06-08 | Stop reason: HOSPADM

## 2022-06-07 RX ORDER — SODIUM CHLORIDE 0.9 % (FLUSH) 0.9 %
5-40 SYRINGE (ML) INJECTION EVERY 12 HOURS SCHEDULED
Status: DISCONTINUED | OUTPATIENT
Start: 2022-06-07 | End: 2022-06-08 | Stop reason: HOSPADM

## 2022-06-07 RX ORDER — ONDANSETRON 2 MG/ML
4 INJECTION INTRAMUSCULAR; INTRAVENOUS EVERY 6 HOURS PRN
Status: DISCONTINUED | OUTPATIENT
Start: 2022-06-07 | End: 2022-06-08 | Stop reason: HOSPADM

## 2022-06-07 RX ORDER — ACETAMINOPHEN 325 MG/1
650 TABLET ORAL EVERY 4 HOURS PRN
Status: DISCONTINUED | OUTPATIENT
Start: 2022-06-07 | End: 2022-06-08 | Stop reason: HOSPADM

## 2022-06-07 RX ORDER — ASPIRIN 81 MG/1
81 TABLET, CHEWABLE ORAL DAILY
Status: DISCONTINUED | OUTPATIENT
Start: 2022-06-07 | End: 2022-06-08 | Stop reason: HOSPADM

## 2022-06-07 RX ORDER — LANOLIN ALCOHOL/MO/W.PET/CERES
400 CREAM (GRAM) TOPICAL DAILY
Status: DISCONTINUED | OUTPATIENT
Start: 2022-06-07 | End: 2022-06-08 | Stop reason: HOSPADM

## 2022-06-07 RX ORDER — ATORVASTATIN CALCIUM 40 MG/1
20 TABLET, FILM COATED ORAL DAILY
Status: DISCONTINUED | OUTPATIENT
Start: 2022-06-07 | End: 2022-06-08 | Stop reason: HOSPADM

## 2022-06-07 RX ORDER — SODIUM CHLORIDE 9 MG/ML
INJECTION, SOLUTION INTRAVENOUS PRN
Status: DISCONTINUED | OUTPATIENT
Start: 2022-06-07 | End: 2022-06-08 | Stop reason: HOSPADM

## 2022-06-07 RX ORDER — ZOLPIDEM TARTRATE 5 MG/1
5 TABLET ORAL NIGHTLY PRN
Status: DISCONTINUED | OUTPATIENT
Start: 2022-06-07 | End: 2022-06-08 | Stop reason: HOSPADM

## 2022-06-07 RX ORDER — ACETAMINOPHEN 325 MG/1
650 TABLET ORAL EVERY 6 HOURS PRN
Status: DISCONTINUED | OUTPATIENT
Start: 2022-06-07 | End: 2022-06-08 | Stop reason: HOSPADM

## 2022-06-07 RX ADMIN — SOTALOL HYDROCHLORIDE 80 MG: 80 TABLET ORAL at 20:45

## 2022-06-07 RX ADMIN — ATORVASTATIN CALCIUM 20 MG: 40 TABLET, FILM COATED ORAL at 20:45

## 2022-06-07 RX ADMIN — SODIUM CHLORIDE: 9 INJECTION, SOLUTION INTRAVENOUS at 15:42

## 2022-06-07 ASSESSMENT — ENCOUNTER SYMPTOMS
SHORTNESS OF BREATH: 0
VOMITING: 0
ABDOMINAL DISTENTION: 0
ABDOMINAL PAIN: 0
EYE DISCHARGE: 0
CONSTIPATION: 0
RHINORRHEA: 0
NAUSEA: 0
RECTAL PAIN: 0
SORE THROAT: 0
COLOR CHANGE: 0

## 2022-06-07 ASSESSMENT — PAIN - FUNCTIONAL ASSESSMENT: PAIN_FUNCTIONAL_ASSESSMENT: NONE - DENIES PAIN

## 2022-06-07 ASSESSMENT — PAIN SCALES - GENERAL
PAINLEVEL_OUTOF10: 0
PAINLEVEL_OUTOF10: 0

## 2022-06-07 NOTE — ED TRIAGE NOTES
Pt presents to ED from home with c/o coagulation issues. Pt reports that he is currently taking ASA, plavix, and eliquis. He reports that every time he gets cut, he \"uncontrollably bleeds\". Pt arrives to ED w/pressure dressing in place to LLE - +1 pitting edema noted. Upon assessment, pt is A/Ox4, skin p/w/d, resp even and unlabored, msp's intact. Pt denies cp, sob, n/v/d, fever, and chills. Pt reports that he is s/p AAA repair on April 2022.

## 2022-06-07 NOTE — FLOWSHEET NOTE
1715- Admission completed at this time, pt alert and oriented, patient denies numbness and tingling, denies chest pain or shortness of breath, pt has dyspnea on exertion, pt wears upper and lower dentures and home, pt up with walker, no adventitious heart sounds, multiple skin integrity site; see flowsheets, home medications verified, denies pain at this time, pt has loop recorder, Skin assessment with Annette Rodarte RN -KG     1800- Preventative Mepilex on bilateral heels and sacrum, -KG     Electronically signed by Mika Lou RN on 6/7/2022

## 2022-06-07 NOTE — PLAN OF CARE
Problem: Discharge Planning  Goal: Discharge to home or other facility with appropriate resources  Outcome: Progressing  Flowsheets (Taken 6/7/2022 9375)  Discharge to home or other facility with appropriate resources: Identify barriers to discharge with patient and caregiver     Problem: Pain  Goal: Verbalizes/displays adequate comfort level or baseline comfort level  Outcome: Progressing

## 2022-06-07 NOTE — ED NOTES
Pressure dressing removed from LLE per DELMY Boyd and 2 cm evulsion noted. Bleeding controlled. Dermabond applied per DELMY Castro.       Andres Murphy RN  06/07/22 6039

## 2022-06-07 NOTE — ED PROVIDER NOTES
[address] [facility] ED  eMERGENCY dEPARTMENT eNCOUnter      Pt Name: Karen Christianson  MRN: [id number]  [place] 1940  Date of evaluation: 6/7/2022  Provider: Dk Dc PA-C    CHIEF COMPLAINT       Chief Complaint   Patient presents with    Coagulation Disorder     pt reports that he has been \"uncontrollably bleeding\" from every cut that he gets - currently taking eliquis         HISTORY OF PRESENT ILLNESS   (Location/Symptom, Timing/Onset,Context/Setting, Quality, Duration, Modifying Factors, Severity)  Note limiting factors. Karen Christianson is a 79 y.o. male who presents to the emergency department complaint of uncontrolled bleeding. Patient states that he is on Eliquis, he is on aspirin, he is on Plavix, and over the last 5days any small scratch does cause excessive bleeding for him, he states he has a difficult time keeping them under control at home, he is used been using dressings, but they have bleeding through quite frequently. Patient denies any hematemesis, no melena, denies any acute injury or falls. No headaches, no dizziness, no chest pain or shortness of breath. HPI    NursingNotes were reviewed. REVIEW OF SYSTEMS    (2-9 systems for level 4, 10 or more for level 5)     Review of Systems   Constitutional: Negative for activity change and appetite change. HENT: Negative for congestion, ear discharge, ear pain, nosebleeds, rhinorrhea and sore throat. Eyes: Negative for discharge. Respiratory: Negative for shortness of breath. Cardiovascular: Negative for chest pain, palpitations and leg swelling. Gastrointestinal: Negative for abdominal distention, abdominal pain, constipation, nausea, rectal pain and vomiting. Abdomen soft nondistended nontender no guarding mass rebound, no CVA tenderness. Genitourinary: Negative for difficulty urinating and dysuria. Musculoskeletal: Negative for arthralgias. Skin: Negative for color change, pallor, rash and wound. Bleeding from scratch of left calf laterally, as well as right ear lobe. Dressings are in place, there is no acute or active bleeding at time of my evaluation. Neurological: Negative for dizziness, syncope, numbness and headaches. Psychiatric/Behavioral: Negative for agitation and confusion. Except as noted above the remainder of the review of systems was reviewed and negative. PAST MEDICAL HISTORY     Past Medical History:   Diagnosis Date    [facility]     CAD (coronary artery disease)     cardiac stents    COPD [facility]chronic obstructive pulmonary disease) [facility]     Diabetes mellitus ([facility]     Hyperlipidemia     Hypertension     Movement disorder     Pneumonia          SURGICALHISTORY       Past Surgical History:   Procedure Laterality Date    APPENDECTOMY      CORONARY ANGIOPLASTY WITH STENT PLACEMENT      4    CORONARY ARTERY BYPASS GRAFT      triple bypass    EYE SURGERY Bilateral     cataract surgery    INSERTABLE CARDIAC MONITOR Left 12/2018         CURRENT MEDICATIONS       Previous Medications    ALBUTEROL SULFATE  (90 BASE) MCG/ACT INHALER    use 1 puff as needed twice daily for 90 days.     APIXABAN (ELIQUIS) 5 MG TABS TABLET    Take 1 tablet by mouth 2 times daily    ASPIRIN 81 MG TABLET    Take 81 mg by mouth daily    CLOPIDOGREL (PLAVIX) 75 MG TABLET    Take 1 tablet by mouth daily    MAGNESIUM OXIDE (MAG-OX) 400 (240 MG) MG TABLET    Take 1 tablet by mouth daily    METFORMIN (GLUCOPHAGE) 500 MG TABLET    Take 500 mg by mouth 2 times daily     NITROGLYCERIN (NITROSTAT) 0.4 MG SL TABLET    Place 0.4 mg under the tongue every 5 minutes as needed for Chest pain     PITAVASTATIN (LIVALO) 4 MG TABS TABLET    Take 4 mg by mouth nightly    SOTALOL (BETAPACE) 80 MG TABLET    Take 1 tablet by mouth 2 times daily    VALSARTAN (DIOVAN) 160 MG TABLET    Take 1 tablet by mouth daily       ALLERGIES     Fenofibrate, Lipitor [atorvastatin], Niacin, Niaspan [niacin er], Vitamin e, and Zocor [simvastatin]    FAMILY HISTORY     History reviewed. No pertinent family history. SOCIAL HISTORY       Social History     Socioeconomic History    Marital status:      Spouse name: None    Number of children: None    Years of education: None    Highest education level: None   Occupational History    None   Tobacco Use    Smoking status: Former Smoker     Types: Cigarettes    Smokeless tobacco: Never Used    Tobacco comment: 3 cigarettes/month   Vaping Use    Vaping Use: Never used   Substance and Sexual Activity    Alcohol use: No    Drug use: No    Sexual activity: None   Other Topics Concern    None   Social History Narrative    Lives With: Spouse Kami    Type of Home: House in [address]: One level    Home Access: Stairs to enter with rails-- Number of Steps: 3- Rails: Both    Bathroom Shower/Tub: Tub/Shower unit,Shower chair with back    Bathroom Toilet: Handicap height    Bathroom Equipment: Shower chair ([facility] [place] [facility])    Home Equipment: [address] Help From: Family    ADL Assistance: Needs assistance    Bath: Minimal assistance (pt washes self up, wife assisting in drying. Assists also needed to get into shower)    Dressing: Minimal assistance (assist over feet from wife \"I do about 80%\")    Toileting: Independent    Homemaking Assistance: Needs assistance    Homemaking Responsibilities: Yes (wife completes most IADLs.  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    Leisure & Hobbies: Reading    Additional Comments: Son lives in [address] [place] [address] [address] Strain:     Difficulty of Paying Living Expenses: Not on file   Food Insecurity:     Worried About [address] [employer] in the Last Year: Not on file    Ran Out of Food in the Last Year: Not on file   Transportation Needs:     Lack of Transportation (Medical): Not on file    Lack of Transportation (Non-Medical):  Not on file   Physical Activity:     Days of Exercise per Week: Not on file    Minutes of Exercise per Session: Not on file   Stress:     Feeling of Stress : Not on file   Social Connections:     Frequency of Communication with Friends and Family: Not on file    Frequency of Social Gatherings with Friends and Family: Not on file    Attends Episcopal Services: Not on file    Active Member of [address] [place] [address] or Organizations: Not on file    Attends Club or Organization Meetings: Not on file    Marital Status: Not on file   Intimate Partner Violence:     Fear of Current or Ex-Partner: Not on file    Emotionally Abused: Not on file    Physically Abused: Not on file    Sexually Abused: Not on file   Housing Stability:     Unable to Pay for Housing in the Last Year: Not on file    Number of [place] in the Last Year: Not on file    Unstable Housing in the Last Year: Not on file       SCREENINGS    John Coma Scale  Eye Opening: Spontaneous  Best Verbal Response: Oriented  Best Motor Response: Obeys commands  [place] Coma Scale Score: 15 @FLOW([id number])@      PHYSICAL EXAM    (up to 7 for level 4, 8 or more for level 5)     ED Triage Vitals [06/07/22 1351]   BP Temp Temp Source Heart Rate Resp SpO2 Height Weight   (!) 148/76 98.1 °F (36.7 °C) Oral 68 20 100 % 5' 8\" (1.727 m) 150 lb (68 kg)       Physical Exam    DIAGNOSTIC RESULTS     EKG: All EKG's are interpreted by the Emergency Department Physician who either signs or Co-signsthis chart in the absence of a cardiologist.        RADIOLOGY:   Non-plain filmimages such as CT, Ultrasound and MRI are read by the radiologist. Plain radiographic images are visualized and preliminarily interpreted by the emergency physician with the below findings:        Interpretation per the Radiologist below, if available at the time ofthis note:    No orders to display         ED BEDSIDE ULTRASOUND:   Performed by ED Physician - none    LABS:  Labs Reviewed   COMPREHENSIVE METABOLIC PANEL - Abnormal; Notable for the following components:       Result Value    Glucose 155 (*)     BUN 34 (*)     CREATININE 1.60 (*)     GFR Non- 41.5 (*)     GFR  50.2 (*)     All other components within normal limits   CBC WITH AUTO DIFFERENTIAL - Abnormal; Notable for the following components:    RBC 3.36 (*)     Hemoglobin 9.3 (*)     Hematocrit 29.2 (*)     MCHC 31.9 (*)     RDW 18.4 (*)     Neutrophils Absolute 7.0 (*)     All other components within normal limits   PROTIME-INR - Abnormal; Notable for the following components:    Protime 18.8 (*)     All other components within normal limits   APTT       All other labs were within normal range or not returned as of this dictation. EMERGENCY DEPARTMENT COURSE and DIFFERENTIAL DIAGNOSIS/MDM:   Vitals:    Vitals:    06/07/22 1351 06/07/22 1433 06/07/22 1538   BP: (!) 148/76 113/68 (!) 118/56   Pulse: 68 63 62   Resp: 20 20 19   Temp: 98.1 °F (36.7 °C)     TempSrc: Oral     SpO2: 100% 100% 98%   Weight: 150 lb (68 kg)     Height: 5' 8\" (1.727 m)            MDM  Number of Diagnoses or Management Options  MARIAM (acute kidney injury) ([facility]  Anticoagulated  Diagnosis management comments: Patient presented to the ED with a complaint of bleeding, he states he is on Eliquis which she has been on for years, he states over the last 4 to 5 days with any minor scratch he cannot get her bleeding under control. He is not vomiting blood, he is not coughing up blood, he is not urinating blood, he is not pooping any blood.   Patient appears to be stable, labs were obtained, he shows worsening kidney function, concerns for acute kidney injury, dehydration. He was started on IV hydration, he had 2 superficial scratches 1 to his left lateral calf, this was cleaned, and Dermabond was applied which controlled the bleeding, also a scratch to his right earlobe, this was cleaned and Dermabond applied, bleeding is controlled. I did speak with Dr. Keshia Herrera, he will accept admission of this patient for acute kidney injury, uncontrolled bleeding on anticoagulation       Amount and/or Complexity of Data Reviewed  Decide to obtain previous medical records or to obtain history from someone other than the patient: yes        CRITICAL CARE TIME   Total Critical Care time was 0 minutes, excluding separately reportableprocedures. There was a high probability of clinicallysignificant/life threatening deterioration in the patient's condition which required my urgent intervention. CONSULTS:  None    PROCEDURES:  Unless otherwise noted below, none     Procedures    FINAL IMPRESSION      1. MARIAM (acute kidney injury) ([facility]    2. Anticoagulated          DISPOSITION/PLAN   DISPOSITION Decision To Admit 06/07/2022 03:55:37 PM      PATIENT REFERRED TO:  No follow-up provider specified.     DISCHARGE MEDICATIONS:  New Prescriptions    No medications on file          (Please note that portions of this note were completed with a voice recognition program.  Efforts were made to edit the dictations but occasionally words are mis-transcribed.)    Mustapha Oneil PA-C (electronically signed)  Attending Emergency Physician         Mustapha Oneil PA-C  06/07/22 1600

## 2022-06-08 VITALS
DIASTOLIC BLOOD PRESSURE: 64 MMHG | TEMPERATURE: 98.1 F | OXYGEN SATURATION: 100 % | WEIGHT: 150 LBS | HEART RATE: 61 BPM | SYSTOLIC BLOOD PRESSURE: 132 MMHG | HEIGHT: 68 IN | RESPIRATION RATE: 16 BRPM | BODY MASS INDEX: 22.73 KG/M2

## 2022-06-08 LAB
ALBUMIN SERPL-MCNC: 3.3 G/DL (ref 3.5–4.6)
ALP BLD-CCNC: 61 U/L (ref 35–104)
ALT SERPL-CCNC: 6 U/L (ref 0–41)
ANION GAP SERPL CALCULATED.3IONS-SCNC: 8 MEQ/L (ref 9–15)
AST SERPL-CCNC: 12 U/L (ref 0–40)
BILIRUB SERPL-MCNC: 0.3 MG/DL (ref 0.2–0.7)
BUN BLDV-MCNC: 26 MG/DL (ref 8–23)
CALCIUM SERPL-MCNC: 8.3 MG/DL (ref 8.5–9.9)
CHLORIDE BLD-SCNC: 107 MEQ/L (ref 95–107)
CO2: 26 MEQ/L (ref 20–31)
CREAT SERPL-MCNC: 1.11 MG/DL (ref 0.7–1.2)
GFR AFRICAN AMERICAN: >60
GFR NON-AFRICAN AMERICAN: >60
GLOBULIN: 2.5 G/DL (ref 2.3–3.5)
GLUCOSE BLD-MCNC: 121 MG/DL (ref 70–99)
POTASSIUM SERPL-SCNC: 4.8 MEQ/L (ref 3.4–4.9)
SODIUM BLD-SCNC: 141 MEQ/L (ref 135–144)
TOTAL PROTEIN: 5.8 G/DL (ref 6.3–8)

## 2022-06-08 PROCEDURE — 36415 COLL VENOUS BLD VENIPUNCTURE: CPT

## 2022-06-08 PROCEDURE — 80053 COMPREHEN METABOLIC PANEL: CPT

## 2022-06-08 PROCEDURE — 2580000003 HC RX 258: Performed by: PHYSICIAN ASSISTANT

## 2022-06-08 PROCEDURE — 6370000000 HC RX 637 (ALT 250 FOR IP): Performed by: INTERNAL MEDICINE

## 2022-06-08 RX ADMIN — ATORVASTATIN CALCIUM 20 MG: 40 TABLET, FILM COATED ORAL at 09:15

## 2022-06-08 RX ADMIN — VALSARTAN 80 MG: 80 TABLET, FILM COATED ORAL at 12:24

## 2022-06-08 RX ADMIN — ASPIRIN 81 MG 81 MG: 81 TABLET ORAL at 09:15

## 2022-06-08 RX ADMIN — SODIUM CHLORIDE: 9 INJECTION, SOLUTION INTRAVENOUS at 05:19

## 2022-06-08 RX ADMIN — SOTALOL HYDROCHLORIDE 80 MG: 80 TABLET ORAL at 09:15

## 2022-06-08 RX ADMIN — SODIUM CHLORIDE, PRESERVATIVE FREE 10 ML: 5 INJECTION INTRAVENOUS at 09:16

## 2022-06-08 RX ADMIN — Medication 400 MG: at 09:15

## 2022-06-08 ASSESSMENT — PAIN SCALES - GENERAL: PAINLEVEL_OUTOF10: 0

## 2022-06-08 NOTE — FLOWSHEET NOTE
Assumed care of patient assessment unchanged. Vitals taken denies any pain or dizziness at this time. AXOX4.  Resting in the bed comfortably bed alarm on call light within reach no further needs at this time Electronically signed by Earnestine Wei RN on 6/8/2022 at 12:52 AM

## 2022-06-08 NOTE — H&P
[address]                      [address] [place], [address] [facility]                              HISTORY AND PHYSICAL    PATIENT NAME: Ludivina Vu                       :        1940  MED REC NO:   [id number]                            ROOM:       T335  ACCOUNT NO:   [de-identified]                           ADMIT DATE: 2022  PROVIDER:     Leydi Rico DO    HISTORY OF PRESENT ILLNESS:  An 29-year-old who was admitted to the  hospital after having significant bleeding from multiple abrasions due  to his Plavix use. The patient was noted to have a change in his renal  function and as a result of this, the patient was admitted to the  hospital.  He denied any nausea, vomiting, or diarrhea. He denied any  recent use of new medications. The patient does have a history of  underlying organic heart disease. ALLERGIES:  LIPITOR, NIACIN, NIASPAN, VITAMIN E, and ZOCOR. MEDICATIONS AT THE TIME OF HIS ADMISSION TO THE HOSPITAL:  Apresoline,  Lasix, Plavix, Diovan, Betapace, Glucophage, Eliquis, and Livalo. PAST SURGICAL HISTORY:  Would be appendectomy, coronary angioplasty with  stents, coronary artery bypass grafting, cataract surgery, and loop  recorder. FAMILY HISTORY:  , has children. HABITS:  No smoking. No alcohol use. PHYSICAL EXAMINATION:  VITAL SIGNS:  Height 5 feet 8 inches and weight 158 pounds. Blood  pressure stable since admission. It is presently 128/50, heart rate 67,  respirations 18, and afebrile. HEENT:  Normocephalic. Pupils equal and reactive to light. Extraocular  muscles intact. NECK:  Supple. No JVD, bruits, or adenopathy. CHEST:  K[place] shows healed midline surgical sternal scar. Left upper  quadrant pacemaker. ABDOMEN:  Soft. No guarding or rigidity. HEART:  Without murmurs, gallops, clicks, or rubs. Lower limb showed no  edema. SKIN:  Warm and dry. There is no orthostasis.     IMPRESSIONS:  1.  MARIAM, etiology uncertain. 2.  Abrasions with bleeding secondary to Plavix. 3.  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 4.  History of coronary stent . 5. Hypertension. 6.  Hyperlipidemia. 7.  Diabetes mellitus, type 2.  8.  COPD. PLAN:  Home medications were administered. IV fluids were given. This  will be discontinued and the patient will be discharged today.  _____  renal function has returned to normal.  The patient's bleeding has  stopped.         Evalyn Oppenheim, DO    D: 06/08/2022 8:49:29       T: 06/08/2022 8:53:39     [id number]  Job#: [id number]     Doc#: [id number]    CC:

## 2022-06-08 NOTE — CARE COORDINATION
[facility] [place] Case Management Initial Discharge Assessment    Met with Patient and Family to discuss discharge plan. PCP: Payton Robison,                                 Date of Last Visit: Ki Ritchie Final Patient: No        [facility] Notified: no    If no PCP, list provided? N/A    Discharge Planning    Living Arrangements: independently at home    Who do you live with? WIFE    Who helps you with your care:  family    If lives at home:     Do you have any barriers navigating in your home? no    Patient can perform ADL? Yes    Current Services (outpatient and in home) :  None    Dialysis: No    Is transportation available to get to your appointments? Yes, WIFE    DME Equipment:  yes - [facility]    Respiratory equipment: None    Respiratory provider:  no     Pharmacy:  yes - [facility], [address] with Medication Assistance Program?  No      Patient agreeable to [address]? Declined    Patient agreeable to SNF/Rehab? No    Other discharge needs identified? N/A    Does Patient Have a High-Risk for Readmission Diagnosis (CHF, PN, MI, COPD)? No    If Yes,       Initial Discharge Plan? (Note: please see concurrent daily documentation for any updates after initial note). FROM HOME WITH WIFE. USES [facility] FOR AMBULATION. DENIES ANY DC NEEDS. DC HOME TODAY.     Readmission Risk              Risk of Unplanned Readmission:  12         Electronically signed by CAROLINE Singh on 6/8/2022 at 10:58 AM

## 2022-06-08 NOTE — FLOWSHEET NOTE
Discharge instructions reviewed with pt and spouse, all questions answered. All personal belongings sent with pt. Spouse to transport pt home via car. Electronically signed by Jason Tolbert RN on 6/8/2022 at 1:47 PM

## 2022-06-16 ENCOUNTER — HOSPITAL ENCOUNTER (OUTPATIENT)
Dept: CARDIOLOGY | Age: 82
Discharge: HOME | End: 2022-06-16
Payer: MEDICARE

## 2022-06-16 PROCEDURE — G2066 INTER DEVC REMOTE 30D: HCPCS

## 2022-06-16 NOTE — PROGRESS NOTES
Physician Progress Note      PATIENT:               Arpan Kate  CSN #:                  [id number]  :                       1940  ADMIT DATE:       2022 1:43 PM  [address] DATE:        2022 2:00 PM  RESPONDING  PROVIDER #:        Evalyn Oppenheim DO          QUERY TEXT:    Patient admitted with MARIAM, abrasions with bleeding secondary to Plavix, home   medications were administered, prothrombin time 18.8  INR 1.6. In order to   support the diagnosis of bleeding secondary to Plavix, please include   additional clinical indicators in your documentation. Or please document if   the diagnosis of bleeding secondary to plavix has been ruled out after further   study. The medical record reflects the following:  Risk Factors: 79 yom on eliquis aspirin plavix at home  s/p CABG  hx coronary   stent  Clinical Indicators: presents to ER w/ past 4 to 5 days with any minor scratch   he cannot get her bleeding under control,  prothrombin time 18.8  INR 1.6  Treatment: IVF pertinent labs home medications including aspirin plavix   continues    Thank you,  Princess Mccoy BSN RN CDS   M-F 6am-2pm  Options provided:  -- bleeding secondary to plavix present as evidenced by, Please document   evidence. -- bleeding secondary to plavix was ruled out  -- Other - I will add my own diagnosis  -- Disagree - Not applicable / Not valid  -- Disagree - Clinically unable to determine / Unknown  -- Refer to Clinical Documentation Reviewer    PROVIDER RESPONSE TEXT:    bleeding secondary to plavix was ruled out after study.     Query created by: Vadim Gilliam on 6/15/2022 1:00 PM      Electronically signed by:  Evalyn Oppenheim DO 2022 9:26 AM

## 2022-06-20 NOTE — PROGRESS NOTES
Physician Progress Note      PATIENT:               Cardell Hatchet  CSN #:                  [id number]  :                       1940  ADMIT DATE:       2022 1:43 PM  [address] [place] DATE:        2022 2:00 PM  RESPONDING  PROVIDER #:        Anh Hidalgo DO          QUERY TEXT:    Patient admitted with MARIAM. In order to support the diagnosis of MARIAM, please   include additional clinical indicators in your documentation. ? Or please   document if the diagnosis of MARIAM has been ruled out after further study. The medical record reflects the following:  Risk Factors: 79 yom MARIAM HTN  DMII  HLD  Clinical Indicators: Creatine was 1.60 on admission down to 1.11 after IVFs. Treatment: IVF  pertinent labs    Defined by Kidney Disease Improving Global Outcomes (KDIGO) clinical practice   guideline for acute kidney injury:  -Increase in SCr by greater than or equal to 0.3 mg/dl within 48 hours; or  -Increase or decrease in SCr to greater than or equal to 1.5 times baseline,   which is known or presumed to have occurred within the prior 7 days; or  -Urine volume < 0.5ml/kg/h for 6 hours. Options provided:  -- Acute kidney injury evidenced by, Please document evidence as well as a   numerical baseline creatinine, if known. -- Currently resolved acute kidney injury was evidenced by, Please document   evidence as well as a numerical baseline creatinine, if known. -- Acute kidney injury ruled out after study  -- Other - I will add my own diagnosis  -- Disagree - Not applicable / Not valid  -- Disagree - Clinically unable to determine / Unknown  -- Refer to Clinical Documentation Reviewer    PROVIDER RESPONSE TEXT:    Acute kidney injury was ruled out after study.     Query created by: Shannan Juarez on 2022 2:32 PM      Electronically signed by:  Anh Hidalgo DO 2022 7:39 AM

## 2022-06-29 ENCOUNTER — OFFICE VISIT (OUTPATIENT)
Dept: NEUROLOGY | Age: 82
End: 2022-06-29
Payer: MEDICARE

## 2022-06-29 VITALS
SYSTOLIC BLOOD PRESSURE: 110 MMHG | DIASTOLIC BLOOD PRESSURE: 62 MMHG | WEIGHT: 142 LBS | HEART RATE: 71 BPM | HEIGHT: 68 IN | BODY MASS INDEX: 21.52 KG/M2

## 2022-06-29 DIAGNOSIS — R42 DIZZINESS AND GIDDINESS: ICD-10-CM

## 2022-06-29 DIAGNOSIS — M48.062 SPINAL STENOSIS OF LUMBAR REGION WITH NEUROGENIC CLAUDICATION: ICD-10-CM

## 2022-06-29 DIAGNOSIS — R26.0 ATAXIC GAIT: ICD-10-CM

## 2022-06-29 DIAGNOSIS — Q04.6 COLLOID CYST OF THIRD VENTRICLE (HCC): Primary | ICD-10-CM

## 2022-06-29 PROCEDURE — G8427 DOCREV CUR MEDS BY ELIG CLIN: HCPCS | Performed by: PSYCHIATRY & NEUROLOGY

## 2022-06-29 PROCEDURE — 1111F DSCHRG MED/CURRENT MED MERGE: CPT | Performed by: PSYCHIATRY & NEUROLOGY

## 2022-06-29 PROCEDURE — 1036F TOBACCO NON-USER: CPT | Performed by: PSYCHIATRY & NEUROLOGY

## 2022-06-29 PROCEDURE — 99214 OFFICE O/P EST MOD 30 MIN: CPT | Performed by: PSYCHIATRY & NEUROLOGY

## 2022-06-29 PROCEDURE — 1123F ACP DISCUSS/DSCN MKR DOCD: CPT | Performed by: PSYCHIATRY & NEUROLOGY

## 2022-06-29 PROCEDURE — G8420 CALC BMI NORM PARAMETERS: HCPCS | Performed by: PSYCHIATRY & NEUROLOGY

## 2022-06-29 ASSESSMENT — ENCOUNTER SYMPTOMS
NAUSEA: 0
CHOKING: 0
TROUBLE SWALLOWING: 0
COLOR CHANGE: 0
BACK PAIN: 1
PHOTOPHOBIA: 0
SHORTNESS OF BREATH: 0
VOMITING: 0

## 2022-06-29 NOTE — PROGRESS NOTES
Subjective:      Patient ID: Debora Franklin is a 79 y.o. male who presents today for:  Chief Complaint   Patient presents with    6 Month Follow-Up     Colloid cyst of third ventricle, patient states hes doing okay no concerns at this time        HPI 79year-old right-handed gentleman now with a history of blood cyst of the fourth ventricle. Patient had a syncopal episode prior to this. Is not any recurrent symptoms she has kyphoscoliosis and when last seen he was complaining of syncopal lumbar pathologies. We had further obtain an MRI of the lumbosacral spine which I reviewed he has some degree of hemisacralization of L5. Also has an aneurysm 6 cm was incompletely evaluated and therefore patient underwent a CT abdomen and pelvis shows is 5 by 7 x 5 x 6 cm aortic aneurysm. He is followed by vascular surgery  Had his aneurysm stented without any sequelae. He is doing actually quite well. He denies any headaches and his back is not as bothersome.   Still has some bruising from the Eliquis  Past Medical History:   Diagnosis Date    MARIAM (acute kidney injury) ([facility] 6/7/2022    Atrial fibrillation (HCC)     CAD (coronary artery disease)     cardiac stents    COPD (chronic obstructive pulmonary disease) ([address]     Diabetes mellitus ([facility] [address] [facility]     Hyperlipidemia     Hypertension     Movement disorder     Pneumonia      Past Surgical History:   Procedure Laterality Date    APPENDECTOMY      CORONARY ANGIOPLASTY WITH STENT PLACEMENT      4    CORONARY ARTERY BYPASS GRAFT      triple bypass    EYE SURGERY Bilateral     cataract surgery    INSERTABLE CARDIAC MONITOR Left 12/2018     Social History     Socioeconomic History    Marital status:      Spouse name: Not on file    Number of children: Not on file    Years of education: Not on file    Highest education level: Not on file   Occupational History    Not on file   Tobacco Use    Smoking status: Former Smoker     Types: Cigarettes    Smokeless tobacco: Never Used    Tobacco comment: 3 cigarettes/month   Vaping Use    Vaping Use: Never used   Substance and Sexual Activity    Alcohol use: No    Drug use: No    Sexual activity: Not on file   Other Topics Concern    Not on file   Social History Narrative    Lives With: Spouse Kami    Type of Home: House in [address]: One level    Home Access: Stairs to enter with rails-- Number of Steps: 3- Rails: Both    Bathroom Shower/Tub: Tub/Shower unit,Shower chair with back    Bathroom Toilet: Handicap height    Bathroom Equipment: Shower chair (neighbor to install grab bars)    Home Equipment: [address] Help From: Family    ADL Assistance: Needs assistance    Bath: Minimal assistance (pt washes self up, wife assisting in drying. Assists also needed to get into shower)    Dressing: Minimal assistance (assist over feet from wife \"I do about 80%\")    Toileting: Independent    Homemaking Assistance: Needs assistance    Homemaking Responsibilities: Yes (wife completes most IADLs. 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    Leisure & Hobbies: Reading    Additional Comments: Son lives in [address] [place] [address], [address] Strain:     Difficulty of Paying Living Expenses: Not on file   Food Insecurity:    [address] in the Last Year: Not on file    [address] in the Last Year: Not on file   Transportation Needs:     Lack of Transportation (Medical): Not on file    Lack of Transportation (Non-Medical):  Not on file   Physical Activity:     Days of Exercise per Week: Not on file    Minutes of Exercise per Session: Not on 60 tablet 5    pitavastatin (LIVALO) 4 MG TABS tablet Take 4 mg by mouth nightly      nitroGLYCERIN (NITROSTAT) 0.4 MG SL tablet Place 0.4 mg under the tongue every 5 minutes as needed for Chest pain       aspirin 81 MG tablet Take 81 mg by mouth daily (Patient not taking: Reported on 6/29/2022)       No current facility-administered medications for this visit. Review of Systems   Constitutional: Negative for fever. HENT: Negative for ear pain, tinnitus and trouble swallowing. Eyes: Negative for photophobia and visual disturbance. Respiratory: Negative for choking and shortness of breath. Cardiovascular: Negative for chest pain and palpitations. Gastrointestinal: Negative for nausea and vomiting. Musculoskeletal: Positive for back pain. Negative for gait problem, joint swelling, myalgias, neck pain and neck stiffness. Skin: Negative for color change. Allergic/Immunologic: Negative for food allergies. Neurological: Positive for syncope. Negative for dizziness, tremors, seizures, facial asymmetry, speech difficulty, weakness, light-headedness, numbness and headaches. Psychiatric/Behavioral: Negative for behavioral problems, confusion, hallucinations and sleep disturbance. Objective:   /62 (Site: Left Upper Arm, Position: Sitting, Cuff Size: Medium Adult)   Pulse 71   Ht 5' 8\" (1.727 m)   Wt 142 lb (64.4 kg)   BMI 21.59 kg/m²     Physical Exam  Vitals reviewed. Eyes:      Pupils: Pupils are equal, round, and reactive to light. Cardiovascular:      Rate and Rhythm: Normal rate and regular rhythm. Heart sounds: No murmur heard. Pulmonary:      Effort: Pulmonary effort is normal.      Breath sounds: Normal breath sounds. Abdominal:      General: Bowel sounds are normal.   Musculoskeletal:         General: Normal range of motion. Cervical back: Normal range of motion. Skin:     General: Skin is warm.    Neurological:      Mental Status: He is alert and oriented to person, place, and time. Cranial Nerves: No cranial nerve deficit. Sensory: No sensory deficit. Motor: No abnormal muscle tone. Coordination: Coordination normal.      Deep Tendon Reflexes: Reflexes are normal and symmetric. Babinski sign absent on the right side. Babinski sign absent on the left side. Psychiatric:         Mood and Affect: Mood normal.         No results found. Lab Results   Component Value Date    WBC 9.3 06/07/2022    RBC 3.36 06/07/2022    RBC 4.79 10/19/2011    HGB 9.3 06/07/2022    HCT 29.2 06/07/2022    MCV 86.8 06/07/2022    MCH 27.7 06/07/2022    MCHC 31.9 06/07/2022    RDW 18.4 06/07/2022     06/07/2022    MPV 9.7 10/13/2015     Lab Results   Component Value Date     06/08/2022    K 4.8 06/08/2022    K 4.8 01/24/2021     06/08/2022    CO2 26 06/08/2022    BUN 26 06/08/2022    CREATININE 1.11 06/08/2022    GFRAA >60.0 06/08/2022    LABGLOM >60.0 06/08/2022    GLUCOSE 121 06/08/2022    GLUCOSE 216 04/11/2022    PROT 5.8 06/08/2022    LABALBU 3.3 06/08/2022    LABALBU 2.8 04/11/2022    CALCIUM 8.3 06/08/2022    BILITOT 0.3 06/08/2022    ALKPHOS 61 06/08/2022    AST 12 06/08/2022    ALT 6 06/08/2022     Lab Results   Component Value Date    PROTIME 18.8 06/07/2022    INR 1.6 06/07/2022     Lab Results   Component Value Date    TSH 1.480 10/26/2021    IRON 71 07/19/2021    TIBC 357 07/19/2021     Lab Results   Component Value Date    TRIG 112 12/28/2021    HDL 48 12/28/2021    LDLCALC 58 12/28/2021     No results found for: Milo Lennox, LABBENZ, CANNAB, COCAINESCRN, LABMETH, OPIATESCREENURINE, PHENCYCLIDINESCREENURINE, PPXUR, ETOH  No results found for: LITHIUM, DILFRTOT, VALPROATE    Assessment:       Diagnosis Orders   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 Spinal stenosis of lumbar region with neurogenic claudication     3. Ataxic gait     4. Dizziness and giddiness     Colloid cyst of the third ventricle.   Patient has not

## 2022-07-21 ENCOUNTER — HOSPITAL ENCOUNTER (OUTPATIENT)
Dept: CARDIOLOGY | Age: 82
Discharge: HOME | End: 2022-07-21
Payer: MEDICARE

## 2022-07-21 PROCEDURE — G2066 INTER DEVC REMOTE 30D: HCPCS

## 2022-07-24 NOTE — ED NOTES
Physical exam    Patient appears no acute distress, nontoxic appearance. HENT: Patient has minor laceration to earlobe right side no signs of other facial or intraoral injury. Neck supple, no pain on palpation, no crepitus, no midline tenderness. Pulmonary lung sounds are clear in all fields, no wheezes rales or rhonchi, no accessory muscle use, no retractions, room air saturation 100%    Cardiopulmonary, no acute process, heart rate regular. Skin, patient has 2 minor superficial lacerations to left lateral calf minimal active bleeding. Neuro patient alert and orient x3, no neurological deficits, no facial droop, slurring of speech.          Colin Motta PA-C  07/24/22 8422

## 2022-08-25 ENCOUNTER — HOSPITAL ENCOUNTER (OUTPATIENT)
Dept: CARDIOLOGY | Age: 82
Discharge: HOME | End: 2022-08-25
Payer: MEDICARE

## 2022-08-25 PROCEDURE — G2066 INTER DEVC REMOTE 30D: HCPCS

## 2022-09-22 ENCOUNTER — HOSPITAL ENCOUNTER (EMERGENCY)
Age: 82
Discharge: HOME | End: 2022-09-22
Payer: MEDICARE

## 2022-09-22 VITALS
TEMPERATURE: 97.7 F | WEIGHT: 142 LBS | OXYGEN SATURATION: 99 % | BODY MASS INDEX: 21.03 KG/M2 | DIASTOLIC BLOOD PRESSURE: 66 MMHG | SYSTOLIC BLOOD PRESSURE: 106 MMHG | HEIGHT: 69 IN | RESPIRATION RATE: 16 BRPM | HEART RATE: 60 BPM

## 2022-09-22 DIAGNOSIS — S10.91XA ABRASION OF NECK, INITIAL ENCOUNTER: Primary | ICD-10-CM

## 2022-09-22 PROCEDURE — 6370000000 HC RX 637 (ALT 250 FOR IP): Performed by: NURSE PRACTITIONER

## 2022-09-22 PROCEDURE — 99283 EMERGENCY DEPT VISIT LOW MDM: CPT

## 2022-09-22 RX ADMIN — Medication 1 EACH: at 13:45

## 2022-09-22 ASSESSMENT — PAIN - FUNCTIONAL ASSESSMENT
PAIN_FUNCTIONAL_ASSESSMENT: 0-10
PAIN_FUNCTIONAL_ASSESSMENT: NONE - DENIES PAIN

## 2022-09-22 ASSESSMENT — ENCOUNTER SYMPTOMS
NAUSEA: 0
TROUBLE SWALLOWING: 0
DIARRHEA: 0
SHORTNESS OF BREATH: 0
VOMITING: 0
SORE THROAT: 0
BACK PAIN: 0
ABDOMINAL PAIN: 0
COUGH: 0

## 2022-09-22 NOTE — ED TRIAGE NOTES
Pt states he scratched the scab to his neck and continues to bleed,  pt on eliquis, pt a&ox4, skin w/d/slightly pale, pt to triage with walker accompanied by wife. 0 other c/o.

## 2022-09-22 NOTE — ED PROVIDER NOTES
[facility] ED  eMERGENCY dEPARTMENT eNCOUnter      Pt Name: Salas Morales  MRN: [id number]  Catalinagfghanshyam 1940  Date of evaluation: 9/22/2022  Provider: RACHEL Rosario CNP      HISTORY OF PRESENT ILLNESS    Salas Morales is a 79 y.o. male who presents to the Emergency Department with bleeding from base of neck where patient scratched piece of skin off his neck. He does take Elaquis and can not get bleeding to stop. Bleeding does stop with pressure. REVIEW OF SYSTEMS       Review of Systems   Constitutional:  Negative for activity change, appetite change and fever. HENT:  Negative for congestion, drooling, sore throat and trouble swallowing. Bleeding from neck   Respiratory:  Negative for cough and shortness of breath. Cardiovascular:  Negative for chest pain. Gastrointestinal:  Negative for abdominal pain, diarrhea, nausea and vomiting. Genitourinary:  Negative for dysuria. Musculoskeletal:  Negative for arthralgias, back pain and neck pain. Skin:  Negative for rash. All other systems reviewed and are negative. PAST MEDICAL HISTORY     Past Medical History:   Diagnosis Date    MARIAM (acute kidney injury) ([facility] [address] 6/7/2022    Atrial fibrillation (HCC)     CAD (coronary artery disease)     cardiac stents    COPD (chronic obstructive pulmonary disease) (HCC)     Diabetes mellitus ([facility] [address]     Hyperlipidemia     Hypertension     Movement disorder     Pneumonia          SURGICAL HISTORY       Past Surgical History:   Procedure Laterality Date    APPENDECTOMY      CORONARY ANGIOPLASTY WITH STENT PLACEMENT      4    CORONARY ARTERY BYPASS GRAFT      triple bypass    EYE SURGERY Bilateral     cataract surgery    INSERTABLE CARDIAC MONITOR Left 12/2018         CURRENT MEDICATIONS       Previous Medications    ALBUTEROL SULFATE  (90 BASE) MCG/ACT INHALER    use 1 puff as needed twice daily for 90 days.     APIXABAN (ELIQUIS) 5 MG TABS TABLET    Take 1 tablet by mouth 2 times daily    ASPIRIN 81 MG TABLET    Take 81 mg by mouth daily    CLOPIDOGREL (PLAVIX) 75 MG TABLET    Take 1 tablet by mouth daily    FUROSEMIDE (LASIX) 40 MG TABLET    Take 40 mg by mouth daily    HYDRALAZINE (APRESOLINE) 25 MG TABLET    Take 25 mg by mouth 2 times daily    MAGNESIUM OXIDE (MAG-OX) 400 (240 MG) MG TABLET    Take 1 tablet by mouth daily    METFORMIN (GLUCOPHAGE) 500 MG TABLET    Take 500 mg by mouth 2 times daily     NITROGLYCERIN (NITROSTAT) 0.4 MG SL TABLET    Place 0.4 mg under the tongue every 5 minutes as needed for Chest pain     PITAVASTATIN (LIVALO) 4 MG TABS TABLET    Take 4 mg by mouth nightly    SOTALOL (BETAPACE) 80 MG TABLET    Take 1 tablet by mouth 2 times daily    VALSARTAN (DIOVAN) 160 MG TABLET    Take 1 tablet by mouth daily       ALLERGIES     Fenofibrate, Lipitor [atorvastatin], Niacin, Niaspan [niacin er], Vitamin e, and Zocor [simvastatin]    FAMILY HISTORY     History reviewed. No pertinent family history.        SOCIAL HISTORY       Social History     Socioeconomic History    Marital status:      Spouse name: None    Number of children: None    Years of education: None    Highest education level: None   Tobacco Use    Smoking status: Former     Types: Cigarettes    Smokeless tobacco: Never    Tobacco comments:     3 cigarettes/month   Vaping Use    Vaping Use: Never used   Substance and Sexual Activity    Alcohol use: No    Drug use: No   Social History Narrative    Lives With: Spouse Kami    Type of Home: House in [address]: One level    Home Access: Stairs to enter with rails-- Number of Steps: 3- Rails: Both    Bathroom Shower/Tub: Tub/Shower unit,Shower chair with back    Bathroom Toilet: Handicap height    Bathroom Equipment: Shower chair (neighbor to install grab bars)    Home Equipment: [address] Help From: Family    ADL Assistance: Needs assistance    Bath: Minimal assistance (pt washes self up, wife assisting in drying. Assists also needed to get into shower)    Dressing: Minimal assistance (assist over feet from wife \"I do about 80%\")    Toileting: Independent    Homemaking Assistance: Needs assistance    Homemaking Responsibilities: Yes (wife completes most IADLs. 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 Constitutional:       Appearance: He is well-developed. HENT:      Head: Normocephalic and atraumatic. Right Ear: Hearing, tympanic membrane, ear canal and external ear normal.      Left Ear: Hearing, tympanic membrane, ear canal and external ear normal.      Nose: Nose normal.      Mouth/Throat:      Lips: Pink. Mouth: Mucous membranes are moist.   Eyes:      Conjunctiva/sclera: Conjunctivae normal.      Pupils: Pupils are equal, round, and reactive to light. Cardiovascular:      Rate and Rhythm: Normal rate and regular rhythm. Heart sounds: Normal heart sounds. Pulmonary:      Effort: Pulmonary effort is normal. No accessory muscle usage or respiratory distress. Breath sounds: Normal breath sounds. No decreased air movement.  No decreased breath sounds, wheezing or rhonchi. Abdominal:      General: Bowel sounds are normal. There is no distension. Palpations: Abdomen is soft. Tenderness: There is no abdominal tenderness. Musculoskeletal:         General: Normal range of motion. Cervical back: Normal range of motion and neck supple. Skin:     General: Skin is warm and dry. Neurological:      General: No focal deficit present. Mental Status: He is alert and oriented to person, place, and time. GCS: GCS eye subscore is 4. GCS verbal subscore is 5. GCS motor subscore is 6. Deep Tendon Reflexes: Reflexes are normal and symmetric. Psychiatric:         Judgment: Judgment normal.         All other labs were within normal range or not returned as of this dictation. EMERGENCY DEPARTMENT COURSE and DIFFERENTIALDIAGNOSIS/MDM:   Vitals:    Vitals:    09/22/22 1208 09/22/22 1330 09/22/22 1334   BP: 105/62  106/66   Pulse: 61  60   Resp: 19  16   Temp: (!) 96.6 °F (35.9 °C) 97.7 °F (36.5 °C)    TempSrc: Temporal Temporal    SpO2: 99%  99%   Weight: 142 lb (64.4 kg)     Height: 5' 9\" (1.753 m)              79 yr old male with skin abrasion that has stopped bleeding after surgicel was applied. F/U with PCP as needed. Patient and wife verbalize understanding. PROCEDURES:  Unless otherwise noted below, none     Procedures      FINAL IMPRESSION      1.  Abrasion of neck, initial encounter          DISPOSITION/PLAN   DISPOSITION Decision To Discharge 09/22/2022 02:06:02 PM          RACHEL Montoya CNP (electronically signed)  Attending Emergency Physician      RACHEL Montoya CNP  09/22/22 6393

## 2022-09-29 ENCOUNTER — HOSPITAL ENCOUNTER (OUTPATIENT)
Dept: CARDIOLOGY | Age: 82
Discharge: HOME | End: 2022-09-29
Payer: MEDICARE

## 2022-09-29 PROCEDURE — G2066 INTER DEVC REMOTE 30D: HCPCS

## 2022-10-11 ENCOUNTER — APPOINTMENT (OUTPATIENT)
Dept: GENERAL RADIOLOGY | Age: 82
DRG: 392 | End: 2022-10-11
Payer: MEDICARE

## 2022-10-11 ENCOUNTER — HOSPITAL ENCOUNTER (INPATIENT)
Age: 82
LOS: 6 days | Discharge: HOME | DRG: 392 | End: 2022-10-21
Attending: STUDENT IN AN ORGANIZED HEALTH CARE EDUCATION/TRAINING PROGRAM | Admitting: STUDENT IN AN ORGANIZED HEALTH CARE EDUCATION/TRAINING PROGRAM
Payer: MEDICARE

## 2022-10-11 DIAGNOSIS — R11.2 NAUSEA AND VOMITING, UNSPECIFIED VOMITING TYPE: ICD-10-CM

## 2022-10-11 DIAGNOSIS — D64.9 ANEMIA, UNSPECIFIED TYPE: ICD-10-CM

## 2022-10-11 DIAGNOSIS — N17.9 AKI (ACUTE KIDNEY INJURY) (HCC): ICD-10-CM

## 2022-10-11 DIAGNOSIS — R07.9 CHEST PAIN, UNSPECIFIED TYPE: Primary | ICD-10-CM

## 2022-10-11 LAB
ALBUMIN SERPL-MCNC: 4.5 G/DL (ref 3.5–4.6)
ALP BLD-CCNC: 61 U/L (ref 35–104)
ALT SERPL-CCNC: 6 U/L (ref 0–41)
ANION GAP SERPL CALCULATED.3IONS-SCNC: 14 MEQ/L (ref 9–15)
AST SERPL-CCNC: 14 U/L (ref 0–40)
BASOPHILS ABSOLUTE: 0.1 K/UL (ref 0–0.2)
BASOPHILS RELATIVE PERCENT: 0.6 %
BILIRUB SERPL-MCNC: 0.3 MG/DL (ref 0.2–0.7)
BUN BLDV-MCNC: 31 MG/DL (ref 8–23)
CALCIUM SERPL-MCNC: 9.5 MG/DL (ref 8.5–9.9)
CHLORIDE BLD-SCNC: 103 MEQ/L (ref 95–107)
CO2: 27 MEQ/L (ref 20–31)
CREAT SERPL-MCNC: 1.7 MG/DL (ref 0.7–1.2)
EOSINOPHILS ABSOLUTE: 0.2 K/UL (ref 0–0.7)
EOSINOPHILS RELATIVE PERCENT: 1.7 %
GFR AFRICAN AMERICAN: 46.8
GFR NON-AFRICAN AMERICAN: 38.6
GLOBULIN: 3.1 G/DL (ref 2.3–3.5)
GLUCOSE BLD-MCNC: 151 MG/DL (ref 70–99)
HCT VFR BLD CALC: 29 % (ref 42–52)
HEMOGLOBIN: 9.2 G/DL (ref 14–18)
LIPASE: 39 U/L (ref 12–95)
LYMPHOCYTES ABSOLUTE: 1 K/UL (ref 1–4.8)
LYMPHOCYTES RELATIVE PERCENT: 11.1 %
MAGNESIUM: 2.3 MG/DL (ref 1.7–2.4)
MCH RBC QN AUTO: 24.9 PG (ref 27–31.3)
MCHC RBC AUTO-ENTMCNC: 31.7 % (ref 33–37)
MCV RBC AUTO: 78.7 FL (ref 80–100)
MONOCYTES ABSOLUTE: 0.6 K/UL (ref 0.2–0.8)
MONOCYTES RELATIVE PERCENT: 6.9 %
NEUTROPHILS ABSOLUTE: 7.5 K/UL (ref 1.4–6.5)
NEUTROPHILS RELATIVE PERCENT: 79.7 %
PDW BLD-RTO: 18.4 % (ref 11.5–14.5)
PLATELET # BLD: 324 K/UL (ref 130–400)
POTASSIUM SERPL-SCNC: 4.1 MEQ/L (ref 3.4–4.9)
RBC # BLD: 3.68 M/UL (ref 4.7–6.1)
SARS-COV-2, NAAT: NOT DETECTED
SODIUM BLD-SCNC: 144 MEQ/L (ref 135–144)
TOTAL CK: 37 U/L (ref 0–190)
TOTAL PROTEIN: 7.6 G/DL (ref 6.3–8)
TROPONIN: <0.01 NG/ML (ref 0–0.01)
WBC # BLD: 9.4 K/UL (ref 4.8–10.8)

## 2022-10-11 PROCEDURE — 83690 ASSAY OF LIPASE: CPT

## 2022-10-11 PROCEDURE — 99285 EMERGENCY DEPT VISIT HI MDM: CPT

## 2022-10-11 PROCEDURE — 6360000002 HC RX W HCPCS: Performed by: PHYSICIAN ASSISTANT

## 2022-10-11 PROCEDURE — 87635 SARS-COV-2 COVID-19 AMP PRB: CPT

## 2022-10-11 PROCEDURE — 84484 ASSAY OF TROPONIN QUANT: CPT

## 2022-10-11 PROCEDURE — 71045 X-RAY EXAM CHEST 1 VIEW: CPT

## 2022-10-11 PROCEDURE — 80053 COMPREHEN METABOLIC PANEL: CPT

## 2022-10-11 PROCEDURE — 82550 ASSAY OF CK (CPK): CPT

## 2022-10-11 PROCEDURE — 83735 ASSAY OF MAGNESIUM: CPT

## 2022-10-11 PROCEDURE — 96374 THER/PROPH/DIAG INJ IV PUSH: CPT

## 2022-10-11 PROCEDURE — G0378 HOSPITAL OBSERVATION PER HR: HCPCS

## 2022-10-11 PROCEDURE — 36415 COLL VENOUS BLD VENIPUNCTURE: CPT

## 2022-10-11 PROCEDURE — 85025 COMPLETE CBC W/AUTO DIFF WBC: CPT

## 2022-10-11 PROCEDURE — 2580000003 HC RX 258: Performed by: STUDENT IN AN ORGANIZED HEALTH CARE EDUCATION/TRAINING PROGRAM

## 2022-10-11 RX ORDER — ONDANSETRON 4 MG/1
4 TABLET, ORALLY DISINTEGRATING ORAL EVERY 8 HOURS PRN
Status: DISCONTINUED | OUTPATIENT
Start: 2022-10-11 | End: 2022-10-21 | Stop reason: HOSPADM

## 2022-10-11 RX ORDER — ONDANSETRON 2 MG/ML
4 INJECTION INTRAMUSCULAR; INTRAVENOUS EVERY 6 HOURS PRN
Status: DISCONTINUED | OUTPATIENT
Start: 2022-10-11 | End: 2022-10-19

## 2022-10-11 RX ORDER — SODIUM CHLORIDE 0.9 % (FLUSH) 0.9 %
5-40 SYRINGE (ML) INJECTION PRN
Status: DISCONTINUED | OUTPATIENT
Start: 2022-10-11 | End: 2022-10-21 | Stop reason: HOSPADM

## 2022-10-11 RX ORDER — SODIUM CHLORIDE 0.9 % (FLUSH) 0.9 %
5-40 SYRINGE (ML) INJECTION EVERY 12 HOURS SCHEDULED
Status: DISCONTINUED | OUTPATIENT
Start: 2022-10-11 | End: 2022-10-21 | Stop reason: HOSPADM

## 2022-10-11 RX ORDER — SODIUM CHLORIDE 9 MG/ML
INJECTION, SOLUTION INTRAVENOUS PRN
Status: DISCONTINUED | OUTPATIENT
Start: 2022-10-11 | End: 2022-10-21 | Stop reason: HOSPADM

## 2022-10-11 RX ORDER — ONDANSETRON 2 MG/ML
4 INJECTION INTRAMUSCULAR; INTRAVENOUS ONCE
Status: COMPLETED | OUTPATIENT
Start: 2022-10-11 | End: 2022-10-11

## 2022-10-11 RX ORDER — ACETAMINOPHEN 325 MG/1
650 TABLET ORAL EVERY 4 HOURS PRN
Status: DISCONTINUED | OUTPATIENT
Start: 2022-10-11 | End: 2022-10-21 | Stop reason: HOSPADM

## 2022-10-11 RX ADMIN — ONDANSETRON 4 MG: 2 INJECTION INTRAMUSCULAR; INTRAVENOUS at 21:13

## 2022-10-11 RX ADMIN — Medication 10 ML: at 22:55

## 2022-10-11 ASSESSMENT — ENCOUNTER SYMPTOMS
VOICE CHANGE: 0
COUGH: 1
APNEA: 0
SHORTNESS OF BREATH: 1
ANAL BLEEDING: 0
VOMITING: 1
EYE DISCHARGE: 0
NAUSEA: 1
ABDOMINAL DISTENTION: 0
COLOR CHANGE: 1

## 2022-10-11 NOTE — ED TRIAGE NOTES
Patient presents with onset of chest pain 1.5 hours ago. Nothing taken at home, is suppose to take nirto but didn't. Also reports onset of mucus filled couch since 1300, reports thick production. Had a AAA in April.

## 2022-10-12 LAB
TROPONIN: <0.01 NG/ML (ref 0–0.01)

## 2022-10-12 PROCEDURE — 2580000003 HC RX 258: Performed by: STUDENT IN AN ORGANIZED HEALTH CARE EDUCATION/TRAINING PROGRAM

## 2022-10-12 PROCEDURE — 6360000002 HC RX W HCPCS: Performed by: STUDENT IN AN ORGANIZED HEALTH CARE EDUCATION/TRAINING PROGRAM

## 2022-10-12 PROCEDURE — 36415 COLL VENOUS BLD VENIPUNCTURE: CPT

## 2022-10-12 PROCEDURE — 96376 TX/PRO/DX INJ SAME DRUG ADON: CPT

## 2022-10-12 PROCEDURE — G0378 HOSPITAL OBSERVATION PER HR: HCPCS

## 2022-10-12 PROCEDURE — 84484 ASSAY OF TROPONIN QUANT: CPT

## 2022-10-12 RX ADMIN — ONDANSETRON 4 MG: 2 INJECTION INTRAMUSCULAR; INTRAVENOUS at 08:59

## 2022-10-12 RX ADMIN — Medication 5 ML: at 20:28

## 2022-10-12 RX ADMIN — Medication 10 ML: at 08:55

## 2022-10-12 NOTE — CARE COORDINATION
[facility] [place] Case Management Initial Discharge Assessment    Met with Patient and wife to discuss discharge plan. PCP: Daphney Horton DO                                Date of Last Visit: 1 month ago    [facility] Patient: No        [facility] Notified: no    If no PCP, list provided? N/A    Discharge Planning    Living Arrangements: independently at home and at home dependent on family care    Who do you live with? wife    Who helps you with your care:  self or spouse    If lives at home:     Do you have any barriers navigating in your home? yes - Pt uses wheeled walker for all walking     Patient can perform ADL? No  - Wife helps with bathing and dressing     Current Services (outpatient and in home) :  None    Dialysis: No    Is transportation available to get to your appointments? Yes  - Wife does driving     DME Equipment:  yes - wheeled walker     Respiratory equipment: None    Respiratory provider:  no     Pharmacy:  yes - [facility] [place]/ [place]    Consult with Medication Assistance Program?  No      Patient agreeable to [address]? Declined    Patient agreeable to SNF/Rehab? Declined    Other discharge needs identified? N/A    Does Patient Have a High-Risk for Readmission Diagnosis (CHF, PN, MI, COPD)? No         Initial Discharge Plan? (Note: please see concurrent daily documentation for any updates after initial note). Met with patient and spouse to discuss dc plan. Pt from home with spouse and she helps him w ALD's. He states he walks well with wheeled walker and plan is return home. They do not want [address] or and SNF/Rehab facility.      Readmission Risk              Risk of Unplanned Readmission:  0         Electronically signed by Derick Gamboa on 10/12/2022 at 11:47 AM

## 2022-10-12 NOTE — PROGRESS NOTES
This  visited with this patient. Very pleasant gentleman. He is resting in bed with a wash pan in his lap which is full of tissues and fluids that he has spit up. During our visit he had to bring up a large amount of brownish colored fluids. Patient is very apologetic. Patient expresses concern that he has not seen a doctor yet. His wife had been with him all day but had returned home without any information. He was hoping to bring her up to speed as to what is happening; why he can't swallow. Patient expressed confusion about dialysis - how it works? His son has been on dialysis for several years, but this patient is not clear as to the actual process and would appreciate clarification as to whether he needs it or not and when he would begin. Patient is an [employer] vet. A flag pin was presented to him to thank him for his service. Patient was surprised and grateful for the pin. A blessing was offered on his behalf. If able, patient would appreciate communion.

## 2022-10-12 NOTE — FLOWSHEET NOTE
2240 pt arrived to unit. Wife at bedside. Assessment complete. Vitals stable. No pain noted at this time. Pt experiencing frequent coughing with moderate clear secretions. HOB 45 degrees. Call light within reach. Will continue to monitor.      0250 pt resting in bed eyes closed

## 2022-10-13 ENCOUNTER — ANESTHESIA EVENT (OUTPATIENT)
Dept: TELEMETRY | Age: 82
DRG: 392 | End: 2022-10-13
Payer: MEDICARE

## 2022-10-13 ENCOUNTER — ANESTHESIA EVENT (OUTPATIENT)
Dept: ENDOSCOPY | Age: 82
DRG: 392 | End: 2022-10-13
Payer: MEDICARE

## 2022-10-13 ENCOUNTER — ANESTHESIA (OUTPATIENT)
Dept: TELEMETRY | Age: 82
DRG: 392 | End: 2022-10-13
Payer: MEDICARE

## 2022-10-13 ENCOUNTER — ANESTHESIA (OUTPATIENT)
Dept: ENDOSCOPY | Age: 82
DRG: 392 | End: 2022-10-13
Payer: MEDICARE

## 2022-10-13 ENCOUNTER — APPOINTMENT (OUTPATIENT)
Dept: CT IMAGING | Age: 82
DRG: 392 | End: 2022-10-13
Payer: MEDICARE

## 2022-10-13 ENCOUNTER — APPOINTMENT (OUTPATIENT)
Dept: GENERAL RADIOLOGY | Age: 82
DRG: 392 | End: 2022-10-13
Payer: MEDICARE
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
## 2022-10-13 PROBLEM — R13.19 ESOPHAGEAL DYSPHAGIA: Status: ACTIVE | Noted: 2022-10-13

## 2022-10-13 PROBLEM — T18.128A FOOD IMPACTION OF ESOPHAGUS: Status: ACTIVE | Noted: 2022-10-13

## 2022-10-13 PROBLEM — K22.2 ESOPHAGEAL STENOSIS: Status: ACTIVE | Noted: 2022-10-13

## 2022-10-13 LAB
ALBUMIN SERPL-MCNC: 4.2 G/DL (ref 3.5–4.6)
ALP BLD-CCNC: 58 U/L (ref 35–104)
ALT SERPL-CCNC: <5 U/L (ref 0–41)
ANION GAP SERPL CALCULATED.3IONS-SCNC: 13 MEQ/L (ref 9–15)
AST SERPL-CCNC: 15 U/L (ref 0–40)
BILIRUB SERPL-MCNC: 0.5 MG/DL (ref 0.2–0.7)
BUN BLDV-MCNC: 31 MG/DL (ref 8–23)
CALCIUM SERPL-MCNC: 9.3 MG/DL (ref 8.5–9.9)
CHLORIDE BLD-SCNC: 109 MEQ/L (ref 95–107)
CO2: 27 MEQ/L (ref 20–31)
CREAT SERPL-MCNC: 1.29 MG/DL (ref 0.7–1.2)
GFR AFRICAN AMERICAN: >60
GFR NON-AFRICAN AMERICAN: 53.1
GLOBULIN: 3.1 G/DL (ref 2.3–3.5)
GLUCOSE BLD-MCNC: 119 MG/DL (ref 70–99)
GLUCOSE BLD-MCNC: 134 MG/DL (ref 70–99)
GLUCOSE BLD-MCNC: 138 MG/DL (ref 70–99)
GLUCOSE BLD-MCNC: 175 MG/DL (ref 70–99)
HCT VFR BLD CALC: 28.3 % (ref 42–52)
HEMOGLOBIN: 8.5 G/DL (ref 14–18)
INR BLD: 1.5
MCH RBC QN AUTO: 23.8 PG (ref 27–31.3)
MCHC RBC AUTO-ENTMCNC: 30 % (ref 33–37)
MCV RBC AUTO: 79.2 FL (ref 80–100)
PDW BLD-RTO: 18.6 % (ref 11.5–14.5)
PERFORMED ON: ABNORMAL
PLATELET # BLD: 295 K/UL (ref 130–400)
POTASSIUM SERPL-SCNC: 4.8 MEQ/L (ref 3.4–4.9)
PROTHROMBIN TIME: 17.8 SEC (ref 12.3–14.9)
RBC # BLD: 3.58 M/UL (ref 4.7–6.1)
SODIUM BLD-SCNC: 149 MEQ/L (ref 135–144)
TOTAL PROTEIN: 7.3 G/DL (ref 6.3–8)
WBC # BLD: 11.6 K/UL (ref 4.8–10.8)

## 2022-10-13 PROCEDURE — 3700000001 HC ADD 15 MINUTES (ANESTHESIA): Performed by: INTERNAL MEDICINE

## 2022-10-13 PROCEDURE — 80053 COMPREHEN METABOLIC PANEL: CPT

## 2022-10-13 PROCEDURE — 99223 1ST HOSP IP/OBS HIGH 75: CPT | Performed by: INTERNAL MEDICINE

## 2022-10-13 PROCEDURE — 6370000000 HC RX 637 (ALT 250 FOR IP): Performed by: INTERNAL MEDICINE

## 2022-10-13 PROCEDURE — 0D758ZZ DILATION OF ESOPHAGUS, VIA NATURAL OR ARTIFICIAL OPENING ENDOSCOPIC: ICD-10-PCS | Performed by: INTERNAL MEDICINE

## 2022-10-13 PROCEDURE — 3700000000 HC ANESTHESIA ATTENDED CARE: Performed by: INTERNAL MEDICINE

## 2022-10-13 PROCEDURE — 2709999900 HC NON-CHARGEABLE SUPPLY: Performed by: INTERNAL MEDICINE

## 2022-10-13 PROCEDURE — 2700000000 HC OXYGEN THERAPY PER DAY

## 2022-10-13 PROCEDURE — G0378 HOSPITAL OBSERVATION PER HR: HCPCS

## 2022-10-13 PROCEDURE — 43247 EGD REMOVE FOREIGN BODY: CPT | Performed by: INTERNAL MEDICINE

## 2022-10-13 PROCEDURE — 7100000010 HC PHASE II RECOVERY - FIRST 15 MIN: Performed by: INTERNAL MEDICINE

## 2022-10-13 PROCEDURE — 6360000002 HC RX W HCPCS: Performed by: REGISTERED NURSE

## 2022-10-13 PROCEDURE — 71250 CT THORAX DX C-: CPT

## 2022-10-13 PROCEDURE — 7100000011 HC PHASE II RECOVERY - ADDTL 15 MIN: Performed by: INTERNAL MEDICINE

## 2022-10-13 PROCEDURE — 43248 EGD GUIDE WIRE INSERTION: CPT | Performed by: INTERNAL MEDICINE

## 2022-10-13 PROCEDURE — 7100000000 HC PACU RECOVERY - FIRST 15 MIN: Performed by: INTERNAL MEDICINE

## 2022-10-13 PROCEDURE — 71045 X-RAY EXAM CHEST 1 VIEW: CPT

## 2022-10-13 PROCEDURE — 85610 PROTHROMBIN TIME: CPT

## 2022-10-13 PROCEDURE — 36415 COLL VENOUS BLD VENIPUNCTURE: CPT

## 2022-10-13 PROCEDURE — 2580000003 HC RX 258: Performed by: INTERNAL MEDICINE

## 2022-10-13 PROCEDURE — C1769 GUIDE WIRE: HCPCS | Performed by: INTERNAL MEDICINE

## 2022-10-13 PROCEDURE — 7100000001 HC PACU RECOVERY - ADDTL 15 MIN: Performed by: INTERNAL MEDICINE

## 2022-10-13 PROCEDURE — 92610 EVALUATE SWALLOWING FUNCTION: CPT

## 2022-10-13 PROCEDURE — 2580000003 HC RX 258: Performed by: STUDENT IN AN ORGANIZED HEALTH CARE EDUCATION/TRAINING PROGRAM

## 2022-10-13 PROCEDURE — 85027 COMPLETE CBC AUTOMATED: CPT

## 2022-10-13 PROCEDURE — 2500000003 HC RX 250 WO HCPCS: Performed by: REGISTERED NURSE

## 2022-10-13 PROCEDURE — 0DC28ZZ EXTIRPATION OF MATTER FROM MIDDLE ESOPHAGUS, VIA NATURAL OR ARTIFICIAL OPENING ENDOSCOPIC: ICD-10-PCS | Performed by: INTERNAL MEDICINE

## 2022-10-13 PROCEDURE — 3609017100 HC EGD: Performed by: INTERNAL MEDICINE

## 2022-10-13 RX ORDER — VALSARTAN 80 MG/1
160 TABLET ORAL DAILY
Status: DISCONTINUED | OUTPATIENT
Start: 2022-10-13 | End: 2022-10-16

## 2022-10-13 RX ORDER — SOTALOL HYDROCHLORIDE 80 MG/1
40 TABLET ORAL 2 TIMES DAILY
Status: DISCONTINUED | OUTPATIENT
Start: 2022-10-13 | End: 2022-10-14

## 2022-10-13 RX ORDER — PROPOFOL 10 MG/ML
INJECTION, EMULSION INTRAVENOUS PRN
Status: DISCONTINUED | OUTPATIENT
Start: 2022-10-13 | End: 2022-10-13 | Stop reason: SDUPTHER

## 2022-10-13 RX ORDER — DEXTROSE MONOHYDRATE 100 MG/ML
INJECTION, SOLUTION INTRAVENOUS CONTINUOUS PRN
Status: DISCONTINUED | OUTPATIENT
Start: 2022-10-13 | End: 2022-10-21 | Stop reason: HOSPADM

## 2022-10-13 RX ORDER — LANOLIN ALCOHOL/MO/W.PET/CERES
400 CREAM (GRAM) TOPICAL DAILY
Status: DISCONTINUED | OUTPATIENT
Start: 2022-10-13 | End: 2022-10-21 | Stop reason: HOSPADM

## 2022-10-13 RX ORDER — INSULIN LISPRO 100 [IU]/ML
0-4 INJECTION, SOLUTION INTRAVENOUS; SUBCUTANEOUS EVERY 4 HOURS
Status: DISCONTINUED | OUTPATIENT
Start: 2022-10-13 | End: 2022-10-14 | Stop reason: DRUGHIGH

## 2022-10-13 RX ORDER — SIMETHICONE 20 MG/.3ML
EMULSION ORAL PRN
Status: DISCONTINUED | OUTPATIENT
Start: 2022-10-13 | End: 2022-10-13 | Stop reason: ALTCHOICE

## 2022-10-13 RX ORDER — SODIUM CHLORIDE 9 MG/ML
INJECTION, SOLUTION INTRAVENOUS CONTINUOUS
Status: DISCONTINUED | OUTPATIENT
Start: 2022-10-13 | End: 2022-10-15

## 2022-10-13 RX ORDER — CLOPIDOGREL BISULFATE 75 MG/1
75 TABLET ORAL DAILY
Status: DISCONTINUED | OUTPATIENT
Start: 2022-10-13 | End: 2022-10-21 | Stop reason: HOSPADM

## 2022-10-13 RX ORDER — SUCCINYLCHOLINE/SOD CL,ISO/PF 100 MG/5ML
SYRINGE (ML) INTRAVENOUS PRN
Status: DISCONTINUED | OUTPATIENT
Start: 2022-10-13 | End: 2022-10-13 | Stop reason: SDUPTHER

## 2022-10-13 RX ORDER — ENOXAPARIN SODIUM 100 MG/ML
30 INJECTION SUBCUTANEOUS DAILY
Status: DISCONTINUED | OUTPATIENT
Start: 2022-10-13 | End: 2022-10-20

## 2022-10-13 RX ORDER — MAGNESIUM HYDROXIDE 1200 MG/15ML
LIQUID ORAL PRN
Status: DISCONTINUED | OUTPATIENT
Start: 2022-10-13 | End: 2022-10-13 | Stop reason: ALTCHOICE

## 2022-10-13 RX ORDER — SOTALOL HYDROCHLORIDE 80 MG/1
80 TABLET ORAL 2 TIMES DAILY
Status: DISCONTINUED | OUTPATIENT
Start: 2022-10-13 | End: 2022-10-13

## 2022-10-13 RX ORDER — NITROGLYCERIN 0.4 MG/1
0.4 TABLET SUBLINGUAL EVERY 5 MIN PRN
Status: DISCONTINUED | OUTPATIENT
Start: 2022-10-13 | End: 2022-10-21 | Stop reason: HOSPADM

## 2022-10-13 RX ORDER — HYDRALAZINE HYDROCHLORIDE 25 MG/1
25 TABLET, FILM COATED ORAL 2 TIMES DAILY
Status: DISCONTINUED | OUTPATIENT
Start: 2022-10-13 | End: 2022-10-15

## 2022-10-13 RX ORDER — GLYCOPYRROLATE 1 MG/5 ML
SYRINGE (ML) INTRAVENOUS PRN
Status: DISCONTINUED | OUTPATIENT
Start: 2022-10-13 | End: 2022-10-13 | Stop reason: SDUPTHER

## 2022-10-13 RX ORDER — LIDOCAINE HYDROCHLORIDE 20 MG/ML
INJECTION, SOLUTION INFILTRATION; PERINEURAL PRN
Status: DISCONTINUED | OUTPATIENT
Start: 2022-10-13 | End: 2022-10-13 | Stop reason: SDUPTHER

## 2022-10-13 RX ADMIN — PROPOFOL 80 MG: 10 INJECTION, EMULSION INTRAVENOUS at 15:34

## 2022-10-13 RX ADMIN — Medication 100 MG: at 15:41

## 2022-10-13 RX ADMIN — Medication 20 MG: at 15:39

## 2022-10-13 RX ADMIN — SOTALOL HYDROCHLORIDE 40 MG: 80 TABLET ORAL at 20:38

## 2022-10-13 RX ADMIN — Medication 0.2 MG: at 15:32

## 2022-10-13 RX ADMIN — Medication 0.2 MG: at 15:46

## 2022-10-13 RX ADMIN — LIDOCAINE HYDROCHLORIDE 60 MG: 20 INJECTION, SOLUTION INFILTRATION; PERINEURAL at 15:32

## 2022-10-13 RX ADMIN — SODIUM CHLORIDE: 9 INJECTION, SOLUTION INTRAVENOUS at 09:23

## 2022-10-13 ASSESSMENT — PAIN DESCRIPTION - LOCATION: LOCATION: CHEST

## 2022-10-13 ASSESSMENT — PAIN SCALES - GENERAL
PAINLEVEL_OUTOF10: 0
PAINLEVEL_OUTOF10: 10

## 2022-10-13 ASSESSMENT — ENCOUNTER SYMPTOMS
SHORTNESS OF BREATH: 1
SHORTNESS OF BREATH: 1

## 2022-10-13 ASSESSMENT — PAIN - FUNCTIONAL ASSESSMENT: PAIN_FUNCTIONAL_ASSESSMENT: NONE - DENIES PAIN

## 2022-10-13 ASSESSMENT — PAIN DESCRIPTION - FREQUENCY: FREQUENCY: INTERMITTENT

## 2022-10-13 NOTE — H&P
Department of Internal Medicine  Nephrology  [facility]. Nephrology  Attending History and Physical      CHIEF COMPLAINT:  vomiting, lower sternal pain    Reason for Admission:  rule out cardiac etiology    History Obtained From:  patient, electronic medical record    HISTORY OF PRESENT ILLNESS:    79 y.o. male with history s/f history s/f T2DM, HTN, HLD, CAD s/p ORTIZ and CABG, A.fib on AC who presented for chest pain, N/V. Also associated w/ coughing. Has been present for 3 days. Apparently everything he eats he throws back up. Has had multiple episodes here. Hemodynamically stable. Pt of Dr. Yaneth Vences. Cardiac enzymes wnl here. CXR showing no acute changes     Past Medical History:        Diagnosis Date    MARIAM (acute kidney injury) ([facility] 6/7/2022    Atrial fibrillation (HCC)     CAD (coronary artery disease)     cardiac stents    COPD (chronic obstructive pulmonary disease) ([facility])     Diabetes mellitus ([facility]     Hyperlipidemia     Hypertension     Movement disorder     Pneumonia        Past Surgical History:        Procedure Laterality Date    APPENDECTOMY      CORONARY ANGIOPLASTY WITH STENT PLACEMENT      4    CORONARY ARTERY BYPASS GRAFT      triple bypass    EYE SURGERY Bilateral     cataract surgery    INSERTABLE CARDIAC MONITOR Left 12/2018       Medications Prior to Admission:    Medications Prior to Admission: hydrALAZINE (APRESOLINE) 25 MG tablet, Take 25 mg by mouth 2 times daily  furosemide (LASIX) 40 MG tablet, Take 40 mg by mouth daily  clopidogrel (PLAVIX) 75 MG tablet, Take 1 tablet by mouth daily  valsartan (DIOVAN) 160 MG tablet, Take 1 tablet by mouth daily  albuterol sulfate  (90 Base) MCG/ACT inhaler, use 1 puff as needed twice daily for 90 days.   sotalol (BETAPACE) 80 MG tablet, Take 1 tablet by mouth 2 times daily  magnesium oxide (MAG-OX) 400 (240 Mg) MG tablet, Take 1 tablet by mouth daily  metFORMIN (GLUCOPHAGE) 500 MG tablet, Take 500 mg by mouth 2 times daily   apixaban (ELIQUIS) 5 MG TABS tablet, Take 1 tablet by mouth 2 times daily  pitavastatin (LIVALO) 4 MG TABS tablet, Take 4 mg by mouth nightly  nitroGLYCERIN (NITROSTAT) 0.4 MG SL tablet, Place 0.4 mg under the tongue every 5 minutes as needed for Chest pain   aspirin 81 MG tablet, Take 81 mg by mouth daily (Patient not taking: No sig reported)    Allergies:  Aripiprazole    Social History:   Social History       Tobacco History       Smoking Status  Former Smoking Tobacco Type  Cigarettes      Smokeless Tobacco Use  Never      Tobacco Comments  3 cigarettes/month              Alcohol History       Alcohol Use Status  No              Drug Use       Drug Use Status  No              Sexual Activity       Sexually Active  Not Asked                      Family History:   History reviewed. No pertinent family history.     REVIEW OF SYSTEMS:  Positives in bold  Constitutional: fever, chills, fatigue, malaise   HENT:  rhinorrhea, sinus pain, sore throat, epistaxis    Eyes:  photophobia, visual disturbance, eye redness  Respiratory: shortness of breath, cough, hemoptysis    Cardiovascular: chest pain, palpitations, orthopnea, leg swelling   Gastrointestinal: abdominal pain, nausea, vomiting, diarrhea, constipation   Endocrine: polydipsia, polyphagia, cold intolerance, heat intolerance  Genitourinary: dysuria, flank pain, frequency, urgency   Hematologic: easy bruising, easy bleeding  Musculoskeletal: back pain, neck pain, myalgias, arthalgias  Neurological: syncope, lightheadedness, dizziness, seizures, tremors, weakness  Psychiatric/Behavioral: anxiety, depression, hallucinations  Skin: rash, itching    PHYSICAL EXAM:  Vitals:  BP (!) 148/66   Pulse 71   Temp 98.4 °F (36.9 °C)   Resp 18   Ht 5' 9\" (1.753 m)   Wt 150 lb (68 kg)   SpO2 100%   BMI 22.15 kg/m²     General: alert, in no apparent distress  HEENT: normocephalic, atraumatic, anicteric  Neck: supple, no mass  Lungs: non-labored respirations, clear to auscultation bilaterally  Heart: regular rate and rhythm, no murmurs or rubs  Abdomen: soft, non-tender, non-distended  MSK: no joint swelling or tenderness  Ext: no cyanosis, no peripheral edema  Neuro: alert and oriented, no gross abnormalities  Psych: normal mood and affect  Skin: no rash    DATA:  CBC:   Lab Results   Component Value Date/Time    WBC 9.4 10/11/2022 07:08 PM    RBC 3.68 10/11/2022 07:08 PM    RBC 4.79 10/19/2011 06:47 AM    HGB 9.2 10/11/2022 07:08 PM    HCT 29.0 10/11/2022 07:08 PM    MCV 78.7 10/11/2022 07:08 PM    MCH 24.9 10/11/2022 07:08 PM    MCHC 31.7 10/11/2022 07:08 PM    RDW 18.4 10/11/2022 07:08 PM     10/11/2022 07:08 PM    MPV 9.7 10/13/2015 07:07 AM     BMP:    Lab Results   Component Value Date/Time     10/11/2022 07:08 PM    K 4.1 10/11/2022 07:08 PM    K 4.8 01/24/2021 05:07 AM     10/11/2022 07:08 PM    CO2 27 10/11/2022 07:08 PM    BUN 31 10/11/2022 07:08 PM    LABALBU 4.5 10/11/2022 07:08 PM    LABALBU 2.8 04/11/2022 01:50 PM    CREATININE 1.70 10/11/2022 07:08 PM    CALCIUM 9.5 10/11/2022 07:08 PM    GFRAA 46.8 10/11/2022 07:08 PM    LABGLOM 38.6 10/11/2022 07:08 PM    GLUCOSE 151 10/11/2022 07:08 PM    GLUCOSE 216 04/11/2022 01:50 PM     Last 3 Troponin:    Lab Results   Component Value Date/Time    TROPONINI <0.010 10/12/2022 09:48 AM    TROPONINI <0.010 10/12/2022 05:20 AM    TROPONINI <0.010 10/12/2022 02:17 AM       ASSESSMENT AND PLAN:    79 y.o. male with history s/f history s/f T2DM, HTN, HLD, CAD s/p ORTIZ and CABG, A.fib on AC who presented for chest pain, N/V. Chest pain: ? If cardiac in etiology vs. Esophageal/gastric. Does have cardiac history however having dysphagia along w/ the emesis. Neg trops.  Pt of Dr. Ammon Alarcon, neg CXR  HTN  A.fib: on [address]  N/V  T2DM: SSI, holding metformin    - agree w/ keeping patient NPO  - will start on NS for now  - obtain swallow eval   - will cardiology see due to cardiac history   - anti-emetics      Elizabeth Dillon MD, MD

## 2022-10-13 NOTE — CONSULTS
Inpatient consult to GI  Consult performed by: Caitlin Brown MD  Consult ordered by: Nato Crockett MD    Patient Name: Daniele Herzog Date: 10/11/2022  6:08 PM  MR #: [id number]  : 1940    Attending Physician: Nato Crockett MD  Reason for consult: Nausea, vomiting, dysphagia  History Obtained From:  patient, electronic medical record, staff nurse  History of Presenting Illness:      Sita Henry is a 79 y.o. male on hospital day 0 with PMH of MARIAM (acute kidney injury) ([address] Atrial fibrillation ([address] CAD (coronary artery disease), COPD (chronic obstructive pulmonary disease) ([address] Diabetes mellitus ([address] Hyperlipidemia, Hypertension, Movement disorder, and Pneumonia. PSH includes Coronary artery bypass graft; Appendectomy; Eye surgery (Bilateral); Insertable Cardiac Monitor (Left, 2018); and Coronary angioplasty with stent. Admitted with a history of nausea/vomiting with associated chest/epigastric pain. Patient reports on Tuesday morning he ate a sausage/egg/biscuit sandwich, since that time he has been unable to keep anything down, states \"everything comes back up. \"  States he has been unable to keep food or his pills down since that time. GI was consulted today 10/13/2022 at 12:37 PM.  States even if he drinks a sip of water, it comes right back up. He reports history of esophageal dysphagia to solids occurring approximately 1 time per week. Denies prior EGD or dilation. Reports history of heartburn on rare occasion, does not take any prescriptions for this. He denies abdominal pain, except for when he tries to eat or drink something he feels pain in his upper abdomen as he vomits it back up. States he is spitting up his own saliva throughout the day. He denies prior history of GI bleed. No prior colonoscopy. He is a former smoker, denies alcohol or illicit drug use, denies NSAID use. He takes Eliquis and Plavix for history of CAD and atrial fibrillation.   States he last took Eliquis and Plavix on Tuesday morning prior to eating his breakfast sandwich. On admit, glucose 151, BUN 31, creatinine 1.70, WBCs 9.4, Hgb 9.2, MCV 78.7, platelets 683, lipase 39, troponin less than 0.010 x4,   Portable chest x-ray 10/11/2022: No acute cardiopulmonary abnormality. History:      Past Medical History:   Diagnosis Date    MARIAM (acute kidney injury) ([facility] 6/7/2022    Atrial fibrillation (HCC)     CAD (coronary artery disease)     cardiac stents    COPD (chronic obstructive pulmonary disease) ([facility]     Diabetes mellitus ([facility]     Hyperlipidemia     Hypertension     Movement disorder     Pneumonia      Past Surgical History:   Procedure Laterality Date    APPENDECTOMY      CORONARY ANGIOPLASTY WITH STENT PLACEMENT      4    CORONARY ARTERY BYPASS GRAFT      triple bypass    EYE SURGERY Bilateral     cataract surgery    INSERTABLE CARDIAC MONITOR Left 12/2018     Family History  History reviewed. No pertinent family history.   [] Unable to obtain due to ventilated and/ or neurologic status  Social History     Socioeconomic History    Marital status:      Spouse name: Not on file    Number of children: Not on file    Years of education: Not on file    Highest education level: Not on file   Occupational History    Not on file   Tobacco Use    Smoking status: Former     Types: Cigarettes    Smokeless tobacco: Never    Tobacco comments:     3 cigarettes/month   Vaping Use    Vaping Use: Never used   Substance and Sexual Activity    Alcohol use: No    Drug use: No    Sexual activity: Not on file   Other Topics Concern    Not on file   Social History Narrative    Lives With: Spouse Kami    Type of Home: [address] in [address]: One level    Home Access: Stairs to enter with rails-- Number of Steps: 3- Rails: Both    Bathroom Shower/Tub: Tub/Shower unit,Shower chair with back    Bathroom Toilet: Handicap height    Bathroom Equipment: Shower chair (neighbor to install grab bars)    Home Equipment: Rolling walker,Cane    Receives Help From: Family    ADL Assistance: Needs assistance    Bath: Minimal assistance (pt washes self up, wife assisting in drying. Assists also needed to get into shower)    Dressing: Minimal assistance (assist over feet from wife \"I do about 80%\")    Toileting: Independent    Homemaking Assistance: Needs assistance    Homemaking Responsibilities: Yes (wife completes most IADLs. 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    Leisure & Hobbies: Reading    Additional Comments: Son lives in [address] [place] [address], [address] Strain: Not on file   Food Insecurity: Not on file   Transportation Needs: Not on file   Physical Activity: Not on file   Stress: Not on file   Social Connections: Not on file   Intimate Partner Violence: Not on file   Housing Stability: Not on file      [] Unable to obtain due to ventilated and/ or neurologic status  Home Medications:   Medications Prior to Admission: hydrALAZINE (APRESOLINE) 25 MG tablet, Take 25 mg by mouth 2 times daily  furosemide (LASIX) 40 MG tablet, Take 40 mg by mouth daily  clopidogrel (PLAVIX) 75 MG tablet, Take 1 tablet by mouth daily  valsartan (DIOVAN) 160 MG tablet, Take 1 tablet by mouth daily  albuterol sulfate  (90 Base) MCG/ACT inhaler, use 1 puff as needed twice daily for 90 days.   sotalol (BETAPACE) 80 MG tablet, Take 1 tablet by mouth 2 times daily  magnesium oxide (MAG-OX) 400 (240 Mg) MG tablet, Take 1 tablet by mouth daily  metFORMIN (GLUCOPHAGE) 500 MG tablet, Take 500 mg by mouth 2 times daily   apixaban (ELIQUIS) 5 MG TABS tablet, Take 1 tablet by mouth 2 times daily  pitavastatin (LIVALO) 4 MG TABS tablet, Take 4 mg by mouth nightly  nitroGLYCERIN (NITROSTAT) 0.4 MG SL tablet, Place 0.4 mg under the tongue every 5 minutes as needed for Chest pain   aspirin 81 MG tablet, Take 81 mg by mouth daily (Patient not taking: No sig reported)  Current Hospital Medications:   Scheduled Meds:   [Held by provider] apixaban  5 mg Oral BID    [Held by provider] clopidogrel  75 mg Oral Daily    hydrALAZINE  25 mg Oral BID    magnesium oxide  400 mg Oral Daily    [Held by provider] valsartan  160 mg Oral Daily    insulin lispro  0-4 Units SubCUTAneous Q4H    [Held by provider] enoxaparin  30 mg SubCUTAneous Daily    sotalol  40 mg Oral BID    sodium chloride flush  5-40 mL IntraVENous 2 times per day     Continuous Infusions:   dextrose      sodium chloride 75 mL/hr at 10/13/22 0923    sodium chloride       PRN Meds:.nitroGLYCERIN, glucose, dextrose bolus **OR** dextrose bolus, glucagon (rDNA), dextrose, sodium chloride flush, sodium chloride, acetaminophen, ondansetron **OR** ondansetron   dextrose      sodium chloride 75 mL/hr at 10/13/22 0923    sodium chloride        Allergies: Allergies   Allergen Reactions    Fenofibrate     Lipitor [Atorvastatin] Other (See Comments)     Body pain    Niacin Other (See Comments)    Niaspan [Niacin Er] Other (See Comments)     Body pain    Vitamin E Other (See Comments)     Nose bleeds    Zocor [Simvastatin] Other (See Comments)     Sharp body pain      Review of Systems:    [x] CV, Resp, Neuro, , and all other systems reviewed and negative other than listed in HPI.     Objective Findings:     Vitals:   Vitals:    10/12/22 0800 10/12/22 1445 10/12/22 2059 10/13/22 0927   BP:  (!) 145/92 123/71 (!) 148/66   Pulse: 64 78 61 71   Resp:   18    Temp:  97.4 °F (36.3 °C) 97.5 °F (36.4 °C) 98.4 °F (36.9 °C)   TempSrc:  Oral Oral    SpO2:  100% 100% 100%   Weight:       Height:          Physical Examination:  General: Alert, oriented, in no acute distress  HEENT: Normocephalic, no scleral icterus. Neck: Soft/supple  Heart: Regular, no murmur, no rub/gallop. Lungs: clear to ascultation, no rales/wheezing/rhonchi. equal chest wall excursion. Abdomen: Abdomen soft, non-tender. BS normal. No masses,  No organomegaly  Extremities: no clubbing/cyanosis, no edema. Skin: Warm, dry, normal turgor, no rash, no bruise, no petichiae. Neuro: without focal deficit, moves all extremities   Psych: normal affect    Results/ Medications reviewed 10/13/2022, 1:21 PM     Laboratory, Microbiology, Pathology, Radiology, Cardiology, Medications and Transcriptions reviewed  Scheduled Meds:   [Held by provider] apixaban  5 mg Oral BID    [Held by provider] clopidogrel  75 mg Oral Daily    hydrALAZINE  25 mg Oral BID    magnesium oxide  400 mg Oral Daily    [Held by provider] valsartan  160 mg Oral Daily    insulin lispro  0-4 Units SubCUTAneous Q4H    [Held by provider] enoxaparin  30 mg SubCUTAneous Daily    sotalol  40 mg Oral BID    sodium chloride flush  5-40 mL IntraVENous 2 times per day     Continuous Infusions:   dextrose      sodium chloride 75 mL/hr at 10/13/22 0923    sodium chloride         Recent Labs     10/11/22  1908   WBC 9.4   HGB 9.2*   HCT 29.0*   MCV 78.7*        Recent Labs     10/11/22  1908      K 4.1      CO2 27   BUN 31*   CREATININE 1.70*     Recent Labs     10/11/22  1908   AST 14   ALT 6   BILITOT 0.3   ALKPHOS 61     Recent Labs     10/11/22  1908   LIPASE 39     Recent Labs     10/11/22  1908   PROT 7.6     XR CHEST PORTABLE    Result Date: 10/11/2022  EXAMINATION: ONE XRAY VIEW OF THE CHEST 10/11/2022 7:47 pm COMPARISON: None. HISTORY: ORDERING SYSTEM PROVIDED HISTORY: cp/cough TECHNOLOGIST PROVIDED HISTORY: Reason for exam:->cp/cough What reading provider will be dictating this exam?->CRC FINDINGS: Loop recorder noted. The cardiomediastinal silhouette is within normal limits. The lungs are clear. No pneumothorax or pleural effusion.      No acute cardiopulmonary abnormality. Impression:   79 y.o. male admitted with a history of nausea/vomiting with associated chest/epigastric pain. He has been unable to keep anything down (including food, liquids, or his own secretions) since Tuesday morning after eating a sausage/egg/biscuit sandwich. Endorses history of esophageal dysphagia to solids approximately 1 time per week denies prior EGD or dilation. Rare heartburn symptoms, not on PPI therapy at home. On Eliquis and Plavix (last had Tuesday morning). On admit, glucose 151, BUN 31, creatinine 1.70, WBCs 9.4, Hgb 9.2, MCV 78.7, platelets 199, lipase 39, troponin less than 0.010 x4,   Portable chest x-ray 10/11/2022: No acute cardiopulmonary abnormality. Clinical history of acute onset of esophageal dysphagia to liquids/solids, unable to tolerate secretions, highly suspicious for esophageal obstruction secondary to food bolus obstruction. Plan:   -Medical management/supportive care per primary team  -N.p.o.  -Stat portable chest x-ray prior to procedure  -Plan for urgent EGD +/- intervention today  -Further recommendations pending patient's clinical course  Comments: Thank you for allowing us to participate in the care of this patient. Will continue to follow. Please call if questions or concerns arise. Electronically signed by Vince Weber MD on 10/13/2022 at 1:21 PM    Please note this report has been partially produced using speech recognition software and may cause contain errors related to that system including grammar, punctuation and spelling as well as words and phrases that may seem inappropriate. If there are questions or concerns please feel free to contact me to clarify.

## 2022-10-13 NOTE — ANESTHESIA PRE PROCEDURE
Department of Anesthesiology  Preprocedure Note       Name:  Jennelle Goldmann   Age:  79 y.o.  :  1940                                          MRN:  [id number]         Date:  10/13/2022      Surgeon: Sara Feldman):  Kalen Huston MD    Procedure: Procedure(s):  ESOPHAGOGASTRODUODENOSCOPY    Medications prior to admission:   Prior to Admission medications    Medication Sig Start Date End Date Taking? Authorizing Provider   hydrALAZINE (APRESOLINE) 25 MG tablet Take 25 mg by mouth 2 times daily    Historical Provider, MD   furosemide (LASIX) 40 MG tablet Take 40 mg by mouth daily    Historical Provider, MD   clopidogrel (PLAVIX) 75 MG tablet Take 1 tablet by mouth daily 22   Daphney Horton DO   valsartan (DIOVAN) 160 MG tablet Take 1 tablet by mouth daily 22   Daphney Horton DO   albuterol sulfate  (90 Base) MCG/ACT inhaler use 1 puff as needed twice daily for 90 days.  21   Historical Provider, MD   sotalol (BETAPACE) 80 MG tablet Take 1 tablet by mouth 2 times daily 21   Murtaza Valles MD   magnesium oxide (MAG-OX) 400 (240 Mg) MG tablet Take 1 tablet by mouth daily 21   Murtaza Valles MD   metFORMIN (GLUCOPHAGE) 500 MG tablet Take 500 mg by mouth 2 times daily  20   Historical Provider, MD   apixaban (ELIQUIS) 5 MG TABS tablet Take 1 tablet by mouth 2 times daily 19   Kira Bernard MD   pitavastatin (LIVALO) 4 MG TABS tablet Take 4 mg by mouth nightly    Historical Provider, MD   nitroGLYCERIN (NITROSTAT) 0.4 MG SL tablet Place 0.4 mg under the tongue every 5 minutes as needed for Chest pain     Historical Provider, MD   aspirin 81 MG tablet Take 81 mg by mouth daily  Patient not taking: No sig reported    Historical Provider, MD       Current medications:    Current Facility-Administered Medications   Medication Dose Route Frequency Provider Last Rate Last Admin    [Held by provider] apixaban (ELIQUIS) tablet 5 mg  5 mg Oral BID MD Karissa Rosario [facility] [place] by provider] clopidogrel (PLAVIX) tablet 75 mg  75 mg Oral Daily Brittaney Tai MD        hydrALAZINE (APRESOLINE) tablet 25 mg  25 mg Oral BID Brittaney Tai MD        magnesium oxide (MAG-OX) tablet 400 mg  400 mg Oral Daily Brittaney Tai MD        nitroGLYCERIN (NITROSTAT) SL tablet 0.4 mg  0.4 mg SubLINGual Q5 Min PRN Brittaney Tai MD        [Held by provider] valsartan (DIOVAN) tablet 160 mg  160 mg Oral Daily Brittaney Tai MD        glucose chewable tablet 16 g  4 tablet Oral PRN Brittaney Tai MD        dextrose bolus 10% 125 mL  125 mL IntraVENous PRN Brittaney Tai MD        Or    dextrose bolus 10% 250 mL  250 mL IntraVENous PRN Brittaney Tai MD        glucagon (rDNA) injection 1 mg  1 mg SubCUTAneous PRN Brittaney Tai MD        dextrose 10 % infusion   IntraVENous Continuous PRN Brittaney Tai MD        insulin lispro (HUMALOG) injection vial 0-4 Units  0-4 Units SubCUTAneous Q4H Mildred Dubose MD        0.9 % sodium chloride infusion   IntraVENous Continuous Mildred Dubose MD 75 mL/hr at 10/13/22 0923 New Bag at 10/13/22 0923    [Held by provider] enoxaparin Sodium (LOVENOX) injection 30 mg  30 mg SubCUTAneous Daily Mildred Dubose MD        sotalol (BETAPACE) tablet 40 mg  40 mg Oral BID Tawanna Mackey MD        sodium chloride flush 0.9 % injection 5-40 mL  5-40 mL IntraVENous 2 times per day Brittaney Tai MD   5 mL at 10/12/22 2028    sodium chloride flush 0.9 % injection 5-40 mL  5-40 mL IntraVENous PRN Brittaney Tai MD        0.9 % sodium chloride infusion   IntraVENous PRN Brittaney Tai MD        acetaminophen (TYLENOL) tablet 650 mg  650 mg Oral Q4H PRN Mildred Dubose MD        ondansetron (ZOFRAN-ODT) disintegrating tablet 4 mg  4 mg Oral Q8H PRN Mildred Dubose MD        Or    ondansetron (ZOFRAN) injection 4 mg  4 mg IntraVENous Q6H PRN Brittaney Tai MD   4 mg at 10/12/22 0859       Allergies:     Allergies Allergen Reactions    Fenofibrate     Lipitor [Atorvastatin] Other (See Comments)     Body pain    Niacin Other (See Comments)    Niaspan [Niacin Er] Other (See Comments)     Body pain    Vitamin E Other (See Comments)     Nose bleeds    Zocor [Simvastatin] Other (See Comments)     Sharp body pain       Problem List:    Patient Active Problem List   Diagnosis Code    Atrial fibrillation ([facility] [address] I48.91    High risk medication use Z79.899    Atherosclerosis of native coronary artery of native heart without angina pectoris I25.10    Hypertension I10    Ischemic myocardial dysfunction I25.5    Nonrheumatic aortic valve disorder, unspecified I35.9    Aortic valve disorder I35.9    Personal history of nicotine dependence Z87.891    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    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    Vasovagal syncope R55    Dizziness and giddiness R42    Cerebrovascular accident ([facility] [address] I63.9    Orthostatic dizziness R42    Ataxia R27.0    Vertigo R42    Meniere disease, bilateral H81.03    Benign paroxysmal positional vertigo due to bilateral vestibular disorder H81.13    Coronary angioplasty status Z98.61    Ataxic gait R26.0    Kyphoscoliosis M41.9    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    Abdominal aortic aneurysm (AAA) I71.40    Acute inferior myocardial infarction (HCC) I21.19    Carotid bruit R09.89    Chronic obstructive pulmonary disease (HCC) J44.9    Diabetes mellitus ([facility]) E11.9    Dyspnea on exertion R06.09    Fatigue R53.83    First degree atrioventricular block I44.0    Hyperlipidemia E78.5    Infection of the inner ear H83.09    Muscle pain M79.10    Underweight R63.6    Ventricular premature beats I49.3    Weight loss R63.4    PVD (peripheral vascular disease) ([facility]) I73.9    Impaired mobility and activities of daily living -sp Severe PVD s/p AAA repain Z74.09, Z78.9    Hypotension I95.9    Late effects 10/11/2022 07:08 PM    HCT 29.0 10/11/2022 07:08 PM    MCV 78.7 10/11/2022 07:08 PM    RDW 18.4 10/11/2022 07:08 PM     10/11/2022 07:08 PM       CMP:   Lab Results   Component Value Date/Time     10/11/2022 07:08 PM    K 4.1 10/11/2022 07:08 PM    K 4.8 01/24/2021 05:07 AM     10/11/2022 07:08 PM    CO2 27 10/11/2022 07:08 PM    BUN 31 10/11/2022 07:08 PM    CREATININE 1.70 10/11/2022 07:08 PM    GFRAA 46.8 10/11/2022 07:08 PM    LABGLOM 38.6 10/11/2022 07:08 PM    GLUCOSE 151 10/11/2022 07:08 PM    GLUCOSE 216 04/11/2022 01:50 PM    PROT 7.6 10/11/2022 07:08 PM    CALCIUM 9.5 10/11/2022 07:08 PM    BILITOT 0.3 10/11/2022 07:08 PM    ALKPHOS 61 10/11/2022 07:08 PM    AST 14 10/11/2022 07:08 PM    ALT 6 10/11/2022 07:08 PM       POC Tests:   Recent Labs     10/13/22  0918   POCGLU 134*       Coags:   Lab Results   Component Value Date/Time    PROTIME 18.8 06/07/2022 02:30 PM    INR 1.6 06/07/2022 02:30 PM    APTT 34.5 06/07/2022 02:30 PM       HCG (If Applicable): No results found for: PREGTESTUR, PREGSERUM, HCG, HCGQUANT     ABGs:   Lab Results   Component Value Date/Time    PHART 7.34 04/07/2022 08:20 PM    PO2ART 294 04/07/2022 08:20 PM    GRX8FTX 32 04/07/2022 08:20 PM    R2JXTWKB 100 04/07/2022 08:20 PM        Type & Screen (If Applicable):  No results found for: LABABO, LABRH    Drug/Infectious Status (If Applicable):  No results found for: HIV, HEPCAB    COVID-19 Screening (If Applicable):   Lab Results   Component Value Date/Time    COVID19 Not Detected 10/11/2022 07:08 PM    COVID19 NOT DETECTED 04/12/2022 11:56 AM           Anesthesia Evaluation  Patient summary reviewed and Nursing notes reviewed no history of anesthetic complications:   Airway: Mallampati: II  TM distance: >3 FB   Neck ROM: full  Mouth opening: > = 3 FB   Dental: normal exam         Pulmonary:normal exam    (+) pneumonia:  COPD:  shortness of breath:                             Cardiovascular:  Exercise tolerance: good (>4 METS),   (+) hypertension:, past MI:, CAD:, POWERS:,       ECG reviewed               Beta Blocker:  Not on Beta Blocker         Neuro/Psych:   (+) CVA:,             GI/Hepatic/Renal: Neg GI/Hepatic/Renal ROS            Endo/Other:    (+) Diabetes, . Pt had PAT visit. Abdominal:             Vascular: negative vascular ROS.          Other Findings:           Anesthesia Plan      MAC     ASA 4 - emergent                                   RACHEL Carlos - CRNA   10/13/2022

## 2022-10-13 NOTE — PROGRESS NOTES
Patient had an uneventful night. Patient currently is in bed with bed in lowest position, side rails up, and call light within reach.

## 2022-10-13 NOTE — PLAN OF CARE
Problem: Discharge Planning  Goal: Discharge to home or other facility with appropriate resources  Outcome: Progressing  Flowsheets  Taken 10/12/2022 2059 by Walt Grande RN  Discharge to home or other facility with appropriate resources:   Identify barriers to discharge with patient and caregiver   Arrange for needed discharge resources and transportation as appropriate   Identify discharge learning needs (meds, wound care, etc)  Taken 10/12/2022 0800 by Landon Hernandez RN  Discharge to home or other facility with appropriate resources:   Identify barriers to discharge with patient and caregiver   Arrange for needed discharge resources and transportation as appropriate     Problem: Safety - Adult  Goal: Free from fall injury  Outcome: Progressing     Problem: Chronic Conditions and Co-morbidities  Goal: Patient's chronic conditions and co-morbidity symptoms are monitored and maintained or improved  Outcome: Progressing  Flowsheets  Taken 10/12/2022 2059 by Laila Gillis 34 - Patient's Chronic Conditions and Co-Morbidity Symptoms are Monitored and Maintained or Improved:   Monitor and assess patient's chronic conditions and comorbid symptoms for stability, deterioration, or improvement   Collaborate with multidisciplinary team to address chronic and comorbid conditions and prevent exacerbation or deterioration   Update acute care plan with appropriate goals if chronic or comorbid symptoms are exacerbated and prevent overall improvement and discharge  Taken 10/12/2022 0800 by Laila Saul 34 - Patient's Chronic Conditions and Co-Morbidity Symptoms are Monitored and Maintained or Improved:   Monitor and assess patient's chronic conditions and comorbid symptoms for stability, deterioration, or improvement   Collaborate with multidisciplinary team to address chronic and comorbid conditions and prevent exacerbation or deterioration

## 2022-10-13 NOTE — CONSULTS
This is an inpatient consult for cardiology    HPI:  We are consulted for chest/epigastric pain. This patient was doing reasonably well up until 48 hours ago. Then during breakfast, the patient had difficulty swallowing. Since that time, he has been able to keep very little down. Even liquids given some difficulty. He has been able to take some of his medications. His wife noted he was complaining of chest discomfort, and brought the patient to the emergency room. At that time, the patient had an EKG which really shows no acute changes, and troponins and CPKs been negative. This patient does however have a history of coronary disease. In 1995, the patient had a percutaneous intervention-but I do not believe there was a stent placed at that time. Subsequent to that, in 1997 he underwent open heart surgery. He had a LIMA to the LAD, radial to the circumflex, and a saphenous vein to the right coronary artery. Subsequent to that, the patient relatively recently underwent an open AAA repair at [address] [place] [address]. The patient also had some unexplained syncope. He underwent loop recorder in the past.  Subsequent to that, he was found to be in atrial fibrillation. He is now on high risk medication-sotalol--as well as Eliquis. He is also on aspirin and Plavix. His loop recorder was end-of-life, but discussed with his primary cardiologist, Dr. Cosmo Live, and felt that it was not necessary to review placement. In fact, before presenting to the emergency room, the patient was scheduled to see Dr. Melodie Dorsey about stopping the aspirin, and just going with Plavix and Eliquis for his coronary disease, as well as paroxysmal atrial fibrillation. On exam, the patient appears to be little bit uncomfortable but cooperative. HEENT is atraumatic normocephalic no xanthelasmas noted. No icterus is noted. He neck exam shows no obvious jugular venous distention or carotid bruits.   Upper extremities are symmetrical.  There is some ecchymoses noted. Chest: Shows evidence of open heart surgery, and has a palpable reveal Linq over the left precordial area. Cardiac exam shows regular rate and rhythm without murmur, rub, S3 or S4.  Lungs are clear to auscultation no rhonchi rales or wheeze. Abdomen is essentially benign, but does have some epigastric tenderness. Lower extremities show no calf tenderness just a trace of edema. Laboratory as described above shows negative troponins and CPK. EKG shows mildly prolonged QTC. Monitor shows sinus rhythm with occasional PVCs, and perhaps idioventricular rhythm. Chest x-ray is clear. Does show reveal Linq shadow. Patient is COVID-negative. Magnesium is 2.3. Lipase is normal.  CBC shows a white count of 9.4, hemoglobin is 9.2. This is down a little bit from the spring of this year. 324,000 platelets. Patient's creatinine is 4.67, this is certainly elevated from previous values. BUN is 37. This most likely is reflective of dehydration. Liver functions are normal.  LDL is 62. Other past medical history is notable for the following:  Patient has had some difficulty swallowing in the past, but only intermittently. Patient has had some acute kidney injury in the past, but in June of this year his creatinine was normal  Paroxysmal atrial fibrillation with high risk medication-sotalol  COPD  Diabetes  Hypertension-  Hyperlipidemia and apparently history of pneumonia    Past surgical history is notable for appendectomy with above-mentioned open heart surgery, above-mentioned reveal Linq, the above-mentioned percutaneous intervention in 1995, as well as the above-mentioned AAA repair    Family history: There is somewhat of a family history of cancer as well as atherosclerotic disease and a sibling    Substance use, the patient used to smoke but quit a number of years ago.   The patient denies any significant alcohol or drug usage    Socially, the patient is  and lives at home    Allergies: The patient has several allergies-please see epic. They are mostly related to lipid-lowering agents. Review of systems: Other 12 point reviews of systems are negative except which is outlined in the history of present illness    Assessment:  Although this patient does have a significant coronary history, this patient has no evidence of ischemia by troponins or EKG criteria. Even the patient admits that he does not think this is his anginal symptoms. The focus I believe should be on the patient's GI symptoms i.e. inability to swallow, as well as nausea and vomiting.   Laboratory work is pointing towards dehydration    Plan:  QTC is elevated, we will reduce the sotalol-particularly in light of dehydration and elevated creatinine  The aspirin will be stopped altogether, and because the patient may undergo a procedure, we will hold Plavix as well as Eliquis  Okay for EDG from a cardiac standpoint  Patient will be continue to monitor  Other nephrotoxic medications will be held  Further recommendations pending

## 2022-10-13 NOTE — PROGRESS NOTES
Nursing report was received at patients bedside. Patient is still wondering when he will be seen by a physician. Currently patient is in bed, denies pain, no distress noted, call light within reach, bed in lowest position, side rail upX2. Nurse will monitor patient throughout shift.

## 2022-10-13 NOTE — PROGRESS NOTES
[facility]   [facility]  Speech Language Pathology  Clinical Bedside Swallow Evaluation    Adelina Baptiste  : 1940 (79 y.o.)   [x]   confirmed    MRN: [id number]  ROOM: [place] [address]  ADMISSION DATE: 10/11/2022  PATIENT DIAGNOSIS(ES): Chest pain [R07.9]  Chest pain, unspecified type [R07.9]  Nausea and vomiting, unspecified vomiting type [R11.2]  No chief complaint on file.     Patient Active Problem List    Diagnosis Date Noted    Atrial fibrillation ([facility] [address] 2019    High risk medication use 2019    Chest pain 10/11/2022    Body mass index (BMI) of 20 to 24 2022    MARIAM (acute kidney injury) ([address] 2022    Impaired mobility and activities of daily living -sp Severe PVD s/p AAA repain 2022    Hypotension 2022    Late effects of CVA (cerebrovascular accident) 2022    PVD (peripheral vascular disease) ([address] 2022    Abdominal aortic aneurysm (AAA) 2021    Acute inferior myocardial infarction ([address] 2021    Carotid bruit 2021    Chronic obstructive pulmonary disease ([address] 2021    Diabetes mellitus ([address] 2021    Dyspnea on exertion 2021    Fatigue 2021    First degree atrioventricular block 2021    Hyperlipidemia 2021    Infection of the inner ear 2021    Muscle pain 2021    Underweight 2021    Ventricular premature beats 2021    Weight loss 2021    Ataxic gait 2021    Kyphoscoliosis 2021    Spinal stenosis of lumbar region with neurogenic claudication 2021    Meniere disease, bilateral     Benign paroxysmal positional vertigo due to bilateral vestibular disorder     Vertigo 2021    Ataxia 2021    Cerebrovascular accident ([address] 2020    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[address] 2020    Vasovagal syncope 2020    Dizziness and giddiness 2020    Orthostatic dizziness 2020    Atherosclerosis of native coronary artery of native heart without angina pectoris 11/26/2018    Hypertension 11/26/2018    Ischemic myocardial dysfunction 11/26/2018    Nonrheumatic aortic valve disorder, unspecified 11/26/2018    Aortic valve disorder 11/26/2018    Personal history of nicotine dependence 11/26/2018    Presence of aortocoronary bypass graft 11/26/2018    Coronary angioplasty status 11/26/2018     Past Medical History:   Diagnosis Date    MARIAM (acute kidney injury) ([facility] [address] 6/7/2022    Atrial fibrillation (HCC)     CAD (coronary artery disease)     cardiac stents    COPD (chronic obstructive pulmonary disease) (HCC)     Diabetes mellitus ([facility] [address]     Hyperlipidemia     Hypertension     Movement disorder     Pneumonia      Past Surgical History:   Procedure Laterality Date    APPENDECTOMY      CORONARY ANGIOPLASTY WITH STENT PLACEMENT      4    CORONARY ARTERY BYPASS GRAFT      triple bypass    EYE SURGERY Bilateral     cataract surgery    INSERTABLE CARDIAC MONITOR Left 12/2018     Allergies   Allergen Reactions    Fenofibrate     Lipitor [Atorvastatin] Other (See Comments)     Body pain    Niacin Other (See Comments)    Niaspan [Niacin Er] Other (See Comments)     Body pain    Vitamin E Other (See Comments)     Nose bleeds    Zocor [Simvastatin] Other (See Comments)     Sharp body pain       DATE ONSET: 10/11/2022    Date of Evaluation: 10/13/2022   Evaluating Therapist: ANUJ Carpenter    Dysphagia Diagnosis  Dysphagia Diagnosis: Concerns for esophageal stage dysphagia  Dysphagia Impression : Clinical indicators of esophageal dysphagia identified at bedside of unknown etiology with acute onset 2 days prior per patient and spouse reporting. Patient would benefit from immediate GI consult for evaluation. NPO with exceptions of ice chips is recommended at this time pending GI workup.     Recommended Diet  Recommendations: NPO;Consider ice chips PRN  Referral To: GI  Diet Solids Recommendation: NPO  Liquid Consistency Recommendation: Ice chips after oral care;NPO  Recommended Form of Meds: Via alternative means of nutrition       Reason for Referral  Nancy Monterroso was referred for a bedside swallow evaluation to assess the efficiency of his swallow function, identify signs and symptoms of aspiration, identify risk factors, and make recommendations regarding safe dietary consistencies, effective compensatory strategies, and safe eating environment. General  Chart Reviewed: Yes  Subjective  Subjective: Patient and spouse reporting consistently expectorating copious amounts of saliva/secretions and regurgitating all PO intake paired with chest pain since 10/11. SLP witnessed this during evaluation. Patient's emesis basin contained undigested scrambled eggs from breakfast, with patient reporting he only took 1 bite. Behavior/Cognition: Alert; Cooperative;Pleasant mood  Respiratory Status: O2 via nasual cannula  O2 Device: Nasal cannula  Liters of Oxygen: 2 L  Communication Observation: Functional  Follows Directions: Simple  Dentition: Edentulous (full set of dentures were left at home)  Patient Positioning: Upright in bed  Baseline Vocal Quality: Normal  Volitional Cough: Strong  Prior Dysphagia History: None  Consistencies Administered: Thin - cup    Current Diet level  Current Diet : NPO  Current Liquid Diet : NPO    Oral Motor  Labial: No impairment  Dentition: Edentulous  Oral Hygiene: Moist;Clean  Lingual: No impairment  Velum: No Impairment  Mandible: No impairment  Gag:  (DNT)    Oral/Pharyngeal Phase  Oral Phase - Comment: WFL at this time for single sip of thin liquid via cup. Pharyngeal Phase: Pharyngeal dysphagia is not suspected, though pt's symptoms likely indicate impairment during the esophogeal stage of the swallow.  Patient expectorating large amounts of saliva/secretions prior to any PO intake with undigested scrambled eggs noted in expectorated material. Patient reports he only ate one bite of them at breakfast. Patient requested water, trialed single sip. Within 5 seconds of pharyngeal swallow, regurgitation of thin liquid bolus observed into basin paired with patient reported 10/10 chest pain simultaneously. PO Trials  Neuromuscular Estim Used: No  Assessment Method(s): Observation  Patient Position: Upright in bed  Vocal Quality: No Impairment  Consistency Presented: Thin  How much presented: 1  Bolus Acceptance: No impairment  Bolus Formation/Control: No impairment  Propulsion: No impairment  Oral Residue: None  Initiation of Swallow:  (no impairment suspected)  Laryngeal Elevation:  (present)  Aspiration Signs/Symptoms: None  Pharyngeal Phase Characteristics: No impairment, issues, or problems  Effective Modifications: None    Dysphagia Diagnosis  Dysphagia Diagnosis: Concerns for esophageal stage dysphagia  Dysphagia Impression : Clinical indicators of esophageal dysphagia identified at bedside of unknown etiology with acute onset 2 days prior per patient and spouse reporting. Patient would benefit from immediate GI consult for evaluation. NPO with exceptions of ice chips is recommended at this time pending GI workup. Dysphagia Outcome Severity Scale: Level 1: Severe dysphagia- NPO. Unable to tolerate any PO safely     Recommendations  Requires SLP Intervention: Yes  Recommendations: NPO;Consider ice chips PRN  Referral To: GI  D/C Recommendations: Ongoing speech therapy is recommended during this hospitalization  Diet Solids Recommendation: NPO  Liquid Consistency Recommendation: Ice chips after oral care;NPO  Recommended Form of Meds: Via alternative means of nutrition  Therapeutic Interventions: Patient/Family education; Therapeutic PO trials with SLP  Duration of Treatment: LOS or until goals are met  Frequency of Treatment: 1-2x/week    Prognosis  Speech Therapy Prognosis  Prognosis:  (unknown, pending GI workup)    Education  Individuals consulted  Consulted and agree with results and recommendations: Patient;RN  RN Name: KENRICK Hines    Treatment/Goals  Short-term Goals  Timeframe for Short-term Goals: 1 week  Goal 1: Patient will tolereate therapeutic PO trials of food/liquid consistencies to be determined by treating SLP pending medical clearance from GI. Long-term Goals  Timeframe for Long-term Goals: 1 week  Goal 1: Patient will tolerate the least restrictive diet without adverse outcomes. Dysphagia Goals: The patient will tolerate repeat bedside swallowing evaluation when able. Safety Devices  Safety Devices  Safety Devices in place: Yes  Type of devices: Bed alarm in place;Call light within reach  Restraints Initially in Place: No    Pain Assessment  Patient c/o pain: Location and pain level: 10/10 chest pain  Pt able to proceed with session.   RN notified    Pain Re-assessment  Patient c/o pain: Described as same as above      Therapy Time  SLP Individual Minutes  Time In: 8746  Time Out: 8852  Minutes: 13          Signature: Electronically signed by ANUJ Haney on 10/13/2022 at 11:43 AM

## 2022-10-13 NOTE — H&P
Department of Internal Medicine  Nephrology  [facility]. Nephrology  Attending History and Physical      CHIEF COMPLAINT:  vomiting, lower sternal pain    Reason for Admission:  rule out cardiac etiology    History Obtained From:  patient, electronic medical record    HISTORY OF PRESENT ILLNESS:    79 y.o. male with history s/f history s/f T2DM, HTN, HLD, CAD s/p ORTIZ and CABG, A.fib on AC who presented for chest pain, N/V. Also associated w/ coughing. Has been present for 3 days. Apparently everything he eats he throws back up. Has had multiple episodes here. Hemodynamically stable. Pt of Dr. Melinda Reese. Cardiac enzymes wnl here. CXR showing no acute changes     Past Medical History:        Diagnosis Date    MARIAM (acute kidney injury) ([facility] [address] 6/7/2022    Atrial fibrillation (HCC)     CAD (coronary artery disease)     cardiac stents    COPD (chronic obstructive pulmonary disease) ([facility])     Diabetes mellitus ([facility] [address]     Hyperlipidemia     Hypertension     Movement disorder     Pneumonia        Past Surgical History:        Procedure Laterality Date    APPENDECTOMY      CORONARY ANGIOPLASTY WITH STENT PLACEMENT      4    CORONARY ARTERY BYPASS GRAFT      triple bypass    EYE SURGERY Bilateral     cataract surgery    INSERTABLE CARDIAC MONITOR Left 12/2018       Medications Prior to Admission:    Medications Prior to Admission: hydrALAZINE (APRESOLINE) 25 MG tablet, Take 25 mg by mouth 2 times daily  furosemide (LASIX) 40 MG tablet, Take 40 mg by mouth daily  clopidogrel (PLAVIX) 75 MG tablet, Take 1 tablet by mouth daily  valsartan (DIOVAN) 160 MG tablet, Take 1 tablet by mouth daily  albuterol sulfate  (90 Base) MCG/ACT inhaler, use 1 puff as needed twice daily for 90 days.   sotalol (BETAPACE) 80 MG tablet, Take 1 tablet by mouth 2 times daily  magnesium oxide (MAG-OX) 400 (240 Mg) MG tablet, Take 1 tablet by mouth daily  metFORMIN (GLUCOPHAGE) 500 MG tablet, Take 500 mg by mouth 2 times daily   apixaban (ELIQUIS) 5 MG TABS tablet, Take 1 tablet by mouth 2 times daily  pitavastatin (LIVALO) 4 MG TABS tablet, Take 4 mg by mouth nightly  nitroGLYCERIN (NITROSTAT) 0.4 MG SL tablet, Place 0.4 mg under the tongue every 5 minutes as needed for Chest pain   aspirin 81 MG tablet, Take 81 mg by mouth daily (Patient not taking: No sig reported)    Allergies:  Aripiprazole    Social History:   Social History       Tobacco History       Smoking Status  Former Smoking Tobacco Type  Cigarettes      Smokeless Tobacco Use  Never      Tobacco Comments  3 cigarettes/month              Alcohol History       Alcohol Use Status  No              Drug Use       Drug Use Status  No              Sexual Activity       Sexually Active  Not Asked                      Family History:   History reviewed. No pertinent family history.     REVIEW OF SYSTEMS:  Positives in bold  Constitutional: fever, chills, fatigue, malaise   HENT:  rhinorrhea, sinus pain, sore throat, epistaxis    Eyes:  photophobia, visual disturbance, eye redness  Respiratory: shortness of breath, cough, hemoptysis    Cardiovascular: chest pain, palpitations, orthopnea, leg swelling   Gastrointestinal: abdominal pain, nausea, vomiting, diarrhea, constipation   Endocrine: polydipsia, polyphagia, cold intolerance, heat intolerance  Genitourinary: dysuria, flank pain, frequency, urgency   Hematologic: easy bruising, easy bleeding  Musculoskeletal: back pain, neck pain, myalgias, arthalgias  Neurological: syncope, lightheadedness, dizziness, seizures, tremors, weakness  Psychiatric/Behavioral: anxiety, depression, hallucinations  Skin: rash, itching    PHYSICAL EXAM:  Vitals:  /71   Pulse 61   Temp 97.5 °F (36.4 °C) (Oral)   Resp 18   Ht 5' 9\" (1.753 m)   Wt 150 lb (68 kg)   SpO2 100%   BMI 22.15 kg/m²     General: alert, in no apparent distress  HEENT: normocephalic, atraumatic, anicteric  Neck: supple, no mass  Lungs: non-labored respirations, clear to auscultation bilaterally  Heart: regular rate and rhythm, no murmurs or rubs  Abdomen: soft, non-tender, non-distended  MSK: no joint swelling or tenderness  Ext: no cyanosis, no peripheral edema  Neuro: alert and oriented, no gross abnormalities  Psych: normal mood and affect  Skin: no rash    DATA:  CBC:   Lab Results   Component Value Date/Time    WBC 9.4 10/11/2022 07:08 PM    RBC 3.68 10/11/2022 07:08 PM    RBC 4.79 10/19/2011 06:47 AM    HGB 9.2 10/11/2022 07:08 PM    HCT 29.0 10/11/2022 07:08 PM    MCV 78.7 10/11/2022 07:08 PM    MCH 24.9 10/11/2022 07:08 PM    MCHC 31.7 10/11/2022 07:08 PM    RDW 18.4 10/11/2022 07:08 PM     10/11/2022 07:08 PM    MPV 9.7 10/13/2015 07:07 AM     BMP:    Lab Results   Component Value Date/Time     10/11/2022 07:08 PM    K 4.1 10/11/2022 07:08 PM    K 4.8 01/24/2021 05:07 AM     10/11/2022 07:08 PM    CO2 27 10/11/2022 07:08 PM    BUN 31 10/11/2022 07:08 PM    LABALBU 4.5 10/11/2022 07:08 PM    LABALBU 2.8 04/11/2022 01:50 PM    CREATININE 1.70 10/11/2022 07:08 PM    CALCIUM 9.5 10/11/2022 07:08 PM    GFRAA 46.8 10/11/2022 07:08 PM    LABGLOM 38.6 10/11/2022 07:08 PM    GLUCOSE 151 10/11/2022 07:08 PM    GLUCOSE 216 04/11/2022 01:50 PM     Last 3 Troponin:    Lab Results   Component Value Date/Time    TROPONINI <0.010 10/12/2022 09:48 AM    TROPONINI <0.010 10/12/2022 05:20 AM    TROPONINI <0.010 10/12/2022 02:17 AM       ASSESSMENT AND PLAN:    79 y.o. male with history s/f history s/f T2DM, HTN, HLD, CAD s/p ORTIZ and CABG, A.fib on AC who presented for chest pain, N/V. Chest pain: ? If cardiac in etiology vs. Esophageal/gastric. Does have cardiac history however having dysphagia along w/ the emesis. Neg trops.  Pt of Dr. Jef Loyola, neg CXR  HTN  A.fib: on [address]  N/V  T2DM: SSI, holding metformin      - agree w/ keeping patient NPO  - will start on NS for now  - obtain swallow eval   - will cardiology see due to cardiac history   - anti-emetics      Leslie Pina, MD, MD

## 2022-10-13 NOTE — FLOWSHEET NOTE
0730- Bedside report obtained from [facility]. Patient alert, oriented and resting in bed. Frequent productive cough with large amounts of clear expectorant with any oral consumption. Patient does not have home medications reordered at this time. 0800- Patient continuing to have frequent productive cough with large amounts of expectorant. Dr. Riky Pozo notified. Nursing judgement made to make patient NPO until further evaluation completed.      Electronically signed by Nolan Villarreal RN on 10/13/2022 at 8:11 AM

## 2022-10-13 NOTE — ANESTHESIA PRE PROCEDURE
Department of Anesthesiology  Preprocedure Note       Name:  Arpan Mendez   Age:  79 y.o.  :  1940                                          MRN:  [id number]         Date:  10/13/2022      Surgeon: Margo Coleman):  Seng Lechuga MD    Procedure: Procedure(s):  EGD ESOPHAGOGASTRODUODENOSCOPY WITH DILATION    Medications prior to admission:   Prior to Admission medications    Medication Sig Start Date End Date Taking? Authorizing Provider   hydrALAZINE (APRESOLINE) 25 MG tablet Take 25 mg by mouth 2 times daily    Historical Provider, MD   furosemide (LASIX) 40 MG tablet Take 40 mg by mouth daily    Historical Provider, MD   clopidogrel (PLAVIX) 75 MG tablet Take 1 tablet by mouth daily 22   hSruthi Obregon DO   valsartan (DIOVAN) 160 MG tablet Take 1 tablet by mouth daily 22   Shruthi Obregon DO   albuterol sulfate  (90 Base) MCG/ACT inhaler use 1 puff as needed twice daily for 90 days.  21   Historical Provider, MD   sotalol (BETAPACE) 80 MG tablet Take 1 tablet by mouth 2 times daily 21   Lanny Martinez MD   magnesium oxide (MAG-OX) 400 (240 Mg) MG tablet Take 1 tablet by mouth daily 21   Lanny Martinez MD   metFORMIN (GLUCOPHAGE) 500 MG tablet Take 500 mg by mouth 2 times daily  20   Historical Provider, MD   apixaban (ELIQUIS) 5 MG TABS tablet Take 1 tablet by mouth 2 times daily 19   Kallie Hawkisn MD   pitavastatin (LIVALO) 4 MG TABS tablet Take 4 mg by mouth nightly    Historical Provider, MD   nitroGLYCERIN (NITROSTAT) 0.4 MG SL tablet Place 0.4 mg under the tongue every 5 minutes as needed for Chest pain     Historical Provider, MD   aspirin 81 MG tablet Take 81 mg by mouth daily  Patient not taking: No sig reported    Historical Provider, MD       Current medications:    Current Facility-Administered Medications   Medication Dose Route Frequency Provider Last Rate Last Admin    [Held by provider] apixaban (ELIQUIS) tablet 5 mg  5 mg Oral BID Mildred Duobse MD Fito Robertson by provider] clopidogrel (PLAVIX) tablet 75 mg  75 mg Oral Daily Floyd Ormond, MD        hydrALAZINE (APRESOLINE) tablet 25 mg  25 mg Oral BID Floyd Ormond, MD        magnesium oxide (MAG-OX) tablet 400 mg  400 mg Oral Daily Floyd Ormond, MD        nitroGLYCERIN (NITROSTAT) SL tablet 0.4 mg  0.4 mg SubLINGual Q5 Min PRN Floyd Ormond, MD        [Held by provider] valsartan (DIOVAN) tablet 160 mg  160 mg Oral Daily Floyd Ormond, MD        glucose chewable tablet 16 g  4 tablet Oral PRN Floyd Ormond, MD        dextrose bolus 10% 125 mL  125 mL IntraVENous PRN Floyd Ormond, MD        Or    dextrose bolus 10% 250 mL  250 mL IntraVENous PRN Floyd Ormond, MD        glucagon (rDNA) injection 1 mg  1 mg SubCUTAneous PRN Floyd Ormond, MD        dextrose 10 % infusion   IntraVENous Continuous PRN Floyd Ormond, MD        insulin lispro (HUMALOG) injection vial 0-4 Units  0-4 Units SubCUTAneous Q4H Mildred Dubose MD        0.9 % sodium chloride infusion   IntraVENous Continuous Mildred Dubose MD 75 mL/hr at 10/13/22 0923 New Bag at 10/13/22 0923    [Held by provider] enoxaparin Sodium (LOVENOX) injection 30 mg  30 mg SubCUTAneous Daily Mildred Dubose MD        sotalol (BETAPACE) tablet 40 mg  40 mg Oral BID Deb Alvarez MD        sodium chloride flush 0.9 % injection 5-40 mL  5-40 mL IntraVENous 2 times per day Floyd Ormond, MD   5 mL at 10/12/22 2028    sodium chloride flush 0.9 % injection 5-40 mL  5-40 mL IntraVENous PRN Floyd Ormond, MD        0.9 % sodium chloride infusion   IntraVENous PRN Floyd Ormond, MD        acetaminophen (TYLENOL) tablet 650 mg  650 mg Oral Q4H PRN Mildred Dubose MD        ondansetron (ZOFRAN-ODT) disintegrating tablet 4 mg  4 mg Oral Q8H PRN Mildred Dubose MD        Or    ondansetron (ZOFRAN) injection 4 mg  4 mg IntraVENous Q6H PRN Floyd Ormond, MD   4 mg at 10/12/22 1331 Allergies:     Allergies   Allergen Reactions    Fenofibrate     Lipitor [Atorvastatin] Other (See Comments)     Body pain    Niacin Other (See Comments)    Niaspan [Niacin Er] Other (See Comments)     Body pain    Vitamin E Other (See Comments)     Nose bleeds    Zocor [Simvastatin] Other (See Comments)     Sharp body pain       Problem List:    Patient Active Problem List   Diagnosis Code    Atrial fibrillation ([facility] I48.91    High risk medication use Z79.899    Atherosclerosis of native coronary artery of native heart without angina pectoris I25.10    Hypertension I10    Ischemic myocardial dysfunction I25.5    Nonrheumatic aortic valve disorder, unspecified I35.9    Aortic valve disorder I35.9    Personal history of nicotine dependence Z87.891    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    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    Vasovagal syncope R55    Dizziness and giddiness R42    Cerebrovascular accident ([facility] I63.9    Orthostatic dizziness R42    Ataxia R27.0    Vertigo R42    Meniere disease, bilateral H81.03    Benign paroxysmal positional vertigo due to bilateral vestibular disorder H81.13    Coronary angioplasty status Z98.61    Ataxic gait R26.0    Kyphoscoliosis M41.9    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    Abdominal aortic aneurysm (AAA) I71.40    Acute inferior myocardial infarction (HCC) I21.19    Carotid bruit R09.89    Chronic obstructive pulmonary disease (HCC) J44.9    Diabetes mellitus (HCC) E11.9    Dyspnea on exertion R06.09    Fatigue R53.83    First degree atrioventricular block I44.0    Hyperlipidemia E78.5    Infection of the inner ear H83.09    Muscle pain M79.10    Underweight R63.6    Ventricular premature beats I49.3    Weight loss R63.4    PVD (peripheral vascular disease) ([facility] I73.9    Impaired mobility and activities of daily living -sp Severe PVD s/p AAA repain Z74.09, Z78.9    Hypotension I95.9    Late effects of CVA (cerebrovascular accident) I76.80    MARIAM (acute kidney injury) (HCC) N17.9    Body mass index (BMI) of 20 to 24 [id number]    Chest pain R07.9       Past Medical History:        Diagnosis Date    [facility] 6/7/2022    Atrial fibrillation (HCC)     CAD (coronary artery disease)     cardiac stents    COPD (chronic obstructive pulmonary disease) ([facility]     Diabetes mellitus ([facility]     Hyperlipidemia     Hypertension     Movement disorder     Pneumonia        Past Surgical History:        Procedure Laterality Date    APPENDECTOMY      CORONARY ANGIOPLASTY WITH STENT PLACEMENT      4    CORONARY ARTERY BYPASS GRAFT      triple bypass    EYE SURGERY Bilateral     cataract surgery    INSERTABLE CARDIAC MONITOR Left 12/2018       Social History:    Social History     Tobacco Use    Smoking status: Former     Types: Cigarettes    Smokeless tobacco: Never    Tobacco comments:     3 cigarettes/month   Substance Use Topics    Alcohol use:  No                                Counseling given: Not Answered  Tobacco comments: 3 cigarettes/month      Vital Signs (Current):   Vitals:    10/12/22 2059 10/13/22 0927 10/13/22 1502 10/13/22 1615   BP: 123/71 (!) 148/66 (!) 144/84    Pulse: 61 71 86 78   Resp: 18  18 18   Temp: 36.4 °C (97.5 °F) 36.9 °C (98.4 °F) 37 °C (98.6 °F) 36.8 °C (98.2 °F)   TempSrc: Oral  Temporal    SpO2: 100% 100% 93%    Weight:   150 lb (68 kg)    Height:   5' 9\" (1.753 m)                                               BP Readings from Last 3 Encounters:   10/13/22 (!) 144/84   09/22/22 106/66   06/29/22 110/62       NPO Status: Time of last liquid consumption: 0700                        Time of last solid consumption: 0700                        Date of last liquid consumption: 10/13/22                        Date of last solid food consumption: 10/13/22    BMI:   Wt Readings from Last 3 Encounters:   10/13/22 150 lb (68 kg) 09/22/22 142 lb (64.4 kg)   06/29/22 142 lb (64.4 kg)     Body mass index is 22.15 kg/m².     CBC:   Lab Results   Component Value Date/Time    WBC 9.4 10/11/2022 07:08 PM    RBC 3.68 10/11/2022 07:08 PM    RBC 4.79 10/19/2011 06:47 AM    HGB 9.2 10/11/2022 07:08 PM    HCT 29.0 10/11/2022 07:08 PM    MCV 78.7 10/11/2022 07:08 PM    RDW 18.4 10/11/2022 07:08 PM     10/11/2022 07:08 PM       CMP:   Lab Results   Component Value Date/Time     10/11/2022 07:08 PM    K 4.1 10/11/2022 07:08 PM    K 4.8 01/24/2021 05:07 AM     10/11/2022 07:08 PM    CO2 27 10/11/2022 07:08 PM    BUN 31 10/11/2022 07:08 PM    CREATININE 1.70 10/11/2022 07:08 PM    GFRAA 46.8 10/11/2022 07:08 PM    LABGLOM 38.6 10/11/2022 07:08 PM    GLUCOSE 151 10/11/2022 07:08 PM    GLUCOSE 216 04/11/2022 01:50 PM    PROT 7.6 10/11/2022 07:08 PM    CALCIUM 9.5 10/11/2022 07:08 PM    BILITOT 0.3 10/11/2022 07:08 PM    ALKPHOS 61 10/11/2022 07:08 PM    AST 14 10/11/2022 07:08 PM    ALT 6 10/11/2022 07:08 PM       POC Tests:   Recent Labs     10/13/22  0918   POCGLU 134*       Coags:   Lab Results   Component Value Date/Time    PROTIME 18.8 06/07/2022 02:30 PM    INR 1.6 06/07/2022 02:30 PM    APTT 34.5 06/07/2022 02:30 PM       HCG (If Applicable): No results found for: PREGTESTUR, PREGSERUM, HCG, HCGQUANT     ABGs:   Lab Results   Component Value Date/Time    PHART 7.34 04/07/2022 08:20 PM    PO2ART 294 04/07/2022 08:20 PM    DMT2CMO 32 04/07/2022 08:20 PM    N2BISKKP 100 04/07/2022 08:20 PM        Type & Screen (If Applicable):  No results found for: LABABO, LABRH    Drug/Infectious Status (If Applicable):  No results found for: HIV, HEPCAB    COVID-19 Screening (If Applicable):   Lab Results   Component Value Date/Time    COVID19 Not Detected 10/11/2022 07:08 PM    COVID19 NOT DETECTED 04/12/2022 11:56 AM           Anesthesia Evaluation  Patient summary reviewed and Nursing notes reviewed no history of anesthetic complications: Airway: Mallampati: II  TM distance: >3 FB   Neck ROM: full  Mouth opening: > = 3 FB   Dental: normal exam         Pulmonary:normal exam    (+) pneumonia:  COPD:  shortness of breath:                             Cardiovascular:  Exercise tolerance: good (>4 METS),   (+) hypertension:, past MI:, CAD:, POWERS:,       ECG reviewed               Beta Blocker:  Not on Beta Blocker         Neuro/Psych:   (+) CVA:,             GI/Hepatic/Renal: Neg GI/Hepatic/Renal ROS            Endo/Other:    (+) Diabetes, . Pt had PAT visit. Abdominal:             Vascular: negative vascular ROS. Other Findings:           Anesthesia Plan      general     ASA 4 - emergent       Induction: intravenous.                             [place] M[place], RACHEL - CRNA   10/13/2022

## 2022-10-13 NOTE — ANESTHESIA POSTPROCEDURE EVALUATION
Department of Anesthesiology  Postprocedure Note    Patient: Sita Henry  MRN: [id number]  YOB: 1940  Date of evaluation: 10/13/2022      Procedure Summary     Date: 10/13/22 Room / Location: [address] [place] [address]    Anesthesia Start: 7010 Anesthesia Stop: 1618    Procedure: EGD ESOPHAGOGASTRODUODENOSCOPY WITH DILATION Diagnosis:       Esophageal obstruction      (Esophageal obstruction [K22.2])    Surgeons: Caitlin Brown MD Responsible Provider:     Anesthesia Type: general ASA Status: 4 - Emergent          Anesthesia Type: No value filed.     Moni Phase I: Moni Score: 6    Moni Phase II:        Anesthesia Post Evaluation    Patient location during evaluation: bedside  Patient participation: complete - patient participated  Level of consciousness: awake and awake and alert  Airway patency: patent  Nausea & Vomiting: no nausea and no vomiting  Complications: no  Cardiovascular status: blood pressure returned to baseline and hemodynamically stable  Respiratory status: acceptable  Hydration status: euvolemic

## 2022-10-14 LAB
ANION GAP SERPL CALCULATED.3IONS-SCNC: 8 MEQ/L (ref 9–15)
BUN BLDV-MCNC: 26 MG/DL (ref 8–23)
CALCIUM SERPL-MCNC: 8.5 MG/DL (ref 8.5–9.9)
CHLORIDE BLD-SCNC: 109 MEQ/L (ref 95–107)
CO2: 28 MEQ/L (ref 20–31)
CREAT SERPL-MCNC: 1.08 MG/DL (ref 0.7–1.2)
EKG ATRIAL RATE: 72 BPM
EKG P AXIS: 96 DEGREES
EKG P-R INTERVAL: 204 MS
EKG Q-T INTERVAL: 428 MS
EKG QRS DURATION: 86 MS
EKG QTC CALCULATION (BAZETT): 468 MS
EKG R AXIS: 63 DEGREES
EKG T AXIS: 154 DEGREES
EKG VENTRICULAR RATE: 72 BPM
GFR AFRICAN AMERICAN: >60
GFR NON-AFRICAN AMERICAN: >60
GLUCOSE BLD-MCNC: 131 MG/DL (ref 70–99)
GLUCOSE BLD-MCNC: 144 MG/DL (ref 70–99)
GLUCOSE BLD-MCNC: 148 MG/DL (ref 70–99)
GLUCOSE BLD-MCNC: 157 MG/DL (ref 70–99)
GLUCOSE BLD-MCNC: 166 MG/DL (ref 70–99)
GLUCOSE BLD-MCNC: 199 MG/DL (ref 70–99)
GLUCOSE BLD-MCNC: 222 MG/DL (ref 70–99)
HCT VFR BLD CALC: 22.7 % (ref 42–52)
HEMOGLOBIN: 7.1 G/DL (ref 14–18)
MCH RBC QN AUTO: 24.5 PG (ref 27–31.3)
MCHC RBC AUTO-ENTMCNC: 31.5 % (ref 33–37)
MCV RBC AUTO: 77.8 FL (ref 80–100)
PDW BLD-RTO: 18.2 % (ref 11.5–14.5)
PERFORMED ON: ABNORMAL
PLATELET # BLD: 232 K/UL (ref 130–400)
POTASSIUM SERPL-SCNC: 4 MEQ/L (ref 3.4–4.9)
RBC # BLD: 2.92 M/UL (ref 4.7–6.1)
SODIUM BLD-SCNC: 145 MEQ/L (ref 135–144)
TOTAL CK: 73 U/L (ref 0–190)
TROPONIN: <0.01 NG/ML (ref 0–0.01)
WBC # BLD: 8.9 K/UL (ref 4.8–10.8)

## 2022-10-14 PROCEDURE — 80048 BASIC METABOLIC PNL TOTAL CA: CPT

## 2022-10-14 PROCEDURE — 2700000000 HC OXYGEN THERAPY PER DAY

## 2022-10-14 PROCEDURE — 99232 SBSQ HOSP IP/OBS MODERATE 35: CPT | Performed by: NURSE PRACTITIONER

## 2022-10-14 PROCEDURE — G0378 HOSPITAL OBSERVATION PER HR: HCPCS | Performed by: STUDENT IN AN ORGANIZED HEALTH CARE EDUCATION/TRAINING PROGRAM

## 2022-10-14 PROCEDURE — 82550 ASSAY OF CK (CPK): CPT

## 2022-10-14 PROCEDURE — 96361 HYDRATE IV INFUSION ADD-ON: CPT

## 2022-10-14 PROCEDURE — G0378 HOSPITAL OBSERVATION PER HR: HCPCS

## 2022-10-14 PROCEDURE — 6370000000 HC RX 637 (ALT 250 FOR IP): Performed by: INTERNAL MEDICINE

## 2022-10-14 PROCEDURE — 93005 ELECTROCARDIOGRAM TRACING: CPT | Performed by: INTERNAL MEDICINE

## 2022-10-14 PROCEDURE — 92526 ORAL FUNCTION THERAPY: CPT

## 2022-10-14 PROCEDURE — 85027 COMPLETE CBC AUTOMATED: CPT

## 2022-10-14 PROCEDURE — 6370000000 HC RX 637 (ALT 250 FOR IP): Performed by: STUDENT IN AN ORGANIZED HEALTH CARE EDUCATION/TRAINING PROGRAM

## 2022-10-14 PROCEDURE — 36415 COLL VENOUS BLD VENIPUNCTURE: CPT

## 2022-10-14 PROCEDURE — 2580000003 HC RX 258: Performed by: STUDENT IN AN ORGANIZED HEALTH CARE EDUCATION/TRAINING PROGRAM

## 2022-10-14 PROCEDURE — 84484 ASSAY OF TROPONIN QUANT: CPT

## 2022-10-14 RX ORDER — INSULIN LISPRO 100 [IU]/ML
0-4 INJECTION, SOLUTION INTRAVENOUS; SUBCUTANEOUS NIGHTLY
Status: DISCONTINUED | OUTPATIENT
Start: 2022-10-14 | End: 2022-10-21 | Stop reason: HOSPADM

## 2022-10-14 RX ORDER — SOTALOL HYDROCHLORIDE 120 MG/1
60 TABLET ORAL 2 TIMES DAILY
Status: DISCONTINUED | OUTPATIENT
Start: 2022-10-14 | End: 2022-10-19

## 2022-10-14 RX ORDER — INSULIN LISPRO 100 [IU]/ML
0-4 INJECTION, SOLUTION INTRAVENOUS; SUBCUTANEOUS
Status: DISCONTINUED | OUTPATIENT
Start: 2022-10-15 | End: 2022-10-21 | Stop reason: HOSPADM

## 2022-10-14 RX ORDER — 0.9 % SODIUM CHLORIDE 0.9 %
500 INTRAVENOUS SOLUTION INTRAVENOUS ONCE
Status: COMPLETED | OUTPATIENT
Start: 2022-10-14 | End: 2022-10-14

## 2022-10-14 RX ADMIN — Medication 10 ML: at 09:01

## 2022-10-14 RX ADMIN — SOTALOL HYDROCHLORIDE 40 MG: 80 TABLET ORAL at 09:00

## 2022-10-14 RX ADMIN — SODIUM CHLORIDE: 9 INJECTION, SOLUTION INTRAVENOUS at 08:59

## 2022-10-14 RX ADMIN — SODIUM CHLORIDE: 9 INJECTION, SOLUTION INTRAVENOUS at 20:49

## 2022-10-14 RX ADMIN — SODIUM CHLORIDE 500 ML: 9 INJECTION, SOLUTION INTRAVENOUS at 17:24

## 2022-10-14 RX ADMIN — SOTALOL HYDROCHLORIDE 60 MG: 120 TABLET ORAL at 20:50

## 2022-10-14 RX ADMIN — MAGNESIUM OXIDE 400 MG (241.3 MG MAGNESIUM) TABLET 400 MG: TABLET at 09:00

## 2022-10-14 ASSESSMENT — PAIN SCALES - GENERAL
PAINLEVEL_OUTOF10: 0
PAINLEVEL_OUTOF10: 0

## 2022-10-14 NOTE — PROGRESS NOTES
[facility]  [facility]  Speech Language Pathology   Treatment Note      Sofy Rajput  1940  B199/[id number]  [x]   confirmed      Date: 10/14/2022    Chest pain [R07.9]  Chest pain, unspecified type [R07.9]  Nausea and vomiting, unspecified vomiting type [R11.2]         Weight: 150 lb (68 kg)     ADULT DIET; Dysphagia - Soft and Bite Sized    SpO2: 98 % (10/14/22 0857)  O2 Flow Rate (L/min): 3 L/min (10/13/22 2033)  No active isolations      Subjective:  Alert, Cooperative, and Pleasant        Interventions used this date:  Dysphagia Treatment and Instruction in Compensatory Strategies      Objective/Assessment:  Patient progressing towards goals:  Short-term Goals  Timeframe for Short-term Goals: 1 week  Goal 1: Patient will tolereate therapeutic PO trials of food/liquid consistencies to be determined by treating SLP pending medical clearance from GI.  GI was consulted. Food bolus was found in esophagus and removed successfully. Esophagus was also dilated up to 15mm. GI ordered esophagram.     SLP called and spoke with KENRICK Sanz who reviewed results of GI consult. RN informed SLP of initiation of soft and bite size and thin liquid diet. RN reports patient has been tolerating well. Patient consumed 5 teaspoons of soft and bite size consistency. Patient does not have dentures present and required prolonged mastication time. Patient was able to fully clear oral cavity, given extended time. Patient's wife reports that he has not worn his dentures in about a year. Patient then trialed thin liquid via straw. Patient consumed 3 consecutive sips and demonstrated no s/s of aspiration. Patient also consumed 4 single sips of thin liquid with no s/s of aspiration. Update goals to reflect the following:    Patient will tolerate recommended diet with adequate mastication, oral clearance, and no s/s of aspiration or regurgitation.      SLP recommends continuing with soft and thin liquid diet at this time. Treatment/Activity Tolerance:  Patient tolerated treatment well    Plan: Alter current POC (see above)    Pain Assessment:  Patient does not c/o pain. Pain Re-assessment:  Patient does not c/o pain. Patient/Caregiver Education:  Patient educated on session and progression towards goals. Patient stated verbal understanding of directions.     Safety Devices:  Bed alarm in place and Call light within reach      Therapy Time  SLP Individual Minutes  Time In: 2938  Time Out: [address]  Minutes: 8            Signature: Electronically signed by ANUJ Guzman on 10/14/2022 at 3:56 PM

## 2022-10-14 NOTE — PROGRESS NOTES
[facility] Heart Progress Note      Patient: Sita Henry    Unit/Bed: Q443/B168-89  YOB: 1940  MRN: [id number]  Admit Date:  10/11/2022  Hospital Day: 0    Rounding Date: 10/14/2022    Rounding Cardiologist:  HILDA Thomas MD    PRIMARY Cardiologist: Troy Lowe    Subjective Complaint:   Denies any chest pain with exertion or at rest, palpitations, syncope, or edema.,  Feels much better. Physical Examination:     BP (!) 105/45   Pulse 63   Temp 98 °F (36.7 °C) (Oral)   Resp 18   Ht 5' 9\" (1.753 m)   Wt 150 lb (68 kg)   SpO2 100%   BMI 22.15 kg/m²       Intake/Output Summary (Last 24 hours) at 10/14/2022 0933  Last data filed at 10/14/2022 0314  Gross per 24 hour   Intake 108 ml   Output 200 ml   Net -92 ml                  Sita Henry examined at bedside in in no apparent distress, cooperative, and improved. Focused exam reveals:     Cardiac: Heart regular rate and rhythm     Lungs:  clear to auscultation bilaterally- no wheezes, rales or rhonchi, normal air movement, no respiratory distress    Extremities:   negative    Telemetry:      normal sinus  and 1 short burst of nonsustained supraventricular tachycardia          LABS:   CBC:   Recent Labs     10/11/22  1908 10/13/22  1746 10/14/22  0543   WBC 9.4 11.6* 8.9   HGB 9.2* 8.5* 7.1*    295 232      BMP:    Recent Labs     10/11/22  1908 10/13/22  1746 10/14/22  0543    149* 145*   K 4.1 4.8 4.0    109* 109*   CO2 27 27 28   BUN 31* 31* 26*   CREATININE 1.70* 1.29* 1.08   GLUCOSE 151* 138* 131*              Troponin: No results for input(s): TROPONINT in the last 72 hours. BNP: No results for input(s): PROBNP in the last 72 hours. INR:   Recent Labs     10/13/22  1746   INR 1.5      Mg:   Recent Labs     10/11/22  1908   MG 2.3       Cardiac Testing:    none  EKG today shows QTC is improved, but now with T wave abnormalities    Assessment:  Please see EGD report.   Patient apparently had some food stuck in esophagus and underwent dilatation  Negative troponin-  QTC was elevated but the patient was dehydrated and subsequently he is Betapace may have caused QTC prolongation. Patient's creatinine is now normalized  Anemia    Plan: Will hold Plavix and Eliquis till tomorrow a.m. Slowly adjust patient's sotalol  From a pure cardiac standpoint, the patient can be discharged--however we will have to hold some of his medications. Most likely would resume Plavix and Eliquis in a.m. However, I believe patient's CBC should be repeated. We will also repeat troponins given some T wave changes.   Patient has follow-up with Dr. J Carlos Fontenot in the near future  Electronically signed by Zen Doan MD on 10/14/2022 at 9:33 AM

## 2022-10-14 NOTE — PROGRESS NOTES
Nephrology Progress Note    ASSESSMENT AND PLAN:    79 y.o. male with history s/f history s/f T2DM, HTN, HLD, CAD s/p ORTIZ and CABG, A.fib on AC who presented for chest pain, N/V. Chest pain: ? If cardiac in etiology vs. Esophageal/gastric. Does have cardiac history however having dysphagia along w/ the emesis. Neg trops.  Pt of Dr. Miriam Ellis, neg CXR  HTN  A.fib: on [address]  T2DM: SSI, holding metformin  Esophageal stricture: noted on EGD 10/13, s/p dilation      - monitor today  - encourage PO, stopping IVFs later today  - per cardiology plan to restart [facility] tomorrow  - if cardiology and GI ok tomorrow will discharge then      Patient Active Problem List:     Atrial fibrillation ([address]     High risk medication use     Atherosclerosis of native coronary artery of native heart without angina pectoris     Hypertension     Ischemic myocardial dysfunction     Nonrheumatic aortic valve disorder, unspecified     Aortic valve disorder     Personal history of nicotine dependence     Presence of aortocoronary bypass graft     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HCC)     Vasovagal syncope     Dizziness and giddiness     Cerebrovascular accident ([address]     Orthostatic dizziness     Ataxia     Vertigo     Meniere disease, bilateral     Benign paroxysmal positional vertigo due to bilateral vestibular disorder     Coronary angioplasty status     Ataxic gait     Kyphoscoliosis     Spinal stenosis of lumbar region with neurogenic claudication     Abdominal aortic aneurysm (AAA)     Acute inferior myocardial infarction (HCC)     Carotid bruit     Chronic obstructive pulmonary disease (HCC)     Diabetes mellitus ([address]     Dyspnea on exertion     Fatigue     First degree atrioventricular block     Hyperlipidemia     Infection of the inner ear     Muscle pain     Underweight     Ventricular premature beats     Weight loss     PVD (peripheral vascular disease) (HCC)     Impaired mobility and activities of daily living -sp Severe PVD s/p AAA repain     Hypotension     Late effects of CVA (cerebrovascular accident)     MARIAM (acute kidney injury) ([facility]     Body mass index (BMI) of 20 to 24     Chest pain     Esophageal dysphagia     Food impaction of esophagus     Esophageal stenosis      Subjective:  Admit Date: 10/11/2022    Interval History: feels better this AM, seen by GI yesterday s/p urgent EGD which showed lower esophageal stricture s/p dilation    Medications:  Scheduled Meds:   sotalol  60 mg Oral BID    [Held by provider] apixaban  5 mg Oral BID    [Held by provider] clopidogrel  75 mg Oral Daily    [Held by provider] hydrALAZINE  25 mg Oral BID    magnesium oxide  400 mg Oral Daily    [Held by provider] valsartan  160 mg Oral Daily    insulin lispro  0-4 Units SubCUTAneous Q4H    [Held by provider] enoxaparin  30 mg SubCUTAneous Daily    sodium chloride flush  5-40 mL IntraVENous 2 times per day     Continuous Infusions:   dextrose      sodium chloride 75 mL/hr at 10/14/22 0859    sodium chloride         CBC:   Recent Labs     10/13/22  1746 10/14/22  0543   WBC 11.6* 8.9   HGB 8.5* 7.1*    232     CMP:    Recent Labs     10/11/22  1908 10/13/22  1746 10/14/22  0543    149* 145*   K 4.1 4.8 4.0    109* 109*   CO2 27 27 28   BUN 31* 31* 26*   CREATININE 1.70* 1.29* 1.08   GLUCOSE 151* 138* 131*   CALCIUM 9.5 9.3 8.5   LABGLOM 38.6* 53.1* >60.0     Troponin:   Recent Labs     10/14/22  0958   TROPONINI <0.010     BNP: No results for input(s): BNP in the last 72 hours. INR:   Recent Labs     10/13/22  1746   INR 1.5     Lipids:   Recent Labs     10/11/22  1908   LIPASE 39     Liver:   Recent Labs     10/13/22  1746   AST 15   ALT <5   ALKPHOS 58   PROT 7.3   LABALBU 4.2   BILITOT 0.5     Iron:  No results for input(s): IRONS, FERRITIN in the last 72 hours. Invalid input(s): LABIRONS  Urinalysis: No results for input(s): UA in the last 72 hours.     Objective:  Vitals: BP (!) 105/45   Pulse 63   Temp 98 °F (36.7 °C) (Oral) Resp 18   Ht 5' 9\" (1.753 m)   Wt 150 lb (68 kg)   SpO2 100%   BMI 22.15 kg/m²    Wt Readings from Last 3 Encounters:   10/13/22 150 lb (68 kg)   09/22/22 142 lb (64.4 kg)   06/29/22 142 lb (64.4 kg)      24HR INTAKE/OUTPUT:    Intake/Output Summary (Last 24 hours) at 10/14/2022 1223  Last data filed at 10/14/2022 0314  Gross per 24 hour   Intake 108 ml   Output 200 ml   Net -92 ml       General: alert, in no apparent distress  HEENT: normocephalic, atraumatic, anicteric  Neck: supple, no mass  Lungs: non-labored respirations, clear to auscultation bilaterally  Heart: regular rate and rhythm, no murmurs or rubs  Abdomen: soft, non-tender, non-distended  Ext: no cyanosis, no peripheral edema  Neuro: alert and oriented, no gross abnormalities      Electronically signed by Ludmila Douglass MD, MD

## 2022-10-14 NOTE — PROGRESS NOTES
Gastroenterology Progress Note    Jennelle Goldmann is a 79 y.o. male patient. Hospitalization Day:0    Chief C/O: Nausea, vomiting, dysphagia    SUBJECTIVE: Seen and examined on telemetry unit. Patient reports significant improvement in his ability to keep liquids and soft solids down since his EGD yesterday. States he still spits up some phlegm on occasion, however overall feels much better. States his chest/epigastric pain has resolved since his procedure. He denies nausea or vomiting. Denies bowel movement overnight. Per nursing, he has not gone for his esophagram yet. Physical    VITALS:  BP (!) 105/45   Pulse 63   Temp 98 °F (36.7 °C) (Oral)   Resp 18   Ht 5' 9\" (1.753 m)   Wt 150 lb (68 kg)   SpO2 100%   BMI 22.15 kg/m²   TEMPERATURE:  Current - Temp: 98 °F (36.7 °C); Max - Temp  Av.2 °F (36.8 °C)  Min: 97.8 °F (36.6 °C)  Max: 98.6 °F (37 °C)    General -alert, oriented, in no acute distress  Eyes - no Icterus, no pallor  Cardiovascular - RRR  Lungs - clear to auscultation bilaterally  Abdomen - non distended,  non tender, no organomegaly, Bowel sounds present  Extremities - no edema  Skin -warm/dry, without jaundice  Neuro: Without focal deficit, moves all extremities, no asterixis     Data    Data Review:    Recent Labs     10/11/22  1908 10/13/22  1746 10/14/22  0543   WBC 9.4 11.6* 8.9   HGB 9.2* 8.5* 7.1*   HCT 29.0* 28.3* 22.7*   MCV 78.7* 79.2* 77.8*    295 232     Recent Labs     10/11/22  1908 10/13/22  1746 10/14/22  0543    149* 145*   K 4.1 4.8 4.0    109* 109*   CO2 27 27 28   BUN 31* 31* 26*   CREATININE 1.70* 1.29* 1.08     Recent Labs     10/11/22  1908 10/13/22  1746   AST 14 15   ALT 6 <5   BILITOT 0.3 0.5   ALKPHOS 61 58     Recent Labs     10/11/22  1908   LIPASE 39     Recent Labs     10/13/22  1746   PROTIME 17.8*   INR 1.5       Radiology Review:      Portable chest x-ray 10/11/2022: No acute cardiopulmonary abnormality.     Portable chest x-ray 10/13/2022: No acute process    CT chest without contrast 10/13/2022: Esophagus is patulous in its proximal through mid portions with circumferential thickening of the distal esophagus relative change in caliber likely soft tissue thickening versus minimal mass-effect from enlargement of the left atrium however no soft tissue mass or adenopathy otherwise identified to suggest intrinsic compression. Esophagram under fluoroscopic evaluation may be considered versus endoscopy. Previous endoscopic history:  EGD 10/13/22: Food in the middle third of the esophagus and in the lower third of the esophagus. Removal was successful. Benign-appearing esophageal stenosis. Dilated up to 15 mm. Esophagogastric landmarks identified. Normal stomach. Normal examined duodenum. ASSESSMENT:  79 y.o. male admitted with a history of nausea/vomiting with associated chest/epigastric pain. He has been unable to keep anything down (including food, liquids, or his own secretions) since Tuesday morning after eating a sausage/egg/biscuit sandwich. Endorses history of esophageal dysphagia to solids approximately 1 time per week denies prior EGD or dilation. Rare heartburn symptoms, not on PPI therapy at home. On Eliquis and Plavix (last had Tuesday morning). On admit, glucose 151, BUN 31, creatinine 1.70, WBCs 9.4, Hgb 9.2, MCV 78.7, platelets 511, lipase 39, troponin less than 0.010 x4,   Portable chest x-ray 10/11/2022: No acute cardiopulmonary abnormality. Clinical history of acute onset of esophageal dysphagia to liquids/solids, unable to tolerate secretions, highly suspicious for esophageal obstruction secondary to food bolus obstruction. S/P EGD with food in the middle third of the esophagus and in the lower third of the esophagus, removal was successful. Benign-appearing esophageal stenosis, dilated up to 15 mm.  CT consistent with patulous proximal/mid esophagus with circumferential thickening of distal esophagus, no soft tissue mass or adenopathy appreciated. Achalasia differential.  Awaiting timed esophagram.    PLAN :  -Medical management/supportive care per primary team  -Soft diet as tolerated  -Await esophagram results  -Further recommendations pending patient's clinical course  -Recommend holding antiplatelet therapy at least 24 hours post dilation  -Recommend holding anticoagulation 48-72 hours post dilation  -Further recommendations pending patient's clinical course    Signing out GI consult service to Dr. Darlene Aguirre. After 5 pm today Dr. Darlene Aguirre is covering hospital call and consult for GI/hepatology, Dr. Darlene Aguirre will continue to follow until sign off or discharge from the hospital.    Thank you for allowing me to participate in the care of your patient. Please feel free to contact me with any concerns.     RACHEL Quezada - CNP

## 2022-10-15 LAB
ABO/RH: NORMAL
ANION GAP SERPL CALCULATED.3IONS-SCNC: 9 MEQ/L (ref 9–15)
ANTIBODY SCREEN: NORMAL
BLOOD BANK DISPENSE STATUS: NORMAL
BLOOD BANK PRODUCT CODE: NORMAL
BPU ID: NORMAL
BUN BLDV-MCNC: 18 MG/DL (ref 8–23)
CALCIUM SERPL-MCNC: 8.2 MG/DL (ref 8.5–9.9)
CHLORIDE BLD-SCNC: 110 MEQ/L (ref 95–107)
CO2: 26 MEQ/L (ref 20–31)
CREAT SERPL-MCNC: 0.92 MG/DL (ref 0.7–1.2)
DESCRIPTION BLOOD BANK: NORMAL
EKG ATRIAL RATE: 61 BPM
EKG P AXIS: 79 DEGREES
EKG P-R INTERVAL: 236 MS
EKG Q-T INTERVAL: 510 MS
EKG QRS DURATION: 84 MS
EKG QTC CALCULATION (BAZETT): 513 MS
EKG R AXIS: 62 DEGREES
EKG T AXIS: 109 DEGREES
EKG VENTRICULAR RATE: 61 BPM
GFR AFRICAN AMERICAN: >60
GFR NON-AFRICAN AMERICAN: >60
GLUCOSE BLD-MCNC: 135 MG/DL (ref 70–99)
GLUCOSE BLD-MCNC: 137 MG/DL (ref 70–99)
GLUCOSE BLD-MCNC: 153 MG/DL (ref 70–99)
GLUCOSE BLD-MCNC: 177 MG/DL (ref 70–99)
HCT VFR BLD CALC: 22.8 % (ref 42–52)
HCT VFR BLD CALC: 24.1 % (ref 42–52)
HEMOGLOBIN: 7.1 G/DL (ref 14–18)
HEMOGLOBIN: 7.3 G/DL (ref 14–18)
MCH RBC QN AUTO: 24.6 PG (ref 27–31.3)
MCHC RBC AUTO-ENTMCNC: 31.4 % (ref 33–37)
MCV RBC AUTO: 78.5 FL (ref 80–100)
PDW BLD-RTO: 18.3 % (ref 11.5–14.5)
PERFORMED ON: ABNORMAL
PLATELET # BLD: 224 K/UL (ref 130–400)
POTASSIUM SERPL-SCNC: 4.1 MEQ/L (ref 3.4–4.9)
RBC # BLD: 2.9 M/UL (ref 4.7–6.1)
SODIUM BLD-SCNC: 145 MEQ/L (ref 135–144)
WBC # BLD: 6.5 K/UL (ref 4.8–10.8)

## 2022-10-15 PROCEDURE — 86901 BLOOD TYPING SEROLOGIC RH(D): CPT

## 2022-10-15 PROCEDURE — 86900 BLOOD TYPING SEROLOGIC ABO: CPT

## 2022-10-15 PROCEDURE — 6370000000 HC RX 637 (ALT 250 FOR IP): Performed by: STUDENT IN AN ORGANIZED HEALTH CARE EDUCATION/TRAINING PROGRAM

## 2022-10-15 PROCEDURE — 6370000000 HC RX 637 (ALT 250 FOR IP): Performed by: INTERNAL MEDICINE

## 2022-10-15 PROCEDURE — 36430 TRANSFUSION BLD/BLD COMPNT: CPT

## 2022-10-15 PROCEDURE — 85014 HEMATOCRIT: CPT

## 2022-10-15 PROCEDURE — 36415 COLL VENOUS BLD VENIPUNCTURE: CPT

## 2022-10-15 PROCEDURE — 85018 HEMOGLOBIN: CPT

## 2022-10-15 PROCEDURE — 85027 COMPLETE CBC AUTOMATED: CPT

## 2022-10-15 PROCEDURE — P9016 RBC LEUKOCYTES REDUCED: HCPCS

## 2022-10-15 PROCEDURE — 80048 BASIC METABOLIC PNL TOTAL CA: CPT

## 2022-10-15 PROCEDURE — 93005 ELECTROCARDIOGRAM TRACING: CPT | Performed by: INTERNAL MEDICINE

## 2022-10-15 PROCEDURE — 86923 COMPATIBILITY TEST ELECTRIC: CPT

## 2022-10-15 PROCEDURE — 2580000003 HC RX 258: Performed by: STUDENT IN AN ORGANIZED HEALTH CARE EDUCATION/TRAINING PROGRAM

## 2022-10-15 PROCEDURE — 86850 RBC ANTIBODY SCREEN: CPT

## 2022-10-15 PROCEDURE — 2060000000 HC ICU INTERMEDIATE R&B

## 2022-10-15 RX ORDER — SODIUM CHLORIDE 9 MG/ML
INJECTION, SOLUTION INTRAVENOUS PRN
Status: DISCONTINUED | OUTPATIENT
Start: 2022-10-15 | End: 2022-10-21 | Stop reason: HOSPADM

## 2022-10-15 RX ORDER — CLOPIDOGREL BISULFATE 75 MG/1
75 TABLET ORAL DAILY
Qty: 30 TABLET | Refills: 3 | Status: SHIPPED | OUTPATIENT
Start: 2022-10-17

## 2022-10-15 RX ORDER — SOTALOL HYDROCHLORIDE 120 MG/1
60 TABLET ORAL 2 TIMES DAILY
Qty: 60 TABLET | Refills: 3 | Status: SHIPPED | OUTPATIENT
Start: 2022-10-15 | End: 2022-10-20 | Stop reason: HOSPADM

## 2022-10-15 RX ADMIN — SOTALOL HYDROCHLORIDE 60 MG: 120 TABLET ORAL at 09:40

## 2022-10-15 RX ADMIN — MAGNESIUM OXIDE 400 MG (241.3 MG MAGNESIUM) TABLET 400 MG: TABLET at 09:40

## 2022-10-15 RX ADMIN — Medication 10 ML: at 09:41

## 2022-10-15 RX ADMIN — Medication 10 ML: at 20:37

## 2022-10-15 RX ADMIN — SODIUM CHLORIDE: 9 INJECTION, SOLUTION INTRAVENOUS at 09:42

## 2022-10-15 RX ADMIN — SOTALOL HYDROCHLORIDE 60 MG: 120 TABLET ORAL at 20:37

## 2022-10-15 ASSESSMENT — PAIN SCALES - GENERAL: PAINLEVEL_OUTOF10: 0

## 2022-10-15 NOTE — CONSENT
Informed Consent for Blood Component Transfusion Note    I have discussed with the patient the rationale for blood component transfusion; its benefits in treating or preventing fatigue, organ damage, or death; and its risk which includes mild transfusion reactions, rare risk of blood borne infection, or more serious but rare reactions. I have discussed the alternatives to transfusion, including the risk and consequences of not receiving transfusion. The patient had an opportunity to ask questions and had agreed to proceed with transfusion of blood components.     Electronically signed by Elizabeth Dillon MD on 10/15/22 at 11:05 AM EDT

## 2022-10-15 NOTE — FLOWSHEET NOTE
Pts BP 97/55. Patient asymptomatic. Dr. Kimberli Holloway made aware via perfect serve. Orders received for 500cc NS bolus.      Electronically signed by Marley Aldridge RN on 10/14/2022 at 8:17 PM

## 2022-10-15 NOTE — PROGRESS NOTES
Nephrology Progress Note    ASSESSMENT AND PLAN:    79 y.o. male with history s/f history s/f T2DM, HTN, HLD, CAD s/p ORTIZ and CABG, A.fib on AC who presented for chest pain, N/V. Chest pain: ? If cardiac in etiology vs. Esophageal/gastric. Does have cardiac history however having dysphagia along w/ the emesis. Neg trops.  Pt of Dr. Mor Lynn, neg CXR  HTN  A.fib: on [facility]  T2DM: SSI, holding metformin  Esophageal stricture: noted on EGD 10/13, s/p dilation   Anemia: likely in setting of blood loss     - cardiology ok w/ discharge, outpatient follow  - can resume AC on Monday  - will give PRBC 1 unit transfusion as Hb 7.1 and cardiac patient  - will discharge thereafter if Hb stable and GI ok with discharge  -  will keep valsartan and hydralazine on hold on discharge       Patient Active Problem List:     Atrial fibrillation ([address]     High risk medication use     Atherosclerosis of native coronary artery of native heart without angina pectoris     Hypertension     Ischemic myocardial dysfunction     Nonrheumatic aortic valve disorder, unspecified     Aortic valve disorder     Personal history of nicotine dependence     Presence of aortocoronary bypass graft     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HCC)     Vasovagal syncope     Dizziness and giddiness     Cerebrovascular accident ([address]     Orthostatic dizziness     Ataxia     Vertigo     Meniere disease, bilateral     Benign paroxysmal positional vertigo due to bilateral vestibular disorder     Coronary angioplasty status     Ataxic gait     Kyphoscoliosis     Spinal stenosis of lumbar region with neurogenic claudication     Abdominal aortic aneurysm (AAA)     Acute inferior myocardial infarction [facility])     Carotid bruit     Chronic obstructive pulmonary disease (HCC)     Diabetes mellitus ([address]     Dyspnea on exertion     Fatigue     First degree atrioventricular block     Hyperlipidemia     Infection of the inner ear     Muscle pain     Underweight     Ventricular premature beats     Weight loss     PVD (peripheral vascular disease) ([facility])     Impaired mobility and activities of daily living -sp Severe PVD s/p AAA repain     Hypotension     Late effects of CVA (cerebrovascular accident)     MARIAM (acute kidney injury) ([facility]     Body mass index (BMI) of 20 to 24     Chest pain     Esophageal dysphagia     Food impaction of esophagus     Esophageal stenosis      Subjective:  Admit Date: 10/11/2022    Interval History: feeling better, Hb remains stable at 7.1, BP intermittently low, cardiology ok w/ discharge, tolerating diet       Medications:  Scheduled Meds:   sotalol  60 mg Oral BID    insulin lispro  0-4 Units SubCUTAneous TID WC    insulin lispro  0-4 Units SubCUTAneous Nightly    [Held by provider] apixaban  5 mg Oral BID    [Held by provider] clopidogrel  75 mg Oral Daily    magnesium oxide  400 mg Oral Daily    [Held by provider] valsartan  160 mg Oral Daily    [Held by provider] enoxaparin  30 mg SubCUTAneous Daily    sodium chloride flush  5-40 mL IntraVENous 2 times per day     Continuous Infusions:   sodium chloride      dextrose      sodium chloride 75 mL/hr at 10/15/22 0942    sodium chloride         CBC:   Recent Labs     10/14/22  0543 10/15/22  0539   WBC 8.9 6.5   HGB 7.1* 7.1*    224       CMP:    Recent Labs     10/13/22  1746 10/14/22  0543 10/15/22  0539   * 145* 145*   K 4.8 4.0 4.1   * 109* 110*   CO2 27 28 26   BUN 31* 26* 18   CREATININE 1.29* 1.08 0.92   GLUCOSE 138* 131* 135*   CALCIUM 9.3 8.5 8.2*   LABGLOM 53.1* >60.0 >60.0       Troponin:   Recent Labs     10/14/22  0958   TROPONINI <0.010       BNP: No results for input(s): BNP in the last 72 hours. INR:   Recent Labs     10/13/22  1746   INR 1.5       Lipids:   No results for input(s): CHOL, LDLDIRECT, TRIG, HDL, AMYLASE, LIPASE in the last 72 hours.     Liver:   Recent Labs     10/13/22  1746   AST 15   ALT <5   ALKPHOS 58   PROT 7.3   LABALBU 4.2   BILITOT 0.5       Iron:  No results for input(s): IRONS, FERRITIN in the last 72 hours. Invalid input(s): LABIRONS  Urinalysis: No results for input(s): UA in the last 72 hours.     Objective:  Vitals: /77   Pulse 73   Temp 97.9 °F (36.6 °C)   Resp 18   Ht 5' 9\" (1.753 m)   Wt 150 lb (68 kg)   SpO2 100%   BMI 22.15 kg/m²    Wt Readings from Last 3 Encounters:   10/13/22 150 lb (68 kg)   09/22/22 142 lb (64.4 kg)   06/29/22 142 lb (64.4 kg)      24HR INTAKE/OUTPUT:    Intake/Output Summary (Last 24 hours) at 10/15/2022 1044  Last data filed at 10/14/2022 1725  Gross per 24 hour   Intake 1887.91 ml   Output --   Net 1887.91 ml         General: alert, in no apparent distress  HEENT: normocephalic, atraumatic, anicteric  Neck: supple, no mass  Lungs: non-labored respirations, clear to auscultation bilaterally  Heart: regular rate and rhythm, no murmurs or rubs  Abdomen: soft, non-tender, non-distended  Ext: no cyanosis, no peripheral edema  Neuro: alert and oriented, no gross abnormalities      Electronically signed by Chon Enciso MD, MD

## 2022-10-15 NOTE — PROGRESS NOTES
[facility] OF [facility] Heart Amber Note      Patient: Cheyanne Duncan    Unit/Bed: S820/Q911-90  YOB: 1940  MRN: [id number]  Admit Date:  10/11/2022  Hospital Day: 0    Rounding Date: 10/15/2022    Rounding Cardiologist:  HILDA English MD    PRIMARY Cardiologist: Nick Healy    Subjective Complaint:   Denies any chest pain with exertion or at rest, palpitations, syncope, or edema. .     Physical Examination:     /77   Pulse 73   Temp 97.9 °F (36.6 °C)   Resp 18   Ht 5' 9\" (1.753 m)   Wt 150 lb (68 kg)   SpO2 100%   BMI 22.15 kg/m²       Intake/Output Summary (Last 24 hours) at 10/15/2022 0830  Last data filed at 10/14/2022 1725  Gross per 24 hour   Intake 1887.91 ml   Output --   Net 1887.91 ml                  Cheyanne Duncan examined at bedside in in no apparent distress and improved. Focused exam reveals:     Cardiac: Heart regular rate and rhythm     Lungs:  clear to auscultation bilaterally- no wheezes, rales or rhonchi, normal air movement, no respiratory distress    Extremities:   negative    Telemetry:      normal sinus  and bursts of SVT          LABS:   CBC:   Recent Labs     10/13/22  1746 10/14/22  0543 10/15/22  0539   WBC 11.6* 8.9 6.5   HGB 8.5* 7.1* 7.1*    232 224      BMP:    Recent Labs     10/13/22  1746 10/14/22  0543 10/15/22  0539   * 145* 145*   K 4.8 4.0 4.1   * 109* 110*   CO2 27 28 26   BUN 31* 26* 18   CREATININE 1.29* 1.08 0.92   GLUCOSE 138* 131* 135*              Troponin: No results for input(s): TROPONINT in the last 72 hours. BNP: No results for input(s): PROBNP in the last 72 hours. INR:   Recent Labs     10/13/22  1746   INR 1.5      Mg: No results for input(s): MG in the last 72 hours.     Cardiac Testing:    none    Assessment:  Status post esophageal dilatation and removal of foreign object  History of coronary disease  History of paroxysmal atrial fibrillation  Last EKG showed some T wave inversion with negative troponins and clinically and not having angina  High risk medication  Anemia  Plan:  Okay to discharge from cardiovascular standpoint  Will resume Plavix and Eliquis if felt safe by primary service as well as GI  Patient's creatinine has normalized, will check EKG  May need stress test at some point given her coronary disease  Electronically signed by Angel Kirk MD on 10/15/2022 at 8:30 AM

## 2022-10-16 ENCOUNTER — APPOINTMENT (OUTPATIENT)
Dept: GENERAL RADIOLOGY | Age: 82
DRG: 392 | End: 2022-10-16
Payer: MEDICARE

## 2022-10-16 LAB
ANION GAP SERPL CALCULATED.3IONS-SCNC: 9 MEQ/L (ref 9–15)
BUN BLDV-MCNC: 13 MG/DL (ref 8–23)
CALCIUM SERPL-MCNC: 8.7 MG/DL (ref 8.5–9.9)
CHLORIDE BLD-SCNC: 109 MEQ/L (ref 95–107)
CO2: 26 MEQ/L (ref 20–31)
CREAT SERPL-MCNC: 0.95 MG/DL (ref 0.7–1.2)
GFR AFRICAN AMERICAN: >60
GFR NON-AFRICAN AMERICAN: >60
GLUCOSE BLD-MCNC: 126 MG/DL (ref 70–99)
GLUCOSE BLD-MCNC: 136 MG/DL (ref 70–99)
GLUCOSE BLD-MCNC: 142 MG/DL (ref 70–99)
GLUCOSE BLD-MCNC: 155 MG/DL (ref 70–99)
GLUCOSE BLD-MCNC: 157 MG/DL (ref 70–99)
HCT VFR BLD CALC: 28.3 % (ref 42–52)
HEMOGLOBIN: 9 G/DL (ref 14–18)
MCH RBC QN AUTO: 25.5 PG (ref 27–31.3)
MCHC RBC AUTO-ENTMCNC: 32 % (ref 33–37)
MCV RBC AUTO: 79.8 FL (ref 80–100)
PDW BLD-RTO: 18.1 % (ref 11.5–14.5)
PERFORMED ON: ABNORMAL
PLATELET # BLD: 236 K/UL (ref 130–400)
POTASSIUM SERPL-SCNC: 4.4 MEQ/L (ref 3.4–4.9)
RBC # BLD: 3.54 M/UL (ref 4.7–6.1)
SODIUM BLD-SCNC: 144 MEQ/L (ref 135–144)
WBC # BLD: 6.8 K/UL (ref 4.8–10.8)

## 2022-10-16 PROCEDURE — 6370000000 HC RX 637 (ALT 250 FOR IP): Performed by: INTERNAL MEDICINE

## 2022-10-16 PROCEDURE — 93005 ELECTROCARDIOGRAM TRACING: CPT | Performed by: INTERNAL MEDICINE

## 2022-10-16 PROCEDURE — 85027 COMPLETE CBC AUTOMATED: CPT

## 2022-10-16 PROCEDURE — 2580000003 HC RX 258: Performed by: STUDENT IN AN ORGANIZED HEALTH CARE EDUCATION/TRAINING PROGRAM

## 2022-10-16 PROCEDURE — 80048 BASIC METABOLIC PNL TOTAL CA: CPT

## 2022-10-16 PROCEDURE — 2060000000 HC ICU INTERMEDIATE R&B

## 2022-10-16 PROCEDURE — 36415 COLL VENOUS BLD VENIPUNCTURE: CPT

## 2022-10-16 PROCEDURE — 2700000000 HC OXYGEN THERAPY PER DAY

## 2022-10-16 PROCEDURE — 71046 X-RAY EXAM CHEST 2 VIEWS: CPT

## 2022-10-16 PROCEDURE — 97162 PT EVAL MOD COMPLEX 30 MIN: CPT

## 2022-10-16 PROCEDURE — 6370000000 HC RX 637 (ALT 250 FOR IP): Performed by: STUDENT IN AN ORGANIZED HEALTH CARE EDUCATION/TRAINING PROGRAM

## 2022-10-16 RX ORDER — VALSARTAN 80 MG/1
80 TABLET ORAL DAILY
Status: DISCONTINUED | OUTPATIENT
Start: 2022-10-16 | End: 2022-10-21 | Stop reason: HOSPADM

## 2022-10-16 RX ADMIN — MAGNESIUM OXIDE 400 MG (241.3 MG MAGNESIUM) TABLET 400 MG: TABLET at 09:14

## 2022-10-16 RX ADMIN — VALSARTAN 80 MG: 80 TABLET, FILM COATED ORAL at 09:59

## 2022-10-16 RX ADMIN — Medication 10 ML: at 09:13

## 2022-10-16 RX ADMIN — Medication 10 ML: at 20:14

## 2022-10-16 RX ADMIN — SOTALOL HYDROCHLORIDE 60 MG: 120 TABLET ORAL at 20:14

## 2022-10-16 RX ADMIN — SOTALOL HYDROCHLORIDE 60 MG: 120 TABLET ORAL at 09:13

## 2022-10-16 ASSESSMENT — PAIN SCALES - GENERAL
PAINLEVEL_OUTOF10: 0
PAINLEVEL_OUTOF10: 0

## 2022-10-16 NOTE — PROGRESS NOTES
[facility] OF [facility] Heart Progress Note      Patient: Rose Marie Song    Unit/Bed: P820/D493-87  YOB: 1940  MRN: [id number]  [address] [place] [address] Date:  10/11/2022  Hospital Day: 1    Rounding Date: 10/16/2022    Rounding Cardiologist:  HILDA Betancur MD    PRIMARY Cardiologist: Karen Gary    Subjective Complaint:   Denies any chest pain with exertion or at rest, palpitations, syncope, or edema.,  Mildly short of breath. Physical Examination:     BP (!) 149/74   Pulse 65   Temp 97.6 °F (36.4 °C) (Oral)   Resp 18   Ht 5' 9\" (1.753 m)   Wt 150 lb (68 kg)   SpO2 100%   BMI 22.15 kg/m²       Intake/Output Summary (Last 24 hours) at 10/16/2022 0933  Last data filed at 10/15/2022 2154  Gross per 24 hour   Intake 118.75 ml   Output 100 ml   Net 18.75 ml                  Rose Marie Song examined at bedside in in no apparent distress, cooperative, and improved. Focused exam reveals:     Cardiac: Heart regular rate and rhythm     Lungs:  decreased breath sounds noted-bilaterally    Extremities:   negative    Telemetry:      normal sinus          LABS:   CBC:   Recent Labs     10/14/22  0543 10/15/22  0539 10/15/22  1111 10/16/22  0525   WBC 8.9 6.5  --  6.8   HGB 7.1* 7.1* 7.3* 9.0*    224  --  236      BMP:    Recent Labs     10/14/22  0543 10/15/22  0539 10/16/22  0525   * 145* 144   K 4.0 4.1 4.4   * 110* 109*   CO2 28 26 26   BUN 26* 18 13   CREATININE 1.08 0.92 0.95   GLUCOSE 131* 135* 136*              Troponin: No results for input(s): TROPONINT in the last 72 hours. BNP: No results for input(s): PROBNP in the last 72 hours. INR:   Recent Labs     10/13/22  1746   INR 1.5      Mg: No results for input(s): MG in the last 72 hours. Cardiac Testing:    none    Assessment:  Patient's main problem is esophageal stricture and apparent foreign body  Negative troponins, but EKG does show some T wave changes. Clinically, does not go along with his obvious problem.   However, the patient does have coronary disease  Patient complains a little bit of shortness of breath, CT scan showed possible pneumonia, questionable aspiration  QTC is normalized  Monitor shows no significant arrhythmias    Plan: Will get chest x-ray today, just to make sure there is no evidence of aspiration.   Patient's white count is normal, and no fever  Out of bed and ambulate  See orders    Electronically signed by Rick Diaz MD on 10/16/2022 at 9:33 AM

## 2022-10-16 NOTE — CARE COORDINATION
MET WITH PATIENT TO EXPLAIN INPT IMM TO HIM AND COPY GIVEN. HE VERBALIZES UNDERSTANDING AND STATES WHEN THE DOCTOR IS READY TO DC HIM HE IS READY TO GO. HE CONTINUES TO DECLINE ANY POST DC SERVICES. HE IS USING [employer] FOR [employer]ING OF HIS SECRETIONS.

## 2022-10-16 NOTE — PROGRESS NOTES
Physical Therapy Med Surg Initial Assessment  [facility]       NAME: Natasha Armendariz  : 1940 (79 y.o.)  MRN: [id number]  CODE STATUS: Full Code    Date of Service: 10/16/2022    Patient Diagnosis(es): Chest pain [R07.9]  Chest pain, unspecified type [R07.9]  Nausea and vomiting, unspecified vomiting type [R11.2]   No chief complaint on file.     Patient Active Problem List    Diagnosis Date Noted    Atrial fibrillation ([address] 2019    High risk medication use 2019    Esophageal dysphagia 10/13/2022    Food impaction of esophagus 10/13/2022    Esophageal stenosis 10/13/2022    Chest pain 10/11/2022    Body mass index (BMI) of 20 to 24 2022    MARIAM (acute kidney injury) ([address] 2022    Impaired mobility and activities of daily living -sp Severe PVD s/p AAA repain 2022    Hypotension 2022    Late effects of CVA (cerebrovascular accident) 2022    PVD (peripheral vascular disease) ([address] 2022    Abdominal aortic aneurysm (AAA) 2021    Acute inferior myocardial infarction ([address] 2021    Carotid bruit 2021    Chronic obstructive pulmonary disease ([address] 2021    Diabetes mellitus ([address] 2021    Dyspnea on exertion 2021    Fatigue 2021    First degree atrioventricular block 2021    Hyperlipidemia 2021    Infection of the inner ear 2021    Muscle pain 2021    Underweight 2021    Ventricular premature beats 2021    Weight loss 2021    Ataxic gait 2021    Kyphoscoliosis 2021    Spinal stenosis of lumbar region with neurogenic claudication 2021    Meniere disease, bilateral     Benign paroxysmal positional vertigo due to bilateral vestibular disorder     Vertigo 2021    Ataxia 2021    Cerebrovascular accident ([address] 2020    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[facility] 2020    Vasovagal syncope 2020    Dizziness and giddiness 02/03/2020    Orthostatic dizziness 02/03/2020    Atherosclerosis of native coronary artery of native heart without angina pectoris 11/26/2018    Hypertension 11/26/2018    Ischemic myocardial dysfunction 11/26/2018    Nonrheumatic aortic valve disorder, unspecified 11/26/2018    Aortic valve disorder 11/26/2018    Personal history of nicotine dependence 11/26/2018    Presence of aortocoronary bypass graft 11/26/2018    Coronary angioplasty status 11/26/2018        Past Medical History:   Diagnosis Date    MARIAM (acute kidney injury) ([facility] 6/7/2022    Atrial fibrillation (HCC)     CAD (coronary artery disease)     cardiac stents    COPD (chronic obstructive pulmonary disease) (HCC)     Diabetes mellitus ([facility]     Hyperlipidemia     Hypertension     Movement disorder     Pneumonia      Past Surgical History:   Procedure Laterality Date    APPENDECTOMY      CORONARY ANGIOPLASTY WITH STENT PLACEMENT      4    CORONARY ARTERY BYPASS GRAFT      triple bypass    EYE SURGERY Bilateral     cataract surgery    INSERTABLE CARDIAC MONITOR Left 12/2018    UPPER GASTROINTESTINAL ENDOSCOPY N/A 10/13/2022    EGD ESOPHAGOGASTRODUODENOSCOPY WITH DILATION performed by Kalen Huston MD at [facility]       Patient assessed for rehabilitation services?: Yes  Family / Caregiver Present: No    Restrictions:  Restrictions/Precautions: Fall Risk (high cummings score)     SUBJECTIVE:   Pain   0/10    Prior Level of Function:  Social/Functional History  Lives With: Spouse (wife is able to assist pt)  Type of Home: Mobile home  Home Layout: One level  Home Access: Stairs to enter with rails  Entrance Stairs - Number of Steps: 3  Entrance Stairs - Rails: Both  Bathroom Shower/Tub: Tub/Shower unit  Bathroom Equipment: Shower chair  Home Equipment: brandon Suazo  Has the patient had two or more falls in the past year or any fall with injury in the past year?: Yes (\"i got stuck in the tub 6 months ago\")  ADL Assistance: Needs assistance (wife assists with dressing and bathing; assists pt with getting into the tub)  Homemaking Responsibilities: No  Ambulation Assistance: Independent ([employer])  Transfer Assistance: Independent  Active : No  Patient's  Info: wife  Additional Comments: pt states only amb household distances    OBJECTIVE:   Vision  Vision: Impaired  Vision Exceptions: Wears glasses for reading  Hearing: Exceptions to [facility]  Hearing Exceptions: Hard of hearing/hearing concerns    Cognition:  Overall Orientation Status: Within Functional Limits  Follows Commands: Within Functional Limits    Observation/Palpation  Posture: Fair  Observation: no c/o dizziness; mod to max increased SOB with activity; SPO2 94% on O2 with activity    ROM:  RLE AROM: WFL  LLE AROM : WFL    Strength:  Strength RLE  Comment: grossly 3+/5  Strength LLE  Comment: grossly 3+/5    Neuro:  Balance  Sitting - Static: Good  Sitting - Dynamic: Good;-  Standing - Static: Poor;+  Standing - Dynamic: Poor    Sensation: Impaired (intermittently in fingers)    Bed mobility  Supine to Sit: Stand by assistance  Sit to Supine: Stand by assistance  Bed Mobility Comments: HOB elevated; increased time and effort    Transfers  Sit to Stand: Minimal Assistance  Stand to Sit: Minimal Assistance  Comment: posterior lean; B HHA    Ambulation  Surface: Level tile  Device: No Device;Hand-Held Assist  Assistance:  Moderate assistance  Quality of Gait: heavy use of UE support to advance LEs to take a step  Gait Deviations: Slow Cleo;Decreased step length;Decreased step height  Distance: 4 side steps toward [facility]    Stairs/Curb  Stairs?: No    Activity Tolerance  Activity Tolerance: Patient limited by fatigue;Patient limited by endurance  Activity Tolerance Comments: pt limited by strength and balance deficits    Patient Education  Education Given To: Patient  Education Provided: Role of Therapy;Plan of Care;Transfer Training  Education Method: Verbal  Education Outcome: Therapy Time:   Individual   Time In 1348   Time Out 1400   Minutes 12       Eval X 12 min    Zoe Semaj, PT, 10/16/22 at 2:53 PM         Definitions for assistance levels  Independent = pt does not require any physical supervision or assistance from another person for activity completion. Device may be needed.   Stand by assistance = pt requires verbal cues or instructions from another person, close to but not touching, to perform the activity  Minimal assistance= pt performs 75% or more of the activity; assistance is required to complete the activity  Moderate assistance= pt performs 50% of the activity; assistance is required to complete the activity  Maximal assistance = pt performs 25% of the activity; assistance is required to complete the activity  Dependent = pt requires total physical assistance to accomplish the task

## 2022-10-17 LAB
ANION GAP SERPL CALCULATED.3IONS-SCNC: 8 MEQ/L (ref 9–15)
BUN BLDV-MCNC: 12 MG/DL (ref 8–23)
CALCIUM SERPL-MCNC: 8.5 MG/DL (ref 8.5–9.9)
CHLORIDE BLD-SCNC: 110 MEQ/L (ref 95–107)
CO2: 25 MEQ/L (ref 20–31)
CREAT SERPL-MCNC: 0.82 MG/DL (ref 0.7–1.2)
EKG ATRIAL RATE: 38 BPM
EKG ATRIAL RATE: 66 BPM
EKG P AXIS: 54 DEGREES
EKG P-R INTERVAL: 212 MS
EKG Q-T INTERVAL: 456 MS
EKG Q-T INTERVAL: 566 MS
EKG QRS DURATION: 82 MS
EKG QRS DURATION: 88 MS
EKG QTC CALCULATION (BAZETT): 478 MS
EKG QTC CALCULATION (BAZETT): 623 MS
EKG R AXIS: 61 DEGREES
EKG R AXIS: 70 DEGREES
EKG T AXIS: -88 DEGREES
EKG T AXIS: 111 DEGREES
EKG VENTRICULAR RATE: 66 BPM
EKG VENTRICULAR RATE: 73 BPM
GFR AFRICAN AMERICAN: >60
GFR NON-AFRICAN AMERICAN: >60
GLUCOSE BLD-MCNC: 114 MG/DL (ref 70–99)
GLUCOSE BLD-MCNC: 121 MG/DL (ref 70–99)
GLUCOSE BLD-MCNC: 147 MG/DL (ref 70–99)
GLUCOSE BLD-MCNC: 179 MG/DL (ref 70–99)
GLUCOSE BLD-MCNC: 179 MG/DL (ref 70–99)
HCT VFR BLD CALC: 28.7 % (ref 42–52)
HEMOGLOBIN: 8.9 G/DL (ref 14–18)
MCH RBC QN AUTO: 25.2 PG (ref 27–31.3)
MCHC RBC AUTO-ENTMCNC: 31 % (ref 33–37)
MCV RBC AUTO: 81.2 FL (ref 80–100)
PDW BLD-RTO: 18.4 % (ref 11.5–14.5)
PERFORMED ON: ABNORMAL
PLATELET # BLD: 226 K/UL (ref 130–400)
POTASSIUM SERPL-SCNC: 4.3 MEQ/L (ref 3.4–4.9)
RBC # BLD: 3.53 M/UL (ref 4.7–6.1)
SODIUM BLD-SCNC: 143 MEQ/L (ref 135–144)
WBC # BLD: 5.7 K/UL (ref 4.8–10.8)

## 2022-10-17 PROCEDURE — 2580000003 HC RX 258: Performed by: STUDENT IN AN ORGANIZED HEALTH CARE EDUCATION/TRAINING PROGRAM

## 2022-10-17 PROCEDURE — 36415 COLL VENOUS BLD VENIPUNCTURE: CPT

## 2022-10-17 PROCEDURE — 6360000002 HC RX W HCPCS: Performed by: STUDENT IN AN ORGANIZED HEALTH CARE EDUCATION/TRAINING PROGRAM

## 2022-10-17 PROCEDURE — 6370000000 HC RX 637 (ALT 250 FOR IP): Performed by: STUDENT IN AN ORGANIZED HEALTH CARE EDUCATION/TRAINING PROGRAM

## 2022-10-17 PROCEDURE — 80048 BASIC METABOLIC PNL TOTAL CA: CPT

## 2022-10-17 PROCEDURE — 85027 COMPLETE CBC AUTOMATED: CPT

## 2022-10-17 PROCEDURE — 6370000000 HC RX 637 (ALT 250 FOR IP): Performed by: INTERNAL MEDICINE

## 2022-10-17 PROCEDURE — 99231 SBSQ HOSP IP/OBS SF/LOW 25: CPT | Performed by: SPECIALIST

## 2022-10-17 PROCEDURE — 2060000000 HC ICU INTERMEDIATE R&B

## 2022-10-17 RX ORDER — VALSARTAN 80 MG/1
80 TABLET ORAL DAILY
Qty: 30 TABLET | Refills: 3 | Status: SHIPPED | OUTPATIENT
Start: 2022-10-18

## 2022-10-17 RX ADMIN — SOTALOL HYDROCHLORIDE 60 MG: 120 TABLET ORAL at 09:01

## 2022-10-17 RX ADMIN — Medication 10 ML: at 21:57

## 2022-10-17 RX ADMIN — SOTALOL HYDROCHLORIDE 60 MG: 120 TABLET ORAL at 21:56

## 2022-10-17 RX ADMIN — ENOXAPARIN SODIUM 30 MG: 100 INJECTION SUBCUTANEOUS at 09:02

## 2022-10-17 RX ADMIN — VALSARTAN 80 MG: 80 TABLET, FILM COATED ORAL at 09:02

## 2022-10-17 RX ADMIN — MAGNESIUM OXIDE 400 MG (241.3 MG MAGNESIUM) TABLET 400 MG: TABLET at 09:02

## 2022-10-17 RX ADMIN — Medication 10 ML: at 11:53

## 2022-10-17 ASSESSMENT — PAIN SCALES - GENERAL: PAINLEVEL_OUTOF10: 0

## 2022-10-17 NOTE — PROGRESS NOTES
[facility] OF THE [facility] Heart Progress Note      Patient: Arpan [facility]    Unit/Bed: J650/H542-98  YOB: 1940  MRN: [id number]  [facility] Date:  10/11/2022  Hospital Day: 2    Rounding Date: 10/17/2022    Rounding Cardiologist:  HILDA Wren MD    PRIMARY Cardiologist: Cynthia Rodriguez    Subjective Complaint:   Denies any chest pain with exertion or at rest, palpitations, syncope, or edema.,  Still with a cough. Physical Examination:     BP (!) 143/78   Pulse 73   Temp 97.8 °F (36.6 °C) (Oral)   Resp 18   Ht 5' 9\" (1.753 m)   Wt 150 lb (68 kg)   SpO2 96%   BMI 22.15 kg/m²     No intake or output data in the 24 hours ending 10/17/22 [address] examined at bedside in in no apparent distress, cooperative, and improved. Focused exam reveals:     Cardiac: Heart regular rate and rhythm     Lungs:  rhonchi present-scant    Extremities:   negative    Telemetry:      normal sinus          LABS:   CBC:   Recent Labs     10/15/22  0539 10/15/22  1111 10/16/22  0525 10/17/22  0514   WBC 6.5  --  6.8 5.7   HGB 7.1* 7.3* 9.0* 8.9*     --  236 226      BMP:    Recent Labs     10/15/22  0539 10/16/22  0525 10/17/22  0514   * 144 143   K 4.1 4.4 4.3   * 109* 110*   CO2 26 26 25   BUN 18 13 12   CREATININE 0.92 0.95 0.82   GLUCOSE 135* 136* 121*              Troponin: No results for input(s): TROPONINT in the last 72 hours. BNP: No results for input(s): PROBNP in the last 72 hours. INR: No results for input(s): INR in the last 72 hours. Mg: No results for input(s): MG in the last 72 hours.     Cardiac Testing:    none    Assessment:    Dysphagia  High risk medication---sotalol--qtc is ok   Anemia ---stable   Plan:  Pt to get repeat swallowing eval today  Will resume Valsartan at a lower dose  From a cardiac standpoint--OK to d/c and f/u with Dr Vidal Pritchett arranged  Electronically signed by Rick Diaz MD on 10/17/2022 at 10:56 AM

## 2022-10-17 NOTE — PROGRESS NOTES
Sita Henry is a 79 y.o. male patient.     Current Facility-Administered Medications   Medication Dose Route Frequency Provider Last Rate Last Admin    valsartan (DIOVAN) tablet 80 mg  80 mg Oral Daily Ortega Siddiqui MD   80 mg at 10/17/22 0902    0.9 % sodium chloride infusion   IntraVENous PRN Nato Crockett MD        sotalol (BETAPACE) tablet 60 mg  60 mg Oral BID Ortega Siddiqui MD   60 mg at 10/17/22 0901    insulin lispro (HUMALOG) injection vial 0-4 Units  0-4 Units SubCUTAneous TID WC Nato Crockett MD        insulin lispro (HUMALOG) injection vial 0-4 Units  0-4 Units SubCUTAneous Nightly Nato Crockett MD        [Held by provider] apixaban (ELIQUIS) tablet 5 mg  5 mg Oral BID Nato Crockett MD        [Held by provider] clopidogrel (PLAVIX) tablet 75 mg  75 mg Oral Daily Nato Crockett MD        magnesium oxide (MAG-OX) tablet 400 mg  400 mg Oral Daily Nato Crockett MD   400 mg at 10/17/22 0902    nitroGLYCERIN (NITROSTAT) SL tablet 0.4 mg  0.4 mg SubLINGual Q5 Min PRN Nato Crcokett MD        glucose chewable tablet 16 g  4 tablet Oral PRN Nato Crockett MD        dextrose bolus 10% 125 mL  125 mL IntraVENous PRN Nato Crockett MD        Or    dextrose bolus 10% 250 mL  250 mL IntraVENous PRN Nato Crockett MD        glucagon (rDNA) injection 1 mg  1 mg SubCUTAneous PRN Nato Crockett MD        dextrose 10 % infusion   IntraVENous Continuous PRN Nato Crockett MD        enoxaparin Sodium (LOVENOX) injection 30 mg  30 mg SubCUTAneous Daily Nato Crockett MD   30 mg at 10/17/22 0902    sodium chloride flush 0.9 % injection 5-40 mL  5-40 mL IntraVENous 2 times per day Nato Crockett MD   10 mL at 10/17/22 1153    sodium chloride flush 0.9 % injection 5-40 mL  5-40 mL IntraVENous PRN Nato Crockett MD        0.9 % sodium chloride infusion   IntraVENous PRN Nato Crockett MD        acetaminophen (TYLENOL) tablet 650 mg  650 mg Oral Q4H PRN Mildred MD Sedrick        ondansetron (ZOFRAN-ODT) disintegrating tablet 4 mg  4 mg Oral Q8H PRN Mildred Dubose MD        Or    ondansetron (ZOFRAN) injection 4 mg  4 mg IntraVENous Q6H PRN Amy Skinner MD   4 mg at 10/12/22 0891     Allergies   Allergen Reactions    Fenofibrate     Lipitor [Atorvastatin] Other (See Comments)     Body pain    Niacin Other (See Comments)    Niaspan [Niacin Er] Other (See Comments)     Body pain    Vitamin E Other (See Comments)     Nose bleeds    Zocor [Simvastatin] Other (See Comments)     Sharp body pain     Principal Problem:    Chest pain  Active Problems:    Esophageal dysphagia    Food impaction of esophagus    Esophageal stenosis  Resolved Problems:    * No resolved hospital problems. *    Blood pressure (!) 143/78, pulse 73, temperature 97.8 °F (36.6 °C), temperature source Oral, resp. rate 18, height 5' 9\" (1.753 m), weight 150 lb (68 kg), SpO2 96 %. Subjective no emesis tolerating diet reasonably well,  Objective:  Vital signs: (most recent): Blood pressure (!) 143/78, pulse 73, temperature 97.8 °F (36.6 °C), temperature source Oral, resp. rate 18, height 5' 9\" (1.753 m), weight 150 lb (68 kg), SpO2 96 %. Assessment & Plan possible achalasia awaiting for barium study.     Faustina Lantigua MD  10/17/2022

## 2022-10-17 NOTE — PROGRESS NOTES
Nephrology Progress Note    ASSESSMENT AND PLAN:    79 y.o. male with history s/f history s/f T2DM, HTN, HLD, CAD s/p ORTIZ and CABG, A.fib on AC who presented for chest pain, N/V. Chest pain: ? If cardiac in etiology vs. Esophageal/gastric. Does have cardiac history however having dysphagia along w/ the emesis. Neg trops.  Pt of Dr. Luigi Mares, neg CXR  HTN  A.fib: on [address]  T2DM: SSI, holding metformin  Esophageal stricture: noted on EGD 10/13, s/p dilation   Anemia: likely in setting of blood loss, s/p PRBC transfusion      - CXR wnl though pt remains on O2, wean O2  - getting another GI eval?  - ok to discharge from cardiac standpoint   - awaiting GI recs        Patient Active Problem List:     Atrial fibrillation ([address]     High risk medication use     Atherosclerosis of native coronary artery of native heart without angina pectoris     Hypertension     Ischemic myocardial dysfunction     Nonrheumatic aortic valve disorder, unspecified     Aortic valve disorder     Personal history of nicotine dependence     Presence of aortocoronary bypass graft     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HCC)     Vasovagal syncope     Dizziness and giddiness     Cerebrovascular accident ([address]     Orthostatic dizziness     Ataxia     Vertigo     Meniere disease, bilateral     Benign paroxysmal positional vertigo due to bilateral vestibular disorder     Coronary angioplasty status     Ataxic gait     Kyphoscoliosis     Spinal stenosis of lumbar region with neurogenic claudication     Abdominal aortic aneurysm (AAA)     Acute inferior myocardial infarction (HCC)     Carotid bruit     Chronic obstructive pulmonary disease (HCC)     Diabetes mellitus ([address]     Dyspnea on exertion     Fatigue     First degree atrioventricular block     Hyperlipidemia     Infection of the inner ear     Muscle pain     Underweight     Ventricular premature beats     Weight loss     PVD (peripheral vascular disease) (HCC)     Impaired mobility and activities of daily living -sp Severe PVD s/p AAA repain     Hypotension     Late effects of CVA (cerebrovascular accident)     MARIAM (acute kidney injury) ([facility] [address] [facility]     Body mass index (BMI) of 20 to 24     Chest pain     Esophageal dysphagia     Food impaction of esophagus     Esophageal stenosis      Subjective:  Admit Date: 10/11/2022    Interval History: on O2, breathing is better, function stable, Hb stable      Medications:  Scheduled Meds:   valsartan  80 mg Oral Daily    sotalol  60 mg Oral BID    insulin lispro  0-4 Units SubCUTAneous TID WC    insulin lispro  0-4 Units SubCUTAneous Nightly    [Held by provider] apixaban  5 mg Oral BID    [Held by provider] clopidogrel  75 mg Oral Daily    magnesium oxide  400 mg Oral Daily    enoxaparin  30 mg SubCUTAneous Daily    sodium chloride flush  5-40 mL IntraVENous 2 times per day     Continuous Infusions:   sodium chloride      dextrose      sodium chloride         CBC:   Recent Labs     10/16/22  0525 10/17/22  0514   WBC 6.8 5.7   HGB 9.0* 8.9*    226       CMP:    Recent Labs     10/15/22  0539 10/16/22  0525 10/17/22  0514   * 144 143   K 4.1 4.4 4.3   * 109* 110*   CO2 26 26 25   BUN 18 13 12   CREATININE 0.92 0.95 0.82   GLUCOSE 135* 136* 121*   CALCIUM 8.2* 8.7 8.5   LABGLOM >60.0 >60.0 >60.0       Troponin:   No results for input(s): TROPONINI in the last 72 hours. BNP: No results for input(s): BNP in the last 72 hours. INR:   No results for input(s): INR in the last 72 hours. Lipids:   No results for input(s): CHOL, LDLDIRECT, TRIG, HDL, AMYLASE, LIPASE in the last 72 hours. Liver:   No results for input(s): AST, ALT, ALKPHOS, PROT, LABALBU, BILITOT in the last 72 hours. Invalid input(s): BILDIR    Iron:  No results for input(s): IRONS, FERRITIN in the last 72 hours. Invalid input(s): LABIRONS  Urinalysis: No results for input(s): UA in the last 72 hours.     Objective:  Vitals: BP (!) 143/78   Pulse 73   Temp 97.8 °F (36.6 °C) (Oral) Resp 18   Ht 5' 9\" (1.753 m)   Wt 150 lb (68 kg)   SpO2 96%   BMI 22.15 kg/m²    Wt Readings from Last 3 Encounters:   10/13/22 150 lb (68 kg)   09/22/22 142 lb (64.4 kg)   06/29/22 142 lb (64.4 kg)      24HR INTAKE/OUTPUT:  No intake or output data in the 24 hours ending 10/17/22 1710      General: alert, in no apparent distress  HEENT: normocephalic, atraumatic, anicteric  Neck: supple, no mass  Lungs: non-labored respirations, clear to auscultation bilaterally  Heart: regular rate and rhythm, no murmurs or rubs  Abdomen: soft, non-tender, non-distended  Ext: no cyanosis, no peripheral edema  Neuro: alert and oriented, no gross abnormalities      Electronically signed by Farooq Herman MD, MD

## 2022-10-17 NOTE — FLOWSHEET NOTE
2009- Pt resting in bed watching tv, no complaints at this time, call light within reach    0510- Pt resting in bed no acute issues over night, call light within reach Patient declined information

## 2022-10-17 NOTE — PROGRESS NOTES
Nephrology Progress Note    ASSESSMENT AND PLAN:    79 y.o. male with history s/f history s/f T2DM, HTN, HLD, CAD s/p ORTIZ and CABG, A.fib on AC who presented for chest pain, N/V. Chest pain: ? If cardiac in etiology vs. Esophageal/gastric. Does have cardiac history however having dysphagia along w/ the emesis. Neg trops.  Pt of Dr. Daun Favre, neg CXR  HTN  A[address]fib: on [address]  T2DM: SSI, holding metformin  Esophageal stricture: noted on EGD 10/13, s/p dilation   Anemia: likely in setting of blood loss, s/p PRBC transfusion      -  CXR wnl  - awaiting GI recs  - will discharge thereafter if Hb stable and GI ok with discharge        Patient Active Problem List:     Atrial fibrillation ([address]     High risk medication use     Atherosclerosis of native coronary artery of native heart without angina pectoris     Hypertension     Ischemic myocardial dysfunction     Nonrheumatic aortic valve disorder, unspecified     Aortic valve disorder     Personal history of nicotine dependence     Presence of aortocoronary bypass graft     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HCC)     Vasovagal syncope     Dizziness and giddiness     Cerebrovascular accident ([address]     Orthostatic dizziness     Ataxia     Vertigo     Meniere disease, bilateral     Benign paroxysmal positional vertigo due to bilateral vestibular disorder     Coronary angioplasty status     Ataxic gait     Kyphoscoliosis     Spinal stenosis of lumbar region with neurogenic claudication     Abdominal aortic aneurysm (AAA)     Acute inferior myocardial infarction (HCC)     Carotid bruit     Chronic obstructive pulmonary disease (HCC)     Diabetes mellitus ([address]     Dyspnea on exertion     Fatigue     First degree atrioventricular block     Hyperlipidemia     Infection of the inner ear     Muscle pain     Underweight     Ventricular premature beats     Weight loss     PVD (peripheral vascular disease) (HCC)     Impaired mobility and activities of daily living -sp Severe PVD s/p AAA repain     Hypotension     Late effects of CVA (cerebrovascular accident)     MARIAM (acute kidney injury) ([facility] [address]     Body mass index (BMI) of 20 to 24     Chest pain     Esophageal dysphagia     Food impaction of esophagus     Esophageal stenosis      Subjective:  Admit Date: 10/11/2022    Interval History: on O2, was c/o mild SOB earlier, got CXR which showed no acute process       Medications:  Scheduled Meds:   valsartan  80 mg Oral Daily    sotalol  60 mg Oral BID    insulin lispro  0-4 Units SubCUTAneous TID WC    insulin lispro  0-4 Units SubCUTAneous Nightly    [Held by provider] apixaban  5 mg Oral BID    [Held by provider] clopidogrel  75 mg Oral Daily    magnesium oxide  400 mg Oral Daily    enoxaparin  30 mg SubCUTAneous Daily    sodium chloride flush  5-40 mL IntraVENous 2 times per day     Continuous Infusions:   sodium chloride      dextrose      sodium chloride         CBC:   Recent Labs     10/15/22  0539 10/15/22  1111 10/16/22  0525   WBC 6.5  --  6.8   HGB 7.1* 7.3* 9.0*     --  236       CMP:    Recent Labs     10/14/22  0543 10/15/22  0539 10/16/22  0525   * 145* 144   K 4.0 4.1 4.4   * 110* 109*   CO2 28 26 26   BUN 26* 18 13   CREATININE 1.08 0.92 0.95   GLUCOSE 131* 135* 136*   CALCIUM 8.5 8.2* 8.7   LABGLOM >60.0 >60.0 >60.0       Troponin:   Recent Labs     10/14/22  0958   TROPONINI <0.010       BNP: No results for input(s): BNP in the last 72 hours. INR:   No results for input(s): INR in the last 72 hours. Lipids:   No results for input(s): CHOL, LDLDIRECT, TRIG, HDL, AMYLASE, LIPASE in the last 72 hours. Liver:   No results for input(s): AST, ALT, ALKPHOS, PROT, LABALBU, BILITOT in the last 72 hours. Invalid input(s): BILDIR    Iron:  No results for input(s): IRONS, FERRITIN in the last 72 hours. Invalid input(s): LABIRONS  Urinalysis: No results for input(s): UA in the last 72 hours.     Objective:  Vitals: BP (!) 154/73   Pulse 62   Temp 97.7 °F (36.5 °C) (Oral)   Resp 18   Ht 5' 9\" (1.753 m)   Wt 150 lb (68 kg)   SpO2 100%   BMI 22.15 kg/m²    Wt Readings from Last 3 Encounters:   10/13/22 150 lb (68 kg)   09/22/22 142 lb (64.4 kg)   06/29/22 142 lb (64.4 kg)      24HR INTAKE/OUTPUT:    Intake/Output Summary (Last 24 hours) at 10/16/2022 2021  Last data filed at 10/15/2022 2154  Gross per 24 hour   Intake --   Output 100 ml   Net -100 ml         General: alert, in no apparent distress  HEENT: normocephalic, atraumatic, anicteric  Neck: supple, no mass  Lungs: non-labored respirations, clear to auscultation bilaterally  Heart: regular rate and rhythm, no murmurs or rubs  Abdomen: soft, non-tender, non-distended  Ext: no cyanosis, no peripheral edema  Neuro: alert and oriented, no gross abnormalities      Electronically signed by Crispin Rowland MD, MD

## 2022-10-18 ENCOUNTER — APPOINTMENT (OUTPATIENT)
Dept: GENERAL RADIOLOGY | Age: 82
DRG: 392 | End: 2022-10-18
Payer: MEDICARE

## 2022-10-18 LAB
ANION GAP SERPL CALCULATED.3IONS-SCNC: 8 MEQ/L (ref 9–15)
BUN BLDV-MCNC: 12 MG/DL (ref 8–23)
CALCIUM SERPL-MCNC: 8.6 MG/DL (ref 8.5–9.9)
CHLORIDE BLD-SCNC: 111 MEQ/L (ref 95–107)
CO2: 28 MEQ/L (ref 20–31)
CREAT SERPL-MCNC: 0.78 MG/DL (ref 0.7–1.2)
EKG ATRIAL RATE: 60 BPM
EKG P-R INTERVAL: 208 MS
EKG Q-T INTERVAL: 482 MS
EKG QRS DURATION: 84 MS
EKG QTC CALCULATION (BAZETT): 482 MS
EKG R AXIS: 64 DEGREES
EKG T AXIS: 120 DEGREES
EKG VENTRICULAR RATE: 60 BPM
GFR SERPL CREATININE-BSD FRML MDRD: >60 ML/MIN/{1.73_M2}
GLUCOSE BLD-MCNC: 126 MG/DL (ref 70–99)
GLUCOSE BLD-MCNC: 138 MG/DL (ref 70–99)
HCT VFR BLD CALC: 29.2 % (ref 42–52)
HEMOGLOBIN: 9.3 G/DL (ref 14–18)
MCH RBC QN AUTO: 25.4 PG (ref 27–31.3)
MCHC RBC AUTO-ENTMCNC: 31.9 % (ref 33–37)
MCV RBC AUTO: 79.7 FL (ref 80–100)
PDW BLD-RTO: 18.5 % (ref 11.5–14.5)
PERFORMED ON: ABNORMAL
PLATELET # BLD: 268 K/UL (ref 130–400)
POTASSIUM SERPL-SCNC: 4.3 MEQ/L (ref 3.4–4.9)
RBC # BLD: 3.67 M/UL (ref 4.7–6.1)
SODIUM BLD-SCNC: 147 MEQ/L (ref 135–144)
WBC # BLD: 6.5 K/UL (ref 4.8–10.8)

## 2022-10-18 PROCEDURE — 2580000003 HC RX 258

## 2022-10-18 PROCEDURE — 6360000002 HC RX W HCPCS: Performed by: STUDENT IN AN ORGANIZED HEALTH CARE EDUCATION/TRAINING PROGRAM

## 2022-10-18 PROCEDURE — 6370000000 HC RX 637 (ALT 250 FOR IP): Performed by: INTERNAL MEDICINE

## 2022-10-18 PROCEDURE — 99231 SBSQ HOSP IP/OBS SF/LOW 25: CPT | Performed by: SPECIALIST

## 2022-10-18 PROCEDURE — 85027 COMPLETE CBC AUTOMATED: CPT

## 2022-10-18 PROCEDURE — 2060000000 HC ICU INTERMEDIATE R&B

## 2022-10-18 PROCEDURE — 93005 ELECTROCARDIOGRAM TRACING: CPT | Performed by: INTERNAL MEDICINE

## 2022-10-18 PROCEDURE — 2580000003 HC RX 258: Performed by: STUDENT IN AN ORGANIZED HEALTH CARE EDUCATION/TRAINING PROGRAM

## 2022-10-18 PROCEDURE — 6370000000 HC RX 637 (ALT 250 FOR IP): Performed by: STUDENT IN AN ORGANIZED HEALTH CARE EDUCATION/TRAINING PROGRAM

## 2022-10-18 PROCEDURE — 36415 COLL VENOUS BLD VENIPUNCTURE: CPT

## 2022-10-18 PROCEDURE — 80048 BASIC METABOLIC PNL TOTAL CA: CPT

## 2022-10-18 PROCEDURE — 97535 SELF CARE MNGMENT TRAINING: CPT

## 2022-10-18 RX ORDER — DEXTROSE AND SODIUM CHLORIDE 5; .9 G/100ML; G/100ML
INJECTION, SOLUTION INTRAVENOUS
Status: COMPLETED
Start: 2022-10-18 | End: 2022-10-18

## 2022-10-18 RX ORDER — DEXTROSE AND SODIUM CHLORIDE 5; .9 G/100ML; G/100ML
INJECTION, SOLUTION INTRAVENOUS CONTINUOUS
Status: DISPENSED | OUTPATIENT
Start: 2022-10-18 | End: 2022-10-19

## 2022-10-18 RX ADMIN — VALSARTAN 80 MG: 80 TABLET, FILM COATED ORAL at 09:24

## 2022-10-18 RX ADMIN — ENOXAPARIN SODIUM 30 MG: 100 INJECTION SUBCUTANEOUS at 09:25

## 2022-10-18 RX ADMIN — SOTALOL HYDROCHLORIDE 60 MG: 120 TABLET ORAL at 09:25

## 2022-10-18 RX ADMIN — Medication 10 ML: at 20:35

## 2022-10-18 RX ADMIN — Medication 10 ML: at 09:25

## 2022-10-18 RX ADMIN — DEXTROSE AND SODIUM CHLORIDE: 5; .9 INJECTION, SOLUTION INTRAVENOUS at 11:03

## 2022-10-18 RX ADMIN — SOTALOL HYDROCHLORIDE 60 MG: 120 TABLET ORAL at 20:35

## 2022-10-18 RX ADMIN — DEXTROSE AND SODIUM CHLORIDE: 5; 900 INJECTION, SOLUTION INTRAVENOUS at 11:03

## 2022-10-18 RX ADMIN — MAGNESIUM OXIDE 400 MG (241.3 MG MAGNESIUM) TABLET 400 MG: TABLET at 09:24

## 2022-10-18 ASSESSMENT — PAIN SCALES - GENERAL: PAINLEVEL_OUTOF10: 0

## 2022-10-18 NOTE — CARE COORDINATION
SPOKE WITH PATIENT. WIFE PRESENT AT BEDSIDE. PT AND SPOUSE DECLINE HHC. THEY STATE THE HOME IS A ONE LEVEL AND HE DOES ALL OF HIS OWN THERAPY AT HOME.  DENIES ANY OTHER NEEDS

## 2022-10-18 NOTE — PROGRESS NOTES
[facility] OF [facility] Heart [place] Note      Patient: Chacha Zaman    Unit/Bed: L431/C309-66  YOB: 1940  MRN: [id number]  [facility] Date:  10/11/2022  Hospital Day: 3    Rounding Date: 10/18/2022    Rounding Cardiologist:  HILDA Tucker MD    PRIMARY Cardiologist: Davin Davis    Subjective Complaint:   Denies any chest pain with exertion or at rest, palpitations, syncope, or edema. .     Physical Examination:     BP (!) 162/83   Pulse 58   Temp 98.2 °F (36.8 °C) (Oral)   Resp 18   Ht 5' 9\" (1.753 m)   Wt 150 lb (68 kg)   SpO2 97%   BMI 22.15 kg/m²       Intake/Output Summary (Last 24 hours) at 10/18/2022 1419  Last data filed at 10/18/2022 0739  Gross per 24 hour   Intake 0 ml   Output 400 ml   Net -400 ml                  Chacha Zaman examined at bedside in in no apparent distress and cooperative. Focused exam reveals:     Cardiac: Heart regular rate and rhythm     Lungs:  clear to auscultation bilaterally- no wheezes, rales or rhonchi, normal air movement, no respiratory distress    Extremities:   negative    Telemetry:      normal sinus          LABS:   CBC:   Recent Labs     10/16/22  0525 10/17/22  0514 10/18/22  0520   WBC 6.8 5.7 6.5   HGB 9.0* 8.9* 9.3*    226 268      BMP:    Recent Labs     10/16/22  0525 10/17/22  0514 10/18/22  0520    143 147*   K 4.4 4.3 4.3   * 110* 111*   CO2 26 25 28   BUN 13 12 12   CREATININE 0.95 0.82 0.78   GLUCOSE 136* 121* 126*              Troponin: No results for input(s): TROPONINT in the last 72 hours. BNP: No results for input(s): PROBNP in the last 72 hours. INR: No results for input(s): INR in the last 72 hours. Mg: No results for input(s): MG in the last 72 hours.     Cardiac Testing:    none  Acceptable today--QTC  Assessment:  High risk medication-sotalol  Dysphagia    Plan:  Swallowing evaluation tomorrow  Okay to discharge when okay with other subspecialists    Electronically signed by Lavern Iqbal MD on 10/18/2022 at 2:19 PM

## 2022-10-18 NOTE — PROGRESS NOTES
Comprehensive Nutrition Assessment    Type and Reason for Visit:  Initial, RD Nutrition Re-Screen/LOS    Nutrition Recommendations/Plan:   RD to remain available if tube feeding is opted. Will monitor MBS results. Malnutrition Assessment:  Malnutrition Status: At risk for malnutrition (Comment) (10/18/22 0061)    Context:  Social/Environmental Circumstances     Findings of the 6 clinical characteristics of malnutrition:  Energy Intake:  Mild decrease in energy intake (Comment)  Weight Loss:  Unable to assess     Body Fat Loss:  Unable to assess (nutrition related vs. age related)     Muscle Mass Loss:  Unable to assess    Fluid Accumulation:  Unable to assess     Strength:  Not Performed    Nutrition Assessment:    Pt at risk for malnutrition with dysphagia hindering PO intake. Pt currently NPO for MBS. RD to remain available if tube feeding is opted. Will monitor MBS results. No updated bed scale wt available, UTD if wt loss is present. Nutrition Related Findings:    Pt presents with chest pain/ N/V. Esophageal stricture noted on EGD 10/13, s/p dilation. Currently NPO for MBS. Was on soft and bite sized diet. Last Bm 10/17.  Labs (10/18): Lm=030; BUn/Cr= WNL; Gluc 126-147 Wound Type: None       Current Nutrition Intake & Therapies:    Diet NPO Exceptions are: Sips of Water with Meds    Anthropometric Measures:  Height: 5' 9\" (175.3 cm)  Ideal Body Weight (IBW): 160 lbs (73 kg)    Admission Body Weight: 150 lb (68 kg) (stated 10/11)  Current Body Weight: 150 lb (68 kg) (10/13),    Weight Source: Stated  Current BMI (kg/m2): 22.1  Usual Body Weight: 150 lb 11 oz (68.4 kg) (9/2021)  % Weight Change (Calculated): -0.5                    BMI Categories: Normal Weight (BMI 22.0 to 24.9) age over 72    Estimated Daily Nutrient Needs:  Energy Requirements Based On: Kcal/kg  Weight Used for Energy Requirements: Current  Energy (kcal/day): 1700-1836kcal (25-27kcal/kg)  Weight Used for Protein Requirements: Ideal  Protein (g/day): 74-81g (1.1-1.2g/kg)  Method Used for Fluid Requirements: 1 ml/kcal  Fluid (ml/day): ~1800ml    Nutrition Diagnosis:   Inadequate oral intake related to swallowing difficulty as evidenced by NPO or clear liquid status due to medical condition    Nutrition Interventions:   Food and/or Nutrient Delivery: Continue NPO  Nutrition Education/Counseling: No recommendation at this time  Coordination of Nutrition Care: Continue to monitor while inpatient       Goals:     Goals: Initiate PO diet, within 2 days       Nutrition Monitoring and Evaluation:   Behavioral-Environmental Outcomes: None Identified  Food/Nutrient Intake Outcomes: Diet Advancement/Tolerance  Physical Signs/Symptoms Outcomes: Chewing or Swallowing, Biochemical Data, Weight    Discharge Planning:     Too soon to determine     Simuel Boxer, MS, RD, LD

## 2022-10-18 NOTE — PROGRESS NOTES
Patient didn't receive his breakfast this am he is going for a procedure this morning. He is happy about getting this done this morning. Staff will continue to monitor and update.     4252: Patient off unit going to Xray for procedure

## 2022-10-18 NOTE — PROGRESS NOTES
Nephrology Progress Note    ASSESSMENT AND PLAN:    79 y.o. male with history s/f history s/f T2DM, HTN, HLD, CAD s/p ORTIZ and CABG, A.fib on AC who presented for chest pain, N/V. Chest pain: ? If cardiac in etiology vs. Esophageal/gastric. Does have cardiac history however having dysphagia along w/ the emesis. Neg trops. Pt of Dr. Giovanni Chester, neg CXR  HTN  A.fib: on [address]  T2DM: SSI, holding metformin  Esophageal stricture: noted on EGD 10/13, s/p dilation   Anemia: likely in setting of blood loss, s/p PRBC transfusion   Acute hypoxic respiratory failure: CXR wnl, ?  Aspiration, O2 has been weaned off      - currently NPO for barium study  - ordered NS/D5W at 50 ml/hr for now         Patient Active Problem List:     Atrial fibrillation ([facility] [address]     High risk medication use     Atherosclerosis of native coronary artery of native heart without angina pectoris     Hypertension     Ischemic myocardial dysfunction     Nonrheumatic aortic valve disorder, unspecified     Aortic valve disorder     Personal history of nicotine dependence     Presence of aortocoronary bypass graft     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HCC)     Vasovagal syncope     Dizziness and giddiness     Cerebrovascular accident ([address]     Orthostatic dizziness     Ataxia     Vertigo     Meniere disease, bilateral     Benign paroxysmal positional vertigo due to bilateral vestibular disorder     Coronary angioplasty status     Ataxic gait     Kyphoscoliosis     Spinal stenosis of lumbar region with neurogenic claudication     Abdominal aortic aneurysm (AAA)     Acute inferior myocardial infarction (HCC)     Carotid bruit     Chronic obstructive pulmonary disease (HCC)     Diabetes mellitus (HCC)     Dyspnea on exertion     Fatigue     First degree atrioventricular block     Hyperlipidemia     Infection of the inner ear     Muscle pain     Underweight     Ventricular premature beats     Weight loss     PVD (peripheral vascular disease) (HCC)     Impaired mobility and activities of daily living -sp Severe PVD s/p AAA repain     Hypotension     Late effects of CVA (cerebrovascular accident)     MARIAM (acute kidney injury) ([facility] [address]     Body mass index (BMI) of 20 to 24     Chest pain     Esophageal dysphagia     Food impaction of esophagus     Esophageal stenosis      Subjective:  Admit Date: 10/11/2022    Interval History: Na elevated, function wnl, no acute overnight events       Medications:  Scheduled Meds:   valsartan  80 mg Oral Daily    sotalol  60 mg Oral BID    insulin lispro  0-4 Units SubCUTAneous TID WC    insulin lispro  0-4 Units SubCUTAneous Nightly    [Held by provider] apixaban  5 mg Oral BID    [Held by provider] clopidogrel  75 mg Oral Daily    magnesium oxide  400 mg Oral Daily    enoxaparin  30 mg SubCUTAneous Daily    sodium chloride flush  5-40 mL IntraVENous 2 times per day     Continuous Infusions:   dextrose 5 % and 0.9 % NaCl 50 mL/hr at 10/18/22 1103    sodium chloride      dextrose      sodium chloride         CBC:   Recent Labs     10/17/22  0514 10/18/22  0520   WBC 5.7 6.5   HGB 8.9* 9.3*    268       CMP:    Recent Labs     10/16/22  0525 10/17/22  0514 10/18/22  0520    143 147*   K 4.4 4.3 4.3   * 110* 111*   CO2 26 25 28   BUN 13 12 12   CREATININE 0.95 0.82 0.78   GLUCOSE 136* 121* 126*   CALCIUM 8.7 8.5 8.6   LABGLOM >60.0 >60.0 >60.0       Troponin:   No results for input(s): TROPONINI in the last 72 hours. BNP: No results for input(s): BNP in the last 72 hours. INR:   No results for input(s): INR in the last 72 hours. Lipids:   No results for input(s): CHOL, LDLDIRECT, TRIG, HDL, AMYLASE, LIPASE in the last 72 hours. Liver:   No results for input(s): AST, ALT, ALKPHOS, PROT, LABALBU, BILITOT in the last 72 hours. Invalid input(s): BILDIR    Iron:  No results for input(s): IRONS, FERRITIN in the last 72 hours.     Invalid input(s): LABIRONS  Urinalysis: No results for input(s): UA in the last 72 hours.    Objective:  Vitals: BP (!) 159/88   Pulse 62   Temp 97.3 °F (36.3 °C) (Oral)   Resp 18   Ht 5' 9\" (1.753 m)   Wt 150 lb (68 kg)   SpO2 97%   BMI 22.15 kg/m²    Wt Readings from Last 3 Encounters:   10/13/22 150 lb (68 kg)   09/22/22 142 lb (64.4 kg)   06/29/22 142 lb (64.4 kg)      24HR INTAKE/OUTPUT:    Intake/Output Summary (Last 24 hours) at 10/18/2022 1247  Last data filed at 10/18/2022 0739  Gross per 24 hour   Intake 0 ml   Output 400 ml   Net -400 ml         General: alert, in no apparent distress  HEENT: normocephalic, atraumatic, anicteric  Neck: supple, no mass  Lungs: non-labored respirations, clear to auscultation bilaterally  Heart: regular rate and rhythm, no murmurs or rubs  Abdomen: soft, non-tender, non-distended  Ext: no cyanosis, no peripheral edema  Neuro: alert and oriented, no gross abnormalities      Electronically signed by Elizabeth Dillon MD, MD

## 2022-10-18 NOTE — PROGRESS NOTES
Physical Therapy Med Surg Daily Treatment Note  Facility/Department: Located within [facility]  Room: Critical access [id number]       NAME: Shannan Blanco  : 1940 (79 y.o.)  MRN: [id number]  CODE STATUS: Full Code    Date of Service: 10/18/2022    Patient Diagnosis(es): Chest pain [R07.9]  Chest pain, unspecified type [R07.9]  Nausea and vomiting, unspecified vomiting type [R11.2]   No chief complaint on file.     Patient Active Problem List    Diagnosis Date Noted    Atrial fibrillation ([address] 2019    High risk medication use 2019    Esophageal dysphagia 10/13/2022    Food impaction of esophagus 10/13/2022    Esophageal stenosis 10/13/2022    Chest pain 10/11/2022    Body mass index (BMI) of 20 to 24 2022    MARIAM (acute kidney injury) ([address] 2022    Impaired mobility and activities of daily living -sp Severe PVD s/p AAA repain 2022    Hypotension 2022    Late effects of CVA (cerebrovascular accident) 2022    PVD (peripheral vascular disease) ([address] 2022    Abdominal aortic aneurysm (AAA) 2021    Acute inferior myocardial infarction ([address] 2021    Carotid bruit 2021    Chronic obstructive pulmonary disease ([address] 2021    Diabetes mellitus ([address] 2021    Dyspnea on exertion 2021    Fatigue 2021    First degree atrioventricular block 2021    Hyperlipidemia 2021    Infection of the inner ear 2021    Muscle pain 2021    Underweight 2021    Ventricular premature beats 2021    Weight loss 2021    Ataxic gait 2021    Kyphoscoliosis 2021    Spinal stenosis of lumbar region with neurogenic claudication 2021    Meniere disease, bilateral     Benign paroxysmal positional vertigo due to bilateral vestibular disorder     Vertigo 2021    Ataxia 2021    Cerebrovascular accident ([address] 2020    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[facility] [address] 2020    Vasovagal syncope 2020    Dizziness and giddiness 02/03/2020    Orthostatic dizziness 02/03/2020    Atherosclerosis of native coronary artery of native heart without angina pectoris 11/26/2018    Hypertension 11/26/2018    Ischemic myocardial dysfunction 11/26/2018    Nonrheumatic aortic valve disorder, unspecified 11/26/2018    Aortic valve disorder 11/26/2018    Personal history of nicotine dependence 11/26/2018    Presence of aortocoronary bypass graft 11/26/2018    Coronary angioplasty status 11/26/2018        Past Medical History:   Diagnosis Date    MARIAM (acute kidney injury) ([facility] [address] 6/7/2022    Atrial fibrillation (HCC)     CAD (coronary artery disease)     cardiac stents    COPD (chronic obstructive pulmonary disease) (HCC)     Diabetes mellitus ([facility] [address]     Hyperlipidemia     Hypertension     Movement disorder     Pneumonia      Past Surgical History:   Procedure Laterality Date    APPENDECTOMY      CORONARY ANGIOPLASTY WITH STENT PLACEMENT      4    CORONARY ARTERY BYPASS GRAFT      triple bypass    EYE SURGERY Bilateral     cataract surgery    INSERTABLE CARDIAC MONITOR Left 12/2018    UPPER GASTROINTESTINAL ENDOSCOPY N/A 10/13/2022    EGD ESOPHAGOGASTRODUODENOSCOPY WITH DILATION performed by Vince Weber MD at [facility]   Restrictions:  Restrictions/Precautions: Fall Risk (high cummings score)    SUBJECTIVE:   Subjective: \"I can sit up in a chair\"    Pain  Pain: 0/10 pre and post    OBJECTIVE:   Bed Mobility Training  Bed Mobility Training: Yes  Supine to Sit: Supervision (no c/o dizziness)    Transfer Training  Transfer Training: Yes  Sit to Stand: Supervision [facility] used)  Stand to Noah Controls: Supervision    Gait Training: Yes  Overall Level of Assistance: Stand-by assistance (pt demonstrating discontinuous steps due to walker management in tight space of pt room; pt exhibiting SOB after amb requiring seated rest break- SPO2 92% on RA after amb; no c/o dizziness with amb)  Distance (ft): 40 Feet  Assistive Device: Walker, rolling    Activity Tolerance  Activity Tolerance: Patient limited by fatigue;Patient limited by endurance     ASSESSMENT   Assessment: Pt exhibiting decreased SOB with activity compared to eval date. Pt stating SOB has been a long standing issue at home. Pt requiring decreased assistance for bed mobility, transfers, and gait of supervision to SBA compared to min to mod A which pt demonstrated at eval.  This is evidence of improved functional mobility with carryover to ease of caregiver need for assistance at d/c from hospital.  Pt requires continued PT to progress toward PLOF of indep for safe return home with wife. Discharge Recommendations:  Continue to assess pending progress, Patient would benefit from continued therapy after discharge    Goals  Long Term Goals  Long Term Goal 1: Pt will demonstrate bed mobility indep  Long Term Goal 2: Pt will demonstrate transfers mod indep with safest AD  Long Term Goal 3: Pt will demonstrate amb >/= 50ft mod indep with safest AD  Long Term Goal 4: Pt will demonstrate stair negotiation 3 steps with 1 rail CGA    PLAN    General Plan: 1 time a day 3-6 times a week  Safety Devices  Type of Devices: Call light within reach, Chair alarm in place, Left in chair     AMPAC (6 CLICK) BASIC MOBILITY  AM-PAC Inpatient Mobility Raw Score : 17 - 3 point improvement from previous assessment on 10/16    Therapy Time   Individual   Time In 0903   Time Out 0917   Minutes 14         There act: 9 min  Gait: 5 min    Zoe Cha PT, 10/18/22 at 9:37 AM         Definitions for assistance levels  Independent = pt does not require any physical supervision or assistance from another person for activity completion. Device may be needed.   Stand by assistance = pt requires verbal cues or instructions from another person, close to but not touching, to perform the activity  Minimal assistance= pt performs 75% or more of the activity; assistance is required to complete the activity  Moderate assistance= pt performs 50% of the activity; assistance is required to complete the activity  Maximal assistance = pt performs 25% of the activity; assistance is required to complete the activity  Dependent = pt requires total physical assistance to accomplish the task

## 2022-10-18 NOTE — PLAN OF CARE
Nutrition Problem #1: Inadequate oral intake  Intervention: Food and/or Nutrient Delivery: Continue NPO  Nutritional  Goals: Ability to tolerate PO intake vs. EN repeat BMP ordered

## 2022-10-18 NOTE — PROGRESS NOTES
Thi Diaz is a 79 y.o. male patient.     Current Facility-Administered Medications   Medication Dose Route Frequency Provider Last Rate Last Admin    dextrose 5 % and 0.9 % sodium chloride infusion   IntraVENous Continuous Farooq Herman MD 50 mL/hr at 10/18/22 1103 New Bag at 10/18/22 1103    valsartan (DIOVAN) tablet 80 mg  80 mg Oral Daily Tian Yates MD   80 mg at 10/18/22 0924    0.9 % sodium chloride infusion   IntraVENous PRN Farooq Herman MD        sotalol (BETAPACE) tablet 60 mg  60 mg Oral BID Tian Yates MD   60 mg at 10/18/22 7603    insulin lispro (HUMALOG) injection vial 0-4 Units  0-4 Units SubCUTAneous TID WC Farooq Herman MD        insulin lispro (HUMALOG) injection vial 0-4 Units  0-4 Units SubCUTAneous Nightly Farooq Herman MD        [Held by provider] apixaban (ELIQUIS) tablet 5 mg  5 mg Oral BID Farooq Herman MD        [Held by provider] clopidogrel (PLAVIX) tablet 75 mg  75 mg Oral Daily Farooq Herman MD        magnesium oxide (MAG-OX) tablet 400 mg  400 mg Oral Daily Farooq Herman MD   400 mg at 10/18/22 4422    nitroGLYCERIN (NITROSTAT) SL tablet 0.4 mg  0.4 mg SubLINGual Q5 Min PRN Farooq Herman MD        glucose chewable tablet 16 g  4 tablet Oral PRN Farooq Herman MD        dextrose bolus 10% 125 mL  125 mL IntraVENous PRN Farooq Herman MD        Or    dextrose bolus 10% 250 mL  250 mL IntraVENous PRN Farooq Herman MD        glucagon (rDNA) injection 1 mg  1 mg SubCUTAneous PRN Farooq Herman MD        dextrose 10 % infusion   IntraVENous Continuous PRN Farooq Herman MD        enoxaparin Sodium (LOVENOX) injection 30 mg  30 mg SubCUTAneous Daily Farooq Herman MD   30 mg at 10/18/22 0925    sodium chloride flush 0.9 % injection 5-40 mL  5-40 mL IntraVENous 2 times per day Farooq Herman MD   10 mL at 10/18/22 0925    sodium chloride flush 0.9 % injection 5-40 mL  5-40 mL IntraVENous PRN Farooq Herman MD 0.9 % sodium chloride infusion   IntraVENous PRN Mildred Dubose MD        acetaminophen (TYLENOL) tablet 650 mg  650 mg Oral Q4H PRN Mildred Dubose MD        ondansetron (ZOFRAN-ODT) disintegrating tablet 4 mg  4 mg Oral Q8H PRN Brooke Dey MD        Or    ondansetron (ZOFRAN) injection 4 mg  4 mg IntraVENous Q6H PRN Brooke Dey MD   4 mg at 10/12/22 0859     Allergies   Allergen Reactions    Fenofibrate     Lipitor [Atorvastatin] Other (See Comments)     Body pain    Niacin Other (See Comments)    Niaspan [Niacin Er] Other (See Comments)     Body pain    Vitamin E Other (See Comments)     Nose bleeds    Zocor [Simvastatin] Other (See Comments)     Sharp body pain     Principal Problem:    Chest pain  Active Problems:    Esophageal dysphagia    Food impaction of esophagus    Esophageal stenosis  Resolved Problems:    * No resolved hospital problems. *    Blood pressure (!) 162/83, pulse 58, temperature 98.2 °F (36.8 °C), temperature source Oral, resp. rate 18, height 5' 9\" (1.753 m), weight 150 lb (68 kg), SpO2 97 %. Subjective reports no dysphagia, was scheduled for timed esophagram today but that was canceled since patient ate  Objective:  Vital signs: (most recent): Blood pressure (!) 162/83, pulse 58, temperature 98.2 °F (36.8 °C), temperature source Oral, resp. rate 18, height 5' 9\" (1.753 m), weight 150 lb (68 kg), SpO2 97 %. Assessment & Plan possible achalasia going for barium study tomorrow.     Dhara Pollard MD  10/18/2022

## 2022-10-19 ENCOUNTER — APPOINTMENT (OUTPATIENT)
Dept: GENERAL RADIOLOGY | Age: 82
DRG: 392 | End: 2022-10-19
Payer: MEDICARE

## 2022-10-19 LAB
ANION GAP SERPL CALCULATED.3IONS-SCNC: 7 MEQ/L (ref 9–15)
BUN BLDV-MCNC: 11 MG/DL (ref 8–23)
CALCIUM SERPL-MCNC: 8.4 MG/DL (ref 8.5–9.9)
CHLORIDE BLD-SCNC: 107 MEQ/L (ref 95–107)
CO2: 25 MEQ/L (ref 20–31)
CREAT SERPL-MCNC: 0.78 MG/DL (ref 0.7–1.2)
GFR SERPL CREATININE-BSD FRML MDRD: >60 ML/MIN/{1.73_M2}
GLUCOSE BLD-MCNC: 115 MG/DL (ref 70–99)
GLUCOSE BLD-MCNC: 117 MG/DL (ref 70–99)
GLUCOSE BLD-MCNC: 135 MG/DL (ref 70–99)
GLUCOSE BLD-MCNC: 145 MG/DL (ref 70–99)
GLUCOSE BLD-MCNC: 169 MG/DL (ref 70–99)
HCT VFR BLD CALC: 29.5 % (ref 42–52)
HEMOGLOBIN: 9.5 G/DL (ref 14–18)
MAGNESIUM: 2 MG/DL (ref 1.7–2.4)
MCH RBC QN AUTO: 25.7 PG (ref 27–31.3)
MCHC RBC AUTO-ENTMCNC: 32.1 % (ref 33–37)
MCV RBC AUTO: 80.2 FL (ref 79–92.2)
PDW BLD-RTO: 18.9 % (ref 11.5–14.5)
PERFORMED ON: ABNORMAL
PLATELET # BLD: 267 K/UL (ref 130–400)
POTASSIUM SERPL-SCNC: 4.6 MEQ/L (ref 3.4–4.9)
RBC # BLD: 3.68 M/UL (ref 4.7–6.1)
SODIUM BLD-SCNC: 139 MEQ/L (ref 135–144)
WBC # BLD: 6.7 K/UL (ref 4.8–10.8)

## 2022-10-19 PROCEDURE — 6360000002 HC RX W HCPCS: Performed by: INTERNAL MEDICINE

## 2022-10-19 PROCEDURE — 85027 COMPLETE CBC AUTOMATED: CPT

## 2022-10-19 PROCEDURE — 83735 ASSAY OF MAGNESIUM: CPT

## 2022-10-19 PROCEDURE — 36415 COLL VENOUS BLD VENIPUNCTURE: CPT

## 2022-10-19 PROCEDURE — 74220 X-RAY XM ESOPHAGUS 1CNTRST: CPT

## 2022-10-19 PROCEDURE — 6360000002 HC RX W HCPCS: Performed by: NURSE PRACTITIONER

## 2022-10-19 PROCEDURE — 2580000003 HC RX 258: Performed by: STUDENT IN AN ORGANIZED HEALTH CARE EDUCATION/TRAINING PROGRAM

## 2022-10-19 PROCEDURE — 99231 SBSQ HOSP IP/OBS SF/LOW 25: CPT | Performed by: SPECIALIST

## 2022-10-19 PROCEDURE — 93005 ELECTROCARDIOGRAM TRACING: CPT | Performed by: NURSE PRACTITIONER

## 2022-10-19 PROCEDURE — 6370000000 HC RX 637 (ALT 250 FOR IP): Performed by: STUDENT IN AN ORGANIZED HEALTH CARE EDUCATION/TRAINING PROGRAM

## 2022-10-19 PROCEDURE — 6370000000 HC RX 637 (ALT 250 FOR IP): Performed by: INTERNAL MEDICINE

## 2022-10-19 PROCEDURE — 2000000000 HC ICU R&B

## 2022-10-19 PROCEDURE — 2500000003 HC RX 250 WO HCPCS: Performed by: NURSE PRACTITIONER

## 2022-10-19 PROCEDURE — 80048 BASIC METABOLIC PNL TOTAL CA: CPT

## 2022-10-19 RX ORDER — MAGNESIUM SULFATE IN WATER 40 MG/ML
2000 INJECTION, SOLUTION INTRAVENOUS ONCE
Status: COMPLETED | OUTPATIENT
Start: 2022-10-19 | End: 2022-10-19

## 2022-10-19 RX ORDER — HYDRALAZINE HYDROCHLORIDE 20 MG/ML
10 INJECTION INTRAMUSCULAR; INTRAVENOUS EVERY 4 HOURS PRN
Status: DISCONTINUED | OUTPATIENT
Start: 2022-10-19 | End: 2022-10-21 | Stop reason: HOSPADM

## 2022-10-19 RX ORDER — SOTALOL HYDROCHLORIDE 80 MG/1
40 TABLET ORAL 2 TIMES DAILY
Status: DISCONTINUED | OUTPATIENT
Start: 2022-10-19 | End: 2022-10-20

## 2022-10-19 RX ADMIN — Medication 10 ML: at 08:06

## 2022-10-19 RX ADMIN — VALSARTAN 80 MG: 80 TABLET, FILM COATED ORAL at 08:06

## 2022-10-19 RX ADMIN — MAGNESIUM OXIDE 400 MG (241.3 MG MAGNESIUM) TABLET 400 MG: TABLET at 08:05

## 2022-10-19 RX ADMIN — HYDRALAZINE HYDROCHLORIDE 10 MG: 20 INJECTION INTRAMUSCULAR; INTRAVENOUS at 21:38

## 2022-10-19 RX ADMIN — MAGNESIUM SULFATE HEPTAHYDRATE 2000 MG: 40 INJECTION, SOLUTION INTRAVENOUS at 15:16

## 2022-10-19 RX ADMIN — BARIUM SULFATE 176 G: 960 POWDER, FOR SUSPENSION ORAL at 09:41

## 2022-10-19 RX ADMIN — MAGNESIUM SULFATE HEPTAHYDRATE 2000 MG: 40 INJECTION, SOLUTION INTRAVENOUS at 17:13

## 2022-10-19 RX ADMIN — Medication 10 ML: at 20:51

## 2022-10-19 RX ADMIN — SOTALOL HYDROCHLORIDE 60 MG: 120 TABLET ORAL at 08:06

## 2022-10-19 ASSESSMENT — PAIN SCALES - GENERAL
PAINLEVEL_OUTOF10: 0
PAINLEVEL_OUTOF10: 0

## 2022-10-19 NOTE — PROGRESS NOTES
[facility] OF [facility] [place] Note      Patient: Alli Ibarra    Unit/Bed: K377/H746-36  YOB: 1940  MRN: [id number]  [facility] Date:  10/11/2022  Hospital Day: 4    Rounding Date: 10/19/2022    Rounding Cardiologist:  HILDA Vallejo MD    PRIMARY Cardiologist: Luz Elena Matos    Subjective Complaint:   Denies any chest pain with exertion or at rest, palpitations, syncope, or edema. .     Physical Examination:     BP (!) 157/76   Pulse 57   Temp 97.5 °F (36.4 °C) (Oral)   Resp 18   Ht 5' 9\" (1.753 m)   Wt 133 lb 9.6 oz (60.6 kg)   SpO2 99%   BMI 19.73 kg/m²       Intake/Output Summary (Last 24 hours) at 10/19/2022 1128  Last data filed at 10/19/2022 0305  Gross per 24 hour   Intake --   Output 500 ml   Net -500 ml                  Alli Ibarra examined at bedside in in no apparent distress. Focused exam reveals:     Cardiac: Heart regular rate and rhythm     Lungs:  clear to auscultation bilaterally- no wheezes, rales or rhonchi, normal air movement, no respiratory distress    Extremities:   negative    Telemetry:      normal sinus          LABS:   CBC:   Recent Labs     10/17/22  0514 10/18/22  0520 10/19/22  0537   WBC 5.7 6.5 6.7   HGB 8.9* 9.3* 9.5*    268 267      BMP:    Recent Labs     10/17/22  0514 10/18/22  0520 10/19/22  0537    147* 139   K 4.3 4.3 4.6   * 111* 107   CO2 25 28 25   BUN 12 12 11   CREATININE 0.82 0.78 0.78   GLUCOSE 121* 126* 145*              Troponin: No results for input(s): TROPONINT in the last 72 hours. BNP: No results for input(s): PROBNP in the last 72 hours. INR: No results for input(s): INR in the last 72 hours. Mg: No results for input(s): MG in the last 72 hours. Cardiac Testing:    none    Assessment:  Patient completed swallowing evaluation  Patient's hemoglobin is stable    Plan:  Okay to go home from a cardiovascular standpoint.   Unless there is evidence of significant abnormalities/bleeding, would resume Plavix and Eliquis  Follow-up is arranged    Electronically signed by Hayder Casillas MD on 10/19/2022 at 11:28 AM

## 2022-10-19 NOTE — PROGRESS NOTES
Tessa Mitchell is a 79 y.o. male patient.     Current Facility-Administered Medications   Medication Dose Route Frequency Provider Last Rate Last Admin    magnesium sulfate 2000 mg in 50 mL IVPB premix  2,000 mg IntraVENous Once RACHEL Tovar CNP 25 mL/hr at 10/19/22 1516 2,000 mg at 10/19/22 1516    sotalol (BETAPACE) tablet 40 mg  40 mg Oral BID RACHEL Tovar CNP        magnesium sulfate 2000 mg in 50 mL IVPB premix  2,000 mg IntraVENous Once RACHEL Tovar CNP        valsartan (DIOVAN) tablet 80 mg  80 mg Oral Daily Zen Aceves MD   80 mg at 10/19/22 0806    0.9 % sodium chloride infusion   IntraVENous PRN Chloe Ritchie MD        insulin lispro (HUMALOG) injection vial 0-4 Units  0-4 Units SubCUTAneous TID WC Chloe Ritchie MD        insulin lispro (HUMALOG) injection vial 0-4 Units  0-4 Units SubCUTAneous Nightly Chloe Ritchie MD        [Held by provider] apixaban (ELIQUIS) tablet 5 mg  5 mg Oral BID Chloe Ritchie MD        [Held by provider] clopidogrel (PLAVIX) tablet 75 mg  75 mg Oral Daily Chloe Ritchie MD        magnesium oxide (MAG-OX) tablet 400 mg  400 mg Oral Daily Chloe Ritchie MD   400 mg at 10/19/22 0805    nitroGLYCERIN (NITROSTAT) SL tablet 0.4 mg  0.4 mg SubLINGual Q5 Min PRN Chloe Ritchie MD        glucose chewable tablet 16 g  4 tablet Oral PRN Chloe Ritchie MD        dextrose bolus 10% 125 mL  125 mL IntraVENous PRN Chloe Ritchie MD        Or    dextrose bolus 10% 250 mL  250 mL IntraVENous PRN Chloe Ritchie MD        glucagon (rDNA) injection 1 mg  1 mg SubCUTAneous PRN Chloe Ritchie MD        dextrose 10 % infusion   IntraVENous Continuous PRN Chloe iRtchie MD        enoxaparin Sodium (LOVENOX) injection 30 mg  30 mg SubCUTAneous Daily Chloe Ritchie MD   30 mg at 10/18/22 0925    sodium chloride flush 0.9 % injection 5-40 mL  5-40 mL IntraVENous 2 times per day Chloe Ritchie MD   10 mL at 10/19/22 9029 sodium chloride flush 0.9 % injection 5-40 mL  5-40 mL IntraVENous PRN Mildred Dubose MD        0.9 % sodium chloride infusion   IntraVENous PRN Mildred Dubose MD        acetaminophen (TYLENOL) tablet 650 mg  650 mg Oral Q4H PRN Shonda Leon MD        ondansetron (ZOFRAN-ODT) disintegrating tablet 4 mg  4 mg Oral Q8H PRN Mildred Dubose MD         Allergies   Allergen Reactions    Fenofibrate     Lipitor [Atorvastatin] Other (See Comments)     Body pain    Niacin Other (See Comments)    Niaspan [Niacin Er] Other (See Comments)     Body pain    Vitamin E Other (See Comments)     Nose bleeds    Zocor [Simvastatin] Other (See Comments)     Sharp body pain     Principal Problem:    Chest pain  Active Problems:    Esophageal dysphagia    Food impaction of esophagus    Esophageal stenosis  Resolved Problems:    * No resolved hospital problems. *    Blood pressure (!) 148/76, pulse 65, temperature 98 °F (36.7 °C), temperature source Oral, resp. rate 18, height 5' 9\" (1.753 m), weight 133 lb 9.6 oz (60.6 kg), SpO2 98 %. Subjective patient reports no dysphagia, had barium esophagram today results are pending, patient is getting transferred to ICU because of cardiac rhythm issues  Objective:  Vital signs: (most recent): Blood pressure (!) 148/76, pulse 65, temperature 98 °F (36.7 °C), temperature source Oral, resp. rate 18, height 5' 9\" (1.753 m), weight 133 lb 9.6 oz (60.6 kg), SpO2 98 %. Assessment & Plan possible achalasia ,Will review timed esophagram later.     Ivis Doll MD  10/19/2022

## 2022-10-19 NOTE — CARE COORDINATION
MET WITH PT AND SPOUSE AT BEDSIDE. PLAN AT [address] [place] [address]. DECLINES HHC. STATES HOME IS ALL ONE LEVEL. DENIES NEEDS.

## 2022-10-19 NOTE — PROGRESS NOTES
[facility] OF [facility] Note      Patient: Gemma Sturkie    Unit/Bed: C283/O425-98  YOB: 1940  MRN: [id number]  [facility] Date:  10/11/2022  Hospital Day: 4    Rounding Date: 10/19/2022    Rounding Cardiologist:  HILDA Heredia MD    PRIMARY Cardiologist: I [facility] OF [facility]    Subjective Complaint:   Has no cardiac complaints. Physical Examination:     BP (!) 157/76   Pulse 57   Temp 97.5 °F (36.4 °C) (Oral)   Resp 18   Ht 5' 9\" (1.753 m)   Wt 133 lb 9.6 oz (60.6 kg)   SpO2 99%   BMI 19.73 kg/m²       Intake/Output Summary (Last 24 hours) at 10/19/2022 1105  Last data filed at 10/19/2022 0305  Gross per 24 hour   Intake --   Output 500 ml   Net -500 ml                  Gemma Sturkie examined at bedside in moderately ill. Focused exam reveals:     Cardiac: Heart irregular rate and rhythm     Lungs:  clear to auscultation bilaterally- no wheezes, rales or rhonchi, normal air movement, no respiratory distress    Extremities:   2+ and ascites edema    Telemetry:      atrial flutter and still with short pauses, improved after backing off propranolol          LABS:   CBC:   Recent Labs     10/17/22  0514 10/18/22  0520 10/19/22  0537   WBC 5.7 6.5 6.7   HGB 8.9* 9.3* 9.5*    268 267      BMP:    Recent Labs     10/17/22  0514 10/18/22  0520 10/19/22  0537    147* 139   K 4.3 4.3 4.6   * 111* 107   CO2 25 28 25   BUN 12 12 11   CREATININE 0.82 0.78 0.78   GLUCOSE 121* 126* 145*              Troponin: No results for input(s): TROPONINT in the last 72 hours. BNP: No results for input(s): PROBNP in the last 72 hours. INR: No results for input(s): INR in the last 72 hours. Mg: No results for input(s): MG in the last 72 hours. Cardiac Testing:    none    Assessment:  Nursing recording blood pressure is on the low side, though Epics blood pressures are acceptable.   However certainly would believe nursing staff  Atrial flutter with pauses--improved after backing off on propranolol  Cirrhosis  Worried some about hepatorenal syndrome--per tickly with low blood pressure  Thankfully, no evidence of elevated creatinine hyponatremia  Recent banding of esophageal varices  Plan:  Reduce propranolol to 10 mg twice daily  Hold diuretics for now  Unfortunately, not a great candidate for systemic anticoagulation given anemia, esophageal varices and banding,--consider watchman's device  Continue evaluate BP--May need ProAmatine although patient does have a history of coronary disease  See orders  Electronically signed by Mayank Graham MD on 10/19/2022 at 11:05 AM

## 2022-10-19 NOTE — PROGRESS NOTES
Nephrology Progress Note    ASSESSMENT AND PLAN:    79 y.o. male with history s/f history s/f T2DM, HTN, HLD, CAD s/p ORTIZ and CABG, A.fib on AC who presented for chest pain, N/V. Chest pain: ? If cardiac in etiology vs. Esophageal/gastric. Does have cardiac history however having dysphagia along w/ the emesis. Neg trops. Pt of Dr. Ammon Alarcon, neg CXR  HTN  A.fib: on [address]  T2DM: SSI, holding metformin  Esophageal stricture: noted on EGD 10/13, s/p dilation   Anemia: likely in setting of blood loss, s/p PRBC transfusion   Acute hypoxic respiratory failure: CXR wnl, ?  Aspiration, O2 has been weaned off      - continue NS/D5W at 50 ml/hr for now   - awaiting barium study results to determine course of action, hopeful for discharge today, awaiting GI recs         Patient Active Problem List:     Atrial fibrillation ([facility] [address]     High risk medication use     Atherosclerosis of native coronary artery of native heart without angina pectoris     Hypertension     Ischemic myocardial dysfunction     Nonrheumatic aortic valve disorder, unspecified     Aortic valve disorder     Personal history of nicotine dependence     Presence of aortocoronary bypass graft     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HCC)     Vasovagal syncope     Dizziness and giddiness     Cerebrovascular accident ([address]     Orthostatic dizziness     Ataxia     Vertigo     Meniere disease, bilateral     Benign paroxysmal positional vertigo due to bilateral vestibular disorder     Coronary angioplasty status     Ataxic gait     Kyphoscoliosis     Spinal stenosis of lumbar region with neurogenic claudication     Abdominal aortic aneurysm (AAA)     Acute inferior myocardial infarction (HCC)     Carotid bruit     Chronic obstructive pulmonary disease (HCC)     Diabetes mellitus (HCC)     Dyspnea on exertion     Fatigue     First degree atrioventricular block     Hyperlipidemia     Infection of the inner ear     Muscle pain     Underweight     Ventricular premature beats     Weight loss     PVD (peripheral vascular disease) (HCC)     Impaired mobility and activities of daily living -sp Severe PVD s/p AAA repain     Hypotension     Late effects of CVA (cerebrovascular accident)     MARIAM (acute kidney injury) ([facility] [address]     Body mass index (BMI) of 20 to 24     Chest pain     Esophageal dysphagia     Food impaction of esophagus     Esophageal stenosis      Subjective:  Admit Date: 10/11/2022    Interval History: already had study this AM, awaiting results, no acute overnight events       Medications:  Scheduled Meds:   valsartan  80 mg Oral Daily    sotalol  60 mg Oral BID    insulin lispro  0-4 Units SubCUTAneous TID WC    insulin lispro  0-4 Units SubCUTAneous Nightly    [Held by provider] apixaban  5 mg Oral BID    [Held by provider] clopidogrel  75 mg Oral Daily    magnesium oxide  400 mg Oral Daily    enoxaparin  30 mg SubCUTAneous Daily    sodium chloride flush  5-40 mL IntraVENous 2 times per day     Continuous Infusions:   sodium chloride      dextrose      sodium chloride         CBC:   Recent Labs     10/18/22  0520 10/19/22  0537   WBC 6.5 6.7   HGB 9.3* 9.5*    267       CMP:    Recent Labs     10/17/22  0514 10/18/22  0520 10/19/22  0537    147* 139   K 4.3 4.3 4.6   * 111* 107   CO2 25 28 25   BUN 12 12 11   CREATININE 0.82 0.78 0.78   GLUCOSE 121* 126* 145*   CALCIUM 8.5 8.6 8.4*   LABGLOM >60.0 >60.0 >60.0       Troponin:   No results for input(s): TROPONINI in the last 72 hours. BNP: No results for input(s): BNP in the last 72 hours. INR:   No results for input(s): INR in the last 72 hours. Lipids:   No results for input(s): CHOL, LDLDIRECT, TRIG, HDL, AMYLASE, LIPASE in the last 72 hours. Liver:   No results for input(s): AST, ALT, ALKPHOS, PROT, LABALBU, BILITOT in the last 72 hours. Invalid input(s): BILDIR    Iron:  No results for input(s): IRONS, FERRITIN in the last 72 hours.     Invalid input(s): LABIRONS  Urinalysis: No results for input(s): UA in the last 72 hours.     Objective:  Vitals: BP (!) 157/76   Pulse 57   Temp 97.5 °F (36.4 °C) (Oral)   Resp 18   Ht 5' 9\" (1.753 m)   Wt 133 lb 9.6 oz (60.6 kg)   SpO2 99%   BMI 19.73 kg/m²    Wt Readings from Last 3 Encounters:   10/19/22 133 lb 9.6 oz (60.6 kg)   09/22/22 142 lb (64.4 kg)   06/29/22 142 lb (64.4 kg)      24HR INTAKE/OUTPUT:    Intake/Output Summary (Last 24 hours) at 10/19/2022 1259  Last data filed at 10/19/2022 0305  Gross per 24 hour   Intake --   Output 500 ml   Net -500 ml         General: alert, in no apparent distress  HEENT: normocephalic, atraumatic, anicteric  Neck: supple, no mass  Lungs: non-labored respirations, clear to auscultation bilaterally  Heart: regular rate and rhythm, no murmurs or rubs  Abdomen: soft, non-tender, non-distended  Ext: no cyanosis, no peripheral edema  Neuro: alert and oriented, no gross abnormalities      Electronically signed by Jeri Mora MD, MD

## 2022-10-19 NOTE — PROGRESS NOTES
Was notified by Pratibha Douglas of patient having 5 beat runs of V-tach and frequent PVC's. I have discussed with Dr. Marce Gallegos who recommends obtaining an EKG, monitor his Qtc, will obtain a magnesium level and administer Magnesium 2 gm IV x 1 now. He is currently resting comfortably in bed with his wife at bedside. He states that he feels well and denies any chest pain, chest pressure, chest tightness, shortness of breath, lightheadedness, dizziness or palpitations. They both verbalize understanding that the new onset of V-tach will hold up his discharge today from cardiac standpoint. Addendum  24-20-52-61. Have reviewed EKG's with Dr. Marce Gallegos who recommends reducing Sotalol to 40 mg PO twice day, obtain EKG in AM .  He has also inquired when patient can resume Plavix and Eliquis. Addendum 1600  Dr. Brock Chandler has also reviewed EKG and recommends additional magnesium IV 2 gm for total of 4 gm, EKG prior to and 1 hour after each Sotalol dose with parameters to HOLD for Qtc greater than 500 and transfer to ICU overnight for closer monitoring. Discussed with Yimi CHOUDHARY and Emelyn CHOUDHARY/assistant paul  also with patient and wife who verbalize understanding. Order has been placed for transfer to ICU, no ICU beds available at this time.

## 2022-10-20 LAB
ALBUMIN SERPL-MCNC: 3.5 G/DL (ref 3.5–4.6)
ANION GAP SERPL CALCULATED.3IONS-SCNC: 13 MEQ/L (ref 9–15)
ANION GAP SERPL CALCULATED.3IONS-SCNC: 16 MEQ/L (ref 9–15)
BASOPHILS ABSOLUTE: 0.1 K/UL (ref 0–0.2)
BASOPHILS RELATIVE PERCENT: 0.9 %
BUN BLDV-MCNC: 11 MG/DL (ref 8–23)
BUN BLDV-MCNC: 11 MG/DL (ref 8–23)
CALCIUM SERPL-MCNC: 8.6 MG/DL (ref 8.5–9.9)
CALCIUM SERPL-MCNC: 8.7 MG/DL (ref 8.5–9.9)
CHLORIDE BLD-SCNC: 100 MEQ/L (ref 95–107)
CHLORIDE BLD-SCNC: 100 MEQ/L (ref 95–107)
CO2: 20 MEQ/L (ref 20–31)
CO2: 24 MEQ/L (ref 20–31)
CREAT SERPL-MCNC: 0.74 MG/DL (ref 0.7–1.2)
CREAT SERPL-MCNC: 0.79 MG/DL (ref 0.7–1.2)
EKG ATRIAL RATE: 0 BPM
EKG ATRIAL RATE: 43 BPM
EKG ATRIAL RATE: 60 BPM
EKG P AXIS: 101 DEGREES
EKG P-R INTERVAL: 222 MS
EKG Q-T INTERVAL: 484 MS
EKG Q-T INTERVAL: 528 MS
EKG Q-T INTERVAL: 596 MS
EKG QRS DURATION: 66 MS
EKG QRS DURATION: 82 MS
EKG QRS DURATION: 84 MS
EKG QTC CALCULATION (BAZETT): 528 MS
EKG QTC CALCULATION (BAZETT): 558 MS
EKG QTC CALCULATION (BAZETT): 596 MS
EKG R AXIS: 72 DEGREES
EKG R AXIS: 75 DEGREES
EKG R AXIS: 83 DEGREES
EKG T AXIS: 133 DEGREES
EKG T AXIS: 147 DEGREES
EKG T AXIS: 148 DEGREES
EKG VENTRICULAR RATE: 60 BPM
EKG VENTRICULAR RATE: 60 BPM
EKG VENTRICULAR RATE: 80 BPM
EOSINOPHILS ABSOLUTE: 0.1 K/UL (ref 0–0.7)
EOSINOPHILS RELATIVE PERCENT: 0.6 %
GFR SERPL CREATININE-BSD FRML MDRD: >60 ML/MIN/{1.73_M2}
GFR SERPL CREATININE-BSD FRML MDRD: >60 ML/MIN/{1.73_M2}
GLUCOSE BLD-MCNC: 113 MG/DL (ref 70–99)
GLUCOSE BLD-MCNC: 120 MG/DL (ref 70–99)
GLUCOSE BLD-MCNC: 121 MG/DL (ref 70–99)
GLUCOSE BLD-MCNC: 121 MG/DL (ref 70–99)
GLUCOSE BLD-MCNC: 132 MG/DL (ref 70–99)
HCT VFR BLD CALC: 31.8 % (ref 42–52)
HEMOGLOBIN: 10.3 G/DL (ref 14–18)
LYMPHOCYTES ABSOLUTE: 0.7 K/UL (ref 1–4.8)
LYMPHOCYTES RELATIVE PERCENT: 7.6 %
MAGNESIUM: 2.6 MG/DL (ref 1.7–2.4)
MCH RBC QN AUTO: 25.9 PG (ref 27–31.3)
MCHC RBC AUTO-ENTMCNC: 32.5 % (ref 33–37)
MCV RBC AUTO: 79.7 FL (ref 79–92.2)
MONOCYTES ABSOLUTE: 0.6 K/UL (ref 0.2–0.8)
MONOCYTES RELATIVE PERCENT: 6.6 %
NEUTROPHILS ABSOLUTE: 7.3 K/UL (ref 1.4–6.5)
NEUTROPHILS RELATIVE PERCENT: 84.3 %
PDW BLD-RTO: 18.9 % (ref 11.5–14.5)
PERFORMED ON: ABNORMAL
PHOSPHORUS: 3.4 MG/DL (ref 2.3–4.8)
PLATELET # BLD: 326 K/UL (ref 130–400)
POTASSIUM SERPL-SCNC: 4.1 MEQ/L (ref 3.4–4.9)
POTASSIUM SERPL-SCNC: 4.2 MEQ/L (ref 3.4–4.9)
RBC # BLD: 3.99 M/UL (ref 4.7–6.1)
SODIUM BLD-SCNC: 136 MEQ/L (ref 135–144)
SODIUM BLD-SCNC: 137 MEQ/L (ref 135–144)
WBC # BLD: 8.7 K/UL (ref 4.8–10.8)

## 2022-10-20 PROCEDURE — 85025 COMPLETE CBC W/AUTO DIFF WBC: CPT

## 2022-10-20 PROCEDURE — 2580000003 HC RX 258: Performed by: STUDENT IN AN ORGANIZED HEALTH CARE EDUCATION/TRAINING PROGRAM

## 2022-10-20 PROCEDURE — 99223 1ST HOSP IP/OBS HIGH 75: CPT | Performed by: INTERNAL MEDICINE

## 2022-10-20 PROCEDURE — 99232 SBSQ HOSP IP/OBS MODERATE 35: CPT | Performed by: SPECIALIST

## 2022-10-20 PROCEDURE — 2060000000 HC ICU INTERMEDIATE R&B

## 2022-10-20 PROCEDURE — 36415 COLL VENOUS BLD VENIPUNCTURE: CPT

## 2022-10-20 PROCEDURE — 93010 ELECTROCARDIOGRAM REPORT: CPT | Performed by: INTERNAL MEDICINE

## 2022-10-20 PROCEDURE — 83735 ASSAY OF MAGNESIUM: CPT

## 2022-10-20 PROCEDURE — 6370000000 HC RX 637 (ALT 250 FOR IP): Performed by: STUDENT IN AN ORGANIZED HEALTH CARE EDUCATION/TRAINING PROGRAM

## 2022-10-20 PROCEDURE — 6360000002 HC RX W HCPCS: Performed by: INTERNAL MEDICINE

## 2022-10-20 PROCEDURE — 93005 ELECTROCARDIOGRAM TRACING: CPT | Performed by: NURSE PRACTITIONER

## 2022-10-20 PROCEDURE — 80069 RENAL FUNCTION PANEL: CPT

## 2022-10-20 PROCEDURE — 6360000002 HC RX W HCPCS: Performed by: STUDENT IN AN ORGANIZED HEALTH CARE EDUCATION/TRAINING PROGRAM

## 2022-10-20 RX ADMIN — HYDRALAZINE HYDROCHLORIDE 10 MG: 20 INJECTION INTRAMUSCULAR; INTRAVENOUS at 03:11

## 2022-10-20 RX ADMIN — Medication 10 ML: at 08:39

## 2022-10-20 RX ADMIN — APIXABAN 5 MG: 5 TABLET, FILM COATED ORAL at 21:51

## 2022-10-20 RX ADMIN — Medication 10 ML: at 21:51

## 2022-10-20 RX ADMIN — ENOXAPARIN SODIUM 30 MG: 100 INJECTION SUBCUTANEOUS at 08:42

## 2022-10-20 RX ADMIN — ONDANSETRON 4 MG: 4 TABLET, ORALLY DISINTEGRATING ORAL at 08:49

## 2022-10-20 ASSESSMENT — PAIN SCALES - GENERAL
PAINLEVEL_OUTOF10: 0
PAINLEVEL_OUTOF10: 0

## 2022-10-20 NOTE — PROGRESS NOTES
Pt hypertensive with SBP >180. Dr. Jennifer Souza paged for antihypertensive prn orders. Hydralazine was ordered prn every 4 hours.

## 2022-10-20 NOTE — PROGRESS NOTES
Rona Ellis is a 79 y.o. male patient.     Current Facility-Administered Medications   Medication Dose Route Frequency Provider Last Rate Last Admin    hydrALAZINE (APRESOLINE) injection 10 mg  10 mg IntraVENous Q4H PRN Jayy Ramirezar DO   10 mg at 10/20/22 3236    valsartan (DIOVAN) tablet 80 mg  80 mg Oral Daily Antonio Hutton MD   80 mg at 10/19/22 0806    0.9 % sodium chloride infusion   IntraVENous PRN Ashwini Case MD        insulin lispro (HUMALOG) injection vial 0-4 Units  0-4 Units SubCUTAneous TID WC Ashwini Case MD        insulin lispro (HUMALOG) injection vial 0-4 Units  0-4 Units SubCUTAneous Nightly Ashwini Case MD        apixaban (ELIQUIS) tablet 5 mg  5 mg Oral BID Ashwini Case MD        clopidogrel (PLAVIX) tablet 75 mg  75 mg Oral Daily Ashwini Case MD        magnesium oxide (MAG-OX) tablet 400 mg  400 mg Oral Daily Ashwini Case MD   400 mg at 10/19/22 0805    nitroGLYCERIN (NITROSTAT) SL tablet 0.4 mg  0.4 mg SubLINGual Q5 Min PRN Ashwini Case MD        glucose chewable tablet 16 g  4 tablet Oral PRN Ashwini Case MD        dextrose bolus 10% 125 mL  125 mL IntraVENous PRN Ashwini Case MD        Or    dextrose bolus 10% 250 mL  250 mL IntraVENous PRN Ashwini Case MD        glucagon (rDNA) injection 1 mg  1 mg SubCUTAneous PRN Ashwini Case MD        dextrose 10 % infusion   IntraVENous Continuous PRN Mildred Dubose MD        sodium chloride flush 0.9 % injection 5-40 mL  5-40 mL IntraVENous 2 times per day Ashwini Case MD   10 mL at 10/20/22 0839    sodium chloride flush 0.9 % injection 5-40 mL  5-40 mL IntraVENous PRN Ashwini Case MD        0.9 % sodium chloride infusion   IntraVENous PRN Ashwini Case MD        acetaminophen (TYLENOL) tablet 650 mg  650 mg Oral Q4H PRN Mildred Dubose MD        ondansetron (ZOFRAN-ODT) disintegrating tablet 4 mg  4 mg Oral Q8H PRN Ashwini Case MD   4 mg at 10/20/22 0849     Allergies Allergen Reactions    Fenofibrate     Lipitor [Atorvastatin] Other (See Comments)     Body pain    Niacin Other (See Comments)    Niaspan [Niacin Er] Other (See Comments)     Body pain    Vitamin E Other (See Comments)     Nose bleeds    Zocor [Simvastatin] Other (See Comments)     Sharp body pain     Principal Problem:    Chest pain  Active Problems:    Esophageal dysphagia    Food impaction of esophagus    Esophageal stenosis  Resolved Problems:    * No resolved hospital problems. *    Blood pressure (!) 141/71, pulse 77, temperature 97.8 °F (36.6 °C), temperature source Oral, resp. rate 22, height 5' 9\" (1.753 m), weight 133 lb 9.6 oz (60.6 kg), SpO2 96 %. Subjective no dysphagia or choking, tolerating diet reasonably well, timed esophagogram showed no evidence of any significant esophageal stasis. Objective:  Vital signs: (most recent): Blood pressure (!) 141/71, pulse 77, temperature 97.8 °F (36.6 °C), temperature source Oral, resp. rate 22, height 5' 9\" (1.753 m), weight 133 lb 9.6 oz (60.6 kg), SpO2 96 %. Assessment & Plan dysphagia probably nonspecific motility disorder.,  Patient seems to be tolerating oral feedings reasonably well. Will observe for now and if symptoms does not improve may have to refer to tertiary care facility for esophageal manometry.     Daria Delgado MD  10/20/2022

## 2022-10-20 NOTE — PROGRESS NOTES
Physical Therapy   Facility/Department: [facility] MED SURG IC14/IC14-01    NAME: Alli Ibarra    : 1940 (79 y.o.)  MRN: [id number]    Account: [de-identified]  Gender: male    Hold PT treatment on this date d/t patient had a change in medical status. Pt moved from W179 to IC14. Change in medical status discussed with supervising PT on this date. Please re-consult physical therapy when appropriate. Note written to attending hospitalist  via \"sticky note\" requesting updated Physical Therapy eval and treat orders when pt is appropriate for out of bed activity.       [address] [facility]) Physical Therapy Department      Electronically signed by Bashir Lamas PTA on 10/20/22 at 7:37 AM EDT

## 2022-10-20 NOTE — PROGRESS NOTES
Medicine Progress Note    Assessment:  Esophageal stricture  OHDX CAD  DM type2  AF   Hypertension         Plan: possibly  discharge tomorrow    to floor    Patient Active Problem List:     Atrial fibrillation ([facility] [address]     High risk medication use     Atherosclerosis of native coronary artery of native heart without angina pectoris     Hypertension     Ischemic myocardial dysfunction     Nonrheumatic aortic valve disorder, unspecified     Aortic valve disorder     Personal history of nicotine dependence     Presence of aortocoronary bypass graft     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HCC)     Vasovagal syncope     Dizziness and giddiness     Cerebrovascular accident ([facility] [address]     Orthostatic dizziness     Ataxia     Vertigo     Meniere disease, bilateral     Benign paroxysmal positional vertigo due to bilateral vestibular disorder     Coronary angioplasty status     Ataxic gait     Kyphoscoliosis     Spinal stenosis of lumbar region with neurogenic claudication     Abdominal aortic aneurysm (AAA)     Acute inferior myocardial infarction (HCC)     Carotid bruit     Chronic obstructive pulmonary disease (HCC)     Diabetes mellitus (HCC)     Dyspnea on exertion     Fatigue     First degree atrioventricular block     Hyperlipidemia     Infection of the inner ear     Muscle pain     Underweight     Ventricular premature beats     Weight loss     PVD (peripheral vascular disease) ([facility])     Impaired mobility and activities of daily living -sp Severe PVD s/p AAA repain     Hypotension     Late effects of CVA (cerebrovascular accident)     MARIAM (acute kidney injury) ([facility] [address]     Body mass index (BMI) of 20 to 24     Chest pain     Esophageal dysphagia     Food impaction of esophagus     Esophageal stenosis      Subjective:  Admit Date: 10/11/2022    Interval History: alert    Medications:  Scheduled Meds:   valsartan  80 mg Oral Daily    insulin lispro  0-4 Units SubCUTAneous TID WC    insulin lispro  0-4 Units SubCUTAneous Nightly apixaban  5 mg Oral BID    clopidogrel  75 mg Oral Daily    magnesium oxide  400 mg Oral Daily    sodium chloride flush  5-40 mL IntraVENous 2 times per day     Continuous Infusions:   sodium chloride      dextrose      sodium chloride         CBC:   Recent Labs     10/19/22  0537 10/20/22  0919   WBC 6.7 8.7   HGB 9.5* 10.3*    326     CMP:    Recent Labs     10/19/22  0537 10/20/22  0919 10/20/22  1108    136 137   K 4.6 4.1 4.2    100 100   CO2 25 20 24   BUN 11 11 11   CREATININE 0.78 0.74 0.79   GLUCOSE 145* 132* 121*   CALCIUM 8.4* 8.6 8.7   LABGLOM >60.0 >60.0 >60.0     Troponin: No results for input(s): TROPONINI in the last 72 hours. BNP: No results for input(s): BNP in the last 72 hours. INR: No results for input(s): INR in the last 72 hours. Lipids: No results for input(s): CHOL, LDLDIRECT, TRIG, HDL, AMYLASE, LIPASE in the last 72 hours. Liver:   Recent Labs     10/20/22  0919   LABALBU 3.5     Iron:  No results for input(s): IRONS, FERRITIN in the last 72 hours. Invalid input(s): LABIRONS  Urinalysis: No results for input(s): UA in the last 72 hours.     Objective:  Vitals: BP (!) 153/97   Pulse 68   Temp (!) 96.7 °F (35.9 °C) (Axillary)   Resp 20   Ht 5' 9\" (1.753 m)   Wt 133 lb 9.6 oz (60.6 kg)   SpO2 99%   BMI 19.73 kg/m²    Wt Readings from Last 3 Encounters:   10/19/22 133 lb 9.6 oz (60.6 kg)   09/22/22 142 lb (64.4 kg)   06/29/22 142 lb (64.4 kg)      24HR INTAKE/OUTPUT:    Intake/Output Summary (Last 24 hours) at 10/20/2022 1342  Last data filed at 10/20/2022 0700  Gross per 24 hour   Intake 0 ml   Output 800 ml   Net -800 ml       General: alert, in no apparent distress  HEENT: normocephalic, atraumatic, anicteric  Neck: supple, no mass  Lungs: non-labored respirations, clear to auscultation bilaterally  Heart: regular rate and rhythm, no murmurs or rubs  Abdomen: soft, non-tender, non-distended  Ext: no cyanosis, no peripheral edema  Neuro: alert and oriented, no gross abnormalities  Psych: normal mood and affect  Skin: no rash      Electronically signed by Susanne Clifford DO, MD

## 2022-10-20 NOTE — PROGRESS NOTES
Pt awake alert and oriented x 3. Moves extremities x 4 without diff. Voiding via urinal. Pt remains NPO post esophagram. Perfect served GI for update.

## 2022-10-20 NOTE — INTERDISCIPLINARY ROUNDS
participated in ICU rounds with multidisciplinary team. No immediate spiritual needs noted.  to provide support as needed.

## 2022-10-20 NOTE — PROGRESS NOTES
[facility]   Facility/Department: [facility]  Speech Language Pathology    Fermin Hobbs  : 1940  ROOM: IC14/IC14-01      DATE: 10/20/2022      This patient was being seen by Speech Therapy for:  Dysphagia Treatment      However, due to this patient's change in medical status, Speech Therapy will require new orders to continue to follow the patient. Please re-order, as needed.        Thank you,  Desirae Nugent SLP, CCC-SLP, Date: 10/20/2022, Time: 7:57 AM

## 2022-10-20 NOTE — CARE COORDINATION
Quality round completed with care management team. Family at bedside. Discuss DC plan with pt. DC plan Home Denies any needs. Declined HHC. LSW will follow.      Electronically signed by CAROLINE Owen on 10/20/2022 at 11:17 AM

## 2022-10-20 NOTE — PROGRESS NOTES
[facility] Heart [place] Note      Patient: Ameena Jewell    Unit/Bed: CZ42/UU79-25  YOB: 1940  MRN: [id number]  Admit Date:  10/11/2022  Hospital Day: 5    Rounding Date: 10/20/2022    Rounding Cardiologist:  HILDA Gloria MD    PRIMARY Cardiologist: Rena Buckley    Subjective Complaint:   Denies any chest pain with exertion or at rest, palpitations, syncope, or edema. .     Physical Examination:     BP (!) 153/97   Pulse 68   Temp (!) 96.7 °F (35.9 °C) (Axillary)   Resp 20   Ht 5' 9\" (1.753 m)   Wt 133 lb 9.6 oz (60.6 kg)   SpO2 99%   BMI 19.73 kg/m²       Intake/Output Summary (Last 24 hours) at 10/20/2022 1310  Last data filed at 10/20/2022 0700  Gross per 24 hour   Intake 0 ml   Output 800 ml   Net -800 ml                  Ameena Jewell examined at bedside in in no apparent distress and cooperative. Focused exam reveals:     Cardiac: Heart regular rate and rhythm     Lungs:  decreased breath sounds noted-bilaterally    Extremities:   negative    Telemetry:      Very rare ectopy--just a rare couplet. No further sustained ventricular tachycardia        LABS:   CBC:   Recent Labs     10/18/22  0520 10/19/22  0537 10/20/22  0919   WBC 6.5 6.7 8.7   HGB 9.3* 9.5* 10.3*    267 326      BMP:    Recent Labs     10/19/22  0537 10/20/22  0919 10/20/22  1108    136 137   K 4.6 4.1 4.2    100 100   CO2 25 20 24   BUN 11 11 11   CREATININE 0.78 0.74 0.79   GLUCOSE 145* 132* 121*              Troponin: No results for input(s): TROPONINT in the last 72 hours. BNP: No results for input(s): PROBNP in the last 72 hours. INR: No results for input(s): INR in the last 72 hours. Mg:   Recent Labs     10/20/22  1108   MG 2.6*       Cardiac Testing:   EKG does still show a prolonged QTC,    Assessment:  Patient presented with dysphagia, foreign body in esophagus, and underwent esophageal dilatation  EKG showed prolonged QTC. Patient sotalol was continually reduced.   However patient did have some short bursts of multifocal tachycardia--questionable torsades  History of paroxysmal atrial fibrillation  Hemoglobin improved  Magnesium was given an appropriate dose    Plan:  Patient sotalol has been stopped  Magnesium was given appropriately and level is 2.6  Heart rate is increased, which is shortened QTC  No evidence of further bleeding-we will stop Lovenox and resume Plavix for coronary artery disease and Eliquis for paroxysmal atrial fibrillation  Okay to transfer to a monitored floor or possibly home if felt necessary--does not need to be on 1 W.  Close follow-up will be made with EKG, Holter monitor, and consider repeat echocardiogram  Patient had epigastric pain, obviously was from his dysphagia and esophagus  If ambulating well, most likely can go home.   Check QTC in a.m. if still here, as well as basic metabolic profile and magnesium level  Patient has a follow-up with me next week to check EKG, most likely will get further follow-up with Dr. Kavon Hough on November 7    See orders  Electronically signed by Marty Lam MD on 10/20/2022 at 1:10 PM

## 2022-10-20 NOTE — CONSULTS
Critical Care Consult    Consults      Admit Date: 10/11/2022    PCP:  Maria Luisa Lynne DO    CHIEF COMPLAINT / HPI:                The patient is a 79 y.o. male with significant past medical history of chronic obstructive pulmonary disease, hypertension, hyperlipidemia and atrial fibrillation who presented with chest pain and nausea. He also was vomiting. This has been going on for about 3 days prior to admission. There was no fever or chills. His initial cardiac enzymes were normal.  He was seen by gastroenterology. He underwent EGD and had evidence of esophageal strictures and had dilation. He has been having abnormalities on EKG which was noted yesterday. He had 5 beats run of V. tach with frequent PVCs. He was given magnesium IV and repeated EKG was done. His sotalol dose was decreased. Repeated EKG in the afternoon showed prolongation of QTC's. He was seen by cardiology again and was transferred to the ICU for further monitoring.      Past Medical History:      Diagnosis Date    MARIAM (acute kidney injury) ([facility] [address] 6/7/2022    Atrial fibrillation (HCC)     CAD (coronary artery disease)     cardiac stents    COPD (chronic obstructive pulmonary disease) (HCC)     Diabetes mellitus ([facility] [address]     Hyperlipidemia     Hypertension     Movement disorder     Pneumonia         Past Surgical History:        Procedure Laterality Date    APPENDECTOMY      CORONARY ANGIOPLASTY WITH STENT PLACEMENT      4    CORONARY ARTERY BYPASS GRAFT      triple bypass    EYE SURGERY Bilateral     cataract surgery    INSERTABLE CARDIAC MONITOR Left 12/2018    UPPER GASTROINTESTINAL ENDOSCOPY N/A 10/13/2022    EGD ESOPHAGOGASTRODUODENOSCOPY WITH DILATION performed by Juana Odonnell MD at [facility]       Current Medications:     sotalol  40 mg Oral BID    valsartan  80 mg Oral Daily    insulin lispro  0-4 Units SubCUTAneous TID     insulin lispro  0-4 Units SubCUTAneous Nightly    [Held by provider] apixaban  5 mg Oral BID [Held by provider] clopidogrel  75 mg Oral Daily    magnesium oxide  400 mg Oral Daily    enoxaparin  30 mg SubCUTAneous Daily    sodium chloride flush  5-40 mL IntraVENous 2 times per day     Home Meds:  Prior to Admission medications    Medication Sig Start Date End Date Taking? Authorizing Provider   valsartan (DIOVAN) 80 MG tablet Take 1 tablet by mouth daily 10/18/22  Yes Bonita Guthrie MD   sotalol (BETAPACE) 120 MG tablet Take 0.5 tablets by mouth 2 times daily 10/15/22  Yes Bonita Guthrie MD   clopidogrel (PLAVIX) 75 MG tablet Take 1 tablet by mouth daily 10/17/22  Yes Bonita Guthrie MD   apixaban (ELIQUIS) 5 MG TABS tablet Take 1 tablet by mouth 2 times daily 10/17/22  Yes Bonita Guthrie MD   albuterol sulfate  (90 Base) MCG/ACT inhaler use 1 puff as needed twice daily for 90 days. 7/29/21   Historical Provider, MD   magnesium oxide (MAG-OX) 400 (240 Mg) MG tablet Take 1 tablet by mouth daily 1/27/21   Yuliana Carter MD   metFORMIN (GLUCOPHAGE) 500 MG tablet Take 500 mg by mouth 2 times daily  1/20/20   Historical Provider, MD   pitavastatin (LIVALO) 4 MG TABS tablet Take 4 mg by mouth nightly    Historical Provider, MD   nitroGLYCERIN (NITROSTAT) 0.4 MG SL tablet Place 0.4 mg under the tongue every 5 minutes as needed for Chest pain     Historical Provider, MD       Allergies:  Fenofibrate, Lipitor [atorvastatin], Niacin, Niaspan [niacin er], Vitamin e, and Zocor [simvastatin]     Social History:      reports that he has quit smoking. His smoking use included cigarettes. He has never used smokeless tobacco. He reports that he does not drink alcohol and does not use drugs. TOBACCO:   reports that he has quit smoking. His smoking use included cigarettes. He has never used smokeless tobacco.     ETOH:   reports no history of alcohol use. Family History:   Nonpertinent      Review of Systems  Complete review of systems done and negative unless otherwise noted positive. Objective:     PHYSICAL EXAM:      VITALS:  BP (!) 153/97   Pulse 68   Temp (!) 96.7 °F (35.9 °C) (Axillary)   Resp 20   Ht 5' 9\" (1.753 m)   Wt 133 lb 9.6 oz (60.6 kg)   SpO2 99%   BMI 19.73 kg/m²   24HR INTAKE/OUTPUT:    Intake/Output Summary (Last 24 hours) at 10/20/2022 0836  Last data filed at 10/20/2022 0700  Gross per 24 hour   Intake 0 ml   Output 800 ml   Net -800 ml     CURRENT PULSE OXIMETRY:  SpO2: 99 %  24HR PULSE OXIMETRY RANGE:  SpO2  Av.3 %  Min: 90 %  Max: 100 %    General appearance - alert, ill appearing, and in no distress  Mental status - alert, oriented to person, place, and time  Eyes - pupils equal and reactive, extraocular eye movements intact  Nose - normal and patent, no erythema, discharge or polyps  Neck - supple, no significant adenopathy  Chest - clear to auscultation, no wheezes, rales or rhonchi, symmetric air entry  Heart - normal rate, regular rhythm, normal S1, S2, no murmurs, rubs, clicks or gallops  Abdomen - soft, nontender, nondistended, no masses or organomegaly  Rectal - deferred, not clinically indicated  Neurological - alert, oriented, normal speech, no focal findings or movement disorder noted, motor and sensory grossly normal bilaterally  Musculoskeletal - no joint tenderness, deformity or swelling  Extremities - peripheral pulses normal, no pedal edema, no clubbing or cyanosis  Skin - normal coloration and turgor, no rashes, no suspicious skin lesions noted         DATA:    CBC:   Recent Labs     10/18/22  0520 10/19/22  0537   WBC 6.5 6.7   HGB 9.3* 9.5*   HCT 29.2* 29.5*    267     BMP:    Recent Labs     10/18/22  0520 10/19/22  0537 10/19/22  1444   * 139  --    K 4.3 4.6  --    * 107  --    CO2 28 25  --    BUN 12 11  --    CREATININE 0.78 0.78  --    GLUCOSE 126* 145*  --    CALCIUM 8.6 8.4*  --    MG  --   --  2.0            Radiology Review:    CXR portable: Results for orders placed during the hospital encounter of 10/11/22    XR CHEST PORTABLE    Narrative  EXAMINATION:  ONE XRAY VIEW OF THE CHEST    10/13/2022 2:46 pm    COMPARISON:  12/11/2022    HISTORY:  ORDERING SYSTEM PROVIDED HISTORY: ?esophageal obstruction  TECHNOLOGIST PROVIDED HISTORY:  Reason for exam:->?esophageal obstruction  What reading provider will be dictating this exam?->CRC    FINDINGS:  The lungs are without acute focal process. There is no effusion or  pneumothorax. The cardiomediastinal silhouette is without acute process. The  osseous structures are without acute process. Sternotomy wires noted. Impression  No acute process. Echo:    Assessment/Plan         Impression:    -V. Tach with PVCs. -Prolonged QTC  -High risk cardiac medications. Has been on sotalol which was decreased in dose yesterday  -Chest pain. This has resulted in  -Dysphagia due to esophageal stricture status post esophageal dilation.  -Negative fibrillation.  -Chronic anemia. Recommendations:    -Admitted to the ICU for hemodynamic and neuro monitoring.  -Continue cardiac monitor. Continue to repeat EKG. -Maintain potassium above 4 and magnesium above 2.  -Noted holding sotalol.  -Hemoglobin has been stable. Cardiology is restarting antiplatelets and anticoagulation.  -Continue his current cardiac medications.  -Can be transferred out of the ICU.           Full Code         Electronically signed by Sha Colbert MD on 10/20/2022 at 8:36 AM

## 2022-10-20 NOTE — PROGRESS NOTES
Pt sotalol held @2051 for QTC greater than 500. Pt  on 12-lead taken @2043. Dr Brenda Quick, from Cardiology, notified medication was held.

## 2022-10-21 VITALS
BODY MASS INDEX: 19.31 KG/M2 | SYSTOLIC BLOOD PRESSURE: 150 MMHG | HEIGHT: 69 IN | HEART RATE: 79 BPM | WEIGHT: 130.4 LBS | DIASTOLIC BLOOD PRESSURE: 72 MMHG | TEMPERATURE: 98.2 F | RESPIRATION RATE: 16 BRPM | OXYGEN SATURATION: 97 %

## 2022-10-21 LAB
ALBUMIN SERPL-MCNC: 3.2 G/DL (ref 3.5–4.6)
ANION GAP SERPL CALCULATED.3IONS-SCNC: 13 MEQ/L (ref 9–15)
BASOPHILS ABSOLUTE: 0 K/UL (ref 0–0.2)
BASOPHILS RELATIVE PERCENT: 0.4 %
BUN BLDV-MCNC: 16 MG/DL (ref 8–23)
CALCIUM SERPL-MCNC: 8.5 MG/DL (ref 8.5–9.9)
CHLORIDE BLD-SCNC: 101 MEQ/L (ref 95–107)
CO2: 24 MEQ/L (ref 20–31)
CREAT SERPL-MCNC: 1.12 MG/DL (ref 0.7–1.2)
EOSINOPHILS ABSOLUTE: 0.1 K/UL (ref 0–0.7)
EOSINOPHILS RELATIVE PERCENT: 1.7 %
GFR SERPL CREATININE-BSD FRML MDRD: >60 ML/MIN/{1.73_M2}
GLUCOSE BLD-MCNC: 111 MG/DL (ref 70–99)
GLUCOSE BLD-MCNC: 113 MG/DL (ref 70–99)
GLUCOSE BLD-MCNC: 181 MG/DL (ref 70–99)
HCT VFR BLD CALC: 29.7 % (ref 42–52)
HEMOGLOBIN: 9.7 G/DL (ref 14–18)
LYMPHOCYTES ABSOLUTE: 0.6 K/UL (ref 1–4.8)
LYMPHOCYTES RELATIVE PERCENT: 9 %
MAGNESIUM: 2.6 MG/DL (ref 1.7–2.4)
MCH RBC QN AUTO: 26 PG (ref 27–31.3)
MCHC RBC AUTO-ENTMCNC: 32.7 % (ref 33–37)
MCV RBC AUTO: 79.6 FL (ref 79–92.2)
MONOCYTES ABSOLUTE: 0.8 K/UL (ref 0.2–0.8)
MONOCYTES RELATIVE PERCENT: 10.8 %
NEUTROPHILS ABSOLUTE: 5.5 K/UL (ref 1.4–6.5)
NEUTROPHILS RELATIVE PERCENT: 78.1 %
PDW BLD-RTO: 19.3 % (ref 11.5–14.5)
PERFORMED ON: ABNORMAL
PERFORMED ON: ABNORMAL
PHOSPHORUS: 4 MG/DL (ref 2.3–4.8)
PLATELET # BLD: 288 K/UL (ref 130–400)
POTASSIUM SERPL-SCNC: 3.9 MEQ/L (ref 3.4–4.9)
RBC # BLD: 3.73 M/UL (ref 4.7–6.1)
SODIUM BLD-SCNC: 138 MEQ/L (ref 135–144)
WBC # BLD: 7 K/UL (ref 4.8–10.8)

## 2022-10-21 PROCEDURE — 83735 ASSAY OF MAGNESIUM: CPT

## 2022-10-21 PROCEDURE — 36415 COLL VENOUS BLD VENIPUNCTURE: CPT

## 2022-10-21 PROCEDURE — 97162 PT EVAL MOD COMPLEX 30 MIN: CPT

## 2022-10-21 PROCEDURE — 85025 COMPLETE CBC W/AUTO DIFF WBC: CPT

## 2022-10-21 PROCEDURE — 80069 RENAL FUNCTION PANEL: CPT

## 2022-10-21 PROCEDURE — 6370000000 HC RX 637 (ALT 250 FOR IP): Performed by: INTERNAL MEDICINE

## 2022-10-21 PROCEDURE — 6370000000 HC RX 637 (ALT 250 FOR IP): Performed by: STUDENT IN AN ORGANIZED HEALTH CARE EDUCATION/TRAINING PROGRAM

## 2022-10-21 PROCEDURE — 97166 OT EVAL MOD COMPLEX 45 MIN: CPT

## 2022-10-21 RX ADMIN — VALSARTAN 80 MG: 80 TABLET, FILM COATED ORAL at 09:46

## 2022-10-21 RX ADMIN — MAGNESIUM OXIDE 400 MG (241.3 MG MAGNESIUM) TABLET 400 MG: TABLET at 09:46

## 2022-10-21 RX ADMIN — APIXABAN 5 MG: 5 TABLET, FILM COATED ORAL at 09:46

## 2022-10-21 RX ADMIN — CLOPIDOGREL BISULFATE 75 MG: 75 TABLET ORAL at 09:46

## 2022-10-21 NOTE — DISCHARGE SUMMARY
Discharge Summary    Patient Identification:  Patient: Rose Marie Song  : 1940  MRN: [id number]   Account: [de-identified]     Admit date: 10/11/2022  Discharge date: 10/21/2022   Attending provider: No att. providers found        Primary care provider: Jono Barney DO     History of Present Illness: choking    Admission Diagnoses:  Chest pain related to choking spell  Hx ODX Hypertension  DM type-2 CABGX AF    Discharge Diagnoses:    Active Hospital Problems    Diagnosis Date Noted    Esophageal dysphagia [R13.19] 10/13/2022     Priority: Medium    Food impaction of esophagus [T18.128A] 10/13/2022     Priority: Medium    Esophageal stenosis [K22.2] 10/13/2022     Priority: Medium    Chest pain [R07.9] 10/11/2022     Priority: Medium       Procedures:   EGD    Consults:   IP CONSULT TO CARDIOLOGY  IP CONSULT TO GI  REQUEST FOR SPIRITUAL ASSESSMENT     Examination: on chart       Significant Diagnostics:   Labs:   Recent Results (from the past 72 hour(s))   POCT Glucose    Collection Time: 10/18/22  9:03 PM   Result Value Ref Range    POC Glucose 138 (H) 70 - 99 mg/dl    Performed on ACCU-CHEK    Basic Metabolic Panel    Collection Time: 10/19/22  5:37 AM   Result Value Ref Range    Sodium 139 135 - 144 mEq/L    Potassium 4.6 3.4 - 4.9 mEq/L    Chloride 107 95 - 107 mEq/L    CO2 25 20 - 31 mEq/L    Anion Gap 7 (L) 9 - 15 mEq/L    Glucose 145 (H) 70 - 99 mg/dL    BUN 11 8 - 23 mg/dL    Creatinine 0.78 0.70 - 1.20 mg/dL    Est, Glom Filt Rate >60.0 >60    Calcium 8.4 (L) 8.5 - 9.9 mg/dL   CBC    Collection Time: 10/19/22  5:37 AM   Result Value Ref Range    WBC 6.7 4.8 - 10.8 K/uL    RBC 3.68 (L) 4.70 - 6.10 M/uL    Hemoglobin 9.5 (L) 14.0 - 18.0 g/dL    Hematocrit 29.5 (L) 42.0 - 52.0 %    MCV 80.2 79.0 - 92.2 fL    MCH 25.7 (L) 27.0 - 31.3 pg    MCHC 32.1 (L) 33.0 - 37.0 %    RDW 18.9 (H) 11.5 - 14.5 %    Platelets 129 792 - 629 K/uL   POCT Glucose    Collection Time: 10/19/22  5:55 AM   Result Value Ref Range    POC Glucose 135 (H) 70 - 99 mg/dl    Performed on ACCU-CHEK    POCT Glucose    Collection Time: 10/19/22 10:51 AM   Result Value Ref Range    POC Glucose 169 (H) 70 - 99 mg/dl    Performed on ACCU-CHEK    Magnesium    Collection Time: 10/19/22  2:44 PM   Result Value Ref Range    Magnesium 2.0 1.7 - 2.4 mg/dL   EKG 12 Lead    Collection Time: 10/19/22  3:00 PM   Result Value Ref Range    Ventricular Rate 60 BPM    Atrial Rate 0 BPM    QRS Duration 82 ms    Q-T Interval 596 ms    QTc Calculation (Bazett) 596 ms    R Axis 75 degrees    T Axis 147 degrees   POCT Glucose    Collection Time: 10/19/22  3:48 PM   Result Value Ref Range    POC Glucose 115 (H) 70 - 99 mg/dl    Performed on ACCU-CHEK    POCT Glucose    Collection Time: 10/19/22  8:32 PM   Result Value Ref Range    POC Glucose 117 (H) 70 - 99 mg/dl    Performed on ACCU-CHEK    EKG 12 Lead    Collection Time: 10/19/22  8:43 PM   Result Value Ref Range    Ventricular Rate 60 BPM    Atrial Rate 60 BPM    P-R Interval 222 ms    QRS Duration 84 ms    Q-T Interval 528 ms    QTc Calculation (Bazett) 528 ms    P Axis 101 degrees    R Axis 72 degrees    T Axis 133 degrees   POCT Glucose    Collection Time: 10/20/22  8:33 AM   Result Value Ref Range    POC Glucose 120 (H) 70 - 99 mg/dl    Performed on ACCU-CHEK    Renal Function Panel    Collection Time: 10/20/22  9:19 AM   Result Value Ref Range    Sodium 136 135 - 144 mEq/L    Potassium 4.1 3.4 - 4.9 mEq/L    Chloride 100 95 - 107 mEq/L    CO2 20 20 - 31 mEq/L    Anion Gap 16 (H) 9 - 15 mEq/L    Glucose 132 (H) 70 - 99 mg/dL    BUN 11 8 - 23 mg/dL    Creatinine 0.74 0.70 - 1.20 mg/dL    Est, Glom Filt Rate >60.0 >60    Calcium 8.6 8.5 - 9.9 mg/dL    Phosphorus 3.4 2.3 - 4.8 mg/dL    Albumin 3.5 3.5 - 4.6 g/dL   CBC with Auto Differential    Collection Time: 10/20/22  9:19 AM   Result Value Ref Range    WBC 8.7 4.8 - 10.8 K/uL    RBC 3.99 (L) 4.70 - 6.10 M/uL Hemoglobin 10.3 (L) 14.0 - 18.0 g/dL    Hematocrit 31.8 (L) 42.0 - 52.0 %    MCV 79.7 79.0 - 92.2 fL    MCH 25.9 (L) 27.0 - 31.3 pg    MCHC 32.5 (L) 33.0 - 37.0 %    RDW 18.9 (H) 11.5 - 14.5 %    Platelets 342 352 - 506 K/uL    Neutrophils % 84.3 %    Lymphocytes % 7.6 %    Monocytes % 6.6 %    Eosinophils % 0.6 %    Basophils % 0.9 %    Neutrophils Absolute 7.3 (H) 1.4 - 6.5 K/uL    Lymphocytes Absolute 0.7 (L) 1.0 - 4.8 K/uL    Monocytes Absolute 0.6 0.2 - 0.8 K/uL    Eosinophils Absolute 0.1 0.0 - 0.7 K/uL    Basophils Absolute 0.1 0.0 - 0.2 K/uL   Basic Metabolic Panel    Collection Time: 10/20/22 11:08 AM   Result Value Ref Range    Sodium 137 135 - 144 mEq/L    Potassium 4.2 3.4 - 4.9 mEq/L    Chloride 100 95 - 107 mEq/L    CO2 24 20 - 31 mEq/L    Anion Gap 13 9 - 15 mEq/L    Glucose 121 (H) 70 - 99 mg/dL    BUN 11 8 - 23 mg/dL    Creatinine 0.79 0.70 - 1.20 mg/dL    Est, Glom Filt Rate >60.0 >60    Calcium 8.7 8.5 - 9.9 mg/dL   Magnesium    Collection Time: 10/20/22 11:08 AM   Result Value Ref Range    Magnesium 2.6 (H) 1.7 - 2.4 mg/dL   EKG 12 Lead    Collection Time: 10/20/22  1:09 PM   Result Value Ref Range    Ventricular Rate 80 BPM    Atrial Rate 43 BPM    QRS Duration 66 ms    Q-T Interval 484 ms    QTc Calculation (Bazett) 558 ms    R Axis 83 degrees    T Axis 148 degrees   POCT Glucose    Collection Time: 10/20/22  4:12 PM   Result Value Ref Range    POC Glucose 113 (H) 70 - 99 mg/dl    Performed on ACCU-CHEK    POCT Glucose    Collection Time: 10/20/22  8:30 PM   Result Value Ref Range    POC Glucose 121 (H) 70 - 99 mg/dl    Performed on ACCU-CHEK    EKG 12 Lead    Collection Time: 10/21/22  4:57 AM   Result Value Ref Range    Ventricular Rate 72 BPM    Atrial Rate 72 BPM    P-R Interval 196 ms    QRS Duration 88 ms    Q-T Interval 452 ms    QTc Calculation (Bazett) 494 ms    P Axis 88 degrees    R Axis 77 degrees    T Axis 161 degrees   Renal Function Panel    Collection Time: 10/21/22  5:48 AM Result Value Ref Range    Sodium 138 135 - 144 mEq/L    Potassium 3.9 3.4 - 4.9 mEq/L    Chloride 101 95 - 107 mEq/L    CO2 24 20 - 31 mEq/L    Anion Gap 13 9 - 15 mEq/L    Glucose 111 (H) 70 - 99 mg/dL    BUN 16 8 - 23 mg/dL    Creatinine 1.12 0.70 - 1.20 mg/dL    Est, Glom Filt Rate >60.0 >60    Calcium 8.5 8.5 - 9.9 mg/dL    Phosphorus 4.0 2.3 - 4.8 mg/dL    Albumin 3.2 (L) 3.5 - 4.6 g/dL   CBC with Auto Differential    Collection Time: 10/21/22  5:48 AM   Result Value Ref Range    WBC 7.0 4.8 - 10.8 K/uL    RBC 3.73 (L) 4.70 - 6.10 M/uL    Hemoglobin 9.7 (L) 14.0 - 18.0 g/dL    Hematocrit 29.7 (L) 42.0 - 52.0 %    MCV 79.6 79.0 - 92.2 fL    MCH 26.0 (L) 27.0 - 31.3 pg    MCHC 32.7 (L) 33.0 - 37.0 %    RDW 19.3 (H) 11.5 - 14.5 %    Platelets 063 481 - 193 K/uL    Neutrophils % 78.1 %    Lymphocytes % 9.0 %    Monocytes % 10.8 %    Eosinophils % 1.7 %    Basophils % 0.4 %    Neutrophils Absolute 5.5 1.4 - 6.5 K/uL    Lymphocytes Absolute 0.6 (L) 1.0 - 4.8 K/uL    Monocytes Absolute 0.8 0.2 - 0.8 K/uL    Eosinophils Absolute 0.1 0.0 - 0.7 K/uL    Basophils Absolute 0.0 0.0 - 0.2 K/uL   Magnesium    Collection Time: 10/21/22  5:48 AM   Result Value Ref Range    Magnesium 2.6 (H) 1.7 - 2.4 mg/dL   POCT Glucose    Collection Time: 10/21/22  6:46 AM   Result Value Ref Range    POC Glucose 113 (H) 70 - 99 mg/dl    Performed on ACCU-CHEK    POCT Glucose    Collection Time: 10/21/22 10:37 AM   Result Value Ref Range    POC Glucose 181 (H) 70 - 99 mg/dl    [facility]      Radiology: XR CHEST PORTABLE    Result Date: 10/11/2022  EXAMINATION: ONE XRAY VIEW OF THE CHEST 10/11/2022 7:47 pm COMPARISON: None. HISTORY: ORDERING SYSTEM PROVIDED HISTORY: cp/cough TECHNOLOGIST PROVIDED HISTORY: Reason for exam:->cp/cough What reading provider will be dictating this exam?->CRC FINDINGS: Loop recorder noted. The cardiomediastinal silhouette is within normal limits. The lungs are clear.   No pneumothorax or pleural effusion. No acute cardiopulmonary abnormality. Hospital Course:   Lillette Schirmer is a 79 y.o. male admitted to [facility] on [facility]/2022for chest discomfort assoc with Choking on food. Evaluated by cardiology not  heart related. Seen by GI who scoped  him and dilated esophogus. Symptoms relieved. . Vitals stable  thru out admission . Discharged to follow I office all same home medications  Doing well wife present        Assessment:  Active Hospital Problems    Diagnosis Date Noted    Esophageal dysphagia [R13.19] 10/13/2022     Priority: Medium    Food impaction of esophagus [T18.128A] 10/13/2022     Priority: Medium    Esophageal stenosis [K22.2] 10/13/2022     Priority: Medium    Chest pain [R07.9] 10/11/2022     Priority: Medium         Plan:   Medications:  Discharge Medication List as of 10/21/2022 11:52 AM        CONTINUE these medications which have CHANGED    Details   valsartan (DIOVAN) 80 MG tablet Take 1 tablet by mouth daily, Disp-30 tablet, R-3Normal      clopidogrel (PLAVIX) 75 MG tablet Take 1 tablet by mouth daily, Disp-30 tablet, R-3Normal      apixaban (ELIQUIS) 5 MG TABS tablet Take 1 tablet by mouth 2 times daily, Disp-60 tablet, R-5Normal           CONTINUE these medications which have NOT CHANGED    Details   albuterol sulfate  (90 Base) MCG/ACT inhaler use 1 puff as needed twice daily for 90 days. Historical Med      magnesium oxide (MAG-OX) 400 (240 Mg) MG tablet Take 1 tablet by mouth daily, Disp-30 tablet, R-3Normal      metFORMIN (GLUCOPHAGE) 500 MG tablet Take 500 mg by mouth 2 times daily Historical Med      pitavastatin (LIVALO) 4 MG TABS tablet Take 4 mg by mouth nightlyHistorical Med      nitroGLYCERIN (NITROSTAT) 0.4 MG SL tablet Place 0.4 mg under the tongue every 5 minutes as needed for Chest pain Historical Med           STOP taking these medications       sotalol (BETAPACE) 120 MG tablet Comments:   Reason for Stopping:         hydrALAZINE (APRESOLINE) 25 MG tablet Comments:   Reason for Stopping:         furosemide (LASIX) 40 MG tablet Comments:   Reason for Stopping:         sotalol (BETAPACE) 80 MG tablet Comments:   Reason for Stopping:         aspirin 81 MG tablet Comments:   Reason for Stopping:              Follow-up visits: Jonathan Gonzales MD  [address] [place] [address]  [phone]    Follow up on 11/9/2022  9  Jarvis Araujo MD  [address] [address]  [phone]    Go on 10/25/2022  Follow up appt @ 11:30 am    Sabrina Ewing MD  [address]  [phone]    Follow up  As needed        Electronically signed by Daphney Horton DO on 10/21/2022 at 12:34 PM

## 2022-10-21 NOTE — PROGRESS NOTES
Medeical  Progress Note    Assessment:  Dysphagia resolved  PAF  Hypertension  OHDXCAD  PAD  AAArepair        Plan: ok to discharge today     Patient Active Problem List:     Atrial fibrillation ([facility] [address]     High risk medication use     Atherosclerosis of native coronary artery of native heart without angina pectoris     Hypertension     Ischemic myocardial dysfunction     Nonrheumatic aortic valve disorder, unspecified     Aortic valve disorder     Personal history of nicotine dependence     Presence of aortocoronary bypass graft     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HCC)     Vasovagal syncope     Dizziness and giddiness     Cerebrovascular accident ([facility] [address]     Orthostatic dizziness     Ataxia     Vertigo     Meniere disease, bilateral     Benign paroxysmal positional vertigo due to bilateral vestibular disorder     Coronary angioplasty status     Ataxic gait     Kyphoscoliosis     Spinal stenosis of lumbar region with neurogenic claudication     Abdominal aortic aneurysm (AAA)     Acute inferior myocardial infarction (HCC)     Carotid bruit     Chronic obstructive pulmonary disease (HCC)     Diabetes mellitus (HCC)     Dyspnea on exertion     Fatigue     First degree atrioventricular block     Hyperlipidemia     Infection of the inner ear     Muscle pain     Underweight     Ventricular premature beats     Weight loss     PVD (peripheral vascular disease) ([facility])     Impaired mobility and activities of daily living -sp Severe PVD s/p AAA repain     Hypotension     Late effects of CVA (cerebrovascular accident)     MARIAM (acute kidney injury) ([facility] [address]     Body mass index (BMI) of 20 to 24     Chest pain     Esophageal dysphagia     Food impaction of esophagus     Esophageal stenosis      Subjective:  Admit Date: 10/11/2022    Interval History: no issues    Medications:  Scheduled Meds:   valsartan  80 mg Oral Daily    insulin lispro  0-4 Units SubCUTAneous TID WC    insulin lispro  0-4 Units SubCUTAneous Nightly    apixaban  5 mg Oral BID    clopidogrel  75 mg Oral Daily    magnesium oxide  400 mg Oral Daily    sodium chloride flush  5-40 mL IntraVENous 2 times per day     Continuous Infusions:   sodium chloride      dextrose      sodium chloride         CBC:   Recent Labs     10/20/22  0919 10/21/22  0548   WBC 8.7 7.0   HGB 10.3* 9.7*    288     CMP:    Recent Labs     10/20/22  0919 10/20/22  1108 10/21/22  0548    137 138   K 4.1 4.2 3.9    100 101   CO2 20 24 24   BUN 11 11 16   CREATININE 0.74 0.79 1.12   GLUCOSE 132* 121* 111*   CALCIUM 8.6 8.7 8.5   LABGLOM >60.0 >60.0 >60.0     Troponin: No results for input(s): TROPONINI in the last 72 hours. BNP: No results for input(s): BNP in the last 72 hours. INR: No results for input(s): INR in the last 72 hours. Lipids: No results for input(s): CHOL, LDLDIRECT, TRIG, HDL, AMYLASE, LIPASE in the last 72 hours. Liver:   Recent Labs     10/21/22  0548   LABALBU 3.2*     Iron:  No results for input(s): IRONS, FERRITIN in the last 72 hours. Invalid input(s): LABIRONS  Urinalysis: No results for input(s): UA in the last 72 hours.     Objective:  Vitals: BP (!) 151/76   Pulse 73   Temp 98 °F (36.7 °C)   Resp 16   Ht 5' 9\" (1.753 m)   Wt 130 lb 6.4 oz (59.1 kg)   SpO2 96%   BMI 19.26 kg/m²    Wt Readings from Last 3 Encounters:   10/21/22 130 lb 6.4 oz (59.1 kg)   09/22/22 142 lb (64.4 kg)   06/29/22 142 lb (64.4 kg)      24HR INTAKE/OUTPUT:    Intake/Output Summary (Last 24 hours) at 10/21/2022 3136  Last data filed at 10/21/2022 0421  Gross per 24 hour   Intake 220 ml   Output 375 ml   Net -155 ml       General: alert, in no apparent distress  HEENT: normocephalic, atraumatic, anicteric  Neck: supple, no mass  Lungs: non-labored respirations, clear to auscultation bilaterally  Heart: regular rate and rhythm, no murmurs or rubs  Abdomen: soft, non-tender, non-distended  Ext: no cyanosis, no peripheral edema  Neuro: alert and oriented, no gross abnormalities  Psych: normal mood and affect  Skin: no rash      Electronically signed by Ramon Purcell DO, MD

## 2022-10-21 NOTE — CARE COORDINATION
Met with patient and wife and plan is dc today. Pt and wife are comfortable with dc . Reviewed 2nd IMM and they both feel ready for pt to be d/cd.

## 2022-10-21 NOTE — PROGRESS NOTES
[facility] OCCUPATIONAL THERAPY EVALUATION - ACUTE     NAME: Sita Henry  : 1940 (79 y.o.)  MRN: [id number]  CODE STATUS: Full Code  [facility]    Date of Service: 10/21/2022    Patient Diagnosis(es): Chest pain [R07.9]  Chest pain, unspecified type [R07.9]  Nausea and vomiting, unspecified vomiting type [R11.2]   Patient Active Problem List    Diagnosis Date Noted    Atrial fibrillation ([address] 2019    High risk medication use 2019    Esophageal dysphagia 10/13/2022    Food impaction of esophagus 10/13/2022    Esophageal stenosis 10/13/2022    Chest pain 10/11/2022    Body mass index (BMI) of 20 to 24 2022    MARIAM (acute kidney injury) ([address] 2022    Impaired mobility and activities of daily living -sp Severe PVD s/p AAA repain 2022    Hypotension 2022    Late effects of CVA (cerebrovascular accident) 2022    PVD (peripheral vascular disease) ([address] 2022    Abdominal aortic aneurysm (AAA) 2021    Acute inferior myocardial infarction ([address] 2021    Carotid bruit 2021    Chronic obstructive pulmonary disease ([address] 2021    Diabetes mellitus ([address] 2021    Dyspnea on exertion 2021    Fatigue 2021    First degree atrioventricular block 2021    Hyperlipidemia 2021    Infection of the inner ear 2021    Muscle pain 2021    Underweight 2021    Ventricular premature beats 2021    Weight loss 2021    Ataxic gait 2021    Kyphoscoliosis 2021    Spinal stenosis of lumbar region with neurogenic claudication 2021    Meniere disease, bilateral     Benign paroxysmal positional vertigo due to bilateral vestibular disorder     Vertigo 2021    Ataxia 2021    Cerebrovascular accident ([address] 2020    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[address] 2020    Vasovagal syncope 2020    Dizziness and giddiness 2020    Orthostatic dizziness 2020 Atherosclerosis of native coronary artery of native heart without angina pectoris 11/26/2018    Hypertension 11/26/2018    Ischemic myocardial dysfunction 11/26/2018    Nonrheumatic aortic valve disorder, unspecified 11/26/2018    Aortic valve disorder 11/26/2018    Personal history of nicotine dependence 11/26/2018    Presence of aortocoronary bypass graft 11/26/2018    Coronary angioplasty status 11/26/2018        Past Medical History:   Diagnosis Date    MARIAM (acute kidney injury) ([facility] 6/7/2022    Atrial fibrillation (HCC)     CAD (coronary artery disease)     cardiac stents    COPD (chronic obstructive pulmonary disease) (HCC)     Diabetes mellitus ([facility]     Hyperlipidemia     Hypertension     Movement disorder     Pneumonia      Past Surgical History:   Procedure Laterality Date    APPENDECTOMY      CORONARY ANGIOPLASTY WITH STENT PLACEMENT      4    CORONARY ARTERY BYPASS GRAFT      triple bypass    EYE SURGERY Bilateral     cataract surgery    INSERTABLE CARDIAC MONITOR Left 12/2018    UPPER GASTROINTESTINAL ENDOSCOPY N/A 10/13/2022    EGD ESOPHAGOGASTRODUODENOSCOPY WITH DILATION performed by Heavenly Gomez MD at [facility]        Restrictions  Restrictions/Precautions: Fall Risk      Safety Devices: Safety Devices  Type of Devices: Call light within reach; Bed alarm in place; Left in bed     Patient's date of birth confirmed:     General:  Chart Reviewed: Yes  Patient assessed for rehabilitation services?: Yes    Subjective  Subjective: \"I am feeling back to normal\"       Pain at start of treatment: No    Pain at end of treatment: No    Location: N/A  Nursing notified: Not Applicable  RN: N/A  Intervention: None    Prior Level of Function:  Social/Functional History  Lives With: Spouse (wife is able to assist pt)  Type of Home: Mobile home  Home Layout: One level  Home Access: Stairs to enter with rails  Entrance Stairs - Number of Steps: 3  Entrance Stairs - Rails: Both  Bathroom Shower/Tub: Tub/Shower unit  Bathroom Equipment: Shower chair  Home Equipment: [place], rolling  Has the patient had two or more falls in the past year or any fall with injury in the past year?: Yes (\"i got stuck in the tub 6 months ago\")  ADL Assistance: Needs assistance (wife assists with dressing and bathing; assists pt with getting into the tub)  Homemaking Responsibilities: No  Ambulation Assistance: Independent [employer])  Transfer Assistance: Independent  Active : No  Patient's  Info: wife  Additional Comments: pt states only amb household distances    OBJECTIVE:     Orientation Status:  Orientation  Overall Orientation Status: Within Functional Limits    Observation:  Observation/Palpation  Posture: Fair    Cognition Status:       Perception Status:  Perception  Overall Perceptual Status: WFL    Vision and Hearing Status:  Vision  Vision Exceptions: Wears glasses for reading        GROSS ASSESSMENT AROM/PROM:  AROM: Within functional limits       ROM:   LUE AROM (degrees)  LUE AROM : WFL  RUE AROM (degrees)  RUE AROM : WFL    UE STRENGTH:  Strength: Generally decreased, functional    UE COORDINATION:  Coordination: Generally decreased, functional    UE TONE:  Tone: Normal    UE SENSATION:  Sensation: Intact    Hand Dominance:  Hand Dominance  Hand Dominance: Right    ADL Status:  ADL  Feeding: Setup  Grooming: Modified independent   UE Bathing: Modified independent   LE Bathing: Minimal assistance  UE Dressing: Modified independent   LE Dressing: Minimal assistance  Toileting: Modified independent     Functional Mobility:    Patient ambulated to/from bathroom with WW at SBA level.      Bed Mobility  Bed mobility  Rolling to Left: Modified independent  Rolling to Right: Modified independent  Supine to Sit: Modified independent  Sit to Supine: Modified independent    Seated and Standing Balance:       Functional Endurance:  Activity Tolerance  Activity Tolerance: Patient Tolerated treatment well    D/C Recommendations:  OT D/C RECOMMENDATIONS  REQUIRES OT FOLLOW-UP: Yes    Equipment Recommendations:       OT Education:   Patient Education  Education Given To: Patient  Education Provided: Plan of Care;Role of Therapy  Education Method: Verbal  Education Outcome: Verbalized understanding    OT Follow Up:   OT D/C RECOMMENDATIONS  REQUIRES OT FOLLOW-UP: Yes       Assessment/Discharge Disposition:  Assessment: Pt is an 79year old male from home. Pt [facility] to [facility] s/p chest pain. Pt demonstrated overall decreased endurance and ADL status. Pt would benefit from continued OT to maximize safety and consistency to complete ADLs at highest level of independence. Performance deficits / Impairments: Decreased functional mobility , Decreased ADL status, Decreased endurance, Decreased high-level IADLs, Decreased strength  Decision Making: Medium Complexity  History: Pt has a moderately complex history  Exam: Pt has 5 performance deficits.   Assistance / Modification: Minimum assistance    AMPAC (Six Click) Self care Score   How much help for putting on and taking off regular lower body clothing?: A Little  How much help for Bathing?: A Little  How much help for Toileting?: A Little  How much help for putting on and taking off regular upper body clothing?: A Little  How much help for taking care of personal grooming?: A Little  How much help for eating meals?: None  AM-[facility] Inpatient Daily Activity Raw Score: 19  AM-PAC Inpatient ADL T-Scale Score : 40.22  ADL Inpatient CMS 0-100% Score: 42.8    Therapy key for assistance levels -   Independent/Mod I = Pt. is able to perform task with no assistance but may require a device   Stand by assistance = Pt. does not perform task at an independent level but does not need physical assistance, requires verbal cues  Minimal, Moderate, Maximal Assistance = Pt. requires physical assistance (25%, 50%, 75% assist from helper) for task but is able to actively participate in task   Dependent = Pt. requires total assistance with task and is not able to actively participate with task completion     Plan:  Occupational Therapy Plan  Times Per Week: 1-4x/week, length of acute stay    Goals:   Patient will:    - Improve functional endurance to tolerate/complete 30 mins of ADL's  - Be Modified Independent in UB ADLs   - Be Modified indpendent in LB ADLs  - Be Modified Indpendent in ADL transfers without LOB  - Be Modified indpendent in toileting tasks  - Improve B UE strength and endurance to [facility] in order to participate in self-care activities as projected.     Patient Goal:    \"To return home\"   Discussed and agreed upon: Yes Comments:       Therapy Time:   Individual   Time In 0959   Time Out 1012   Minutes 13          Eval: 13 minutes     Electronically signed by:    Zo Cancino OT,   10/21/2022, 10:43 AM

## 2022-10-21 NOTE — PLAN OF CARE
See OT evaluation for all goals and OT POC.  Electronically signed by Carri Martínez OT on 10/21/2022 at 10:45 AM

## 2022-10-21 NOTE — PROGRESS NOTES
Physical Therapy Med Surg Initial Assessment  Facility/Department: [facility]  Room: [place] [address]       NAME: Sadia Lopez  : 1940 (79 y.o.)  MRN: [id number]  CODE STATUS: Full Code    Date of Service: 10/21/2022    Patient Diagnosis(es): Chest pain [R07.9]  Chest pain, unspecified type [R07.9]  Nausea and vomiting, unspecified vomiting type [R11.2]   No chief complaint on file.     Patient Active Problem List    Diagnosis Date Noted    Atrial fibrillation ([address] 2019    High risk medication use 2019    Esophageal dysphagia 10/13/2022    Food impaction of esophagus 10/13/2022    Esophageal stenosis 10/13/2022    Chest pain 10/11/2022    Body mass index (BMI) of 20 to 24 2022    MARIAM (acute kidney injury) ([address] 2022    Impaired mobility and activities of daily living -sp Severe PVD s/p AAA repain 2022    Hypotension 2022    Late effects of CVA (cerebrovascular accident) 2022    PVD (peripheral vascular disease) ([address] 2022    Abdominal aortic aneurysm (AAA) 2021    Acute inferior myocardial infarction ([address] 2021    Carotid bruit 2021    Chronic obstructive pulmonary disease ([address] 2021    Diabetes mellitus ([address] 2021    Dyspnea on exertion 2021    Fatigue 2021    First degree atrioventricular block 2021    Hyperlipidemia 2021    Infection of the inner ear 2021    Muscle pain 2021    Underweight 2021    Ventricular premature beats 2021    Weight loss 2021    Ataxic gait 2021    Kyphoscoliosis 2021    Spinal stenosis of lumbar region with neurogenic claudication 2021    Meniere disease, bilateral     Benign paroxysmal positional vertigo due to bilateral vestibular disorder     Vertigo 2021    Ataxia 2021    Cerebrovascular accident ([address] 2020    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[facility] 2020    Vasovagal syncope 2020    Dizziness and giddiness 02/03/2020    Orthostatic dizziness 02/03/2020    Atherosclerosis of native coronary artery of native heart without angina pectoris 11/26/2018    Hypertension 11/26/2018    Ischemic myocardial dysfunction 11/26/2018    Nonrheumatic aortic valve disorder, unspecified 11/26/2018    Aortic valve disorder 11/26/2018    Personal history of nicotine dependence 11/26/2018    Presence of aortocoronary bypass graft 11/26/2018    Coronary angioplasty status 11/26/2018        Past Medical History:   Diagnosis Date    MARIAM (acute kidney injury) ([facility] 6/7/2022    Atrial fibrillation (HCC)     CAD (coronary artery disease)     cardiac stents    COPD (chronic obstructive pulmonary disease) (HCC)     Diabetes mellitus ([facility]     Hyperlipidemia     Hypertension     Movement disorder     Pneumonia      Past Surgical History:   Procedure Laterality Date    APPENDECTOMY      CORONARY ANGIOPLASTY WITH STENT PLACEMENT      4    CORONARY ARTERY BYPASS GRAFT      triple bypass    EYE SURGERY Bilateral     cataract surgery    INSERTABLE CARDIAC MONITOR Left 12/2018    UPPER GASTROINTESTINAL ENDOSCOPY N/A 10/13/2022    EGD ESOPHAGOGASTRODUODENOSCOPY WITH DILATION performed by Seng Lechuga MD at [facility]       Chart Reviewed: Yes  Patient assessed for rehabilitation services?: Yes  Family / Caregiver Present: No    Restrictions:  Restrictions/Precautions: Fall Risk     SUBJECTIVE:   Subjective: no pain reported before or after session           Prior Level of Function:  Social/Functional History  Lives With: Spouse (wife is able to assist pt)  Type of Home: Mobile home  Home Layout: One level  Home Access: Stairs to enter with rails  Entrance Stairs - Number of Steps: 3  Entrance Stairs - Rails: Both  Bathroom Shower/Tub: Tub/Shower unit  Bathroom Equipment: Shower chair  Home Equipment: [place]  ADL Assistance: Needs assistance (wife assists with dressing and bathing; assists pt with getting into the tub)  Homemaking Responsibilities: No  Ambulation Assistance: Independent ([employer])  Transfer Assistance: Independent  Active : No  Patient's  Info: wife  Additional Comments: pt states only amb household distances    OBJECTIVE:   Vision  Vision: Impaired  Vision Exceptions: Wears glasses for reading  Hearing: Exceptions to [facility]  Hearing Exceptions: Hard of hearing/hearing concerns    Cognition:  Overall Orientation Status: Within Functional Limits  Follows Commands: Within Functional Limits         ROM:  AROM: Generally decreased, functional    Strength:  Strength: Generally decreased, functional    Neuro:  Balance  Sitting - Static: Good  Standing - Static: Fair; - (with ww mild balance delay noted however pt corrected with no assistance)  Standing - Dynamic: Fair;- (pt able to manage balance without assistance however concern for follow thru noted)                      Coordination: Generally decreased, functional         Bed mobility  Rolling to Left: Modified independent  Rolling to Right: Modified independent  Supine to Sit: Modified independent  Sit to Supine: Modified independent  Scooting: Modified independent  Bed Mobility Comments: increased time and energy required    Transfers  Sit to Stand: Stand by assistance;Supervision  Stand to Sit: Stand by assistance;Supervision  Comment: Pt moves quickly however he is able to manage his balance with use of ww for support    Ambulation  Surface: Level tile  Device: Rolling Walker  Assistance: Stand by assistance  Quality of Gait: anterior trunk momentum with increased pace, LOB with change of direction, flexed trunk and neck, able to advance LE's with good knee stability  Distance: 30 feet; 25 feet  Comments: Pt able to improve posture in second gait trial following instruction    Stairs/Curb  Stairs?: No (pt and PT feel trial not needed and pt will be able to perform steps at his normal level of function)              Activity Tolerance  Activity Tolerance: Patient tolerated evaluation without incident    Patient Education  Education Given To: Patient  Education Provided: Role of Therapy;Plan of Care;Transfer Training  Education Method: Verbal;Demonstration  Barriers to Learning: Hearing  Education Outcome: Demonstrated understanding;Verbalized understanding       ASSESSMENT:   Body Structures, Functions, Activity Limitations Requiring Skilled Therapeutic Intervention: Decreased functional mobility ; Decreased strength;Decreased endurance;Decreased balance  Decision Making: Medium Complexity  History: high  Exam: med  Clinical Presentation: med    Barriers to Learning: hearing/?memory         DISCHARGE RECOMMENDATIONS:  Discharge Recommendations: Continue to assess pending progress    Assessment: Pt demonstrates deficits as listed. He is requiring SBA to ensure safety at this time. Pts wife indicates she is with him at all times and can assist him at this level of care.  Should pt not be discharged he would benefit from follow up PT  Requires PT Follow-Up: Yes      PLAN OF CARE:  Physcial Therapy Plan  General Plan: 1 time a day 3-6 times a week  Current Treatment Recommendations: Strengthening, ROM, Balance training, Functional mobility training, Transfer training, Endurance training, Gait training, Stair training, Neuromuscular re-education, Safety education & training, Home exercise program, Patient/Caregiver education & training, Equipment evaluation, education, & procurement, Positioning, Therapeutic activities    Safety Devices  Type of Devices: Call light within reach, Bed alarm in place, Left in bed  Restraints  Restraints Initially in Place: No    Goals:  Long Term Goals  Long Term Goal 1: Pt will demonstrate bed mobility indep  Long Term Goal 2: Pt will demonstrate transfers mod indep with safest AD  Long Term Goal 3: Pt will demonstrate amb >/= 50ft mod indep with safest AD  Long Term Goal 4: Pt will demonstrate stair negotiation 3 steps with 1 rail CGA    [facility] (6 CLICK) BASIC MOBILITY  AM-PAC Inpatient Mobility Raw Score : 18     Therapy Time:   Individual   Time In 0955   Time Out 1012   Minutes [address] [place], 10/21/22 at 10:25 AM         Definitions for assistance levels  Independent = pt does not require any physical supervision or assistance from another person for activity completion. Device may be needed.   Stand by assistance = pt requires verbal cues or instructions from another person, close to but not touching, to perform the activity  Minimal assistance= pt performs 75% or more of the activity; assistance is required to complete the activity  Moderate assistance= pt performs 50% of the activity; assistance is required to complete the activity  Maximal assistance = pt performs 25% of the activity; assistance is required to complete the activity  Dependent = pt requires total physical assistance to accomplish the task

## 2022-10-21 NOTE — PROGRESS NOTES
Plavix and Eliquis  EKG today again shows some T wave inversion but no ST elevation--    Plan:  Okay to discharge home  Treatment for QTC is stopping potential medications, that heart rate come up, and magnesium  Patient to see me next week and get repeat EKG  Given T wave inversion, consideration for repeat stress testing could be made as an outpatient basis  Eventually, consider low-dose beta-blockade or consider adding amiodarone  See orders  Electronically signed by Zen Doan MD on 10/21/2022 at 8:40 AM

## 2022-10-24 ENCOUNTER — TELEPHONE (OUTPATIENT)
Dept: GASTROENTEROLOGY | Age: 82
End: 2022-10-24

## 2022-10-24 NOTE — TELEPHONE ENCOUNTER
The patient was seen in the ER, he did have an EGD with Dr. Sugar Jones and was told to follow up with Dr. Dorcas Us. The wife said he is feeling better and does not need a follow up visit.

## 2022-10-26 LAB
EKG ATRIAL RATE: 72 BPM
EKG P AXIS: 88 DEGREES
EKG P-R INTERVAL: 196 MS
EKG Q-T INTERVAL: 452 MS
EKG QRS DURATION: 88 MS
EKG QTC CALCULATION (BAZETT): 494 MS
EKG R AXIS: 77 DEGREES
EKG T AXIS: 161 DEGREES
EKG VENTRICULAR RATE: 72 BPM

## 2022-11-01 LAB
LV EF: 57 %
LVEF MODALITY: NORMAL

## 2022-12-19 DIAGNOSIS — D64.9 ANEMIA, UNSPECIFIED TYPE: ICD-10-CM

## 2022-12-19 DIAGNOSIS — E61.1 IRON DEFICIENCY: ICD-10-CM

## 2022-12-19 DIAGNOSIS — D50.9 IRON DEFICIENCY ANEMIA, UNSPECIFIED IRON DEFICIENCY ANEMIA TYPE: ICD-10-CM

## 2023-01-19 ENCOUNTER — HOSPITAL ENCOUNTER (OUTPATIENT)
Dept: INFUSION THERAPY | Age: 83
Setting detail: INFUSION SERIES
Discharge: HOME | End: 2023-01-19
Payer: MEDICARE

## 2023-01-19 VITALS
SYSTOLIC BLOOD PRESSURE: 130 MMHG | DIASTOLIC BLOOD PRESSURE: 73 MMHG | HEART RATE: 72 BPM | TEMPERATURE: 98.2 F | RESPIRATION RATE: 18 BRPM

## 2023-01-19 DIAGNOSIS — D64.9 ANEMIA, UNSPECIFIED TYPE: ICD-10-CM

## 2023-01-19 DIAGNOSIS — E61.1 IRON DEFICIENCY: ICD-10-CM

## 2023-01-19 DIAGNOSIS — D50.9 IRON DEFICIENCY ANEMIA, UNSPECIFIED IRON DEFICIENCY ANEMIA TYPE: Primary | ICD-10-CM

## 2023-01-19 PROCEDURE — 96366 THER/PROPH/DIAG IV INF ADDON: CPT

## 2023-01-19 PROCEDURE — 2580000003 HC RX 258: Performed by: INTERNAL MEDICINE

## 2023-01-19 PROCEDURE — 6360000002 HC RX W HCPCS: Performed by: INTERNAL MEDICINE

## 2023-01-19 PROCEDURE — 96365 THER/PROPH/DIAG IV INF INIT: CPT

## 2023-01-19 RX ORDER — METOPROLOL SUCCINATE 50 MG/1
50 TABLET, EXTENDED RELEASE ORAL DAILY
COMMUNITY

## 2023-01-19 RX ADMIN — IRON SUCROSE 300 MG: 20 INJECTION, SOLUTION INTRAVENOUS at 10:17

## 2023-01-19 NOTE — FLOWSHEET NOTE
Observation complete, patient tolerated well with no symptoms. Left unit by wheelchair with transporter assistance and spouse. All equipment used in the care for this patient has been cleaned.

## 2023-01-19 NOTE — FLOWSHEET NOTE
Patient arrived on unit by wheelchair with transporter assistance and spouse. . Alert, oriented, resting in chair. Vital signs obtained.

## 2023-05-09 ENCOUNTER — HOSPITAL ENCOUNTER (OUTPATIENT)
Dept: INFUSION THERAPY | Age: 83
Setting detail: INFUSION SERIES
Discharge: HOME | End: 2023-05-09
Payer: MEDICARE

## 2023-05-09 VITALS
SYSTOLIC BLOOD PRESSURE: 151 MMHG | HEART RATE: 72 BPM | RESPIRATION RATE: 18 BRPM | TEMPERATURE: 97.5 F | DIASTOLIC BLOOD PRESSURE: 69 MMHG

## 2023-05-09 DIAGNOSIS — D50.9 IRON DEFICIENCY ANEMIA, UNSPECIFIED IRON DEFICIENCY ANEMIA TYPE: Primary | ICD-10-CM

## 2023-05-09 DIAGNOSIS — D64.9 ANEMIA, UNSPECIFIED TYPE: ICD-10-CM

## 2023-05-09 DIAGNOSIS — E61.1 IRON DEFICIENCY: ICD-10-CM

## 2023-05-09 PROCEDURE — 96366 THER/PROPH/DIAG IV INF ADDON: CPT

## 2023-05-09 PROCEDURE — 2580000003 HC RX 258: Performed by: INTERNAL MEDICINE

## 2023-05-09 PROCEDURE — 96365 THER/PROPH/DIAG IV INF INIT: CPT

## 2023-05-09 PROCEDURE — 6360000002 HC RX W HCPCS: Performed by: INTERNAL MEDICINE

## 2023-05-09 RX ADMIN — IRON SUCROSE 300 MG: 20 INJECTION, SOLUTION INTRAVENOUS at 10:53

## 2023-05-09 NOTE — PROGRESS NOTES
No sxs reactions noted. Pt left unit via w/c with Kym Mahoney, antonella. AVS given. No c/o or requests. All equipment used in the care for this patient has been cleaned.

## 2023-05-09 NOTE — PROGRESS NOTES
Patient to the floor via w/c, with spouse, for infusion. Pt made comfortable in chair. Call light within reach.

## 2023-06-05 NOTE — PROGRESS NOTES
[facility]   Facility/Department: [facility]  [facility]  Speech Language Pathology  Initial Speech/Language/Cognitive Assessment    Amrit Torres  : 1940 (79 y.o.)   MRN: [id number]  ROOM: [place] [id number]  ADMISSION DATE: 2021  PATIENT DIAGNOSIS(ES): Ataxia [R27.0]  Chief Complaint   Patient presents with    Dizziness     patient falling and feeling dizzy since , ear ringing and foggy hearing to right ear     Patient Active Problem List    Diagnosis Date Noted    Atrial fibrillation ([address] 2019     Priority: High    High risk medication use 2019     Priority: High    Ataxia 2021    Cerebrovascular accident ([address] 2020    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[address] 2020    Vasovagal syncope 2020    Dizziness and giddiness 2020    Orthostatic dizziness 2020    Atherosclerosis of native coronary artery of native heart without angina pectoris 2018    Essential hypertension 2018    Ischemic cardiomyopathy 2018    Nonrheumatic aortic valve disorder, unspecified 2018    Nonrheumatic aortic valve disorder, unspecified 2018    H/O: drug dependency ([address] 2018    Presence of aortocoronary bypass graft 2018     Past Medical History:   Diagnosis Date    Atrial fibrillation ([address]     CAD (coronary artery disease)     cardiac stents    COPD (chronic obstructive pulmonary disease) ([address]     Diabetes mellitus ([address]     Hyperlipidemia     Hypertension     Movement disorder     Pneumonia      Past Surgical History:   Procedure Laterality Date    APPENDECTOMY      CORONARY ANGIOPLASTY WITH STENT PLACEMENT      4    CORONARY ARTERY BYPASS GRAFT      triple bypass    EYE SURGERY Bilateral     cataract surgery    INSERTABLE CARDIAC MONITOR Left 2018         DATE ONSET: 2021    Date of Evaluation: 2021   Evaluating Therapist: Krystyna Rivas, ANUJ      Assessment:      Diagnosis: Pt presents Requested medication(s) are due for refill today: Yes  Patient has already received a courtesy refill: No  Other reason request has been forwarded to provider: failed protocol with speech, language, and cognition [facility]. Recommendations:  Requires SLP Intervention: No         Subjective:   Previous level of function and limitations: Independent  General  Chart Reviewed: Yes  Family / Caregiver Present: No  Subjective  Subjective: Alert, cooperative, pleasant     Vision  Vision: Within Functional Limits  Hearing  Hearing: Exceptions to [facility]  Hearing Exceptions: Hard of hearing/hearing concerns; No hearing aid           Objective:     Oral/Motor  Oral Motor: Within functional limits    Auditory Comprehension  Comprehension: Within Functional Limits         Expression  Primary Mode of Expression: Verbal    Verbal Expression  Verbal Expression: Within functional limits         Motor Speech  Motor Speech: Within Functional Limits    Pragmatics/Social Functioning  Pragmatics: Within functional limits    Cognition:      Orientation  Overall Orientation Status: Within Normal Limits  Attention  Attention: Within Functional Limits  Memory  Memory: Within Funtional Limits  Abstract Reasoning  Abstract Reasoning: Within Functional Limits  Safety/Judgement  Safety/Judgement: Within Functional Limits      Prognosis:  Individuals consulted  Consulted and agree with results and recommendations: Patient;RN(Gisela CHOUDHARY)    Education:  Patient Education: Educated pt on results  Patient Education Response: Verbalizes understanding  Safety Devices in place: Yes  Type of devices: Bed alarm in place;Call light within reach    Pain Assessment:  Initial Assessment:  Patient denies pain. Re-assessment:  Patient denies pain. Therapy Time  SLP Individual Minutes  Time In: 9240  Time Out: 1150  Minutes: 15                 Signature: Electronically signed by Claudette Tucker.  ANUJ Damon on 1/22/2021 at 12:13 PM

## 2023-06-22 PROBLEM — R94.31 ABNORMAL ELECTROCARDIOGRAPHY: Status: ACTIVE | Noted: 2022-10-31

## 2023-06-22 PROBLEM — S41.119A LACERATION OF UPPER ARM: Status: ACTIVE | Noted: 2023-06-22

## 2023-06-22 PROBLEM — T46.6X5A ADVERSE EFFECT OF ATORVASTATIN: Status: ACTIVE | Noted: 2023-06-22

## 2023-06-22 PROBLEM — I10 BENIGN ESSENTIAL HYPERTENSION: Status: ACTIVE | Noted: 2023-06-22

## 2023-06-28 ENCOUNTER — OFFICE VISIT (OUTPATIENT)
Dept: NEUROLOGY | Age: 83
End: 2023-06-28
Payer: MEDICARE

## 2023-06-28 VITALS
DIASTOLIC BLOOD PRESSURE: 62 MMHG | BODY MASS INDEX: 21.86 KG/M2 | WEIGHT: 148 LBS | SYSTOLIC BLOOD PRESSURE: 130 MMHG | HEART RATE: 63 BPM

## 2023-06-28 DIAGNOSIS — I48.11 LONGSTANDING PERSISTENT ATRIAL FIBRILLATION (HCC): ICD-10-CM

## 2023-06-28 DIAGNOSIS — Q04.6 COLLOID CYST OF THIRD VENTRICLE (HCC): Primary | ICD-10-CM

## 2023-06-28 DIAGNOSIS — I63.512 CEREBROVASCULAR ACCIDENT (CVA) DUE TO STENOSIS OF LEFT MIDDLE CEREBRAL ARTERY (HCC): ICD-10-CM

## 2023-06-28 PROCEDURE — 3074F SYST BP LT 130 MM HG: CPT | Performed by: PSYCHIATRY & NEUROLOGY

## 2023-06-28 PROCEDURE — 1036F TOBACCO NON-USER: CPT | Performed by: PSYCHIATRY & NEUROLOGY

## 2023-06-28 PROCEDURE — 3078F DIAST BP <80 MM HG: CPT | Performed by: PSYCHIATRY & NEUROLOGY

## 2023-06-28 PROCEDURE — 99213 OFFICE O/P EST LOW 20 MIN: CPT | Performed by: PSYCHIATRY & NEUROLOGY

## 2023-06-28 PROCEDURE — 1123F ACP DISCUSS/DSCN MKR DOCD: CPT | Performed by: PSYCHIATRY & NEUROLOGY

## 2023-06-28 PROCEDURE — G8420 CALC BMI NORM PARAMETERS: HCPCS | Performed by: PSYCHIATRY & NEUROLOGY

## 2023-06-28 PROCEDURE — G8427 DOCREV CUR MEDS BY ELIG CLIN: HCPCS | Performed by: PSYCHIATRY & NEUROLOGY

## 2023-06-28 RX ORDER — CLOPIDOGREL BISULFATE 75 MG/1
75 TABLET ORAL DAILY
COMMUNITY
Start: 2023-05-12

## 2023-06-28 ASSESSMENT — ENCOUNTER SYMPTOMS
NAUSEA: 0
CHOKING: 0
BACK PAIN: 0
VOMITING: 0
TROUBLE SWALLOWING: 0
SHORTNESS OF BREATH: 0
COLOR CHANGE: 0
PHOTOPHOBIA: 0

## 2023-07-05 ENCOUNTER — HOSPITAL ENCOUNTER (OUTPATIENT)
Dept: CT IMAGING | Age: 83
Discharge: HOME | End: 2023-07-07
Attending: PSYCHIATRY & NEUROLOGY
Payer: MEDICARE

## 2023-07-05 DIAGNOSIS — Q04.6 COLLOID CYST OF THIRD VENTRICLE (HCC): ICD-10-CM

## 2023-07-05 PROCEDURE — 70450 CT HEAD/BRAIN W/O DYE: CPT

## 2023-09-04 ENCOUNTER — APPOINTMENT (OUTPATIENT)
Dept: GENERAL RADIOLOGY | Age: 83
DRG: 189 | End: 2023-09-04
Payer: MEDICARE

## 2023-09-04 ENCOUNTER — ANESTHESIA (OUTPATIENT)
Dept: ENDOSCOPY | Age: 83
DRG: 189 | End: 2023-09-04
Payer: MEDICARE

## 2023-09-04 ENCOUNTER — ANESTHESIA EVENT (OUTPATIENT)
Dept: ENDOSCOPY | Age: 83
DRG: 189 | End: 2023-09-04
Payer: MEDICARE

## 2023-09-04 ENCOUNTER — HOSPITAL ENCOUNTER (INPATIENT)
Age: 83
LOS: 1 days | Discharge: HOME | DRG: 189 | End: 2023-09-06
Attending: EMERGENCY MEDICINE | Admitting: INTERNAL MEDICINE
Payer: MEDICARE

## 2023-09-04 ENCOUNTER — HOSPITAL ENCOUNTER (EMERGENCY)
Age: 83
Discharge: HOME | DRG: 189 | End: 2023-09-04
Attending: EMERGENCY MEDICINE
Payer: MEDICARE

## 2023-09-04 ENCOUNTER — PREP FOR PROCEDURE (OUTPATIENT)
Dept: GASTROENTEROLOGY | Age: 83
End: 2023-09-04

## 2023-09-04 VITALS
RESPIRATION RATE: 20 BRPM | DIASTOLIC BLOOD PRESSURE: 62 MMHG | WEIGHT: 150 LBS | TEMPERATURE: 97.7 F | OXYGEN SATURATION: 94 % | HEART RATE: 69 BPM | SYSTOLIC BLOOD PRESSURE: 138 MMHG | BODY MASS INDEX: 22.73 KG/M2 | HEIGHT: 68 IN

## 2023-09-04 DIAGNOSIS — T18.128A ESOPHAGEAL OBSTRUCTION DUE TO FOOD IMPACTION: Primary | ICD-10-CM

## 2023-09-04 DIAGNOSIS — R09.02 HYPOXIA: ICD-10-CM

## 2023-09-04 DIAGNOSIS — J69.0 ASPIRATION PNEUMONIA OF LOWER LOBE, UNSPECIFIED ASPIRATION PNEUMONIA TYPE, UNSPECIFIED LATERALITY (HCC): Primary | ICD-10-CM

## 2023-09-04 DIAGNOSIS — K22.2 ESOPHAGEAL OBSTRUCTION DUE TO FOOD IMPACTION: Primary | ICD-10-CM
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
## 2023-09-04 LAB
ALBUMIN SERPL-MCNC: 4.3 G/DL (ref 3.5–4.6)
ALBUMIN SERPL-MCNC: 4.3 G/DL (ref 3.5–4.6)
ALP SERPL-CCNC: 66 U/L (ref 35–104)
ALP SERPL-CCNC: 70 U/L (ref 35–104)
ALT SERPL-CCNC: 7 U/L (ref 0–41)
ALT SERPL-CCNC: 7 U/L (ref 0–41)
ANION GAP SERPL CALCULATED.3IONS-SCNC: 12 MEQ/L (ref 9–15)
ANION GAP SERPL CALCULATED.3IONS-SCNC: 21 MEQ/L (ref 9–15)
AST SERPL-CCNC: 13 U/L (ref 0–40)
AST SERPL-CCNC: 15 U/L (ref 0–40)
BASE EXCESS ARTERIAL: -12 (ref -3–3)
BASOPHILS # BLD: 0 K/UL (ref 0–0.2)
BASOPHILS NFR BLD: 0.5 %
BILIRUB SERPL-MCNC: 0.3 MG/DL (ref 0.2–0.7)
BILIRUB SERPL-MCNC: 0.4 MG/DL (ref 0.2–0.7)
BNP BLD-MCNC: 878 PG/ML
BUN SERPL-MCNC: 22 MG/DL (ref 8–23)
BUN SERPL-MCNC: 23 MG/DL (ref 8–23)
CALCIUM IONIZED: 1.21 MMOL/L (ref 1.12–1.32)
CALCIUM SERPL-MCNC: 9 MG/DL (ref 8.5–9.9)
CALCIUM SERPL-MCNC: 9.1 MG/DL (ref 8.5–9.9)
CHLORIDE SERPL-SCNC: 100 MEQ/L (ref 95–107)
CHLORIDE SERPL-SCNC: 102 MEQ/L (ref 95–107)
CO2 SERPL-SCNC: 18 MEQ/L (ref 20–31)
CO2 SERPL-SCNC: 23 MEQ/L (ref 20–31)
CREAT SERPL-MCNC: 1.42 MG/DL (ref 0.7–1.2)
CREAT SERPL-MCNC: 1.44 MG/DL (ref 0.7–1.2)
EOSINOPHIL # BLD: 0.3 K/UL (ref 0–0.7)
EOSINOPHIL NFR BLD: 3.2 %
ERYTHROCYTE [DISTWIDTH] IN BLOOD BY AUTOMATED COUNT: 15.9 % (ref 11.5–14.5)
GLOBULIN SER CALC-MCNC: 2.8 G/DL (ref 2.3–3.5)
GLOBULIN SER CALC-MCNC: 2.9 G/DL (ref 2.3–3.5)
GLUCOSE BLD-MCNC: 173 MG/DL (ref 70–99)
GLUCOSE SERPL-MCNC: 132 MG/DL (ref 70–99)
GLUCOSE SERPL-MCNC: 192 MG/DL (ref 70–99)
HCO3 ARTERIAL: 16.5 MMOL/L (ref 21–29)
HCT VFR BLD AUTO: 32.3 % (ref 42–52)
HCT VFR BLD AUTO: 37 % (ref 41–53)
HGB BLD CALC-MCNC: 12.6 GM/DL (ref 13.5–17.5)
HGB BLD-MCNC: 10.3 G/DL (ref 14–18)
INR PPP: 1.8
LACTATE: 6.66 MMOL/L (ref 0.4–2)
LYMPHOCYTES # BLD: 1.3 K/UL (ref 1–4.8)
LYMPHOCYTES NFR BLD: 13.6 %
MAGNESIUM SERPL-MCNC: 2 MG/DL (ref 1.7–2.4)
MCH RBC QN AUTO: 26.7 PG (ref 27–31.3)
MCHC RBC AUTO-ENTMCNC: 31.9 % (ref 33–37)
MCV RBC AUTO: 83.7 FL (ref 79–92.2)
MONOCYTES # BLD: 0.7 K/UL (ref 0.2–0.8)
MONOCYTES NFR BLD: 7 %
NEUTROPHILS # BLD: 7.4 K/UL (ref 1.4–6.5)
NEUTS SEG NFR BLD: 75.7 %
O2 SAT, ARTERIAL: 95 % (ref 93–100)
PCO2 ARTERIAL: 41 MM HG (ref 35–45)
PERFORMED ON: ABNORMAL
PH ARTERIAL: 7.21 (ref 7.35–7.45)
PLATELET # BLD AUTO: 400 K/UL (ref 130–400)
PO2 ARTERIAL: 91 MM HG (ref 75–108)
POC CHLORIDE: 109 MEQ/L (ref 99–110)
POC CREATININE: 1.5 MG/DL (ref 0.8–1.3)
POC FIO2: 100
POC SAMPLE TYPE: ABNORMAL
POTASSIUM SERPL-SCNC: 4.8 MEQ/L (ref 3.4–4.9)
POTASSIUM SERPL-SCNC: 4.9 MEQ/L (ref 3.4–4.9)
POTASSIUM SERPL-SCNC: 5.1 MEQ/L (ref 3.5–5.1)
PROT SERPL-MCNC: 7.1 G/DL (ref 6.3–8)
PROT SERPL-MCNC: 7.2 G/DL (ref 6.3–8)
PROTHROMBIN TIME: 20.8 SEC (ref 12.3–14.9)
RBC # BLD AUTO: 3.86 M/UL (ref 4.7–6.1)
SODIUM BLD-SCNC: 137 MEQ/L (ref 136–145)
SODIUM SERPL-SCNC: 137 MEQ/L (ref 135–144)
SODIUM SERPL-SCNC: 139 MEQ/L (ref 135–144)
TCO2 ARTERIAL: 18 MMOL/L (ref 21–32)
TROPONIN T SERPL-MCNC: <0.01 NG/ML (ref 0–0.01)
TROPONIN T SERPL-MCNC: <0.01 NG/ML (ref 0–0.01)
WBC # BLD AUTO: 9.7 K/UL (ref 4.8–10.8)

## 2023-09-04 PROCEDURE — 3700000001 HC ADD 15 MINUTES (ANESTHESIA): Performed by: INTERNAL MEDICINE

## 2023-09-04 PROCEDURE — 85014 HEMATOCRIT: CPT

## 2023-09-04 PROCEDURE — 96375 TX/PRO/DX INJ NEW DRUG ADDON: CPT

## 2023-09-04 PROCEDURE — 93005 ELECTROCARDIOGRAM TRACING: CPT | Performed by: EMERGENCY MEDICINE

## 2023-09-04 PROCEDURE — 96374 THER/PROPH/DIAG INJ IV PUSH: CPT

## 2023-09-04 PROCEDURE — 6360000002 HC RX W HCPCS: Performed by: EMERGENCY MEDICINE

## 2023-09-04 PROCEDURE — 83880 ASSAY OF NATRIURETIC PEPTIDE: CPT

## 2023-09-04 PROCEDURE — 83605 ASSAY OF LACTIC ACID: CPT

## 2023-09-04 PROCEDURE — 2580000003 HC RX 258

## 2023-09-04 PROCEDURE — 85025 COMPLETE CBC W/AUTO DIFF WBC: CPT

## 2023-09-04 PROCEDURE — 36415 COLL VENOUS BLD VENIPUNCTURE: CPT

## 2023-09-04 PROCEDURE — 43247 EGD REMOVE FOREIGN BODY: CPT | Performed by: INTERNAL MEDICINE

## 2023-09-04 PROCEDURE — 82435 ASSAY OF BLOOD CHLORIDE: CPT

## 2023-09-04 PROCEDURE — 2709999900 HC NON-CHARGEABLE SUPPLY: Performed by: INTERNAL MEDICINE

## 2023-09-04 PROCEDURE — 82565 ASSAY OF CREATININE: CPT

## 2023-09-04 PROCEDURE — 2580000003 HC RX 258: Performed by: INTERNAL MEDICINE

## 2023-09-04 PROCEDURE — 82803 BLOOD GASES ANY COMBINATION: CPT

## 2023-09-04 PROCEDURE — 99284 EMERGENCY DEPT VISIT MOD MDM: CPT

## 2023-09-04 PROCEDURE — 84484 ASSAY OF TROPONIN QUANT: CPT

## 2023-09-04 PROCEDURE — 84295 ASSAY OF SERUM SODIUM: CPT

## 2023-09-04 PROCEDURE — 6370000000 HC RX 637 (ALT 250 FOR IP): Performed by: INTERNAL MEDICINE

## 2023-09-04 PROCEDURE — 2580000003 HC RX 258: Performed by: STUDENT IN AN ORGANIZED HEALTH CARE EDUCATION/TRAINING PROGRAM

## 2023-09-04 PROCEDURE — 2580000003 HC RX 258: Performed by: EMERGENCY MEDICINE

## 2023-09-04 PROCEDURE — 3700000000 HC ANESTHESIA ATTENDED CARE: Performed by: INTERNAL MEDICINE

## 2023-09-04 PROCEDURE — 3609017100 HC EGD: Performed by: INTERNAL MEDICINE

## 2023-09-04 PROCEDURE — 7100000011 HC PHASE II RECOVERY - ADDTL 15 MIN: Performed by: INTERNAL MEDICINE

## 2023-09-04 PROCEDURE — 5A09357 ASSISTANCE WITH RESPIRATORY VENTILATION, LESS THAN 24 CONSECUTIVE HOURS, CONTINUOUS POSITIVE AIRWAY PRESSURE: ICD-10-PCS | Performed by: EMERGENCY MEDICINE

## 2023-09-04 PROCEDURE — 82330 ASSAY OF CALCIUM: CPT

## 2023-09-04 PROCEDURE — 6360000002 HC RX W HCPCS: Performed by: STUDENT IN AN ORGANIZED HEALTH CARE EDUCATION/TRAINING PROGRAM

## 2023-09-04 PROCEDURE — 99285 EMERGENCY DEPT VISIT HI MDM: CPT

## 2023-09-04 PROCEDURE — 7100000010 HC PHASE II RECOVERY - FIRST 15 MIN: Performed by: INTERNAL MEDICINE

## 2023-09-04 PROCEDURE — 2500000003 HC RX 250 WO HCPCS: Performed by: EMERGENCY MEDICINE

## 2023-09-04 PROCEDURE — 85610 PROTHROMBIN TIME: CPT

## 2023-09-04 PROCEDURE — 0DC58ZZ EXTIRPATION OF MATTER FROM ESOPHAGUS, VIA NATURAL OR ARTIFICIAL OPENING ENDOSCOPIC: ICD-10-PCS | Performed by: INTERNAL MEDICINE

## 2023-09-04 PROCEDURE — 80053 COMPREHEN METABOLIC PANEL: CPT

## 2023-09-04 PROCEDURE — 83735 ASSAY OF MAGNESIUM: CPT

## 2023-09-04 PROCEDURE — 36600 WITHDRAWAL OF ARTERIAL BLOOD: CPT

## 2023-09-04 PROCEDURE — 71045 X-RAY EXAM CHEST 1 VIEW: CPT

## 2023-09-04 PROCEDURE — 84132 ASSAY OF SERUM POTASSIUM: CPT

## 2023-09-04 PROCEDURE — 94660 CPAP INITIATION&MGMT: CPT

## 2023-09-04 RX ORDER — LORAZEPAM 2 MG/ML
1 INJECTION INTRAMUSCULAR ONCE
Status: COMPLETED | OUTPATIENT
Start: 2023-09-04 | End: 2023-09-04

## 2023-09-04 RX ORDER — SODIUM CHLORIDE 0.9 % (FLUSH) 0.9 %
5-40 SYRINGE (ML) INJECTION PRN
Status: DISCONTINUED | OUTPATIENT
Start: 2023-09-04 | End: 2023-09-04 | Stop reason: HOSPADM

## 2023-09-04 RX ORDER — DIPHENHYDRAMINE HYDROCHLORIDE 50 MG/ML
12.5 INJECTION INTRAMUSCULAR; INTRAVENOUS ONCE
Status: COMPLETED | OUTPATIENT
Start: 2023-09-04 | End: 2023-09-04

## 2023-09-04 RX ORDER — WATER 1000 ML/1000ML
INJECTION, SOLUTION INTRAVENOUS
Status: COMPLETED
Start: 2023-09-04 | End: 2023-09-04

## 2023-09-04 RX ORDER — MAGNESIUM HYDROXIDE 1200 MG/15ML
LIQUID ORAL PRN
Status: DISCONTINUED | OUTPATIENT
Start: 2023-09-04 | End: 2023-09-04 | Stop reason: ALTCHOICE

## 2023-09-04 RX ORDER — SODIUM CHLORIDE 9 MG/ML
INJECTION, SOLUTION INTRAVENOUS PRN
Status: DISCONTINUED | OUTPATIENT
Start: 2023-09-04 | End: 2023-09-04 | Stop reason: HOSPADM

## 2023-09-04 RX ORDER — LABETALOL HYDROCHLORIDE 5 MG/ML
10 INJECTION, SOLUTION INTRAVENOUS ONCE
Status: DISCONTINUED | OUTPATIENT
Start: 2023-09-04 | End: 2023-09-06 | Stop reason: HOSPADM

## 2023-09-04 RX ORDER — PROPOFOL 10 MG/ML
INJECTION, EMULSION INTRAVENOUS PRN
Status: DISCONTINUED | OUTPATIENT
Start: 2023-09-04 | End: 2023-09-04 | Stop reason: SDUPTHER

## 2023-09-04 RX ORDER — 0.9 % SODIUM CHLORIDE 0.9 %
1000 INTRAVENOUS SOLUTION INTRAVENOUS ONCE
Status: COMPLETED | OUTPATIENT
Start: 2023-09-04 | End: 2023-09-05

## 2023-09-04 RX ORDER — ONDANSETRON 2 MG/ML
4 INJECTION INTRAMUSCULAR; INTRAVENOUS
Status: DISCONTINUED | OUTPATIENT
Start: 2023-09-04 | End: 2023-09-04 | Stop reason: HOSPADM

## 2023-09-04 RX ORDER — IPRATROPIUM BROMIDE AND ALBUTEROL SULFATE 2.5; .5 MG/3ML; MG/3ML
1 SOLUTION RESPIRATORY (INHALATION) ONCE
Status: COMPLETED | OUTPATIENT
Start: 2023-09-04 | End: 2023-09-04

## 2023-09-04 RX ORDER — SODIUM CHLORIDE 9 MG/ML
INJECTION, SOLUTION INTRAVENOUS CONTINUOUS PRN
Status: DISCONTINUED | OUTPATIENT
Start: 2023-09-04 | End: 2023-09-04 | Stop reason: SDUPTHER

## 2023-09-04 RX ORDER — SODIUM CHLORIDE 0.9 % (FLUSH) 0.9 %
5-40 SYRINGE (ML) INJECTION EVERY 12 HOURS SCHEDULED
Status: DISCONTINUED | OUTPATIENT
Start: 2023-09-04 | End: 2023-09-04 | Stop reason: HOSPADM

## 2023-09-04 RX ORDER — LIDOCAINE HYDROCHLORIDE 10 MG/ML
1 INJECTION, SOLUTION EPIDURAL; INFILTRATION; INTRACAUDAL; PERINEURAL
Status: DISCONTINUED | OUTPATIENT
Start: 2023-09-04 | End: 2023-09-04 | Stop reason: HOSPADM

## 2023-09-04 RX ORDER — METOCLOPRAMIDE HYDROCHLORIDE 5 MG/ML
10 INJECTION INTRAMUSCULAR; INTRAVENOUS ONCE
Status: COMPLETED | OUTPATIENT
Start: 2023-09-04 | End: 2023-09-04

## 2023-09-04 RX ORDER — GLUCAGON 1 MG/ML
2 KIT INJECTION ONCE
Status: COMPLETED | OUTPATIENT
Start: 2023-09-04 | End: 2023-09-04

## 2023-09-04 RX ORDER — SIMETHICONE 20 MG/.3ML
EMULSION ORAL PRN
Status: DISCONTINUED | OUTPATIENT
Start: 2023-09-04 | End: 2023-09-04 | Stop reason: ALTCHOICE

## 2023-09-04 RX ORDER — ALBUTEROL SULFATE 90 UG/1
AEROSOL, METERED RESPIRATORY (INHALATION)
Status: DISCONTINUED
Start: 2023-09-04 | End: 2023-09-04 | Stop reason: WASHOUT

## 2023-09-04 RX ORDER — SODIUM CHLORIDE 9 MG/ML
INJECTION, SOLUTION INTRAVENOUS
Status: COMPLETED
Start: 2023-09-04 | End: 2023-09-04

## 2023-09-04 RX ADMIN — LORAZEPAM 1 MG: 2 INJECTION INTRAMUSCULAR; INTRAVENOUS at 22:39

## 2023-09-04 RX ADMIN — WATER 10 ML: 1 INJECTION INTRAMUSCULAR; INTRAVENOUS; SUBCUTANEOUS at 15:21

## 2023-09-04 RX ADMIN — PROPOFOL 50 MG: 10 INJECTION, EMULSION INTRAVENOUS at 17:31

## 2023-09-04 RX ADMIN — GLUCAGON 2 MG: 1 INJECTION, POWDER, LYOPHILIZED, FOR SOLUTION INTRAMUSCULAR; INTRAVENOUS at 15:21

## 2023-09-04 RX ADMIN — PROPOFOL 50 MG: 10 INJECTION, EMULSION INTRAVENOUS at 17:34

## 2023-09-04 RX ADMIN — DIPHENHYDRAMINE HYDROCHLORIDE 12.5 MG: 50 INJECTION INTRAMUSCULAR; INTRAVENOUS at 15:21

## 2023-09-04 RX ADMIN — SODIUM CHLORIDE: 9 INJECTION, SOLUTION INTRAVENOUS at 17:17

## 2023-09-04 RX ADMIN — IPRATROPIUM BROMIDE AND ALBUTEROL SULFATE 1 DOSE: .5; 3 SOLUTION RESPIRATORY (INHALATION) at 18:27

## 2023-09-04 RX ADMIN — PROPOFOL 50 MG: 10 INJECTION, EMULSION INTRAVENOUS at 17:26

## 2023-09-04 RX ADMIN — SODIUM CHLORIDE 1000 ML: 9 INJECTION, SOLUTION INTRAVENOUS at 23:42

## 2023-09-04 RX ADMIN — METOCLOPRAMIDE HYDROCHLORIDE 10 MG: 5 INJECTION INTRAMUSCULAR; INTRAVENOUS at 15:21

## 2023-09-04 RX ADMIN — PROPOFOL 50 MG: 10 INJECTION, EMULSION INTRAVENOUS at 17:28

## 2023-09-04 ASSESSMENT — ENCOUNTER SYMPTOMS
SHORTNESS OF BREATH: 1
SORE THROAT: 0
SORE THROAT: 0
ABDOMINAL PAIN: 0
PHOTOPHOBIA: 0
CHEST TIGHTNESS: 0
ABDOMINAL PAIN: 0
COUGH: 0
TROUBLE SWALLOWING: 1
SHORTNESS OF BREATH: 0
CHEST TIGHTNESS: 0
VOMITING: 0
SHORTNESS OF BREATH: 1
VOMITING: 0
ABDOMINAL DISTENTION: 0
EYE PAIN: 0
NAUSEA: 0
EYE DISCHARGE: 0
WHEEZING: 0

## 2023-09-04 ASSESSMENT — PAIN - FUNCTIONAL ASSESSMENT
PAIN_FUNCTIONAL_ASSESSMENT: NONE - DENIES PAIN
PAIN_FUNCTIONAL_ASSESSMENT: NONE - DENIES PAIN

## 2023-09-04 NOTE — ED NOTES
Patient is sitting in the bed with his wife at the bedside, he will occasionally cough due to thick clear sputum      Macho Miller RN  09/04/23 9080

## 2023-09-04 NOTE — ED NOTES
[address] [facility] [address] [facility], r/c 103 [facility]  09/04/23 3901

## 2023-09-04 NOTE — ED NOTES
Report called to the [facility] - GEE CHOUDHARY at this time, she is waiting for Anesthesia to call her back, once she hears back they will put in for transport.   Charge nurse aware      Susannah Hart RN  09/04/23 0617

## 2023-09-04 NOTE — ADDENDUM NOTE
Addendum  created 09/04/23 1821 by RACHEL Huff - CRNA    Order list changed, Pharmacy for encounter modified

## 2023-09-04 NOTE — ED PROVIDER NOTES
Keanu      Pt Name: Deshaun Lin  MRN: [id number]  [address] [facility] [place] 1940  Date of evaluation: 9/4/2023  Provider: Justus Shelton DO    CHIEF COMPLAINT       Chief Complaint   Patient presents with    Chest Pain     Pt sates he had chest pain that started this morning and it stated when he tried to drink water. Pt states he \"clamped down. \" Pt states the chest pain is resolved now. HISTORY OF PRESENT ILLNESS   (Location/Symptom, Timing/Onset, Context/Setting, Quality, Duration, Modifying Factors, Severity)  Note limiting factors. Deshaun Lin is a 79 y.o. male who presents to the emergency department . Presents with chest pain after trying to swallow. He had eaten some chicken earlier. When he tried to drink some water he developed severe chest pain. He cannot drink or eat anything at this time and is not able to swallow his saliva. Patient has history of esophageal stricture and has had to have food bolus removed previously. Patient was concerned that he had developed chest pain because he does have coronary artery disease history of atrial fibrillation COPD diabetes hyperlipidemia hypertension. HPI    Nursing Notes were reviewed. REVIEW OF SYSTEMS    (2-9 systems for level 4, 10 or more for level 5)     Review of Systems   Constitutional:  Negative for activity change, appetite change and fatigue. HENT:  Positive for trouble swallowing. Negative for congestion and sore throat. Eyes:  Negative for pain and visual disturbance. Respiratory:  Negative for chest tightness and shortness of breath. Cardiovascular:  Positive for chest pain. Gastrointestinal:  Negative for abdominal pain, nausea and vomiting. Endocrine: Negative for polydipsia. Genitourinary:  Negative for flank pain and urgency. Musculoskeletal:  Negative for gait problem and neck stiffness. Skin:  Negative for rash.    Neurological:  Negative for weakness, Procedures    No LOS Charge filed ***    FINAL IMPRESSION    No diagnosis found. DISPOSITION/PLAN   DISPOSITION        PATIENT REFERRED TO:  No follow-up provider specified. DISCHARGE MEDICATIONS:  New Prescriptions    No medications on file     Controlled Substances Monitoring:     RX Monitoring 4/18/2022   Periodic Controlled Substance Monitoring Possible medication side effects, risk of tolerance/dependence & alternative treatments discussed. ;No signs of potential drug abuse or diversion identified. ;Assessed functional status. ;Obtaining appropriate analgesic effect of treatment. Chronic Pain > 50 MEDD Re-evaluated the status of the patient's underlying condition causing pain. ;Considered consultation with a specialist.;Obtained or confirmed \"Consent for Opioid Use\" on file.        (Please note that portions of this note were completed with a voice recognition program.  Efforts were made to edit the dictations but occasionally words are mis-transcribed.)    Karuna Hunyh DO (electronically signed)  Attending Emergency Physician

## 2023-09-04 NOTE — ANESTHESIA POSTPROCEDURE EVALUATION
Department of Anesthesiology  Postprocedure Note    Patient: Angelika Infante  MRN: [id number]  [address] [facility] [place]: 1940  Date of evaluation: 9/4/2023      Procedure Summary     Date: 09/04/23 Room / Location: [address] [facility] OR 01 / [facility]    Anesthesia Start: 1717 Anesthesia Stop:     Procedure: EGD ESOPHAGOGASTRODUODENOSCOPY WITH FOREIGN BODY REMOVAL Diagnosis:       Foreign body in esophagus, initial encounter      (Foreign body in esophagus, initial encounter [T18.108A])    Surgeons: Jemma Keene MD Responsible Provider: Worley Jeans, DO    Anesthesia Type: general, MAC ASA Status: 3 - Emergent          Anesthesia Type: MAC    Moni Phase I:   10    Moni Phase II:        Anesthesia Post Evaluation    Patient location during evaluation: bedside  Patient participation: complete - patient participated  Level of consciousness: awake and awake and alert  Pain score: 0  Airway patency: patent  Nausea & Vomiting: no nausea and no vomiting  Complications: no  Cardiovascular status: blood pressure returned to baseline and hemodynamically stable  Respiratory status: acceptable  Hydration status: euvolemic  Pain management: adequate

## 2023-09-05 LAB
ANION GAP SERPL CALCULATED.3IONS-SCNC: 10 MEQ/L (ref 9–15)
ANISOCYTOSIS BLD QL SMEAR: ABNORMAL
BASOPHILS # BLD: 0 K/UL (ref 0–0.2)
BASOPHILS # BLD: 0.1 K/UL (ref 0–0.2)
BASOPHILS NFR BLD: 0 %
BASOPHILS NFR BLD: 0.3 %
BNP BLD-MCNC: 1720 PG/ML
BUN SERPL-MCNC: 26 MG/DL (ref 8–23)
CALCIUM SERPL-MCNC: 8 MG/DL (ref 8.5–9.9)
CHLORIDE SERPL-SCNC: 110 MEQ/L (ref 95–107)
CO2 SERPL-SCNC: 19 MEQ/L (ref 20–31)
CREAT SERPL-MCNC: 1.44 MG/DL (ref 0.7–1.2)
EKG ATRIAL RATE: 105 BPM
EKG P AXIS: 88 DEGREES
EKG P-R INTERVAL: 256 MS
EKG Q-T INTERVAL: 340 MS
EKG QRS DURATION: 86 MS
EKG QTC CALCULATION (BAZETT): 449 MS
EKG R AXIS: 71 DEGREES
EKG T AXIS: 62 DEGREES
EKG VENTRICULAR RATE: 105 BPM
EOSINOPHIL # BLD: 0 K/UL (ref 0–0.7)
EOSINOPHIL # BLD: 0 K/UL (ref 0–0.7)
EOSINOPHIL NFR BLD: 0 %
EOSINOPHIL NFR BLD: 0 %
ERYTHROCYTE [DISTWIDTH] IN BLOOD BY AUTOMATED COUNT: 15.4 % (ref 11.5–14.5)
ERYTHROCYTE [DISTWIDTH] IN BLOOD BY AUTOMATED COUNT: 16.2 % (ref 11.5–14.5)
GLUCOSE SERPL-MCNC: 132 MG/DL (ref 70–99)
HCT VFR BLD AUTO: 26.4 % (ref 42–52)
HCT VFR BLD AUTO: 33.8 % (ref 42–52)
HGB BLD-MCNC: 10.5 G/DL (ref 14–18)
HGB BLD-MCNC: 8.5 G/DL (ref 14–18)
LACTATE BLDV-SCNC: 2.2 MMOL/L (ref 0.5–2.2)
LACTATE BLDV-SCNC: 4.4 MMOL/L (ref 0.5–2.2)
LACTIC ACID, SEPSIS: 2.6 MMOL/L (ref 0.5–1.9)
LACTIC ACID, SEPSIS: 3 MMOL/L (ref 0.5–1.9)
LYMPHOCYTES # BLD: 0.6 K/UL (ref 1–4.8)
LYMPHOCYTES # BLD: 1.6 K/UL (ref 1–4.8)
LYMPHOCYTES NFR BLD: 3.1 %
LYMPHOCYTES NFR BLD: 7 %
MCH RBC QN AUTO: 26.4 PG (ref 27–31.3)
MCH RBC QN AUTO: 27 PG (ref 27–31.3)
MCHC RBC AUTO-ENTMCNC: 31 % (ref 33–37)
MCHC RBC AUTO-ENTMCNC: 32.2 % (ref 33–37)
MCV RBC AUTO: 83.9 FL (ref 79–92.2)
MCV RBC AUTO: 85.1 FL (ref 79–92.2)
METAMYELOCYTES NFR BLD MANUAL: 1 %
MONOCYTES # BLD: 0.9 K/UL (ref 0.2–0.8)
MONOCYTES # BLD: 1 K/UL (ref 0.2–0.8)
MONOCYTES NFR BLD: 4 %
MONOCYTES NFR BLD: 5.1 %
MYELOCYTES NFR BLD MANUAL: 1 %
NEUTROPHILS # BLD: 18.5 K/UL (ref 1.4–6.5)
NEUTROPHILS # BLD: 20.6 K/UL (ref 1.4–6.5)
NEUTS SEG NFR BLD: 87 %
NEUTS SEG NFR BLD: 91.5 %
OVALOCYTES BLD QL SMEAR: ABNORMAL
PLATELET # BLD AUTO: 233 K/UL (ref 130–400)
PLATELET # BLD AUTO: 481 K/UL (ref 130–400)
PLATELET BLD QL SMEAR: ABNORMAL
POLYCHROMASIA BLD QL SMEAR: ABNORMAL
POTASSIUM SERPL-SCNC: 5.1 MEQ/L (ref 3.4–4.9)
PROCALCITONIN SERPL IA-MCNC: 9.46 NG/ML (ref 0–0.15)
RBC # BLD AUTO: 3.14 M/UL (ref 4.7–6.1)
RBC # BLD AUTO: 3.97 M/UL (ref 4.7–6.1)
SODIUM SERPL-SCNC: 139 MEQ/L (ref 135–144)
TARGETS BLD QL SMEAR: ABNORMAL
WBC # BLD AUTO: 20.2 K/UL (ref 4.8–10.8)
WBC # BLD AUTO: 23.1 K/UL (ref 4.8–10.8)

## 2023-09-05 PROCEDURE — 96365 THER/PROPH/DIAG IV INF INIT: CPT

## 2023-09-05 PROCEDURE — 92610 EVALUATE SWALLOWING FUNCTION: CPT

## 2023-09-05 PROCEDURE — 36415 COLL VENOUS BLD VENIPUNCTURE: CPT

## 2023-09-05 PROCEDURE — 80048 BASIC METABOLIC PNL TOTAL CA: CPT

## 2023-09-05 PROCEDURE — 6360000002 HC RX W HCPCS: Performed by: EMERGENCY MEDICINE

## 2023-09-05 PROCEDURE — 87040 BLOOD CULTURE FOR BACTERIA: CPT

## 2023-09-05 PROCEDURE — 6360000002 HC RX W HCPCS: Performed by: INTERNAL MEDICINE

## 2023-09-05 PROCEDURE — 84145 PROCALCITONIN (PCT): CPT

## 2023-09-05 PROCEDURE — 83880 ASSAY OF NATRIURETIC PEPTIDE: CPT

## 2023-09-05 PROCEDURE — 2580000003 HC RX 258: Performed by: INTERNAL MEDICINE

## 2023-09-05 PROCEDURE — 83605 ASSAY OF LACTIC ACID: CPT

## 2023-09-05 PROCEDURE — 2580000003 HC RX 258: Performed by: EMERGENCY MEDICINE

## 2023-09-05 PROCEDURE — 97162 PT EVAL MOD COMPLEX 30 MIN: CPT

## 2023-09-05 PROCEDURE — 1210000000 HC MED SURG R&B

## 2023-09-05 PROCEDURE — 85025 COMPLETE CBC W/AUTO DIFF WBC: CPT

## 2023-09-05 RX ORDER — SODIUM CHLORIDE 9 MG/ML
INJECTION, SOLUTION INTRAVENOUS CONTINUOUS
Status: DISCONTINUED | OUTPATIENT
Start: 2023-09-05 | End: 2023-09-06 | Stop reason: HOSPADM

## 2023-09-05 RX ORDER — SODIUM CHLORIDE 9 MG/ML
INJECTION, SOLUTION INTRAVENOUS PRN
Status: DISCONTINUED | OUTPATIENT
Start: 2023-09-05 | End: 2023-09-06 | Stop reason: HOSPADM

## 2023-09-05 RX ORDER — SODIUM CHLORIDE 0.9 % (FLUSH) 0.9 %
5-40 SYRINGE (ML) INJECTION EVERY 12 HOURS SCHEDULED
Status: DISCONTINUED | OUTPATIENT
Start: 2023-09-05 | End: 2023-09-06 | Stop reason: HOSPADM

## 2023-09-05 RX ORDER — ONDANSETRON 4 MG/1
4 TABLET, ORALLY DISINTEGRATING ORAL EVERY 8 HOURS PRN
Status: DISCONTINUED | OUTPATIENT
Start: 2023-09-05 | End: 2023-09-06 | Stop reason: HOSPADM

## 2023-09-05 RX ORDER — ONDANSETRON 2 MG/ML
4 INJECTION INTRAMUSCULAR; INTRAVENOUS EVERY 6 HOURS PRN
Status: DISCONTINUED | OUTPATIENT
Start: 2023-09-05 | End: 2023-09-06 | Stop reason: HOSPADM

## 2023-09-05 RX ORDER — ACETAMINOPHEN 325 MG/1
650 TABLET ORAL EVERY 4 HOURS PRN
Status: DISCONTINUED | OUTPATIENT
Start: 2023-09-05 | End: 2023-09-06 | Stop reason: HOSPADM

## 2023-09-05 RX ORDER — SODIUM CHLORIDE 0.9 % (FLUSH) 0.9 %
5-40 SYRINGE (ML) INJECTION PRN
Status: DISCONTINUED | OUTPATIENT
Start: 2023-09-05 | End: 2023-09-06 | Stop reason: HOSPADM

## 2023-09-05 RX ORDER — ENOXAPARIN SODIUM 100 MG/ML
40 INJECTION SUBCUTANEOUS DAILY
Status: DISCONTINUED | OUTPATIENT
Start: 2023-09-05 | End: 2023-09-06 | Stop reason: HOSPADM

## 2023-09-05 RX ADMIN — SODIUM CHLORIDE 1000 ML: 9 INJECTION, SOLUTION INTRAVENOUS at 00:17

## 2023-09-05 RX ADMIN — PIPERACILLIN AND TAZOBACTAM 3375 MG: 3; .375 INJECTION, POWDER, LYOPHILIZED, FOR SOLUTION INTRAVENOUS at 00:53

## 2023-09-05 RX ADMIN — ENOXAPARIN SODIUM 40 MG: 100 INJECTION SUBCUTANEOUS at 10:49

## 2023-09-05 RX ADMIN — Medication 10 ML: at 09:00

## 2023-09-05 RX ADMIN — SODIUM CHLORIDE: 9 INJECTION, SOLUTION INTRAVENOUS at 18:31

## 2023-09-05 NOTE — PLAN OF CARE
Problem: Safety - Adult  Goal: Free from fall injury  Outcome: Progressing     Problem: Neurosensory - Adult  Goal: Achieves stable or improved neurological status  Outcome: Progressing  Goal: Achieves maximal functionality and self care  Outcome: Progressing     Problem: Respiratory - Adult  Goal: Achieves optimal ventilation and oxygenation  Outcome: Progressing     Problem: Cardiovascular - Adult  Goal: Maintains optimal cardiac output and hemodynamic stability  Outcome: Progressing  Goal: Absence of cardiac dysrhythmias or at baseline  Outcome: Progressing     Problem: Skin/Tissue Integrity - Adult  Goal: Skin integrity remains intact  Outcome: Progressing  Goal: Incisions, wounds, or drain sites healing without S/S of infection  Outcome: Progressing  Goal: Oral mucous membranes remain intact  Outcome: Progressing     Problem: Musculoskeletal - Adult  Goal: Return mobility to safest level of function  Outcome: Progressing  Goal: Maintain proper alignment of affected body part  Outcome: Progressing  Goal: Return ADL status to a safe level of function  Outcome: Progressing     Problem: Gastrointestinal - Adult  Goal: Minimal or absence of nausea and vomiting  Outcome: Progressing  Goal: Maintains or returns to baseline bowel function  Outcome: Progressing  Goal: Maintains adequate nutritional intake  Outcome: Progressing     Problem: Genitourinary - Adult  Goal: Absence of urinary retention  Outcome: Progressing     Problem: Infection - Adult  Goal: Absence of infection at discharge  Outcome: Progressing  Goal: Absence of infection during hospitalization  Outcome: Progressing  Goal: Absence of fever/infection during anticipated neutropenic period  Outcome: Progressing     Problem: Metabolic/Fluid and Electrolytes - Adult  Goal: Electrolytes maintained within normal limits  Outcome: Progressing  Goal: Hemodynamic stability and optimal renal function maintained  Outcome: Progressing  Goal: Glucose maintained within prescribed range  Outcome: Progressing     Problem: Hematologic - Adult  Goal: Maintains hematologic stability  Outcome: Progressing

## 2023-09-05 NOTE — PROGRESS NOTES
[facility]   [facility]  Speech Language Pathology  Clinical Bedside Swallow Evaluation    Mel Carolina  : 1940 (79 y.o.)   [x]   confirmed    MRN: [id number]  ROOM: [place] [address]  ADMISSION DATE: 2023  PATIENT DIAGNOSIS(ES): Hypoxia [R09.02]  Aspiration pneumonia of left lower lobe due to anesthesia during labor and delivery [O74.0]  Aspiration pneumonia of lower lobe, unspecified aspiration pneumonia type, unspecified laterality [facility]) [J69.0]  Chief Complaint   Patient presents with    Shortness of Breath     Patient Active Problem List    Diagnosis Date Noted    Atrial fibrillation ([address]) 2019    High risk medication use 2019    Iron deficiency anemia, unspecified 2022    Anemia, unspecified 2022    Iron deficiency 2022    Esophageal dysphagia 10/13/2022    Food impaction of esophagus 10/13/2022    Esophageal stenosis 10/13/2022    Chest pain 10/11/2022    Body mass index (BMI) of 20 to 24 2022    MARIAM (acute kidney injury) ([address]) 2022    Aspiration pneumonia of left lower lobe due to anesthesia during labor and delivery 2023    Esophageal obstruction due to food impaction 2023    Adverse effect of atorvastatin 2023    Benign essential hypertension 2023    Laceration of upper arm 2023    Abnormal electrocardiography 10/31/2022    Impaired mobility and activities of daily living -sp Severe PVD s/p AAA repain 2022    Hypotension 2022    Late effects of CVA (cerebrovascular accident) 2022    PVD (peripheral vascular disease) ([address]) 2022    Abdominal aortic aneurysm (AAA) ([address]) 2021    Acute inferior myocardial infarction ([address]) 2021    Carotid bruit 2021    Chronic obstructive pulmonary disease ([address]) 2021    Diabetes mellitus ([address]) 2021    Dyspnea on exertion 2021    Fatigue 2021    First degree atrioventricular block 2021

## 2023-09-05 NOTE — ED TRIAGE NOTES
Pt came via EMS from home for SOB  Pt was seen earlier today in the [facility] EMERGENCY MEDICAL [place] AT [facility] for a procedure to remove a \"chunk of chicken\" from the esophagus.    Pt states that he returned home from the procedure around 1900 and started experiencing SOB  129 pulse  206/96  Resp 33  Pt is using accessory muscles to breath and breathing is labored  Skin is pale and dry

## 2023-09-05 NOTE — PROGRESS NOTES
Pt arrived from ED very drowsy d/t Ativan that had been given in ED. Pt able to answer most questions appropriately but difficult to understand d/t garbled speech. VSS. Arrived on 2L NC. Skin assessment completed with Santosh Lou RN. Redness to buttocks and small abrasion to right second toe.

## 2023-09-05 NOTE — ED NOTES
Pt resting quietly. Pt appears in no apparent distress. Pt tolerating NC O2. IVF continue to infuse.       Pati Keith RN  09/05/23 [id number]

## 2023-09-05 NOTE — PROGRESS NOTES
Physical Therapy Med Surg Initial Assessment  [facility]Department[facility]  [facility]     NAME: Ana Cevallos  : 1940 (79 y.o.)  MRN: [id number]  CODE STATUS: Full Code    Date of Service: 2023    Patient Diagnosis(es): Hypoxia [R09.02]  Aspiration pneumonia of left lower lobe due to anesthesia during labor and delivery [O74.0]  Aspiration pneumonia of lower lobe, unspecified aspiration pneumonia type, unspecified laterality [facility]) [J69.0]   Chief Complaint   Patient presents with    Shortness of Breath     Patient Active Problem List    Diagnosis Date Noted    Atrial fibrillation ([address]) 2019    High risk medication use 2019    Iron deficiency anemia, unspecified 2022    Anemia, unspecified 2022    Iron deficiency 2022    Esophageal dysphagia 10/13/2022    Food impaction of esophagus 10/13/2022    Esophageal stenosis 10/13/2022    Chest pain 10/11/2022    Body mass index (BMI) of 20 to 24 2022    MARIAM (acute kidney injury) ([address]) 2022    Aspiration pneumonia of left lower lobe due to anesthesia during labor and delivery 2023    Esophageal obstruction due to food impaction 2023    Adverse effect of atorvastatin 2023    Benign essential hypertension 2023    Laceration of upper arm 2023    Abnormal electrocardiography 10/31/2022    Impaired mobility and activities of daily living -sp Severe PVD s/p AAA repain 2022    Hypotension 2022    Late effects of CVA (cerebrovascular accident) 2022    PVD (peripheral vascular disease) ([address]) 2022    Abdominal aortic aneurysm (AAA) ([address]) 2021    Acute inferior myocardial infarction ([address]) 2021    Carotid bruit 2021    Chronic obstructive pulmonary disease ([address]) 2021    Diabetes mellitus ([address]) 2021    Dyspnea on exertion 2021    Fatigue 2021    First degree atrioventricular block 2021    Hyperlipidemia 2021

## 2023-09-05 NOTE — PROGRESS NOTES
Pt failed bedside swallow eval.  Pt began coughing after drinking 15 cc of water. Pt made NPO and SLP consulted. Wife and patient aware of NPO status.

## 2023-09-05 NOTE — CARE COORDINATION
Case Management Assessment  Initial Evaluation    Date/Time of Evaluation: 9/5/2023 8:26 AM  Assessment Completed by: Ulysses Smock, RN    If patient is discharged prior to next notation, then this note serves as note for discharge by case management. Patient Name: Carlos Lopez                   YOB: 1940  Diagnosis: Hypoxia [R09.02]  Aspiration pneumonia of left lower lobe due to anesthesia during labor and delivery [O74.0]  Aspiration pneumonia of lower lobe, unspecified aspiration pneumonia type, unspecified laterality ([address]) [J69.0]                   Date / Time: 9/4/2023 10:31 PM    Patient Admission Status: Inpatient   Readmission Risk (Low < 19, Mod (19-27), High > 27): Readmission Risk Score: 14.5    Current PCP: Katie Saunders, DO  PCP verified by CM? Yes    Chart Reviewed: Yes      History Provided by: Significant Other  Patient Orientation: Unable to Assess    Patient Cognition: Alert    Hospitalization in the last 30 days (Readmission):  No    If yes, Readmission Assessment in CM Navigator will be completed. Advance Directives:      Code Status: Full Code   Patient's Primary Decision Maker is: Named in Scanned ACP Document    Primary Decision MakerBmadhu Matos - [phone]    Discharge Planning:    Patient lives with: Spouse/Significant Other Type of Home: Trailer/Mobile Home  Primary Care Giver: Spouse  Patient Support Systems include: Spouse/Significant Other   Current Financial resources:  (, not [place] connected)  Current community resources: None  Current services prior to admission: None            Current DME:  WALKER, SHOWER CHAIR, RAISED TOILET             Type of Home Care services:  None    ADLS  Prior functional level: Mobility, Assistance with the following:, Cooking, Housework  Current functional level: Mobility, Assistance with the following:    PT AM-PAC:   /24  OT AM-PAC:   /24    Family can provide assistance at DC:  Yes  Would you like Case lobe, unspecified aspiration pneumonia type, unspecified laterality ([address]) [Y04.3]    IF APPLICABLE: The Patient and/or patient representative Fermín Seen and his family were provided with a choice of provider and agrees with the discharge plan. Freedom of choice list with basic dialogue that supports the patient's individualized plan of care/goals and shares the quality data associated with the providers was provided to: Patient Representative   Patient Representative Name:       The Patient and/or Patient Representative Agree with the Discharge Plan?  Yes    Maria Verdugo RN  Case Management Department  Ph: [phone] Fax: [phone]

## 2023-09-05 NOTE — ED PROVIDER NOTES
[facility] ED  eMERGENCY dEPARTMENT eNCOUnter      Pt Name: Kyler Winters  MRN: [id number]  [address] [facility] 1940  Date of evaluation: 9/4/2023  Provider: Melinda Figueroa MD    CHIEF COMPLAINT       Chief Complaint   Patient presents with    Shortness of Breath         HISTORY OF PRESENT ILLNESS   (Location/Symptom, Timing/Onset,Context/Setting, Quality, Duration, Modifying Factors, Severity)  Note limiting factors. Kyler Winters is a 79 y.o. male who presents to the emergency department for shortness of breath. Patient was seen here earlier today for esophageal foreign body and went to endoscopy and had a removed. Was discharged home getting home at 7 PM and by 930 he returns for sudden shortness of breath. He states at home he was weak and felt like he was having difficulty breathing. Has history of asthma and COPD took his inhaler without relief called EMS. He is brought in in respiratory distress with elevated heart rate and blood pressure. No associated chest pain. No nausea or vomiting. HPI    NursingNotes were reviewed. REVIEW OF SYSTEMS    (2-9 systems for level 4, 10 or more for level 5)     Review of Systems   Constitutional:  Negative for chills and diaphoresis. HENT:  Negative for congestion, ear pain, mouth sores and sore throat. Eyes:  Negative for photophobia and discharge. Respiratory:  Positive for shortness of breath. Negative for cough, chest tightness and wheezing. Cardiovascular:  Negative for chest pain and palpitations. Gastrointestinal:  Negative for abdominal distention, abdominal pain and vomiting. Endocrine: Negative for cold intolerance. Genitourinary:  Negative for difficulty urinating. Musculoskeletal:  Negative for arthralgias. Skin:  Negative for pallor and rash. Allergic/Immunologic: Negative for immunocompromised state. Neurological:  Positive for weakness. Negative for dizziness and syncope. Hematological:  Negative for adenopathy.

## 2023-09-05 NOTE — CARE COORDINATION
Met with patient. Definition of pneumonia discussed. Causes of different types of pneumonia reviewed. Symptoms discussed and pt does understand that they may have some or all of these symptoms. Testing to diagnose pneumonia reviewed as well as possible treatments. Importance of avoiding infections discussed including: taking medication as directed, washing hands, disposing of used tissues, getting the pneumonia and flu vaccines, and avoiding others who are ill. Importance of not smoking and to avoid others who may be smoking around patient stressed. Pneumonia Zones also reviewed. \"Green\" zone is the goal, \"Yellow\" zone means to call the doctor, and \"Red\" zone means to call the doctor ASAP or call 911. Copies of Pneumonia booklet and Zone Pamphlet given to patient. Offer for patient to express any concerns or questions. Pt denies having further questions at this time.

## 2023-09-05 NOTE — H&P
H&p DICTATED  Aspiration solid food  Hypoxia  OHDX CAD  HFrEF AF stents remote  Hyperlipidemia  COPD    Plan  increase activity  follow labs home meds aspiration precautions  BSS  evaulation

## 2023-09-05 NOTE — H&P
[facility], [address] [facility]                              HISTORY AND PHYSICAL    PATIENT NAME: Selvin Ferris                       :        1940  MED REC NO:   [id number]                            ROOM:       [id number]  ACCOUNT NO:   [de-identified]                           ADMIT DATE: 2023  PROVIDER:     Ruby Clark DO    HISTORY OF PRESENT ILLNESS:  An 79year-old admitted to the hospital  after choking on chicken that he ate proximally 6 hours before ED  arrival.  The patient had upper endoscopy with a foreign body removed. He was sent home only to return 4 hours later with shortness of breath. The patient was extremely weak and has difficulty with breathing. He  has a known history of underlying organic heart disease with coronary  artery stents and atrial fibrillation. At the time of my evaluation,  the patient's wife was in the room. He was in no distress and extremely  tired from being up all night. He denied any chest pain or pressure at  this time. He denied any coughing spells at night. PAST SURGICAL HISTORY:  Multiple coronary stents, appendectomy, loop  recorder, cataract surgery, upper endoscopy. SOCIAL HISTORY:  . No alcohol. Former smoker. No opioids. MEDICATIONS:  At time of his admission; albuterol, Eliquis, Plavix, mag  oxide, Glucophage, Toprol, nitroglycerin, Livovel, and valsartan. ALLERGIES TO MEDICATIONS:  LIPITOR, FENOFIBRATE, NIACIN. REVIEW OF SYSTEMS:  Unremarkable. PHYSICAL EXAMINATION:  VITAL SIGNS:  Height 5 feet and 8 inches, 150 pounds. Blood pressure is  110/60, heart rate 70, respirations 20, afebrile. HEENT:  Normocephalic. Pupils equal and reactive to light. Extraocular  muscles intact. NECK:  Supple. No JVD, bruits, or adenopathy. CHEST:  Lungs are clear. CARDIOVASCULAR:  Heart is regular without murmurs, gallops, clicks, or  rubs. ABDOMEN:  Soft. No guarding or rigidity. Bowel sounds are present. EXTREMITIES:  Show no edema. Skin is warm and dry. IMPRESSION:  1. Hypoxia on admission due to aspiration. 2.  Organic heart disease, coronary artery disease, heart failure with  reduced ejection fraction, atrial fibrillation. 3.  Hyperlipidemia. 4.  COPD. PLAN:  Continue blood thinner due to atrial fib, increase activity  level. Continue home medications, aspiration precautions, bedside  swallowing evaluation.         Alesia Comer DO    D: 09/05/2023 11:18:47       T: 09/05/2023 11:22:29     [email or web address]  Job#: [id number]     Doc#: [id number]    CC:

## 2023-09-05 NOTE — ACP (ADVANCE CARE PLANNING)
Advance Care Planning   Healthcare Decision Maker:    Primary Decision Maker: Phil Portillo [facility] - [phone]

## 2023-09-06 VITALS
SYSTOLIC BLOOD PRESSURE: 139 MMHG | DIASTOLIC BLOOD PRESSURE: 70 MMHG | WEIGHT: 150 LBS | RESPIRATION RATE: 19 BRPM | HEART RATE: 66 BPM | TEMPERATURE: 97.9 F | HEIGHT: 68 IN | BODY MASS INDEX: 22.73 KG/M2 | OXYGEN SATURATION: 95 %

## 2023-09-06 LAB
ANION GAP SERPL CALCULATED.3IONS-SCNC: 9 MEQ/L (ref 9–15)
BUN SERPL-MCNC: 20 MG/DL (ref 8–23)
CALCIUM SERPL-MCNC: 8.2 MG/DL (ref 8.5–9.9)
CHLORIDE SERPL-SCNC: 109 MEQ/L (ref 95–107)
CO2 SERPL-SCNC: 22 MEQ/L (ref 20–31)
CREAT SERPL-MCNC: 1.07 MG/DL (ref 0.7–1.2)
GLUCOSE BLD-MCNC: 114 MG/DL (ref 70–99)
GLUCOSE BLD-MCNC: 84 MG/DL (ref 70–99)
GLUCOSE SERPL-MCNC: 86 MG/DL (ref 70–99)
PERFORMED ON: ABNORMAL
PERFORMED ON: NORMAL
POTASSIUM SERPL-SCNC: 4.4 MEQ/L (ref 3.4–4.9)
SODIUM SERPL-SCNC: 140 MEQ/L (ref 135–144)

## 2023-09-06 PROCEDURE — 80048 BASIC METABOLIC PNL TOTAL CA: CPT

## 2023-09-06 PROCEDURE — 2700000000 HC OXYGEN THERAPY PER DAY

## 2023-09-06 PROCEDURE — 36415 COLL VENOUS BLD VENIPUNCTURE: CPT

## 2023-09-06 PROCEDURE — 6360000002 HC RX W HCPCS: Performed by: INTERNAL MEDICINE

## 2023-09-06 PROCEDURE — 92526 ORAL FUNCTION THERAPY: CPT

## 2023-09-06 PROCEDURE — 97535 SELF CARE MNGMENT TRAINING: CPT

## 2023-09-06 PROCEDURE — 97116 GAIT TRAINING THERAPY: CPT

## 2023-09-06 RX ADMIN — ENOXAPARIN SODIUM 40 MG: 100 INJECTION SUBCUTANEOUS at 10:14

## 2023-09-06 NOTE — CARE COORDINATION
Met with pt and spouse at bedside to discuss discharge planning. Pt states his spouse helps him at home and he uses a walker. Pt denies needs to go for skilled therapy and also declines HHC. Pt plans to DC home with no needs pending swallow assessment and increase in activity per Dr. Ck Mattson note.

## 2023-09-06 NOTE — PROGRESS NOTES
[facility]  Facility/Department: [facility]  Speech Language Pathology   Treatment Note      Talon Dumont  1940  [id number]  [x]   confirmed      Date: 2023    Hypoxia [R09.02]  Aspiration pneumonia of left lower lobe due to anesthesia during labor and delivery [O74.0]  Aspiration pneumonia of lower lobe, unspecified aspiration pneumonia type, unspecified laterality ([address]) [J69.0]    Restrictions/Precautions: Fall Risk    Weight - Scale: 150 lb (68 kg)     ADULT DIET; Dysphagia - Soft and Bite Sized    SpO2: 97 % (23)  O2 Flow Rate (L/min): 2 L/min (23)  No active isolations      Subjective:  Alert, Cooperative, and Pleasant      Wife present for session. Wife notified SLP of blood pooling around IV. RN EMANI notified. Interventions used this date:  Dysphagia Treatment      Objective/Assessment:  Patient progressing towards goals:  Short-term Goals  Timeframe for Short-term Goals: 1 week or LOS until goals are met. Goal 1: Pt will complete oral motor ROM and strengthening exercises with 90% accuracy, given cues as needed, in order to strengthen lingual/labial/buccal musculature to promote safety and efficiency of oral phase of swallow and decrease risk for pocketing. Pt completed 2 sets of 10/each labial and lingual strengthening exercises with great effort and 100% acc. Goal 2: Pt will complete lingual exercises that promote anterior to posterior propulsion of bolus and improve tongue base retraction with 80% accuracy in order to strengthen the muscles of the swallow to decrease risk of aspiration and to increase ability to safely handle the least restrictive diet level. Pt completed 2 sets of 10 lingual press with great effort and 100% acc. Goal 3: Pt will complete pharyngeal strengthening exercises (such as the Karen, Effortful swallow, etc) with 80% acc in order to strengthen and establish and more effective swallow.   Pt completed 2 sets of 5 effortful

## 2023-09-06 NOTE — PROGRESS NOTES
Physician Progress Note      PATIENT:               Dayo Lin  CSN #:                  [id number]  :                       1940  ADMIT DATE:       2023 10:31 PM  [address] [facility] DATE:  Gilbert Langston  PROVIDER #:        Terry Dorantes DO          QUERY TEXT:    Pt admitted with hypoxia due to aspiration per H&P. Acute distress and work   of breathing noted per ED physician with tachypnea and accessory muscle usage. pO2 91 on 100% FIO2. If possible, please document in the progress notes and   discharge summary if you are evaluating and/or treating any of the following: The medical record reflects the following:  Risk Factors: aspiration, CHF, COPD  Clinical Indicators: On arrival to ED RR 25-36. ABGs showed pH 7.208 pO2 91 on   100% FIO2. Patient with shortness of breath, in acute distress. \"Pulmonary:   Effort: Tachypnea and accessory muscle usage present\" per ED physician. ED nursing \"Resp 33\" \"Pt is using accessory muscles to breath and breathing is   labored\"  Treatment: ABG, supplemental O2, initially on BIPAP then 2L NC    Thank you, Shira Fraga RN BSN [facility]  [phone]  Options provided:  -- Acute respiratory failure with hypoxia  -- Other - I will add my own diagnosis  -- Disagree - Not applicable / Not valid  -- Disagree - Clinically unable to determine / Unknown  -- Refer to Clinical Documentation Reviewer    PROVIDER RESPONSE TEXT:    This patient is in acute respiratory failure with hypoxia.     Query created by: Alejandro Gomez on 2023 3:01 PM      Electronically signed by:  Terry Dorantes DO 2023 7:50 AM

## 2023-09-06 NOTE — PROGRESS NOTES
Physical Therapy Med Surg Daily Treatment Note  Facility/Department: [facility]  Room: [place] [id number]       NAME: Kallie Gandhi  : 1940 (79 y.o.)  MRN: [id number]  CODE STATUS: Full Code    Date of Service: 2023    Patient Diagnosis(es): Hypoxia [R09.02]  Aspiration pneumonia of left lower lobe due to anesthesia during labor and delivery [O74.0]  Aspiration pneumonia of lower lobe, unspecified aspiration pneumonia type, unspecified laterality [facility]) [J69.0]   Chief Complaint   Patient presents with    Shortness of Breath     Patient Active Problem List    Diagnosis Date Noted    Atrial fibrillation ([address]) 2019    High risk medication use 2019    Iron deficiency anemia, unspecified 2022    Anemia, unspecified 2022    Iron deficiency 2022    Esophageal dysphagia 10/13/2022    Food impaction of esophagus 10/13/2022    Esophageal stenosis 10/13/2022    Chest pain 10/11/2022    Body mass index (BMI) of 20 to 24 2022    AMRIAM (acute kidney injury) ([address]) 2022    Aspiration pneumonia of left lower lobe due to anesthesia during labor and delivery 2023    Esophageal obstruction due to food impaction 2023    Adverse effect of atorvastatin 2023    Benign essential hypertension 2023    Laceration of upper arm 2023    Abnormal electrocardiography 10/31/2022    Impaired mobility and activities of daily living -sp Severe PVD s/p AAA repain 2022    Hypotension 2022    Late effects of CVA (cerebrovascular accident) 2022    PVD (peripheral vascular disease) ([address]) 2022    Abdominal aortic aneurysm (AAA) ([address]) 2021    Acute inferior myocardial infarction ([address]) 2021    Carotid bruit 2021    Chronic obstructive pulmonary disease ([address]) 2021    Diabetes mellitus ([address]) 2021    Dyspnea on exertion 2021    Fatigue 2021    First degree atrioventricular block 2021    Hyperlipidemia 2021 but not touching, to perform the activity  Minimal assistance= pt performs 75% or more of the activity; assistance is required to complete the activity  Moderate assistance= pt performs 50% of the activity; assistance is required to complete the activity  Maximal assistance = pt performs 25% of the activity; assistance is required to complete the activity  Dependent = pt requires total physical assistance to accomplish the task

## 2023-09-06 NOTE — PROGRESS NOTES
Nephrology Progress Note    Assessment:  Aspiration esophogus   OHDx CADF Hx AF  stents remote  AAA repair remote        Plan: increase activity  assess swallowing if fine discharge    Patient Active Problem List:     Atrial fibrillation ([address])     High risk medication use     Atherosclerosis of native coronary artery of native heart without angina pectoris     Hypertension     Ischemic myocardial dysfunction     Nonrheumatic aortic valve disorder, unspecified     Aortic valve disorder     Personal history of nicotine dependence     Presence of aortocoronary bypass graft     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HCC)     Vasovagal syncope     Dizziness and giddiness     Cerebrovascular accident ([address])     Orthostatic dizziness     Ataxia     Vertigo     Meniere disease, bilateral     Benign paroxysmal positional vertigo due to bilateral vestibular disorder     Coronary angioplasty status     Ataxic gait     Kyphoscoliosis     Spinal stenosis of lumbar region with neurogenic claudication     Abdominal aortic aneurysm (AAA) ([address])     Acute inferior myocardial infarction (HCC)     Carotid bruit     Chronic obstructive pulmonary disease (HCC)     Diabetes mellitus (HCC)     Dyspnea on exertion     Fatigue     First degree atrioventricular block     Hyperlipidemia     Infection of the inner ear     Muscle pain     Underweight     Ventricular premature beats     Weight loss     PVD (peripheral vascular disease) ([facility])     Impaired mobility and activities of daily living -sp Severe PVD s/p AAA repain     Hypotension     Late effects of CVA (cerebrovascular accident)     MARIAM (acute kidney injury) ([address])     Body mass index (BMI) of 20 to 24     Chest pain     Esophageal dysphagia     Food impaction of esophagus     Esophageal stenosis     Iron deficiency anemia, unspecified     Anemia, unspecified     Iron deficiency     Abnormal electrocardiography     Adverse effect of atorvastatin     Benign essential hypertension     Laceration -325 ml       General: alert, in no apparent distress  HEENT: normocephalic, atraumatic, anicteric  Neck: supple, no mass  Lungs: non-labored respirations, clear to auscultation bilaterally  Heart: regular rate and rhythm, no murmurs or rubs  Abdomen: soft, non-tender, non-distended  Ext: no cyanosis, no peripheral edema  Neuro: alert and oriented, no gross abnormalities  Psych: normal mood and affect  Skin: no rash      Electronically signed by Santiago Jefferson DO, MD

## 2023-09-06 NOTE — PLAN OF CARE
Discharge instructions reviewed with patient and spouse per request.  Verbalized understanding. No further questions or concerns. Medications discussed and reviewed including side effects. Follow-up appointments discussed. Home-going instructions discussed and reviewed as well. Waiting wheelchair transport.       Problem: Safety - Adult  Goal: Free from fall injury  Outcome: Completed     Problem: Neurosensory - Adult  Goal: Achieves stable or improved neurological status  Outcome: Completed  Goal: Achieves maximal functionality and self care  Outcome: Completed     Problem: Respiratory - Adult  Goal: Achieves optimal ventilation and oxygenation  Outcome: Completed     Problem: Cardiovascular - Adult  Goal: Maintains optimal cardiac output and hemodynamic stability  Outcome: Completed  Goal: Absence of cardiac dysrhythmias or at baseline  Outcome: Completed     Problem: Skin/Tissue Integrity - Adult  Goal: Skin integrity remains intact  Outcome: Completed  Goal: Incisions, wounds, or drain sites healing without S/S of infection  Outcome: Completed  Goal: Oral mucous membranes remain intact  Outcome: Completed     Problem: Musculoskeletal - Adult  Goal: Return mobility to safest level of function  Outcome: Completed  Goal: Maintain proper alignment of affected body part  Outcome: Completed  Goal: Return ADL status to a safe level of function  Outcome: Completed     Problem: Gastrointestinal - Adult  Goal: Minimal or absence of nausea and vomiting  Outcome: Completed  Goal: Maintains or returns to baseline bowel function  Outcome: Completed  Goal: Maintains adequate nutritional intake  Outcome: Completed     Problem: Genitourinary - Adult  Goal: Absence of urinary retention  Outcome: Completed     Problem: Infection - Adult  Goal: Absence of infection at discharge  Outcome: Completed  Goal: Absence of infection during hospitalization  Outcome: Completed  Goal: Absence of fever/infection during anticipated neutropenic period  Outcome: Completed     Problem: Metabolic/Fluid and Electrolytes - Adult  Goal: Electrolytes maintained within normal limits  Outcome: Completed  Goal: Hemodynamic stability and optimal renal function maintained  Outcome: Completed  Goal: Glucose maintained within prescribed range  Outcome: Completed     Problem: Hematologic - Adult  Goal: Maintains hematologic stability  Outcome: Completed     Problem: Chronic Conditions and Co-morbidities  Goal: Patient's chronic conditions and co-morbidity symptoms are monitored and maintained or improved  Outcome: Completed

## 2023-09-07 NOTE — PROGRESS NOTES
Physical Therapy  Facility/Department: [facility] MED SURG [id number]  Physical Therapy Discharge      NAME: Ana Luisa Heredia    : 1940 (79 y.o.)  MRN: [id number]    Account: [de-identified]  Gender: male      Patient has been discharged from acute care hospital. DC patient from current PT program.      Electronically signed by Tomi James PT on 23 at 12:30 PM EDT

## 2023-09-10 ENCOUNTER — HOSPITAL ENCOUNTER (EMERGENCY)
Age: 83
Discharge: HOME | End: 2023-09-10
Payer: MEDICARE

## 2023-09-10 VITALS
RESPIRATION RATE: 18 BRPM | WEIGHT: 150 LBS | OXYGEN SATURATION: 96 % | TEMPERATURE: 97.9 F | SYSTOLIC BLOOD PRESSURE: 147 MMHG | BODY MASS INDEX: 22.73 KG/M2 | HEIGHT: 68 IN | DIASTOLIC BLOOD PRESSURE: 84 MMHG | HEART RATE: 77 BPM

## 2023-09-10 DIAGNOSIS — S51.819A SKIN TEAR OF FOREARM WITHOUT COMPLICATION, INITIAL ENCOUNTER: Primary | ICD-10-CM

## 2023-09-10 LAB
BACTERIA BLD CULT ORG #2: NORMAL
BACTERIA BLD CULT: NORMAL

## 2023-09-10 PROCEDURE — 99282 EMERGENCY DEPT VISIT SF MDM: CPT

## 2023-09-10 ASSESSMENT — ENCOUNTER SYMPTOMS
COUGH: 0
SHORTNESS OF BREATH: 0
BACK PAIN: 0
ABDOMINAL PAIN: 0

## 2023-09-10 ASSESSMENT — PAIN - FUNCTIONAL ASSESSMENT: PAIN_FUNCTIONAL_ASSESSMENT: NONE - DENIES PAIN

## 2023-09-10 NOTE — ED PROVIDER NOTES
[facility] ED  eMERGENCY dEPARTMENT eNCOUnter      Pt Name: Suzette Antonio  MRN: [id number]  [address] [facility] 1940  Date of evaluation: 9/10/2023  Provider: RACHEL Nicole CNP      HISTORY OF PRESENT ILLNESS    Suzette Antonio is a 79 y.o. male who presents to the Emergency Department with 2 skin tears to L FA that he states have been bleeding intermittently for days. States he is on 2 blood thinners. Bleeding does stop with pressure. No pain. REVIEW OF SYSTEMS       Review of Systems   Constitutional:  Negative for activity change, appetite change and fever. HENT:  Negative for congestion. Respiratory:  Negative for cough and shortness of breath. Cardiovascular:  Negative for chest pain. Gastrointestinal:  Negative for abdominal pain. Genitourinary:  Negative for dysuria. Musculoskeletal:  Negative for arthralgias and back pain. Skin:  Negative for rash. L FA skin care   All other systems reviewed and are negative.         PAST MEDICAL HISTORY     Past Medical History:   Diagnosis Date    MARIAM (acute kidney injury) ([address] 6/7/2022    Atrial fibrillation (HCC)     CAD (coronary artery disease)     cardiac stents    COPD (chronic obstructive pulmonary disease) (HCC)     Diabetes mellitus ([address])     Hyperlipidemia     Hypertension     Movement disorder     Pneumonia          SURGICAL HISTORY       Past Surgical History:   Procedure Laterality Date    APPENDECTOMY      CORONARY ANGIOPLASTY WITH STENT PLACEMENT      4    CORONARY ARTERY BYPASS GRAFT      triple bypass    EYE SURGERY Bilateral     cataract surgery    INSERTABLE CARDIAC MONITOR Left 12/2018    UPPER GASTROINTESTINAL ENDOSCOPY N/A 10/13/2022    EGD ESOPHAGOGASTRODUODENOSCOPY WITH DILATION performed by Jakub Cervantes MD at [address] [place] [address]. N/A 9/4/2023    EGD ESOPHAGOGASTRODUODENOSCOPY WITH FOREIGN BODY REMOVAL performed by Raine Peck MD at [address] [place] [address]

## 2023-09-10 NOTE — ED TRIAGE NOTES
The patient came to ED for skin tear on left forearm. Pt states he was recently discharged and was attempting to remove an old bandage when his skin came off with the tape. Pt is on plavix. Bleeding controlled with bandage.

## 2023-09-13 ENCOUNTER — TELEPHONE (OUTPATIENT)
Dept: OTHER | Facility: CLINIC | Age: 83
End: 2023-09-13

## 2023-09-13 ENCOUNTER — HOSPITAL ENCOUNTER (EMERGENCY)
Age: 83
Discharge: HOME | End: 2023-09-13
Payer: MEDICARE

## 2023-09-13 VITALS
DIASTOLIC BLOOD PRESSURE: 78 MMHG | OXYGEN SATURATION: 97 % | SYSTOLIC BLOOD PRESSURE: 146 MMHG | RESPIRATION RATE: 20 BRPM | HEART RATE: 79 BPM | TEMPERATURE: 96.8 F

## 2023-09-13 DIAGNOSIS — S51.812D SKIN TEAR OF FOREARM WITHOUT COMPLICATION, LEFT, SUBSEQUENT ENCOUNTER: Primary | ICD-10-CM

## 2023-09-13 PROCEDURE — 99282 EMERGENCY DEPT VISIT SF MDM: CPT

## 2023-09-13 PROCEDURE — 2500000003 HC RX 250 WO HCPCS: Performed by: STUDENT IN AN ORGANIZED HEALTH CARE EDUCATION/TRAINING PROGRAM

## 2023-09-13 PROCEDURE — 6370000000 HC RX 637 (ALT 250 FOR IP): Performed by: STUDENT IN AN ORGANIZED HEALTH CARE EDUCATION/TRAINING PROGRAM

## 2023-09-13 RX ADMIN — Medication: at 16:38

## 2023-09-13 RX ADMIN — Medication 1 EACH: at 17:16

## 2023-09-13 ASSESSMENT — ENCOUNTER SYMPTOMS
ABDOMINAL PAIN: 0
WHEEZING: 0
COUGH: 0
VOMITING: 0
NAUSEA: 0
SHORTNESS OF BREATH: 0
PHOTOPHOBIA: 0

## 2023-09-13 NOTE — ED TRIAGE NOTES
Pt presents to ER with skin abrasion to the left forearm that has been bleeding on and off since Sunday. Pt is currently on blood thinners. Abrasion is wrapped and bleeding is controlled at this time. Pt is A&Ox4, warm and dry.

## 2023-09-13 NOTE — ED PROVIDER NOTES
[facility] ED  EMERGENCY DEPARTMENT ENCOUNTER      Pt Name: Janice Jacome  MRN: [id number]  [address] [facility] [place] 1940  Date of evaluation: 9/13/2023  Provider: Patrizia Garcia, [address] [place] [address]       Chief Complaint   Patient presents with    Wound Check         HISTORY OF PRESENT ILLNESS   (Location/Symptom, Timing/Onset, Context/Setting, Quality, Duration, Modifying Factors, Severity)  Note limiting factors. Janice Jacome is a 79 y.o. male who presents to the emergency department for evaluation of left forearm skin tear. Patient states he was discharged from the hospital on 9/6. When he removed dressing that placed during admission he sustained a skin tear. It has been bleeding since. He was seen in the emergency department on 9/10 for same and bleeding stopped with pressure dressing initially however continued later at home, states has been bleeding through dressings and ACE wrap. He is on Plavix. No associated pain, swelling, redness, drainage, fever chills. HPI    Nursing Notes were reviewed. REVIEW OF SYSTEMS    (2-9 systems for level 4, 10 or more for level 5)     Review of Systems   Constitutional:  Negative for chills and fever. HENT:  Negative for congestion. Eyes:  Negative for photophobia. Respiratory:  Negative for cough, shortness of breath and wheezing. Cardiovascular:  Negative for chest pain and palpitations. Gastrointestinal:  Negative for abdominal pain, nausea and vomiting. Genitourinary:  Negative for dysuria, frequency and hematuria. Musculoskeletal:  Negative for myalgias. Skin:  Positive for wound. Allergic/Immunologic: Negative for immunocompromised state. Neurological:  Negative for dizziness, weakness and headaches. All other systems reviewed and are negative. Except as noted above the remainder of the review of systems was reviewed and negative.        PAST MEDICAL HISTORY     Past Medical History:   Diagnosis Date    MARIAM (acute kidney Use\" on file.        (Please note that portions of this note were completed with a voice recognition program.  Efforts were made to edit the dictations but occasionally words are mis-transcribed.)    Matteo Daigle PA-C (electronically signed)             Matteo Daigle PA-C  09/13/23 [id number]

## 2023-09-20 ENCOUNTER — OFFICE VISIT (OUTPATIENT)
Dept: GASTROENTEROLOGY | Age: 83
End: 2023-09-20
Payer: MEDICARE

## 2023-09-20 VITALS — HEART RATE: 67 BPM | DIASTOLIC BLOOD PRESSURE: 76 MMHG | OXYGEN SATURATION: 98 % | SYSTOLIC BLOOD PRESSURE: 118 MMHG

## 2023-09-20 DIAGNOSIS — R13.19 ESOPHAGEAL DYSPHAGIA: ICD-10-CM

## 2023-09-20 DIAGNOSIS — K20.90 ESOPHAGITIS: Primary | ICD-10-CM

## 2023-09-20 PROCEDURE — 99213 OFFICE O/P EST LOW 20 MIN: CPT | Performed by: NURSE PRACTITIONER

## 2023-09-20 PROCEDURE — 3078F DIAST BP <80 MM HG: CPT | Performed by: NURSE PRACTITIONER

## 2023-09-20 PROCEDURE — 1111F DSCHRG MED/CURRENT MED MERGE: CPT | Performed by: NURSE PRACTITIONER

## 2023-09-20 PROCEDURE — G8420 CALC BMI NORM PARAMETERS: HCPCS | Performed by: NURSE PRACTITIONER

## 2023-09-20 PROCEDURE — 1123F ACP DISCUSS/DSCN MKR DOCD: CPT | Performed by: NURSE PRACTITIONER

## 2023-09-20 PROCEDURE — 1036F TOBACCO NON-USER: CPT | Performed by: NURSE PRACTITIONER

## 2023-09-20 PROCEDURE — 3074F SYST BP LT 130 MM HG: CPT | Performed by: NURSE PRACTITIONER

## 2023-09-20 PROCEDURE — G8427 DOCREV CUR MEDS BY ELIG CLIN: HCPCS | Performed by: NURSE PRACTITIONER

## 2023-09-20 RX ORDER — PANTOPRAZOLE SODIUM 40 MG/1
40 TABLET, DELAYED RELEASE ORAL DAILY
Qty: 30 TABLET | Refills: 3 | Status: SHIPPED | OUTPATIENT
Start: 2023-09-20 | End: 2024-01-18

## 2023-09-20 ASSESSMENT — ENCOUNTER SYMPTOMS
CONSTIPATION: 0
VOICE CHANGE: 0
NAUSEA: 0
CHEST TIGHTNESS: 0
ABDOMINAL DISTENTION: 0
COLOR CHANGE: 0
RECTAL PAIN: 0
VOMITING: 0
EYE PAIN: 0
DIARRHEA: 0
TROUBLE SWALLOWING: 0
ANAL BLEEDING: 0
PHOTOPHOBIA: 0
EYE REDNESS: 0
BLOOD IN STOOL: 0
ABDOMINAL PAIN: 0

## 2023-09-20 NOTE — PROGRESS NOTES
07/19/2021 07:32 AM     Lab Results   Component Value Date/Time    INR 1.8 09/04/2023 03:00 PM    INR 1.5 10/13/2022 05:46 PM    INR 1.6 06/07/2022 02:30 PM    INR 1.2 04/07/2022 07:48 AM    INR 1.7 10/12/2021 05:15 PM     No components found for: \"ACUTEHEPATITISSCREEN\"  No components found for: \"CELIACPANEL\"  No components found for: \"STOOLCULTURE\", \"C. DIFF\", \"STOOLOVAPARASITE\", \"STOOLLEUCOCYTE\"    Endoscopic hx:  EGD Dr Jackie Palmer 9/4/23  Findings:         - Food / Chicken piece / bolus was found in the lower third of the esophagus            causing complete obstruction. Removal of food was accomplished. Removal was            accomplished with a Rivera net and Raptor grasping device. -  The examined esophagus was moderately tortuous. -  Patchy moderately erythematous mucosa without bleeding was found in the            gastric antrum and in the gastric body. -  The examined duodenum was normal.         -  The cardia and gastric fundus were normal on retroflexion. Impression:         -  Food in the lower third of the esophagus. Removal was successful.         -  Tortuous esophagus.         -  Erythematous mucosa in the gastric antrum and gastric body. -  Normal examined duodenum. Assessment:       Diagnosis Orders   1. Esophagitis  pantoprazole (PROTONIX) 40 MG tablet      2. Esophageal dysphagia              Plan:      Esophageal dysphagia, Hx of food impaction   was seen approximately 2 weeks ago in the emergency department with esophageal foreign body, had emergent EGD for food impaction. In addition patient was found to have moderately erythematous mucosa in the gastric antrum and body, patient is clinically asymptomatic no further dysphagia, epigastric pain or heartburn. Noted patient has significant comorbid conditions including A-fib, CAD, hypertension, hyperlipidemia, diabetes, COPD, and is primarily wheelchair-bound.   Discussed proceeding with repeat EGD for

## 2023-11-08 PROBLEM — R94.31 ABNORMAL EKG: Status: ACTIVE | Noted: 2023-11-08

## 2023-11-08 PROBLEM — R07.9 CHEST PAIN: Status: ACTIVE | Noted: 2023-11-08

## 2023-11-08 PROBLEM — R09.89 CAROTID BRUIT: Status: ACTIVE | Noted: 2023-11-08

## 2023-11-08 PROBLEM — E11.9 DIABETES MELLITUS (MULTI): Status: ACTIVE | Noted: 2023-11-08

## 2023-11-08 PROBLEM — Z98.62 STATUS POST ANGIOPLASTY: Status: ACTIVE | Noted: 2023-11-08

## 2023-11-08 PROBLEM — I49.5 SINUS NODE DYSFUNCTION (MULTI): Status: ACTIVE | Noted: 2023-11-08

## 2023-11-08 PROBLEM — I25.10 ARTERIOSCLEROTIC CORONARY ARTERY DISEASE: Status: ACTIVE | Noted: 2023-11-08

## 2023-11-08 PROBLEM — R63.4 WEIGHT LOSS: Status: ACTIVE | Noted: 2023-11-08

## 2023-11-08 PROBLEM — I25.5 ISCHEMIC CARDIOMYOPATHY: Status: ACTIVE | Noted: 2023-11-08

## 2023-11-08 PROBLEM — I49.3 PVC (PREMATURE VENTRICULAR CONTRACTION): Status: ACTIVE | Noted: 2023-11-08

## 2023-11-08 PROBLEM — J44.9 COPD (CHRONIC OBSTRUCTIVE PULMONARY DISEASE) (MULTI): Status: ACTIVE | Noted: 2023-11-08

## 2023-11-08 PROBLEM — I35.9 AORTIC VALVE DISEASE: Status: ACTIVE | Noted: 2023-11-08

## 2023-11-08 PROBLEM — I71.40 AAA (ABDOMINAL AORTIC ANEURYSM) (CMS-HCC): Status: ACTIVE | Noted: 2023-11-08

## 2023-11-08 PROBLEM — I44.0 AV BLOCK, 1ST DEGREE: Status: ACTIVE | Noted: 2023-11-08

## 2023-11-08 PROBLEM — I21.19 ACUTE MYOCARDIAL INFARCTION OF INFERIOR WALL (MULTI): Status: ACTIVE | Noted: 2023-11-08

## 2023-11-08 PROBLEM — R06.02 SHORT OF BREATH ON EXERTION: Status: ACTIVE | Noted: 2023-11-08

## 2023-11-08 PROBLEM — R42 VERTIGO: Status: ACTIVE | Noted: 2023-11-08

## 2023-11-08 PROBLEM — I48.0 PAROXYSMAL ATRIAL FIBRILLATION (MULTI): Status: ACTIVE | Noted: 2023-11-08

## 2023-11-08 PROBLEM — E78.5 HYPERLIPIDEMIA: Status: ACTIVE | Noted: 2023-11-08

## 2023-11-08 PROBLEM — R53.83 FATIGUE: Status: ACTIVE | Noted: 2023-11-08

## 2023-11-08 PROBLEM — I10 HYPERTENSION: Status: ACTIVE | Noted: 2023-11-08

## 2023-11-08 PROBLEM — Z95.818 STATUS POST PLACEMENT OF IMPLANTABLE LOOP RECORDER: Status: ACTIVE | Noted: 2023-11-08

## 2023-11-08 PROBLEM — H83.09 INFECTION OF THE INNER EAR: Status: ACTIVE | Noted: 2023-11-08

## 2023-11-08 PROBLEM — Z95.1 S/P CABG (CORONARY ARTERY BYPASS GRAFT): Status: ACTIVE | Noted: 2023-11-08

## 2023-11-08 RX ORDER — METFORMIN HYDROCHLORIDE 500 MG/1
1 TABLET ORAL 2 TIMES DAILY
COMMUNITY
Start: 2021-07-29

## 2023-11-08 RX ORDER — CALCIUM CARBONATE/VITAMIN D3 500-10/5ML
1 LIQUID (ML) ORAL DAILY
COMMUNITY

## 2023-11-08 RX ORDER — ALBUTEROL SULFATE 90 UG/1
AEROSOL, METERED RESPIRATORY (INHALATION)
COMMUNITY
Start: 2021-07-29

## 2023-11-08 RX ORDER — NITROGLYCERIN 0.4 MG/1
TABLET SUBLINGUAL
COMMUNITY
Start: 2020-10-21 | End: 2024-06-03 | Stop reason: SDUPTHER

## 2023-11-08 RX ORDER — CLOPIDOGREL BISULFATE 75 MG/1
1 TABLET ORAL DAILY
COMMUNITY
Start: 2022-04-08 | End: 2023-12-01 | Stop reason: SDUPTHER

## 2023-11-08 RX ORDER — VALSARTAN 80 MG/1
80 TABLET ORAL DAILY
COMMUNITY
Start: 2022-10-17 | End: 2023-12-01 | Stop reason: SDUPTHER

## 2023-11-08 RX ORDER — METOPROLOL SUCCINATE 50 MG/1
1 TABLET, EXTENDED RELEASE ORAL DAILY
COMMUNITY
Start: 2022-10-25 | End: 2023-12-01 | Stop reason: SDUPTHER

## 2023-11-08 RX ORDER — PITAVASTATIN CALCIUM 4.18 MG/1
1 TABLET, FILM COATED ORAL NIGHTLY
COMMUNITY
Start: 2021-08-29 | End: 2023-12-01 | Stop reason: SDUPTHER

## 2023-12-01 ENCOUNTER — OFFICE VISIT (OUTPATIENT)
Dept: CARDIOLOGY | Facility: CLINIC | Age: 83
End: 2023-12-01
Payer: MEDICARE

## 2023-12-01 VITALS
WEIGHT: 144.6 LBS | BODY MASS INDEX: 21.92 KG/M2 | HEIGHT: 68 IN | DIASTOLIC BLOOD PRESSURE: 72 MMHG | SYSTOLIC BLOOD PRESSURE: 112 MMHG | HEART RATE: 73 BPM

## 2023-12-01 DIAGNOSIS — Z95.1 S/P CABG (CORONARY ARTERY BYPASS GRAFT): ICD-10-CM

## 2023-12-01 DIAGNOSIS — E78.2 MIXED HYPERLIPIDEMIA: ICD-10-CM

## 2023-12-01 DIAGNOSIS — E11.9 TYPE 2 DIABETES MELLITUS WITHOUT COMPLICATION, WITHOUT LONG-TERM CURRENT USE OF INSULIN (MULTI): ICD-10-CM

## 2023-12-01 DIAGNOSIS — I71.40 ABDOMINAL AORTIC ANEURYSM (AAA) WITHOUT RUPTURE, UNSPECIFIED PART (CMS-HCC): ICD-10-CM

## 2023-12-01 DIAGNOSIS — I25.10 ARTERIOSCLEROTIC CORONARY ARTERY DISEASE: ICD-10-CM

## 2023-12-01 DIAGNOSIS — I10 PRIMARY HYPERTENSION: ICD-10-CM

## 2023-12-01 DIAGNOSIS — R06.02 SHORT OF BREATH ON EXERTION: ICD-10-CM

## 2023-12-01 DIAGNOSIS — Z87.891 FORMER SMOKER: ICD-10-CM

## 2023-12-01 DIAGNOSIS — I48.0 PAROXYSMAL ATRIAL FIBRILLATION (MULTI): ICD-10-CM

## 2023-12-01 PROCEDURE — 3078F DIAST BP <80 MM HG: CPT | Performed by: INTERNAL MEDICINE

## 2023-12-01 PROCEDURE — 1158F ADVNC CARE PLAN TLK DOCD: CPT | Performed by: INTERNAL MEDICINE

## 2023-12-01 PROCEDURE — 99215 OFFICE O/P EST HI 40 MIN: CPT | Performed by: INTERNAL MEDICINE

## 2023-12-01 PROCEDURE — 3074F SYST BP LT 130 MM HG: CPT | Performed by: INTERNAL MEDICINE

## 2023-12-01 PROCEDURE — 1036F TOBACCO NON-USER: CPT | Performed by: INTERNAL MEDICINE

## 2023-12-01 PROCEDURE — 1159F MED LIST DOCD IN RCRD: CPT | Performed by: INTERNAL MEDICINE

## 2023-12-01 RX ORDER — VALSARTAN 80 MG/1
80 TABLET ORAL DAILY
Qty: 90 TABLET | Refills: 1 | Status: SHIPPED | OUTPATIENT
Start: 2023-12-01 | End: 2023-12-04 | Stop reason: SDUPTHER

## 2023-12-01 RX ORDER — CLOPIDOGREL BISULFATE 75 MG/1
75 TABLET ORAL DAILY
Qty: 90 TABLET | Refills: 1 | Status: SHIPPED | OUTPATIENT
Start: 2023-12-01 | End: 2023-12-04 | Stop reason: SDUPTHER

## 2023-12-01 RX ORDER — PANTOPRAZOLE SODIUM 40 MG/1
40 TABLET, DELAYED RELEASE ORAL DAILY
COMMUNITY

## 2023-12-01 RX ORDER — PITAVASTATIN CALCIUM 4.18 MG/1
1 TABLET, FILM COATED ORAL NIGHTLY
Qty: 90 TABLET | Refills: 1 | Status: SHIPPED | OUTPATIENT
Start: 2023-12-01 | End: 2023-12-04 | Stop reason: SDUPTHER

## 2023-12-01 RX ORDER — METOPROLOL SUCCINATE 50 MG/1
50 TABLET, EXTENDED RELEASE ORAL DAILY
Qty: 90 TABLET | Refills: 1 | Status: SHIPPED | OUTPATIENT
Start: 2023-12-01 | End: 2023-12-04 | Stop reason: SDUPTHER

## 2023-12-01 ASSESSMENT — ENCOUNTER SYMPTOMS
LOSS OF SENSATION IN FEET: 0
OCCASIONAL FEELINGS OF UNSTEADINESS: 0
DEPRESSION: 0

## 2023-12-01 NOTE — PROGRESS NOTES
HPI    83-year-old male returns for routine follow-up of his cardiovascular status historically he has had bypass surgery with a LIMA to the LAD and a radial artery to the circumflex and a vein graft to the RCA he has had episodes of atrial fibs in the past he has had a Linq loop recorder but is no longer functional he is currently on a combination of Eliquis and Plavix for his management of his coronary disease and his atrial fibs risk previous checks of the device and the Linq loop before it was no longer working showed that his burden seem to be low but he still on the medications he lives at home he uses a walker most of the time in general she has been stable with respect to his issues for his age and does what he can with respect to his activities of daily living he is done remarkably well now about 25 years since his surgery as I remember Dr. Robert he has had no falls if he does too much she gets a little short of breath but if he paces himself with activities of daily living in his walker he does okay he has had no significant bleeding or bruising on his current issues of apixaban and clopidogrel and has had no significant falls or injuries but obviously this is a significant risk for him his previous EKG in March of this year was sinus and his EKG today is sinus with the ST-T changes and QT interval being okay so he seems to be maintaining sinus and obviously the major question is does he still need the apixaban as addressed below this was extensively discussed with the patient and spouse currently he is having no definite or significant bleeding issues and has had no serious injuries or falls and has had no major bleeding or bruising problems because of his very carefully placed status support from family use of the walker etc. background risk factors of diabetes essential hypertension dyslipidemia former smoker      The review of systems was addressed with the patient today, All other review of systems  were either discussed or not relevant to the reason the patient was seen today.    Medications, vital signs, and exam all completed and discussed with patient and documented in touch works.    Today's EKG shows a sinus mechanism minor nonspecific ST-T changes QT probably okay heart rates in the 70 range    Recent pertinent labs White count 7300 hemoglobin 8.5 platelets 354,000 sodium 143 potassium 4.4 BUN 21 creatinine 1.39 blood sugar 106 total cholesterol 103 triglycerides 96 HDL 37 LDL 47 magnesium 1.9 TSH 1.77    Clinical impression      CAD status post bypass and AAA resection last CTA results of size 4.8    PAF with as best we can tell seems to be a low burden but he still on apixaban and clopidogrel obviously putting him in a very very significant risk for bleeding if there is any kind of fall and at his age using a walker etc. this is clearly a theoretical major concern    Diabetes    High risk meds both apixaban 5 twice daily and clopidogrel    Essential hypertension    Former smoker    Dyslipidemia      Plan    Extensive discussion with the patient and spouse regarding the medicines that he is on both apixaban at full dose and Plavix and clearly he is at risk and should he experience any kind of significant trauma particularly head trauma he likely would die from it   we extensively discussed potential options such as a Watchman device that would allow us to probably stop the apixaban and then just keep him on probably Plavix versus aspirin moving forward this was extensively discussed with he and his spouse today and they agreed to meet with Dr. Obregon from  in the [place] office to walk him through this decision process and whether or not the Watchman device makes sense to allow us to stop his apixaban this will be scheduled in the near future at the [facility] office with Dr. Obregon    Please excuse any errors in grammar or translation related to this dictation.  Voice recognition software was utilized to  prepare this document.      Ender JACKSON

## 2023-12-04 DIAGNOSIS — E78.2 MIXED HYPERLIPIDEMIA: ICD-10-CM

## 2023-12-04 DIAGNOSIS — I25.10 ARTERIOSCLEROTIC CORONARY ARTERY DISEASE: ICD-10-CM

## 2023-12-04 DIAGNOSIS — I48.0 PAROXYSMAL ATRIAL FIBRILLATION (MULTI): ICD-10-CM

## 2023-12-04 DIAGNOSIS — I10 PRIMARY HYPERTENSION: ICD-10-CM

## 2023-12-13 RX ORDER — PITAVASTATIN CALCIUM 4.18 MG/1
1 TABLET, FILM COATED ORAL NIGHTLY
Qty: 90 TABLET | Refills: 1 | Status: SHIPPED | OUTPATIENT
Start: 2023-12-13 | End: 2024-06-03 | Stop reason: SDUPTHER

## 2023-12-13 RX ORDER — CLOPIDOGREL BISULFATE 75 MG/1
75 TABLET ORAL DAILY
Qty: 90 TABLET | Refills: 1 | Status: SHIPPED | OUTPATIENT
Start: 2023-12-13 | End: 2024-06-03

## 2023-12-13 RX ORDER — VALSARTAN 80 MG/1
80 TABLET ORAL DAILY
Qty: 90 TABLET | Refills: 1 | Status: SHIPPED | OUTPATIENT
Start: 2023-12-13 | End: 2024-06-03 | Stop reason: SDUPTHER

## 2023-12-13 RX ORDER — METOPROLOL SUCCINATE 50 MG/1
50 TABLET, EXTENDED RELEASE ORAL DAILY
Qty: 90 TABLET | Refills: 1 | Status: SHIPPED | OUTPATIENT
Start: 2023-12-13 | End: 2024-06-03 | Stop reason: SDUPTHER

## 2024-02-12 DIAGNOSIS — D50.8 OTHER IRON DEFICIENCY ANEMIAS: Primary | ICD-10-CM

## 2024-02-14 ENCOUNTER — HOSPITAL ENCOUNTER (EMERGENCY)
Age: 84
Discharge: HOME | End: 2024-02-14
Payer: MEDICARE

## 2024-02-14 VITALS
HEART RATE: 72 BPM | HEIGHT: 68 IN | WEIGHT: 140 LBS | SYSTOLIC BLOOD PRESSURE: 114 MMHG | BODY MASS INDEX: 21.22 KG/M2 | RESPIRATION RATE: 16 BRPM | TEMPERATURE: 97 F | DIASTOLIC BLOOD PRESSURE: 82 MMHG | OXYGEN SATURATION: 100 %

## 2024-02-14 DIAGNOSIS — S61.211A LACERATION OF LEFT INDEX FINGER WITHOUT FOREIGN BODY WITHOUT DAMAGE TO NAIL, INITIAL ENCOUNTER: Primary | ICD-10-CM

## 2024-02-14 PROCEDURE — 12001 RPR S/N/AX/GEN/TRNK 2.5CM/<: CPT

## 2024-02-14 PROCEDURE — 99282 EMERGENCY DEPT VISIT SF MDM: CPT

## 2024-02-14 NOTE — ED PROVIDER NOTES
[facility] ED  eMERGENCY dEPARTMENT eNCOUnter      Pt Name: Ruben Gonsalez  MRN: [id number]  Birthdate 1940  Date of evaluation: 2/14/2024  Provider: DELMY PRO    My attending is Dr. Church    HISTORY OF PRESENT ILLNESS    Ruben Gonsalez is a 83 y.o. male with PMHx of A-fib, CAD, COPD, DM2, hyperlipidemia, hypertension presents to the emergency department with right index finger laceration while cutting meat loaf with a knife.  Patient is on Eliquis.  No numbness or paresthesias distally, no limitation in movement. Last tetanus 2019.     HPI    Nursing Notes were reviewed.    REVIEW OF SYSTEMS       Review of Systems   Constitutional:  Negative for appetite change, chills and fever.   HENT:  Negative for congestion, rhinorrhea and sore throat.    Respiratory:  Negative for cough and shortness of breath.    Cardiovascular:  Negative for chest pain.   Gastrointestinal:  Negative for abdominal pain, blood in stool, diarrhea, nausea and vomiting.   Genitourinary:  Negative for difficulty urinating.   Musculoskeletal:  Negative for neck stiffness.   Skin:  Positive for wound. Negative for color change and rash.   Neurological:  Negative for dizziness, syncope, weakness, light-headedness, numbness and headaches.   All other systems reviewed and are negative.            PAST MEDICAL HISTORY     Past Medical History:   Diagnosis Date    MARIAM (acute kidney injury) (HCC) 06/07/2022    Atrial fibrillation (HCC)     CAD (coronary artery disease)     cardiac stents    COPD (chronic obstructive pulmonary disease) (HCC)     Coronary angioplasty status 11/26/2018    Diabetes mellitus (HCC)     Hyperlipidemia     Hypertension     Movement disorder     Pneumonia          SURGICAL HISTORY       Past Surgical History:   Procedure Laterality Date    APPENDECTOMY      CORONARY ANGIOPLASTY WITH STENT PLACEMENT      4    CORONARY ARTERY BYPASS GRAFT      triple bypass    EYE SURGERY Bilateral     cataract surgery

## 2024-02-20 DIAGNOSIS — D63.1 ANEMIA IN CHRONIC KIDNEY DISEASE (CODE): Primary | ICD-10-CM

## 2024-02-20 DIAGNOSIS — D63.1 ANEMIA IN STAGE 3 CHRONIC KIDNEY DISEASE, UNSPECIFIED WHETHER STAGE 3A OR 3B CKD (HCC): ICD-10-CM

## 2024-02-20 DIAGNOSIS — D50.1 SIDEROPENIC DYSPHAGIA: ICD-10-CM

## 2024-02-20 DIAGNOSIS — N18.30 ANEMIA IN STAGE 3 CHRONIC KIDNEY DISEASE, UNSPECIFIED WHETHER STAGE 3A OR 3B CKD (HCC): ICD-10-CM

## 2024-02-20 DIAGNOSIS — N18.30 STAGE 3 CHRONIC KIDNEY DISEASE, UNSPECIFIED WHETHER STAGE 3A OR 3B CKD (HCC): Primary | ICD-10-CM

## 2024-02-28 ENCOUNTER — APPOINTMENT (OUTPATIENT)
Dept: CARDIOLOGY | Facility: CLINIC | Age: 84
End: 2024-02-28
Payer: MEDICARE

## 2024-03-21 ENCOUNTER — TELEPHONE (OUTPATIENT)
Dept: CARDIOLOGY | Facility: HOSPITAL | Age: 84
End: 2024-03-21
Payer: MEDICARE

## 2024-05-14 ENCOUNTER — HOSPITAL ENCOUNTER (INPATIENT)
Age: 84
LOS: 3 days | Discharge: HOME | DRG: 812 | End: 2024-05-17
Attending: STUDENT IN AN ORGANIZED HEALTH CARE EDUCATION/TRAINING PROGRAM | Admitting: INTERNAL MEDICINE
Payer: MEDICARE

## 2024-05-14 DIAGNOSIS — D64.9 ANEMIA, UNSPECIFIED TYPE: Primary | ICD-10-CM

## 2024-05-14 LAB
ABO + RH BLD: NORMAL
ALBUMIN SERPL-MCNC: 3.9 G/DL (ref 3.5–4.6)
ALP SERPL-CCNC: 56 U/L (ref 35–104)
ALT SERPL-CCNC: <5 U/L (ref 0–41)
ANION GAP SERPL CALCULATED.3IONS-SCNC: 15 MEQ/L (ref 9–15)
ANISOCYTOSIS BLD QL SMEAR: ABNORMAL
APTT PPP: 31.9 SEC (ref 24.4–36.8)
AST SERPL-CCNC: 10 U/L (ref 0–40)
BASOPHILS # BLD: 0.1 K/UL (ref 0–0.2)
BASOPHILS NFR BLD: 0.5 %
BILIRUB SERPL-MCNC: 0.5 MG/DL (ref 0.2–0.7)
BLD GP AB SCN SERPL QL: NORMAL
BLOOD BANK DISPENSE STATUS: NORMAL
BLOOD BANK DISPENSE STATUS: NORMAL
BLOOD BANK PRODUCT CODE: NORMAL
BLOOD BANK PRODUCT CODE: NORMAL
BPU ID: NORMAL
BPU ID: NORMAL
BUN SERPL-MCNC: 23 MG/DL (ref 8–23)
CALCIUM SERPL-MCNC: 9.3 MG/DL (ref 8.5–9.9)
CHLORIDE SERPL-SCNC: 104 MEQ/L (ref 95–107)
CO2 SERPL-SCNC: 22 MEQ/L (ref 20–31)
CREAT SERPL-MCNC: 1.54 MG/DL (ref 0.7–1.2)
DESCRIPTION BLOOD BANK: NORMAL
DESCRIPTION BLOOD BANK: NORMAL
EOSINOPHIL # BLD: 0.1 K/UL (ref 0–0.7)
EOSINOPHIL NFR BLD: 0.9 %
ERYTHROCYTE [DISTWIDTH] IN BLOOD BY AUTOMATED COUNT: 18.8 % (ref 11.5–14.5)
ERYTHROCYTE [SEDIMENTATION RATE] IN BLOOD BY WESTERGREN METHOD: 22 MM (ref 0–20)
GLOBULIN SER CALC-MCNC: 2.7 G/DL (ref 2.3–3.5)
GLUCOSE SERPL-MCNC: 157 MG/DL (ref 70–99)
HCT VFR BLD AUTO: 24.8 % (ref 42–52)
HCT VFR BLD AUTO: 25 % (ref 42–52)
HCT VFR BLD AUTO: 26 % (ref 42–52)
HCT VFR BLD AUTO: 26.8 % (ref 42–52)
HGB BLD-MCNC: 6.6 G/DL (ref 14–18)
HGB BLD-MCNC: 6.9 G/DL (ref 14–18)
HGB BLD-MCNC: 7.9 G/DL (ref 14–18)
HYPOCHROMIA BLD QL SMEAR: ABNORMAL
INR PPP: 2.2
LDH SERPL-CCNC: 168 U/L (ref 135–225)
LYMPHOCYTES # BLD: 0.7 K/UL (ref 1–4.8)
LYMPHOCYTES NFR BLD: 7.7 %
MCH RBC QN AUTO: 18.5 PG (ref 27–31.3)
MCHC RBC AUTO-ENTMCNC: 26.4 % (ref 33–37)
MCV RBC AUTO: 70.2 FL (ref 79–92.2)
MICROCYTES BLD QL SMEAR: ABNORMAL
MONOCYTES # BLD: 0.7 K/UL (ref 0.2–0.8)
MONOCYTES NFR BLD: 6.8 %
NEUTROPHILS # BLD: 8 K/UL (ref 1.4–6.5)
NEUTS SEG NFR BLD: 83.6 %
OVALOCYTES BLD QL SMEAR: ABNORMAL
PATH INTERP BLD-IMP: YES
PLATELET # BLD AUTO: 363 K/UL (ref 130–400)
PLATELET BLD QL SMEAR: ADEQUATE
POIKILOCYTOSIS BLD QL SMEAR: ABNORMAL
POTASSIUM SERPL-SCNC: 4.8 MEQ/L (ref 3.4–4.9)
PROT SERPL-MCNC: 6.6 G/DL (ref 6.3–8)
PROTHROMBIN TIME: 24.4 SEC (ref 12.3–14.9)
RBC # BLD AUTO: 3.56 M/UL (ref 4.7–6.1)
RETICS # AUTO: 0.04 M/CUMM (ref 0.02–0.11)
RETICS/RBC NFR: 1.2 % (ref 0.6–2.2)
SLIDE REVIEW: ABNORMAL
SODIUM SERPL-SCNC: 141 MEQ/L (ref 135–144)
TROPONIN, HIGH SENSITIVITY: 30 NG/L (ref 0–19)
WBC # BLD AUTO: 9.5 K/UL (ref 4.8–10.8)

## 2024-05-14 PROCEDURE — 83550 IRON BINDING TEST: CPT

## 2024-05-14 PROCEDURE — 85730 THROMBOPLASTIN TIME PARTIAL: CPT

## 2024-05-14 PROCEDURE — 99285 EMERGENCY DEPT VISIT HI MDM: CPT

## 2024-05-14 PROCEDURE — 2580000003 HC RX 258: Performed by: INTERNAL MEDICINE

## 2024-05-14 PROCEDURE — 85610 PROTHROMBIN TIME: CPT

## 2024-05-14 PROCEDURE — 84165 PROTEIN E-PHORESIS SERUM: CPT

## 2024-05-14 PROCEDURE — 6370000000 HC RX 637 (ALT 250 FOR IP): Performed by: INTERNAL MEDICINE

## 2024-05-14 PROCEDURE — P9016 RBC LEUKOCYTES REDUCED: HCPCS

## 2024-05-14 PROCEDURE — 82746 ASSAY OF FOLIC ACID SERUM: CPT

## 2024-05-14 PROCEDURE — 86850 RBC ANTIBODY SCREEN: CPT

## 2024-05-14 PROCEDURE — 85652 RBC SED RATE AUTOMATED: CPT

## 2024-05-14 PROCEDURE — 82728 ASSAY OF FERRITIN: CPT

## 2024-05-14 PROCEDURE — 80053 COMPREHEN METABOLIC PANEL: CPT

## 2024-05-14 PROCEDURE — 1210000000 HC MED SURG R&B

## 2024-05-14 PROCEDURE — 36430 TRANSFUSION BLD/BLD COMPNT: CPT

## 2024-05-14 PROCEDURE — 30233N1 TRANSFUSION OF NONAUTOLOGOUS RED BLOOD CELLS INTO PERIPHERAL VEIN, PERCUTANEOUS APPROACH: ICD-10-PCS | Performed by: INTERNAL MEDICINE

## 2024-05-14 PROCEDURE — 86923 COMPATIBILITY TEST ELECTRIC: CPT

## 2024-05-14 PROCEDURE — 2580000003 HC RX 258: Performed by: PHYSICIAN ASSISTANT

## 2024-05-14 PROCEDURE — 84155 ASSAY OF PROTEIN SERUM: CPT

## 2024-05-14 PROCEDURE — 85014 HEMATOCRIT: CPT

## 2024-05-14 PROCEDURE — 93005 ELECTROCARDIOGRAM TRACING: CPT | Performed by: PHYSICIAN ASSISTANT

## 2024-05-14 PROCEDURE — 85018 HEMOGLOBIN: CPT

## 2024-05-14 PROCEDURE — 36415 COLL VENOUS BLD VENIPUNCTURE: CPT

## 2024-05-14 PROCEDURE — 83615 LACTATE (LD) (LDH) ENZYME: CPT

## 2024-05-14 PROCEDURE — 84484 ASSAY OF TROPONIN QUANT: CPT

## 2024-05-14 PROCEDURE — 85025 COMPLETE CBC W/AUTO DIFF WBC: CPT

## 2024-05-14 PROCEDURE — 85046 RETICYTE/HGB CONCENTRATE: CPT

## 2024-05-14 PROCEDURE — 86901 BLOOD TYPING SEROLOGIC RH(D): CPT

## 2024-05-14 PROCEDURE — 82607 VITAMIN B-12: CPT

## 2024-05-14 PROCEDURE — 83520 IMMUNOASSAY QUANT NOS NONAB: CPT

## 2024-05-14 PROCEDURE — 86900 BLOOD TYPING SEROLOGIC ABO: CPT

## 2024-05-14 PROCEDURE — 83540 ASSAY OF IRON: CPT

## 2024-05-14 RX ORDER — ALBUTEROL SULFATE 2.5 MG/3ML
2.5 SOLUTION RESPIRATORY (INHALATION) EVERY 6 HOURS PRN
Status: DISCONTINUED | OUTPATIENT
Start: 2024-05-14 | End: 2024-05-17 | Stop reason: HOSPADM

## 2024-05-14 RX ORDER — NITROGLYCERIN 0.4 MG/1
0.4 TABLET SUBLINGUAL EVERY 5 MIN PRN
COMMUNITY

## 2024-05-14 RX ORDER — 0.9 % SODIUM CHLORIDE 0.9 %
1000 INTRAVENOUS SOLUTION INTRAVENOUS ONCE
Status: COMPLETED | OUTPATIENT
Start: 2024-05-14 | End: 2024-05-14

## 2024-05-14 RX ORDER — METOPROLOL SUCCINATE 50 MG/1
50 TABLET, EXTENDED RELEASE ORAL DAILY
Status: DISCONTINUED | OUTPATIENT
Start: 2024-05-14 | End: 2024-05-17 | Stop reason: HOSPADM

## 2024-05-14 RX ORDER — ONDANSETRON 4 MG/1
4 TABLET, ORALLY DISINTEGRATING ORAL EVERY 8 HOURS PRN
Status: DISCONTINUED | OUTPATIENT
Start: 2024-05-14 | End: 2024-05-17 | Stop reason: HOSPADM

## 2024-05-14 RX ORDER — VALSARTAN 80 MG/1
80 TABLET ORAL DAILY
Status: DISCONTINUED | OUTPATIENT
Start: 2024-05-15 | End: 2024-05-17 | Stop reason: HOSPADM

## 2024-05-14 RX ORDER — SODIUM CHLORIDE 9 MG/ML
INJECTION, SOLUTION INTRAVENOUS PRN
Status: DISCONTINUED | OUTPATIENT
Start: 2024-05-14 | End: 2024-05-17 | Stop reason: HOSPADM

## 2024-05-14 RX ORDER — SODIUM CHLORIDE 0.9 % (FLUSH) 0.9 %
5-40 SYRINGE (ML) INJECTION EVERY 12 HOURS SCHEDULED
Status: DISCONTINUED | OUTPATIENT
Start: 2024-05-14 | End: 2024-05-17 | Stop reason: HOSPADM

## 2024-05-14 RX ORDER — CLOPIDOGREL BISULFATE 75 MG/1
75 TABLET ORAL DAILY
Status: DISCONTINUED | OUTPATIENT
Start: 2024-05-14 | End: 2024-05-17 | Stop reason: HOSPADM

## 2024-05-14 RX ORDER — ROSUVASTATIN CALCIUM 20 MG/1
20 TABLET, COATED ORAL NIGHTLY
Status: DISCONTINUED | OUTPATIENT
Start: 2024-05-14 | End: 2024-05-17 | Stop reason: HOSPADM

## 2024-05-14 RX ORDER — ONDANSETRON 2 MG/ML
4 INJECTION INTRAMUSCULAR; INTRAVENOUS EVERY 6 HOURS PRN
Status: DISCONTINUED | OUTPATIENT
Start: 2024-05-14 | End: 2024-05-17 | Stop reason: HOSPADM

## 2024-05-14 RX ORDER — ACETAMINOPHEN 325 MG/1
650 TABLET ORAL EVERY 4 HOURS PRN
Status: DISCONTINUED | OUTPATIENT
Start: 2024-05-14 | End: 2024-05-17 | Stop reason: HOSPADM

## 2024-05-14 RX ORDER — SODIUM CHLORIDE 9 MG/ML
INJECTION, SOLUTION INTRAVENOUS
Status: DISPENSED
Start: 2024-05-14 | End: 2024-05-15

## 2024-05-14 RX ORDER — SODIUM CHLORIDE 0.9 % (FLUSH) 0.9 %
5-40 SYRINGE (ML) INJECTION PRN
Status: DISCONTINUED | OUTPATIENT
Start: 2024-05-14 | End: 2024-05-17 | Stop reason: HOSPADM

## 2024-05-14 RX ADMIN — SODIUM CHLORIDE 1000 ML: 9 INJECTION, SOLUTION INTRAVENOUS at 10:50

## 2024-05-14 RX ADMIN — ROSUVASTATIN CALCIUM 20 MG: 20 TABLET, FILM COATED ORAL at 21:48

## 2024-05-14 RX ADMIN — METOPROLOL SUCCINATE 50 MG: 50 TABLET, EXTENDED RELEASE ORAL at 21:48

## 2024-05-14 RX ADMIN — APIXABAN 5 MG: 5 TABLET, FILM COATED ORAL at 21:49

## 2024-05-14 RX ADMIN — Medication 10 ML: at 21:49

## 2024-05-14 RX ADMIN — CLOPIDOGREL BISULFATE 75 MG: 75 TABLET ORAL at 21:49

## 2024-05-14 ASSESSMENT — ENCOUNTER SYMPTOMS
DIARRHEA: 0
ABDOMINAL PAIN: 0
NAUSEA: 0
VOMITING: 0
COUGH: 0
BACK PAIN: 0

## 2024-05-14 ASSESSMENT — PAIN - FUNCTIONAL ASSESSMENT: PAIN_FUNCTIONAL_ASSESSMENT: NONE - DENIES PAIN

## 2024-05-14 ASSESSMENT — LIFESTYLE VARIABLES
HOW MANY STANDARD DRINKS CONTAINING ALCOHOL DO YOU HAVE ON A TYPICAL DAY: PATIENT DOES NOT DRINK
HOW MANY STANDARD DRINKS CONTAINING ALCOHOL DO YOU HAVE ON A TYPICAL DAY: PATIENT DOES NOT DRINK
HOW OFTEN DO YOU HAVE A DRINK CONTAINING ALCOHOL: NEVER
HOW OFTEN DO YOU HAVE A DRINK CONTAINING ALCOHOL: NEVER

## 2024-05-14 NOTE — ED NOTES
[facility]  Pharmacy Home Medication Reconciliation Note      Medication reconciliation was attempted for patient Ruben Gonsalez 1940.   Admit date: 5/14/2024  Consult: No    Med rec status: Medrec Status: Completed    Information provided by: MED REC HISTORY: Patient Interview and Review of EMR    Pharmacy:  [facility] ([address]), Phone [phone]    Pharmacy Updated: Yes    MEDICATIONS     Prior to Admission medications    Medication Sig Start Date End Date Taking? Authorizing Provider   nitroGLYCERIN (NITROSTAT) 0.4 MG SL tablet Place 1 tablet under the tongue every 5 minutes as needed for Chest pain DISSOLVE 1 TABLET UNDER THE TONGUE AS NEEDED FOR CHEST PAIN- MAY REPEAT EVERY 5 MINUTES IF NEEDED (MAX 3 DOSES.- IF NO RELIEF CALL 911)   Yes Pinky Roldan MD   pantoprazole (PROTONIX) 40 MG tablet Take 1 tablet by mouth daily 9/20/23 5/14/24  Rashida Bazan, RACHEL - CNP   clopidogrel (PLAVIX) 75 MG tablet Take 1 tablet by mouth daily 5/12/23   Pinky Roldan MD   metoprolol succinate (TOPROL XL) 50 MG extended release tablet Take 1 tablet by mouth daily    Pinky Roldan MD   valsartan (DIOVAN) 80 MG tablet Take 1 tablet by mouth daily 10/18/22   Javan Montesinos MD   apixaban (ELIQUIS) 5 MG TABS tablet Take 1 tablet by mouth 2 times daily 10/17/22   Javan Montesinos MD   albuterol sulfate  (90 Base) MCG/ACT inhaler use 1 puff as needed twice daily for 90 days. 7/29/21   Pinky Roldan MD   magnesium oxide (MAG-OX) 400 (240 Mg) MG tablet Take 1 tablet by mouth daily 1/27/21   Joshua Mcconnell MD   metFORMIN (GLUCOPHAGE) 500 MG tablet Take 1 tablet by mouth 2 times daily 1/20/20   Pinky Roldan MD   pitavastatin (LIVALO) 4 MG TABS tablet Take 1 tablet by mouth nightly    Pinky Roldan MD       ALLERGIES   He is allergic to fenofibrate, lipitor [atorvastatin], niacin, niaspan [niacin er],

## 2024-05-14 NOTE — CONSENT
Informed Consent for Blood Component Transfusion Note    I have discussed with the patient the rationale for blood component transfusion; its benefits in treating or preventing fatigue, organ damage, or death; and its risk which includes mild transfusion reactions, rare risk of blood borne infection, or more serious but rare reactions. I have discussed the alternatives to transfusion, including the risk and consequences of not receiving transfusion. The patient had an opportunity to ask questions and had agreed to proceed with transfusion of blood components.    Electronically signed by Melba Hernández PA-C on 5/14/24 at 11:08 AM EDT

## 2024-05-14 NOTE — ED NOTES
RN called to 4W to give report, RN was told that report cannot be given until the bed is cleaned. 4w states that is a rule. RN notified charge. Charge notified house sup

## 2024-05-14 NOTE — ED PROVIDER NOTES
[facility] ED  eMERGENCY dEPARTMENTeNCOUnter      Pt Name: Ruben Gonsalez  MRN: [id number]  Birthdate 1940  Date ofevaluation: 5/14/2024  Provider: Melba Hernández PA-C    CHIEF COMPLAINT       Chief Complaint   Patient presents with    OTHER     Sent from doctors office for low hemoglobin.          HISTORY OF PRESENT ILLNESS   (Location/Symptom, Timing/Onset,Context/Setting, Quality, Duration, Modifying Factors, Severity)  Note limiting factors.   Ruben Gonsalez is a 83 y.o. male who presents to the emergency department after being sent by his PCP for low hemoglobin. Patient admits to fatigue and weakness but states this is fairly normal for him. Patient states that he has had to get iron transfusions before in the past but never blood transfusions. Patient's wife reports that he is on Eliquis and Plavix. Patient denies recent fever, chills, dark tarry stools.      HPI    NursingNotes were reviewed.    REVIEW OF SYSTEMS    (2-9 systems for level 4, 10 or more for level 5)     Review of Systems   Constitutional:  Negative for chills, diaphoresis, fatigue and fever.   Respiratory:  Negative for cough and shortness of breath.    Cardiovascular:  Negative for chest pain and palpitations.   Gastrointestinal:  Negative for abdominal pain, diarrhea, nausea and vomiting.   Genitourinary:  Negative for dysuria and flank pain.   Musculoskeletal:  Negative for back pain.   Skin:  Negative for rash.   Neurological:  Negative for dizziness, light-headedness and headaches.   Psychiatric/Behavioral: Negative.     All other systems reviewed and are negative.      Except as noted above the remainder of the review of systems was reviewed and negative.       PAST MEDICAL HISTORY     Past Medical History:   Diagnosis Date    MARIAM (acute kidney injury) (HCC) 06/07/2022    Atrial fibrillation (HCC)     CAD (coronary artery disease)     cardiac stents    COPD (chronic obstructive pulmonary disease) (HCC)     Coronary angioplasty

## 2024-05-14 NOTE — H&P
H&P dictated Anemia no obvious GI source r/o metabolic issue  labs ordered  hematology to seein morning received one unit RBC

## 2024-05-14 NOTE — PROGRESS NOTES
Admission assessment complete, Pt oriented to room and call system, wife at bedside, denies any needs at this time, home med list updated in [facility].

## 2024-05-14 NOTE — ED NOTES
Iv access established, obt. Blood at bedside, line flushed and patent.  EKG done and to Dr. Church.

## 2024-05-15 LAB
ANION GAP SERPL CALCULATED.3IONS-SCNC: 9 MEQ/L (ref 9–15)
BUN SERPL-MCNC: 20 MG/DL (ref 8–23)
CALCIUM SERPL-MCNC: 8.7 MG/DL (ref 8.5–9.9)
CHLORIDE SERPL-SCNC: 105 MEQ/L (ref 95–107)
CO2 SERPL-SCNC: 25 MEQ/L (ref 20–31)
CREAT SERPL-MCNC: 1.39 MG/DL (ref 0.7–1.2)
FERRITIN: 17 NG/ML (ref 30–400)
FOLATE: 11.7 NG/ML (ref 4.8–24.2)
GLUCOSE SERPL-MCNC: 133 MG/DL (ref 70–99)
IRON % SATURATION: 3 % (ref 20–55)
IRON: 15 UG/DL (ref 61–157)
PATH INTERP BLD-IMP: NORMAL
POTASSIUM SERPL-SCNC: 4.9 MEQ/L (ref 3.4–4.9)
SODIUM SERPL-SCNC: 139 MEQ/L (ref 135–144)
TOTAL IRON BINDING CAPACITY: 431 UG/DL (ref 250–450)
UNSATURATED IRON BINDING CAPACITY: 416 UG/DL (ref 112–347)
VITAMIN B-12: 153 PG/ML (ref 232–1245)

## 2024-05-15 PROCEDURE — 82941 ASSAY OF GASTRIN: CPT

## 2024-05-15 PROCEDURE — 2580000003 HC RX 258: Performed by: INTERNAL MEDICINE

## 2024-05-15 PROCEDURE — 80048 BASIC METABOLIC PNL TOTAL CA: CPT

## 2024-05-15 PROCEDURE — 86340 INTRINSIC FACTOR ANTIBODY: CPT

## 2024-05-15 PROCEDURE — 6360000002 HC RX W HCPCS: Performed by: INTERNAL MEDICINE

## 2024-05-15 PROCEDURE — 83516 IMMUNOASSAY NONANTIBODY: CPT

## 2024-05-15 PROCEDURE — 1210000000 HC MED SURG R&B

## 2024-05-15 PROCEDURE — 6370000000 HC RX 637 (ALT 250 FOR IP): Performed by: INTERNAL MEDICINE

## 2024-05-15 RX ORDER — CYANOCOBALAMIN 1000 UG/ML
1000 INJECTION, SOLUTION INTRAMUSCULAR; SUBCUTANEOUS DAILY
Status: COMPLETED | OUTPATIENT
Start: 2024-05-16 | End: 2024-05-16

## 2024-05-15 RX ORDER — CYANOCOBALAMIN 1000 UG/ML
1000 INJECTION, SOLUTION INTRAMUSCULAR; SUBCUTANEOUS DAILY
Status: DISCONTINUED | OUTPATIENT
Start: 2024-05-15 | End: 2024-05-15

## 2024-05-15 RX ADMIN — CYANOCOBALAMIN 1000 MCG: 1000 INJECTION, SOLUTION INTRAMUSCULAR; SUBCUTANEOUS at 17:24

## 2024-05-15 RX ADMIN — Medication 10 ML: at 23:01

## 2024-05-15 RX ADMIN — VALSARTAN 80 MG: 80 TABLET, FILM COATED ORAL at 09:32

## 2024-05-15 RX ADMIN — APIXABAN 5 MG: 5 TABLET, FILM COATED ORAL at 09:31

## 2024-05-15 RX ADMIN — Medication 10 ML: at 10:25

## 2024-05-15 RX ADMIN — ROSUVASTATIN CALCIUM 20 MG: 20 TABLET, FILM COATED ORAL at 23:01

## 2024-05-15 RX ADMIN — CLOPIDOGREL BISULFATE 75 MG: 75 TABLET ORAL at 09:32

## 2024-05-15 RX ADMIN — SODIUM CHLORIDE 300 MG: 9 INJECTION, SOLUTION INTRAVENOUS at 10:22

## 2024-05-15 RX ADMIN — METOPROLOL SUCCINATE 50 MG: 50 TABLET, EXTENDED RELEASE ORAL at 09:32

## 2024-05-15 RX ADMIN — APIXABAN 5 MG: 5 TABLET, FILM COATED ORAL at 23:01

## 2024-05-15 NOTE — PROGRESS NOTES
Shift assessment complete. VSS A&Ox4. Patient is calm and cooperative. Hard of hearing. Lung sounds are diminished. Expiratory wheezing present. On room air. Sr on tele. No complaints of chest pain. Abdomen is soft and round. Bowel sounds are present. Tolerating regular diet. Medications given per MAR. Up x 1 to commode. No Bowel movement today, Blood occult stool needed. Iron given IV x 1 today. Generalized bruising to BUE. Abrasion/scabs to BLE and RUE. Pulses palpable. C/O slight numbness to BLE. In bed with call light in reach. No needs at this time. Care ongoing    - Per attending, patient okay to receive eliquis and plavix    Electronically signed by Pete Bah RN on 5/15/2024 at 12:57 PM

## 2024-05-15 NOTE — PROGRESS NOTES
05/14/24 2000   RT Protocol   History Pulmonary Disease 2   Respiratory pattern 0   Breath sounds 0   Cough 0   Indications for Bronchodilator Therapy On home bronchodilators   Bronchodilator Assessment Score 2

## 2024-05-15 NOTE — PROGRESS NOTES
05/14/24 2000   RT Protocol   History Pulmonary Disease 2   Respiratory pattern 0   Breath sounds 4   Cough 0   Indications for Bronchodilator Therapy On home bronchodilators   Bronchodilator Assessment Score 6

## 2024-05-15 NOTE — PLAN OF CARE
Problem: Discharge Planning  Goal: Discharge to home or other facility with appropriate resources  Outcome: Progressing  Flowsheets (Taken 5/14/2024 1738 by Roshan Ross, RN)  Discharge to home or other facility with appropriate resources:   Identify barriers to discharge with patient and caregiver   Identify discharge learning needs (meds, wound care, etc)   Refer to discharge planning if patient needs post-hospital services based on physician order or complex needs related to functional status, cognitive ability or social support system     Problem: Chronic Conditions and Co-morbidities  Goal: Patient's chronic conditions and co-morbidity symptoms are monitored and maintained or improved  Outcome: Progressing     Problem: Safety - Adult  Goal: Free from fall injury  Outcome: Progressing

## 2024-05-15 NOTE — H&P
[facility]                   [address] [place] [address]                           HISTORY & PHYSICAL      PATIENT NAME: CORNELL CUNHA                 : 1940  MED REC NO: [id number]                        ROOM: [id number]  ACCOUNT NO: [id number]                       ADMIT DATE: 2024  PROVIDER: Adilson Dowd DO      HISTORY OF PRESENT ILLNESS:  An 83-year-old thin  male admitted to the hospital with weakness.  Routine labs showed that he had a recent hemoglobin of 6.8 g.  His wife was contacted and she states that he has had increasing weakness and difficulty ambulating because of this issue.  The patient was sent to the emergency room for blood transfusion and workup of his anemia. He denies any blood in the stools or hematemesis.  He denies any use of nonsteroidal anti-inflammatory medications.    PAST MEDICAL HISTORY:  Atrial fibrillation, coronary artery disease, COPD, diabetes mellitus type 2, hypertension, hyperlipidemia.    PAST SURGICAL HISTORY:  Appendectomy, coronary artery stenting x4, coronary artery bypass grafting, loop recorder in place, upper endoscopy multiple times.    MEDICATIONS:  At the time of his admission, albuterol, Eliquis, Plavix, Toprol, metformin, Livalo.  ***medications fenofibrate, Lipitor, niacin, Niaspan, vitamin D, and Zocor.    HABITS:  . No smoking at this time.  No alcohol or opioids.    REVIEW OF SYSTEMS:  Weak.    PHYSICAL EXAMINATION:  VITAL SIGNS:  5 feet 8 inches,  145 pounds. Blood pressure is 136/70, heart rate 70, respirations 18.  HEENT:  Normocephalic.  Pupils equal and react to light.  Extraocular muscles intact.  NECK:  Supple.  No JVD, bruits, or adenopathy.  LUNGS:  Clear.  HEART: Regular rate and rhythm  ***systolic murmur. Diminished ________  ABDOMEN:  Soft.  No guarding or rigidity.  EXTREMITIES: Show muscle wasting.  Skin is warm and dry.  The patient moves all limbs.    IMPRESSION:  Anemia. No

## 2024-05-15 NOTE — PROGRESS NOTES
Nephrology Progress Note    Assessment:  Anemia possible promary bone marrow  checking stools  OHDX CABGx  hx stenting  HyperliidemiaAF  COPD  DMType-2      Plan:checking stools  2 uits given mutiple labs pending hematology to consult  my need GI consult    Patient Active Problem List:     Atrial fibrillation ([facility])     High risk medication use     Atherosclerosis of native coronary artery of native heart without angina pectoris     Hypertension     Ischemic myocardial dysfunction     Nonrheumatic aortic valve disorder, unspecified     Aortic valve disorder     Personal history of nicotine dependence     Presence of aortocoronary bypass graft     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[facility])     Vasovagal syncope     Dizziness and giddiness     Cerebrovascular accident ([facility])     Orthostatic dizziness     Ataxia     Vertigo     Meniere disease, bilateral     Benign paroxysmal positional vertigo due to bilateral vestibular disorder     Coronary angioplasty status     Ataxic gait     Kyphoscoliosis     Spinal stenosis of lumbar region with neurogenic claudication     Abdominal aortic aneurysm (AAA) ([facility])     Acute inferior myocardial infarction ([facility])     Carotid bruit     Chronic obstructive pulmonary disease ([facility])     Diabetes mellitus ([facility])     Dyspnea on exertion     Fatigue     First degree atrioventricular block     Hyperlipidemia     Infection of the inner ear     Muscle pain     Underweight     Ventricular premature beats     Weight loss     PVD (peripheral vascular disease) ([facility])     Impaired mobility and activities of daily living -sp Severe PVD s/p AAA repain     Hypotension     Late effects of CVA (cerebrovascular accident)     MARIAM (acute kidney injury) ([facility])     Body mass index (BMI) of 20 to 24     Chest pain     Esophageal dysphagia     Food impaction of esophagus     Esophageal stenosis     Iron deficiency anemia, unspecified     Anemia, unspecified     Iron deficiency     Abnormal electrocardiography     Adverse

## 2024-05-15 NOTE — ACP (ADVANCE CARE PLANNING)
Advance Care Planning     Advance Care Planning Activator (Inpatient)  Conversation Note      Date of ACP Conversation: 5/14/2024     Conversation Conducted with: Patient with Decision Making Capacity    ACP Activator: Jessica Monahan RN    ACP documents are on this chart.    Health Care Decision Maker:     Current Designated Health Care Decision Maker:     Primary Decision Maker: Kami Gonsalez - [facility] - [phone]

## 2024-05-15 NOTE — CONSULTS
Hematology/Oncology Consult  Encounter Date: 5/15/2024 4:20 PM    Mr. Ruben Gonsalez is a 83 y.o. male  : 1940  MRN: [id number]  Acct Number: [id number]  Requesting Provider: Adilson Dowd DO    Reason for request: anemia      CONSULTANT: Hemal Perez MD    HPI: The pt developed fatigue and dizziness and had difficulty ambulating. Recent routine labs showed severe anemia with Hgb=6.8. The pt was sent to the ER last night where he was given 1 RBC unit and then the pt was admitted and received another RBC unit. He was also given 1 dose of IV Fe. His energy level improved  after the RBC transfusions and IV Fe tx..    No gross bleeding.  GI has been consulted.    Patient Active Problem List   Diagnosis    Atrial fibrillation (HCC)    High risk medication use    Atherosclerosis of native coronary artery of native heart without angina pectoris    Hypertension    Ischemic myocardial dysfunction    Nonrheumatic aortic valve disorder, unspecified    Aortic valve disorder    Personal history of nicotine dependence    Presence of aortocoronary bypass graft    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HCC)    Vasovagal syncope    Dizziness and giddiness    Cerebrovascular accident (HCC)    Orthostatic dizziness    Ataxia    Vertigo    Meniere disease, bilateral    Benign paroxysmal positional vertigo due to bilateral vestibular disorder    Coronary angioplasty status    Ataxic gait    Kyphoscoliosis    Spinal stenosis of lumbar region with neurogenic claudication    Abdominal aortic aneurysm (AAA) (HCC)    Acute inferior myocardial infarction (HCC)    Carotid bruit    Chronic obstructive pulmonary disease (HCC)    Diabetes mellitus (HCC)    Dyspnea on exertion    Fatigue    First degree atrioventricular block    Hyperlipidemia    Infection of the inner ear    Muscle pain    Underweight    Ventricular premature beats    Weight loss    PVD (peripheral vascular disease) ([facility])    Impaired mobility and activities of daily living 
Spoke to pharmacy. Patient is still using this medication.  Will work on PA for the medication
<5 <5   BILITOT 0.5 0.5   ALKPHOS 58 56     No results for input(s): \"LIPASE\", \"AMYLASE\" in the last 72 hours.  Recent Labs     05/14/24  1015   INR 2.2     Lab Results   Component Value Date    SBHGKMIN59 153 (L) 05/14/2024     Lab Results   Component Value Date    FOLATE 11.7 05/14/2024       Lab Results   Component Value Date    IRON 15 (L) 05/14/2024    TIBC 431 05/14/2024    FERRITIN 17 (L) 05/14/2024          Impression:   83 y.o. male admitted with anemia, GI consulted for iron deficiency anemia, no obvious GI bleed, patient on Plavix/Eliquis.  Patient was sent to ER for hemoglobin of 6.8 on outpatient labs.  Patient did receive 1 unit packed red blood cells with posttransfusion hemoglobin of 7.9.  Patient has history of iron deficiency anemia and received iron infusions over the last year per Dr. Leslie.  Of note patient has had 2 endoscopic procedures for food impaction in the esophagus over the last 2 years.  He denies any current antireflux medications,  unintentional weight loss, dysphagia, hematemesis, hematochezia nor melena.   Iron studies consistent with iron deficiency anemia.  Additionally patient with low vitamin B12.  Raising a concern for reduced oral intake/reduced iron intake.  Plan:   -Start Pantoprazole 40 mg daily oral   -Monitor H&H and transfuse to keep above goal greater than 7  -Monitor stools for color and consistency, notify GI  -Continue iron infusions  -Replace vitamin B12  -Will need further workup with EGD and colonoscopy, antral biopsies to rule out gastric atrophy, evaluate for presence of pernicious anemia (labs have been ordered for anti-intrinsic factor antibody, antiparietal cell antibody and gastrin levels), small bowel capsule endoscopy if EGD and colonoscopy are negative.  -Will plan for EGD and colonoscopy on 5/17/2024  -Start on clear diet on 5/16/2024 with prep on evening 5/16/2024  Comments:     Thank you for allowing us to participate in the care of this patient.

## 2024-05-15 NOTE — PROGRESS NOTES
Transfusion completed at 2114. 2210- H&H drawn for post transfusion draw.  Results of draw is hemoglobin of 7.9

## 2024-05-15 NOTE — RT PROTOCOL NOTE
RT Inhaler-Nebulizer Bronchodilator Protocol Note    There is a bronchodilator order in the chart from a provider indicating to follow the RT Bronchodilator Protocol and there is an “Initiate RT Inhaler-Nebulizer Bronchodilator Protocol” order as well (see protocol at bottom of note).    CXR Findings:  No results found.    The findings from the last RT Protocol Assessment were as follows:   History Pulmonary Disease: Chronic pulmonary disease  Respiratory Pattern: Regular pattern and RR 12-20 bpm  Breath Sounds: Intermittent or unilateral wheezes  Cough: Strong, spontaneous, non-productive  Indication for Bronchodilator Therapy: On home bronchodilators  Bronchodilator Assessment Score: 6    Aerosolized bronchodilator medication orders have been revised according to the RT Inhaler-Nebulizer Bronchodilator Protocol below.    Respiratory Therapist to perform RT Therapy Protocol Assessment initially then follow the protocol.  Repeat RT Therapy Protocol Assessment PRN for score 0-3 or on second treatment, BID, and PRN for scores above 3.    No Indications - adjust the frequency to every 6 hours PRN wheezing or bronchospasm, if no treatments needed after 48 hours then discontinue using Per Protocol order mode.     If indication present, adjust the RT bronchodilator orders based on the Bronchodilator Assessment Score as indicated below.  Use Inhaler orders unless patient has one or more of the following: on home nebulizer, not able to hold breath for 10 seconds, is not alert and oriented, cannot activate and use MDI correctly, or respiratory rate 25 breaths per minute or more, then use the equivalent nebulizer order(s) with same Frequency and PRN reasons based on the score.  If a patient is on this medication at home then do not decrease Frequency below that used at home.    0-3 - enter or revise RT bronchodilator order(s) to equivalent RT Bronchodilator order with Frequency of every 4 hours PRN for wheezing or increased

## 2024-05-16 ENCOUNTER — ANESTHESIA EVENT (OUTPATIENT)
Dept: ENDOSCOPY | Age: 84
DRG: 812 | End: 2024-05-16
Payer: MEDICARE

## 2024-05-16 LAB
ANION GAP SERPL CALCULATED.3IONS-SCNC: 10 MEQ/L (ref 9–15)
ANISOCYTOSIS BLD QL SMEAR: ABNORMAL
BASOPHILS # BLD: 0.1 K/UL (ref 0–0.2)
BASOPHILS NFR BLD: 1 %
BUN SERPL-MCNC: 17 MG/DL (ref 8–23)
CALCIUM SERPL-MCNC: 7.8 MG/DL (ref 8.5–9.9)
CHLORIDE SERPL-SCNC: 108 MEQ/L (ref 95–107)
CO2 SERPL-SCNC: 23 MEQ/L (ref 20–31)
CREAT SERPL-MCNC: 1.17 MG/DL (ref 0.7–1.2)
DEPRECATED KAPPA LC FREE/LAMBDA SER: 1.63 {RATIO} (ref 0.26–1.65)
EKG ATRIAL RATE: 75 BPM
EKG P AXIS: 48 DEGREES
EKG P-R INTERVAL: 238 MS
EKG Q-T INTERVAL: 412 MS
EKG QRS DURATION: 86 MS
EKG QTC CALCULATION (BAZETT): 460 MS
EKG R AXIS: 59 DEGREES
EKG T AXIS: 84 DEGREES
EKG VENTRICULAR RATE: 75 BPM
EOSINOPHIL # BLD: 0.4 K/UL (ref 0–0.7)
EOSINOPHIL NFR BLD: 5 %
ERYTHROCYTE [DISTWIDTH] IN BLOOD BY AUTOMATED COUNT: 21.8 % (ref 11.5–14.5)
GLUCOSE SERPL-MCNC: 93 MG/DL (ref 70–99)
HCT VFR BLD AUTO: 30.1 % (ref 42–52)
HGB BLD-MCNC: 8.7 G/DL (ref 14–18)
KAPPA LC FREE SER-MCNC: 45.53 MG/L (ref 3.3–19.4)
LAMBDA LC FREE SERPL-MCNC: 27.89 MG/L (ref 5.71–26.3)
LYMPHOCYTES # BLD: 0.4 K/UL (ref 1–4.8)
LYMPHOCYTES NFR BLD: 1 %
MCH RBC QN AUTO: 21.1 PG (ref 27–31.3)
MCHC RBC AUTO-ENTMCNC: 28.9 % (ref 33–37)
MCV RBC AUTO: 73.1 FL (ref 79–92.2)
METAMYELOCYTES NFR BLD MANUAL: 1 %
MICROCYTES BLD QL SMEAR: ABNORMAL
MONOCYTES # BLD: 0.1 K/UL (ref 0.2–0.8)
MONOCYTES NFR BLD: 1 %
NEUTROPHILS # BLD: 7.3 K/UL (ref 1.4–6.5)
NEUTS SEG NFR BLD: 88 %
PLATELET # BLD AUTO: 293 K/UL (ref 130–400)
PLATELET BLD QL SMEAR: ADEQUATE
POTASSIUM SERPL-SCNC: 4.3 MEQ/L (ref 3.4–4.9)
RBC # BLD AUTO: 4.12 M/UL (ref 4.7–6.1)
SLIDE REVIEW: ABNORMAL
SMUDGE CELLS BLD QL SMEAR: 2.9
SODIUM SERPL-SCNC: 141 MEQ/L (ref 135–144)
VARIANT LYMPHS NFR BLD: 4 %
WBC # BLD AUTO: 8.2 K/UL (ref 4.8–10.8)

## 2024-05-16 PROCEDURE — 6360000002 HC RX W HCPCS: Performed by: INTERNAL MEDICINE

## 2024-05-16 PROCEDURE — 36415 COLL VENOUS BLD VENIPUNCTURE: CPT

## 2024-05-16 PROCEDURE — 6370000000 HC RX 637 (ALT 250 FOR IP): Performed by: INTERNAL MEDICINE

## 2024-05-16 PROCEDURE — 93010 ELECTROCARDIOGRAM REPORT: CPT | Performed by: INTERNAL MEDICINE

## 2024-05-16 PROCEDURE — 1210000000 HC MED SURG R&B

## 2024-05-16 PROCEDURE — 2580000003 HC RX 258: Performed by: INTERNAL MEDICINE

## 2024-05-16 PROCEDURE — 85025 COMPLETE CBC W/AUTO DIFF WBC: CPT

## 2024-05-16 PROCEDURE — 80048 BASIC METABOLIC PNL TOTAL CA: CPT

## 2024-05-16 RX ORDER — PEG-3350, SODIUM SULFATE, SODIUM CHLORIDE, POTASSIUM CHLORIDE, SODIUM ASCORBATE AND ASCORBIC ACID 7.5-2.691G
100 KIT ORAL 2 TIMES DAILY
Status: COMPLETED | OUTPATIENT
Start: 2024-05-16 | End: 2024-05-16

## 2024-05-16 RX ADMIN — CYANOCOBALAMIN 1000 MCG: 1000 INJECTION, SOLUTION INTRAMUSCULAR; SUBCUTANEOUS at 08:22

## 2024-05-16 RX ADMIN — Medication 10 ML: at 08:31

## 2024-05-16 RX ADMIN — APIXABAN 5 MG: 5 TABLET, FILM COATED ORAL at 20:34

## 2024-05-16 RX ADMIN — ROSUVASTATIN CALCIUM 20 MG: 20 TABLET, FILM COATED ORAL at 20:34

## 2024-05-16 RX ADMIN — SODIUM CHLORIDE 300 MG: 9 INJECTION, SOLUTION INTRAVENOUS at 09:59

## 2024-05-16 RX ADMIN — POLYETHYLENE GLYCOL 3350, SODIUM SULFATE, SODIUM CHLORIDE, POTASSIUM CHLORIDE, ASCORBIC ACID, SODIUM ASCORBATE 100 G: KIT at 20:37

## 2024-05-16 RX ADMIN — Medication 10 ML: at 20:34

## 2024-05-16 RX ADMIN — POLYETHYLENE GLYCOL 3350, SODIUM SULFATE, SODIUM CHLORIDE, POTASSIUM CHLORIDE, ASCORBIC ACID, SODIUM ASCORBATE 100 G: KIT at 17:47

## 2024-05-16 RX ADMIN — VALSARTAN 80 MG: 80 TABLET, FILM COATED ORAL at 08:22

## 2024-05-16 RX ADMIN — CLOPIDOGREL BISULFATE 75 MG: 75 TABLET ORAL at 08:22

## 2024-05-16 RX ADMIN — APIXABAN 5 MG: 5 TABLET, FILM COATED ORAL at 08:22

## 2024-05-16 NOTE — PLAN OF CARE
Problem: Discharge Planning  Goal: Discharge to home or other facility with appropriate resources  5/16/2024 0841 by Katie Ellis RN  Outcome: Progressing  5/15/2024 2355 by Gricelda De Jesus RN  Outcome: Progressing  Flowsheets (Taken 5/15/2024 2215)  Discharge to home or other facility with appropriate resources:   Identify barriers to discharge with patient and caregiver   Arrange for needed discharge resources and transportation as appropriate   Identify discharge learning needs (meds, wound care, etc)     Problem: Chronic Conditions and Co-morbidities  Goal: Patient's chronic conditions and co-morbidity symptoms are monitored and maintained or improved  5/16/2024 0841 by Katie Ellis RN  Outcome: Progressing  5/15/2024 2355 by Gricelda De Jesus RN  Outcome: Progressing  Flowsheets (Taken 5/15/2024 2215)  Care Plan - Patient's Chronic Conditions and Co-Morbidity Symptoms are Monitored and Maintained or Improved:   Monitor and assess patient's chronic conditions and comorbid symptoms for stability, deterioration, or improvement   Collaborate with multidisciplinary team to address chronic and comorbid conditions and prevent exacerbation or deterioration   Update acute care plan with appropriate goals if chronic or comorbid symptoms are exacerbated and prevent overall improvement and discharge     Problem: Safety - Adult  Goal: Free from fall injury  5/16/2024 0841 by Katie Ellis RN  Outcome: Progressing  5/15/2024 2355 by Gricelda De Jesus RN  Outcome: Progressing

## 2024-05-16 NOTE — PROGRESS NOTES
0700 Report received from Gricelda CHOUDHARY    0864 Shift assessment complete see flowsheets. Patient medications administered per MAR without difficulty. Patient resting comfortably at this time, voices no further needs. Call light within reach and safety maintained.     Electronically signed by Katie Ellis RN on 5/16/2024 at 8:43 AM

## 2024-05-16 NOTE — PLAN OF CARE
Problem: Discharge Planning  Goal: Discharge to home or other facility with appropriate resources  5/15/2024 2355 by Gricelda De Jesus RN  Outcome: Progressing  5/15/2024 1048 by Pete Bah RN  Outcome: Progressing  Flowsheets (Taken 5/15/2024 0930)  Discharge to home or other facility with appropriate resources: Identify barriers to discharge with patient and caregiver     Problem: Chronic Conditions and Co-morbidities  Goal: Patient's chronic conditions and co-morbidity symptoms are monitored and maintained or improved  5/15/2024 2355 by Gricelda De Jesus RN  Outcome: Progressing  5/15/2024 1048 by Pete Bah RN  Outcome: Progressing     Problem: Safety - Adult  Goal: Free from fall injury  5/15/2024 2355 by Gricelda De Jesus RN  Outcome: Progressing  5/15/2024 1048 by Pete Bah RN  Outcome: Progressing

## 2024-05-16 NOTE — PROGRESS NOTES
Shift assessments completed. VSS A&O x4. Patient able to express needs. Patient is calm and cooperative. Stand by assist x 1 to commode. Patient denies any discomfort. No further needs expressed at this time. Call light with in reach bed in lowest position, safety maintained. Care ongoing.

## 2024-05-16 NOTE — PROGRESS NOTES
Gastroenterology Progress Note    Ruben Gonsalez is a 83 y.o. male patient.  Hospitalization Day:2    Chief C/O: Iron deficiency anemia, no obvious GI bleed on Plavix/Eliquis    SUBJECTIVE:   Patient denies any nausea vomiting or abdominal pain.  He has been tolerating regular diet without difficulty.  Patient had bowel movement yesterday brown and formed in consistency.  Patient tolerated regular diet without difficulty.  Patient aware to be on clears today and prep for colonoscopy tomorrow.    Previous endoscopic evaluation:  EGD 2023, Dr. Mann, [facility]  Food in the lower third of the esophagus.  Removal was successful. Tortuous esophagus. Erythematous mucosa in the gastric antrum and gastric body. Normal examined duodenum.  EGD 10/13/2022, Dr. Chavarria, [facility]  Food in the middle third of the esophagus and in the lower third of the esophagus. Removal was successful. Benign-appearing esophageal stenosis. Dilated up to 15 mm. Esophagogastric landmarks identified. Normal stomach. Normal examined duodenum  ROS:    Gastrointestinal ROS: no abdominal pain, change in bowel habits, or black or bloody stools    Physical    VITALS:  BP (!) 143/65   Pulse 55   Temp 98.1 °F (36.7 °C) (Oral)   Resp 18   Ht 1.727 m (5' 8\")   Wt 65.8 kg (145 lb)   SpO2 100%   BMI 22.05 kg/m²   TEMPERATURE:  Current - Temp: 98.1 °F (36.7 °C); Max - Temp  Av.9 °F (36.6 °C)  Min: 97.5 °F (36.4 °C)  Max: 98.6 °F (37 °C)    General -no acute distress  Eyes -no icterus, no pallor  Cardiovascular - RRR, no murmur  Lungs -clear to auscultation bilaterally  Abdomen - non-distended,  non-tender, no organomegaly, Bowel sounds normal  Extremities -no edema  Skin -warm and dry  Neuro: No asterixis     Data    Data Review:    Recent Labs     24  0729 24  1015 24  1315 24  1515 24  2228 24  0446   WBC 8.9 9.5  --   --   --  8.2   HGB 7.0* 6.6*  --  6.9* 7.9* 8.7*   HCT 25.3* 25.0*   < > 24.8* 26.8* 30.1*   MCV

## 2024-05-16 NOTE — ANESTHESIA PRE PROCEDURE
Department of Anesthesiology  Preprocedure Note       Name:  Ruben Gonsalez   Age:  83 y.o.  :  1940                                          MRN:  [id number]         Date:  2024      Surgeon: Surgeon(s):  Surinder Chavarria MD    Procedure: Procedure(s):  ESOPHAGOGASTRODUODENOSCOPY DIAGNOSTIC ONLY  COLONOSCOPY DIAGNOSTIC    Medications prior to admission:   Prior to Admission medications    Medication Sig Start Date End Date Taking? Authorizing Provider   nitroGLYCERIN (NITROSTAT) 0.4 MG SL tablet Place 1 tablet under the tongue every 5 minutes as needed for Chest pain DISSOLVE 1 TABLET UNDER THE TONGUE AS NEEDED FOR CHEST PAIN- MAY REPEAT EVERY 5 MINUTES IF NEEDED (MAX 3 DOSES.- IF NO RELIEF CALL 911)   Yes Pinky Roldan MD   clopidogrel (PLAVIX) 75 MG tablet Take 1 tablet by mouth daily 23   Pinky Roldan MD   metoprolol succinate (TOPROL XL) 50 MG extended release tablet Take 1 tablet by mouth daily    Pinky Roldan MD   valsartan (DIOVAN) 80 MG tablet Take 1 tablet by mouth daily 10/18/22   Javan Montesinos MD   apixaban (ELIQUIS) 5 MG TABS tablet Take 1 tablet by mouth 2 times daily 10/17/22   Javan Montesinos MD   albuterol sulfate  (90 Base) MCG/ACT inhaler use 1 puff as needed twice daily for 90 days. 21   Pinky Roldan MD   magnesium oxide (MAG-OX) 400 (240 Mg) MG tablet Take 1 tablet by mouth daily 21   Joshua Mcconnell MD   metFORMIN (GLUCOPHAGE) 500 MG tablet Take 1 tablet by mouth 2 times daily 20   Pinky Roldan MD   pitavastatin (LIVALO) 4 MG TABS tablet Take 1 tablet by mouth nightly    Pinky Roldan MD       Current medications:    Current Facility-Administered Medications   Medication Dose Route Frequency Provider Last Rate Last Admin    PEG-KCl-NaCl-NaSulf-Na Asc-C (MOVIPREP) solution 100 g  100 g Oral BID Surinder Chavarria MD        0.9 % sodium chloride infusion   IntraVENous PRN Melba Hernández, PA-C        sodium

## 2024-05-16 NOTE — PROGRESS NOTES
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[facility])     Vasovagal syncope     Dizziness and giddiness     Cerebrovascular accident ([facility])     Orthostatic dizziness     Ataxia     Vertigo     Meniere disease, bilateral     Benign paroxysmal positional vertigo due to bilateral vestibular disorder     Coronary angioplasty status     Ataxic gait     Kyphoscoliosis     Spinal stenosis of lumbar region with neurogenic claudication     Abdominal aortic aneurysm (AAA) ([facility])     Acute inferior myocardial infarction ([facility])     Carotid bruit     Chronic obstructive pulmonary disease ([facility])     Diabetes mellitus ([facility])     Dyspnea on exertion     Fatigue     First degree atrioventricular block     Hyperlipidemia     Infection of the inner ear     Muscle pain     Underweight     Ventricular premature beats     Weight loss     PVD (peripheral vascular disease) ([facility])     Impaired mobility and activities of daily living -sp Severe PVD s/p AAA repain     Hypotension     Late effects of CVA (cerebrovascular accident)     MARIAM (acute kidney injury) ([facility])     Body mass index (BMI) of 20 to 24     Chest pain     Esophageal dysphagia     Food impaction of esophagus     Esophageal stenosis     Iron deficiency anemia, unspecified     Anemia, unspecified     Iron deficiency     Abnormal electrocardiography     Adverse effect of atorvastatin     Benign essential hypertension     Laceration of upper arm

## 2024-05-16 NOTE — CARE COORDINATION
MET W/PT AND SPOUSE TO ASSESS NEEDS AND DISCUSS DISCHARGE PLAN WHICH IS HOME W/SPOUSE. PT IS SLEEPING SOUNDLY. SPOUSE DENIES HHC NEEDS. WILL FOLLOW. PT FOR EGD POSSIBLE COLONOSCOPY TOMORROW.

## 2024-05-17 ENCOUNTER — ANESTHESIA (OUTPATIENT)
Dept: ENDOSCOPY | Age: 84
DRG: 812 | End: 2024-05-17
Payer: MEDICARE

## 2024-05-17 ENCOUNTER — PREP FOR PROCEDURE (OUTPATIENT)
Dept: GASTROENTEROLOGY | Age: 84
End: 2024-05-17

## 2024-05-17 VITALS
TEMPERATURE: 97.2 F | SYSTOLIC BLOOD PRESSURE: 159 MMHG | RESPIRATION RATE: 18 BRPM | HEART RATE: 71 BPM | OXYGEN SATURATION: 100 % | HEIGHT: 68 IN | DIASTOLIC BLOOD PRESSURE: 89 MMHG | WEIGHT: 145.06 LBS | BODY MASS INDEX: 21.99 KG/M2

## 2024-05-17 LAB
ALBUMIN SERPL-MCNC: 3.49 G/DL (ref 3.75–5.01)
ALPHA1 GLOB SERPL ELPH-MCNC: 0.36 G/DL (ref 0.19–0.46)
ALPHA2 GLOB SERPL ELPH-MCNC: 0.83 G/DL (ref 0.48–1.05)
B-GLOBULIN SERPL ELPH-MCNC: 0.8 G/DL (ref 0.48–1.1)
BASOPHILS # BLD: 0 K/UL (ref 0–0.2)
BASOPHILS NFR BLD: 0.4 %
EOSINOPHIL # BLD: 0.2 K/UL (ref 0–0.7)
EOSINOPHIL NFR BLD: 2.9 %
ERYTHROCYTE [DISTWIDTH] IN BLOOD BY AUTOMATED COUNT: 22.8 % (ref 11.5–14.5)
GAMMA GLOB SERPL ELPH-MCNC: 0.82 G/DL (ref 0.62–1.51)
HCT VFR BLD AUTO: 29.2 % (ref 42–52)
HGB BLD-MCNC: 8.5 G/DL (ref 14–18)
INTERPRETATION SERPL IFE-IMP: ABNORMAL
LYMPHOCYTES # BLD: 0.8 K/UL (ref 1–4.8)
LYMPHOCYTES NFR BLD: 10.9 %
MCH RBC QN AUTO: 21.4 PG (ref 27–31.3)
MCHC RBC AUTO-ENTMCNC: 29.1 % (ref 33–37)
MCV RBC AUTO: 73.4 FL (ref 79–92.2)
MONOCYTES # BLD: 0.7 K/UL (ref 0.2–0.8)
MONOCYTES NFR BLD: 9.4 %
NEUTROPHILS # BLD: 5.6 K/UL (ref 1.4–6.5)
NEUTS SEG NFR BLD: 75.9 %
PLATELET # BLD AUTO: 287 K/UL (ref 130–400)
PROT SERPL-MCNC: 6.3 G/DL (ref 6.3–8.2)
PROTEIN ELECTROPHORESIS, SERUM: ABNORMAL
RBC # BLD AUTO: 3.98 M/UL (ref 4.7–6.1)
WBC # BLD AUTO: 7.3 K/UL (ref 4.8–10.8)

## 2024-05-17 PROCEDURE — 2580000003 HC RX 258: Performed by: INTERNAL MEDICINE

## 2024-05-17 PROCEDURE — 6360000002 HC RX W HCPCS: Performed by: INTERNAL MEDICINE

## 2024-05-17 PROCEDURE — 6370000000 HC RX 637 (ALT 250 FOR IP): Performed by: INTERNAL MEDICINE

## 2024-05-17 PROCEDURE — 3609027000 HC COLONOSCOPY: Performed by: INTERNAL MEDICINE

## 2024-05-17 PROCEDURE — 6360000002 HC RX W HCPCS: Performed by: REGISTERED NURSE

## 2024-05-17 PROCEDURE — 7100000011 HC PHASE II RECOVERY - ADDTL 15 MIN: Performed by: INTERNAL MEDICINE

## 2024-05-17 PROCEDURE — 36415 COLL VENOUS BLD VENIPUNCTURE: CPT

## 2024-05-17 PROCEDURE — 3609017100 HC EGD: Performed by: INTERNAL MEDICINE

## 2024-05-17 PROCEDURE — 7100000010 HC PHASE II RECOVERY - FIRST 15 MIN: Performed by: INTERNAL MEDICINE

## 2024-05-17 PROCEDURE — 3700000000 HC ANESTHESIA ATTENDED CARE: Performed by: INTERNAL MEDICINE

## 2024-05-17 PROCEDURE — 2500000003 HC RX 250 WO HCPCS: Performed by: REGISTERED NURSE

## 2024-05-17 PROCEDURE — 3700000001 HC ADD 15 MINUTES (ANESTHESIA): Performed by: INTERNAL MEDICINE

## 2024-05-17 PROCEDURE — 0DJ08ZZ INSPECTION OF UPPER INTESTINAL TRACT, VIA NATURAL OR ARTIFICIAL OPENING ENDOSCOPIC: ICD-10-PCS | Performed by: INTERNAL MEDICINE

## 2024-05-17 PROCEDURE — 2709999900 HC NON-CHARGEABLE SUPPLY: Performed by: INTERNAL MEDICINE

## 2024-05-17 PROCEDURE — 85025 COMPLETE CBC W/AUTO DIFF WBC: CPT

## 2024-05-17 PROCEDURE — 0DJD8ZZ INSPECTION OF LOWER INTESTINAL TRACT, VIA NATURAL OR ARTIFICIAL OPENING ENDOSCOPIC: ICD-10-PCS | Performed by: INTERNAL MEDICINE

## 2024-05-17 RX ORDER — SODIUM CHLORIDE 0.9 % (FLUSH) 0.9 %
5-40 SYRINGE (ML) INJECTION EVERY 12 HOURS SCHEDULED
Status: CANCELLED | OUTPATIENT
Start: 2024-05-17

## 2024-05-17 RX ORDER — SODIUM CHLORIDE 0.9 % (FLUSH) 0.9 %
5-40 SYRINGE (ML) INJECTION PRN
Status: DISCONTINUED | OUTPATIENT
Start: 2024-05-17 | End: 2024-05-17 | Stop reason: HOSPADM

## 2024-05-17 RX ORDER — SODIUM CHLORIDE 9 MG/ML
INJECTION, SOLUTION INTRAVENOUS PRN
Status: DISCONTINUED | OUTPATIENT
Start: 2024-05-17 | End: 2024-05-17 | Stop reason: HOSPADM

## 2024-05-17 RX ORDER — SODIUM CHLORIDE 0.9 % (FLUSH) 0.9 %
5-40 SYRINGE (ML) INJECTION EVERY 12 HOURS SCHEDULED
Status: DISCONTINUED | OUTPATIENT
Start: 2024-05-17 | End: 2024-05-17 | Stop reason: HOSPADM

## 2024-05-17 RX ORDER — GLYCOPYRROLATE 1 MG/5 ML
SYRINGE (ML) INTRAVENOUS PRN
Status: DISCONTINUED | OUTPATIENT
Start: 2024-05-17 | End: 2024-05-17 | Stop reason: SDUPTHER

## 2024-05-17 RX ORDER — SODIUM CHLORIDE 9 MG/ML
INJECTION, SOLUTION INTRAVENOUS CONTINUOUS
Status: DISCONTINUED | OUTPATIENT
Start: 2024-05-17 | End: 2024-05-17 | Stop reason: HOSPADM

## 2024-05-17 RX ORDER — SODIUM CHLORIDE 0.9 % (FLUSH) 0.9 %
5-40 SYRINGE (ML) INJECTION PRN
Status: CANCELLED | OUTPATIENT
Start: 2024-05-17

## 2024-05-17 RX ORDER — CYANOCOBALAMIN 1000 UG/ML
1000 INJECTION, SOLUTION INTRAMUSCULAR; SUBCUTANEOUS ONCE
Status: COMPLETED | OUTPATIENT
Start: 2024-05-17 | End: 2024-05-17

## 2024-05-17 RX ORDER — LIDOCAINE HYDROCHLORIDE 20 MG/ML
INJECTION, SOLUTION INFILTRATION; PERINEURAL PRN
Status: DISCONTINUED | OUTPATIENT
Start: 2024-05-17 | End: 2024-05-17 | Stop reason: SDUPTHER

## 2024-05-17 RX ORDER — SODIUM CHLORIDE 9 MG/ML
INJECTION, SOLUTION INTRAVENOUS CONTINUOUS
Status: CANCELLED | OUTPATIENT
Start: 2024-05-17

## 2024-05-17 RX ORDER — SODIUM CHLORIDE 9 MG/ML
INJECTION, SOLUTION INTRAVENOUS PRN
Status: CANCELLED | OUTPATIENT
Start: 2024-05-17

## 2024-05-17 RX ORDER — PROPOFOL 10 MG/ML
INJECTION, EMULSION INTRAVENOUS PRN
Status: DISCONTINUED | OUTPATIENT
Start: 2024-05-17 | End: 2024-05-17 | Stop reason: SDUPTHER

## 2024-05-17 RX ORDER — SODIUM CHLORIDE 9 MG/ML
INJECTION, SOLUTION INTRAVENOUS
Status: DISCONTINUED
Start: 2024-05-17 | End: 2024-05-17 | Stop reason: HOSPADM

## 2024-05-17 RX ADMIN — PROPOFOL 100 MG: 10 INJECTION, EMULSION INTRAVENOUS at 11:34

## 2024-05-17 RX ADMIN — LIDOCAINE HYDROCHLORIDE 60 MG: 20 INJECTION, SOLUTION INFILTRATION; PERINEURAL at 11:33

## 2024-05-17 RX ADMIN — CYANOCOBALAMIN 1000 MCG: 1000 INJECTION, SOLUTION INTRAMUSCULAR; SUBCUTANEOUS at 13:54

## 2024-05-17 RX ADMIN — METOPROLOL SUCCINATE 50 MG: 50 TABLET, EXTENDED RELEASE ORAL at 08:32

## 2024-05-17 RX ADMIN — PROPOFOL 50 MG: 10 INJECTION, EMULSION INTRAVENOUS at 11:45

## 2024-05-17 RX ADMIN — PROPOFOL 50 MG: 10 INJECTION, EMULSION INTRAVENOUS at 11:41

## 2024-05-17 RX ADMIN — VALSARTAN 80 MG: 80 TABLET, FILM COATED ORAL at 08:32

## 2024-05-17 RX ADMIN — Medication 0.2 MG: at 11:33

## 2024-05-17 RX ADMIN — PROPOFOL 50 MG: 10 INJECTION, EMULSION INTRAVENOUS at 11:51

## 2024-05-17 ASSESSMENT — PAIN - FUNCTIONAL ASSESSMENT: PAIN_FUNCTIONAL_ASSESSMENT: NONE - DENIES PAIN

## 2024-05-17 NOTE — PROGRESS NOTES
Discharge instructions provided to patient and wife. IV and telemetry removed.     Electronically signed by Ciara Cruz RN on 5/17/2024 at 2:02 PM

## 2024-05-17 NOTE — PROGRESS NOTES
Nephrology Progress Note    Assessment:  Anemia better B12- iron def.  OHDX CABGx stents  DM type-2  Hyperlipidmeia      Plan: ok to discharge will need continued B12 and iron evaluation post discharge  If late procedures and patient not strong enough to go home with wife keep overnight    Patient Active Problem List:     Atrial fibrillation ([facility])     High risk medication use     Atherosclerosis of native coronary artery of native heart without angina pectoris     Hypertension     Ischemic myocardial dysfunction     Nonrheumatic aortic valve disorder, unspecified     Aortic valve disorder     Personal history of nicotine dependence     Presence of aortocoronary bypass graft     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[facility])     Vasovagal syncope     Dizziness and giddiness     Cerebrovascular accident ([facility])     Orthostatic dizziness     Ataxia     Vertigo     Meniere disease, bilateral     Benign paroxysmal positional vertigo due to bilateral vestibular disorder     Coronary angioplasty status     Ataxic gait     Kyphoscoliosis     Spinal stenosis of lumbar region with neurogenic claudication     Abdominal aortic aneurysm (AAA) ([facility])     Acute inferior myocardial infarction ([facility])     Carotid bruit     Chronic obstructive pulmonary disease ([facility])     Diabetes mellitus ([facility])     Dyspnea on exertion     Fatigue     First degree atrioventricular block     Hyperlipidemia     Infection of the inner ear     Muscle pain     Underweight     Ventricular premature beats     Weight loss     PVD (peripheral vascular disease) ([facility])     Impaired mobility and activities of daily living -sp Severe PVD s/p AAA repain     Hypotension     Late effects of CVA (cerebrovascular accident)     MARIAM (acute kidney injury) ([facility])     Body mass index (BMI) of 20 to 24     Chest pain     Esophageal dysphagia     Food impaction of esophagus     Esophageal stenosis     Iron deficiency anemia, unspecified     Anemia, unspecified     Iron deficiency

## 2024-05-17 NOTE — PLAN OF CARE
Problem: Discharge Planning  Goal: Discharge to home or other facility with appropriate resources  Outcome: Completed     Problem: Chronic Conditions and Co-morbidities  Goal: Patient's chronic conditions and co-morbidity symptoms are monitored and maintained or improved  Outcome: Completed  Flowsheets (Taken 5/17/2024 0800)  Care Plan - Patient's Chronic Conditions and Co-Morbidity Symptoms are Monitored and Maintained or Improved: Monitor and assess patient's chronic conditions and comorbid symptoms for stability, deterioration, or improvement     Problem: Safety - Adult  Goal: Free from fall injury  Outcome: Completed

## 2024-05-17 NOTE — PLAN OF CARE
Problem: Discharge Planning  Goal: Discharge to home or other facility with appropriate resources  5/16/2024 2133 by Sb Chavez, RN  Outcome: Progressing  Flowsheets (Taken 5/16/2024 2034)  Discharge to home or other facility with appropriate resources: Identify barriers to discharge with patient and caregiver  5/16/2024 0841 by Katie Ellis RN  Outcome: Progressing     Problem: Chronic Conditions and Co-morbidities  Goal: Patient's chronic conditions and co-morbidity symptoms are monitored and maintained or improved  5/16/2024 2133 by Sb Chavez RN  Outcome: Progressing  Flowsheets (Taken 5/16/2024 2034)  Care Plan - Patient's Chronic Conditions and Co-Morbidity Symptoms are Monitored and Maintained or Improved: Monitor and assess patient's chronic conditions and comorbid symptoms for stability, deterioration, or improvement  5/16/2024 0841 by Katie Ellis RN  Outcome: Progressing     Problem: Safety - Adult  Goal: Free from fall injury  5/16/2024 2133 by Sb Chavez RN  Outcome: Progressing  5/16/2024 0841 by Katie Ellis RN  Outcome: Progressing

## 2024-05-17 NOTE — ANESTHESIA POSTPROCEDURE EVALUATION
Department of Anesthesiology  Postprocedure Note    Patient: Ruben Gonsalez  MRN: [id number]  YOB: 1940  Date of evaluation: 5/17/2024    Procedure Summary       Date: 05/17/24 Room / Location: [facility] OR 02 / [facility]    Anesthesia Start: 1131 Anesthesia Stop:     Procedures:       ESOPHAGOGASTRODUODENOSCOPY DIAGNOSTIC ONLY      COLONOSCOPY DIAGNOSTIC Diagnosis:       Anemia, unspecified type      (Anemia, unspecified type [D64.9])    Surgeons: Surinder Chavarria MD Responsible Provider: Caleb Alicia APRN - CRNA    Anesthesia Type: MAC ASA Status: 3            Anesthesia Type: No value filed.    Moni Phase I: Moni Score: 10    Moni Phase II:      Anesthesia Post Evaluation    Patient location during evaluation: bedside  Patient participation: complete - patient participated  Level of consciousness: awake and awake and alert  Airway patency: patent  Nausea & Vomiting: no nausea and no vomiting  Cardiovascular status: blood pressure returned to baseline and hemodynamically stable  Respiratory status: acceptable  Hydration status: euvolemic  Pain management: adequate        No notable events documented.

## 2024-05-18 LAB
GASTRIN SERPL-MCNC: 38 PG/ML (ref 0–100)
PCA IGG SER QL IF: 3.8 UNITS (ref 0–24.9)
PCA IGG SER QL IF: 5.1 UNITS (ref 0–24.9)

## 2024-05-19 ENCOUNTER — HOSPITAL ENCOUNTER (EMERGENCY)
Age: 84
Discharge: HOME | End: 2024-05-19
Payer: MEDICARE

## 2024-05-19 VITALS
TEMPERATURE: 97.3 F | RESPIRATION RATE: 20 BRPM | WEIGHT: 145 LBS | HEIGHT: 67 IN | HEART RATE: 75 BPM | OXYGEN SATURATION: 98 % | DIASTOLIC BLOOD PRESSURE: 74 MMHG | BODY MASS INDEX: 22.76 KG/M2 | SYSTOLIC BLOOD PRESSURE: 121 MMHG

## 2024-05-19 DIAGNOSIS — S51.012A SKIN TEAR OF LEFT ELBOW WITHOUT COMPLICATION, INITIAL ENCOUNTER: Primary | ICD-10-CM

## 2024-05-19 LAB
IF BLOCK AB SER QL RIA: NEGATIVE
IF BLOCK AB SER QL RIA: NEGATIVE

## 2024-05-19 PROCEDURE — 99282 EMERGENCY DEPT VISIT SF MDM: CPT

## 2024-05-19 PROCEDURE — 2500000003 HC RX 250 WO HCPCS: Performed by: PHYSICIAN ASSISTANT

## 2024-05-19 RX ADMIN — Medication: at 10:15

## 2024-05-19 ASSESSMENT — LIFESTYLE VARIABLES
HOW MANY STANDARD DRINKS CONTAINING ALCOHOL DO YOU HAVE ON A TYPICAL DAY: PATIENT DOES NOT DRINK
HOW OFTEN DO YOU HAVE A DRINK CONTAINING ALCOHOL: NEVER

## 2024-05-19 ASSESSMENT — PAIN - FUNCTIONAL ASSESSMENT: PAIN_FUNCTIONAL_ASSESSMENT: NONE - DENIES PAIN

## 2024-05-19 NOTE — ED PROVIDER NOTES
[facility] ED  EMERGENCY DEPARTMENT ENCOUNTER      Pt Name: Ruben Gonsalez  MRN: [id number]  Birthdate 1940  Date of evaluation: 5/19/2024  Provider: Melba Hernández PA-C      HISTORY OF PRESENT ILLNESS    Ruben Gonsalez is a 83 y.o. male who presents to the Emergency Department with skin tear to left forearm that started yesterday after removing a band-aid. Is on eliquis. No pain        REVIEW OF SYSTEMS       Review of Systems   Constitutional:  Negative for chills, diaphoresis, fatigue and fever.   HENT:  Negative for congestion, rhinorrhea and sore throat.    Eyes:  Negative for photophobia and pain.   Respiratory:  Negative for cough and shortness of breath.    Cardiovascular:  Negative for chest pain and palpitations.   Gastrointestinal:  Negative for abdominal pain, diarrhea, nausea and vomiting.   Genitourinary:  Negative for dysuria and flank pain.   Musculoskeletal:  Negative for back pain.   Skin:  Positive for wound. Negative for rash.   Neurological:  Negative for dizziness, light-headedness and headaches.   Hematological:  Bruises/bleeds easily.   Psychiatric/Behavioral: Negative.     All other systems reviewed and are negative.        PAST MEDICAL HISTORY     Past Medical History:   Diagnosis Date    MARIAM (acute kidney injury) ([facility]) 06/07/2022    Atrial fibrillation ([facility])     CAD (coronary artery disease)     cardiac stents    COPD (chronic obstructive pulmonary disease) ([facility])     Coronary angioplasty status 11/26/2018    Diabetes mellitus (HCC)     Hyperlipidemia     Hypertension     Movement disorder     Pneumonia          SURGICAL HISTORY       Past Surgical History:   Procedure Laterality Date    APPENDECTOMY      COLONOSCOPY N/A 5/17/2024    COLONOSCOPY DIAGNOSTIC performed by Surinder Chavarria MD at [facility]    CORONARY ANGIOPLASTY WITH STENT PLACEMENT      4    CORONARY ARTERY BYPASS GRAFT      triple bypass    EYE SURGERY Bilateral     cataract surgery    INSERTABLE CARDIAC

## 2024-05-19 NOTE — ED TRIAGE NOTES
Pt arrived by private car with report of left forearm bleeding   Pt states he was D/C yesterday from being admitted and after his IV removal the site hasn't stopped bleeding since being taken out   Pt is on eliquis   Pt has ace wrap on arm with shadowing

## 2024-06-03 ENCOUNTER — OFFICE VISIT (OUTPATIENT)
Dept: CARDIOLOGY | Facility: CLINIC | Age: 84
End: 2024-06-03
Payer: MEDICARE

## 2024-06-03 VITALS
HEART RATE: 75 BPM | WEIGHT: 139 LBS | DIASTOLIC BLOOD PRESSURE: 64 MMHG | HEIGHT: 69 IN | SYSTOLIC BLOOD PRESSURE: 130 MMHG | BODY MASS INDEX: 20.59 KG/M2

## 2024-06-03 DIAGNOSIS — I48.0 PAROXYSMAL ATRIAL FIBRILLATION (MULTI): ICD-10-CM

## 2024-06-03 DIAGNOSIS — I25.10 CORONARY ARTERY DISEASE INVOLVING NATIVE CORONARY ARTERY OF NATIVE HEART WITHOUT ANGINA PECTORIS: ICD-10-CM

## 2024-06-03 DIAGNOSIS — E53.8 B12 DEFICIENCY: ICD-10-CM

## 2024-06-03 DIAGNOSIS — R06.02 SHORTNESS OF BREATH: ICD-10-CM

## 2024-06-03 DIAGNOSIS — E61.1 IRON DEFICIENCY: ICD-10-CM

## 2024-06-03 DIAGNOSIS — J44.9 CHRONIC OBSTRUCTIVE PULMONARY DISEASE, UNSPECIFIED COPD TYPE (MULTI): ICD-10-CM

## 2024-06-03 PROBLEM — R07.9 CHEST PAIN: Status: RESOLVED | Noted: 2023-11-08 | Resolved: 2024-06-03

## 2024-06-03 PROBLEM — I21.19 ACUTE MYOCARDIAL INFARCTION OF INFERIOR WALL (MULTI): Status: RESOLVED | Noted: 2023-11-08 | Resolved: 2024-06-03

## 2024-06-03 PROBLEM — I25.5 ISCHEMIC CARDIOMYOPATHY: Status: RESOLVED | Noted: 2023-11-08 | Resolved: 2024-06-03

## 2024-06-03 PROBLEM — I35.9 AORTIC VALVE DISEASE: Status: RESOLVED | Noted: 2023-11-08 | Resolved: 2024-06-03

## 2024-06-03 PROCEDURE — 99214 OFFICE O/P EST MOD 30 MIN: CPT | Performed by: INTERNAL MEDICINE

## 2024-06-03 PROCEDURE — 3075F SYST BP GE 130 - 139MM HG: CPT | Performed by: INTERNAL MEDICINE

## 2024-06-03 PROCEDURE — 1036F TOBACCO NON-USER: CPT | Performed by: INTERNAL MEDICINE

## 2024-06-03 PROCEDURE — 1159F MED LIST DOCD IN RCRD: CPT | Performed by: INTERNAL MEDICINE

## 2024-06-03 PROCEDURE — 3078F DIAST BP <80 MM HG: CPT | Performed by: INTERNAL MEDICINE

## 2024-06-03 RX ORDER — LANOLIN ALCOHOL/MO/W.PET/CERES
1000 CREAM (GRAM) TOPICAL DAILY
COMMUNITY

## 2024-06-03 RX ORDER — METOPROLOL SUCCINATE 50 MG/1
50 TABLET, EXTENDED RELEASE ORAL DAILY
Qty: 90 TABLET | Refills: 1 | Status: SHIPPED | OUTPATIENT
Start: 2024-06-03

## 2024-06-03 RX ORDER — NITROGLYCERIN 0.4 MG/1
TABLET SUBLINGUAL
Qty: 25 TABLET | Refills: 5 | Status: SHIPPED | OUTPATIENT
Start: 2024-06-03

## 2024-06-03 RX ORDER — PITAVASTATIN CALCIUM 4.18 MG/1
1 TABLET, FILM COATED ORAL NIGHTLY
Qty: 90 TABLET | Refills: 1 | Status: SHIPPED | OUTPATIENT
Start: 2024-06-03 | End: 2025-06-03

## 2024-06-03 RX ORDER — VALSARTAN 80 MG/1
80 TABLET ORAL DAILY
Qty: 90 TABLET | Refills: 1 | Status: SHIPPED | OUTPATIENT
Start: 2024-06-03

## 2024-06-03 NOTE — PROGRESS NOTES
Patient:  Jaquan Gambino  YOB: 1940  MRN: [id number]       Impression/Plan:     Diagnoses and all orders for this visit:  Paroxysmal atrial fibrillation (Multi)  -    Clinically low burden none while in the hospital  -    Full anticoagulation can be beneficial for coronary disease though antiplatelet therapy not particularly beneficial in the setting of A-fib for stroke reduction.  He just recently had severe anemia hemoglobin down to 6.  I think he would be safer on full anticoagulation alone as opposed to anticoagulation plus antiplatelet.  I think he would be best served by a Watchman device but he and his wife wants no part of more procedures.  They understand that the purpose would be to reduce stroke such that he does not need to be on full anticoagulation.  -     Fortunately he has had no recurrent severe anemia he had no obvious GI bleeding on his presentation in the hospital but clearly iron deficient    Iron deficiency  B12 deficiency  -    Patient is receiving iron and B12 hemoglobin is still low.  If over time it is clear that this is pure iron absorption problem and B12 problem could consider resumption of Plavix    Coronary artery disease involving native coronary artery of native heart without angina pectoris  -     No symptoms of angina or CHF on current medical regimen and good control of cholesterol and the current dose of pitavastatin    Shortness of breath  Chronic obstructive pulmonary disease, unspecified COPD type (Multi)  -     Obtain echo to assure LV function remained stable given his slight increase in shortness of breath and whether or not perhaps pulm hypertension has occurred secondary to his COPD      Chief Complaint/Active Symptoms:       Jaquan Gambino is a 84 y.o. male who presents with coronary artery disease with prior CABG ~ 1999 (L-LAD, radial-CX, SVG-RCA).  Paroxysmal atrial fibrillation.  Has had remote abdominal aortic aneurysm resection as well as hypertension,  hyperlipidemia.  Perfusion study November 2022 57% EF with evidence of prior posterior lateral infarct echocardiogram 2021 EF 55%.  1+ MR 1+ TR. he has significant COPD    Last seen by Dr. Jay Hernández 12/1/2023.  No cardiovascular symptoms no falls but concerns as to whether Eliquis was necessary versus watchman and they apparently was at that time referred to Dr. Obregon for consideration thereof.  That evaluation was not pursued.    2/14/2024 was in the emergency department at [facility] after cutting his finger did not require admission or stitching.    Admitted [facility] 5/14/2024 presenting with severe anemia fatigue and weakness.  Initial blood pressure 90/50 heart rate 80 O2 saturation 100%.  Initial hemoglobin 6.6 creatinine 1.54 admitted for blood transfusion ECG showed sinus rhythm on presentation.  EGD and colonoscopy recommended by gastroenterology.  Was seen by hematology found him to have iron and B12 deficiency possible pernicious anemia was given B12 and Venofer infusions.  Neither EGD or colonoscopy showed active source of bleeding.  Reportedly creatinine decreased to 1.17 after transfusion globin increased 8.5    Presented again to the emergency department 5/19/2024 with skin tear after removing a Band-Aid.  Vital signs were stable in the ER.    Currently describes no angina, palpitation, lightheadedness or syncope.  He is had no falls.  He does use a walker and is very careful.  He notes what he describes as chronic shortness of breath related to prior tobacco abuse and COPD.  Previous CT scans have shown significant signs of emphysema.  He does not believe his breathing is changed in the last year though he does get more tired since he has been anemic.  He is unaware of GI blood loss upper and lower endoscopy was unrevealing yet he had a fairly rapid decrease in hemoglobin suggesting the possibility of GI blood loss.  He does feel fatigued.  He was  definitely more short of breath when he was more anemic.             Review of Systems: Unremarkable except as noted above    Meds     Current Outpatient Medications   Medication Instructions    albuterol 90 mcg/actuation inhaler use 1 puff as needed twice daily for 90 days.    apixaban (ELIQUIS) 5 mg, oral, 2 times daily    clopidogrel (PLAVIX) 75 mg, oral, Daily    cyanocobalamin (VITAMIN B-12) 1,000 mcg, oral, Daily    magnesium oxide 400 mg magnesium capsule 1 tablet, oral, Daily    metFORMIN (Glucophage) 500 mg tablet 1 tablet, 2 times daily    metoprolol succinate XL (TOPROL-XL) 50 mg, oral, Daily    nitroglycerin (Nitrostat) 0.4 mg SL tablet DISSOLVE 1 TABLET UNDER THE TONGUE AS NEEDED FOR CHEST PAIN- MAY REPEAT EVERY 5 MINUTES IF NEEDED ( MAX 3 DOSES.- IF NO RELIEF CALL 911)    pantoprazole (PROTONIX) 40 mg, oral, Daily    pitavastatin calcium (Livalo) 4 mg tablet 1 tablet, oral, Nightly    valsartan (DIOVAN) 80 mg, oral, Daily        Allergies     Allergies   Allergen Reactions    Atorvastatin Unknown    Fenofibrate Unknown    Fluvastatin Unknown    Pravastatin Unknown    Simvastatin Unknown    Vitamin E (D-Alpha Tocopherol) Unknown         Annotated Problems     Specialty Problems          Cardiology Problems    AAA (abdominal aortic aneurysm) ([facility])    Abnormal EKG    Acute myocardial infarction of inferior wall (Multi)    Aortic valve disease    Arteriosclerotic coronary artery disease    AV block, 1st degree    Carotid bruit    Chest pain    Hyperlipidemia    Hypertension    Ischemic cardiomyopathy    Paroxysmal atrial fibrillation (Multi)    PVC (premature ventricular contraction)    S/P CABG (coronary artery bypass graft)    Short of breath on exertion    Sinus node dysfunction (Multi)    Status post angioplasty    Status post placement of implantable loop recorder    Former smoker        Problem List     Patient Active Problem List    Diagnosis Date Noted    BMI 21.0-21.9, adult 12/01/2023     "Former smoker 12/01/2023    AAA (abdominal aortic aneurysm) (CMS-HCC) 11/08/2023    Abnormal EKG 11/08/2023    Acute myocardial infarction of inferior wall (Multi) 11/08/2023    Aortic valve disease 11/08/2023    Arteriosclerotic coronary artery disease 11/08/2023    AV block, 1st degree 11/08/2023    Carotid bruit 11/08/2023    Chest pain 11/08/2023    COPD (chronic obstructive pulmonary disease) (Multi) 11/08/2023    Diabetes mellitus (Multi) 11/08/2023    Fatigue 11/08/2023    Hyperlipidemia 11/08/2023    Hypertension 11/08/2023    Infection of the inner ear 11/08/2023    Ischemic cardiomyopathy 11/08/2023    Paroxysmal atrial fibrillation (Multi) 11/08/2023    PVC (premature ventricular contraction) 11/08/2023    S/P CABG (coronary artery bypass graft) 11/08/2023    Short of breath on exertion 11/08/2023    Sinus node dysfunction (Multi) 11/08/2023    Status post angioplasty 11/08/2023    Status post placement of implantable loop recorder 11/08/2023    Vertigo 11/08/2023    Weight loss 11/08/2023       Objective:     Vitals:    06/03/24 0859   BP: 130/64   BP Location: Right arm   Patient Position: Sitting   Pulse: 75   Weight: 63 kg (139 lb)   Height: 1.753 m (5' 9\")      Wt Readings from Last 4 Encounters:   06/03/24 63 kg (139 lb)   12/01/23 65.6 kg (144 lb 9.6 oz)   06/02/23 64.9 kg (143 lb)   12/02/22 65.3 kg (144 lb)           LAB:     Lab Results   Component Value Date    WBC 15.4 (H) 04/10/2022    HGB 10.3 (L) 04/10/2022    HCT 32.9 (L) 04/10/2022     (L) 04/10/2022    ALT 20 04/11/2022    AST 24 04/11/2022     04/11/2022    K 3.9 04/11/2022     04/11/2022    CREATININE 1.10 04/11/2022    BUN 24 (H) 04/11/2022    CO2 29 04/11/2022    INR 1.5 (H) 04/08/2022    HGBA1C 6.5 (H) 05/14/2024       Diagnostic Studies:     Patient was never admitted.      Radiology:     No orders to display       Physical Exam     General Appearance: alert and oriented to person, place and time, in no acute " distress  Cardiovascular: normal rate, regular rhythm, normal S1 and S2, no murmurs, rubs, clicks, or gallops,  no JVD  Pulmonary/Chest: clear to auscultation bilaterally- no wheezes, rales or rhonchi, normal air movement, no respiratory distress  Abdomen: soft, non-tender, non-distended, normal bowel sounds, no masses   Extremities: no cyanosis, clubbing or edema  Skin: warm and dry, no rash or erythema  Eyes: EOMI  Neck: supple and non-tender without mass, no thyromegaly   Neurological: alert, oriented, normal speech, no focal findings or movement disorder noted  Vascular:         Scribe Attestation  By signing my name below, I, Yancy Arana CMA   , Scribe   attest that this documentation has been prepared under the direction and in the presence of Celso Pantoja MD.

## 2024-06-03 NOTE — PATIENT INSTRUCTIONS
STOP CLOPIDOGREL    STAY ON ELIQUIS        Please bring all medicines, vitamins, and herbal supplements with you in original bottles to every appointment! This is the best way to ensure your medication list in your chart is accurate.    Prescriptions will not be filled unless you are compliant with your follow up appointments or have a follow up appointment scheduled as per instruction of your physician. Refills should be requested at the time of your visit.

## 2024-06-14 PROBLEM — Z98.62 STATUS POST ANGIOPLASTY: Status: ACTIVE | Noted: 2023-11-08

## 2024-06-14 PROBLEM — Z98.890 HISTORY OF CARDIOVASCULAR SURGERY: Status: ACTIVE | Noted: 2023-11-08

## 2024-06-14 PROBLEM — Z87.891 FORMER SMOKER: Status: ACTIVE | Noted: 2023-12-01

## 2024-06-14 PROBLEM — Z95.1 S/P CABG (CORONARY ARTERY BYPASS GRAFT): Status: ACTIVE | Noted: 2023-11-08

## 2024-06-26 ENCOUNTER — OFFICE VISIT (OUTPATIENT)
Dept: NEUROLOGY | Age: 84
End: 2024-06-26
Payer: MEDICARE

## 2024-06-26 VITALS — DIASTOLIC BLOOD PRESSURE: 64 MMHG | SYSTOLIC BLOOD PRESSURE: 118 MMHG | HEART RATE: 67 BPM

## 2024-06-26 DIAGNOSIS — R27.0 ATAXIA: ICD-10-CM

## 2024-06-26 DIAGNOSIS — I63.512 CEREBROVASCULAR ACCIDENT (CVA) DUE TO STENOSIS OF LEFT MIDDLE CEREBRAL ARTERY (HCC): ICD-10-CM

## 2024-06-26 DIAGNOSIS — Z91.89 AT HIGH RISK FOR STROKE: ICD-10-CM

## 2024-06-26 DIAGNOSIS — I48.11 LONGSTANDING PERSISTENT ATRIAL FIBRILLATION (HCC): ICD-10-CM

## 2024-06-26 DIAGNOSIS — Q04.6 COLLOID CYST OF THIRD VENTRICLE (HCC): Primary | ICD-10-CM

## 2024-06-26 DIAGNOSIS — R09.89 BRUIT OF RIGHT CAROTID ARTERY: ICD-10-CM

## 2024-06-26 PROCEDURE — 1123F ACP DISCUSS/DSCN MKR DOCD: CPT | Performed by: PSYCHIATRY & NEUROLOGY

## 2024-06-26 PROCEDURE — 3074F SYST BP LT 130 MM HG: CPT | Performed by: PSYCHIATRY & NEUROLOGY

## 2024-06-26 PROCEDURE — G8420 CALC BMI NORM PARAMETERS: HCPCS | Performed by: PSYCHIATRY & NEUROLOGY

## 2024-06-26 PROCEDURE — 99214 OFFICE O/P EST MOD 30 MIN: CPT | Performed by: PSYCHIATRY & NEUROLOGY

## 2024-06-26 PROCEDURE — G8427 DOCREV CUR MEDS BY ELIG CLIN: HCPCS | Performed by: PSYCHIATRY & NEUROLOGY

## 2024-06-26 PROCEDURE — 3078F DIAST BP <80 MM HG: CPT | Performed by: PSYCHIATRY & NEUROLOGY

## 2024-06-26 PROCEDURE — 1036F TOBACCO NON-USER: CPT | Performed by: PSYCHIATRY & NEUROLOGY

## 2024-06-26 NOTE — PROGRESS NOTES
Subjective:      Patient ID: Ruben Gonsalez is a 84 y.o. male who presents today for:  Chief Complaint   Patient presents with    Follow-up     Pt states things are good. No question or concerns at this time        HPI 84 right-handed gentleman with a history of colloid cyst of the third ventricle.  Patient had a CT scan of the head done in July last year and we have reviewed this.  This cyst has not changed.  Is not causing a ball-valve event and hydrocephalus.  Patient also had a stroke in the left middle cerebral artery and has persistent underlying long-term effects of the same with difficulty with walking.  Patient is atrial fibrillation continues on anticoagulation Eliquis without any bleeding or bruising is not any falls.  Denies any headaches upon erect posture.  He has no cough headaches    Patient has considerable bruising as he was on Eliquis Plavix and aspirin together.  He is now only on Eliquis and Plavix.  He has been stable since.  He still gets short winded upon walking and therefore uses a walker.    Past Medical History:   Diagnosis Date    MARIAM (acute kidney injury) ([facility]) 06/07/2022    Atrial fibrillation ([facility])     CAD (coronary artery disease)     cardiac stents    COPD (chronic obstructive pulmonary disease) ([facility])     Coronary angioplasty status 11/26/2018    Diabetes mellitus ([facility])     Hyperlipidemia     Hypertension     Movement disorder     Pneumonia      Past Surgical History:   Procedure Laterality Date    APPENDECTOMY      COLONOSCOPY N/A 5/17/2024    COLONOSCOPY DIAGNOSTIC performed by Surinder Chavarria MD at [facility]    CORONARY ANGIOPLASTY WITH STENT PLACEMENT      4    CORONARY ARTERY BYPASS GRAFT      triple bypass    EYE SURGERY Bilateral     cataract surgery    INSERTABLE CARDIAC MONITOR Left 12/2018    UPPER GASTROINTESTINAL ENDOSCOPY N/A 10/13/2022    EGD ESOPHAGOGASTRODUODENOSCOPY WITH DILATION performed by Surinder Chavarria MD at [facility]    UPPER GASTROINTESTINAL

## 2024-07-02 ENCOUNTER — ANCILLARY PROCEDURE (OUTPATIENT)
Dept: CARDIOLOGY | Facility: HOSPITAL | Age: 84
End: 2024-07-02
Payer: MEDICARE

## 2024-07-02 DIAGNOSIS — I48.0 PAROXYSMAL ATRIAL FIBRILLATION (MULTI): ICD-10-CM

## 2024-07-02 DIAGNOSIS — I25.10 CORONARY ARTERY DISEASE INVOLVING NATIVE CORONARY ARTERY OF NATIVE HEART WITHOUT ANGINA PECTORIS: ICD-10-CM

## 2024-07-02 DIAGNOSIS — R06.02 SHORTNESS OF BREATH: ICD-10-CM

## 2024-07-02 LAB
AORTIC VALVE MEAN GRADIENT: 5 MMHG
AORTIC VALVE PEAK VELOCITY: 1.41 M/S
AV PEAK GRADIENT: 8 MMHG
AVA (PEAK VEL): 0.99 CM2
AVA (VTI): 1.05 CM2
EJECTION FRACTION APICAL 4 CHAMBER: 26.9
EJECTION FRACTION: 48 %
LEFT VENTRICLE INTERNAL DIMENSION DIASTOLE: 3.88 CM (ref 3.5–6)
LEFT VENTRICULAR OUTFLOW TRACT DIAMETER: 1.7 CM
LV EJECTION FRACTION BIPLANE: 26 %
MITRAL VALVE E/A RATIO: 0.54
MITRAL VALVE E/E' RATIO: 5.9
RIGHT VENTRICLE PEAK SYSTOLIC PRESSURE: 38.3 MMHG

## 2024-07-02 PROCEDURE — 93306 TTE W/DOPPLER COMPLETE: CPT

## 2024-07-02 PROCEDURE — 93306 TTE W/DOPPLER COMPLETE: CPT | Performed by: INTERNAL MEDICINE

## 2024-07-17 ENCOUNTER — APPOINTMENT (OUTPATIENT)
Dept: CARDIOLOGY | Facility: CLINIC | Age: 84
End: 2024-07-17
Payer: MEDICARE

## 2024-07-17 VITALS
SYSTOLIC BLOOD PRESSURE: 124 MMHG | HEIGHT: 68 IN | DIASTOLIC BLOOD PRESSURE: 84 MMHG | BODY MASS INDEX: 21.07 KG/M2 | WEIGHT: 139 LBS | HEART RATE: 60 BPM

## 2024-07-17 DIAGNOSIS — I48.0 PAROXYSMAL ATRIAL FIBRILLATION (MULTI): ICD-10-CM

## 2024-07-17 DIAGNOSIS — R06.02 SHORTNESS OF BREATH: ICD-10-CM

## 2024-07-17 DIAGNOSIS — I25.10 CORONARY ARTERY DISEASE INVOLVING NATIVE CORONARY ARTERY OF NATIVE HEART WITHOUT ANGINA PECTORIS: ICD-10-CM

## 2024-07-17 DIAGNOSIS — I10 PRIMARY HYPERTENSION: ICD-10-CM

## 2024-07-17 PROCEDURE — 99214 OFFICE O/P EST MOD 30 MIN: CPT | Performed by: INTERNAL MEDICINE

## 2024-07-17 PROCEDURE — 3079F DIAST BP 80-89 MM HG: CPT | Performed by: INTERNAL MEDICINE

## 2024-07-17 PROCEDURE — 3074F SYST BP LT 130 MM HG: CPT | Performed by: INTERNAL MEDICINE

## 2024-07-17 PROCEDURE — 1036F TOBACCO NON-USER: CPT | Performed by: INTERNAL MEDICINE

## 2024-07-17 PROCEDURE — 1159F MED LIST DOCD IN RCRD: CPT | Performed by: INTERNAL MEDICINE

## 2024-07-17 RX ORDER — VALSARTAN 80 MG/1
80 TABLET ORAL DAILY
Qty: 90 TABLET | Refills: 3 | Status: SHIPPED | OUTPATIENT
Start: 2024-07-17

## 2024-07-17 RX ORDER — METOPROLOL SUCCINATE 50 MG/1
50 TABLET, EXTENDED RELEASE ORAL DAILY
Qty: 90 TABLET | Refills: 3 | Status: SHIPPED | OUTPATIENT
Start: 2024-07-17

## 2024-07-17 RX ORDER — PITAVASTATIN CALCIUM 4.18 MG/1
1 TABLET, FILM COATED ORAL NIGHTLY
Qty: 90 TABLET | Refills: 3 | Status: SHIPPED | OUTPATIENT
Start: 2024-07-17 | End: 2025-07-17

## 2024-07-17 NOTE — PATIENT INSTRUCTIONS
Did you know we have a retail pharmacy in the building and we can send your prescriptions there for  if you like! We typically can have prescriptions ready in 10 to 15 minutes!    Dr. Pantoja would like to see you sooner than 6 months if your breathing gets worse, you notice chest discomfort or swelling to your legs.      -Please bring all medicines, vitamins, and herbal supplements with you in original bottles to every appointment!!!!    -Prescriptions will not be filled unless you are compliant with your follow up appointments or have a follow up appointment scheduled as per instruction of your physician. Refills should be requested at the time of your visit.

## 2024-07-17 NOTE — PROGRESS NOTES
Patient:  Jaquan Gambino  YOB: 1940  MRN: [id number]       Impression/Plan:     Diagnoses and all orders for this visit:  Coronary artery disease involving native coronary artery of native heart without angina pectoris  -     No angina at this time  -     Echo shows slight decrease in overall ejection fraction I discussed with he and his wife this could be due to progression of coronary disease.  He says his breathing is really unchanged he would not be interested in stenting or other interventions on his heart as long as he is not significantly symptomatic.  He tolerates his current medications well and will continue same  -     Tolerating Livalo well.  Not on antiplatelet therapy secondary to severe anemia he is on anticoagulation would continue that alone without antiplatelets  Paroxysmal atrial fibrillation (Multi)  -     Low burden  -     apixaban (Eliquis) 5 mg tablet; Take 1 tablet (5 mg) by mouth 2 times a day.  Shortness of breath  -     Current level seems most likely pulmonary he says he is very stable at this point  Primary hypertension  -     valsartan (Diovan) 80 mg tablet; Take 1 tablet (80 mg) by mouth once daily.  -     Well-controlled      Chief Complaint/Active Symptoms:       Jaquan Gambino is a 84 y.o. male who presents with  coronary artery disease with prior CABG ~ 1999 (L-LAD, radial-CX, SVG-RCA).  Paroxysmal atrial fibrillation.  Has had remote abdominal aortic aneurysm resection as well as hypertension, hyperlipidemia.  Perfusion study November 2022 57% EF with evidence of prior posterior lateral infarct echocardiogram 2021 EF 55%.  1+ MR 1+ TR. he has significant COPD .      Admitted [facility] 5/14/2024 presenting with severe anemia fatigue and weakness.  Initial blood pressure 90/50 heart rate 80 O2 saturation 100%.  Initial hemoglobin 6.6 creatinine 1.54 admitted for blood transfusion ECG showed sinus rhythm on presentation.  EGD and colonoscopy recommended  by gastroenterology.  Was seen by hematology found him to have iron and B12 deficiency possible pernicious anemia was given B12 and Venofer infusions.  Neither EGD or colonoscopy showed active source of bleeding.  Reportedly creatinine decreased to 1.17 after transfusion globin increased 8.5     6/3/2024 presented this office after above described admission for anemia.  He was taken off of aspirin in favor of full anticoagulation alone in light of his recent bleeding.  Watchman would be very reasonable but neither he and his wife wanted any more procedures.  Iron deficiency and B12 deficiency were both noted felt absorptive and he is being treated by hematology.  He had no signs of angina at that time echo was obtained to assure shortness of breath not related to change in cardiac disease but attributable to his known COPD.          1/2/2024 echocardiogram      1. The left ventricular systolic function is mildly decreased, with a visually estimated ejection fraction of 45-50%.   2. There is global hypokinesis of the left ventricle with minor regional variations.   3. RVSP 40 mm Hg.   4. There is normal right ventricular global systolic function.   5. Tr MR.   6. Mildly elevated RVSP.    Echo 2021 report states 50% EF.    He denies angina, palpitation, edema, lightheadedness or syncope.  He has had no symptoms of claudication or neurologic deterioration.  There have been no hospitalizations or emergency room visits since last office visit.  Following regularly with medicine for iron and B12 infusion.  He is had no bleeding    Overall he has been feeling well except for slight shortness of breath that is stable for his level of COPD.  He does not think there is been a change in the last year    Review of Systems: Unremarkable except as noted above    Meds     Current Outpatient Medications   Medication Instructions    albuterol 90 mcg/actuation inhaler use 1 puff as needed twice daily for 90 days.    apixaban  (ELIQUIS) 5 mg, oral, 2 times daily    cyanocobalamin (VITAMIN B-12) 1,000 mcg, oral, Daily    magnesium oxide 400 mg magnesium capsule 1 tablet, oral, Daily    metFORMIN (Glucophage) 500 mg tablet 1 tablet, 2 times daily    metoprolol succinate XL (TOPROL-XL) 50 mg, oral, Daily    nitroglycerin (Nitrostat) 0.4 mg SL tablet DISSOLVE 1 TABLET UNDER THE TONGUE AS NEEDED FOR CHEST PAIN- MAY REPEAT EVERY 5 MINUTES IF NEEDED ( MAX 3 DOSES.- IF NO RELIEF CALL 911)    pantoprazole (PROTONIX) 40 mg, oral, Daily    pitavastatin calcium (Livalo) 4 mg tablet 1 tablet, oral, Nightly    valsartan (DIOVAN) 80 mg, oral, Daily        Allergies     Allergies   Allergen Reactions    Atorvastatin Unknown    Fenofibrate Unknown    Fluvastatin Unknown    Pravastatin Unknown    Simvastatin Unknown    Vitamin E (D-Alpha Tocopherol) Unknown         Annotated Problems     Specialty Problems          Cardiology Problems    AAA (abdominal aortic aneurysm) (CMS-HCC)     remote abdominal aortic aneurysm resection         AV block, 1st degree    Coronary artery disease     Perfusion study November 2022 57% EF with evidence of prior posterior lateral infarct    NOV 2021 echo LVEF 55%.  1+ MR 1+ TR    CABG ~ 1999 (L-LAD, radial-CX, SVG-RCA)         Hyperlipidemia    Hypertension    Paroxysmal atrial fibrillation (Multi)    PVC (premature ventricular contraction)    S/P CABG (coronary artery bypass graft)     CABG ~ 1999 (L-LAD, radial-CX, SVG-RCA).         Sinus node dysfunction (Multi)    Status post placement of implantable loop recorder    Former smoker        Problem List     Patient Active Problem List    Diagnosis Date Noted    B12 deficiency 06/03/2024    BMI 21.0-21.9, adult 12/01/2023    Former smoker 12/01/2023    AAA (abdominal aortic aneurysm) ([facility]) 11/08/2023    Coronary artery disease 11/08/2023    AV block, 1st degree 11/08/2023    COPD (chronic obstructive pulmonary disease) (Multi) 11/08/2023    Diabetes mellitus (Multi)  "11/08/2023    Fatigue 11/08/2023    Hyperlipidemia 11/08/2023    Hypertension 11/08/2023    Infection of the inner ear 11/08/2023    Paroxysmal atrial fibrillation (Multi) 11/08/2023    PVC (premature ventricular contraction) 11/08/2023    S/P CABG (coronary artery bypass graft) 11/08/2023    Shortness of breath 11/08/2023    Sinus node dysfunction (Multi) 11/08/2023    Status post placement of implantable loop recorder 11/08/2023    Vertigo 11/08/2023    Weight loss 11/08/2023       Objective:     Vitals:    07/17/24 0808   BP: 124/84   BP Location: Right arm   Patient Position: Sitting   Pulse: 60   Weight: 63 kg (139 lb)   Height: 1.727 m (5' 8\")      Wt Readings from Last 4 Encounters:   07/17/24 63 kg (139 lb)   06/03/24 63 kg (139 lb)   12/01/23 65.6 kg (144 lb 9.6 oz)   06/02/23 64.9 kg (143 lb)           LAB:     Lab Results   Component Value Date    WBC 15.4 (H) 04/10/2022    HGB 10.3 (L) 04/10/2022    HCT 32.9 (L) 04/10/2022     (L) 04/10/2022    ALT 20 04/11/2022    AST 24 04/11/2022     04/11/2022    K 3.9 04/11/2022     04/11/2022    CREATININE 1.10 04/11/2022    BUN 24 (H) 04/11/2022    CO2 29 04/11/2022    INR 1.5 (H) 04/08/2022    HGBA1C 6.5 (H) 05/14/2024       Diagnostic Studies:     Patient was never admitted.      Radiology:     No orders to display       Physical Exam     General Appearance: alert and oriented to person, place and time, in no acute distress  Cardiovascular: normal rate, regular rhythm, normal S1 and S2, no murmurs, rubs, clicks, or gallops,  no JVD  Pulmonary/Chest: clear to auscultation bilaterally- no wheezes, rales or rhonchi, normal air movement, no respiratory distress  Abdomen: soft, non-tender, non-distended, normal bowel sounds, no masses   Extremities: no cyanosis, clubbing or edema  Skin: warm and dry, no rash or erythema  Eyes: EOMI  Neck: supple and non-tender without mass, no thyromegaly   Neurological: alert, oriented, normal speech, no focal " findings or movement disorder noted          Scribe Attestation  By signing my name below, I, Kiah Mann LPN , Scribe   attest that this documentation has been prepared under the direction and in the presence of Celso Pantoja MD.

## 2024-07-25 DIAGNOSIS — I48.0 PAROXYSMAL ATRIAL FIBRILLATION (MULTI): ICD-10-CM

## 2024-07-25 DIAGNOSIS — I10 PRIMARY HYPERTENSION: ICD-10-CM

## 2024-07-25 DIAGNOSIS — I25.10 CORONARY ARTERY DISEASE INVOLVING NATIVE CORONARY ARTERY OF NATIVE HEART WITHOUT ANGINA PECTORIS: ICD-10-CM

## 2024-07-25 RX ORDER — PITAVASTATIN CALCIUM 4.18 MG/1
1 TABLET, FILM COATED ORAL NIGHTLY
Qty: 90 TABLET | Refills: 3 | Status: SHIPPED | OUTPATIENT
Start: 2024-07-25 | End: 2025-07-25

## 2024-07-25 RX ORDER — VALSARTAN 80 MG/1
80 TABLET ORAL DAILY
Qty: 90 TABLET | Refills: 3 | Status: SHIPPED | OUTPATIENT
Start: 2024-07-25

## 2024-07-25 NOTE — TELEPHONE ENCOUNTER
Patient's wife presented to the office requesting refills on Eliquis 5 mg BID, Pitavastatin 4 mg daily, and Valsartan 80 mg daily. Spoke with [facility] who states there is no RX orders on file. Patient will need a short term supply for Eliquis to go to local pharmacy as they wait on mail order.   Hawa Burnett MA

## 2025-01-07 ENCOUNTER — APPOINTMENT (OUTPATIENT)
Dept: CT IMAGING | Age: 85
End: 2025-01-07
Payer: MEDICARE

## 2025-01-07 ENCOUNTER — HOSPITAL ENCOUNTER (INPATIENT)
Age: 85
LOS: 11 days | Discharge: NURSING, REHAB OR LONG-TERM CARE | End: 2025-01-18
Attending: EMERGENCY MEDICINE | Admitting: INTERNAL MEDICINE
Payer: MEDICARE

## 2025-01-07 ENCOUNTER — APPOINTMENT (OUTPATIENT)
Dept: GENERAL RADIOLOGY | Age: 85
End: 2025-01-07
Payer: MEDICARE

## 2025-01-07 DIAGNOSIS — J96.00 ACUTE RESPIRATORY FAILURE, UNSPECIFIED WHETHER WITH HYPOXIA OR HYPERCAPNIA: ICD-10-CM

## 2025-01-07 DIAGNOSIS — J44.1 COPD EXACERBATION (HCC): ICD-10-CM

## 2025-01-07 DIAGNOSIS — N18.9 CHRONIC KIDNEY DISEASE, UNSPECIFIED CKD STAGE: Primary | ICD-10-CM

## 2025-01-07 DIAGNOSIS — E86.0 DEHYDRATION: ICD-10-CM

## 2025-01-07 DIAGNOSIS — U07.1 COVID-19: ICD-10-CM

## 2025-01-07 DIAGNOSIS — R06.02 SHORTNESS OF BREATH: ICD-10-CM

## 2025-01-07 LAB
ALBUMIN SERPL-MCNC: 4.4 G/DL (ref 3.5–4.6)
ALP SERPL-CCNC: 77 U/L (ref 35–104)
ALT SERPL-CCNC: 9 U/L (ref 0–41)
ANION GAP SERPL CALCULATED.3IONS-SCNC: 15 MEQ/L (ref 9–15)
APTT PPP: 31.8 SEC (ref 24.4–36.8)
AST SERPL-CCNC: 15 U/L (ref 0–40)
B PARAP IS1001 DNA NPH QL NAA+NON-PROBE: NOT DETECTED
B PERT.PT PRMT NPH QL NAA+NON-PROBE: NOT DETECTED
BASOPHILS # BLD: 0.1 K/UL (ref 0–0.2)
BASOPHILS NFR BLD: 0.4 %
BILIRUB SERPL-MCNC: 0.4 MG/DL (ref 0.2–0.7)
BNP BLD-MCNC: 1152 PG/ML
BUN SERPL-MCNC: 26 MG/DL (ref 8–23)
C PNEUM DNA NPH QL NAA+NON-PROBE: NOT DETECTED
CALCIUM SERPL-MCNC: 9.5 MG/DL (ref 8.5–9.9)
CHLORIDE SERPL-SCNC: 100 MEQ/L (ref 95–107)
CO2 SERPL-SCNC: 22 MEQ/L (ref 20–31)
CREAT SERPL-MCNC: 1.57 MG/DL (ref 0.7–1.2)
EOSINOPHIL # BLD: 0.2 K/UL (ref 0–0.7)
EOSINOPHIL NFR BLD: 1.5 %
ERYTHROCYTE [DISTWIDTH] IN BLOOD BY AUTOMATED COUNT: 13.9 % (ref 11.5–14.5)
FLUAV RNA NPH QL NAA+NON-PROBE: NOT DETECTED
FLUBV RNA NPH QL NAA+NON-PROBE: NOT DETECTED
GLOBULIN SER CALC-MCNC: 3 G/DL (ref 2.3–3.5)
GLUCOSE SERPL-MCNC: 190 MG/DL (ref 70–99)
HADV DNA NPH QL NAA+NON-PROBE: NOT DETECTED
HCOV 229E RNA NPH QL NAA+NON-PROBE: NOT DETECTED
HCOV HKU1 RNA NPH QL NAA+NON-PROBE: NOT DETECTED
HCOV NL63 RNA NPH QL NAA+NON-PROBE: NOT DETECTED
HCOV OC43 RNA NPH QL NAA+NON-PROBE: NOT DETECTED
HCT VFR BLD AUTO: 41.8 % (ref 42–52)
HGB BLD-MCNC: 13.3 G/DL (ref 14–18)
HMPV RNA NPH QL NAA+NON-PROBE: NOT DETECTED
HPIV1 RNA NPH QL NAA+NON-PROBE: NOT DETECTED
HPIV2 RNA NPH QL NAA+NON-PROBE: NOT DETECTED
HPIV3 RNA NPH QL NAA+NON-PROBE: NOT DETECTED
HPIV4 RNA NPH QL NAA+NON-PROBE: NOT DETECTED
INR PPP: 1.6
LACTIC ACID, SEPSIS: 3.2 MMOL/L (ref 0.5–1.9)
LACTIC ACID, SEPSIS: 4 MMOL/L (ref 0.5–1.9)
LYMPHOCYTES # BLD: 1 K/UL (ref 1–4.8)
LYMPHOCYTES NFR BLD: 7.4 %
M PNEUMO DNA NPH QL NAA+NON-PROBE: NOT DETECTED
MAGNESIUM SERPL-MCNC: 2.2 MG/DL (ref 1.7–2.4)
MCH RBC QN AUTO: 29 PG (ref 27–31.3)
MCHC RBC AUTO-ENTMCNC: 31.8 % (ref 33–37)
MCV RBC AUTO: 91.1 FL (ref 79–92.2)
MONOCYTES # BLD: 1.3 K/UL (ref 0.2–0.8)
MONOCYTES NFR BLD: 9.6 %
NEUTROPHILS # BLD: 10.9 K/UL (ref 1.4–6.5)
NEUTS SEG NFR BLD: 80.5 %
PLATELET # BLD AUTO: 335 K/UL (ref 130–400)
POTASSIUM SERPL-SCNC: 5.1 MEQ/L (ref 3.4–4.9)
PROCALCITONIN SERPL IA-MCNC: 0.08 NG/ML (ref 0–0.15)
PROT SERPL-MCNC: 7.4 G/DL (ref 6.3–8)
PROTHROMBIN TIME: 18.7 SEC (ref 12.3–14.9)
RBC # BLD AUTO: 4.59 M/UL (ref 4.7–6.1)
RSV RNA NPH QL NAA+NON-PROBE: NOT DETECTED
RV+EV RNA NPH QL NAA+NON-PROBE: NOT DETECTED
SARS-COV-2 RNA NPH QL NAA+NON-PROBE: DETECTED
SODIUM SERPL-SCNC: 137 MEQ/L (ref 135–144)
TROPONIN, HIGH SENSITIVITY: 25 NG/L (ref 0–19)
TROPONIN, HIGH SENSITIVITY: 29 NG/L (ref 0–19)
WBC # BLD AUTO: 13.6 K/UL (ref 4.8–10.8)

## 2025-01-07 PROCEDURE — 80053 COMPREHEN METABOLIC PANEL: CPT

## 2025-01-07 PROCEDURE — 87040 BLOOD CULTURE FOR BACTERIA: CPT

## 2025-01-07 PROCEDURE — 83605 ASSAY OF LACTIC ACID: CPT

## 2025-01-07 PROCEDURE — 85610 PROTHROMBIN TIME: CPT

## 2025-01-07 PROCEDURE — 6370000000 HC RX 637 (ALT 250 FOR IP): Performed by: EMERGENCY MEDICINE

## 2025-01-07 PROCEDURE — 70450 CT HEAD/BRAIN W/O DYE: CPT

## 2025-01-07 PROCEDURE — 0202U NFCT DS 22 TRGT SARS-COV-2: CPT

## 2025-01-07 PROCEDURE — 36600 WITHDRAWAL OF ARTERIAL BLOOD: CPT

## 2025-01-07 PROCEDURE — 93005 ELECTROCARDIOGRAM TRACING: CPT | Performed by: EMERGENCY MEDICINE

## 2025-01-07 PROCEDURE — 36415 COLL VENOUS BLD VENIPUNCTURE: CPT

## 2025-01-07 PROCEDURE — 94640 AIRWAY INHALATION TREATMENT: CPT

## 2025-01-07 PROCEDURE — 2580000003 HC RX 258: Performed by: EMERGENCY MEDICINE

## 2025-01-07 PROCEDURE — 85730 THROMBOPLASTIN TIME PARTIAL: CPT

## 2025-01-07 PROCEDURE — 83735 ASSAY OF MAGNESIUM: CPT

## 2025-01-07 PROCEDURE — 99285 EMERGENCY DEPT VISIT HI MDM: CPT

## 2025-01-07 PROCEDURE — 2500000003 HC RX 250 WO HCPCS: Performed by: EMERGENCY MEDICINE

## 2025-01-07 PROCEDURE — 2700000000 HC OXYGEN THERAPY PER DAY

## 2025-01-07 PROCEDURE — 83880 ASSAY OF NATRIURETIC PEPTIDE: CPT

## 2025-01-07 PROCEDURE — 71045 X-RAY EXAM CHEST 1 VIEW: CPT

## 2025-01-07 PROCEDURE — 85025 COMPLETE CBC W/AUTO DIFF WBC: CPT

## 2025-01-07 PROCEDURE — 84145 PROCALCITONIN (PCT): CPT

## 2025-01-07 PROCEDURE — 1210000000 HC MED SURG R&B

## 2025-01-07 PROCEDURE — 6360000002 HC RX W HCPCS: Performed by: EMERGENCY MEDICINE

## 2025-01-07 PROCEDURE — 84484 ASSAY OF TROPONIN QUANT: CPT

## 2025-01-07 PROCEDURE — 2500000003 HC RX 250 WO HCPCS: Performed by: INTERNAL MEDICINE

## 2025-01-07 PROCEDURE — 96365 THER/PROPH/DIAG IV INF INIT: CPT

## 2025-01-07 PROCEDURE — 96367 TX/PROPH/DG ADDL SEQ IV INF: CPT

## 2025-01-07 RX ORDER — 0.9 % SODIUM CHLORIDE 0.9 %
2000 INTRAVENOUS SOLUTION INTRAVENOUS ONCE
Status: COMPLETED | OUTPATIENT
Start: 2025-01-07 | End: 2025-01-07

## 2025-01-07 RX ORDER — SODIUM CHLORIDE 0.9 % (FLUSH) 0.9 %
5-40 SYRINGE (ML) INJECTION PRN
Status: DISCONTINUED | OUTPATIENT
Start: 2025-01-07 | End: 2025-01-18 | Stop reason: HOSPADM

## 2025-01-07 RX ORDER — ALBUTEROL SULFATE 0.83 MG/ML
2.5 SOLUTION RESPIRATORY (INHALATION)
Status: ACTIVE | OUTPATIENT
Start: 2025-01-07 | End: 2025-01-08

## 2025-01-07 RX ORDER — SODIUM CHLORIDE 0.9 % (FLUSH) 0.9 %
5-40 SYRINGE (ML) INJECTION EVERY 12 HOURS SCHEDULED
Status: DISCONTINUED | OUTPATIENT
Start: 2025-01-07 | End: 2025-01-18 | Stop reason: HOSPADM

## 2025-01-07 RX ORDER — ALBUTEROL SULFATE 0.83 MG/ML
2.5 SOLUTION RESPIRATORY (INHALATION)
Status: DISCONTINUED | OUTPATIENT
Start: 2025-01-07 | End: 2025-01-09

## 2025-01-07 RX ORDER — MAGNESIUM SULFATE IN WATER 40 MG/ML
2000 INJECTION, SOLUTION INTRAVENOUS ONCE
Status: COMPLETED | OUTPATIENT
Start: 2025-01-07 | End: 2025-01-07

## 2025-01-07 RX ORDER — SODIUM CHLORIDE 9 MG/ML
INJECTION, SOLUTION INTRAVENOUS PRN
Status: DISCONTINUED | OUTPATIENT
Start: 2025-01-07 | End: 2025-01-18 | Stop reason: HOSPADM

## 2025-01-07 RX ORDER — IPRATROPIUM BROMIDE AND ALBUTEROL SULFATE 2.5; .5 MG/3ML; MG/3ML
1 SOLUTION RESPIRATORY (INHALATION) ONCE
Status: COMPLETED | OUTPATIENT
Start: 2025-01-07 | End: 2025-01-07

## 2025-01-07 RX ORDER — ACETAMINOPHEN 325 MG/1
650 TABLET ORAL EVERY 4 HOURS PRN
Status: DISCONTINUED | OUTPATIENT
Start: 2025-01-07 | End: 2025-01-18 | Stop reason: HOSPADM

## 2025-01-07 RX ORDER — ALBUTEROL SULFATE 90 UG/1
2 INHALANT RESPIRATORY (INHALATION) EVERY 4 HOURS PRN
Status: DISCONTINUED | OUTPATIENT
Start: 2025-01-07 | End: 2025-01-09

## 2025-01-07 RX ORDER — ONDANSETRON 4 MG/1
4 TABLET, ORALLY DISINTEGRATING ORAL EVERY 8 HOURS PRN
Status: DISCONTINUED | OUTPATIENT
Start: 2025-01-07 | End: 2025-01-18 | Stop reason: HOSPADM

## 2025-01-07 RX ORDER — ONDANSETRON 2 MG/ML
4 INJECTION INTRAMUSCULAR; INTRAVENOUS EVERY 6 HOURS PRN
Status: DISCONTINUED | OUTPATIENT
Start: 2025-01-07 | End: 2025-01-18 | Stop reason: HOSPADM

## 2025-01-07 RX ADMIN — SODIUM CHLORIDE 2000 ML: 9 INJECTION, SOLUTION INTRAVENOUS at 20:13

## 2025-01-07 RX ADMIN — MAGNESIUM SULFATE HEPTAHYDRATE 2000 MG: 40 INJECTION, SOLUTION INTRAVENOUS at 19:16

## 2025-01-07 RX ADMIN — IPRATROPIUM BROMIDE AND ALBUTEROL SULFATE 1 DOSE: 2.5; .5 SOLUTION RESPIRATORY (INHALATION) at 19:47

## 2025-01-07 RX ADMIN — DOXYCYCLINE 100 MG: 100 INJECTION, POWDER, LYOPHILIZED, FOR SOLUTION INTRAVENOUS at 19:53

## 2025-01-07 RX ADMIN — Medication 10 ML: at 23:23

## 2025-01-07 RX ADMIN — IPRATROPIUM BROMIDE AND ALBUTEROL SULFATE 1 DOSE: 2.5; .5 SOLUTION RESPIRATORY (INHALATION) at 19:46

## 2025-01-07 ASSESSMENT — PAIN SCALES - GENERAL
PAINLEVEL_OUTOF10: 3
PAINLEVEL_OUTOF10: 0

## 2025-01-07 ASSESSMENT — PAIN DESCRIPTION - DESCRIPTORS: DESCRIPTORS: ACHING

## 2025-01-07 ASSESSMENT — PAIN - FUNCTIONAL ASSESSMENT: PAIN_FUNCTIONAL_ASSESSMENT: 0-10

## 2025-01-07 ASSESSMENT — LIFESTYLE VARIABLES
HOW OFTEN DO YOU HAVE A DRINK CONTAINING ALCOHOL: NEVER
HOW MANY STANDARD DRINKS CONTAINING ALCOHOL DO YOU HAVE ON A TYPICAL DAY: PATIENT DOES NOT DRINK

## 2025-01-07 ASSESSMENT — PAIN DESCRIPTION - LOCATION: LOCATION: HEAD

## 2025-01-07 NOTE — ED TRIAGE NOTES
Pt presents to ED via EMS from home due to shortness of breath. EMS reports variable HR  Afib RVR, was hypoxic at 90% on room air and placed on 4 LNC. Pt placed on cardiac monitor and showing tachy rhythm at 125. Pt placed on continuous pulse ox

## 2025-01-07 NOTE — ED PROVIDER NOTES
[facility] 2W ORTHO TELE  EMERGENCY DEPARTMENT ENCOUNTER      Pt Name: Ruben Gonsalez  MRN: [id number]  Birthdate 1940  Date of evaluation: 1/7/2025  Provider: Rahat Silva MD  6:48 PM EST    CHIEF COMPLAINT       Chief Complaint   Patient presents with    Shortness of Breath         HISTORY OF PRESENT ILLNESS   (Location/Symptom, Timing/Onset, Context/Setting, Quality, Duration, Modifying Factors, Severity)  Note limiting factors.   Ruben Gonsalez is a 84 y.o. male who presents to the emergency department via EMS.  Initial report from EMS is that the patient was dyspneic throughout the day, he refused to let his wife call 911.  They arrived and he was sitting on the ground having increased work of breathing.  That he was in atrial fibrillation RVR, saturating 90% on room air.  The patient tells me that he was sitting on the ground and had general weakness too weak to get up.  That he has been very short of breath today even at rest.  He denies fever, vision change, jaw pain, chest pain, vomiting or diaphoresis, abdominal pain, syncope, new focal numbness/weakness.    HPI  Chart review notes history of atrial fibrillation on Eliquis, CAD status post stent and CABG on Plavix, COPD, diabetes, hypertension, hyperlipidemia, movement disorder, aortic aneurysm, PVD  Nursing Notes were reviewed.    REVIEW OF SYSTEMS    (2-9 systems for level 4, 10 or more for level 5)     Review of Systems   Constitutional:  Positive for fatigue. Negative for fever.   Respiratory:  Positive for cough and shortness of breath.    Cardiovascular:  Negative for leg swelling.   Gastrointestinal:  Negative for vomiting.   Genitourinary:  Negative for flank pain.   Musculoskeletal:  Negative for back pain and neck pain.   Neurological:  Negative for syncope.   All other systems reviewed and are negative.      Except as noted above the remainder of the review of systems was reviewed and negative.       PAST MEDICAL HISTORY     Past Medical History:  tel. [phone],  MARCO ANTONIO results called to and read back by DR STOUT, 01/07/2025 21:19, by [facility]   BASIC METABOLIC PANEL - Abnormal; Notable for the following components:    Glucose 244 (*)     BUN 24 (*)     Creatinine 1.50 (*)     Est, Glom Filt Rate 45.2 (*)     All other components within normal limits   CBC WITH AUTO DIFFERENTIAL - Abnormal; Notable for the following components:    WBC 4.4 (*)     RBC 4.04 (*)     Hemoglobin 11.2 (*)     Hematocrit 36.2 (*)     MCHC 30.9 (*)     Lymphocytes Absolute 0.3 (*)     Monocytes Absolute 0.1 (*)     All other components within normal limits   CULTURE, BLOOD 1   CULTURE, BLOOD 2   APTT   BRAIN NATRIURETIC PEPTIDE   MAGNESIUM   PROCALCITONIN   URINALYSIS WITH REFLEX TO CULTURE   POCT GLUCOSE   POCT GLUCOSE       All other labs were within normal range or not returned as of this dictation.    EMERGENCY DEPARTMENT COURSE and DIFFERENTIAL DIAGNOSIS/MDM:   Vitals:    Vitals:    01/07/25 2113 01/07/25 2236 01/08/25 0200 01/08/25 0754   BP:  127/68 (!) 129/52 (!) 148/80   Pulse:  (!) 106 75 74   Resp: 24 30 22 20   Temp:  97.6 °F (36.4 °C) 97.7 °F (36.5 °C) 97.4 °F (36.3 °C)   TempSrc:  Oral Axillary Oral   SpO2:  99% 97% 98%   Weight:       Height:             Compliant with anticoagulation, doubt pulmonary embolism  2 years ago he had a reassuring stress test, ejection fraction 57%  Medical Decision Making  Amount and/or Complexity of Data Reviewed  Labs: ordered.  Radiology: ordered.  ECG/medicine tests: ordered.    Risk  Prescription drug management.  Decision regarding hospitalization.    Patient presents emergency department for evaluation of dyspnea, cardiopulmonary and septic workup pursued.  Given IV magnesium and breathing treatments, he was already given Solu-Medrol prior to arrival via EMS.  SEP-1 CORE MEASURE DATA      Sepsis Criteria   Severe Sepsis Criteria   Septic Shock Criteria       Must meet 2:    []Temp >100.9 F (38.3 C) or < 96.8 F (36 C)  [x]HR > 90  [x]RR

## 2025-01-08 LAB
ANION GAP SERPL CALCULATED.3IONS-SCNC: 13 MEQ/L (ref 9–15)
BASOPHILS # BLD: 0 K/UL (ref 0–0.2)
BASOPHILS NFR BLD: 0.2 %
BUN SERPL-MCNC: 24 MG/DL (ref 8–23)
CALCIUM SERPL-MCNC: 8.8 MG/DL (ref 8.5–9.9)
CHLORIDE SERPL-SCNC: 106 MEQ/L (ref 95–107)
CO2 SERPL-SCNC: 22 MEQ/L (ref 20–31)
CREAT SERPL-MCNC: 1.5 MG/DL (ref 0.7–1.2)
EKG ATRIAL RATE: 117 BPM
EKG Q-T INTERVAL: 310 MS
EKG QRS DURATION: 82 MS
EKG QTC CALCULATION (BAZETT): 438 MS
EKG R AXIS: 72 DEGREES
EKG T AXIS: 95 DEGREES
EKG VENTRICULAR RATE: 120 BPM
EOSINOPHIL # BLD: 0 K/UL (ref 0–0.7)
EOSINOPHIL NFR BLD: 0 %
ERYTHROCYTE [DISTWIDTH] IN BLOOD BY AUTOMATED COUNT: 13.8 % (ref 11.5–14.5)
GLUCOSE BLD-MCNC: 114 MG/DL (ref 70–99)
GLUCOSE BLD-MCNC: 134 MG/DL (ref 70–99)
GLUCOSE BLD-MCNC: 190 MG/DL (ref 70–99)
GLUCOSE BLD-MCNC: 212 MG/DL (ref 70–99)
GLUCOSE SERPL-MCNC: 244 MG/DL (ref 70–99)
HCT VFR BLD AUTO: 36.2 % (ref 42–52)
HGB BLD-MCNC: 11.2 G/DL (ref 14–18)
LYMPHOCYTES # BLD: 0.3 K/UL (ref 1–4.8)
LYMPHOCYTES NFR BLD: 7.3 %
MCH RBC QN AUTO: 27.7 PG (ref 27–31.3)
MCHC RBC AUTO-ENTMCNC: 30.9 % (ref 33–37)
MCV RBC AUTO: 89.6 FL (ref 79–92.2)
MONOCYTES # BLD: 0.1 K/UL (ref 0.2–0.8)
MONOCYTES NFR BLD: 2.5 %
NEUTROPHILS # BLD: 3.9 K/UL (ref 1.4–6.5)
NEUTS SEG NFR BLD: 89.5 %
PERFORMED ON: ABNORMAL
PLATELET # BLD AUTO: 250 K/UL (ref 130–400)
POTASSIUM SERPL-SCNC: 4.9 MEQ/L (ref 3.4–4.9)
RBC # BLD AUTO: 4.04 M/UL (ref 4.7–6.1)
SODIUM SERPL-SCNC: 141 MEQ/L (ref 135–144)
TROPONIN, HIGH SENSITIVITY: 28 NG/L (ref 0–19)
WBC # BLD AUTO: 4.4 K/UL (ref 4.8–10.8)

## 2025-01-08 PROCEDURE — 93010 ELECTROCARDIOGRAM REPORT: CPT | Performed by: INTERNAL MEDICINE

## 2025-01-08 PROCEDURE — 84484 ASSAY OF TROPONIN QUANT: CPT

## 2025-01-08 PROCEDURE — 6370000000 HC RX 637 (ALT 250 FOR IP): Performed by: STUDENT IN AN ORGANIZED HEALTH CARE EDUCATION/TRAINING PROGRAM

## 2025-01-08 PROCEDURE — 2500000003 HC RX 250 WO HCPCS: Performed by: INTERNAL MEDICINE

## 2025-01-08 PROCEDURE — 2700000000 HC OXYGEN THERAPY PER DAY

## 2025-01-08 PROCEDURE — 6370000000 HC RX 637 (ALT 250 FOR IP): Performed by: INTERNAL MEDICINE

## 2025-01-08 PROCEDURE — 80048 BASIC METABOLIC PNL TOTAL CA: CPT

## 2025-01-08 PROCEDURE — 36415 COLL VENOUS BLD VENIPUNCTURE: CPT

## 2025-01-08 PROCEDURE — 85025 COMPLETE CBC W/AUTO DIFF WBC: CPT

## 2025-01-08 PROCEDURE — 1210000000 HC MED SURG R&B

## 2025-01-08 RX ORDER — INSULIN LISPRO 100 [IU]/ML
0-4 INJECTION, SOLUTION INTRAVENOUS; SUBCUTANEOUS
Status: DISCONTINUED | OUTPATIENT
Start: 2025-01-08 | End: 2025-01-18 | Stop reason: HOSPADM

## 2025-01-08 RX ORDER — DEXTROSE MONOHYDRATE 100 MG/ML
INJECTION, SOLUTION INTRAVENOUS CONTINUOUS PRN
Status: DISCONTINUED | OUTPATIENT
Start: 2025-01-08 | End: 2025-01-18 | Stop reason: HOSPADM

## 2025-01-08 RX ORDER — GLUCAGON 1 MG/ML
1 KIT INJECTION PRN
Status: DISCONTINUED | OUTPATIENT
Start: 2025-01-08 | End: 2025-01-18 | Stop reason: HOSPADM

## 2025-01-08 RX ORDER — METOPROLOL SUCCINATE 50 MG/1
50 TABLET, EXTENDED RELEASE ORAL DAILY
Status: DISCONTINUED | OUTPATIENT
Start: 2025-01-08 | End: 2025-01-18 | Stop reason: HOSPADM

## 2025-01-08 RX ADMIN — METFORMIN HYDROCHLORIDE 250 MG: 500 TABLET ORAL at 18:08

## 2025-01-08 RX ADMIN — INSULIN LISPRO 1 UNITS: 100 INJECTION, SOLUTION INTRAVENOUS; SUBCUTANEOUS at 12:30

## 2025-01-08 RX ADMIN — APIXABAN 5 MG: 5 TABLET, FILM COATED ORAL at 21:19

## 2025-01-08 RX ADMIN — METFORMIN HYDROCHLORIDE 250 MG: 500 TABLET ORAL at 09:03

## 2025-01-08 RX ADMIN — Medication 10 ML: at 21:19

## 2025-01-08 RX ADMIN — APIXABAN 5 MG: 5 TABLET, FILM COATED ORAL at 09:03

## 2025-01-08 RX ADMIN — METOPROLOL SUCCINATE 50 MG: 50 TABLET, EXTENDED RELEASE ORAL at 09:03

## 2025-01-08 RX ADMIN — Medication 10 ML: at 12:31

## 2025-01-08 ASSESSMENT — PAIN SCALES - GENERAL
PAINLEVEL_OUTOF10: 0
PAINLEVEL_OUTOF10: 0

## 2025-01-08 NOTE — CARE COORDINATION
ATTEMPTED TO CONTACT PT TO OBTAIN CMI AND PROVIDE PG 1 IMM AND PHONE RINGING BUSY. WILL TRY AGAIN SHORTLY.

## 2025-01-08 NOTE — CARE COORDINATION
CTN to see patient for COPD exacerbation. Patient is in droplet plus isolation for COVID. COVID booklet and COPD booklet and zone mailed to the patient's residence for his review.

## 2025-01-08 NOTE — ED NOTES
Radiology at bedside. Wife present and states she found him on the floor in the doorway outside the bathroom and he was asking her to pick him up off the floor. She states she called 911 because she couldn't pick him up. Pt denies falling or sustaining injury

## 2025-01-08 NOTE — FLOWSHEET NOTE
Spoke to dr hernandez regarding pt blood sugar on labs this morning and home metformin, received orders for metformin 250mg bid, accu checks and low dose sliding scale with meals

## 2025-01-08 NOTE — CARE COORDINATION
Case Management Assessment  Initial Evaluation    Date/Time of Evaluation: 1/8/2025 4:43 PM  Assessment Completed by: Lela Yuan RN    If patient is discharged prior to next notation, then this note serves as note for discharge by case management.    Patient Name: Ruben Gonsalez                   YOB: 1940  Diagnosis: Dehydration [E86.0]  COPD exacerbation (HCC) [J44.1]  Acute respiratory failure, unspecified whether with hypoxia or hypercapnia [J96.00]  Chronic kidney disease, unspecified CKD stage [N18.9]  COVID-19 [U07.1]                   Date / Time: 1/7/2025  6:47 PM    Patient Admission Status: Inpatient   Readmission Risk (Low < 19, Mod (19-27), High > 27): Readmission Risk Score: 17.2    Current PCP: Adilson Dowd, DO  PCP verified by CM? Yes    Chart Reviewed: Yes      History Provided by: Spouse  Patient Orientation: Alert and Oriented    Patient Cognition: Alert    Hospitalization in the last 30 days (Readmission):  No    If yes, Readmission Assessment in CM Navigator will be completed.    Advance Directives:      Code Status: Full Code   Patient's Primary Decision Maker is: Named in Scanned ACP Document    Primary Decision Maker: Kami Gonsalez - Spouse - [phone]    Discharge Planning:    Patient lives with: Spouse/Significant Other Type of Home: House  Primary Care Giver: Spouse  Patient Support Systems include: Spouse/Significant Other   Current Financial resources: Medicare  Current community resources: None  Current services prior to admission: None            Current DME:  WALKER, CRUTCHES, SHOWER  CHAIR, TOILET RAISER,             Type of Home Care services:  None (PT IS A , NOT SERVICE CONNECTED)    ADLS  Prior functional level: Mobility, Assistance with the following:, Bathing, Dressing, Cooking, Housework, Shopping  Current functional level: Mobility, Assistance with the following:, Bathing, Dressing, Cooking, Housework, Shopping    PT AM-PAC:   /24  OT AM-PAC:  [J44.1]  Acute respiratory failure, unspecified whether with hypoxia or hypercapnia [J96.00]  Chronic kidney disease, unspecified CKD stage [N18.9]  COVID-19 [U07.1]    IF APPLICABLE: The Patient and/or patient representative Ruben and his family were provided with a choice of provider and agrees with the discharge plan. Freedom of choice list with basic dialogue that supports the patient's individualized plan of care/goals and shares the quality data associated with the providers was provided to: Patient   Patient Representative Name:       The Patient and/or Patient Representative Agree with the Discharge Plan? Yes    Lela Yuan RN  Case Management Department  Ph: [phone] Fax: [phone]

## 2025-01-08 NOTE — H&P
[facility]                   [address] [place] [address]                           HISTORY & PHYSICAL      PATIENT NAME: CORNELL CUNHA                 : 1940  MED REC NO: [id number]                        ROOM: W284  ACCOUNT NO: [id number]                       ADMIT DATE: 2025  PROVIDER: Adilson Dowd DO      HISTORY OF PRESENT ILLNESS:  The patient is an 84-year-old thin  male, admitted to the hospital after falling at home.  The patient denied any syncope.  He denied any fall or struck his head.  The patient had been in his regular state of health up until at that particular moment.  On admission to the hospital, he was positive for COVID with vital signs stable.  He does have a history of underlying organic heart disease, atrial fibrillation, and previous cardiac stents.  He has known CKD 3B and has been stable.  The patient also was known to have hyperlipidemia, hypertension, diabetes, COPD, and aortic aneurysm.  His wife is present at the time of my evaluation.  He is in no distress.  His appetite is fair at the moment.  He denies any dizziness, headaches, or palpitations.    PAST MEDICAL HISTORY:  Atrial fibrillation, cardiac stent, COPD, diabetes mellitus type 2, hypertension, hyperlipidemia.    PAST SURGICAL HISTORY:  Appendectomy, colonoscopy multiple times, coronary angioplasty with 4 stents, prior coronary artery bypass grafting x3 vessels, insertable cardiac monitor in place, upper endoscopy x3.    MEDICATIONS:  Toprol, Diovan, Eliquis, albuterol, magnesium oxide, Glucophage, Levalon, Nitrostat, Plavix.    ALLERGIES:  LIPITOR, NIACIN, VITAMIN E, ZOCOR, FENOFIBRATE.      SOCIAL HISTORY:  .  No alcohol.  No opioids.  No smoking.  He was a prior cigarette smoker.    REVIEW OF SYSTEMS:  Unremarkable.    PHYSICAL EXAMINATION:  VITAL SIGNS:  The patient is 5 feet 8 inches, 145 pounds.  Blood pressure is 130/50, heart rate 75, respirations

## 2025-01-08 NOTE — CONSULTS
Renal consult    Fall at Home  Covid-+  CKD 3a  OHDx AF now NSR  CAB GX   Coronary stents  DJD  COPD    Plan review CT head  labs stable   continue home meds reduce metformin poor intake  vitals fine  isolation for covid

## 2025-01-08 NOTE — ACP (ADVANCE CARE PLANNING)
Advance Care Planning   Healthcare Decision Maker:    Primary Decision Maker: Kami Gonsalez - [facility] - [phone]

## 2025-01-09 LAB
ALBUMIN SERPL-MCNC: 3.7 G/DL (ref 3.5–4.6)
ALP SERPL-CCNC: 59 U/L (ref 35–104)
ALT SERPL-CCNC: 6 U/L (ref 0–41)
ANION GAP SERPL CALCULATED.3IONS-SCNC: 10 MEQ/L (ref 9–15)
AST SERPL-CCNC: 16 U/L (ref 0–40)
BASOPHILS # BLD: 0 K/UL (ref 0–0.2)
BASOPHILS NFR BLD: 0.1 %
BILIRUB SERPL-MCNC: 0.3 MG/DL (ref 0.2–0.7)
BUN SERPL-MCNC: 29 MG/DL (ref 8–23)
CALCIUM SERPL-MCNC: 8.9 MG/DL (ref 8.5–9.9)
CHLORIDE SERPL-SCNC: 105 MEQ/L (ref 95–107)
CO2 SERPL-SCNC: 23 MEQ/L (ref 20–31)
CREAT SERPL-MCNC: 1.52 MG/DL (ref 0.7–1.2)
EOSINOPHIL # BLD: 0 K/UL (ref 0–0.7)
EOSINOPHIL NFR BLD: 0 %
ERYTHROCYTE [DISTWIDTH] IN BLOOD BY AUTOMATED COUNT: 14 % (ref 11.5–14.5)
ESTIMATED AVERAGE GLUCOSE: 137 MG/DL
GLOBULIN SER CALC-MCNC: 2.7 G/DL (ref 2.3–3.5)
GLUCOSE BLD-MCNC: 109 MG/DL (ref 70–99)
GLUCOSE BLD-MCNC: 141 MG/DL (ref 70–99)
GLUCOSE BLD-MCNC: 161 MG/DL (ref 70–99)
GLUCOSE BLD-MCNC: 180 MG/DL (ref 70–99)
GLUCOSE SERPL-MCNC: 118 MG/DL (ref 70–99)
HBA1C MFR BLD: 6.4 % (ref 4–6)
HCT VFR BLD AUTO: 36.5 % (ref 42–52)
HGB BLD-MCNC: 11.6 G/DL (ref 14–18)
LYMPHOCYTES # BLD: 1.2 K/UL (ref 1–4.8)
LYMPHOCYTES NFR BLD: 12.6 %
MCH RBC QN AUTO: 28.4 PG (ref 27–31.3)
MCHC RBC AUTO-ENTMCNC: 31.8 % (ref 33–37)
MCV RBC AUTO: 89.2 FL (ref 79–92.2)
MONOCYTES # BLD: 1 K/UL (ref 0.2–0.8)
MONOCYTES NFR BLD: 10.1 %
NEUTROPHILS # BLD: 7.3 K/UL (ref 1.4–6.5)
NEUTS SEG NFR BLD: 76.7 %
PERFORMED ON: ABNORMAL
PLATELET # BLD AUTO: 296 K/UL (ref 130–400)
POTASSIUM SERPL-SCNC: 4.8 MEQ/L (ref 3.4–4.9)
PROCALCITONIN SERPL IA-MCNC: 0.14 NG/ML (ref 0–0.15)
PROT SERPL-MCNC: 6.4 G/DL (ref 6.3–8)
RBC # BLD AUTO: 4.09 M/UL (ref 4.7–6.1)
SODIUM SERPL-SCNC: 138 MEQ/L (ref 135–144)
WBC # BLD AUTO: 9.5 K/UL (ref 4.8–10.8)

## 2025-01-09 PROCEDURE — 83036 HEMOGLOBIN GLYCOSYLATED A1C: CPT

## 2025-01-09 PROCEDURE — 36415 COLL VENOUS BLD VENIPUNCTURE: CPT

## 2025-01-09 PROCEDURE — 85025 COMPLETE CBC W/AUTO DIFF WBC: CPT

## 2025-01-09 PROCEDURE — 2500000003 HC RX 250 WO HCPCS: Performed by: INTERNAL MEDICINE

## 2025-01-09 PROCEDURE — 2700000000 HC OXYGEN THERAPY PER DAY

## 2025-01-09 PROCEDURE — 6370000000 HC RX 637 (ALT 250 FOR IP): Performed by: STUDENT IN AN ORGANIZED HEALTH CARE EDUCATION/TRAINING PROGRAM

## 2025-01-09 PROCEDURE — 84145 PROCALCITONIN (PCT): CPT

## 2025-01-09 PROCEDURE — 94640 AIRWAY INHALATION TREATMENT: CPT

## 2025-01-09 PROCEDURE — 94761 N-INVAS EAR/PLS OXIMETRY MLT: CPT

## 2025-01-09 PROCEDURE — 1210000000 HC MED SURG R&B

## 2025-01-09 PROCEDURE — 97162 PT EVAL MOD COMPLEX 30 MIN: CPT

## 2025-01-09 PROCEDURE — 6370000000 HC RX 637 (ALT 250 FOR IP): Performed by: INTERNAL MEDICINE

## 2025-01-09 PROCEDURE — 80053 COMPREHEN METABOLIC PANEL: CPT

## 2025-01-09 PROCEDURE — 97166 OT EVAL MOD COMPLEX 45 MIN: CPT

## 2025-01-09 RX ORDER — ALBUTEROL SULFATE 90 UG/1
2 INHALANT RESPIRATORY (INHALATION)
Status: DISCONTINUED | OUTPATIENT
Start: 2025-01-09 | End: 2025-01-09

## 2025-01-09 RX ORDER — ALBUTEROL SULFATE 90 UG/1
2 INHALANT RESPIRATORY (INHALATION)
Status: DISCONTINUED | OUTPATIENT
Start: 2025-01-09 | End: 2025-01-10

## 2025-01-09 RX ORDER — ALBUTEROL SULFATE 90 UG/1
2 INHALANT RESPIRATORY (INHALATION) EVERY 4 HOURS PRN
Status: DISCONTINUED | OUTPATIENT
Start: 2025-01-09 | End: 2025-01-18 | Stop reason: HOSPADM

## 2025-01-09 RX ADMIN — ALBUTEROL SULFATE 2 PUFF: 90 AEROSOL, METERED RESPIRATORY (INHALATION) at 05:12

## 2025-01-09 RX ADMIN — APIXABAN 5 MG: 5 TABLET, FILM COATED ORAL at 09:58

## 2025-01-09 RX ADMIN — METFORMIN HYDROCHLORIDE 250 MG: 500 TABLET ORAL at 17:16

## 2025-01-09 RX ADMIN — APIXABAN 2.5 MG: 2.5 TABLET, FILM COATED ORAL at 20:10

## 2025-01-09 RX ADMIN — ALBUTEROL SULFATE 2 PUFF: 90 AEROSOL, METERED RESPIRATORY (INHALATION) at 19:17

## 2025-01-09 RX ADMIN — INSULIN LISPRO 1 UNITS: 100 INJECTION, SOLUTION INTRAVENOUS; SUBCUTANEOUS at 17:16

## 2025-01-09 RX ADMIN — METOPROLOL SUCCINATE 50 MG: 50 TABLET, EXTENDED RELEASE ORAL at 10:27

## 2025-01-09 RX ADMIN — METFORMIN HYDROCHLORIDE 250 MG: 500 TABLET ORAL at 09:58

## 2025-01-09 RX ADMIN — Medication 10 ML: at 09:58

## 2025-01-09 RX ADMIN — ALBUTEROL SULFATE 2 PUFF: 90 AEROSOL, METERED RESPIRATORY (INHALATION) at 10:09

## 2025-01-09 RX ADMIN — Medication 10 ML: at 20:10

## 2025-01-09 NOTE — PROGRESS NOTES
[facility] OCCUPATIONAL THERAPY EVALUATION - ACUTE     NAME: Ruben Gonsalez  : 1940 (84 y.o.)  MRN: [id number]  CODE STATUS: Full Code  Room: W284/W284-01    Date of Service: 2025    Patient Diagnosis(es): Dehydration [E86.0]  COPD exacerbation (HCC) [J44.1]  Acute respiratory failure, unspecified whether with hypoxia or hypercapnia [J96.00]  Chronic kidney disease, unspecified CKD stage [N18.9]  COVID-19 [U07.1]   Patient Active Problem List    Diagnosis Date Noted    Atrial fibrillation (HCC) 2019    High risk medication use 2019    Iron deficiency anemia, unspecified 2022    Anemia, unspecified 2022    Iron deficiency 2022    Esophageal dysphagia 10/13/2022    Food impaction of esophagus 10/13/2022    Esophageal stenosis 10/13/2022    Chest pain 10/11/2022    Body mass index (BMI) of 20 to 24 2022    MARIAM (acute kidney injury) (HCC) 2022    COPD exacerbation (HCC) 2025    At high risk for stroke 2024    Anemia 2024    Chronic renal disease, stage III (HCC) 2024    Anemia in stage 3 chronic kidney disease (HCC) 2024    Anemia in chronic kidney disease (CODE) 2024    Sideropenic dysphagia 2024    Other iron deficiency anemias 2024    Former smoker 2023    S/P CABG (coronary artery bypass graft) 2023    Status post angioplasty 2023    History of cardiovascular surgery 2023    Esophagitis 2023    Aspiration pneumonia of left lower lobe due to anesthesia during labor and delivery 2023    Esophageal obstruction due to food impaction 2023    Adverse effect of atorvastatin 2023    Benign essential hypertension 2023    Laceration of upper arm 2023    Abnormal electrocardiography 10/31/2022    Impaired mobility and activities of daily living -sp Severe PVD s/p AAA repain 2022    Hypotension 2022    Late effects of CVA (cerebrovascular accident) 2022  5/17/2024    COLONOSCOPY DIAGNOSTIC performed by Surinder Chavarria MD at [facility]    CORONARY ANGIOPLASTY WITH STENT PLACEMENT      4    CORONARY ARTERY BYPASS GRAFT      triple bypass    EYE SURGERY Bilateral     cataract surgery    INSERTABLE CARDIAC MONITOR Left 12/2018    UPPER GASTROINTESTINAL ENDOSCOPY N/A 10/13/2022    EGD ESOPHAGOGASTRODUODENOSCOPY WITH DILATION performed by Surinder Chavarria MD at [facility]    UPPER GASTROINTESTINAL ENDOSCOPY N/A 9/4/2023    EGD ESOPHAGOGASTRODUODENOSCOPY WITH FOREIGN BODY REMOVAL performed by David Mann MD at [facility]    UPPER GASTROINTESTINAL ENDOSCOPY N/A 5/17/2024    ESOPHAGOGASTRODUODENOSCOPY DIAGNOSTIC ONLY performed by Surinder Chavarria MD at [facility]        Restrictions  Restrictions/Precautions: Isolation  Activity Level: Up as Tolerated   Up as Tolerated       Safety Devices: Safety Devices  Type of Devices: All fall risk precautions in place;Call light within reach;Chair alarm in place;Left in chair     Patient's date of birth confirmed: Yes    General:  Chart Reviewed: Yes  Patient assessed for rehabilitation services?: Yes    Subjective  Subjective: \"I'm deaf in that one ear\" Referring to R ear  4/10 neck pain    Pain at start of treatment: Yes: 4/10    Pain at end of treatment: Yes: 5/10    Location: Neck  Description: Aching  Nursing notified: Declined  RN:   Intervention: Repositioned    Prior Level of Function:  Social/Functional History  Lives With: Spouse  Type of Home: Mobile home  Home Layout: One level  Home Access: Stairs to enter without rails  Entrance Stairs - Number of Steps: 3  Bathroom Shower/Tub: Tub/Shower unit  Bathroom Toilet: Handicap height  Bathroom Equipment: Shower chair, Grab bars around toilet, Grab bars in shower  Bathroom Accessibility: Accessible  Home Equipment: Walker - Rolling, Cane  Has the patient had two or more falls in the past year or any fall with injury in the past year?: Yes  Receives  Min A in LB ADLs  - Be Min A in ADL transfers without LOB  - Be Min A in toileting tasks  - Improve B UE strength and endurance to 4/5 in order to participate in self-care activities as projected.  - Access appropriate D/C site with as few architectural barriers as possible.  - Sequence self-care tasks with no verbal cues for safety    Patient Goal: Patient goals : \"I want to get home\"     Discussed and agreed upon: Yes Comments:       Therapy Time:   Individual   Time In 1406   Time Out 1421   Minutes 15     Eval: 15 minutes     Electronically signed by:    MCKAYLA Brooke/L,   1/9/2025, 3:18 PM

## 2025-01-09 NOTE — CARE COORDINATION
HISTORY AND PHYSICAL      Patient Care Team:  Gadiel Barrett MD as PCP - General    Chief complaint right upper quadrant pain    Subjective     Patient is a 26 y.o. female presents with right upper quadrant pain for the past week to return to emergency room initially her CT scan was normal and outpatient HIDA scan demonstrates no dilatation of the gallbladder consistent with cholecystitis.  She says she has severe right upper quadrant pain is the worst pain she never spares her life.  She has not had a bad episode since she went to the emergency room she does have some ongoing back pain this area.  She is about 3 months status post gastric sleeve and has had weight loss of about 60 pounds.        Review of Systems   Pertinent items are noted in HPI, all other systems reviewed and negative    History  Past Medical History:   Diagnosis Date   • Anxiety    • Fatigue    • Heartburn    • Morbid obesity (CMS/HCC)    • PCOS (polycystic ovarian syndrome)      Past Surgical History:   Procedure Laterality Date   • DILATATION AND CURETTAGE  2014 & 2019   • GASTRIC SLEEVE LAPAROSCOPIC N/A 8/6/2019    Procedure: laparoscopic sleeve gastrectomy;  Surgeon: Luna Keyes MD;  Location: [facility] OR;  Service: Bariatric   • TONSILLECTOMY AND ADENOIDECTOMY  2002     Family History   Problem Relation Age of Onset   • Obesity Sister    • Diabetes type II Maternal Grandmother    • Alzheimer's disease Maternal Grandmother    • Lung cancer Maternal Grandfather    • Lung cancer Paternal Grandmother      Social History     Tobacco Use   • Smoking status: Never Smoker   • Smokeless tobacco: Never Used   Substance Use Topics   • Alcohol use: Yes     Frequency: Never     Comment: social   • Drug use: No       (Not in a hospital admission)  Allergies:  Patient has no known allergies.    Objective     Vital Signs  Temp:  [98.6 °F (37 °C)] 98.6 °F (37 °C)  Heart Rate:  [56] 56  BP: (117)/(74) 117/74    Physical Exam:      General  SPOKE WITH PT BY TELEPHONE, PT REMAINS IN COVID ISOLATION. PT STATES DC PLAN TO RETURN HOME W/WIFE. DENIES NEEDS. DECLINED HHC. PT STATES WIFE ASSISTS HIM WITH MOST ADLS.   CALL PLACED TO PT WIFE FERNANDO. SHE IS IN AGREEMENT WITH DC PLAN. SHE FEELS SHE WILL BE ABLE TO ASSIST HIM WHEN HE RETURNS HOME.      Appearance:    Alert, cooperative, in no acute distress   Head:    Normocephalic, without obvious abnormality, atraumatic   Eyes:            Lids and lashes normal, conjunctivae and sclerae normal, no   icterus, no pallor, corneas clear, PERRLA   Ears:    Ears appear intact with no abnormalities noted   Throat:   No oral lesions, no thrush, oral mucosa moist   Neck:   No adenopathy, supple, trachea midline, no thyromegaly, no   carotid bruit, no JVD   Back:     No kyphosis present, no scoliosis present, no skin lesions,      erythema or scars, no tenderness to percussion or                   palpation,   range of motion normal   Lungs:     Clear to auscultation,respirations regular, even and                  unlabored    Heart:    Regular rhythm and normal rate, normal S1 and S2, no            murmur, no gallop, no rub, no click   Chest Wall:    No abnormalities observed   Abdomen:     Normal bowel sounds, no masses, no organomegaly, soft        non-tender, non-distended, no guarding, no rebound                tenderness   Rectal:     Deferred   Extremities:   Moves all extremities well, no edema, no cyanosis, no             redness   Pulses:   Pulses palpable and equal bilaterally   Skin:   No bleeding, bruising or rash   Lymph nodes:   No palpable adenopathy   Neurologic:   Cranial nerves 2 - 12 grossly intact, sensation intact, DTR       present and equal bilaterally     Lab Results (last 24 hours)     ** No results found for the last 24 hours. **          Imaging Results (Last 24 Hours)     ** No results found for the last 24 hours. **          Results Review:    I reviewed the patient's new clinical results.  I reviewed the patient's new imaging results and agree with the interpretation.    Assessment/Plan       * No active hospital problems. *      I have explained the risks and benefits  of laparoscopic cholecystectomy including bleeding infection and common bile duct injury and the patient understands this  and agrees to proceed          Luna Keyes MD  11/01/19  12:55 PM

## 2025-01-09 NOTE — PLAN OF CARE
Problem: Chronic Conditions and Co-morbidities  Goal: Patient's chronic conditions and co-morbidity symptoms are monitored and maintained or improved  1/9/2025 1008 by Mariann Millan RN  Outcome: Progressing  1/9/2025 0022 by Roshan Valadez RN  Outcome: Progressing     Problem: Discharge Planning  Goal: Discharge to home or other facility with appropriate resources  1/9/2025 1008 by Mariann Millan RN  Outcome: Progressing  1/9/2025 0022 by Roshan Valadez RN  Outcome: Progressing     Problem: Pain  Goal: Verbalizes/displays adequate comfort level or baseline comfort level  1/9/2025 1008 by Mariann Millan RN  Outcome: Progressing  1/9/2025 0022 by Roshan Valadez RN  Outcome: Progressing     Problem: Safety - Adult  Goal: Free from fall injury  1/9/2025 1008 by Mariann Millan RN  Outcome: Progressing  1/9/2025 0022 by Roshan Valaedz RN  Outcome: Progressing     Problem: ABCDS Injury Assessment  Goal: Absence of physical injury  1/9/2025 1008 by Mariann Millan RN  Outcome: Progressing  1/9/2025 0022 by Roshan Valadez RN  Outcome: Progressing     Problem: Infection - Adult  Goal: Absence of infection at discharge  1/9/2025 1008 by Mariann Millan RN  Outcome: Progressing  1/9/2025 0022 by Roshan Valadez RN  Outcome: Progressing  Goal: Absence of infection during hospitalization  1/9/2025 1008 by Mariann Millan RN  Outcome: Progressing  1/9/2025 0022 by Roshan Valadez RN  Outcome: Progressing  Goal: Absence of fever/infection during anticipated neutropenic period  1/9/2025 1008 by Mariann Millan RN  Outcome: Progressing  1/9/2025 0022 by Roshan Valadez RN  Outcome: Progressing     Problem: Metabolic/Fluid and Electrolytes - Adult  Goal: Electrolytes maintained within normal limits  1/9/2025 1008 by Mariann Millan RN  Outcome: Progressing  1/9/2025 0022 by Roshan Valadez RN  Outcome: Progressing  Goal: Hemodynamic stability and optimal renal function maintained  1/9/2025 1008

## 2025-01-09 NOTE — PROGRESS NOTES
Physical Therapy Med Surg Initial Assessment  Facility/Department: [address] ORTHO TELE  Room: [facility]/[place] [address]       NAME: Ruben Gonsalez  : 1940 (84 y.o.)  MRN: [id number]  CODE STATUS: Full Code    Date of Service: 2025    Patient Diagnosis(es): Dehydration [E86.0]  COPD exacerbation (HCC) [J44.1]  Acute respiratory failure, unspecified whether with hypoxia or hypercapnia [J96.00]  Chronic kidney disease, unspecified CKD stage [N18.9]  COVID-19 [U07.1]   Chief Complaint   Patient presents with    Shortness of Breath     Patient Active Problem List    Diagnosis Date Noted    Atrial fibrillation (HCC) 2019    High risk medication use 2019    Iron deficiency anemia, unspecified 2022    Anemia, unspecified 2022    Iron deficiency 2022    Esophageal dysphagia 10/13/2022    Food impaction of esophagus 10/13/2022    Esophageal stenosis 10/13/2022    Chest pain 10/11/2022    Body mass index (BMI) of 20 to 24 2022    MARIAM (acute kidney injury) (HCC) 2022    COPD exacerbation (HCC) 2025    At high risk for stroke 2024    Anemia 2024    Chronic renal disease, stage III (HCC) 2024    Anemia in stage 3 chronic kidney disease (HCC) 2024    Anemia in chronic kidney disease (CODE) 2024    Sideropenic dysphagia 2024    Other iron deficiency anemias 2024    Former smoker 2023    S/P CABG (coronary artery bypass graft) 2023    Status post angioplasty 2023    History of cardiovascular surgery 2023    Esophagitis 2023    Aspiration pneumonia of left lower lobe due to anesthesia during labor and delivery 2023    Esophageal obstruction due to food impaction 2023    Adverse effect of atorvastatin 2023    Benign essential hypertension 2023    Laceration of upper arm 2023    Abnormal electrocardiography 10/31/2022    Impaired mobility and activities of daily living -sp Severe PVD s/p AAA  assistance= pt performs 50% of the activity; assistance is required to complete the activity  Maximal assistance = pt performs 25% of the activity; assistance is required to complete the activity  Dependent = pt requires total physical assistance to accomplish the task

## 2025-01-09 NOTE — PROGRESS NOTES
Physician Progress Note      PATIENT:               CORNELL CUNHA  [facility] #:                  [id number]  :                       1940  ADMIT DATE:       2025 6:47 PM  DISCH DATE:  RESPONDING  PROVIDER #:        Adilson Dowd DO          QUERY TEXT:    Patient admitted with COPD exacerbation, noted to have atrial fibrillation and   is maintained on Eliquis. If possible, please document in progress notes and   discharge summary if you are evaluating and/or treating any of the following:?  ?  The medical record reflects the following:  Risk Factors: AFIB, diabetes, age 84, male  Clinical Indicators: Accelerated Junctional rhythm  ST depression, consider subendocardial injury  Nonspecific T wave abnormality  Treatment: Eliquis  Options provided:  -- Secondary hypercoagulable state in a patient with atrial fibrillation  -- Other - I will add my own diagnosis  -- Disagree - Not applicable / Not valid  -- Disagree - Clinically unable to determine / Unknown  -- Refer to Clinical Documentation Reviewer    PROVIDER RESPONSE TEXT:    Patient has history A    Query created by: Ignacia Cordero on 2025 12:06 PM      Electronically signed by:  Adilson Dowd DO 2025 8:19 AM

## 2025-01-09 NOTE — PROGRESS NOTES
Recent Labs     01/09/25  0513   CREATININE 1.52*      HGB 11.6*   Estimated Creatinine Clearance: 33 mL/min (A) (based on SCr of 1.52 mg/dL (H)).  .      Renal Adjustment Per Protocol: eliquis 5mg BID changed to 2.5 mg BID based on  scr > 1.5 and age > 80.    Maria Antonia Vargas, [facility] PharmD

## 2025-01-09 NOTE — PROGRESS NOTES
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[facility])     Vasovagal syncope     Dizziness and giddiness     Cerebrovascular accident ([facility])     Orthostatic dizziness     Ataxia     Vertigo     Meniere disease, bilateral     Benign paroxysmal positional vertigo due to bilateral vestibular disorder     Coronary angioplasty status     Ataxic gait     Kyphoscoliosis     Spinal stenosis of lumbar region with neurogenic claudication     Abdominal aortic aneurysm (AAA) ([facility])     Acute inferior myocardial infarction ([facility])     Carotid bruit     Chronic obstructive pulmonary disease ([facility])     Diabetes mellitus ([facility])     Dyspnea on exertion     Fatigue     First degree atrioventricular block     Hyperlipidemia     Infection of the inner ear     Muscle pain     Underweight     Ventricular premature beats     Weight loss     PVD (peripheral vascular disease) ([facility])     Impaired mobility and activities of daily living -sp Severe PVD s/p AAA repain     Hypotension     Late effects of CVA (cerebrovascular accident)     MARIAM (acute kidney injury) ([facility])     Body mass index (BMI) of 20 to 24     Chest pain     Esophageal dysphagia     Food impaction of esophagus     Esophageal stenosis     Iron deficiency anemia, unspecified     Anemia, unspecified     Iron deficiency     Abnormal electrocardiography     Adverse effect of atorvastatin     Benign essential hypertension     Laceration of upper arm

## 2025-01-09 NOTE — RT PROTOCOL NOTE
RT Inhaler-Nebulizer Bronchodilator Protocol Note    There is a bronchodilator order in the chart from a provider indicating to follow the RT Bronchodilator Protocol and there is an “Initiate RT Inhaler-Nebulizer Bronchodilator Protocol” order as well (see protocol at bottom of note).    CXR Findings:  XR CHEST PORTABLE    Result Date: 1/7/2025  No acute process. Interval resolution of left lower lung infiltrate.       The findings from the last RT Protocol Assessment were as follows:   History Pulmonary Disease: Chronic pulmonary disease  Respiratory Pattern: Dyspnea on exertion or RR 21-25 bpm  Breath Sounds: Inspiratory and expiratory or bilateral wheezing and/or rhonchi  Cough: Strong, productive  Indication for Bronchodilator Therapy: Wheezing associated with pulm disorder, Decreased or absent breath sounds  Bronchodilator Assessment Score: 11    Aerosolized bronchodilator medication orders have been revised according to the RT Inhaler-Nebulizer Bronchodilator Protocol below.    Respiratory Therapist to perform RT Therapy Protocol Assessment initially then follow the protocol.  Repeat RT Therapy Protocol Assessment PRN for score 0-3 or on second treatment, BID, and PRN for scores above 3.    No Indications - adjust the frequency to every 6 hours PRN wheezing or bronchospasm, if no treatments needed after 48 hours then discontinue using Per Protocol order mode.     If indication present, adjust the RT bronchodilator orders based on the Bronchodilator Assessment Score as indicated below.  Use Inhaler orders unless patient has one or more of the following: on home nebulizer, not able to hold breath for 10 seconds, is not alert and oriented, cannot activate and use MDI correctly, or respiratory rate 25 breaths per minute or more, then use the equivalent nebulizer order(s) with same Frequency and PRN reasons based on the score.  If a patient is on this medication at home then do not decrease Frequency below that  used at home.    0-3 - enter or revise RT bronchodilator order(s) to equivalent RT Bronchodilator order with Frequency of every 4 hours PRN for wheezing or increased work of breathing using Per Protocol order mode.        4-6 - enter or revise RT Bronchodilator order(s) to two equivalent RT bronchodilator orders with one order with BID Frequency and one order with Frequency of every 4 hours PRN wheezing or increased work of breathing using Per Protocol order mode.        7-10 - enter or revise RT Bronchodilator order(s) to two equivalent RT bronchodilator orders with one order with TID Frequency and one order with Frequency of every 4 hours PRN wheezing or increased work of breathing using Per Protocol order mode.       11-13 - enter or revise RT Bronchodilator order(s) to one equivalent RT bronchodilator order with QID Frequency and an Albuterol order with Frequency of every 4 hours PRN wheezing or increased work of breathing using Per Protocol order mode.      Greater than 13 - enter or revise RT Bronchodilator order(s) to one equivalent RT bronchodilator order with every 4 hours Frequency and an Albuterol order with Frequency of every 2 hours PRN wheezing or increased work of breathing using Per Protocol order mode.     RT to enter RT Home Evaluation for COPD & MDI Assessment order using Per Protocol order mode.    Electronically signed by STACIE PETERSEN RCP on 1/9/2025 at 5:26 AM

## 2025-01-09 NOTE — PLAN OF CARE
See OT evaluation for all goals and OT POC. Electronically signed by Ashley Otero OTR/L on 1/9/2025 at 3:24 PM

## 2025-01-10 ENCOUNTER — APPOINTMENT (OUTPATIENT)
Dept: GENERAL RADIOLOGY | Age: 85
End: 2025-01-10
Payer: MEDICARE

## 2025-01-10 ENCOUNTER — APPOINTMENT (OUTPATIENT)
Age: 85
End: 2025-01-10
Payer: MEDICARE

## 2025-01-10 LAB
ANION GAP SERPL CALCULATED.3IONS-SCNC: 7 MEQ/L (ref 9–15)
BASOPHILS # BLD: 0 K/UL (ref 0–0.2)
BASOPHILS NFR BLD: 0.2 %
BUN SERPL-MCNC: 31 MG/DL (ref 8–23)
CALCIUM SERPL-MCNC: 8.8 MG/DL (ref 8.5–9.9)
CHLORIDE SERPL-SCNC: 105 MEQ/L (ref 95–107)
CO2 SERPL-SCNC: 26 MEQ/L (ref 20–31)
CREAT SERPL-MCNC: 1.49 MG/DL (ref 0.7–1.2)
ECHO AV AREA PEAK VELOCITY: 1.4 CM2
ECHO AV AREA VTI: 1.5 CM2
ECHO AV AREA/BSA PEAK VELOCITY: 0.8 CM2/M2
ECHO AV AREA/BSA VTI: 0.8 CM2/M2
ECHO AV CUSP MM: 1.6 CM
ECHO AV MEAN GRADIENT: 3 MMHG
ECHO AV MEAN VELOCITY: 0.8 M/S
ECHO AV PEAK GRADIENT: 5 MMHG
ECHO AV PEAK VELOCITY: 1.1 M/S
ECHO AV VELOCITY RATIO: 0.55
ECHO AV VTI: 21.1 CM
ECHO BSA: 1.78 M2
ECHO EST RA PRESSURE: 3 MMHG
ECHO LA DIAMETER INDEX: 2.35 CM/M2
ECHO LA DIAMETER: 4.2 CM
ECHO LA VOL A-L A2C: 53 ML (ref 18–58)
ECHO LA VOL A-L A4C: 42 ML (ref 18–58)
ECHO LA VOL MOD A2C: 49 ML (ref 18–58)
ECHO LA VOL MOD A4C: 39 ML (ref 18–58)
ECHO LA VOLUME AREA LENGTH: 47 ML
ECHO LA VOLUME INDEX A-L A2C: 30 ML/M2 (ref 16–34)
ECHO LA VOLUME INDEX A-L A4C: 23 ML/M2 (ref 16–34)
ECHO LA VOLUME INDEX AREA LENGTH: 26 ML/M2 (ref 16–34)
ECHO LA VOLUME INDEX MOD A2C: 27 ML/M2 (ref 16–34)
ECHO LA VOLUME INDEX MOD A4C: 22 ML/M2 (ref 16–34)
ECHO LV E' LATERAL VELOCITY: 7.53 CM/S
ECHO LV EDV A2C: 58 ML
ECHO LV EDV A4C: 59 ML
ECHO LV EDV BP: 60 ML (ref 67–155)
ECHO LV EDV INDEX A4C: 33 ML/M2
ECHO LV EDV INDEX BP: 34 ML/M2
ECHO LV EDV NDEX A2C: 32 ML/M2
ECHO LV EJECTION FRACTION A2C: 58 %
ECHO LV EJECTION FRACTION A4C: 46 %
ECHO LV EJECTION FRACTION BIPLANE: 50 % (ref 55–100)
ECHO LV ESV A2C: 24 ML
ECHO LV ESV A4C: 32 ML
ECHO LV ESV BP: 30 ML (ref 22–58)
ECHO LV ESV INDEX A2C: 13 ML/M2
ECHO LV ESV INDEX A4C: 18 ML/M2
ECHO LV ESV INDEX BP: 17 ML/M2
ECHO LV FRACTIONAL SHORTENING: 31 % (ref 28–44)
ECHO LV INTERNAL DIMENSION DIASTOLE INDEX: 2.51 CM/M2
ECHO LV INTERNAL DIMENSION DIASTOLIC: 4.5 CM (ref 4.2–5.9)
ECHO LV INTERNAL DIMENSION SYSTOLIC INDEX: 1.73 CM/M2
ECHO LV INTERNAL DIMENSION SYSTOLIC: 3.1 CM
ECHO LV IVSD: 0.9 CM (ref 0.6–1)
ECHO LV IVSS: 1 CM
ECHO LV MASS 2D: 132.8 G (ref 88–224)
ECHO LV MASS INDEX 2D: 74.2 G/M2 (ref 49–115)
ECHO LV POSTERIOR WALL DIASTOLIC: 0.9 CM (ref 0.6–1)
ECHO LV POSTERIOR WALL SYSTOLIC: 1.3 CM
ECHO LV RELATIVE WALL THICKNESS RATIO: 0.4
ECHO LVOT AREA: 2.8 CM2
ECHO LVOT AV VTI INDEX: 0.51
ECHO LVOT DIAM: 1.9 CM
ECHO LVOT MEAN GRADIENT: 1 MMHG
ECHO LVOT PEAK GRADIENT: 1 MMHG
ECHO LVOT PEAK VELOCITY: 0.6 M/S
ECHO LVOT STROKE VOLUME INDEX: 16.9 ML/M2
ECHO LVOT SV: 30.3 ML
ECHO LVOT VTI: 10.7 CM
ECHO MV A VELOCITY: 0.8 M/S
ECHO MV E DECELERATION TIME (DT): 388.8 MS
ECHO MV E VELOCITY: 0.48 M/S
ECHO MV E/A RATIO: 0.6
ECHO MV E/E' LATERAL: 6.37
ECHO PV MAX VELOCITY: 0.9 M/S
ECHO PV PEAK GRADIENT: 3 MMHG
ECHO RIGHT VENTRICULAR SYSTOLIC PRESSURE (RVSP): 33 MMHG
ECHO RV INTERNAL DIMENSION: 2.6 CM
ECHO RVOT PEAK GRADIENT: 4 MMHG
ECHO RVOT PEAK VELOCITY: 0.9 M/S
ECHO TV REGURGITANT MAX VELOCITY: 2.72 M/S
ECHO TV REGURGITANT PEAK GRADIENT: 30 MMHG
EOSINOPHIL # BLD: 0 K/UL (ref 0–0.7)
EOSINOPHIL NFR BLD: 0 %
ERYTHROCYTE [DISTWIDTH] IN BLOOD BY AUTOMATED COUNT: 14.2 % (ref 11.5–14.5)
GLUCOSE BLD-MCNC: 125 MG/DL (ref 70–99)
GLUCOSE BLD-MCNC: 134 MG/DL (ref 70–99)
GLUCOSE BLD-MCNC: 186 MG/DL (ref 70–99)
GLUCOSE SERPL-MCNC: 137 MG/DL (ref 70–99)
HCT VFR BLD AUTO: 33.9 % (ref 42–52)
HGB BLD-MCNC: 10.5 G/DL (ref 14–18)
LYMPHOCYTES # BLD: 1 K/UL (ref 1–4.8)
LYMPHOCYTES NFR BLD: 9.5 %
MCH RBC QN AUTO: 27.9 PG (ref 27–31.3)
MCHC RBC AUTO-ENTMCNC: 31 % (ref 33–37)
MCV RBC AUTO: 89.9 FL (ref 79–92.2)
MONOCYTES # BLD: 1.3 K/UL (ref 0.2–0.8)
MONOCYTES NFR BLD: 11.9 %
NEUTROPHILS # BLD: 8.5 K/UL (ref 1.4–6.5)
NEUTS SEG NFR BLD: 78 %
PERFORMED ON: ABNORMAL
PLATELET # BLD AUTO: 283 K/UL (ref 130–400)
POTASSIUM SERPL-SCNC: 5 MEQ/L (ref 3.4–4.9)
RBC # BLD AUTO: 3.77 M/UL (ref 4.7–6.1)
SODIUM SERPL-SCNC: 138 MEQ/L (ref 135–144)
WBC # BLD AUTO: 10.9 K/UL (ref 4.8–10.8)

## 2025-01-10 PROCEDURE — 2500000003 HC RX 250 WO HCPCS: Performed by: INTERNAL MEDICINE

## 2025-01-10 PROCEDURE — 6370000000 HC RX 637 (ALT 250 FOR IP): Performed by: INTERNAL MEDICINE

## 2025-01-10 PROCEDURE — 2060000000 HC ICU INTERMEDIATE R&B

## 2025-01-10 PROCEDURE — 93306 TTE W/DOPPLER COMPLETE: CPT | Performed by: INTERNAL MEDICINE

## 2025-01-10 PROCEDURE — 71045 X-RAY EXAM CHEST 1 VIEW: CPT

## 2025-01-10 PROCEDURE — 85025 COMPLETE CBC W/AUTO DIFF WBC: CPT

## 2025-01-10 PROCEDURE — 80048 BASIC METABOLIC PNL TOTAL CA: CPT

## 2025-01-10 PROCEDURE — 71046 X-RAY EXAM CHEST 2 VIEWS: CPT

## 2025-01-10 PROCEDURE — 94761 N-INVAS EAR/PLS OXIMETRY MLT: CPT

## 2025-01-10 PROCEDURE — 6360000002 HC RX W HCPCS: Performed by: INTERNAL MEDICINE

## 2025-01-10 PROCEDURE — 99223 1ST HOSP IP/OBS HIGH 75: CPT | Performed by: INTERNAL MEDICINE

## 2025-01-10 PROCEDURE — 94760 N-INVAS EAR/PLS OXIMETRY 1: CPT

## 2025-01-10 PROCEDURE — 36415 COLL VENOUS BLD VENIPUNCTURE: CPT

## 2025-01-10 PROCEDURE — 2700000000 HC OXYGEN THERAPY PER DAY

## 2025-01-10 PROCEDURE — 94640 AIRWAY INHALATION TREATMENT: CPT

## 2025-01-10 PROCEDURE — 2580000003 HC RX 258: Performed by: INTERNAL MEDICINE

## 2025-01-10 PROCEDURE — XW0DXF5 INTRODUCTION OF OTHER NEW TECHNOLOGY THERAPEUTIC SUBSTANCE INTO MOUTH AND PHARYNX, EXTERNAL APPROACH, NEW TECHNOLOGY GROUP 5: ICD-10-PCS | Performed by: INTERNAL MEDICINE

## 2025-01-10 PROCEDURE — 93306 TTE W/DOPPLER COMPLETE: CPT

## 2025-01-10 PROCEDURE — 6370000000 HC RX 637 (ALT 250 FOR IP): Performed by: STUDENT IN AN ORGANIZED HEALTH CARE EDUCATION/TRAINING PROGRAM

## 2025-01-10 PROCEDURE — 0W9B30Z DRAINAGE OF LEFT PLEURAL CAVITY WITH DRAINAGE DEVICE, PERCUTANEOUS APPROACH: ICD-10-PCS | Performed by: INTERNAL MEDICINE

## 2025-01-10 PROCEDURE — 1210000000 HC MED SURG R&B

## 2025-01-10 PROCEDURE — 32551 INSERTION OF CHEST TUBE: CPT | Performed by: INTERNAL MEDICINE

## 2025-01-10 RX ORDER — SODIUM CHLORIDE, SODIUM LACTATE, POTASSIUM CHLORIDE, AND CALCIUM CHLORIDE .6; .31; .03; .02 G/100ML; G/100ML; G/100ML; G/100ML
500 INJECTION, SOLUTION INTRAVENOUS ONCE
Status: COMPLETED | OUTPATIENT
Start: 2025-01-10 | End: 2025-01-10

## 2025-01-10 RX ORDER — FENTANYL CITRATE 0.05 MG/ML
50 INJECTION, SOLUTION INTRAMUSCULAR; INTRAVENOUS
Status: DISCONTINUED | OUTPATIENT
Start: 2025-01-10 | End: 2025-01-18 | Stop reason: HOSPADM

## 2025-01-10 RX ORDER — FENTANYL CITRATE 50 UG/ML
INJECTION, SOLUTION INTRAMUSCULAR; INTRAVENOUS
Status: DISPENSED
Start: 2025-01-10 | End: 2025-01-10

## 2025-01-10 RX ORDER — ALBUTEROL SULFATE 90 UG/1
2 INHALANT RESPIRATORY (INHALATION)
Status: DISCONTINUED | OUTPATIENT
Start: 2025-01-10 | End: 2025-01-12

## 2025-01-10 RX ORDER — SODIUM CHLORIDE, SODIUM LACTATE, POTASSIUM CHLORIDE, CALCIUM CHLORIDE 600; 310; 30; 20 MG/100ML; MG/100ML; MG/100ML; MG/100ML
INJECTION, SOLUTION INTRAVENOUS CONTINUOUS
Status: DISCONTINUED | OUTPATIENT
Start: 2025-01-10 | End: 2025-01-11

## 2025-01-10 RX ADMIN — Medication 10 ML: at 08:20

## 2025-01-10 RX ADMIN — FENTANYL CITRATE 50 MCG: 0.05 INJECTION, SOLUTION INTRAMUSCULAR; INTRAVENOUS at 12:11

## 2025-01-10 RX ADMIN — SODIUM CHLORIDE, POTASSIUM CHLORIDE, SODIUM LACTATE AND CALCIUM CHLORIDE 500 ML: 600; 310; 30; 20 INJECTION, SOLUTION INTRAVENOUS at 12:13

## 2025-01-10 RX ADMIN — INSULIN LISPRO 1 UNITS: 100 INJECTION, SOLUTION INTRAVENOUS; SUBCUTANEOUS at 08:19

## 2025-01-10 RX ADMIN — ALBUTEROL SULFATE 2 PUFF: 90 AEROSOL, METERED RESPIRATORY (INHALATION) at 19:23

## 2025-01-10 RX ADMIN — SODIUM CHLORIDE, POTASSIUM CHLORIDE, SODIUM LACTATE AND CALCIUM CHLORIDE: 600; 310; 30; 20 INJECTION, SOLUTION INTRAVENOUS at 12:31

## 2025-01-10 RX ADMIN — APIXABAN 2.5 MG: 2.5 TABLET, FILM COATED ORAL at 08:19

## 2025-01-10 RX ADMIN — SODIUM CHLORIDE, POTASSIUM CHLORIDE, SODIUM LACTATE AND CALCIUM CHLORIDE: 600; 310; 30; 20 INJECTION, SOLUTION INTRAVENOUS at 22:35

## 2025-01-10 RX ADMIN — Medication 10 ML: at 22:34

## 2025-01-10 RX ADMIN — ALBUTEROL SULFATE 2 PUFF: 90 AEROSOL, METERED RESPIRATORY (INHALATION) at 15:27

## 2025-01-10 RX ADMIN — ALBUTEROL SULFATE 2 PUFF: 90 AEROSOL, METERED RESPIRATORY (INHALATION) at 08:42

## 2025-01-10 RX ADMIN — METFORMIN HYDROCHLORIDE 250 MG: 500 TABLET ORAL at 08:19

## 2025-01-10 RX ADMIN — METOPROLOL SUCCINATE 50 MG: 50 TABLET, EXTENDED RELEASE ORAL at 08:19

## 2025-01-10 ASSESSMENT — PAIN SCALES - GENERAL: PAINLEVEL_OUTOF10: 0

## 2025-01-10 NOTE — PROGRESS NOTES
1145- patient arrived to ICU via bed with transport and RN present. Per report, patient was coughing up excessive amounts of blood during CXR and radiology called to RN to voice concern as well as report large pneumothorax. Patient transferred to ICU by order of attending physician. Patient is requiring 4L NC since admission but does not have any oxygen at home prior to admission. Patient lives at home with his spouse, Kami. He is alert and oriented to person, place, time and situation. He is [place] but able to make his needs known. He has a #20 SL to LAC, flushed but sluggish. His lung sounds are diminished on the right and absent on the left. His skin is pale and fragile with generalized ecchymosis. He has some redness noted to his sacrum but no open areas. Mepilex applied to sacrum and bilateral heels.       1230- Dr. Lizarraga at bedside to perform left chest tube insertion. Patient alert and oriented to person, place time and situation. Patient provided verbal consent for procedure. Documents signed and placed in patient chart. Patient medicated with 50mcg of fentanyl for pain and tolerated procedure well. Chest tube draining 25cc uriel red blood. Patient denies pain at this time.     1420- Patient set up with lunch tray and eating.     Electronically signed by Jenn Graham RN on 1/11/2025 at 3:31 PM

## 2025-01-10 NOTE — PROGRESS NOTES
01/10/25 0800   RT Protocol   History Pulmonary Disease 2   Respiratory pattern 2   Breath sounds 6   Cough 1   Indications for Bronchodilator Therapy Wheezing associated with pulm disorder   Bronchodilator Assessment Score 11

## 2025-01-10 NOTE — PROGRESS NOTES
Pt coughing up bright red sputum and difficulty obtaining SpO2. Dr. Dowd informed by this RT of these conditions while seeing pt at bedside. Pulmonary will be consulted and chest x ray done per Dr. Dowd.

## 2025-01-10 NOTE — PROGRESS NOTES
Physical Therapy Missed Treatment   Facility/Department: [facility] MED SURG IC02/IC02-01    NAME: Ruben Gonsalez    : 1940 (84 y.o.)  MRN: [id number]    Account: [id number]  Gender: male      Pt reportedly coughing up bloody sputum and transferred to ICU. Work up being completed. Will hold PT tx this date and assess pt's condition moving forward.       Will follow and attempt PT evaluation again at earliest availability.       Elsa Del Angel, PT, 01/10/25 at 11:57 AM

## 2025-01-10 NOTE — PROGRESS NOTES
Nephrology Progress Note    Assessment:  COVID +  Hypoxia  CKD 3a  OHDX CAD HFrEF  AAA  Hx CABGx + stents  DM type-2  Hx Hypertension          Plan: increase activity look for signs of resp issues or fever  increase activity needs pulmonary consult  repeat CXR    Patient Active Problem List:     Atrial fibrillation (HCC)     High risk medication use     Atherosclerosis of native coronary artery of native heart without angina pectoris     Hypertension     Ischemic myocardial dysfunction     Nonrheumatic aortic valve disorder, unspecified     Aortic valve disorder     Personal history of nicotine dependence     Presence of aortocoronary bypass graft     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[facility])     Vasovagal syncope     Dizziness and giddiness     Cerebrovascular accident ([facility])     Orthostatic dizziness     Ataxia     Vertigo     Meniere disease, bilateral     Benign paroxysmal positional vertigo due to bilateral vestibular disorder     Coronary angioplasty status     Ataxic gait     Kyphoscoliosis     Spinal stenosis of lumbar region with neurogenic claudication     Abdominal aortic aneurysm (AAA) ([facility])     Acute inferior myocardial infarction ([facility])     Carotid bruit     Chronic obstructive pulmonary disease ([facility])     Diabetes mellitus ([facility])     Dyspnea on exertion     Fatigue     First degree atrioventricular block     Hyperlipidemia     Infection of the inner ear     Muscle pain     Underweight     Ventricular premature beats     Weight loss     PVD (peripheral vascular disease) ([facility])     Impaired mobility and activities of daily living -sp Severe PVD s/p AAA repain     Hypotension     Late effects of CVA (cerebrovascular accident)     MARIAM (acute kidney injury) ([facility])     Body mass index (BMI) of 20 to 24     Chest pain     Esophageal dysphagia     Food impaction of esophagus     Esophageal stenosis     Iron deficiency anemia, unspecified     Anemia, unspecified     Iron deficiency     Abnormal electrocardiography      for input(s): \"UA\" in the last 72 hours.    Objective:  Vitals: /79   Pulse 90   Temp 98.7 °F (37.1 °C) (Oral)   Resp 22   Ht 1.727 m (5' 8\")   Wt 66.1 kg (145 lb 12.8 oz)   SpO2 98%   BMI 22.17 kg/m²    Wt Readings from Last 3 Encounters:   01/07/25 66.1 kg (145 lb 12.8 oz)   05/19/24 65.8 kg (145 lb)   05/17/24 65.8 kg (145 lb 1 oz)      24HR INTAKE/OUTPUT:    Intake/Output Summary (Last 24 hours) at 1/10/2025 0802  Last data filed at 1/10/2025 0106  Gross per 24 hour   Intake 480 ml   Output 725 ml   Net -245 ml       General: alert, in no apparent distress  HEENT: normocephalic, atraumatic, anicteric  Neck: supple, no mass  Lungs: non-labored respirations, clear to auscultation bilaterally  Heart: regular rate and rhythm, no murmurs or rubs  Abdomen: soft, non-tender, non-distended  Ext: no cyanosis, no peripheral edema  Neuro: alert and oriented, no gross abnormalities  Psych: normal mood and affect  Skin: no rash      Electronically signed by Adilson Dowd DO

## 2025-01-10 NOTE — FLOWSHEET NOTE
Attempted to call patient's wife Kami to notify her that patient was transferred to ICU. Kami did not , and has no answering machine. Will try again later. [phone]

## 2025-01-10 NOTE — CONSULTS
Pulmonary and Critical Care Medicine  Consult Note  Encounter Date: 1/10/2025 12:38 PM    Mr. Ruben Gonsalez is a 84 y.o. male  : 1940  Requesting Provider: Adilson Dowd DO    Reason for request: COVID-19 infection            HISTORY OF PRESENT ILLNESS:    Patient is 84 y.o. presents with pneumothorax, transferred today to ICU with secondary spontaneous pneumothorax.  He was admitted yesterday with shortness of breath with generalized weakness, no fever or chills, no chest pain, no nausea vomiting or diarrhea.  Reported fall at home, however no syncopal event, no head trauma.  Today he was noted to have hemoptysis, and chest x-ray showed secondary spontaneous pneumothorax transferred to ICU for further management.    Patient is initially on 2 L O2, now on 4 L O2, saturation 100%, no fever, he denies chest pain, no worsening shortness of breath.  Hemoptysis resolved.      Past Medical History:        Diagnosis Date    MARIAM (acute kidney injury) ([facility]) 2022    Atrial fibrillation ([facility])     CAD (coronary artery disease)     cardiac stents    COPD (chronic obstructive pulmonary disease) ([facility])     Coronary angioplasty status 2018    Diabetes mellitus (HCC)     Hyperlipidemia     Hypertension     Movement disorder     Pneumonia        Past Surgical History:        Procedure Laterality Date    APPENDECTOMY      COLONOSCOPY N/A 2024    COLONOSCOPY DIAGNOSTIC performed by Surinder Chavarria MD at [facility]    CORONARY ANGIOPLASTY WITH STENT PLACEMENT      4    CORONARY ARTERY BYPASS GRAFT      triple bypass    EYE SURGERY Bilateral     cataract surgery    INSERTABLE CARDIAC MONITOR Left 2018    UPPER GASTROINTESTINAL ENDOSCOPY N/A 10/13/2022    EGD ESOPHAGOGASTRODUODENOSCOPY WITH DILATION performed by Surinder Chavarria MD at [facility]    UPPER GASTROINTESTINAL ENDOSCOPY N/A 2023    EGD ESOPHAGOGASTRODUODENOSCOPY WITH FOREIGN BODY REMOVAL performed by David Mann MD at [facility]

## 2025-01-10 NOTE — PLAN OF CARE
Problem: Chronic Conditions and Co-morbidities  Goal: Patient's chronic conditions and co-morbidity symptoms are monitored and maintained or improved  Outcome: Progressing     Problem: Discharge Planning  Goal: Discharge to home or other facility with appropriate resources  Outcome: Progressing     Problem: Pain  Goal: Verbalizes/displays adequate comfort level or baseline comfort level  Outcome: Progressing     Problem: Safety - Adult  Goal: Free from fall injury  Outcome: Progressing     Problem: ABCDS Injury Assessment  Goal: Absence of physical injury  Outcome: Progressing     Problem: Infection - Adult  Goal: Absence of infection at discharge  Outcome: Progressing  Goal: Absence of infection during hospitalization  Outcome: Progressing  Goal: Absence of fever/infection during anticipated neutropenic period  Outcome: Progressing     Problem: Metabolic/Fluid and Electrolytes - Adult  Goal: Electrolytes maintained within normal limits  Outcome: Progressing  Goal: Hemodynamic stability and optimal renal function maintained  Outcome: Progressing  Goal: Glucose maintained within prescribed range  Outcome: Progressing     Problem: Neurosensory - Adult  Goal: Achieves stable or improved neurological status  Outcome: Progressing  Goal: Absence of seizures  Outcome: Progressing  Goal: Remains free of injury related to seizures activity  Outcome: Progressing  Goal: Achieves maximal functionality and self care  Outcome: Progressing     Problem: Respiratory - Adult  Goal: Achieves optimal ventilation and oxygenation  Outcome: Progressing     Problem: Cardiovascular - Adult  Goal: Maintains optimal cardiac output and hemodynamic stability  Outcome: Progressing  Goal: Absence of cardiac dysrhythmias or at baseline  Outcome: Progressing     Problem: Skin/Tissue Integrity - Adult  Goal: Skin integrity remains intact  Outcome: Progressing  Goal: Incisions, wounds, or drain sites healing without S/S of infection  Outcome:

## 2025-01-10 NOTE — CARE COORDINATION
DC PLAN REMAINS HOME W/WIFE. PT/OT REC. HHC- PT AND SPOUSE DECLINED HHC NEEDS AT THIS TIME.   PLAN TO TRANSFER PT TO ICU-- WILL MONITOR FOR HOMEGOING NEEDS.

## 2025-01-10 NOTE — PROGRESS NOTES
01/10/25 1600   RT Protocol   History Pulmonary Disease 2   Respiratory pattern 2   Breath sounds 6   Cough 1   Indications for Bronchodilator Therapy Wheezing associated with pulm disorder   Bronchodilator Assessment Score 11

## 2025-01-10 NOTE — FLOWSHEET NOTE
01/10/25 1811   Treatment Team Notification   Reason for Communication Change in status  (Deterioration Index >=50)   Type of Critical Result   (Deterioration Index)   Critical Lab Information score 55   Name of Team Member Notified Dr. Dowd   Treatment Team Role Attending Provider   Method of Communication Secure Message   Notification Time 1812   Deterioration Index RN Interventions Performed  Provider Notified of Clinical Concerns;Neuro Reassessed;Other (Comments)  (Patient reassessed, VSS, no signs of distress)

## 2025-01-10 NOTE — PLAN OF CARE
Problem: Musculoskeletal - Adult  Goal: Return mobility to safest level of function  1/9/2025 2103 by Pham Valles RN  Outcome: Not Progressing  1/9/2025 1008 by Mariann Millan RN  Outcome: Progressing  Goal: Maintain proper alignment of affected body part  1/9/2025 2103 by Pham Valles RN  Outcome: Not Progressing  1/9/2025 1008 by Mariann Millan RN  Outcome: Progressing  Goal: Return ADL status to a safe level of function  1/9/2025 2103 by Pham Valles RN  Outcome: Not Progressing  1/9/2025 1008 by Mariann Millan RN  Outcome: Progressing     Problem: Chronic Conditions and Co-morbidities  Goal: Patient's chronic conditions and co-morbidity symptoms are monitored and maintained or improved  1/9/2025 2103 by Pham Valles RN  Outcome: Progressing  1/9/2025 1008 by Mariann Millan RN  Outcome: Progressing     Problem: Discharge Planning  Goal: Discharge to home or other facility with appropriate resources  1/9/2025 2103 by Pham Valles RN  Outcome: Progressing  1/9/2025 1008 by Mariann Millan RN  Outcome: Progressing     Problem: Pain  Goal: Verbalizes/displays adequate comfort level or baseline comfort level  1/9/2025 2103 by Pham Valles RN  Outcome: Progressing  1/9/2025 1008 by Mariann Millan RN  Outcome: Progressing     Problem: Safety - Adult  Goal: Free from fall injury  1/9/2025 2103 by hPam Valles RN  Outcome: Progressing  1/9/2025 1008 by Mariann Millan RN  Outcome: Progressing     Problem: ABCDS Injury Assessment  Goal: Absence of physical injury  1/9/2025 2103 by Pham Valles RN  Outcome: Progressing  1/9/2025 1008 by Mariann Millan RN  Outcome: Progressing     Problem: Infection - Adult  Goal: Absence of infection at discharge  1/9/2025 2103 by Pham Valles RN  Outcome: Progressing  1/9/2025 1008 by Mariann Millan RN  Outcome: Progressing  Goal: Absence of infection during hospitalization  1/9/2025 2103 by Pham Valles,

## 2025-01-11 ENCOUNTER — APPOINTMENT (OUTPATIENT)
Dept: CT IMAGING | Age: 85
End: 2025-01-11
Payer: MEDICARE

## 2025-01-11 ENCOUNTER — APPOINTMENT (OUTPATIENT)
Dept: GENERAL RADIOLOGY | Age: 85
End: 2025-01-11
Payer: MEDICARE

## 2025-01-11 LAB
ANION GAP SERPL CALCULATED.3IONS-SCNC: 7 MEQ/L (ref 9–15)
ANTI-XA LOV TREATMENT: >=2 IU/ML
ANTI-XA UNFRAC HEPARIN: >=2 IU/ML
APTT PPP: 38.8 SEC (ref 24.4–36.8)
APTT PPP: 42.7 SEC (ref 24.4–36.8)
BASOPHILS # BLD: 0 K/UL (ref 0–0.2)
BASOPHILS NFR BLD: 0.1 %
BUN SERPL-MCNC: 35 MG/DL (ref 8–23)
CALCIUM SERPL-MCNC: 8.3 MG/DL (ref 8.5–9.9)
CHLORIDE SERPL-SCNC: 105 MEQ/L (ref 95–107)
CO2 SERPL-SCNC: 25 MEQ/L (ref 20–31)
CREAT SERPL-MCNC: 1.48 MG/DL (ref 0.7–1.2)
EOSINOPHIL # BLD: 0 K/UL (ref 0–0.7)
EOSINOPHIL NFR BLD: 0 %
ERYTHROCYTE [DISTWIDTH] IN BLOOD BY AUTOMATED COUNT: 14.2 % (ref 11.5–14.5)
GLUCOSE BLD-MCNC: 142 MG/DL (ref 70–99)
GLUCOSE BLD-MCNC: 160 MG/DL (ref 70–99)
GLUCOSE BLD-MCNC: 166 MG/DL (ref 70–99)
GLUCOSE BLD-MCNC: 210 MG/DL (ref 70–99)
GLUCOSE BLD-MCNC: 224 MG/DL (ref 70–99)
GLUCOSE SERPL-MCNC: 126 MG/DL (ref 70–99)
HCT VFR BLD AUTO: 25.3 % (ref 42–52)
HCT VFR BLD AUTO: 26.4 % (ref 42–52)
HGB BLD-MCNC: 8 G/DL (ref 14–18)
HGB BLD-MCNC: 8.5 G/DL (ref 14–18)
INR PPP: 1.8
INR PPP: 1.8
LYMPHOCYTES # BLD: 0.8 K/UL (ref 1–4.8)
LYMPHOCYTES NFR BLD: 10.4 %
MCH RBC QN AUTO: 28.2 PG (ref 27–31.3)
MCHC RBC AUTO-ENTMCNC: 31.6 % (ref 33–37)
MCV RBC AUTO: 89.1 FL (ref 79–92.2)
MONOCYTES # BLD: 0.9 K/UL (ref 0.2–0.8)
MONOCYTES NFR BLD: 11.1 %
NEUTROPHILS # BLD: 6.2 K/UL (ref 1.4–6.5)
NEUTS SEG NFR BLD: 77.9 %
PERFORMED ON: ABNORMAL
PLATELET # BLD AUTO: 230 K/UL (ref 130–400)
POTASSIUM SERPL-SCNC: 5.6 MEQ/L (ref 3.4–4.9)
PROTHROMBIN TIME: 20.6 SEC (ref 12.3–14.9)
PROTHROMBIN TIME: 21.3 SEC (ref 12.3–14.9)
RBC # BLD AUTO: 2.84 M/UL (ref 4.7–6.1)
SODIUM SERPL-SCNC: 137 MEQ/L (ref 135–144)
WBC # BLD AUTO: 8 K/UL (ref 4.8–10.8)

## 2025-01-11 PROCEDURE — 6370000000 HC RX 637 (ALT 250 FOR IP): Performed by: STUDENT IN AN ORGANIZED HEALTH CARE EDUCATION/TRAINING PROGRAM

## 2025-01-11 PROCEDURE — 94640 AIRWAY INHALATION TREATMENT: CPT

## 2025-01-11 PROCEDURE — 85520 HEPARIN ASSAY: CPT

## 2025-01-11 PROCEDURE — 2060000000 HC ICU INTERMEDIATE R&B

## 2025-01-11 PROCEDURE — 1210000000 HC MED SURG R&B

## 2025-01-11 PROCEDURE — 71045 X-RAY EXAM CHEST 1 VIEW: CPT

## 2025-01-11 PROCEDURE — 85730 THROMBOPLASTIN TIME PARTIAL: CPT

## 2025-01-11 PROCEDURE — 80048 BASIC METABOLIC PNL TOTAL CA: CPT

## 2025-01-11 PROCEDURE — 2700000000 HC OXYGEN THERAPY PER DAY

## 2025-01-11 PROCEDURE — 85025 COMPLETE CBC W/AUTO DIFF WBC: CPT

## 2025-01-11 PROCEDURE — 99222 1ST HOSP IP/OBS MODERATE 55: CPT | Performed by: THORACIC SURGERY (CARDIOTHORACIC VASCULAR SURGERY)

## 2025-01-11 PROCEDURE — 36415 COLL VENOUS BLD VENIPUNCTURE: CPT

## 2025-01-11 PROCEDURE — 2500000003 HC RX 250 WO HCPCS: Performed by: INTERNAL MEDICINE

## 2025-01-11 PROCEDURE — 99232 SBSQ HOSP IP/OBS MODERATE 35: CPT | Performed by: INTERNAL MEDICINE

## 2025-01-11 PROCEDURE — 85014 HEMATOCRIT: CPT

## 2025-01-11 PROCEDURE — 85018 HEMOGLOBIN: CPT

## 2025-01-11 PROCEDURE — 6370000000 HC RX 637 (ALT 250 FOR IP): Performed by: INTERNAL MEDICINE

## 2025-01-11 PROCEDURE — 85610 PROTHROMBIN TIME: CPT

## 2025-01-11 PROCEDURE — 94761 N-INVAS EAR/PLS OXIMETRY MLT: CPT

## 2025-01-11 PROCEDURE — 71250 CT THORAX DX C-: CPT

## 2025-01-11 RX ORDER — OXYCODONE AND ACETAMINOPHEN 5; 325 MG/1; MG/1
2 TABLET ORAL EVERY 6 HOURS PRN
Status: DISCONTINUED | OUTPATIENT
Start: 2025-01-11 | End: 2025-01-18 | Stop reason: HOSPADM

## 2025-01-11 RX ORDER — OXYCODONE AND ACETAMINOPHEN 5; 325 MG/1; MG/1
1 TABLET ORAL EVERY 6 HOURS PRN
Status: DISCONTINUED | OUTPATIENT
Start: 2025-01-11 | End: 2025-01-18 | Stop reason: HOSPADM

## 2025-01-11 RX ADMIN — Medication 10 ML: at 09:36

## 2025-01-11 RX ADMIN — ALBUTEROL SULFATE 2 PUFF: 90 AEROSOL, METERED RESPIRATORY (INHALATION) at 07:42

## 2025-01-11 RX ADMIN — ALBUTEROL SULFATE 2 PUFF: 90 AEROSOL, METERED RESPIRATORY (INHALATION) at 15:55

## 2025-01-11 RX ADMIN — ALBUTEROL SULFATE 2 PUFF: 90 AEROSOL, METERED RESPIRATORY (INHALATION) at 11:48

## 2025-01-11 RX ADMIN — METFORMIN HYDROCHLORIDE 250 MG: 500 TABLET ORAL at 09:35

## 2025-01-11 RX ADMIN — METOPROLOL SUCCINATE 50 MG: 50 TABLET, EXTENDED RELEASE ORAL at 09:35

## 2025-01-11 RX ADMIN — ACETAMINOPHEN 650 MG: 325 TABLET ORAL at 01:10

## 2025-01-11 RX ADMIN — ALBUTEROL SULFATE 2 PUFF: 90 AEROSOL, METERED RESPIRATORY (INHALATION) at 20:09

## 2025-01-11 ASSESSMENT — PAIN SCALES - GENERAL
PAINLEVEL_OUTOF10: 4
PAINLEVEL_OUTOF10: 0
PAINLEVEL_OUTOF10: 3

## 2025-01-11 ASSESSMENT — PAIN DESCRIPTION - LOCATION: LOCATION: CHEST

## 2025-01-11 ASSESSMENT — PAIN DESCRIPTION - ORIENTATION: ORIENTATION: LEFT

## 2025-01-11 ASSESSMENT — PAIN DESCRIPTION - DESCRIPTORS: DESCRIPTORS: SORE

## 2025-01-11 NOTE — PROGRESS NOTES
INPATIENT PROGRESS NOTES    PATIENT NAME: Ruben Gonsalez  MRN: [id number]  SERVICE DATE:  January 11, 2025   SERVICE TIME:  11:20 AM      PRIMARY SERVICE: Pulmonary Disease    CHIEF COMPLAINTS: Pneumothorax    INTERVAL HPI: Patient seen and examined at bedside, Interval Notes, orders reviewed. Nursing notes noted    Patient report pain at the chest tube insertion site, he had blood oozing overnight from the chest tube insertion site this is currently controlled, chest tube drainage 30 to 50 cc, however looks bloody, he is on 5 L O2 saturation 99%, no fever    New information updated in the note today, rest of the examination did not change compared to yesterday.    Review of system:     GI Abdominal pain No  Skin Rash No    Social History     Tobacco Use    Smoking status: Former     Types: Cigarettes     Passive exposure: Past    Smokeless tobacco: Never    Tobacco comments:     3 cigarettes/month   Substance Use Topics    Alcohol use: No         Problem Relation Age of Onset    Colon Cancer Neg Hx          OBJECTIVE    Body mass index is 22.16 kg/m².    PHYSICAL EXAM:  Vitals:  /64   Pulse 76   Temp 97.7 °F (36.5 °C) (Oral)   Resp 18   Ht 1.727 m (5' 7.99\")   Wt 66.1 kg (145 lb 11.6 oz)   SpO2 99%   BMI 22.16 kg/m²     General: alert, cooperative, no distress  Head: normocephalic, atraumatic  Eyes:No gross abnormalities.  ENT:  MMM no lesions  Neck:  supple and no masses  Chest : Few rales, no wheezing, good air movement, nontender, chest tube on the left, no bleed on dressing  Heart:: Heart sounds are normal.  Regular rate and rhythm without murmur, gallop or rub.  ABD:  symmetric, soft, non-tender  Musculoskeletal : no cyanosis, no clubbing, and no edema  Neuro:  Grossly normal  Skin: No rashes or nodules noted.  Lymph node:  no cervical nodes  Urology: No Ambriz   Psychiatric: appropriate    DATA:   Recent Labs     01/10/25  0515 01/11/25  0026 01/11/25  0528   WBC 10.9*  --  8.0   HGB 10.5* 8.5* 8.0*    HCT 33.9* 26.4* 25.3*   MCV 89.9  --  89.1     --  230     Recent Labs     01/09/25  0513 01/10/25  0514 01/11/25  0528    138 137   K 4.8 5.0* 5.6*    105 105   CO2 23 26 25   BUN 29* 31* 35*   CREATININE 1.52* 1.49* 1.48*   GLUCOSE 118* 137* 126*   CALCIUM 8.9 8.8 8.3*   BILITOT 0.3  --   --    ALKPHOS 59  --   --    AST 16  --   --    ALT 6  --   --    LABGLOM 44.5* 45.5* 45.9*   GLOB 2.7  --   --        MV Settings:          No results for input(s): \"PHART\", \"KRO3BNB\", \"PO2ART\", \"ZPP4QGW\", \"BEART\", \"M0YEIOYN\" in the last 72 hours.    O2 Device: Nasal cannula  O2 Flow Rate (L/min): (S) 5 L/min (Decreased to 4)    ADULT DIET; Regular     MEDICATIONS during current hospitalization:    Continuous Infusions:   lactated ringers 50 mL/hr at 01/11/25 0805    dextrose      sodium chloride         Scheduled Meds:   albuterol sulfate HFA  2 puff Inhalation 4x Daily RT    apixaban  2.5 mg Oral BID    metoprolol succinate  50 mg Oral Daily    metFORMIN  250 mg Oral BID WC    insulin lispro  0-4 Units SubCUTAneous TID WC    sodium chloride flush  5-40 mL IntraVENous 2 times per day       PRN Meds:fentanNYL, albuterol sulfate HFA, glucose, dextrose bolus **OR** dextrose bolus, glucagon (rDNA), dextrose, sodium chloride flush, sodium chloride, acetaminophen, ondansetron **OR** ondansetron    Radiology  Chest x-ray films reviewed by me persistent left lower lobe pneumothorax, chest tube in place, no pleural effusion, apical pneumothorax resolved.        IMPRESSION AND SUGGESTION:  Patient is at risk due to   Secondary spontaneous pneumothorax  Bleeding from chest tube insertion site patient on Eliquis, currently bleeding controlled  COVID-19 pneumonia  Coagulopathy  History of COPD  A-fib  Diabetes  Chronic kidney disease  Dehydration     Recommended  Chest tube to suction   Appreciate thoracic surgery   CT chest today chest x-ray tomorrow  Echo noted  Vitamin K x 1  Monitor bleeding, hold Eliquis for today,

## 2025-01-11 NOTE — PROGRESS NOTES
Renal Progress Note  Assessment:  COVID +  Hypoxia  CKD 3a  OHDX CAD HFrEF  AAA  Hx CABGx + stents  DM type-2  Hx Hypertension  Spontaneous pneumothorax           Plan: increase activity look for signs of resp issues or fever  increase activity needs pulmonary consult  repeat CXR    1/11/2025  Significant drop in hemoglobin  Persistent left lower lobe pneumothorax chest tube in place cardiothoracic on case  Eliquis on hold for today  Hold metformin          Patient Active Problem List:     Atrial fibrillation (HCC)     High risk medication use     Atherosclerosis of native coronary artery of native heart without angina pectoris     Hypertension     Ischemic myocardial dysfunction     Nonrheumatic aortic valve disorder, unspecified     Aortic valve disorder     Personal history of nicotine dependence     Presence of aortocoronary bypass graft     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[facility])     Vasovagal syncope     Dizziness and giddiness     Cerebrovascular accident ([facility])     Orthostatic dizziness     Ataxia     Vertigo     Meniere disease, bilateral     Benign paroxysmal positional vertigo due to bilateral vestibular disorder     Coronary angioplasty status     Ataxic gait     Kyphoscoliosis     Spinal stenosis of lumbar region with neurogenic claudication     Abdominal aortic aneurysm (AAA) ([facility])     Acute inferior myocardial infarction ([facility])     Carotid bruit     Chronic obstructive pulmonary disease ([facility])     Diabetes mellitus ([facility])     Dyspnea on exertion     Fatigue     First degree atrioventricular block     Hyperlipidemia     Infection of the inner ear     Muscle pain     Underweight     Ventricular premature beats     Weight loss     PVD (peripheral vascular disease) ([facility])     Impaired mobility and activities of daily living -sp Severe PVD s/p AAA repain     Hypotension     Late effects of CVA (cerebrovascular accident)     MARIAM (acute kidney injury) ([facility])     Body mass index (BMI) of 20 to 24     Chest pain      Esophageal dysphagia     Food impaction of esophagus     Esophageal stenosis     Iron deficiency anemia, unspecified     Anemia, unspecified     Iron deficiency     Abnormal electrocardiography     Adverse effect of atorvastatin     Benign essential hypertension     Laceration of upper arm     Esophageal obstruction due to food impaction     Aspiration pneumonia of left lower lobe due to anesthesia during labor and delivery     Esophagitis     Other iron deficiency anemias     Chronic renal disease, stage III (HCC)     Anemia in stage 3 chronic kidney disease (HCC)     Anemia in chronic kidney disease ([facility])     Sideropenic dysphagia     Anemia     Former smoker     S/P CABG (coronary artery bypass graft)     Status post angioplasty     History of cardiovascular surgery     At high risk for stroke     COPD exacerbation (HCC)      Subjective:   Admit Date: 1/7/2025    Interval History: Not seen or examined patient in isolation secondary to COVID-19 did receive a note from the night nurse of bleed around chest tube patient is on Eliquis defer to pulmonology but order factor X a and PT PTT and INR    Medications:   Scheduled Meds:   albuterol sulfate HFA  2 puff Inhalation 4x Daily RT    [Held by provider] apixaban  2.5 mg Oral BID    metoprolol succinate  50 mg Oral Daily    metFORMIN  250 mg Oral BID WC    insulin lispro  0-4 Units SubCUTAneous TID WC    sodium chloride flush  5-40 mL IntraVENous 2 times per day     Continuous Infusions:   dextrose      sodium chloride         CBC:   Recent Labs     01/10/25  0515 01/11/25  0026 01/11/25  0528   WBC 10.9*  --  8.0   HGB 10.5* 8.5* 8.0*     --  230     CMP:    Recent Labs     01/09/25  0513 01/10/25  0514 01/11/25  0528    138 137   K 4.8 5.0* 5.6*    105 105   CO2 23 26 25   BUN 29* 31* 35*   CREATININE 1.52* 1.49* 1.48*   GLUCOSE 118* 137* 126*   CALCIUM 8.9 8.8 8.3*   LABGLOM 44.5* 45.5* 45.9*     Troponin: No results for input(s): \"TROPONINI\" in

## 2025-01-11 NOTE — PROGRESS NOTES
Called answering service for Dr Dowd regarding eliquis that is due tonight . Pt is bleeding from chest due site, dressing was changed due to bleeding. Pt also  coughing up blood at times.     Dr Gotti called back, ordered to hold eliquis for tonight and reassess in AM, also ordered H&H, APTT and INR lab for now.

## 2025-01-11 NOTE — PROGRESS NOTES
01/11/25 1000   RT Protocol   History Pulmonary Disease 2   Respiratory pattern 2   Breath sounds 6   Cough 1   Bronchodilator Assessment Score 11

## 2025-01-11 NOTE — CONSULTS
motor function grossly intact.  Psychiatric: oriented, appropriate mood/affect.    CT HEAD WO CONTRAST    Result Date: 1/7/2025  EXAM: CT Head Without Intravenous Contrast EXAM DATE/TIME: 1/7/2025 8:36 pm CLINICAL HISTORY: ORDERING SYSTEM PROVIDED HISTORY: gen weakness, ac use  TECHNOLOGIST PROVIDED HISTORY:  Reason for exam:->gen weakness, ac use  Has a \"code stroke\" or \"stroke alert\" been called?->No  Decision Support Exception - unselect if not a suspected or confirmed emergency medical condition->Emergency Medical Condition (MA)  What reading provider will be dictating this exam?->CRC TECHNIQUE: Axial computed tomography images of the head/brain without intravenous contrast.  This CT exam was performed using one or more of the following dose reduction techniques:  automated exposure control, adjustment of the mA and/or kV according to patient size, and/or use of iterative reconstruction technique. COMPARISON: 07/05/2023 FINDINGS: Brain:  No evidence of acute intracranial process.  Prominence of the sulci and/or CSF spaces suggests a degree of cerebral atrophy.  No acute ischemia. No mass or mass effect.  No acute intracranial hemorrhage.  Scattered periventricular deep white matter hypodensity consistent with small vessel ischemic deep white matter disease. Ventricles:  No acute findings.  No ventriculomegaly. Bones/joints:  No acute findings.  No acute fracture. Soft tissues:  No acute findings. Sinuses:  Unremarkable as visualized.  No acute sinusitis. Mastoid air cells:  Unremarkable as visualized.  No mastoid effusion.     1.  No evidence of acute intracranial process. 2.  Findings of presumed small vessel ischemic deep white matter disease. 3.  Prominence of the sulci and/or CSF spaces suggests a degree of cerebral atrophy.     XR CHEST PORTABLE    Result Date: 1/7/2025  EXAMINATION: ONE XRAY VIEW OF THE CHEST 1/7/2025 6:55 pm COMPARISON: September 4, 2023 HISTORY: ORDERING SYSTEM PROVIDED HISTORY: sob  TECHNOLOGIST PROVIDED HISTORY: Reason for exam:->sob What reading provider will be dictating this exam?->CRC FINDINGS: The lungs are without acute focal process.  There is no effusion or pneumothorax. The cardiomediastinal silhouette is without acute process. The osseous structures are without acute process.     No acute process. Interval resolution of left lower lung infiltrate.       MEDICAL DECISION MAKING/TESTING  Studies personally reviewed.      No results found.      Labs:   CBC:   Recent Labs     01/09/25  0513 01/10/25  0515 01/11/25 0026 01/11/25 0528   WBC 9.5 10.9*  --  8.0   HGB 11.6* 10.5* 8.5* 8.0*   HCT 36.5* 33.9* 26.4* 25.3*   MCV 89.2 89.9  --  89.1    283  --  230     BMP:   Recent Labs     01/09/25  0513 01/10/25  0514 01/11/25 0528    138 137   K 4.8 5.0* 5.6*    105 105   CO2 23 26 25   BUN 29* 31* 35*   CREATININE 1.52* 1.49* 1.48*   CALCIUM 8.9 8.8 8.3*     Cardiac Enzymes: No results for input(s): \"CKTOTAL\", \"CKMB\", \"CKMBINDEX\", \"TROPONINI\" in the last 72 hours.  PT/INR:   Recent Labs     01/11/25 0026   PROTIME 21.3*   INR 1.8     APTT:   Recent Labs     01/11/25 0026   APTT 42.7*     Liver Profile:  Lab Results   Component Value Date/Time    AST 16 01/09/2025 05:13 AM    ALT 6 01/09/2025 05:13 AM    BILIDIR 0.2 10/13/2015 07:07 AM    BILITOT 0.3 01/09/2025 05:13 AM    ALKPHOS 59 01/09/2025 05:13 AM    PROTEIN 6.4 01/09/2025 05:13 AM     Lab Results   Component Value Date/Time    CHOL 111 09/24/2024 07:46 AM    HDL 38 09/24/2024 07:46 AM    TRIG 82 09/24/2024 07:46 AM     TSH:  Lab Results   Component Value Date/Time    TSH 3.030 09/24/2024 07:46 AM    TSH 1.560 12/09/2018 05:48 AM     UA:   Lab Results   Component Value Date/Time    COLORU Yellow 09/24/2024 07:46 AM    PHUR 5.0 09/24/2024 07:46 AM    PHUR 5.5 01/30/2024 12:32 PM    WBCUA 3-5 09/24/2024 07:46 AM    RBCUA 0-2 09/24/2024 07:46 AM    BACTERIA RARE 09/24/2024 07:46 AM    CLARITYU Clear 09/24/2024 07:46

## 2025-01-11 NOTE — PROGRESS NOTES
Called Dr Gotti answering service concerning pt's increase SOB,  new lab results. Awaiting call back. Applied new dressing to chest site due to bleeding. Oxgen 100% on 5ltrs. Pt was medicated with tylenol for pain, rating pain 4/10.

## 2025-01-12 ENCOUNTER — APPOINTMENT (OUTPATIENT)
Dept: GENERAL RADIOLOGY | Age: 85
End: 2025-01-12
Attending: THORACIC SURGERY (CARDIOTHORACIC VASCULAR SURGERY)
Payer: MEDICARE

## 2025-01-12 LAB
ANION GAP SERPL CALCULATED.3IONS-SCNC: 12 MEQ/L (ref 9–15)
ANION GAP SERPL CALCULATED.3IONS-SCNC: 9 MEQ/L (ref 9–15)
BACTERIA BLD CULT ORG #2: NORMAL
BACTERIA BLD CULT: NORMAL
BASOPHILS # BLD: 0 K/UL (ref 0–0.2)
BASOPHILS NFR BLD: 0.3 %
BUN SERPL-MCNC: 29 MG/DL (ref 8–23)
BUN SERPL-MCNC: 30 MG/DL (ref 8–23)
CALCIUM SERPL-MCNC: 8.2 MG/DL (ref 8.5–9.9)
CALCIUM SERPL-MCNC: 8.3 MG/DL (ref 8.5–9.9)
CHLORIDE SERPL-SCNC: 101 MEQ/L (ref 95–107)
CHLORIDE SERPL-SCNC: 103 MEQ/L (ref 95–107)
CO2 SERPL-SCNC: 23 MEQ/L (ref 20–31)
CO2 SERPL-SCNC: 25 MEQ/L (ref 20–31)
CREAT SERPL-MCNC: 1.34 MG/DL (ref 0.7–1.2)
CREAT SERPL-MCNC: 1.35 MG/DL (ref 0.7–1.2)
EOSINOPHIL # BLD: 0 K/UL (ref 0–0.7)
EOSINOPHIL NFR BLD: 0.1 %
ERYTHROCYTE [DISTWIDTH] IN BLOOD BY AUTOMATED COUNT: 14.1 % (ref 11.5–14.5)
GLUCOSE BLD-MCNC: 151 MG/DL (ref 70–99)
GLUCOSE BLD-MCNC: 167 MG/DL (ref 70–99)
GLUCOSE BLD-MCNC: 213 MG/DL (ref 70–99)
GLUCOSE BLD-MCNC: 268 MG/DL (ref 70–99)
GLUCOSE SERPL-MCNC: 135 MG/DL (ref 70–99)
GLUCOSE SERPL-MCNC: 238 MG/DL (ref 70–99)
HCT VFR BLD AUTO: 25.6 % (ref 42–52)
HGB BLD-MCNC: 8.3 G/DL (ref 14–18)
LYMPHOCYTES # BLD: 0.7 K/UL (ref 1–4.8)
LYMPHOCYTES NFR BLD: 8.7 %
MAGNESIUM SERPL-MCNC: 2 MG/DL (ref 1.7–2.4)
MCH RBC QN AUTO: 28.7 PG (ref 27–31.3)
MCHC RBC AUTO-ENTMCNC: 32.4 % (ref 33–37)
MCV RBC AUTO: 88.6 FL (ref 79–92.2)
MONOCYTES # BLD: 0.8 K/UL (ref 0.2–0.8)
MONOCYTES NFR BLD: 9.9 %
NEUTROPHILS # BLD: 6.1 K/UL (ref 1.4–6.5)
NEUTS SEG NFR BLD: 80.2 %
PERFORMED ON: ABNORMAL
PLATELET # BLD AUTO: 253 K/UL (ref 130–400)
POTASSIUM SERPL-SCNC: 4.4 MEQ/L (ref 3.4–4.9)
POTASSIUM SERPL-SCNC: 4.9 MEQ/L (ref 3.4–4.9)
RBC # BLD AUTO: 2.89 M/UL (ref 4.7–6.1)
SODIUM SERPL-SCNC: 136 MEQ/L (ref 135–144)
SODIUM SERPL-SCNC: 137 MEQ/L (ref 135–144)
WBC # BLD AUTO: 7.6 K/UL (ref 4.8–10.8)

## 2025-01-12 PROCEDURE — 36415 COLL VENOUS BLD VENIPUNCTURE: CPT

## 2025-01-12 PROCEDURE — 85025 COMPLETE CBC W/AUTO DIFF WBC: CPT

## 2025-01-12 PROCEDURE — 6370000000 HC RX 637 (ALT 250 FOR IP): Performed by: INTERNAL MEDICINE

## 2025-01-12 PROCEDURE — 6370000000 HC RX 637 (ALT 250 FOR IP): Performed by: STUDENT IN AN ORGANIZED HEALTH CARE EDUCATION/TRAINING PROGRAM

## 2025-01-12 PROCEDURE — 83735 ASSAY OF MAGNESIUM: CPT

## 2025-01-12 PROCEDURE — 97168 OT RE-EVAL EST PLAN CARE: CPT

## 2025-01-12 PROCEDURE — 97162 PT EVAL MOD COMPLEX 30 MIN: CPT

## 2025-01-12 PROCEDURE — 2500000003 HC RX 250 WO HCPCS: Performed by: INTERNAL MEDICINE

## 2025-01-12 PROCEDURE — 94761 N-INVAS EAR/PLS OXIMETRY MLT: CPT

## 2025-01-12 PROCEDURE — 71045 X-RAY EXAM CHEST 1 VIEW: CPT

## 2025-01-12 PROCEDURE — 2700000000 HC OXYGEN THERAPY PER DAY

## 2025-01-12 PROCEDURE — 2060000000 HC ICU INTERMEDIATE R&B

## 2025-01-12 PROCEDURE — 99233 SBSQ HOSP IP/OBS HIGH 50: CPT | Performed by: INTERNAL MEDICINE

## 2025-01-12 PROCEDURE — 80048 BASIC METABOLIC PNL TOTAL CA: CPT

## 2025-01-12 PROCEDURE — 94640 AIRWAY INHALATION TREATMENT: CPT

## 2025-01-12 RX ORDER — ALBUTEROL SULFATE 90 UG/1
2 INHALANT RESPIRATORY (INHALATION) 3 TIMES DAILY
Status: DISCONTINUED | OUTPATIENT
Start: 2025-01-12 | End: 2025-01-14

## 2025-01-12 RX ADMIN — ALBUTEROL SULFATE 2 PUFF: 90 AEROSOL, METERED RESPIRATORY (INHALATION) at 07:38

## 2025-01-12 RX ADMIN — Medication 10 ML: at 09:26

## 2025-01-12 RX ADMIN — INSULIN LISPRO 1 UNITS: 100 INJECTION, SOLUTION INTRAVENOUS; SUBCUTANEOUS at 12:08

## 2025-01-12 RX ADMIN — Medication 10 ML: at 20:50

## 2025-01-12 RX ADMIN — ALBUTEROL SULFATE 2 PUFF: 90 AEROSOL, METERED RESPIRATORY (INHALATION) at 13:17

## 2025-01-12 RX ADMIN — ALBUTEROL SULFATE 2 PUFF: 90 AEROSOL, METERED RESPIRATORY (INHALATION) at 01:14

## 2025-01-12 RX ADMIN — METOPROLOL SUCCINATE 50 MG: 50 TABLET, EXTENDED RELEASE ORAL at 18:11

## 2025-01-12 RX ADMIN — ALBUTEROL SULFATE 2 PUFF: 90 AEROSOL, METERED RESPIRATORY (INHALATION) at 20:21

## 2025-01-12 ASSESSMENT — PAIN SCALES - GENERAL: PAINLEVEL_OUTOF10: 0

## 2025-01-12 NOTE — PROGRESS NOTES
No air leak.  Min drainage.  Chest tube removed.  CT reviewed.  It appears that when he ruptured his lung he bled a little into the parenchyma as well as into the fissure.  He is coughing up some blood.  Given his comorbidities I would not operate on this small fissure clot unless it worsens.

## 2025-01-12 NOTE — PROGRESS NOTES
Renal Progress Note  Assessment:  COVID +  Hypoxia  CKD 3a  OHDX CAD HFrEF  AAA  Hx CABGx + stents  DM type-2  Hx Hypertension  Spontaneous pneumothorax           Plan: increase activity look for signs of resp issues or fever  increase activity needs pulmonary consult  repeat CXR     1/11/2025  Significant drop in hemoglobin  Persistent left lower lobe pneumothorax chest tube in place cardiothoracic on case  Eliquis on hold for today  Hold metformin    1/12/2025  Metabolically stable as well as GFR no electrolyte or acid-base imbalance stable hemoglobin at 8.3 hemodynamically stable afebrile nonoliguric none polyuric  Continue to monitor any signs of blood loss  Patient Active Problem List:     Atrial fibrillation (HCC)     High risk medication use     Atherosclerosis of native coronary artery of native heart without angina pectoris     Hypertension     Ischemic myocardial dysfunction     Nonrheumatic aortic valve disorder, unspecified     Aortic valve disorder     Personal history of nicotine dependence     Presence of aortocoronary bypass graft     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HCC)     Vasovagal syncope     Dizziness and giddiness     Cerebrovascular accident (HCC)     Orthostatic dizziness     Ataxia     Vertigo     Meniere disease, bilateral     Benign paroxysmal positional vertigo due to bilateral vestibular disorder     Coronary angioplasty status     Ataxic gait     Kyphoscoliosis     Spinal stenosis of lumbar region with neurogenic claudication     Abdominal aortic aneurysm (AAA) (HCC)     Acute inferior myocardial infarction (HCC)     Carotid bruit     Chronic obstructive pulmonary disease (HCC)     Diabetes mellitus (HCC)     Dyspnea on exertion     Fatigue     First degree atrioventricular block     Hyperlipidemia     Infection of the inner ear     Muscle pain     Underweight     Ventricular premature beats     Weight loss     PVD (peripheral vascular disease) (HCC)     Impaired mobility and  activities of daily living -sp Severe PVD s/p AAA repain     Hypotension     Late effects of CVA (cerebrovascular accident)     MARIAM (acute kidney injury) (HCC)     Body mass index (BMI) of 20 to 24     Chest pain     Esophageal dysphagia     Food impaction of esophagus     Esophageal stenosis     Iron deficiency anemia, unspecified     Anemia, unspecified     Iron deficiency     Abnormal electrocardiography     Adverse effect of atorvastatin     Benign essential hypertension     Laceration of upper arm     Esophageal obstruction due to food impaction     Aspiration pneumonia of left lower lobe due to anesthesia during labor and delivery     Esophagitis     Other iron deficiency anemias     Chronic renal disease, stage III (HCC)     Anemia in stage 3 chronic kidney disease (HCC)     Anemia in chronic kidney disease (CODE)     Sideropenic dysphagia     Anemia     Former smoker     S/P CABG (coronary artery bypass graft)     Status post angioplasty     History of cardiovascular surgery     At high risk for stroke     COPD exacerbation (HCC)      Subjective:   Admit Date: 1/7/2025    Interval History: Uneventful night      Medications:   Scheduled Meds:   albuterol sulfate HFA  2 puff Inhalation TID    [Held by provider] apixaban  2.5 mg Oral BID    metoprolol succinate  50 mg Oral Daily    [Held by provider] metFORMIN  250 mg Oral BID     insulin lispro  0-4 Units SubCUTAneous TID     sodium chloride flush  5-40 mL IntraVENous 2 times per day     Continuous Infusions:   dextrose      sodium chloride         CBC:   Recent Labs     01/11/25  0528 01/12/25  0556   WBC 8.0 7.6   HGB 8.0* 8.3*    253     CMP:    Recent Labs     01/10/25  0514 01/11/25  0528 01/12/25  0556    137 137   K 5.0* 5.6* 4.4    105 103   CO2 26 25 25   BUN 31* 35* 29*   CREATININE 1.49* 1.48* 1.34*   GLUCOSE 137* 126* 135*   CALCIUM 8.8 8.3* 8.2*   LABGLOM 45.5* 45.9* 51.7*     Troponin: No results for input(s): \"TROPONINI\"

## 2025-01-12 NOTE — RT PROTOCOL NOTE
RT Nebulizer Bronchodilator Protocol Note    There is a bronchodilator order in the chart from a provider indicating to follow the RT Bronchodilator Protocol and there is an “Initiate RT Bronchodilator Protocol” order as well (see protocol at bottom of note).    CXR Findings:  XR CHEST PORTABLE    Result Date: 1/11/2025  1. Resolution of previously demonstrated small left apical pneumothorax. 2. Unchanged small left basilar pneumothorax. 3. Left apical pleural tube remains with good positioning.     XR CHEST PORTABLE    Result Date: 1/10/2025  Redemonstration of a left apical and basilar pneumothorax which has decreased in size.       The findings from the last RT Protocol Assessment were as follows:  Smoking: Chronic pulmonary disease  Respiratory Pattern: Regular pattern and RR 12-20 bpm  Breath Sounds: Inspiratory and expiratory or bilateral wheezing and/or rhonchi  Cough: Weak, productive  Indication for Bronchodilator Therapy: Wheezing associated with pulm disorder  Bronchodilator Assessment Score: 10    Aerosolized bronchodilator medication orders have been revised according to the RT Nebulizer Bronchodilator Protocol below.    Respiratory Therapist to perform RT Therapy Protocol Assessment initially then follow the protocol.  Repeat RT Therapy Protocol Assessment PRN for score 0-3 or on second treatment, BID, and PRN for scores above 3.    No Indications - adjust the frequency to every 6 hours PRN wheezing or bronchospasm, if no treatments needed after 48 hours then discontinue using Per Protocol order mode.     If indication present, adjust the RT bronchodilator orders based on the Bronchodilator Assessment Score as indicated below.  If a patient is on this medication at home then do not decrease Frequency below that used at home.    0-3 - enter or revise RT bronchodilator order(s) to equivalent RT Bronchodilator order with Frequency of every 4 hours PRN for wheezing or increased work of breathing using Per

## 2025-01-12 NOTE — PROGRESS NOTES
01/12/25 0747   RT Protocol   History Pulmonary Disease 2   Respiratory pattern 0   Breath sounds 6   Cough 2   Indications for Bronchodilator Therapy Wheezing associated with pulm disorder   Bronchodilator Assessment Score 10

## 2025-01-12 NOTE — PROGRESS NOTES
INPATIENT PROGRESS NOTES    PATIENT NAME: Ruben Gonsalez  MRN: [id number]  SERVICE DATE:  January 12, 2025   SERVICE TIME:  1:07 PM      PRIMARY SERVICE: Pulmonary Disease    CHIEF COMPLAINTS: Pneumothorax    INTERVAL HPI: Patient seen and examined at bedside, Interval Notes, orders reviewed. Nursing notes noted    Chest tube is out, doing good, no pain, no shortness of breath, no coughing, he is on 3 L O2 saturation 98%, no fever    New information updated in the note today, rest of the examination did not change compared to yesterday.    Review of system:     GI Abdominal pain No  Skin Rash No    Social History     Tobacco Use    Smoking status: Former     Types: Cigarettes     Passive exposure: Past    Smokeless tobacco: Never    Tobacco comments:     3 cigarettes/month   Substance Use Topics    Alcohol use: No         Problem Relation Age of Onset    Colon Cancer Neg Hx          OBJECTIVE    Body mass index is 22.16 kg/m².    PHYSICAL EXAM:  Vitals:  /63   Pulse 94   Temp 98.2 °F (36.8 °C) (Oral)   Resp 22   Ht 1.727 m (5' 7.99\")   Wt 66.1 kg (145 lb 11.6 oz)   SpO2 98%   BMI 22.16 kg/m²     General: alert, cooperative, no distress  Head: normocephalic, atraumatic  Eyes:No gross abnormalities.  ENT:  MMM no lesions  Neck:  supple and no masses  Chest : Few rales, no wheezing, good air movement, nontender,    Heart:: Heart sounds are normal.  Regular rate and rhythm without murmur, gallop or rub.  ABD:  symmetric, soft, non-tender  Musculoskeletal : no cyanosis, no clubbing, and no edema  Neuro:  Grossly normal  Skin: No rashes or nodules noted.  Lymph node:  no cervical nodes  Urology: No Ambriz   Psychiatric: appropriate    DATA:   Recent Labs     01/11/25  0528 01/12/25  0556   WBC 8.0 7.6   HGB 8.0* 8.3*   HCT 25.3* 25.6*   MCV 89.1 88.6    253     Recent Labs     01/11/25  0528 01/12/25  0556    137   K 5.6* 4.4    103   CO2 25 25   BUN 35* 29*   CREATININE 1.48* 1.34*   GLUCOSE  126* 135*   CALCIUM 8.3* 8.2*   LABGLOM 45.9* 51.7*       MV Settings:          No results for input(s): \"PHART\", \"GOU5VDU\", \"PO2ART\", \"HVV8LTL\", \"BEART\", \"K7SVMDCL\" in the last 72 hours.    O2 Device: Nasal cannula  O2 Flow Rate (L/min): 3 L/min    ADULT DIET; Regular     MEDICATIONS during current hospitalization:    Continuous Infusions:   dextrose      sodium chloride         Scheduled Meds:   albuterol sulfate HFA  2 puff Inhalation TID    [Held by provider] apixaban  2.5 mg Oral BID    metoprolol succinate  50 mg Oral Daily    [Held by provider] metFORMIN  250 mg Oral BID WC    insulin lispro  0-4 Units SubCUTAneous TID WC    sodium chloride flush  5-40 mL IntraVENous 2 times per day       PRN Meds:oxyCODONE-acetaminophen **AND** oxyCODONE-acetaminophen, fentanNYL, albuterol sulfate HFA, glucose, dextrose bolus **OR** dextrose bolus, glucagon (rDNA), dextrose, sodium chloride flush, sodium chloride, acetaminophen, ondansetron **OR** ondansetron    Radiology  CT chest reviewed by me, small residual pneumothorax at the left base, with loculated hydropneumothorax, chest tube in good position.      IMPRESSION AND SUGGESTION:  Patient is at risk due to   Secondary spontaneous pneumothorax  Loculated hydropneumothorax, likely bleed post chest tube insertion while patient on Eliquis.  Appears stable, asymptomatic.  COVID-19 pneumonia  Coagulopathy  History of COPD  A-fib  Diabetes  Chronic kidney disease  Dehydration     Recommended  Chest tube removed today, repeat chest x-ray tomorrow  Appreciate thoracic surgery, discussed with Dr. Berger today  INR tomorrow  Monitor bleeding, Eliquis on hold for now, resume tomorrow if chest x-ray remains stable  Percocet as needed for pain  Continue cardioprotective medication  Monitor blood sugar target 1 40-1 80  O2 to keep sat 90 to 92%    I spent 50 min with this patient, greater the 80% of this time was spent in counseling and/or coordinating of care.         Electronically

## 2025-01-12 NOTE — PROGRESS NOTES
01/11/25 2300   RT Protocol   History Pulmonary Disease 2   Respiratory pattern 2   Breath sounds 6   Cough 1   Indications for Bronchodilator Therapy Wheezing associated with pulm disorder   Bronchodilator Assessment Score 11

## 2025-01-12 NOTE — PROGRESS NOTES
Physical Therapy Med Surg Initial Assessment  Facility/Department: [address] MED SURG UNIT  Room: [facility]/[place] [address]       NAME: Ruben Gonsalez  : 1940 (84 y.o.)  MRN: [id number]  CODE STATUS: Full Code    Date of Service: 2025    Patient Diagnosis(es): Dehydration [E86.0]  COPD exacerbation (HCC) [J44.1]  Acute respiratory failure, unspecified whether with hypoxia or hypercapnia [J96.00]  Chronic kidney disease, unspecified CKD stage [N18.9]  COVID-19 [U07.1]   Chief Complaint   Patient presents with    Shortness of Breath     Patient Active Problem List    Diagnosis Date Noted    Atrial fibrillation (HCC) 2019    High risk medication use 2019    Iron deficiency anemia, unspecified 2022    Anemia, unspecified 2022    Iron deficiency 2022    Esophageal dysphagia 10/13/2022    Food impaction of esophagus 10/13/2022    Esophageal stenosis 10/13/2022    Chest pain 10/11/2022    Body mass index (BMI) of 20 to 24 2022    MARIAM (acute kidney injury) (HCC) 2022    COPD exacerbation (HCC) 2025    At high risk for stroke 2024    Anemia 2024    Chronic renal disease, stage III (HCC) 2024    Anemia in stage 3 chronic kidney disease (HCC) 2024    Anemia in chronic kidney disease (CODE) 2024    Sideropenic dysphagia 2024    Other iron deficiency anemias 2024    Former smoker 2023    S/P CABG (coronary artery bypass graft) 2023    Status post angioplasty 2023    History of cardiovascular surgery 2023    Esophagitis 2023    Aspiration pneumonia of left lower lobe due to anesthesia during labor and delivery 2023    Esophageal obstruction due to food impaction 2023    Adverse effect of atorvastatin 2023    Benign essential hypertension 2023    Laceration of upper arm 2023    Abnormal electrocardiography 10/31/2022    Impaired mobility and activities of daily living -sp Severe PVD s/p  balance, and activity tolerance and declined standing this date due to increased fatigue.  Continued PT is required to progress toward indep and safe d/c home.  Requires PT Follow-Up: Yes       PLAN OF CARE:  Physical Therapy Plan  General Plan: 1 time a day 3-6 times a week  Current Treatment Recommendations: Strengthening, ROM, Balance training, Functional mobility training, Transfer training, Endurance training, Gait training, Stair training, Home exercise program, Safety education & training, Neuromuscular re-education, Patient/Caregiver education & training, Equipment evaluation, education, & procurement, Positioning, Therapeutic activities    Safety Devices  Type of Devices: All fall risk precautions in place, Bed alarm in place, Call light within reach, Left in bed    Goals:  Long Term Goals  Long Term Goal 1: Pt will demonstrate bed mobility indep  Long Term Goal 2: Pt will demonstrate transfers mod indep with safest AD  Long Term Goal 3: Pt will demonstrate amb >/= 75ft mod indep with safest AD  Long Term Goal 4: Pt will demonstrate TUG </= 20 sec for decreased risk for falls    AMPAC (6 CLICK) BASIC MOBILITY  AM-PAC Inpatient Mobility Raw Score : 15     Therapy Time:   Individual   Time In 1110   Time Out 1120   Minutes 10       Eval x 10 min    Zoe Cha PT, 01/12/25 at 11:44 AM         Definitions for assistance levels  Independent = pt does not require any physical supervision or assistance from another person for activity completion. Device may be needed.  Stand by assistance = pt requires verbal cues or instructions from another person, close to but not touching, to perform the activity  Minimal assistance= pt performs 75% or more of the activity; assistance is required to complete the activity  Moderate assistance= pt performs 50% of the activity; assistance is required to complete the activity  Maximal assistance = pt performs 25% of the activity; assistance is required to complete the  activity  Dependent = pt requires total physical assistance to accomplish the task

## 2025-01-12 NOTE — SIGNIFICANT EVENT
Rapid response called out of concern for tachycardia with HR persisting 150-190 on telemetry for > 1 minute. On my arrival patient's monitor showed HR 100s-110s in atrial fibrillation. Patient denied any new symptoms, including dizziness, lightheadedness, chest pain, or palpitations. /73 and O2 requirements remained stable. RN told me he did not get his morning Toprol due to soft BP. I requested he get his PO Toprol now and have ordered BMP and Mg which I will follow up and replace electrolytes PRN. Recommend resuming AC when deemed safe by primary team.

## 2025-01-12 NOTE — PROGRESS NOTES
[facility] OCCUPATIONAL THERAPY EVALUATION - ACUTE     NAME: Ruben Gonsalez  : 1940 (84 y.o.)  MRN: [id number]  CODE STATUS: Full Code  Room: [facility]    Date of Service: 2025    Patient Diagnosis(es): Dehydration [E86.0]  COPD exacerbation (HCC) [J44.1]  Acute respiratory failure, unspecified whether with hypoxia or hypercapnia [J96.00]  Chronic kidney disease, unspecified CKD stage [N18.9]  COVID-19 [U07.1]   Patient Active Problem List    Diagnosis Date Noted    Atrial fibrillation (HCC) 2019    High risk medication use 2019    Iron deficiency anemia, unspecified 2022    Anemia, unspecified 2022    Iron deficiency 2022    Esophageal dysphagia 10/13/2022    Food impaction of esophagus 10/13/2022    Esophageal stenosis 10/13/2022    Chest pain 10/11/2022    Body mass index (BMI) of 20 to 24 2022    MARIAM (acute kidney injury) (HCC) 2022    COPD exacerbation (HCC) 2025    At high risk for stroke 2024    Anemia 2024    Chronic renal disease, stage III (HCC) 2024    Anemia in stage 3 chronic kidney disease (HCC) 2024    Anemia in chronic kidney disease (CODE) 2024    Sideropenic dysphagia 2024    Other iron deficiency anemias 2024    Former smoker 2023    S/P CABG (coronary artery bypass graft) 2023    Status post angioplasty 2023    History of cardiovascular surgery 2023    Esophagitis 2023    Aspiration pneumonia of left lower lobe due to anesthesia during labor and delivery 2023    Esophageal obstruction due to food impaction 2023    Adverse effect of atorvastatin 2023    Benign essential hypertension 2023    Laceration of upper arm 2023    Abnormal electrocardiography 10/31/2022    Impaired mobility and activities of daily living -sp Severe PVD s/p AAA repain 2022    Hypotension 2022    Late effects of CVA (cerebrovascular accident)  Good  Discharge Recommendations: Continue to assess pending progress  Decision Making: Medium Complexity     History: Pt's medical history is moderately complex  Exam: Pt has 6 performance deficits  Assistance / Modification: Pt requires mod-max A    AMPAC (Six Click) Self care Score    How much help is needed for putting on and taking off regular lower body clothing?: Total  How much help is needed for bathing (which includes washing, rinsing, drying)?: A Lot  How much help is needed for toileting (which includes using toilet, bedpan, or urinal)?: Total  How much help is needed for putting on and taking off regular upper body clothing?: A Little  How much help is needed for taking care of personal grooming?: A Little  How much help for eating meals?: None  AM-[facility] Inpatient Daily Activity Raw Score: 14  AM-PAC Inpatient ADL T-Scale Score : 33.39  ADL Inpatient CMS 0-100% Score: 59.67    Therapy key for assistance levels -   Independent/Mod I = Pt. is able to perform task with no assistance but may require a device   Stand by assistance = Pt. does not perform task at an independent level but does not need physical assistance, requires verbal cues  Minimal, Moderate, Maximal Assistance = Pt. requires physical assistance (25%, 50%, 75% assist from helper) for task but is able to actively participate in task   Dependent = Pt. requires total assistance with task and is not able to actively participate with task completion     Plan:  Occupational Therapy Plan  Times Per Week: 1-4x  Therapy Duration:  (Length of acute stay)  Current Treatment Recommendations: Strengthening, Balance training, Functional mobility training, Endurance training, Pain management, Safety education & training, Patient/Caregiver education & training, Equipment evaluation, education, & procurement, Self-Care / ADL, Home management training    Goals:   Patient will:    - Improve functional endurance to tolerate/complete 30 mins of ADL's  - Be Mod I

## 2025-01-12 NOTE — PLAN OF CARE
Problem: Chronic Conditions and Co-morbidities  Goal: Patient's chronic conditions and co-morbidity symptoms are monitored and maintained or improved  Outcome: Progressing     Problem: Discharge Planning  Goal: Discharge to home or other facility with appropriate resources  Outcome: Progressing     Problem: Pain  Goal: Verbalizes/displays adequate comfort level or baseline comfort level  Outcome: Progressing     Problem: Safety - Adult  Goal: Free from fall injury  Outcome: Progressing     Problem: ABCDS Injury Assessment  Goal: Absence of physical injury  Outcome: Progressing     Problem: Infection - Adult  Goal: Absence of infection at discharge  Outcome: Progressing  Goal: Absence of infection during hospitalization  Outcome: Progressing  Goal: Absence of fever/infection during anticipated neutropenic period  Outcome: Progressing     Problem: Metabolic/Fluid and Electrolytes - Adult  Goal: Electrolytes maintained within normal limits  Outcome: Progressing  Goal: Hemodynamic stability and optimal renal function maintained  Outcome: Progressing  Goal: Glucose maintained within prescribed range  Outcome: Progressing     Problem: Neurosensory - Adult  Goal: Achieves stable or improved neurological status  Outcome: Progressing  Goal: Absence of seizures  Outcome: Progressing  Goal: Remains free of injury related to seizures activity  Outcome: Progressing  Goal: Achieves maximal functionality and self care  Outcome: Progressing     Problem: Respiratory - Adult  Goal: Achieves optimal ventilation and oxygenation  Outcome: Progressing     Problem: Cardiovascular - Adult  Goal: Maintains optimal cardiac output and hemodynamic stability  Outcome: Progressing  Goal: Absence of cardiac dysrhythmias or at baseline  Outcome: Progressing     Problem: Musculoskeletal - Adult  Goal: Return mobility to safest level of function  Outcome: Progressing  Goal: Maintain proper alignment of affected body part  Outcome:

## 2025-01-13 ENCOUNTER — APPOINTMENT (OUTPATIENT)
Dept: GENERAL RADIOLOGY | Age: 85
End: 2025-01-13
Payer: MEDICARE

## 2025-01-13 LAB
ALBUMIN SERPL-MCNC: 2.8 G/DL (ref 3.5–4.6)
ANION GAP SERPL CALCULATED.3IONS-SCNC: 11 MEQ/L (ref 9–15)
BASOPHILS # BLD: 0 K/UL (ref 0–0.2)
BASOPHILS NFR BLD: 0.4 %
BUN SERPL-MCNC: 26 MG/DL (ref 8–23)
CALCIUM SERPL-MCNC: 8.1 MG/DL (ref 8.5–9.9)
CHLORIDE SERPL-SCNC: 104 MEQ/L (ref 95–107)
CO2 SERPL-SCNC: 22 MEQ/L (ref 20–31)
CREAT SERPL-MCNC: 1.42 MG/DL (ref 0.7–1.2)
EOSINOPHIL # BLD: 0.1 K/UL (ref 0–0.7)
EOSINOPHIL NFR BLD: 1.1 %
ERYTHROCYTE [DISTWIDTH] IN BLOOD BY AUTOMATED COUNT: 14.2 % (ref 11.5–14.5)
GLUCOSE BLD-MCNC: 150 MG/DL (ref 70–99)
GLUCOSE BLD-MCNC: 166 MG/DL (ref 70–99)
GLUCOSE BLD-MCNC: 169 MG/DL (ref 70–99)
GLUCOSE BLD-MCNC: 318 MG/DL (ref 70–99)
GLUCOSE SERPL-MCNC: 144 MG/DL (ref 70–99)
HCT VFR BLD AUTO: 24 % (ref 42–52)
HGB BLD-MCNC: 7.7 G/DL (ref 14–18)
INR PPP: 1.4
LYMPHOCYTES # BLD: 0.8 K/UL (ref 1–4.8)
LYMPHOCYTES NFR BLD: 14.4 %
MCH RBC QN AUTO: 28.6 PG (ref 27–31.3)
MCHC RBC AUTO-ENTMCNC: 32.1 % (ref 33–37)
MCV RBC AUTO: 89.2 FL (ref 79–92.2)
MONOCYTES # BLD: 0.9 K/UL (ref 0.2–0.8)
MONOCYTES NFR BLD: 16.3 %
NEUTROPHILS # BLD: 3.7 K/UL (ref 1.4–6.5)
NEUTS SEG NFR BLD: 66 %
PERFORMED ON: ABNORMAL
PHOSPHATE SERPL-MCNC: 2.8 MG/DL (ref 2.3–4.8)
PLATELET # BLD AUTO: 256 K/UL (ref 130–400)
POTASSIUM SERPL-SCNC: 4.3 MEQ/L (ref 3.4–4.9)
PROTHROMBIN TIME: 17.7 SEC (ref 12.3–14.9)
RBC # BLD AUTO: 2.69 M/UL (ref 4.7–6.1)
SODIUM SERPL-SCNC: 137 MEQ/L (ref 135–144)
WBC # BLD AUTO: 5.6 K/UL (ref 4.8–10.8)

## 2025-01-13 PROCEDURE — 85610 PROTHROMBIN TIME: CPT

## 2025-01-13 PROCEDURE — 2500000003 HC RX 250 WO HCPCS: Performed by: INTERNAL MEDICINE

## 2025-01-13 PROCEDURE — 36415 COLL VENOUS BLD VENIPUNCTURE: CPT

## 2025-01-13 PROCEDURE — 85025 COMPLETE CBC W/AUTO DIFF WBC: CPT

## 2025-01-13 PROCEDURE — 51798 US URINE CAPACITY MEASURE: CPT

## 2025-01-13 PROCEDURE — 6370000000 HC RX 637 (ALT 250 FOR IP): Performed by: STUDENT IN AN ORGANIZED HEALTH CARE EDUCATION/TRAINING PROGRAM

## 2025-01-13 PROCEDURE — 6370000000 HC RX 637 (ALT 250 FOR IP): Performed by: INTERNAL MEDICINE

## 2025-01-13 PROCEDURE — 2700000000 HC OXYGEN THERAPY PER DAY

## 2025-01-13 PROCEDURE — 94640 AIRWAY INHALATION TREATMENT: CPT

## 2025-01-13 PROCEDURE — 51701 INSERT BLADDER CATHETER: CPT

## 2025-01-13 PROCEDURE — 94761 N-INVAS EAR/PLS OXIMETRY MLT: CPT

## 2025-01-13 PROCEDURE — 97535 SELF CARE MNGMENT TRAINING: CPT

## 2025-01-13 PROCEDURE — 80069 RENAL FUNCTION PANEL: CPT

## 2025-01-13 PROCEDURE — 2060000000 HC ICU INTERMEDIATE R&B

## 2025-01-13 PROCEDURE — 71045 X-RAY EXAM CHEST 1 VIEW: CPT

## 2025-01-13 PROCEDURE — 99232 SBSQ HOSP IP/OBS MODERATE 35: CPT | Performed by: INTERNAL MEDICINE

## 2025-01-13 RX ORDER — MEGESTROL ACETATE 40 MG/ML
200 SUSPENSION ORAL DAILY
Status: DISCONTINUED | OUTPATIENT
Start: 2025-01-13 | End: 2025-01-13

## 2025-01-13 RX ORDER — MEGESTROL ACETATE 20 MG/1
20 TABLET ORAL 2 TIMES DAILY
Status: DISCONTINUED | OUTPATIENT
Start: 2025-01-13 | End: 2025-01-18 | Stop reason: HOSPADM

## 2025-01-13 RX ADMIN — MEGESTROL ACETATE 20 MG: 20 TABLET ORAL at 21:04

## 2025-01-13 RX ADMIN — Medication 10 ML: at 11:47

## 2025-01-13 RX ADMIN — Medication 10 ML: at 21:04

## 2025-01-13 RX ADMIN — APIXABAN 2.5 MG: 2.5 TABLET, FILM COATED ORAL at 21:04

## 2025-01-13 RX ADMIN — INSULIN LISPRO 3 UNITS: 100 INJECTION, SOLUTION INTRAVENOUS; SUBCUTANEOUS at 11:46

## 2025-01-13 RX ADMIN — MEGESTROL ACETATE 20 MG: 20 TABLET ORAL at 10:29

## 2025-01-13 RX ADMIN — ALBUTEROL SULFATE 2 PUFF: 90 AEROSOL, METERED RESPIRATORY (INHALATION) at 19:17

## 2025-01-13 RX ADMIN — METOPROLOL SUCCINATE 50 MG: 50 TABLET, EXTENDED RELEASE ORAL at 10:30

## 2025-01-13 RX ADMIN — ALBUTEROL SULFATE 2 PUFF: 90 AEROSOL, METERED RESPIRATORY (INHALATION) at 07:14

## 2025-01-13 RX ADMIN — ALBUTEROL SULFATE 2 PUFF: 90 AEROSOL, METERED RESPIRATORY (INHALATION) at 12:06

## 2025-01-13 ASSESSMENT — PAIN SCALES - GENERAL: PAINLEVEL_OUTOF10: 0

## 2025-01-13 NOTE — PROGRESS NOTES
01/12/25 2000   RT Protocol   History Pulmonary Disease 2   Respiratory pattern 0   Breath sounds 6   Cough 1   Indications for Bronchodilator Therapy Wheezing associated with pulm disorder   Bronchodilator Assessment Score 9

## 2025-01-13 NOTE — CARE COORDINATION
MET WITH PATIENT AT BEDSIDE TO DISCUSS DISCHARGE PLAN. PATIENT CURRENTLY RESTING IN BED ON 3 L N/C. PATIENT OFFERED HHC OR SNF AND CURRENTLY DECLINES BOTH. PATIENT DENIES HOME GOING NEEDS. DISCHARGE PLAN HOME WITH WIFE. MONITOR FOR HOME O2 ROSITA.    1635 SPOKE WITH PATIENT'S WIFE FERNANDO REGARDING DISCHARGE PLAN. WIFE FERNANDO DECLINES SNF/REHAB/HHC AT THIS TIME.

## 2025-01-13 NOTE — PROGRESS NOTES
Nephrology Progress Note    Assessment:  Left pneumo  Hypertension  CKD 3a  Anemia down 7.7 Gm  Hx CVA  OHDx CAD CAF  AAA repair        Plan:chest tube out  await CXR to restart eliquis  follow labs  start megace    Patient Active Problem List:     Atrial fibrillation (HCC)     High risk medication use     Atherosclerosis of native coronary artery of native heart without angina pectoris     Hypertension     Ischemic myocardial dysfunction     Nonrheumatic aortic valve disorder, unspecified     Aortic valve disorder     Personal history of nicotine dependence     Presence of aortocoronary bypass graft     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[facility])     Vasovagal syncope     Dizziness and giddiness     Cerebrovascular accident ([facility])     Orthostatic dizziness     Ataxia     Vertigo     Meniere disease, bilateral     Benign paroxysmal positional vertigo due to bilateral vestibular disorder     Coronary angioplasty status     Ataxic gait     Kyphoscoliosis     Spinal stenosis of lumbar region with neurogenic claudication     Abdominal aortic aneurysm (AAA) ([facility])     Acute inferior myocardial infarction ([facility])     Carotid bruit     Chronic obstructive pulmonary disease ([facility])     Diabetes mellitus ([facility])     Dyspnea on exertion     Fatigue     First degree atrioventricular block     Hyperlipidemia     Infection of the inner ear     Muscle pain     Underweight     Ventricular premature beats     Weight loss     PVD (peripheral vascular disease) ([facility])     Impaired mobility and activities of daily living -sp Severe PVD s/p AAA repain     Hypotension     Late effects of CVA (cerebrovascular accident)     MARIAM (acute kidney injury) ([facility])     Body mass index (BMI) of 20 to 24     Chest pain     Esophageal dysphagia     Food impaction of esophagus     Esophageal stenosis     Iron deficiency anemia, unspecified     Anemia, unspecified     Iron deficiency     Abnormal electrocardiography     Adverse effect of atorvastatin     Benign  essential hypertension     Laceration of upper arm     Esophageal obstruction due to food impaction     Aspiration pneumonia of left lower lobe due to anesthesia during labor and delivery     Esophagitis     Other iron deficiency anemias     Chronic renal disease, stage III (HCC)     Anemia in stage 3 chronic kidney disease (HCC)     Anemia in chronic kidney disease (CODE)     Sideropenic dysphagia     Anemia     Former smoker     S/P CABG (coronary artery bypass graft)     Status post angioplasty     History of cardiovascular surgery     At high risk for stroke     COPD exacerbation (HCC)      Subjective:  Admit Date: 1/7/2025    Interval History: stable    Medications:  Scheduled Meds:   albuterol sulfate HFA  2 puff Inhalation TID    [Held by provider] apixaban  2.5 mg Oral BID    metoprolol succinate  50 mg Oral Daily    [Held by provider] metFORMIN  250 mg Oral BID WC    insulin lispro  0-4 Units SubCUTAneous TID WC    sodium chloride flush  5-40 mL IntraVENous 2 times per day     Continuous Infusions:   dextrose      sodium chloride         CBC:   Recent Labs     01/12/25  0556 01/13/25  0552   WBC 7.6 5.6   HGB 8.3* 7.7*    256     CMP:    Recent Labs     01/12/25  0556 01/12/25  1821 01/13/25  0552    136 137   K 4.4 4.9 4.3    101 104   CO2 25 23 22   BUN 29* 30* 26*   CREATININE 1.34* 1.35* 1.42*   GLUCOSE 135* 238* 144*   CALCIUM 8.2* 8.3* 8.1*   LABGLOM 51.7* 51.3* 48.2*     Troponin: No results for input(s): \"TROPONINI\" in the last 72 hours.  BNP: No results for input(s): \"BNP\" in the last 72 hours.  INR:   Recent Labs     01/13/25  0552   INR 1.4     Lipids: No results for input(s): \"CHOL\", \"LDLDIRECT\", \"TRIG\", \"HDL\", \"AMYLASE\", \"LIPASE\" in the last 72 hours.  Liver: No results for input(s): \"AST\", \"ALT\", \"ALKPHOS\", \"LABALBU\", \"BILITOT\" in the last 72 hours.    Invalid input(s): \"PROT\", \"BILDIR\"  Iron:  No results for input(s): \"FERRITIN\" in the last 72 hours.    Invalid input(s):

## 2025-01-13 NOTE — PROGRESS NOTES
1800 Monitor room called nurse to stated that patient's heart rate was sustaining in the 190s. Upon entering room rapid response called. See code charting for details.     1811 Dr. Mascorro instructed nurse to give oral Metoprolol that was scheduled this AM but held due to low blood pressure.

## 2025-01-13 NOTE — PROGRESS NOTES
Physical Therapy Med Surg Daily Treatment Note  Facility/Department: [address] MED SURG UNIT  Room: [place] [address]       NAME: Ruben Gonsalez  : 1940 (84 y.o.)  MRN: [id number]  CODE STATUS: Full Code    Date of Service: 2025    Patient Diagnosis(es): Dehydration [E86.0]  COPD exacerbation (HCC) [J44.1]  Acute respiratory failure, unspecified whether with hypoxia or hypercapnia [J96.00]  Chronic kidney disease, unspecified CKD stage [N18.9]  COVID-19 [U07.1]   Chief Complaint   Patient presents with    Shortness of Breath     Patient Active Problem List    Diagnosis Date Noted    Atrial fibrillation (HCC) 2019    High risk medication use 2019    Iron deficiency anemia, unspecified 2022    Anemia, unspecified 2022    Iron deficiency 2022    Esophageal dysphagia 10/13/2022    Food impaction of esophagus 10/13/2022    Esophageal stenosis 10/13/2022    Chest pain 10/11/2022    Body mass index (BMI) of 20 to 24 2022    MARIAM (acute kidney injury) (HCC) 2022    COPD exacerbation (HCC) 2025    At high risk for stroke 2024    Anemia 2024    Chronic renal disease, stage III (HCC) 2024    Anemia in stage 3 chronic kidney disease (HCC) 2024    Anemia in chronic kidney disease (CODE) 2024    Sideropenic dysphagia 2024    Other iron deficiency anemias 2024    Former smoker 2023    S/P CABG (coronary artery bypass graft) 2023    Status post angioplasty 2023    History of cardiovascular surgery 2023    Esophagitis 2023    Aspiration pneumonia of left lower lobe due to anesthesia during labor and delivery 2023    Esophageal obstruction due to food impaction 2023    Adverse effect of atorvastatin 2023    Benign essential hypertension 2023    Laceration of upper arm 2023    Abnormal electrocardiography 10/31/2022    Impaired mobility and activities of daily living -sp Severe PVD s/p      Pneumonia      Past Surgical History:   Procedure Laterality Date    APPENDECTOMY      COLONOSCOPY N/A 5/17/2024    COLONOSCOPY DIAGNOSTIC performed by Surinder Chavarria MD at [facility]    CORONARY ANGIOPLASTY WITH STENT PLACEMENT      4    CORONARY ARTERY BYPASS GRAFT      triple bypass    EYE SURGERY Bilateral     cataract surgery    INSERTABLE CARDIAC MONITOR Left 12/2018    UPPER GASTROINTESTINAL ENDOSCOPY N/A 10/13/2022    EGD ESOPHAGOGASTRODUODENOSCOPY WITH DILATION performed by Surinder Chavarria MD at [facility]    UPPER GASTROINTESTINAL ENDOSCOPY N/A 9/4/2023    EGD ESOPHAGOGASTRODUODENOSCOPY WITH FOREIGN BODY REMOVAL performed by David Mann MD at [facility]    UPPER GASTROINTESTINAL ENDOSCOPY N/A 5/17/2024    ESOPHAGOGASTRODUODENOSCOPY DIAGNOSTIC ONLY performed by Surinder Chavarria MD at [facility]       Patient assessed for rehabilitation services?: Yes  Family/Caregiver Present: Yes    Restrictions:  Restrictions/Precautions: Isolation    SUBJECTIVE: Patient resting in bed. Agreeable to tx.        Pain   Patient denies pain pre/post session     OBJECTIVE:        Bed mobility  Supine to Sit: Minimal assistance;Moderate assistance  Sit to Supine: Minimal assistance  Scooting: Moderate assistance  Bed Mobility Comments: HOB elevated; patient requires increased time, effort and physical assistance    Transfers  Sit to Stand: Minimal Assistance  Stand to Sit: Minimal Assistance  Comment: with ww; patient requires verbal cues for hand placement. Completes STS x2    Ambulation  Surface: Level tile  Device: Rolling Walker  Assistance: Minimal assistance  Quality of Gait: 3 steps laterally at EOB  Comments: pt declined due to increased fatigue    ASSESSMENT   Body Structures, Functions, Activity Limitations Requiring Skilled Therapeutic Intervention: Decreased functional mobility ;Decreased strength;Decreased endurance;Decreased balance  Assessment: Patient activity tolerance and  normal... Well appearing, well nourished, awake, alert, oriented   and in no apparent distress.

## 2025-01-13 NOTE — PLAN OF CARE
Problem: Chronic Conditions and Co-morbidities  Goal: Patient's chronic conditions and co-morbidity symptoms are monitored and maintained or improved  1/13/2025 1159 by Ciara Vasquez RN  Outcome: Progressing  1/12/2025 2303 by Smiley Moreno RN  Outcome: Progressing     Problem: Discharge Planning  Goal: Discharge to home or other facility with appropriate resources  1/13/2025 1159 by Ciara Vasquez RN  Outcome: Progressing  1/12/2025 2303 by Smiley Moreno RN  Outcome: Progressing     Problem: Pain  Goal: Verbalizes/displays adequate comfort level or baseline comfort level  1/13/2025 1159 by Ciara Vasquez RN  Outcome: Progressing  1/12/2025 2303 by Smiley Moreno RN  Outcome: Progressing     Problem: Safety - Adult  Goal: Free from fall injury  1/13/2025 1159 by Ciara Vasquez RN  Outcome: Progressing  1/12/2025 2303 by Smiley Moreno RN  Outcome: Progressing     Problem: ABCDS Injury Assessment  Goal: Absence of physical injury  1/13/2025 1159 by Ciara Vasquez RN  Outcome: Progressing  1/12/2025 2303 by Smiley Moreno RN  Outcome: Progressing     Problem: Infection - Adult  Goal: Absence of infection at discharge  1/13/2025 1159 by Ciara Vasquez RN  Outcome: Progressing  1/12/2025 2303 by Smiley Moreno RN  Outcome: Progressing  Goal: Absence of infection during hospitalization  1/13/2025 1159 by Ciara Vasquez RN  Outcome: Progressing  1/12/2025 2303 by Smiley Moreno RN  Outcome: Progressing  Goal: Absence of fever/infection during anticipated neutropenic period  1/13/2025 1159 by Ciara Vasquez RN  Outcome: Progressing  1/12/2025 2303 by Smiley Moreno RN  Outcome: Progressing     Problem: Metabolic/Fluid and Electrolytes - Adult  Goal: Electrolytes maintained within normal limits  1/13/2025 1159 by Ciara Vasquez RN  Outcome: Progressing  1/12/2025 2303 by Smiley Moreno RN  Outcome: Progressing  Goal: Hemodynamic stability and optimal renal function maintained  1/13/2025 1159 by  Ciara Vasquez RN  Outcome: Progressing  1/12/2025 2303 by Smiley Moreno RN  Outcome: Progressing  Goal: Glucose maintained within prescribed range  1/13/2025 1159 by Ciara Vasquez RN  Outcome: Progressing  1/12/2025 2303 by Smiley Moreno RN  Outcome: Progressing     Problem: Neurosensory - Adult  Goal: Achieves stable or improved neurological status  1/13/2025 1159 by Ciara Vasquez RN  Outcome: Progressing  1/12/2025 2303 by Smiley Moreno RN  Outcome: Progressing  Goal: Absence of seizures  1/13/2025 1159 by Ciara Vasquez RN  Outcome: Progressing  1/12/2025 2303 by Smiley Moreno RN  Outcome: Progressing  Goal: Remains free of injury related to seizures activity  1/13/2025 1159 by Ciara Vasquez RN  Outcome: Progressing  1/12/2025 2303 by Smiley Moreno RN  Outcome: Progressing  Goal: Achieves maximal functionality and self care  1/13/2025 1159 by Ciara Vasquez RN  Outcome: Progressing  1/12/2025 2303 by Smiley Moreno RN  Outcome: Progressing     Problem: Respiratory - Adult  Goal: Achieves optimal ventilation and oxygenation  1/13/2025 1159 by Ciara Vasquez RN  Outcome: Progressing  1/12/2025 2303 by Smiley Moreno RN  Outcome: Progressing     Problem: Cardiovascular - Adult  Goal: Maintains optimal cardiac output and hemodynamic stability  1/13/2025 1159 by Ciara Vasquez RN  Outcome: Progressing  1/12/2025 2303 by Smiley Moreno RN  Outcome: Progressing  Goal: Absence of cardiac dysrhythmias or at baseline  1/13/2025 1159 by Ciara Vasquez RN  Outcome: Progressing  1/12/2025 2303 by Smiley Moreno RN  Outcome: Progressing     Problem: Skin/Tissue Integrity - Adult  Goal: Skin integrity remains intact  1/13/2025 1159 by Ciara Vasquez RN  Outcome: Progressing  1/12/2025 2303 by Smiley Moreno RN  Outcome: Progressing  Goal: Incisions, wounds, or drain sites healing without S/S of infection  1/13/2025 1159 by Ciara Vasquez, RN  Outcome: Progressing  1/12/2025 2303 by Smiley Moreno,

## 2025-01-14 PROBLEM — U07.1 COVID-19: Status: ACTIVE | Noted: 2025-01-14

## 2025-01-14 PROBLEM — E53.8 B12 DEFICIENCY: Status: ACTIVE | Noted: 2024-06-03

## 2025-01-14 PROBLEM — J93.12 SECONDARY SPONTANEOUS PNEUMOTHORAX: Status: ACTIVE | Noted: 2025-01-14

## 2025-01-14 LAB
ALBUMIN SERPL-MCNC: 2.7 G/DL (ref 3.5–4.6)
ANION GAP SERPL CALCULATED.3IONS-SCNC: 11 MEQ/L (ref 9–15)
BUN SERPL-MCNC: 29 MG/DL (ref 8–23)
CALCIUM SERPL-MCNC: 8 MG/DL (ref 8.5–9.9)
CHLORIDE SERPL-SCNC: 109 MEQ/L (ref 95–107)
CO2 SERPL-SCNC: 22 MEQ/L (ref 20–31)
CREAT SERPL-MCNC: 1.27 MG/DL (ref 0.7–1.2)
GLUCOSE BLD-MCNC: 129 MG/DL (ref 70–99)
GLUCOSE BLD-MCNC: 171 MG/DL (ref 70–99)
GLUCOSE BLD-MCNC: 200 MG/DL (ref 70–99)
GLUCOSE BLD-MCNC: 202 MG/DL (ref 70–99)
GLUCOSE SERPL-MCNC: 118 MG/DL (ref 70–99)
PERFORMED ON: ABNORMAL
PHOSPHATE SERPL-MCNC: 2.9 MG/DL (ref 2.3–4.8)
POTASSIUM SERPL-SCNC: 4.3 MEQ/L (ref 3.4–4.9)
SODIUM SERPL-SCNC: 142 MEQ/L (ref 135–144)

## 2025-01-14 PROCEDURE — 94761 N-INVAS EAR/PLS OXIMETRY MLT: CPT

## 2025-01-14 PROCEDURE — 36415 COLL VENOUS BLD VENIPUNCTURE: CPT

## 2025-01-14 PROCEDURE — 6370000000 HC RX 637 (ALT 250 FOR IP): Performed by: INTERNAL MEDICINE

## 2025-01-14 PROCEDURE — 99221 1ST HOSP IP/OBS SF/LOW 40: CPT | Performed by: PHYSICAL MEDICINE & REHABILITATION

## 2025-01-14 PROCEDURE — 94640 AIRWAY INHALATION TREATMENT: CPT

## 2025-01-14 PROCEDURE — 80069 RENAL FUNCTION PANEL: CPT

## 2025-01-14 PROCEDURE — 97535 SELF CARE MNGMENT TRAINING: CPT

## 2025-01-14 PROCEDURE — 51798 US URINE CAPACITY MEASURE: CPT

## 2025-01-14 PROCEDURE — 99232 SBSQ HOSP IP/OBS MODERATE 35: CPT | Performed by: INTERNAL MEDICINE

## 2025-01-14 PROCEDURE — 6370000000 HC RX 637 (ALT 250 FOR IP): Performed by: STUDENT IN AN ORGANIZED HEALTH CARE EDUCATION/TRAINING PROGRAM

## 2025-01-14 PROCEDURE — 2700000000 HC OXYGEN THERAPY PER DAY

## 2025-01-14 PROCEDURE — 2060000000 HC ICU INTERMEDIATE R&B

## 2025-01-14 PROCEDURE — 1210000000 HC MED SURG R&B

## 2025-01-14 PROCEDURE — 2500000003 HC RX 250 WO HCPCS: Performed by: INTERNAL MEDICINE

## 2025-01-14 RX ORDER — ALBUTEROL SULFATE 90 UG/1
2 INHALANT RESPIRATORY (INHALATION)
Status: DISCONTINUED | OUTPATIENT
Start: 2025-01-14 | End: 2025-01-18 | Stop reason: HOSPADM

## 2025-01-14 RX ORDER — GUAIFENESIN/DEXTROMETHORPHAN 100-10MG/5
5 SYRUP ORAL 4 TIMES DAILY
Status: DISCONTINUED | OUTPATIENT
Start: 2025-01-14 | End: 2025-01-18 | Stop reason: HOSPADM

## 2025-01-14 RX ADMIN — MEGESTROL ACETATE 20 MG: 20 TABLET ORAL at 21:17

## 2025-01-14 RX ADMIN — APIXABAN 2.5 MG: 2.5 TABLET, FILM COATED ORAL at 21:17

## 2025-01-14 RX ADMIN — INSULIN LISPRO 1 UNITS: 100 INJECTION, SOLUTION INTRAVENOUS; SUBCUTANEOUS at 17:13

## 2025-01-14 RX ADMIN — METOPROLOL SUCCINATE 50 MG: 50 TABLET, EXTENDED RELEASE ORAL at 08:45

## 2025-01-14 RX ADMIN — Medication 10 ML: at 21:17

## 2025-01-14 RX ADMIN — Medication 10 ML: at 08:48

## 2025-01-14 RX ADMIN — ALBUTEROL SULFATE 2 PUFF: 90 AEROSOL, METERED RESPIRATORY (INHALATION) at 07:33

## 2025-01-14 RX ADMIN — ALBUTEROL SULFATE 2 PUFF: 90 AEROSOL, METERED RESPIRATORY (INHALATION) at 20:19

## 2025-01-14 RX ADMIN — INSULIN LISPRO 1 UNITS: 100 INJECTION, SOLUTION INTRAVENOUS; SUBCUTANEOUS at 12:07

## 2025-01-14 RX ADMIN — GUAIFENESIN SYRUP AND DEXTROMETHORPHAN 5 ML: 100; 10 SYRUP ORAL at 13:59

## 2025-01-14 RX ADMIN — GUAIFENESIN SYRUP AND DEXTROMETHORPHAN 5 ML: 100; 10 SYRUP ORAL at 21:17

## 2025-01-14 RX ADMIN — MEGESTROL ACETATE 20 MG: 20 TABLET ORAL at 08:45

## 2025-01-14 RX ADMIN — APIXABAN 2.5 MG: 2.5 TABLET, FILM COATED ORAL at 08:45

## 2025-01-14 RX ADMIN — GUAIFENESIN SYRUP AND DEXTROMETHORPHAN 5 ML: 100; 10 SYRUP ORAL at 17:12

## 2025-01-14 ASSESSMENT — ENCOUNTER SYMPTOMS
CONSTIPATION: 1
VOMITING: 0
BLOOD IN STOOL: 0
BACK PAIN: 1
SORE THROAT: 0
WHEEZING: 0
PHOTOPHOBIA: 0
STRIDOR: 0
SHORTNESS OF BREATH: 1
ABDOMINAL PAIN: 0
COUGH: 1
EYE PAIN: 0
DIARRHEA: 0
NAUSEA: 0
EYE REDNESS: 0

## 2025-01-14 ASSESSMENT — PAIN SCALES - GENERAL: PAINLEVEL_OUTOF10: 0

## 2025-01-14 NOTE — PROGRESS NOTES
[facility] MED SURG TREATMENT NOTE     Date: 2025  Patient Name: Ruben Gonsalez        MRN: [id number]  Account: [id number]   : 1940  (84 y.o.)  Room: [place] [address]    Chart Review:    Restrictions  Restrictions/Precautions  Restrictions/Precautions: Fall Risk, Isolation  Activity Level: Up with Assist     Safety:  Safety Devices  Type of Devices: All fall risk precautions in place;Bed alarm in place;Call light within reach;Left in bed    Patient's birthday verified: Yes    Subjective: \"I'm resting my eyes\" pt stated when asked if he is falling asleep.     Pain   Pain at start of treatment: No    Pain at end of treatment: No      Objective: Pt in bed resting upon arrival. Introdyuced self, explained role of OT, and offered to assist pt with ADL completion. Pt reporting fatigue, however, agreeable to participating in partial ADL at bed level. Pt completed as follows.     ADL Status:  Grooming: Setup;Supervision;Verbal cueing  Grooming Skilled Clinical Factors: Pt washed face only with VCs for initiation. Pt declined oral care and hair care at this time  UE Bathing: Setup;Supervision;Verbal cueing  UE Bathing Skilled Clinical Factors: VCs for initiation  LE Bathing: None  LE Bathing Skilled Clinical Factors: Pt declined  UE Dressing: Unable to assess   UE Dressing Skilled Clinical Factors: Hosptial Gown Only  Product Used : Soap and water    Bed Mobility  Rolling to Left: Stand by assistance  Bed Mobility Comments: HOB flat, Use of bed rails, Pt completed in a timely manner    Cognition Status:  Overall Cognitive Status: WFL    Therapy key for assistance levels -   Independent/Mod I = Pt. is able to perform task with no assistance but may require a device   Stand by assistance = Pt. does not perform task at an independent level but does not need physical assistance, requires verbal cues  Minimal, Moderate, Maximal Assistance = Pt. requires physical assistance (25%, 50%, 75% assist from  helper) for task but is able to actively participate in task   Dependent = Pt. requires total assistance with task and is not able to actively participate with task completion    Functional Endurance:  Activity Tolerance  Activity Tolerance: Patient limited by fatigue    Treatment consisted of:  ADL training    Assessment/Discharge Disposition: Pt would benefit from continued Occupational Therapy to increase strength, activity tolerance, [place] with functional transfers, and [place] with ADL/IADL completion.      Performance Deficits/Impairments:   Decreased High-level IADLs  Decreased ADL Status  Decreased Functional Mobility  Decreased Balance  Decreased Strength  Decreased Endurance  Decreased Posture  Decreased Safety Awareness     [employer]  How much help is needed for putting on and taking off regular lower body clothing?: A Lot  How much help is needed for bathing (which includes washing, rinsing, drying)?: A Lot  How much help is needed for toileting (which includes using toilet, bedpan, or urinal)?: A Lot  How much help is needed for putting on and taking off regular upper body clothing?: A Little  How much help is needed for taking care of personal grooming?: A Little  How much help for eating meals?: None  AM-PAC Inpatient Daily Activity Raw Score: 16  AM-PAC Inpatient ADL T-Scale Score : 35.96  ADL Inpatient CMS 0-100% Score: 53.32  ADL Inpatient CMS G-Code Modifier : CK    Plan:  Continue OT per POC    Patient Education:  Patient Education  Education Given To: Patient  Education Provided: Role of Therapy;Plan of Care  Education Method: Verbal  Barriers to Learning: None  Education Outcome: Verbalized understanding    Equipment recommendations:  Continue to assess pending progress.     Goals/Plan of care addressed during this session:  Patient Goal: Patient goals : \"I want to get out of here\"  Improve [place] with ADLs and Improve [place] with Functional Transfers    Therapy Time:

## 2025-01-14 NOTE — CARE COORDINATION
Inpatient Rehab referral received. Met with patient to discuss therapy recommendations and [facility] as consult placed by primary physician. Patient politely declines rehab at this time. He reports he has been with us in the past, but absolutely, has no interest in coming back. He further reports he has a paper of exercises from the last time. His plan is to return home with his wife. Currently wearing NC )2 that he denies having at home/ baseline. I encouraged patient to think about rehab and that we're here if he changes his mind. Patient thanked this RN. Will follow back up with patient in the event he changes his mind. Patient with current plans to return back home when medically able to do so.  Electronically signed by Khalida Trimble on 1/14/2025 at 11:51 AM

## 2025-01-14 NOTE — PROGRESS NOTES
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[facility])     Vasovagal syncope     Dizziness and giddiness     Cerebrovascular accident ([facility])     Orthostatic dizziness     Ataxia     Vertigo     Meniere disease, bilateral     Benign paroxysmal positional vertigo due to bilateral vestibular disorder     Coronary angioplasty status     Ataxic gait     Kyphoscoliosis     Spinal stenosis of lumbar region with neurogenic claudication     Abdominal aortic aneurysm (AAA) ([facility])     Acute inferior myocardial infarction ([facility])     Carotid bruit     Chronic obstructive pulmonary disease ([facility])     Diabetes mellitus ([facility])     Dyspnea on exertion     Fatigue     First degree atrioventricular block     Hyperlipidemia     Infection of the inner ear     Muscle pain     Underweight     Ventricular premature beats     Weight loss     PVD (peripheral vascular disease) ([facility])     Impaired mobility and activities of daily living -sp Severe PVD s/p AAA repain     Hypotension     Late effects of CVA (cerebrovascular accident)     MARIAM (acute kidney injury) ([facility])     Body mass index (BMI) of 20 to 24     Chest pain     Esophageal dysphagia     Food impaction of esophagus     Esophageal stenosis     Iron deficiency anemia, unspecified     Anemia, unspecified     Iron deficiency     Abnormal electrocardiography     Adverse effect of atorvastatin     Benign essential hypertension     Laceration of upper arm      Esophageal obstruction due to food impaction     Aspiration pneumonia of left lower lobe due to anesthesia during labor and delivery     Esophagitis     Other iron deficiency anemias     Chronic renal disease, stage III (HCC)     Anemia in stage 3 chronic kidney disease (HCC)     Anemia in chronic kidney disease (CODE)     Sideropenic dysphagia     Anemia     Former smoker     S/P CABG (coronary artery bypass graft)     Status post angioplasty     History of cardiovascular surgery     At high risk for stroke     COPD exacerbation (HCC)      Subjective:  Admit Date: 1/7/2025    Interval History: says breathing fine  no issues    Medications:  Scheduled Meds:   megestrol  20 mg Oral BID    albuterol sulfate HFA  2 puff Inhalation TID    apixaban  2.5 mg Oral BID    metoprolol succinate  50 mg Oral Daily    [Held by provider] metFORMIN  250 mg Oral BID WC    insulin lispro  0-4 Units SubCUTAneous TID WC    sodium chloride flush  5-40 mL IntraVENous 2 times per day     Continuous Infusions:   dextrose      sodium chloride         CBC:   Recent Labs     01/12/25  0556 01/13/25  0552   WBC 7.6 5.6   HGB 8.3* 7.7*    256     CMP:    Recent Labs     01/12/25  1821 01/13/25  0552 01/14/25  0543    137 142   K 4.9 4.3 4.3    104 109*   CO2 23 22 22   BUN 30* 26* 29*   CREATININE 1.35* 1.42* 1.27*   GLUCOSE 238* 144* 118*   CALCIUM 8.3* 8.1* 8.0*   LABGLOM 51.3* 48.2* 55.1*     Troponin: No results for input(s): \"TROPONINI\" in the last 72 hours.  BNP: No results for input(s): \"BNP\" in the last 72 hours.  INR:   Recent Labs     01/13/25  0552   INR 1.4     Lipids: No results for input(s): \"CHOL\", \"LDLDIRECT\", \"TRIG\", \"HDL\", \"AMYLASE\", \"LIPASE\" in the last 72 hours.  Liver: No results for input(s): \"AST\", \"ALT\", \"ALKPHOS\", \"LABALBU\", \"BILITOT\" in the last 72 hours.    Invalid input(s): \"PROT\", \"BILDIR\"  Iron:  No results for input(s): \"FERRITIN\" in the last 72 hours.    Invalid input(s): \"IRONS\",  \"LABIRONS\"  Urinalysis: No results for input(s): \"UA\" in the last 72 hours.    Objective:  Vitals: /64   Pulse 87   Temp 97.9 °F (36.6 °C) (Axillary)   Resp 17   Ht 1.727 m (5' 7.99\")   Wt 66.1 kg (145 lb 11.6 oz)   SpO2 99%   BMI 22.16 kg/m²    Wt Readings from Last 3 Encounters:   01/10/25 66.1 kg (145 lb 11.6 oz)   05/19/24 65.8 kg (145 lb)   05/17/24 65.8 kg (145 lb 1 oz)      24HR INTAKE/OUTPUT:    Intake/Output Summary (Last 24 hours) at 1/14/2025 0823  Last data filed at 1/14/2025 0459  Gross per 24 hour   Intake 330 ml   Output 380 ml   Net -50 ml       General: alert, in no apparent distress  HEENT: normocephalic, atraumatic, anicteric  Neck: supple, no mass  Lungs: non-labored respirations, clear to auscultation bilaterally  Heart: irregular rate and rhythm, no murmurs or rubs  Abdomen: soft, non-tender, non-distended  Ext: no cyanosis, no peripheral edema  Neuro: alert and oriented, no gross abnormalities  Psych: normal mood and affect  Skin: no rash      Electronically signed by Adilson Dowd DO

## 2025-01-14 NOTE — PROGRESS NOTES
Physical Therapy Med Surg Daily Treatment Note  Facility/Department: [address] MED SURG UNIT  Room: [place] [address]       NAME: Ruben Gonsalez  : 1940 (84 y.o.)  MRN: [id number]  CODE STATUS: Full Code    Date of Service: 2025    Patient Diagnosis(es): Dehydration [E86.0]  COPD exacerbation (HCC) [J44.1]  Acute respiratory failure, unspecified whether with hypoxia or hypercapnia [J96.00]  Chronic kidney disease, unspecified CKD stage [N18.9]  COVID-19 [U07.1]   Chief Complaint   Patient presents with    Shortness of Breath     Patient Active Problem List    Diagnosis Date Noted    Atrial fibrillation (HCC) 2019    High risk medication use 2019    Iron deficiency anemia, unspecified 2022    Anemia, unspecified 2022    Iron deficiency 2022    Esophageal dysphagia 10/13/2022    Food impaction of esophagus 10/13/2022    Esophageal stenosis 10/13/2022    Chest pain 10/11/2022    Body mass index (BMI) of 20 to 24 2022    MARIAM (acute kidney injury) (HCC) 2022    COPD exacerbation (HCC) 2025    At high risk for stroke 2024    Anemia 2024    Chronic renal disease, stage III (HCC) 2024    Anemia in stage 3 chronic kidney disease (HCC) 2024    Anemia in chronic kidney disease (CODE) 2024    Sideropenic dysphagia 2024    Other iron deficiency anemias 2024    Former smoker 2023    S/P CABG (coronary artery bypass graft) 2023    Status post angioplasty 2023    History of cardiovascular surgery 2023    Esophagitis 2023    Aspiration pneumonia of left lower lobe due to anesthesia during labor and delivery 2023    Esophageal obstruction due to food impaction 2023    Adverse effect of atorvastatin 2023    Benign essential hypertension 2023    Laceration of upper arm 2023    Abnormal electrocardiography 10/31/2022    Impaired mobility and activities of daily living -sp Severe PVD s/p  instruction on posturing and breathing technique to aid in recovery of SOB. Pt cont's to require hand over hand reminders for safest hand placement w/ STS's. Further activity limited by SOB, coughing and mucus. Pt returned to bed in supine once SPO2 stable. Dec assist required w/ getting in/out of bed despite inc time/effort and use of HR. Elevated HR upon scooting up in bed.     Discharge Recommendations:  Continue to assess pending progress    Goals  Long Term Goals  Long Term Goal 1: Pt will demonstrate bed mobility indep  Long Term Goal 2: Pt will demonstrate transfers mod indep with safest AD  Long Term Goal 3: Pt will demonstrate amb >/= 75ft mod indep with safest AD  Long Term Goal 4: Pt will demonstrate TUG </= 20 sec for decreased risk for falls    PLAN    General Plan: 1 time a day 3-6 times a week  Safety Devices  Type of Devices: All fall risk precautions in place, Bed alarm in place, Call light within reach, Left in bed     AMPAC (6 CLICK) BASIC MOBILITY  AM-PAC Inpatient Mobility Raw Score : 12     Therapy Time   Individual   Time In 1036   Time Out 1102   Minutes 26     Trans/BM- 23 min   TE- 3 min   Rachael Rebolledo PTA, 01/14/25 at 11:09 AM     Definitions for assistance levels  Independent = pt does not require any physical supervision or assistance from another person for activity completion. Device may be needed.  Stand by assistance = pt requires verbal cues or instructions from another person, close to but not touching, to perform the activity  Minimal assistance= pt performs 75% or more of the activity; assistance is required to complete the activity  Moderate assistance= pt performs 50% of the activity; assistance is required to complete the activity  Maximal assistance = pt performs 25% of the activity; assistance is required to complete the activity  Dependent = pt requires total physical assistance to accomplish the task

## 2025-01-14 NOTE — PROGRESS NOTES
01/14/25 0733   RT Protocol   History Pulmonary Disease 2   Respiratory pattern 0   Breath sounds 4   Cough 0   Indications for Bronchodilator Therapy Wheezing associated with pulm disorder   Bronchodilator Assessment Score 6

## 2025-01-14 NOTE — PROGRESS NOTES
INPATIENT PROGRESS NOTES    PATIENT NAME: Ruben Gonsalez  MRN: [id number]  SERVICE DATE:  January 14, 2025   SERVICE TIME:  8:31 AM      PRIMARY SERVICE: Pulmonary Disease    CHIEF COMPLAIN: Pneumothorax      INTERVAL HPI: Patient seen and examined at bedside, Interval Notes, orders reviewed. Nursing notes noted  Patient is comfortable in bed.  He has been coughing and sometimes bloody mucus.  Chest tube is out.  No shortness of breath.  No chest pain.  On 3 L O2 via nasal cannula O2 saturation 100%.         OBJECTIVE   I/O:24HR INTAKE/OUTPUT:    Intake/Output Summary (Last 24 hours) at 1/14/2025 0831  Last data filed at 1/14/2025 0459  Gross per 24 hour   Intake 330 ml   Output 380 ml   Net -50 ml     01/13 0701 - 01/14 0700  In: 330 [P.O.:330]  Out: 380 [Urine:380]  Body mass index is 22.16 kg/m².    PHYSICAL EXAM:  Vitals:  /64   Pulse 87   Temp 97.9 °F (36.6 °C) (Axillary)   Resp 17   Ht 1.727 m (5' 7.99\")   Wt 66.1 kg (145 lb 11.6 oz)   SpO2 99%   BMI 22.16 kg/m²     General: Sleepy but arousable..comfortable in bed, No distress.  Head: Atraumatic , Normocephalic   Eyes: PERRL. No sclera icterus. No conjunctival injection. No discharge   ENT: No nasal  discharge. Pharynx clear.  Neck:  Trachea midline. No thyromegaly, no JVD, No cervical adenopathy.  Chest : Bilaterally symmetrical ,Normal effort,  No accessory muscle use  Lung : Diminished breath sound bilaterally No Rales. No wheezing. No rhonchi.   Heart:: Normal rate. Regular rhythm. No mumur ,  Rub or gallop  ABD: Non-tender. Non-distended. No masses. No organmegaly. Normal bowel sounds. No hernia.  Ext : No Pitting both leg , No Cyanosis No clubbing  Neuro: no focal weakness    Labs:  CBC:   Recent Labs     01/12/25  0556 01/13/25  0552   WBC 7.6 5.6   HGB 8.3* 7.7*   HCT 25.6* 24.0*    256     BMP:    Recent Labs     01/12/25  1821 01/13/25  0552 01/14/25  0543    137 142   K 4.9 4.3 4.3    104 109*   CO2 23 22 22   BUN 30*  pneumothorax which has decreased in size.  Redemonstration of interstitial and hazy opacities throughout the left lung.  Median sternotomy wires are present.  Implantable cardiac monitor is demonstrated.     Redemonstration of a left apical and basilar pneumothorax which has decreased in size.     XR CHEST (2 VW)    Result Date: 1/10/2025  EXAMINATION: TWO XRAY VIEWS OF THE CHEST 1/10/2025 9:36 am COMPARISON: January 7th HISTORY: ORDERING SYSTEM PROVIDED HISTORY: pneumonia  covid TECHNOLOGIST PROVIDED HISTORY: Reason for exam:->pneumonia  covid What reading provider will be dictating this exam?->CRC FINDINGS: Development of a large left circumferential pneumothorax.  There is no tracheal deviation.  The right lung is clear.  The heart size is normal. Median sternotomy wires are seen.  Implanted loop recording device observed.     Development of a large left-sided pneumothorax, without tracheal deviation. The findings were immediately discussed with the charge nurse on the 2nd floor after preliminary viewing after the x-ray was immediately taken.     CT HEAD WO CONTRAST    Result Date: 1/7/2025  EXAM: CT Head Without Intravenous Contrast EXAM DATE/TIME: 1/7/2025 8:36 pm CLINICAL HISTORY: ORDERING SYSTEM PROVIDED HISTORY: gen weakness, ac use  TECHNOLOGIST PROVIDED HISTORY:  Reason for exam:->gen weakness, ac use  Has a \"code stroke\" or \"stroke alert\" been called?->No  Decision Support Exception - unselect if not a suspected or confirmed emergency medical condition->Emergency Medical Condition (MA)  What reading provider will be dictating this exam?->CRC TECHNIQUE: Axial computed tomography images of the head/brain without intravenous contrast.  This CT exam was performed using one or more of the following dose reduction techniques:  automated exposure control, adjustment of the mA and/or kV according to patient size, and/or use of iterative reconstruction technique. COMPARISON: 07/05/2023 FINDINGS: Brain:  No

## 2025-01-14 NOTE — CARE COORDINATION
PATIENT RESTING IN BED. SPOKE WITH [facility] AT ACUTE REHAB-PER [facility] WITH ACUTE REHAB PATIENT DECLINED ACUTE REHAB.  PATIENT DECLINES SNF/HHC AT THIS TIME.  DISCHARGE PLAN REMAINS HOME WITH WIFE, PEND HOME O2 EVAL, PEND MEDICAL CLEARANCE.

## 2025-01-14 NOTE — PROGRESS NOTES
01/13/25 1900   RT Protocol   History Pulmonary Disease 2   Respiratory pattern 2   Breath sounds 4   Cough 1   Indications for Bronchodilator Therapy Wheezing associated with pulm disorder;Decreased or absent breath sounds   Bronchodilator Assessment Score 9        01/13/25 1900   RT Protocol   History Pulmonary Disease 2   Respiratory pattern 2   Breath sounds 4   Cough 1   Indications for Bronchodilator Therapy Wheezing associated with pulm disorder;Decreased or absent breath sounds   Bronchodilator Assessment Score 9

## 2025-01-14 NOTE — CARE COORDINATION
[facility] Pre-Admission Screening Document      Patient Name: Ruben Gonsalez       MRN: [id number]    : 1940    Age: 84 y.o.  Gender: male   Payor: Payor: MEDICARE / Plan: MEDICARE PART A AND B / Product Type: *No Product type* /   MSSP: No    Admitted from: [facility], [place] Floor: 4W  Attending Care Provider: Adilson Dowd DO  Inpatient Rehab Referring Care Provider: Dr. Dowd  Primary Care Provider: Adilson Dowd DO  Inpatient Treatment Team including Consults: Treatment Team:   Adilson Dowd DO Makadia, Ashok P, MD Robke, Jason M, MD Hipp, Lisa, RN Bennett, KENRICK Jacinto Katelyn, RN Peters, Zahida Woodard, KENRICK Landaverde Angela R    Reason for Hospitalization:   1. Chronic kidney disease, unspecified CKD stage    2. COPD exacerbation (HCC)    3. Dehydration    4. Acute respiratory failure, unspecified whether with hypoxia or hypercapnia    5. COVID-19    6. Shortness of breath      Chief Complaint   Patient presents with    Shortness of Breath     Isolation:Droplet Plus    Hospital Course:  Admit Date: 2025  6:47 PM  Inpatient Rehab Referral Date: 2025  Narrative of hospital course/history of present illness: 84 y.o. male patient who presented to ER  with c/o SOB and was found to be sitting on the ground, too weak to get up. Patient observed to be tachycardic and tachypneic however saturating well on room air-- initially there was no indication for BiPAP. Patient found to have COVID-19 and was admitted for further management of COPD exacerbation. Patient initially admitted to 2W, however, was noted to be coughing up bloody sputum and CXR revealed Pneumothorax that required temp placement of chest tube-- tx to ICU.  RR called HR persisting 150-190.    Primary:  Assessment:  S/P Left chest tube  Covid+  COPD  CAF-CAD Known CABGx   PAD  Frial     Plan:isolation 3 more days   Rehab to  Persistent infiltrate in the left lung with more confluent density in the mid lower aspect of the left lung which relates with the mass dense lesion seen on the CT chest of January 11.     CT CHEST WO CONTRAST  Result Date: 1/11/2025  1. Left-sided chest tube present with tip near the left lung apex. Tiny left pneumothorax. Tiny left pleural effusion. 2. Large opacity involving the left upper lobe containing some gas and fluid measuring up to approximately 5 cm AP, 6.8 cm transverse, and 7.4 cm craniocaudal. Correlate for possible infected fluid collection. Surrounding interstitial and nodular opacity in the left upper lobe. 3. Mild emphysema and bronchitis. 4. Dilated central pulmonary vasculature suggestive of pulmonary hypertension. 5. Ectasia of the ascending thoracic aorta approximately 4.3 cm AP dimension.     XR CHEST PORTABLE  Result Date: 1/11/2025  1. Resolution of previously demonstrated small left apical pneumothorax. 2. Unchanged small left basilar pneumothorax. 3. Left apical pleural tube remains with good positioning.     Echo (TTE) complete (PRN contrast/bubble/strain/3D)  Result Date: 1/10/2025    Left Ventricle: Low normal left ventricular systolic function with a visually estimated EF of  50%. Left ventricle size is normal. Normal wall thickness. Normal wall motion. Abnormal diastolic function.   Right Ventricle: Normal systolic function.   Tricuspid Valve: Mild regurgitation. Normal RVSP. The estimated RVSP is 33 mmHg.   Left Atrium: Left atrium is mildly dilated.   Pericardium: No pericardial effusion.   Image quality is good.     XR CHEST PORTABLE  Result Date: 1/10/2025  Redemonstration of a left apical and basilar pneumothorax which has decreased in size.     XR CHEST (2 VW)  Result Date: 1/10/2025  Development of a large left-sided pneumothorax, without tracheal deviation. The findings were immediately discussed with the charge nurse on the 2nd floor after preliminary viewing after the x-ray

## 2025-01-14 NOTE — CONSULTS
Hypertension     Lumbosacral disc disease     Movement disorder     Osteoarthritis     Pneumonia          PastSurgical History:        Procedure Laterality Date    APPENDECTOMY      COLONOSCOPY N/A 5/17/2024    COLONOSCOPY DIAGNOSTIC performed by Surnider Chavarria MD at [facility]    CORONARY ANGIOPLASTY WITH STENT PLACEMENT      4    CORONARY ARTERY BYPASS GRAFT      triple bypass    EYE SURGERY Bilateral     cataract surgery    INSERTABLE CARDIAC MONITOR Left 12/2018    UPPER GASTROINTESTINAL ENDOSCOPY N/A 10/13/2022    EGD ESOPHAGOGASTRODUODENOSCOPY WITH DILATION performed by Surinder Chavarria MD at [facility]    UPPER GASTROINTESTINAL ENDOSCOPY N/A 9/4/2023    EGD ESOPHAGOGASTRODUODENOSCOPY WITH FOREIGN BODY REMOVAL performed by David Mann MD at [facility]    UPPER GASTROINTESTINAL ENDOSCOPY N/A 5/17/2024    ESOPHAGOGASTRODUODENOSCOPY DIAGNOSTIC ONLY performed by Surinder Chavarria MD at [facility]         Allergies:    Allergies   Allergen Reactions    Fenofibrate     Lipitor [Atorvastatin] Other (See Comments)     Body pain    Niacin Other (See Comments)    Niaspan [Niacin Er] Other (See Comments)     Body pain    Vitamin E Other (See Comments)     Nose bleeds    Zocor [Simvastatin] Other (See Comments)     Sharp body pain          CurrentMedications:   Current Facility-Administered Medications: albuterol sulfate HFA (PROVENTIL;VENTOLIN;PROAIR) 108 (90 Base) MCG/ACT inhaler 2 puff, 2 puff, Inhalation, BID RT  megestrol (MEGACE) tablet 20 mg, 20 mg, Oral, BID  oxyCODONE-acetaminophen (PERCOCET) 5-325 MG per tablet 1 tablet, 1 tablet, Oral, Q6H PRN **AND** oxyCODONE-acetaminophen (PERCOCET) 5-325 MG per tablet 2 tablet, 2 tablet, Oral, Q6H PRN  fentaNYL (SUBLIMAZE) injection 50 mcg, 50 mcg, IntraVENous, Q1H PRN  albuterol sulfate HFA (PROVENTIL;VENTOLIN;PROAIR) 108 (90 Base) MCG/ACT inhaler 2 puff, 2 puff, Inhalation, Q4H PRN  apixaban (ELIQUIS) tablet 2.5 mg, 2.5 mg, Oral,  nosebleeds, sore throat and tinnitus.    Eyes:  Negative for photophobia, pain and redness.   Respiratory:  Positive for cough and shortness of breath. Negative for wheezing and stridor.         Shortness of breath on exertion   Cardiovascular:  Positive for chest pain. Negative for palpitations and leg swelling.   Gastrointestinal:  Positive for constipation. Negative for abdominal pain, blood in stool, diarrhea, nausea and vomiting.   Endocrine: Negative for polydipsia.   Genitourinary:  Negative for dysuria, flank pain, frequency, hematuria and urgency.   Musculoskeletal:  Positive for arthralgias, back pain, gait problem, myalgias and neck pain.   Skin:  Negative for rash.   Allergic/Immunologic: Positive for immunocompromised state. Negative for environmental allergies.   Neurological:  Positive for weakness. Negative for dizziness, tremors, seizures and headaches.   Hematological:  Does not bruise/bleed easily.   Psychiatric/Behavioral:  Positive for dysphoric mood. Negative for hallucinations and suicidal ideas. The patient is not nervous/anxious.              Physical Exam:  /64   Pulse 87   Temp 97.9 °F (36.6 °C) (Axillary)   Resp 17   Ht 1.727 m (5' 7.99\")   Wt 66.1 kg (145 lb 11.6 oz)   SpO2 99%   BMI 22.16 kg/m²      Physical Exam  Vitals and nursing note reviewed.   Constitutional:       General: He is awake. He is not in acute distress.     Appearance: He is underweight. He is ill-appearing. He is not diaphoretic.      Interventions: Nasal cannula in place.   HENT:      Head: Normocephalic and atraumatic.   Eyes:      Extraocular Movements: Extraocular movements intact.      Pupils: Pupils are equal, round, and reactive to light.   Cardiovascular:      Rate and Rhythm: Normal rate and regular rhythm.   Pulmonary:      Effort: Tachypnea and accessory muscle usage present. No respiratory distress.      Breath sounds: Normal breath sounds. Decreased air movement present.      Comments:  wheezing  Abdominal:      General: Bowel sounds are normal.      Palpations: Abdomen is soft.   Skin:     General: Skin is warm and dry.   Neurological:      General: No focal deficit present.      Mental Status: He is alert and oriented to person, place, and time. Mental status is at baseline.      Cranial Nerves: No cranial nerve deficit.       Ortho Exam  Neurologic Exam     Mental Status   Oriented to person, place, and time.   Level of consciousness: alert    Cranial Nerves     CN III, IV, VI   Pupils are equal, round, and reactive to light.    Motor Exam   Muscle bulk: decreased  Overall muscle tone: normal            Diagnostics:    Recent Results (from the past 24 hour(s))   POCT Glucose    Collection Time: 01/13/25  4:33 PM   Result Value Ref Range    POC Glucose 150 (H) 70 - 99 mg/dl    Performed on ACCU-CHEK    POCT Glucose    Collection Time: 01/13/25  9:34 PM   Result Value Ref Range    POC Glucose 169 (H) 70 - 99 mg/dl    Performed on ACCU-CHEK    Renal Function Panel    Collection Time: 01/14/25  5:43 AM   Result Value Ref Range    Sodium 142 135 - 144 mEq/L    Potassium 4.3 3.4 - 4.9 mEq/L    Chloride 109 (H) 95 - 107 mEq/L    CO2 22 20 - 31 mEq/L    Anion Gap 11 9 - 15 mEq/L    Glucose 118 (H) 70 - 99 mg/dL    BUN 29 (H) 8 - 23 mg/dL    Creatinine 1.27 (H) 0.70 - 1.20 mg/dL    Est, Glom Filt Rate 55.1 (L) >60    Calcium 8.0 (L) 8.5 - 9.9 mg/dL    Phosphorus 2.9 2.3 - 4.8 mg/dL    Albumin 2.7 (L) 3.5 - 4.6 g/dL   POCT Glucose    Collection Time: 01/14/25  7:40 AM   Result Value Ref Range    POC Glucose 129 (H) 70 - 99 mg/dl    Performed on ACCU-CHEK    POCT Glucose    Collection Time: 01/14/25 11:09 AM   Result Value Ref Range    POC Glucose 202 (H) 70 - 99 mg/dl    Performed on ACCU-CHEK               Impression:    Impaired mobility and ADLs due to cardiopulmonary debility  Factors favoring recovery include:  near independent premorbid function        Complex Active General Medical Issues that

## 2025-01-14 NOTE — PLAN OF CARE
Problem: Chronic Conditions and Co-morbidities  Goal: Patient's chronic conditions and co-morbidity symptoms are monitored and maintained or improved  1/13/2025 2151 by Smiley Moreno RN  Outcome: Progressing  1/13/2025 1159 by Ciara Vasquez RN  Outcome: Progressing     Problem: Discharge Planning  Goal: Discharge to home or other facility with appropriate resources  1/13/2025 2151 by Smiley Moreno RN  Outcome: Progressing  1/13/2025 1159 by Ciara Vasquez RN  Outcome: Progressing     Problem: Pain  Goal: Verbalizes/displays adequate comfort level or baseline comfort level  1/13/2025 2151 by Smiley Moreno RN  Outcome: Progressing  1/13/2025 1159 by Ciara Vasquez RN  Outcome: Progressing     Problem: Safety - Adult  Goal: Free from fall injury  1/13/2025 2151 by Smiley Moreno RN  Outcome: Progressing  1/13/2025 1159 by Ciara Vasquez RN  Outcome: Progressing     Problem: ABCDS Injury Assessment  Goal: Absence of physical injury  1/13/2025 2151 by Smiley Moreno RN  Outcome: Progressing  1/13/2025 1159 by Ciara Vasquez RN  Outcome: Progressing     Problem: Infection - Adult  Goal: Absence of infection at discharge  1/13/2025 2151 by Smiley Moreno RN  Outcome: Progressing  1/13/2025 1159 by Ciara Vasquez RN  Outcome: Progressing  Goal: Absence of infection during hospitalization  1/13/2025 2151 by Smiley Moreno RN  Outcome: Progressing  1/13/2025 1159 by Ciara Vasquez RN  Outcome: Progressing  Goal: Absence of fever/infection during anticipated neutropenic period  1/13/2025 2151 by Smiley Moreno RN  Outcome: Progressing  1/13/2025 1159 by Ciara Vasquez RN  Outcome: Progressing     Problem: Metabolic/Fluid and Electrolytes - Adult  Goal: Electrolytes maintained within normal limits  1/13/2025 2151 by Smiley Moreno RN  Outcome: Progressing  1/13/2025 1159 by Ciara Vasquez RN  Outcome: Progressing  Goal: Hemodynamic stability and optimal renal function maintained  1/13/2025 2151 by Josh  RN  Outcome: Progressing  Goal: Oral mucous membranes remain intact  1/13/2025 2151 by Smiley Moreno RN  Outcome: Progressing  1/13/2025 1159 by Ciara Vasquez RN  Outcome: Progressing     Problem: Musculoskeletal - Adult  Goal: Return mobility to safest level of function  1/13/2025 2151 by Smiley Moreno RN  Outcome: Progressing  1/13/2025 1159 by Ciara Vasquez RN  Outcome: Progressing  Goal: Maintain proper alignment of affected body part  1/13/2025 2151 by Smiley Moreno RN  Outcome: Progressing  1/13/2025 1159 by Ciara Vasquez RN  Outcome: Progressing  Goal: Return ADL status to a safe level of function  1/13/2025 2151 by Smiley Moreno RN  Outcome: Progressing  1/13/2025 1159 by Ciara Vasquez RN  Outcome: Progressing     Problem: Skin/Tissue Integrity  Goal: Absence of new skin breakdown  Description: 1.  Monitor for areas of redness and/or skin breakdown  2.  Assess vascular access sites hourly  3.  Every 4-6 hours minimum:  Change oxygen saturation probe site  4.  Every 4-6 hours:  If on nasal continuous positive airway pressure, respiratory therapy assess nares and determine need for appliance change or resting period.  1/13/2025 2151 by Smiley Moreno RN  Outcome: Progressing  1/13/2025 1159 by Ciara Vasquez RN  Outcome: Progressing

## 2025-01-14 NOTE — PROGRESS NOTES
INPATIENT PROGRESS NOTES    PATIENT NAME: Ruben Gonsalez  MRN: [id number]  SERVICE DATE:  January 13, 2025   SERVICE TIME:  10:55 PM      PRIMARY SERVICE: Pulmonary Disease    CHIEF COMPLAIN: Pneumothorax      INTERVAL HPI: Patient seen and examined at bedside, Interval Notes, orders reviewed. Nursing notes noted  Patient is very sleepy but arousable.  Chest tube is out.  No shortness of breath.  No chest pain.  No coughing.  On 3 L O2 via nasal cannula O2 saturation 100%.         OBJECTIVE   I/O:24HR INTAKE/OUTPUT:    Intake/Output Summary (Last 24 hours) at 1/13/2025 2255  Last data filed at 1/13/2025 1828  Gross per 24 hour   Intake 500 ml   Output 200 ml   Net 300 ml     01/12 0701 - 01/13 0700  In: 440 [P.O.:440]  Out: 700 [Urine:700]  Body mass index is 22.16 kg/m².    PHYSICAL EXAM:  Vitals:  /74   Pulse 73   Temp 98.6 °F (37 °C) (Oral)   Resp 18   Ht 1.727 m (5' 7.99\")   Wt 66.1 kg (145 lb 11.6 oz)   SpO2 100%   BMI 22.16 kg/m²     General: Sleepy but arousable..comfortable in bed, No distress.  Head: Atraumatic , Normocephalic   Eyes: PERRL. No sclera icterus. No conjunctival injection. No discharge   ENT: No nasal  discharge. Pharynx clear.  Neck:  Trachea midline. No thyromegaly, no JVD, No cervical adenopathy.  Chest : Bilaterally symmetrical ,Normal effort,  No accessory muscle use  Lung : Diminished breath sound bilaterally No Rales. No wheezing. No rhonchi.   Heart:: Normal rate. Regular rhythm. No mumur ,  Rub or gallop  ABD: Non-tender. Non-distended. No masses. No organmegaly. Normal bowel sounds. No hernia.  Ext : No Pitting both leg , No Cyanosis No clubbing  Neuro: no focal weakness    Labs:  CBC:   Recent Labs     01/11/25  0528 01/12/25  0556 01/13/25  0552   WBC 8.0 7.6 5.6   HGB 8.0* 8.3* 7.7*   HCT 25.3* 25.6* 24.0*    253 256     BMP:    Recent Labs     01/12/25  0556 01/12/25  1821 01/13/25  0552    136 137   K 4.4 4.9 4.3    101 104   CO2 25 23 22   BUN 29*  interstitial and nodular opacity in the left upper lobe. 3. Mild emphysema and bronchitis. 4. Dilated central pulmonary vasculature suggestive of pulmonary hypertension. 5. Ectasia of the ascending thoracic aorta approximately 4.3 cm AP dimension.     XR CHEST PORTABLE    Result Date: 1/11/2025  EXAMINATION: ONE XRAY VIEW OF THE CHEST 1/11/2025 2:29 am COMPARISON: January 10, 2025 HISTORY: ORDERING SYSTEM PROVIDED HISTORY: chest tube TECHNOLOGIST PROVIDED HISTORY: Reason for exam:->chest tube What reading provider will be dictating this exam?->CRC FINDINGS: Left apical pleural tube remains with good positioning.  Resolution of previously demonstrated small left apical pneumothorax.  Unchanged small left basilar pneumothorax.  Cardiac postsurgical changes including AVR.  Implanted loop recorder.  No significant subcutaneous emphysema.     1. Resolution of previously demonstrated small left apical pneumothorax. 2. Unchanged small left basilar pneumothorax. 3. Left apical pleural tube remains with good positioning.     Echo (TTE) complete (PRN contrast/bubble/strain/3D)    Result Date: 1/10/2025    Left Ventricle: Low normal left ventricular systolic function with a visually estimated EF of  50%. Left ventricle size is normal. Normal wall thickness. Normal wall motion. Abnormal diastolic function.   Right Ventricle: Normal systolic function.   Tricuspid Valve: Mild regurgitation. Normal RVSP. The estimated RVSP is 33 mmHg.   Left Atrium: Left atrium is mildly dilated.   Pericardium: No pericardial effusion.   Image quality is good.     XR CHEST PORTABLE    Result Date: 1/10/2025  EXAMINATION: ONE XRAY VIEW OF THE CHEST 1/10/2025 12:14 pm COMPARISON: Today at 9:40 a.m. HISTORY: ORDERING SYSTEM PROVIDED HISTORY: chest tube placement TECHNOLOGIST PROVIDED HISTORY: Reason for exam:->chest tube placement What reading provider will be dictating this exam?->CRC FINDINGS: Redemonstration of a left apical and basilar  pneumothorax which has decreased in size.  Redemonstration of interstitial and hazy opacities throughout the left lung.  Median sternotomy wires are present.  Implantable cardiac monitor is demonstrated.     Redemonstration of a left apical and basilar pneumothorax which has decreased in size.     XR CHEST (2 VW)    Result Date: 1/10/2025  EXAMINATION: TWO XRAY VIEWS OF THE CHEST 1/10/2025 9:36 am COMPARISON: January 7th HISTORY: ORDERING SYSTEM PROVIDED HISTORY: pneumonia  covid TECHNOLOGIST PROVIDED HISTORY: Reason for exam:->pneumonia  covid What reading provider will be dictating this exam?->CRC FINDINGS: Development of a large left circumferential pneumothorax.  There is no tracheal deviation.  The right lung is clear.  The heart size is normal. Median sternotomy wires are seen.  Implanted loop recording device observed.     Development of a large left-sided pneumothorax, without tracheal deviation. The findings were immediately discussed with the charge nurse on the 2nd floor after preliminary viewing after the x-ray was immediately taken.     CT HEAD WO CONTRAST    Result Date: 1/7/2025  EXAM: CT Head Without Intravenous Contrast EXAM DATE/TIME: 1/7/2025 8:36 pm CLINICAL HISTORY: ORDERING SYSTEM PROVIDED HISTORY: gen weakness, ac use  TECHNOLOGIST PROVIDED HISTORY:  Reason for exam:->gen weakness, ac use  Has a \"code stroke\" or \"stroke alert\" been called?->No  Decision Support Exception - unselect if not a suspected or confirmed emergency medical condition->Emergency Medical Condition (MA)  What reading provider will be dictating this exam?->CRC TECHNIQUE: Axial computed tomography images of the head/brain without intravenous contrast.  This CT exam was performed using one or more of the following dose reduction techniques:  automated exposure control, adjustment of the mA and/or kV according to patient size, and/or use of iterative reconstruction technique. COMPARISON: 07/05/2023 FINDINGS: Brain:  No

## 2025-01-15 LAB
ALBUMIN SERPL-MCNC: 2.8 G/DL (ref 3.5–4.6)
ANION GAP SERPL CALCULATED.3IONS-SCNC: 8 MEQ/L (ref 9–15)
BUN SERPL-MCNC: 29 MG/DL (ref 8–23)
CALCIUM SERPL-MCNC: 7.9 MG/DL (ref 8.5–9.9)
CHLORIDE SERPL-SCNC: 105 MEQ/L (ref 95–107)
CO2 SERPL-SCNC: 24 MEQ/L (ref 20–31)
CREAT SERPL-MCNC: 1.24 MG/DL (ref 0.7–1.2)
GLUCOSE BLD-MCNC: 183 MG/DL (ref 70–99)
GLUCOSE BLD-MCNC: 185 MG/DL (ref 70–99)
GLUCOSE BLD-MCNC: 194 MG/DL (ref 70–99)
GLUCOSE BLD-MCNC: 200 MG/DL (ref 70–99)
GLUCOSE SERPL-MCNC: 126 MG/DL (ref 70–99)
PERFORMED ON: ABNORMAL
PHOSPHATE SERPL-MCNC: 2.8 MG/DL (ref 2.3–4.8)
POTASSIUM SERPL-SCNC: 3.9 MEQ/L (ref 3.4–4.9)
SODIUM SERPL-SCNC: 137 MEQ/L (ref 135–144)

## 2025-01-15 PROCEDURE — 2060000000 HC ICU INTERMEDIATE R&B

## 2025-01-15 PROCEDURE — 99232 SBSQ HOSP IP/OBS MODERATE 35: CPT | Performed by: INTERNAL MEDICINE

## 2025-01-15 PROCEDURE — 6370000000 HC RX 637 (ALT 250 FOR IP): Performed by: STUDENT IN AN ORGANIZED HEALTH CARE EDUCATION/TRAINING PROGRAM

## 2025-01-15 PROCEDURE — 94640 AIRWAY INHALATION TREATMENT: CPT

## 2025-01-15 PROCEDURE — 6370000000 HC RX 637 (ALT 250 FOR IP): Performed by: INTERNAL MEDICINE

## 2025-01-15 PROCEDURE — 99232 SBSQ HOSP IP/OBS MODERATE 35: CPT | Performed by: PHYSICAL MEDICINE & REHABILITATION

## 2025-01-15 PROCEDURE — 94760 N-INVAS EAR/PLS OXIMETRY 1: CPT

## 2025-01-15 PROCEDURE — 2700000000 HC OXYGEN THERAPY PER DAY

## 2025-01-15 PROCEDURE — 80069 RENAL FUNCTION PANEL: CPT

## 2025-01-15 PROCEDURE — 36415 COLL VENOUS BLD VENIPUNCTURE: CPT

## 2025-01-15 PROCEDURE — 2500000003 HC RX 250 WO HCPCS: Performed by: INTERNAL MEDICINE

## 2025-01-15 PROCEDURE — 97535 SELF CARE MNGMENT TRAINING: CPT

## 2025-01-15 PROCEDURE — 94761 N-INVAS EAR/PLS OXIMETRY MLT: CPT

## 2025-01-15 RX ADMIN — APIXABAN 2.5 MG: 2.5 TABLET, FILM COATED ORAL at 10:06

## 2025-01-15 RX ADMIN — Medication 10 ML: at 20:45

## 2025-01-15 RX ADMIN — ALBUTEROL SULFATE 2 PUFF: 90 AEROSOL, METERED RESPIRATORY (INHALATION) at 08:47

## 2025-01-15 RX ADMIN — GUAIFENESIN SYRUP AND DEXTROMETHORPHAN 5 ML: 100; 10 SYRUP ORAL at 16:52

## 2025-01-15 RX ADMIN — GUAIFENESIN SYRUP AND DEXTROMETHORPHAN 5 ML: 100; 10 SYRUP ORAL at 12:11

## 2025-01-15 RX ADMIN — MEGESTROL ACETATE 20 MG: 20 TABLET ORAL at 10:06

## 2025-01-15 RX ADMIN — GUAIFENESIN SYRUP AND DEXTROMETHORPHAN 5 ML: 100; 10 SYRUP ORAL at 10:06

## 2025-01-15 RX ADMIN — METOPROLOL SUCCINATE 50 MG: 50 TABLET, EXTENDED RELEASE ORAL at 10:06

## 2025-01-15 RX ADMIN — ACETAMINOPHEN 650 MG: 325 TABLET ORAL at 16:50

## 2025-01-15 RX ADMIN — INSULIN LISPRO 1 UNITS: 100 INJECTION, SOLUTION INTRAVENOUS; SUBCUTANEOUS at 16:52

## 2025-01-15 RX ADMIN — MEGESTROL ACETATE 20 MG: 20 TABLET ORAL at 20:50

## 2025-01-15 RX ADMIN — ALBUTEROL SULFATE 2 PUFF: 90 AEROSOL, METERED RESPIRATORY (INHALATION) at 19:48

## 2025-01-15 RX ADMIN — APIXABAN 2.5 MG: 2.5 TABLET, FILM COATED ORAL at 20:50

## 2025-01-15 RX ADMIN — Medication 10 ML: at 10:06

## 2025-01-15 RX ADMIN — GUAIFENESIN SYRUP AND DEXTROMETHORPHAN 5 ML: 100; 10 SYRUP ORAL at 20:50

## 2025-01-15 RX ADMIN — INSULIN LISPRO 1 UNITS: 100 INJECTION, SOLUTION INTRAVENOUS; SUBCUTANEOUS at 10:06

## 2025-01-15 RX ADMIN — INSULIN LISPRO 1 UNITS: 100 INJECTION, SOLUTION INTRAVENOUS; SUBCUTANEOUS at 12:11

## 2025-01-15 ASSESSMENT — PAIN DESCRIPTION - DESCRIPTORS: DESCRIPTORS: ACHING

## 2025-01-15 ASSESSMENT — PAIN SCALES - WONG BAKER
WONGBAKER_NUMERICALRESPONSE: NO HURT
WONGBAKER_NUMERICALRESPONSE: HURTS LITTLE MORE

## 2025-01-15 ASSESSMENT — PAIN DESCRIPTION - LOCATION: LOCATION: HEAD

## 2025-01-15 ASSESSMENT — PAIN SCALES - GENERAL
PAINLEVEL_OUTOF10: 5
PAINLEVEL_OUTOF10: 0

## 2025-01-15 NOTE — PLAN OF CARE
Nutrition Problem #1: Inadequate oral intake  Intervention: Food and/or Nutrient Delivery: Modify Current Diet, Start Oral Nutrition Supplement (Add Carb Control 4 to diet order  Begin diabetic oral supplements at each meal  Recommend begin daily bowel regimen-Last documented BM 1/9)

## 2025-01-15 NOTE — CARE COORDINATION
MET WITH PATIENT AT BEDSIDE. WIFE AT BEDSIDE. PATIENT ALERT AND ORIENTED X 4 PATIENT ON 2 L N/C.  SPOKE WITH PATIENT AND WIFE AT LENGTH REGARDING ACUTE REHAB VS SNF VS HHC.  PATIENT AND WIFE DECLINE.  ENCOURAGED PATIENT TO WORK WITH PT/OT PATIENT STATES UNDERSTANDING. PATIENT STATES HE IS GOING HOME.  WIFE IS ABLE TO TRANSPORT PATIENT HOME.  DISCUSS POSSIBILITY OF HOME O2 EVAL PRIOR TO DISCHARGE. PATIENT AND WIFE  STATE UNDERSTANDING.  DISCHARGE PLAN REMAINS HOME WITH WIFE NO NEEDS.

## 2025-01-15 NOTE — PROGRESS NOTES
Pt requesting foam to be removed from heels d/t them being itchy. Removed and heels elevated on pillow

## 2025-01-15 NOTE — PROGRESS NOTES
01/14/25 2000   RT Protocol   History Pulmonary Disease 2   Respiratory pattern 0   Breath sounds 4   Cough 0   Indications for Bronchodilator Therapy Wheezing associated with pulm disorder   Bronchodilator Assessment Score 6

## 2025-01-15 NOTE — PROGRESS NOTES
Comprehensive Nutrition Assessment    Type and Reason for Visit:  Initial, LOS    Nutrition Recommendations/Plan:   Add Carb Control 4 to diet order   Begin diabetic oral supplements at each meal   Recommend begin daily bowel regimen-Last documented BM 1/9      Malnutrition Assessment:  Malnutrition Status:  At risk for malnutrition (01/15/25 1535)    Context:  Social/Environmental Circumstances     Findings of the 6 clinical characteristics of malnutrition:  Energy Intake:  Unable to assess  Weight Loss:  Unable to assess (stated UBW's in EMR)     Body Fat Loss:  Unable to assess     Muscle Mass Loss:  Unable to assess    Fluid Accumulation:  Unable to assess     Strength:  Not Performed    Nutrition Assessment:    Pt with inadequate oral intake since admission, megace started 1/13.  Per intake documentation, intake at breakfast was > 75% but 0% at lunch today.  Noted last BM documented was on 1/9, no laxatives ordered.  Will add Carb control to diet order and begin diabetic oral nutrition supplements with all meals.  Recommend adding a daily bowel regimen    Nutrition Related Findings:    PMH: Atrial fibrillation, cardiac stent, COPD, diabetes mellitus type 2, hypertension, hyperlipidemia. +COVID.  Last BM documented 1/9 (no laxatives).  Poor po intakes since admission, Megace started (1/13), Labs: glucose (126-318), mildly elevated BUN/CR Wound Type: Surgical Incision       Current Nutrition Intake & Therapies:    Average Meal Intake: 1-25%, 26-50%  Average Supplements Intake: None Ordered  ADULT DIET; Regular  ADULT ORAL NUTRITION SUPPLEMENT; Breakfast, Lunch, Dinner; Standard High Calorie/High Protein Oral Supplement    Anthropometric Measures:  Height: 172.7 cm (5' 7.99\")  Ideal Body Weight (IBW): 154 lbs (70 kg)    Admission Body Weight: 65.8 kg (145 lb) (bed scale)  Current Body Weight: 65.8 kg (145 lb) (1/10),Weight Source: Not specified  Current BMI (kg/m2): 22.1  Usual Body Weight: 65.8 kg (145 lb)

## 2025-01-15 NOTE — PROGRESS NOTES
Subjective:  The patient complains of severe acute on chronic progressive fatigue and dyspnea on exertion relieved with rest partially relieved by rest, PT, OT and meds and O2 titration and exacerbated by exertion and recent COVID-19 infection or any with A-fib and RVR newly reliance on nasal cannula O2..  84 y.o. male admitted to [facility] on 1/7/2025  with weakness diagnosed with COVID but also has complicated shins of A-fib with RVR as well as a pneumothorax requiring a chest tube.     At this time patient is not motivated to going to acute rehab.  He has been there in the past and states he does not have the energy for it.    I am concerned about patient’s medical complexities including:  Principal Problem:    COPD exacerbation ([facility])  Active Problems:    Orthostatic dizziness    Spinal stenosis of lumbar region with neurogenic claudication    Underweight    PVD (peripheral vascular disease) ([facility])    COVID-19    Secondary spontaneous pneumothorax  Resolved Problems:    * No resolved hospital problems. *      .    Controlled Substance Monitoring:    Acute and Chronic Pain Monitoring:   RX Monitoring Periodic Controlled Substance Monitoring Chronic Pain > 50 MEDD   4/18/2022   7:11 AM Possible medication side effects, risk of tolerance/dependence & alternative treatments discussed.;No signs of potential drug abuse or diversion identified.;Assessed functional status.;Obtaining appropriate analgesic effect of treatment. Re-evaluated the status of the patient's underlying condition causing pain.;Considered consultation with a specialist.;Obtained or confirmed \"Consent for Opioid Use\" on file.           Reviewed recent nursing note and discussed current status and planned care with acute care providers, \"Pt is AxOx4. Meds given per mar. Pt denies any needs at this time. Call light within reach.  \".    He remains sinus rhythm on telemetry.  He is wanting to go home and he is hoping he will be strong  rehabilitation program including PT/OT to improve balance, ambulation, ADL’s, and to improve the P/AROM.   It is my opinion that they will be able to tolerate 3 hours of therapy a day and benefit from it at an acute level. I again discussed acute rehab with the patient and verify that the patient is able and willing to participate in 3 hours of therapy a day.  Rehab and Acute Care Case Management has also reinforced this expectation.    Though he would be best served at an acute rehab facility he and his wife are refusing.    Will continue to follow to attempt to get patient to the most efficient but most effective level of care will be in their best interest.  Continue to focus on energy conservation heart rate and blood pressure monitoring before during and after therapy endurance and consistency of function.      Bowel constipation   and Bladder dysfunction   overactive, neurogenic bladder:  frequent toileting, ambulate to bathroom with assistance, check post void residuals.  Check for C.difficile x1 if >2 loose stools in 24 hours, continue bowel & bladder program.  Monitor for UTI symptoms including lethargy and confusion    Severe back pain and generalized OA pain: reassess pain every shift and prior to and after each therapy session, give prn Tylenol   and consider scheduled Tylenol, modalities prn in therapy, consider Lidoderm, K-pad prn.     Skin healing    breakdown   risk:  continue pressure relief program.  Daily skin exams and reports from nursing.    Severe fatigue due to immobility and nutritional deficits: continue to monitor closely for dehydration   Add vitamin B12 vitamin D and CoQ10 titrate dosing and add protein supplementation with low carb content.    Complex discharge planning:   Discussed with care team-last 24 hour events noted.  I will continue to follow along and reassess functional and medical status as we strive to improve patient's functional and medical outcomes progressing to the most

## 2025-01-15 NOTE — PROGRESS NOTES
Physical Therapy Med Surg Daily Treatment Note  Facility/Department: [address] MED SURG UNIT  Room: [place] [address]       NAME: Ruben Gonsalez  : 1940 (84 y.o.)  MRN: [id number]  CODE STATUS: Full Code    Date of Service: 1/15/2025    Patient Diagnosis(es): Dehydration [E86.0]  COPD exacerbation (HCC) [J44.1]  Acute respiratory failure, unspecified whether with hypoxia or hypercapnia [J96.00]  Chronic kidney disease, unspecified CKD stage [N18.9]  COVID-19 [U07.1]   Chief Complaint   Patient presents with    Shortness of Breath     Patient Active Problem List    Diagnosis Date Noted    Atrial fibrillation (HCC) 2019    High risk medication use 2019    Iron deficiency anemia, unspecified 2022    Anemia, unspecified 2022    Iron deficiency 2022    Esophageal dysphagia 10/13/2022    Food impaction of esophagus 10/13/2022    Esophageal stenosis 10/13/2022    Chest pain 10/11/2022    Body mass index (BMI) of 20 to 24 2022    MARIAM (acute kidney injury) (HCC) 2022    COVID-19 2025    Secondary spontaneous pneumothorax 2025    COPD exacerbation (HCC) 2025    At high risk for stroke 2024    B12 deficiency 2024    Anemia 2024    Chronic renal disease, stage III (HCC) 2024    Anemia in stage 3 chronic kidney disease (HCC) 2024    Anemia in chronic kidney disease (CODE) 2024    Sideropenic dysphagia 2024    Other iron deficiency anemias 2024    Former smoker 2023    S/P CABG (coronary artery bypass graft) 2023    Status post angioplasty 2023    History of cardiovascular surgery 2023    Esophagitis 2023    Aspiration pneumonia of left lower lobe due to anesthesia during labor and delivery 2023    Esophageal obstruction due to food impaction 2023    Adverse effect of atorvastatin 2023    Benign essential hypertension 2023    Laceration of upper arm 2023    Abnormal  recoverd to 100% prior to exiting pts room.  Therapy Prognosis: Good     Discharge Recommendations:  Continue to assess pending progress    Goals  Long Term Goals  Long Term Goal 1: Pt will demonstrate bed mobility indep  Long Term Goal 2: Pt will demonstrate transfers mod indep with safest AD  Long Term Goal 3: Pt will demonstrate amb >/= 75ft mod indep with safest AD  Long Term Goal 4: Pt will demonstrate TUG </= 20 sec for decreased risk for falls    PLAN    General Plan: 1 time a day 3-6 times a week  Safety Devices  Type of Devices: All fall risk precautions in place, Bed alarm in place, Call light within reach, Left in bed     AMPAC (6 CLICK) BASIC MOBILITY  AM-PAC Inpatient Mobility Raw Score : 13     Therapy Time   Individual   Time In 1057   Time Out 1130   Minutes 33     Trans-/BM- 33 min     Rachael Rebolledo PTA, 01/15/25 at 11:53 AM         Definitions for assistance levels  Independent = pt does not require any physical supervision or assistance from another person for activity completion. Device may be needed.  Stand by assistance = pt requires verbal cues or instructions from another person, close to but not touching, to perform the activity  Minimal assistance= pt performs 75% or more of the activity; assistance is required to complete the activity  Moderate assistance= pt performs 50% of the activity; assistance is required to complete the activity  Maximal assistance = pt performs 25% of the activity; assistance is required to complete the activity  Dependent = pt requires total physical assistance to accomplish the task

## 2025-01-15 NOTE — PROGRESS NOTES
Nephrology Progress Note    Assessment:  S/P left pneumo  COPD  CKD 3a  OHDx ABGX remote  CAF on eliquis  Frial   Covid +  Anemia      Plan: may need NH if unable to get to our Rehab    Patient Active Problem List:     Atrial fibrillation (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)     Vasovagal syncope     Dizziness and giddiness     Cerebrovascular accident ([facility])     Orthostatic dizziness     Ataxia     Vertigo     Meniere disease, bilateral     Benign paroxysmal positional vertigo due to bilateral vestibular disorder     Coronary angioplasty status     Ataxic gait     Kyphoscoliosis     Spinal stenosis of lumbar region with neurogenic claudication     Abdominal aortic aneurysm (AAA) ([facility])     Acute inferior myocardial infarction ([facility])     Carotid bruit     Chronic obstructive pulmonary disease ([facility])     Diabetes mellitus ([facility])     Dyspnea on exertion     Fatigue     First degree atrioventricular block     Hyperlipidemia     Infection of the inner ear     Muscle pain     Underweight     Ventricular premature beats     Weight loss     PVD (peripheral vascular disease) ([facility])     Impaired mobility and activities of daily living -sp Severe PVD s/p AAA repain     Hypotension     Late effects of CVA (cerebrovascular accident)     MARIAM (acute kidney injury) ([facility])     Body mass index (BMI) of 20 to 24     Chest pain     Esophageal dysphagia     Food impaction of esophagus     Esophageal stenosis     Iron deficiency anemia, unspecified     Anemia, unspecified     Iron deficiency     Abnormal electrocardiography     Adverse effect of atorvastatin     Benign essential hypertension     Laceration  No Repair - Repaired With Adjacent Surgical Defect Text (Leave Blank If You Do Not Want): After obtaining clear surgical margins the defect was repaired concurrently with another surgical defect which was in close approximation.

## 2025-01-15 NOTE — PROGRESS NOTES
Shift assessment complete. Pt is AxOx4. Meds given per mar. Pt denies any needs at this time. Call light within reach.    Electronically signed by Renuka Woodard RN on 1/15/2025 at 10:26 AM

## 2025-01-15 NOTE — PLAN OF CARE
Problem: Chronic Conditions and Co-morbidities  Goal: Patient's chronic conditions and co-morbidity symptoms are monitored and maintained or improved  1/15/2025 1025 by Renuka Woodard RN  Outcome: Progressing  1/14/2025 2128 by Madison Campos RN  Outcome: Progressing     Problem: Discharge Planning  Goal: Discharge to home or other facility with appropriate resources  1/15/2025 1025 by Renuka Woodard RN  Outcome: Progressing  1/14/2025 2128 by Madison Campos RN  Outcome: Progressing     Problem: Pain  Goal: Verbalizes/displays adequate comfort level or baseline comfort level  1/15/2025 1025 by Renuka Woodard RN  Outcome: Progressing  1/14/2025 2128 by Madison Campos RN  Outcome: Progressing     Problem: Safety - Adult  Goal: Free from fall injury  1/15/2025 1025 by Renuka Woodard RN  Outcome: Progressing  1/14/2025 2128 by Madison Campos RN  Outcome: Progressing     Problem: ABCDS Injury Assessment  Goal: Absence of physical injury  Outcome: Progressing     Problem: Infection - Adult  Goal: Absence of infection at discharge  Outcome: Progressing  Goal: Absence of infection during hospitalization  Outcome: Progressing  Goal: Absence of fever/infection during anticipated neutropenic period  Outcome: Progressing     Problem: Metabolic/Fluid and Electrolytes - Adult  Goal: Electrolytes maintained within normal limits  Outcome: Progressing  Goal: Hemodynamic stability and optimal renal function maintained  Outcome: Progressing  Goal: Glucose maintained within prescribed range  Outcome: Progressing     Problem: Neurosensory - Adult  Goal: Achieves stable or improved neurological status  Outcome: Progressing  Goal: Absence of seizures  Outcome: Progressing  Goal: Remains free of injury related to seizures activity  Outcome: Progressing  Goal: Achieves maximal functionality and self care  Outcome: Progressing     Problem: Respiratory - Adult  Goal: Achieves optimal ventilation and

## 2025-01-15 NOTE — PLAN OF CARE
Problem: Chronic Conditions and Co-morbidities  Goal: Patient's chronic conditions and co-morbidity symptoms are monitored and maintained or improved  1/14/2025 2128 by Madison Campos RN  Outcome: Progressing  1/14/2025 1142 by Clarissa Walsh RN  Outcome: Progressing     Problem: Discharge Planning  Goal: Discharge to home or other facility with appropriate resources  1/14/2025 2128 by Madison Campos RN  Outcome: Progressing  1/14/2025 1142 by Clarissa Walsh RN  Outcome: Progressing     Problem: Pain  Goal: Verbalizes/displays adequate comfort level or baseline comfort level  1/14/2025 2128 by Madison Campos RN  Outcome: Progressing  1/14/2025 1142 by Clarissa Walsh RN  Outcome: Progressing     Problem: Safety - Adult  Goal: Free from fall injury  1/14/2025 2128 by Madison Campos RN  Outcome: Progressing  1/14/2025 1142 by Clarissa Walsh RN  Outcome: Progressing

## 2025-01-15 NOTE — PROGRESS NOTES
INPATIENT PROGRESS NOTES    PATIENT NAME: Ruben Gonsalez  MRN: [id number]  SERVICE DATE:  January 15, 2025   SERVICE TIME:  4:51 PM      PRIMARY SERVICE: Pulmonary Disease    CHIEF COMPLAIN: Pneumothorax      INTERVAL HPI: Patient seen and examined at bedside, Interval Notes, orders reviewed. Nursing notes noted  Patient is comfortable in bed.  He is still having soft cough but improved with Robitussin DM.   No shortness of breath.  No chest pain.  On 3 L O2 via nasal cannula O2 saturation 97%.         OBJECTIVE   I/O:24HR INTAKE/OUTPUT:    Intake/Output Summary (Last 24 hours) at 1/15/2025 1651  Last data filed at 1/15/2025 1622  Gross per 24 hour   Intake 1380 ml   Output 1550 ml   Net -170 ml     01/14 0701 - 01/15 0700  In: 1115 [P.O.:1115]  Out: 1300 [Urine:1300]  Body mass index is 22.16 kg/m².    PHYSICAL EXAM:  Vitals:  /64   Pulse 94   Temp 97.8 °F (36.6 °C) (Oral)   Resp 18   Ht 1.727 m (5' 7.99\")   Wt 66.1 kg (145 lb 11.6 oz)   SpO2 97%   BMI 22.16 kg/m²     General: Sleepy but arousable..comfortable in bed, No distress.  Head: Atraumatic , Normocephalic   Eyes: PERRL. No sclera icterus. No conjunctival injection. No discharge   ENT: No nasal  discharge. Pharynx clear.  Neck:  Trachea midline. No thyromegaly, no JVD, No cervical adenopathy.  Chest : Bilaterally symmetrical ,Normal effort,  No accessory muscle use  Lung : Diminished breath sound bilaterally No Rales. No wheezing. No rhonchi.   Heart:: Normal rate. Regular rhythm. No mumur ,  Rub or gallop  ABD: Non-tender. Non-distended. No masses. No organmegaly. Normal bowel sounds. No hernia.  Ext : No Pitting both leg , No Cyanosis No clubbing  Neuro: no focal weakness    Labs:  CBC:   Recent Labs     01/13/25  0552   WBC 5.6   HGB 7.7*   HCT 24.0*        BMP:    Recent Labs     01/12/25  1821 01/13/25  0552 01/14/25  0543 01/15/25  0552    137 142 137   K 4.9 4.3 4.3 3.9    104 109* 105   CO2 23 22 22 24   BUN 30* 26* 29*  craniocaudal. Correlate for possible infected fluid collection. Surrounding interstitial and nodular opacity in the left upper lobe. 3. Mild emphysema and bronchitis. 4. Dilated central pulmonary vasculature suggestive of pulmonary hypertension. 5. Ectasia of the ascending thoracic aorta approximately 4.3 cm AP dimension.     XR CHEST PORTABLE    Result Date: 1/11/2025  EXAMINATION: ONE XRAY VIEW OF THE CHEST 1/11/2025 2:29 am COMPARISON: January 10, 2025 HISTORY: ORDERING SYSTEM PROVIDED HISTORY: chest tube TECHNOLOGIST PROVIDED HISTORY: Reason for exam:->chest tube What reading provider will be dictating this exam?->CRC FINDINGS: Left apical pleural tube remains with good positioning.  Resolution of previously demonstrated small left apical pneumothorax.  Unchanged small left basilar pneumothorax.  Cardiac postsurgical changes including AVR.  Implanted loop recorder.  No significant subcutaneous emphysema.     1. Resolution of previously demonstrated small left apical pneumothorax. 2. Unchanged small left basilar pneumothorax. 3. Left apical pleural tube remains with good positioning.     Echo (TTE) complete (PRN contrast/bubble/strain/3D)    Result Date: 1/10/2025    Left Ventricle: Low normal left ventricular systolic function with a visually estimated EF of  50%. Left ventricle size is normal. Normal wall thickness. Normal wall motion. Abnormal diastolic function.   Right Ventricle: Normal systolic function.   Tricuspid Valve: Mild regurgitation. Normal RVSP. The estimated RVSP is 33 mmHg.   Left Atrium: Left atrium is mildly dilated.   Pericardium: No pericardial effusion.   Image quality is good.     XR CHEST PORTABLE    Result Date: 1/10/2025  EXAMINATION: ONE XRAY VIEW OF THE CHEST 1/10/2025 12:14 pm COMPARISON: Today at 9:40 a.m. HISTORY: ORDERING SYSTEM PROVIDED HISTORY: chest tube placement TECHNOLOGIST PROVIDED HISTORY: Reason for exam:->chest tube placement What reading provider will be dictating this  reconstruction technique. COMPARISON: 07/05/2023 FINDINGS: Brain:  No evidence of acute intracranial process.  Prominence of the sulci and/or CSF spaces suggests a degree of cerebral atrophy.  No acute ischemia. No mass or mass effect.  No acute intracranial hemorrhage.  Scattered periventricular deep white matter hypodensity consistent with small vessel ischemic deep white matter disease. Ventricles:  No acute findings.  No ventriculomegaly. Bones/joints:  No acute findings.  No acute fracture. Soft tissues:  No acute findings. Sinuses:  Unremarkable as visualized.  No acute sinusitis. Mastoid air cells:  Unremarkable as visualized.  No mastoid effusion.     1.  No evidence of acute intracranial process. 2.  Findings of presumed small vessel ischemic deep white matter disease. 3.  Prominence of the sulci and/or CSF spaces suggests a degree of cerebral atrophy.     XR CHEST PORTABLE    Result Date: 1/7/2025  EXAMINATION: ONE XRAY VIEW OF THE CHEST 1/7/2025 6:55 pm COMPARISON: September 4, 2023 HISTORY: ORDERING SYSTEM PROVIDED HISTORY: sob TECHNOLOGIST PROVIDED HISTORY: Reason for exam:->sob What reading provider will be dictating this exam?->CRC FINDINGS: The lungs are without acute focal process.  There is no effusion or pneumothorax. The cardiomediastinal silhouette is without acute process. The osseous structures are without acute process.     No acute process. Interval resolution of left lower lung infiltrate.     IMPRESSION AND SUGGESTION:  Secondary spontaneous pneumothorax  Loculated hydropneumothorax, likely bleed post chest tube insertion while patient on Eliquis.  Appears stable, asymptomatic.  COVID-19 pneumonia  Coagulopathy  History of COPD  A-fib  Diabetes  Chronic kidney disease  Dehydration    Continue bronchodilator therapy.  Continue O2 to keep saturation above 90%.  On cough syrup.  Continue present treatment plan.      NOTE: This report was transcribed using voice recognition software. Every

## 2025-01-16 LAB
ERYTHROCYTE [DISTWIDTH] IN BLOOD BY AUTOMATED COUNT: 13.9 % (ref 11.5–14.5)
GLUCOSE BLD-MCNC: 105 MG/DL (ref 70–99)
GLUCOSE BLD-MCNC: 142 MG/DL (ref 70–99)
GLUCOSE BLD-MCNC: 152 MG/DL (ref 70–99)
GLUCOSE BLD-MCNC: 170 MG/DL (ref 70–99)
HCT VFR BLD AUTO: 24.4 % (ref 42–52)
HGB BLD-MCNC: 7.7 G/DL (ref 14–18)
MCH RBC QN AUTO: 27.7 PG (ref 27–31.3)
MCHC RBC AUTO-ENTMCNC: 31.6 % (ref 33–37)
MCV RBC AUTO: 87.8 FL (ref 79–92.2)
PERFORMED ON: ABNORMAL
PLATELET # BLD AUTO: 315 K/UL (ref 130–400)
RBC # BLD AUTO: 2.78 M/UL (ref 4.7–6.1)
WBC # BLD AUTO: 5.7 K/UL (ref 4.8–10.8)

## 2025-01-16 PROCEDURE — 2700000000 HC OXYGEN THERAPY PER DAY

## 2025-01-16 PROCEDURE — 6370000000 HC RX 637 (ALT 250 FOR IP): Performed by: INTERNAL MEDICINE

## 2025-01-16 PROCEDURE — 85027 COMPLETE CBC AUTOMATED: CPT

## 2025-01-16 PROCEDURE — 2060000000 HC ICU INTERMEDIATE R&B

## 2025-01-16 PROCEDURE — 94640 AIRWAY INHALATION TREATMENT: CPT

## 2025-01-16 PROCEDURE — 2500000003 HC RX 250 WO HCPCS: Performed by: INTERNAL MEDICINE

## 2025-01-16 PROCEDURE — 99232 SBSQ HOSP IP/OBS MODERATE 35: CPT | Performed by: PHYSICAL MEDICINE & REHABILITATION

## 2025-01-16 PROCEDURE — 94761 N-INVAS EAR/PLS OXIMETRY MLT: CPT

## 2025-01-16 PROCEDURE — 6370000000 HC RX 637 (ALT 250 FOR IP): Performed by: STUDENT IN AN ORGANIZED HEALTH CARE EDUCATION/TRAINING PROGRAM

## 2025-01-16 PROCEDURE — 99232 SBSQ HOSP IP/OBS MODERATE 35: CPT | Performed by: INTERNAL MEDICINE

## 2025-01-16 PROCEDURE — 97535 SELF CARE MNGMENT TRAINING: CPT

## 2025-01-16 PROCEDURE — 36415 COLL VENOUS BLD VENIPUNCTURE: CPT

## 2025-01-16 RX ADMIN — APIXABAN 2.5 MG: 2.5 TABLET, FILM COATED ORAL at 21:27

## 2025-01-16 RX ADMIN — GUAIFENESIN SYRUP AND DEXTROMETHORPHAN 5 ML: 100; 10 SYRUP ORAL at 09:02

## 2025-01-16 RX ADMIN — GUAIFENESIN SYRUP AND DEXTROMETHORPHAN 5 ML: 100; 10 SYRUP ORAL at 21:27

## 2025-01-16 RX ADMIN — GUAIFENESIN SYRUP AND DEXTROMETHORPHAN 5 ML: 100; 10 SYRUP ORAL at 18:06

## 2025-01-16 RX ADMIN — ALBUTEROL SULFATE 2 PUFF: 90 AEROSOL, METERED RESPIRATORY (INHALATION) at 07:16

## 2025-01-16 RX ADMIN — METOPROLOL SUCCINATE 50 MG: 50 TABLET, EXTENDED RELEASE ORAL at 09:02

## 2025-01-16 RX ADMIN — APIXABAN 2.5 MG: 2.5 TABLET, FILM COATED ORAL at 09:02

## 2025-01-16 RX ADMIN — Medication 10 ML: at 09:02

## 2025-01-16 RX ADMIN — GUAIFENESIN SYRUP AND DEXTROMETHORPHAN 5 ML: 100; 10 SYRUP ORAL at 11:47

## 2025-01-16 RX ADMIN — MEGESTROL ACETATE 20 MG: 20 TABLET ORAL at 21:27

## 2025-01-16 RX ADMIN — MEGESTROL ACETATE 20 MG: 20 TABLET ORAL at 09:02

## 2025-01-16 RX ADMIN — ALBUTEROL SULFATE 2 PUFF: 90 AEROSOL, METERED RESPIRATORY (INHALATION) at 19:12

## 2025-01-16 NOTE — PROGRESS NOTES
Physical Therapy Med Surg Daily Treatment Note  Facility/Department: [address] MED SURG UNIT  Room: [place] [address]       NAME: Ruben Gonsalez  : 1940 (84 y.o.)  MRN: [id number]  CODE STATUS: Full Code    Date of Service: 2025    Patient Diagnosis(es): Dehydration [E86.0]  COPD exacerbation (HCC) [J44.1]  Acute respiratory failure, unspecified whether with hypoxia or hypercapnia [J96.00]  Chronic kidney disease, unspecified CKD stage [N18.9]  COVID-19 [U07.1]   Chief Complaint   Patient presents with    Shortness of Breath     Patient Active Problem List    Diagnosis Date Noted    Atrial fibrillation (HCC) 2019    High risk medication use 2019    Iron deficiency anemia, unspecified 2022    Anemia, unspecified 2022    Iron deficiency 2022    Esophageal dysphagia 10/13/2022    Food impaction of esophagus 10/13/2022    Esophageal stenosis 10/13/2022    Chest pain 10/11/2022    Body mass index (BMI) of 20 to 24 2022    MARIAM (acute kidney injury) (HCC) 2022    COVID-19 2025    Secondary spontaneous pneumothorax 2025    COPD exacerbation (HCC) 2025    At high risk for stroke 2024    B12 deficiency 2024    Anemia 2024    Chronic renal disease, stage III (HCC) 2024    Anemia in stage 3 chronic kidney disease (HCC) 2024    Anemia in chronic kidney disease (CODE) 2024    Sideropenic dysphagia 2024    Other iron deficiency anemias 2024    Former smoker 2023    S/P CABG (coronary artery bypass graft) 2023    Status post angioplasty 2023    History of cardiovascular surgery 2023    Esophagitis 2023    Aspiration pneumonia of left lower lobe due to anesthesia during labor and delivery 2023    Esophageal obstruction due to food impaction 2023    Adverse effect of atorvastatin 2023    Benign essential hypertension 2023    Laceration of upper arm 2023    Abnormal  Continued to be limited by SOB, reduced endurance, and fatigue. Able to sit EOB for multiple minuteswith SpO2 within normal limits. Requested to lay back down in supine after short period in sitting EOB.     Discharge Recommendations:  Continue to assess pending progress         Goals  Long Term Goals  Long Term Goal 1: Pt will demonstrate bed mobility indep  Long Term Goal 2: Pt will demonstrate transfers mod indep with safest AD  Long Term Goal 3: Pt will demonstrate amb >/= 75ft mod indep with safest AD  Long Term Goal 4: Pt will demonstrate TUG </= 20 sec for decreased risk for falls    PLAN    General Plan: 1 time a day 3-6 times a week  Safety Devices  Type of Devices: All fall risk precautions in place, Bed alarm in place, Call light within reach, Left in bed     AMPAC (6 CLICK) BASIC MOBILITY  AM-PAC Inpatient Mobility Raw Score : 13     Therapy Time   Individual   Time In 1349   Time Out 1406   Minutes 17     Timed Code Treatment Minutes: 17 Minutes   Tr/Bm:10  TherEx: 7    Will Hall PTA, 01/16/25 at 2:18 PM         Definitions for assistance levels  Independent = pt does not require any physical supervision or assistance from another person for activity completion. Device may be needed.  Stand by assistance = pt requires verbal cues or instructions from another person, close to but not touching, to perform the activity  Minimal assistance= pt performs 75% or more of the activity; assistance is required to complete the activity  Moderate assistance= pt performs 50% of the activity; assistance is required to complete the activity  Maximal assistance = pt performs 25% of the activity; assistance is required to complete the activity  Dependent = pt requires total physical assistance to accomplish the task

## 2025-01-16 NOTE — DISCHARGE INSTR - COC
Continuity of Care Form    Patient Name: Ruben Gonsalez   :  1940  MRN:  [id number]    Admit date:  2025  Discharge date:  2025    Code Status Order: Full Code   Advance Directives:   Advance Care Flowsheet Documentation             Admitting Physician:  Adilson Dowd DO  PCP: Adilson Dowd DO    Discharging Nurse: Emily CHOUDHARY   Discharging Hospital Unit/Room#: W480/W480-01  Discharging Unit Phone Number: [phone]    Emergency Contact:   Extended Emergency Contact Information  Primary Emergency Contact: Kami Gonsalez  Address: [address] [place] [address]  Home Phone: [phone]  Relation: Spouse    Past Surgical History:  Past Surgical History:   Procedure Laterality Date    APPENDECTOMY      COLONOSCOPY N/A 2024    COLONOSCOPY DIAGNOSTIC performed by Surinder Chavarria MD at [facility]    CORONARY ANGIOPLASTY WITH STENT PLACEMENT      4    CORONARY ARTERY BYPASS GRAFT      triple bypass    EYE SURGERY Bilateral     cataract surgery    INSERTABLE CARDIAC MONITOR Left 2018    UPPER GASTROINTESTINAL ENDOSCOPY N/A 10/13/2022    EGD ESOPHAGOGASTRODUODENOSCOPY WITH DILATION performed by Surinder Chavarria MD at [facility]    UPPER GASTROINTESTINAL ENDOSCOPY N/A 2023    EGD ESOPHAGOGASTRODUODENOSCOPY WITH FOREIGN BODY REMOVAL performed by David Mann MD at [facility]    UPPER GASTROINTESTINAL ENDOSCOPY N/A 2024    ESOPHAGOGASTRODUODENOSCOPY DIAGNOSTIC ONLY performed by Surinder Chavarria MD at [facility]       Immunization History:   Immunization History   Administered Date(s) Administered    COVID-19, MODERNA Bivalent, (age 12y+), IM, 50 mcg/0.5 mL 2023    COVID-19, MODERNA, (age 12y+), IM, 50mcg/0.5mL 2023    TDaP, ADACEL (age 10y-64y), BOOSTRIX (age 10y+), IM, 0.5mL 2019       Active Problems:  Patient Active Problem List   Diagnosis Code    Atrial fibrillation (HCC) I48.91    High risk medication  Other Treatments After Discharge: obtain bmp & H&H q week til discharged    Physician Certification: I certify the above information and transfer of Ruben Gonsalez  is necessary for the continuing treatment of the diagnosis listed and that he requires Skilled Nursing for less  30 days.   The individual is being discharged to a nursing facility directly from the hospital after receiving acute patient care at the hospital; and    Requires nursing facility services for the condition for which the patient received care in the hospital; and    As the attending physician, certifies no later than the date of discharge, the individual requires fewer than 30 days of nursing facility care   Update Admission H&P: No Change    PHYSICIAN SIGNATURE: Electronically signed by Adilson Dowd DO on 1/17/2025 at 8:43 AM

## 2025-01-16 NOTE — PROGRESS NOTES
Nephrology Progress Note    Assessment:  COVID-+  CAF  OHDx S/P CABGX  Hypertension  Hx CA  DM type-2      Plan:because he is frail will need Rehab at a skilled NH   wife cannot care     Patient Active Problem List:     Atrial fibrillation ([facility])     High risk medication use     Atherosclerosis of native coronary artery of native heart without angina pectoris     Hypertension     Ischemic myocardial dysfunction     Nonrheumatic aortic valve disorder, unspecified     Aortic valve disorder     Personal history of nicotine dependence     Presence of aortocoronary bypass graft     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[facility])     Vasovagal syncope     Dizziness and giddiness     Cerebrovascular accident ([facility])     Orthostatic dizziness     Ataxia     Vertigo     Meniere disease, bilateral     Benign paroxysmal positional vertigo due to bilateral vestibular disorder     Coronary angioplasty status     Ataxic gait     Kyphoscoliosis     Spinal stenosis of lumbar region with neurogenic claudication     Abdominal aortic aneurysm (AAA) ([facility])     Acute inferior myocardial infarction ([facility])     Carotid bruit     Chronic obstructive pulmonary disease ([facility])     Diabetes mellitus ([facility])     Dyspnea on exertion     Fatigue     First degree atrioventricular block     Hyperlipidemia     Infection of the inner ear     Muscle pain     Underweight     Ventricular premature beats     Weight loss     PVD (peripheral vascular disease) ([facility])     Impaired mobility and activities of daily living -sp Severe PVD s/p AAA repain     Hypotension     Late effects of CVA (cerebrovascular accident)     MARIAM (acute kidney injury) ([facility])     Body mass index (BMI) of 20 to 24     Chest pain     Esophageal dysphagia     Food impaction of esophagus     Esophageal stenosis     Iron deficiency anemia, unspecified     Anemia, unspecified     Iron deficiency     Abnormal electrocardiography     Adverse effect of atorvastatin     Benign essential hypertension      Laceration of upper arm     Esophageal obstruction due to food impaction     Aspiration pneumonia of left lower lobe due to anesthesia during labor and delivery     Esophagitis     Other iron deficiency anemias     Chronic renal disease, stage III (HCC)     Anemia in stage 3 chronic kidney disease (HCC)     Anemia in chronic kidney disease (CODE)     Sideropenic dysphagia     Anemia     Former smoker     S/P CABG (coronary artery bypass graft)     Status post angioplasty     History of cardiovascular surgery     At high risk for stroke     COPD exacerbation (HCC)     B12 deficiency     COVID-19     Secondary spontaneous pneumothorax      Subjective:  Admit Date: 1/7/2025    Interval History: weak    Medications:  Scheduled Meds:   albuterol sulfate HFA  2 puff Inhalation BID RT    guaiFENesin-dextromethorphan  5 mL Oral 4x Daily    megestrol  20 mg Oral BID    apixaban  2.5 mg Oral BID    metoprolol succinate  50 mg Oral Daily    [Held by provider] metFORMIN  250 mg Oral BID WC    insulin lispro  0-4 Units SubCUTAneous TID WC    sodium chloride flush  5-40 mL IntraVENous 2 times per day     Continuous Infusions:   dextrose      sodium chloride         CBC:   Recent Labs     01/16/25  0458   WBC 5.7   HGB 7.7*        CMP:    Recent Labs     01/14/25  0543 01/15/25  0552    137   K 4.3 3.9   * 105   CO2 22 24   BUN 29* 29*   CREATININE 1.27* 1.24*   GLUCOSE 118* 126*   CALCIUM 8.0* 7.9*   LABGLOM 55.1* 56.8*     Troponin: No results for input(s): \"TROPONINI\" in the last 72 hours.  BNP: No results for input(s): \"BNP\" in the last 72 hours.  INR: No results for input(s): \"INR\" in the last 72 hours.  Lipids: No results for input(s): \"CHOL\", \"LDLDIRECT\", \"TRIG\", \"HDL\", \"AMYLASE\", \"LIPASE\" in the last 72 hours.  Liver: No results for input(s): \"AST\", \"ALT\", \"ALKPHOS\", \"LABALBU\", \"BILITOT\" in the last 72 hours.    Invalid input(s): \"PROT\", \"BILDIR\"  Iron:  No results for input(s): \"FERRITIN\" in the last 72

## 2025-01-16 NOTE — PROGRESS NOTES
01/16/25 0800   RT Protocol   History Pulmonary Disease 2   Respiratory pattern 0   Breath sounds 4   Cough 0   Indications for Bronchodilator Therapy Wheezing associated with pulm disorder   Bronchodilator Assessment Score 6

## 2025-01-16 NOTE — PROGRESS NOTES
Subjective:  The patient complains of severe acute on chronic progressive fatigue and dyspnea on exertion relieved with rest partially relieved by rest, PT, OT and meds and O2 titration and exacerbated by exertion and recent COVID-19 infection or any with A-fib and RVR newly reliance on nasal cannula O2..  84 y.o. male admitted to [facility] on 1/7/2025  with weakness diagnosed with COVID but also has complicated shins of A-fib with RVR as well as a pneumothorax requiring a chest tube.     At this time patient is not motivated to going to acute rehab.  He has been there in the past and states he does not have the energy for it.    I am concerned about patient’s medical complexities including:  Principal Problem:    COPD exacerbation ([facility])  Active Problems:    Orthostatic dizziness    Spinal stenosis of lumbar region with neurogenic claudication    Underweight    PVD (peripheral vascular disease) ([facility])    COVID-19    Secondary spontaneous pneumothorax  Resolved Problems:    * No resolved hospital problems. *      .    Controlled Substance Monitoring:    Acute and Chronic Pain Monitoring:   RX Monitoring Periodic Controlled Substance Monitoring Chronic Pain > 50 MEDD   4/18/2022   7:11 AM Possible medication side effects, risk of tolerance/dependence & alternative treatments discussed.;No signs of potential drug abuse or diversion identified.;Assessed functional status.;Obtaining appropriate analgesic effect of treatment. Re-evaluated the status of the patient's underlying condition causing pain.;Considered consultation with a specialist.;Obtained or confirmed \"Consent for Opioid Use\" on file.           Reviewed recent nursing note and discussed current status and planned care with acute care providers, \"Pt is AxOx4. Meds given per mar. Pt denies any needs at this time. Call light within reach.  \".    He remains sinus rhythm on telemetry.  He is wanting to go home and he was hoping he will be strong    Diet/Swallow:                   COGNITION  OT:    SP:           Lab/X-ray studies reviewed, analyzed and discussed with patient and staff:   Recent Results (from the past 24 hour(s))   POCT Glucose    Collection Time: 01/15/25  4:24 PM   Result Value Ref Range    POC Glucose 185 (H) 70 - 99 mg/dl    Performed on ACCU-CHEK    POCT Glucose    Collection Time: 01/15/25  8:57 PM   Result Value Ref Range    POC Glucose 183 (H) 70 - 99 mg/dl    Performed on ACCU-CHEK    CBC    Collection Time: 01/16/25  4:58 AM   Result Value Ref Range    WBC 5.7 4.8 - 10.8 K/uL    RBC 2.78 (L) 4.70 - 6.10 M/uL    Hemoglobin 7.7 (L) 14.0 - 18.0 g/dL    Hematocrit 24.4 (L) 42.0 - 52.0 %    MCV 87.8 79.0 - 92.2 fL    MCH 27.7 27.0 - 31.3 pg    MCHC 31.6 (L) 33.0 - 37.0 %    RDW 13.9 11.5 - 14.5 %    Platelets 315 130 - 400 K/uL   POCT Glucose    Collection Time: 01/16/25  7:44 AM   Result Value Ref Range    POC Glucose 105 (H) 70 - 99 mg/dl    Performed on ACCU-CHEK    POCT Glucose    Collection Time: 01/16/25 11:43 AM   Result Value Ref Range    POC Glucose 170 (H) 70 - 99 mg/dl    Performed on ACCU-CHEK      Previous extensive, complex labs, notes and diagnostics reviewed and analyzed.     ALLERGIES:    Allergies as of 01/07/2025 - Fully Reviewed 01/07/2025   Allergen Reaction Noted    Fenofibrate  12/29/2021    Lipitor [atorvastatin] Other (See Comments) 12/19/2016    Niacin Other (See Comments) 12/19/2016    Niaspan [niacin er] Other (See Comments) 12/19/2016    Vitamin e Other (See Comments) 12/19/2016    Zocor [simvastatin] Other (See Comments) 12/19/2016      (please also verify by checking MAR)     Complex Physical Medicine & Rehab Issues Assess & Plan:   Severe abnormality of gait and mobility and impaired self-care and ADL's secondary to cardiopulmonary debility.  Updated functional and medical status reassessed regarding patient’s ability to participate in therapies and patient found to be able to participate in:   acute

## 2025-01-16 NOTE — PROGRESS NOTES
01/15/25 2000   RT Protocol   History Pulmonary Disease 2   Respiratory pattern 0   Breath sounds 4   Cough 0   Indications for Bronchodilator Therapy Wheezing associated with pulm disorder   Bronchodilator Assessment Score 6

## 2025-01-16 NOTE — PLAN OF CARE
Problem: Chronic Conditions and Co-morbidities  Goal: Patient's chronic conditions and co-morbidity symptoms are monitored and maintained or improved  1/15/2025 2224 by Madison Campos RN  Outcome: Progressing  1/15/2025 1025 by Renuka Woodard RN  Outcome: Progressing     Problem: Discharge Planning  Goal: Discharge to home or other facility with appropriate resources  1/15/2025 2224 by Madison Campos RN  Outcome: Progressing  1/15/2025 1025 by Renuka Woodard RN  Outcome: Progressing     Problem: Pain  Goal: Verbalizes/displays adequate comfort level or baseline comfort level  1/15/2025 2224 by Madison Campos RN  Outcome: Progressing  1/15/2025 1025 by Renuka Woodard RN  Outcome: Progressing

## 2025-01-16 NOTE — PROGRESS NOTES
INPATIENT PROGRESS NOTES    PATIENT NAME: Ruben Gonsalez  MRN: [id number]  SERVICE DATE:  January 16, 2025   SERVICE TIME:  12:58 PM      PRIMARY SERVICE: Pulmonary Disease    CHIEF COMPLAIN: Pneumothorax      INTERVAL HPI: Patient seen and examined at bedside, Interval Notes, orders reviewed. Nursing notes noted  Patient is comfortable in bed.  No new problem overnight.  He is still having soft cough but improved with Robitussin DM.   No shortness of breath.  No chest pain.  On 3 L O2 via nasal cannula O2 saturation 98%.         OBJECTIVE   I/O:24HR INTAKE/OUTPUT:    Intake/Output Summary (Last 24 hours) at 1/16/2025 1258  Last data filed at 1/16/2025 0915  Gross per 24 hour   Intake 1320 ml   Output 250 ml   Net 1070 ml     01/15 0701 - 01/16 0700  In: 1560 [P.O.:1560]  Out: 250 [Urine:250]  Body mass index is 22.16 kg/m².    PHYSICAL EXAM:  Vitals:  /84   Pulse 91   Temp 97.9 °F (36.6 °C) (Oral)   Resp 22   Ht 1.727 m (5' 7.99\")   Wt 66.1 kg (145 lb 11.6 oz)   SpO2 98%   BMI 22.16 kg/m²     General: Sleepy but arousable..comfortable in bed, No distress.  Head: Atraumatic , Normocephalic   Eyes: PERRL. No sclera icterus. No conjunctival injection. No discharge   ENT: No nasal  discharge. Pharynx clear.  Neck:  Trachea midline. No thyromegaly, no JVD, No cervical adenopathy.  Chest : Bilaterally symmetrical ,Normal effort,  No accessory muscle use  Lung : Diminished breath sound bilaterally No Rales. No wheezing. No rhonchi.   Heart:: Normal rate. Regular rhythm. No mumur ,  Rub or gallop  ABD: Non-tender. Non-distended. No masses. No organmegaly. Normal bowel sounds. No hernia.  Ext : No Pitting both leg , No Cyanosis No clubbing  Neuro: no focal weakness    Labs:  CBC:   Recent Labs     01/16/25  0458   WBC 5.7   HGB 7.7*   HCT 24.4*        BMP:    Recent Labs     01/14/25  0543 01/15/25  0552    137   K 4.3 3.9   * 105   CO2 22 24   BUN 29* 29*   CREATININE 1.27* 1.24*   GLUCOSE 118*  Prominence of the sulci and/or CSF spaces suggests a degree of cerebral atrophy.  No acute ischemia. No mass or mass effect.  No acute intracranial hemorrhage.  Scattered periventricular deep white matter hypodensity consistent with small vessel ischemic deep white matter disease. Ventricles:  No acute findings.  No ventriculomegaly. Bones/joints:  No acute findings.  No acute fracture. Soft tissues:  No acute findings. Sinuses:  Unremarkable as visualized.  No acute sinusitis. Mastoid air cells:  Unremarkable as visualized.  No mastoid effusion.     1.  No evidence of acute intracranial process. 2.  Findings of presumed small vessel ischemic deep white matter disease. 3.  Prominence of the sulci and/or CSF spaces suggests a degree of cerebral atrophy.     XR CHEST PORTABLE    Result Date: 1/7/2025  EXAMINATION: ONE XRAY VIEW OF THE CHEST 1/7/2025 6:55 pm COMPARISON: September 4, 2023 HISTORY: ORDERING SYSTEM PROVIDED HISTORY: sob TECHNOLOGIST PROVIDED HISTORY: Reason for exam:->sob What reading provider will be dictating this exam?->CRC FINDINGS: The lungs are without acute focal process.  There is no effusion or pneumothorax. The cardiomediastinal silhouette is without acute process. The osseous structures are without acute process.     No acute process. Interval resolution of left lower lung infiltrate.     IMPRESSION AND SUGGESTION:  Secondary spontaneous pneumothorax  Loculated hydropneumothorax, likely bleed post chest tube insertion while patient on Eliquis.  Appears stable, asymptomatic.  COVID-19 pneumonia  Coagulopathy  History of COPD  A-fib  Diabetes  Chronic kidney disease  Dehydration    Continue bronchodilator therapy.  Continue O2 to keep saturation above 90%.  On cough syrup.  Continue present treatment plan.  He will need rehab at skilled nursing facility.    NOTE: This report was transcribed using voice recognition software. Every effort was made to ensure accuracy; however, inadvertent

## 2025-01-16 NOTE — CARE COORDINATION
Per patient and wife request this LSW sent referral to [facility] admissions liaison for [facility].  Awaiting response at this time.  Electronically signed by CAROLINE Medina on 1/16/25 at 10:55 AM EST   I was notified that patients referral has been accepted at [facility].  [facility] will have bed available for patient on Saturday, Jan.18,2025.  I notified patient, patient will notify his wife.   Dr. Dowd is also aware.   Electronically signed by CAROLINE Medina on 1/16/25 at 3:40 PM EST

## 2025-01-16 NOTE — CARE COORDINATION
Met with patient at bedside to discuss discharge plan.  Patient states Dr. Dowd spoke with him and wife and agreed to go to [facility].  ANNETTE MELGOZAW aware and updated.  Discharge plan [facility] pend accept.

## 2025-01-16 NOTE — PROGRESS NOTES
Shift assessment complete. Pt is AxOx4. Meds given per mar. Pt denies any needs at this time. Wife at bedside. Call light within reach.     Electronically signed by Renuka Woodard RN on 1/16/2025 at 9:17 AM

## 2025-01-16 NOTE — PLAN OF CARE
Problem: Chronic Conditions and Co-morbidities  Goal: Patient's chronic conditions and co-morbidity symptoms are monitored and maintained or improved  1/16/2025 0917 by Renuka Woodard RN  Outcome: Progressing  1/15/2025 2224 by Madison Campos RN  Outcome: Progressing     Problem: Discharge Planning  Goal: Discharge to home or other facility with appropriate resources  1/16/2025 0917 by Renuka Woodard RN  Outcome: Progressing  1/15/2025 2224 by Madison Campos RN  Outcome: Progressing     Problem: Pain  Goal: Verbalizes/displays adequate comfort level or baseline comfort level  1/16/2025 0917 by Renuka Woodard RN  Outcome: Progressing  1/15/2025 2224 by Madison Campos RN  Outcome: Progressing     Problem: Safety - Adult  Goal: Free from fall injury  Outcome: Progressing     Problem: ABCDS Injury Assessment  Goal: Absence of physical injury  Outcome: Progressing     Problem: Infection - Adult  Goal: Absence of infection at discharge  Outcome: Progressing  Goal: Absence of infection during hospitalization  Outcome: Progressing  Goal: Absence of fever/infection during anticipated neutropenic period  Outcome: Progressing     Problem: Metabolic/Fluid and Electrolytes - Adult  Goal: Electrolytes maintained within normal limits  Outcome: Progressing  Goal: Hemodynamic stability and optimal renal function maintained  Outcome: Progressing  Goal: Glucose maintained within prescribed range  Outcome: Progressing     Problem: Neurosensory - Adult  Goal: Achieves stable or improved neurological status  Outcome: Progressing  Goal: Absence of seizures  Outcome: Progressing  Goal: Remains free of injury related to seizures activity  Outcome: Progressing  Goal: Achieves maximal functionality and self care  Outcome: Progressing     Problem: Respiratory - Adult  Goal: Achieves optimal ventilation and oxygenation  Outcome: Progressing     Problem: Cardiovascular - Adult  Goal: Maintains optimal cardiac output and

## 2025-01-17 ENCOUNTER — APPOINTMENT (OUTPATIENT)
Dept: GENERAL RADIOLOGY | Age: 85
End: 2025-01-17
Payer: MEDICARE

## 2025-01-17 LAB
GLUCOSE BLD-MCNC: 153 MG/DL (ref 70–99)
GLUCOSE BLD-MCNC: 170 MG/DL (ref 70–99)
GLUCOSE BLD-MCNC: 193 MG/DL (ref 70–99)
GLUCOSE BLD-MCNC: 196 MG/DL (ref 70–99)
PERFORMED ON: ABNORMAL

## 2025-01-17 PROCEDURE — 2500000003 HC RX 250 WO HCPCS: Performed by: INTERNAL MEDICINE

## 2025-01-17 PROCEDURE — 6370000000 HC RX 637 (ALT 250 FOR IP): Performed by: INTERNAL MEDICINE

## 2025-01-17 PROCEDURE — 97535 SELF CARE MNGMENT TRAINING: CPT

## 2025-01-17 PROCEDURE — 71045 X-RAY EXAM CHEST 1 VIEW: CPT

## 2025-01-17 PROCEDURE — 2700000000 HC OXYGEN THERAPY PER DAY

## 2025-01-17 PROCEDURE — 2060000000 HC ICU INTERMEDIATE R&B

## 2025-01-17 PROCEDURE — 94640 AIRWAY INHALATION TREATMENT: CPT

## 2025-01-17 PROCEDURE — 99233 SBSQ HOSP IP/OBS HIGH 50: CPT | Performed by: INTERNAL MEDICINE

## 2025-01-17 PROCEDURE — 94761 N-INVAS EAR/PLS OXIMETRY MLT: CPT

## 2025-01-17 PROCEDURE — 6360000002 HC RX W HCPCS: Performed by: INTERNAL MEDICINE

## 2025-01-17 PROCEDURE — 99231 SBSQ HOSP IP/OBS SF/LOW 25: CPT | Performed by: PHYSICAL MEDICINE & REHABILITATION

## 2025-01-17 RX ORDER — BUDESONIDE AND FORMOTEROL FUMARATE DIHYDRATE 160; 4.5 UG/1; UG/1
2 AEROSOL RESPIRATORY (INHALATION)
Status: DISCONTINUED | OUTPATIENT
Start: 2025-01-17 | End: 2025-01-18 | Stop reason: HOSPADM

## 2025-01-17 RX ORDER — TRANEXAMIC ACID 100 MG/ML
1000 INJECTION, SOLUTION INTRAVENOUS 3 TIMES DAILY
Status: DISCONTINUED | OUTPATIENT
Start: 2025-01-17 | End: 2025-01-18

## 2025-01-17 RX ADMIN — GUAIFENESIN SYRUP AND DEXTROMETHORPHAN 5 ML: 100; 10 SYRUP ORAL at 21:10

## 2025-01-17 RX ADMIN — ACETAMINOPHEN 650 MG: 325 TABLET ORAL at 10:09

## 2025-01-17 RX ADMIN — ALBUTEROL SULFATE 2 PUFF: 90 AEROSOL, METERED RESPIRATORY (INHALATION) at 07:45

## 2025-01-17 RX ADMIN — TRANEXAMIC ACID 1000 MG: 1 INJECTION, SOLUTION INTRAVENOUS at 15:53

## 2025-01-17 RX ADMIN — TRANEXAMIC ACID 1000 MG: 1 INJECTION, SOLUTION INTRAVENOUS at 11:39

## 2025-01-17 RX ADMIN — BUDESONIDE AND FORMOTEROL FUMARATE DIHYDRATE 2 PUFF: 160; 4.5 AEROSOL RESPIRATORY (INHALATION) at 19:26

## 2025-01-17 RX ADMIN — GUAIFENESIN SYRUP AND DEXTROMETHORPHAN 5 ML: 100; 10 SYRUP ORAL at 08:56

## 2025-01-17 RX ADMIN — MEGESTROL ACETATE 20 MG: 20 TABLET ORAL at 08:56

## 2025-01-17 RX ADMIN — TRANEXAMIC ACID 1000 MG: 1 INJECTION, SOLUTION INTRAVENOUS at 19:29

## 2025-01-17 RX ADMIN — Medication 10 ML: at 21:13

## 2025-01-17 RX ADMIN — ALBUTEROL SULFATE 2 PUFF: 90 AEROSOL, METERED RESPIRATORY (INHALATION) at 19:26

## 2025-01-17 RX ADMIN — APIXABAN 2.5 MG: 2.5 TABLET, FILM COATED ORAL at 08:56

## 2025-01-17 RX ADMIN — BUDESONIDE AND FORMOTEROL FUMARATE DIHYDRATE 2 PUFF: 160; 4.5 AEROSOL RESPIRATORY (INHALATION) at 11:44

## 2025-01-17 RX ADMIN — TIOTROPIUM BROMIDE INHALATION SPRAY 2 PUFF: 3.12 SPRAY, METERED RESPIRATORY (INHALATION) at 11:44

## 2025-01-17 RX ADMIN — METHYLPREDNISOLONE SODIUM SUCCINATE 40 MG: 40 INJECTION, POWDER, LYOPHILIZED, FOR SOLUTION INTRAMUSCULAR; INTRAVENOUS at 13:30

## 2025-01-17 RX ADMIN — MEGESTROL ACETATE 20 MG: 20 TABLET ORAL at 21:10

## 2025-01-17 ASSESSMENT — PAIN SCALES - WONG BAKER: WONGBAKER_NUMERICALRESPONSE: NO HURT

## 2025-01-17 ASSESSMENT — PAIN SCALES - GENERAL
PAINLEVEL_OUTOF10: 3
PAINLEVEL_OUTOF10: 0
PAINLEVEL_OUTOF10: 0

## 2025-01-17 NOTE — CARE COORDINATION
This ABADW visited patient and wife at bedside this am.  Discharge plans discussed. Patient to be transferred to [facility] tomorrow, Saturday, Jan.18,2025.  Transportation has been arranged through [facility]. Transport is scheduled for 1:00pm.  Patient, patients wife, [facility]  I have completed [id number] for SNF transfer.  Electronically signed by CAROLINE Medina on 1/17/25 at 9:53 AM EST

## 2025-01-17 NOTE — PROGRESS NOTES
INPATIENT PROGRESS NOTES    PATIENT NAME: Ruben Gonsalez  MRN: [id number]  SERVICE DATE:  January 17, 2025   SERVICE TIME:  11:10 AM      PRIMARY SERVICE: Pulmonary Disease    CHIEF COMPLAINTS: Pneumothorax    INTERVAL HPI: Patient seen and examined at bedside, Interval Notes, orders reviewed. Nursing notes noted    Patient report increased shortness of breath, he has hemoptysis started 2 days ago, no chest pain, no fever, he is on 2 L O2 saturation 100%.    New information updated in the note today, rest of the examination did not change compared to yesterday.    Review of system:     GI Abdominal pain No  Skin Rash No    Social History     Tobacco Use    Smoking status: Former     Types: Cigarettes     Passive exposure: Past    Smokeless tobacco: Never    Tobacco comments:     3 cigarettes/month   Substance Use Topics    Alcohol use: No         Problem Relation Age of Onset    Colon Cancer Neg Hx          OBJECTIVE    Body mass index is 22.16 kg/m².    PHYSICAL EXAM:  Vitals:  /82   Pulse 81   Temp 99.3 °F (37.4 °C) (Axillary)   Resp 18   Ht 1.727 m (5' 7.99\")   Wt 66.1 kg (145 lb 11.6 oz)   SpO2 100%   BMI 22.16 kg/m²     General: alert, cooperative, no distress  Head: normocephalic, atraumatic  Eyes:No gross abnormalities.  ENT:  MMM no lesions  Neck:  supple and no masses  Chest tight with wheezing, minimal rales, nontender, tympanic  Heart:: Heart sounds are normal.  Regular rate and rhythm without murmur, gallop or rub.  ABD:  symmetric, soft, non-tender  Musculoskeletal : no cyanosis, no clubbing, and no edema  Neuro:  Grossly normal  Skin: No rashes or nodules noted.  Lymph node:  no cervical nodes  Urology: No Ambriz   Psychiatric: appropriate    DATA:   Recent Labs     01/16/25  0458   WBC 5.7   HGB 7.7*   HCT 24.4*   MCV 87.8        Recent Labs     01/15/25  0552      K 3.9      CO2 24   BUN 29*   CREATININE 1.24*   GLUCOSE 126*   CALCIUM 7.9*   LABGLOM 56.8*       MV Settings:  this time was spent in counseling and/or coordinating of care.         Electronically signed by Patrice Lizarraga MD,  FCCP   on 1/17/2025 at 11:10 AM

## 2025-01-17 NOTE — PROGRESS NOTES
Physical Therapy Med Surg Daily Treatment Note  Facility/Department: [address] MED SURG UNIT  Room: [place] [id number]       NAME: Ruben Gonsalez  : 1940 (84 y.o.)  MRN: [id number]  CODE STATUS: Full Code    Date of Service: 2025    Patient Diagnosis(es): Dehydration [E86.0]  COPD exacerbation (HCC) [J44.1]  Acute respiratory failure, unspecified whether with hypoxia or hypercapnia [J96.00]  Chronic kidney disease, unspecified CKD stage [N18.9]  COVID-19 [U07.1]   Chief Complaint   Patient presents with    Shortness of Breath     Patient Active Problem List    Diagnosis Date Noted    Atrial fibrillation (HCC) 2019    High risk medication use 2019    Iron deficiency anemia, unspecified 2022    Anemia, unspecified 2022    Iron deficiency 2022    Esophageal dysphagia 10/13/2022    Food impaction of esophagus 10/13/2022    Esophageal stenosis 10/13/2022    Chest pain 10/11/2022    Body mass index (BMI) of 20 to 24 2022    MARIAM (acute kidney injury) (HCC) 2022    COVID-19 2025    Secondary spontaneous pneumothorax 2025    COPD exacerbation (HCC) 2025    At high risk for stroke 2024    B12 deficiency 2024    Anemia 2024    Chronic renal disease, stage III (HCC) 2024    Anemia in stage 3 chronic kidney disease (HCC) 2024    Anemia in chronic kidney disease (CODE) 2024    Sideropenic dysphagia 2024    Other iron deficiency anemias 2024    Former smoker 2023    S/P CABG (coronary artery bypass graft) 2023    Status post angioplasty 2023    History of cardiovascular surgery 2023    Esophagitis 2023    Aspiration pneumonia of left lower lobe due to anesthesia during labor and delivery 2023    Esophageal obstruction due to food impaction 2023    Adverse effect of atorvastatin 2023    Benign essential hypertension 2023    Laceration of upper arm 2023    Abnormal  status 11/26/2018    Diabetes mellitus (HCC)     Hyperlipidemia     Hypertension     Lumbosacral disc disease     Movement disorder     Osteoarthritis     Pneumonia      Past Surgical History:   Procedure Laterality Date    APPENDECTOMY      COLONOSCOPY N/A 5/17/2024    COLONOSCOPY DIAGNOSTIC performed by Surinder Chavarria MD at [facility]    CORONARY ANGIOPLASTY WITH STENT PLACEMENT      4    CORONARY ARTERY BYPASS GRAFT      triple bypass    EYE SURGERY Bilateral     cataract surgery    INSERTABLE CARDIAC MONITOR Left 12/2018    UPPER GASTROINTESTINAL ENDOSCOPY N/A 10/13/2022    EGD ESOPHAGOGASTRODUODENOSCOPY WITH DILATION performed by Surinder Chavarria MD at [facility]    UPPER GASTROINTESTINAL ENDOSCOPY N/A 9/4/2023    EGD ESOPHAGOGASTRODUODENOSCOPY WITH FOREIGN BODY REMOVAL performed by David Mann MD at [facility]    UPPER GASTROINTESTINAL ENDOSCOPY N/A 5/17/2024    ESOPHAGOGASTRODUODENOSCOPY DIAGNOSTIC ONLY performed by Surinder Chavarria MD at [facility]            Restrictions:  Restrictions/Precautions: Fall Risk    SUBJECTIVE:    \"I did a little better today.    Pain  Pain  Pre-Pain: 0Post, also.      OBJECTIVE:        Bed mobility  Supine to Sit: Stand by assistance  Sit to Supine: Stand by assistance  Scooting: Stand by assistance    Transfers  Sit to Stand: Minimal Assistance  Stand to Sit: Stand by assistance  Bed to Chair: Unable to assess    Ambulation  Surface: Level tile  Device: Rolling Walker  Assistance: Minimal assistance  Quality of Gait: 3 - 4 steps laterally at EOB: fatigues quickly with dyspnea.   No LOB                                         ASSESSMENT   Body Structures, Functions, Activity Limitations Requiring Skilled Therapeutic Intervention: Decreased functional mobility ;Decreased strength;Decreased endurance;Decreased balance  Assessment: SpO2 monitored throughout.  Varied from 100% in supine to dropping to 82% sitting EOB (recovered after 1 -2 minutes to

## 2025-01-17 NOTE — PLAN OF CARE
Problem: Chronic Conditions and Co-morbidities  Goal: Patient's chronic conditions and co-morbidity symptoms are monitored and maintained or improved  Outcome: Progressing  Flowsheets (Taken 1/16/2025 1910)  Care Plan - Patient's Chronic Conditions and Co-Morbidity Symptoms are Monitored and Maintained or Improved: Monitor and assess patient's chronic conditions and comorbid symptoms for stability, deterioration, or improvement     Problem: Discharge Planning  Goal: Discharge to home or other facility with appropriate resources  Outcome: Progressing  Flowsheets (Taken 1/16/2025 1910)  Discharge to home or other facility with appropriate resources: Identify barriers to discharge with patient and caregiver     Problem: Pain  Goal: Verbalizes/displays adequate comfort level or baseline comfort level  Outcome: Progressing     Problem: Safety - Adult  Goal: Free from fall injury  Outcome: Progressing     Problem: ABCDS Injury Assessment  Goal: Absence of physical injury  Outcome: Progressing     Problem: Infection - Adult  Goal: Absence of infection at discharge  Outcome: Progressing  Flowsheets (Taken 1/16/2025 1910)  Absence of infection at discharge: Assess and monitor for signs and symptoms of infection  Goal: Absence of infection during hospitalization  Outcome: Progressing  Flowsheets (Taken 1/16/2025 1910)  Absence of infection during hospitalization: Assess and monitor for signs and symptoms of infection  Goal: Absence of fever/infection during anticipated neutropenic period  Outcome: Progressing  Flowsheets (Taken 1/16/2025 1910)  Absence of fever/infection during anticipated neutropenic period: Monitor white blood cell count     Problem: Metabolic/Fluid and Electrolytes - Adult  Goal: Electrolytes maintained within normal limits  Outcome: Progressing  Flowsheets (Taken 1/16/2025 1910)  Electrolytes maintained within normal limits: Monitor labs and assess patient for signs and symptoms of electrolyte

## 2025-01-17 NOTE — PROGRESS NOTES
01/16/25 1900   RT Protocol   History Pulmonary Disease 2   Respiratory pattern 0   Breath sounds 2   Cough 0   Indications for Bronchodilator Therapy Wheezing associated with pulm disorder   Bronchodilator Assessment Score 4

## 2025-01-17 NOTE — PROGRESS NOTES
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[facility])     Vasovagal syncope     Dizziness and giddiness     Cerebrovascular accident ([facility])     Orthostatic dizziness     Ataxia     Vertigo     Meniere disease, bilateral     Benign paroxysmal positional vertigo due to bilateral vestibular disorder     Coronary angioplasty status     Ataxic gait     Kyphoscoliosis     Spinal stenosis of lumbar region with neurogenic claudication     Abdominal aortic aneurysm (AAA) ([facility])     Acute inferior myocardial infarction ([facility])     Carotid bruit     Chronic obstructive pulmonary disease ([facility])     Diabetes mellitus ([facility])     Dyspnea on exertion     Fatigue     First degree atrioventricular block     Hyperlipidemia     Infection of the inner ear     Muscle pain     Underweight     Ventricular premature beats     Weight loss     PVD (peripheral vascular disease) ([facility])     Impaired mobility and activities of daily living -sp Severe PVD s/p AAA repain     Hypotension     Late effects of CVA (cerebrovascular accident)     MARIAM (acute kidney injury) ([facility])     Body mass index (BMI) of 20 to 24     Chest pain     Esophageal dysphagia     Food impaction of esophagus     Esophageal stenosis     Iron deficiency anemia, unspecified     Anemia, unspecified     Iron deficiency     Abnormal electrocardiography     Adverse effect of atorvastatin     Benign essential hypertension     Laceration of upper arm     Esophageal obstruction due to  \"UA\" in the last 72 hours.    Objective:  Vitals: /82   Pulse 81   Temp 99.3 °F (37.4 °C) (Axillary)   Resp 18   Ht 1.727 m (5' 7.99\")   Wt 66.1 kg (145 lb 11.6 oz)   SpO2 100%   BMI 22.16 kg/m²    Wt Readings from Last 3 Encounters:   01/10/25 66.1 kg (145 lb 11.6 oz)   05/19/24 65.8 kg (145 lb)   05/17/24 65.8 kg (145 lb 1 oz)      24HR INTAKE/OUTPUT:    Intake/Output Summary (Last 24 hours) at 1/17/2025 0806  Last data filed at 1/16/2025 1901  Gross per 24 hour   Intake 1320 ml   Output 200 ml   Net 1120 ml       General: alert, in no apparent distress  HEENT: normocephalic, atraumatic, anicteric  Neck: supple, no mass  Lungs: non-labored respirations, clear to auscultation bilaterally  Heart: regular rate and rhythm, no murmurs or rubs  Abdomen: soft, non-tender, non-distended  Ext: no cyanosis, no peripheral edema  Neuro: alert and oriented, no gross abnormalities  Psych: normal mood and affect  Skin: no rash      Electronically signed by Adilson Dowd DO

## 2025-01-17 NOTE — PROGRESS NOTES
01/17/25 1700   RT Protocol   History Pulmonary Disease 2   Respiratory pattern 0   Breath sounds 4   Cough 0   Bronchodilator Assessment Score 6

## 2025-01-17 NOTE — PLAN OF CARE
Problem: Chronic Conditions and Co-morbidities  Goal: Patient's chronic conditions and co-morbidity symptoms are monitored and maintained or improved  1/17/2025 0915 by Katie Ellis RN  Outcome: Progressing  1/17/2025 0008 by Shun Somers RN  Outcome: Progressing  Flowsheets (Taken 1/16/2025 1910)  Care Plan - Patient's Chronic Conditions and Co-Morbidity Symptoms are Monitored and Maintained or Improved: Monitor and assess patient's chronic conditions and comorbid symptoms for stability, deterioration, or improvement     Problem: Discharge Planning  Goal: Discharge to home or other facility with appropriate resources  1/17/2025 0915 by Katie Ellis RN  Outcome: Progressing  1/17/2025 0008 by Shun Somers RN  Outcome: Progressing  Flowsheets (Taken 1/16/2025 1910)  Discharge to home or other facility with appropriate resources: Identify barriers to discharge with patient and caregiver     Problem: Pain  Goal: Verbalizes/displays adequate comfort level or baseline comfort level  1/17/2025 0915 by Katie Ellis RN  Outcome: Progressing  1/17/2025 0008 by Shun Somers RN  Outcome: Progressing     Problem: Safety - Adult  Goal: Free from fall injury  1/17/2025 0915 by Katie Ellis RN  Outcome: Progressing  1/17/2025 0008 by Shun Somers RN  Outcome: Progressing     Problem: ABCDS Injury Assessment  Goal: Absence of physical injury  1/17/2025 0915 by Katie Ellis RN  Outcome: Progressing  1/17/2025 0008 by Shun Somers RN  Outcome: Progressing

## 2025-01-17 NOTE — PROGRESS NOTES
Subjective:  The patient complains of severe acute on chronic progressive fatigue and dyspnea on exertion relieved with rest partially relieved by rest, PT, OT and meds and O2 titration and exacerbated by exertion and recent COVID-19 infection or any with A-fib and RVR newly reliance on nasal cannula O2..  84 y.o. male admitted to [facility] on 1/7/2025  with weakness diagnosed with COVID but also has complicated shins of A-fib with RVR as well as a pneumothorax requiring a chest tube.     At this time patient is not motivated to going to acute rehab.  He has been there in the past and states he does not have the energy for it.    I am concerned about patient’s medical complexities including:  Principal Problem:    COPD exacerbation ([facility])  Active Problems:    Orthostatic dizziness    Spinal stenosis of lumbar region with neurogenic claudication    Underweight    PVD (peripheral vascular disease) ([facility])    COVID-19    Secondary spontaneous pneumothorax  Resolved Problems:    * No resolved hospital problems. *      .    Controlled Substance Monitoring:    Acute and Chronic Pain Monitoring:   RX Monitoring Periodic Controlled Substance Monitoring Chronic Pain > 50 MEDD   4/18/2022   7:11 AM Possible medication side effects, risk of tolerance/dependence & alternative treatments discussed.;No signs of potential drug abuse or diversion identified.;Assessed functional status.;Obtaining appropriate analgesic effect of treatment. Re-evaluated the status of the patient's underlying condition causing pain.;Considered consultation with a specialist.;Obtained or confirmed \"Consent for Opioid Use\" on file.           Reviewed recent nursing note and discussed current status and planned care with acute care providers, \"Pt is AxOx4. Meds given per mar. Pt denies any needs at this time. Call light within reach.  \".    He remains sinus rhythm on telemetry.   He has finally, the determination that he would be best    Diet/Swallow:                   COGNITION  OT:    SP:           Lab/X-ray studies reviewed, analyzed and discussed with patient and staff:   Recent Results (from the past 24 hour(s))   POCT Glucose    Collection Time: 01/16/25 11:43 AM   Result Value Ref Range    POC Glucose 170 (H) 70 - 99 mg/dl    Performed on ACCU-CHEK    POCT Glucose    Collection Time: 01/16/25  4:26 PM   Result Value Ref Range    POC Glucose 152 (H) 70 - 99 mg/dl    Performed on ACCU-CHEK    POCT Glucose    Collection Time: 01/16/25  9:02 PM   Result Value Ref Range    POC Glucose 142 (H) 70 - 99 mg/dl    Performed on ACCU-CHEK    POCT Glucose    Collection Time: 01/17/25  7:25 AM   Result Value Ref Range    POC Glucose 153 (H) 70 - 99 mg/dl    Performed on ACCU-CHEK      Previous extensive, complex labs, notes and diagnostics reviewed and analyzed.     ALLERGIES:    Allergies as of 01/07/2025 - Fully Reviewed 01/07/2025   Allergen Reaction Noted    Fenofibrate  12/29/2021    Lipitor [atorvastatin] Other (See Comments) 12/19/2016    Niacin Other (See Comments) 12/19/2016    Niaspan [niacin er] Other (See Comments) 12/19/2016    Vitamin e Other (See Comments) 12/19/2016    Zocor [simvastatin] Other (See Comments) 12/19/2016      (please also verify by checking MAR)     Complex Physical Medicine & Rehab Issues Assess & Plan:   Severe abnormality of gait and mobility and impaired self-care and ADL's secondary to cardiopulmonary debility.  Updated functional and medical status reassessed regarding patient’s ability to participate in therapies and patient found to be able to participate in:   acute intensive comprehensive inpatient rehabilitation program including PT/OT to improve balance, ambulation, ADL’s, and to improve the P/AROM.   It is my opinion that they will be able to tolerate 3 hours of therapy a day and benefit from it at an acute level. I again discussed acute rehab with the patient and verify that the patient is able and willing to  participate in 3 hours of therapy a day.  Rehab and Acute Care Case Management has also reinforced this expectation.     skilled    At present they are looking at skilled facility in [place]    Will continue to follow to attempt to get patient to the most efficient but most effective level of care will be in their best interest.  Continue to focus on energy conservation heart rate and blood pressure monitoring before during and after therapy endurance and consistency of function.      Bowel constipation   and Bladder dysfunction   overactive, neurogenic bladder:  frequent toileting, ambulate to bathroom with assistance, check post void residuals.  Check for C.difficile x1 if >2 loose stools in 24 hours, continue bowel & bladder program.  Monitor for UTI symptoms including lethargy and confusion    Severe back pain and generalized OA pain: reassess pain every shift and prior to and after each therapy session, give prn Tylenol   and consider scheduled Tylenol, modalities prn in therapy, consider Lidoderm, K-pad prn.     Skin healing    breakdown   risk:  continue pressure relief program.  Daily skin exams and reports from nursing.    Severe fatigue due to immobility and nutritional deficits: continue to monitor closely for dehydration   Add vitamin B12 vitamin D and CoQ10 titrate dosing and add protein supplementation with low carb content.    Complex discharge planning:   Discussed with care team-last 24 hour events noted.  I will continue to follow along and reassess functional and medical status as we strive to improve patient's functional and medical outcomes progressing to the most efficient and lowest level of care.       Complex Active General Medical Issues that complicate care:     1. Principal Problem:    COPD exacerbation ([facility])  Active Problems:    Orthostatic dizziness    Spinal stenosis of lumbar region with neurogenic claudication    Underweight    PVD (peripheral vascular disease) ([facility])    COVID-19

## 2025-01-17 NOTE — CARE COORDINATION
MET WITH PATIENT AND WIFE AT BEDSIDE.  PATIENT AND WIFE UPDATED ON DISCHARGE PLAN TO [facility] ON 1/18 PICKUP TIME 1PM.  PATIENT AND WIFE STATE UNDERSTANDING.

## 2025-01-18 VITALS
DIASTOLIC BLOOD PRESSURE: 68 MMHG | HEIGHT: 68 IN | TEMPERATURE: 97.7 F | HEART RATE: 71 BPM | SYSTOLIC BLOOD PRESSURE: 118 MMHG | OXYGEN SATURATION: 100 % | WEIGHT: 145.72 LBS | RESPIRATION RATE: 18 BRPM | BODY MASS INDEX: 22.09 KG/M2

## 2025-01-18 LAB
GLUCOSE BLD-MCNC: 140 MG/DL (ref 70–99)
GLUCOSE BLD-MCNC: 264 MG/DL (ref 70–99)
GLUCOSE BLD-MCNC: 346 MG/DL (ref 70–99)
PERFORMED ON: ABNORMAL

## 2025-01-18 PROCEDURE — 6370000000 HC RX 637 (ALT 250 FOR IP): Performed by: INTERNAL MEDICINE

## 2025-01-18 PROCEDURE — 2500000003 HC RX 250 WO HCPCS: Performed by: INTERNAL MEDICINE

## 2025-01-18 PROCEDURE — 6360000002 HC RX W HCPCS: Performed by: INTERNAL MEDICINE

## 2025-01-18 PROCEDURE — 94640 AIRWAY INHALATION TREATMENT: CPT

## 2025-01-18 PROCEDURE — 6370000000 HC RX 637 (ALT 250 FOR IP): Performed by: STUDENT IN AN ORGANIZED HEALTH CARE EDUCATION/TRAINING PROGRAM

## 2025-01-18 PROCEDURE — 2700000000 HC OXYGEN THERAPY PER DAY

## 2025-01-18 PROCEDURE — 99232 SBSQ HOSP IP/OBS MODERATE 35: CPT | Performed by: INTERNAL MEDICINE

## 2025-01-18 RX ADMIN — METOPROLOL SUCCINATE 50 MG: 50 TABLET, EXTENDED RELEASE ORAL at 09:19

## 2025-01-18 RX ADMIN — GUAIFENESIN SYRUP AND DEXTROMETHORPHAN 5 ML: 100; 10 SYRUP ORAL at 11:53

## 2025-01-18 RX ADMIN — ALBUTEROL SULFATE 2 PUFF: 90 AEROSOL, METERED RESPIRATORY (INHALATION) at 08:40

## 2025-01-18 RX ADMIN — Medication 10 ML: at 09:19

## 2025-01-18 RX ADMIN — GUAIFENESIN SYRUP AND DEXTROMETHORPHAN 5 ML: 100; 10 SYRUP ORAL at 09:19

## 2025-01-18 RX ADMIN — METHYLPREDNISOLONE SODIUM SUCCINATE 40 MG: 40 INJECTION, POWDER, LYOPHILIZED, FOR SOLUTION INTRAMUSCULAR; INTRAVENOUS at 09:19

## 2025-01-18 RX ADMIN — BUDESONIDE AND FORMOTEROL FUMARATE DIHYDRATE 2 PUFF: 160; 4.5 AEROSOL RESPIRATORY (INHALATION) at 08:43

## 2025-01-18 RX ADMIN — TIOTROPIUM BROMIDE INHALATION SPRAY 2 PUFF: 3.12 SPRAY, METERED RESPIRATORY (INHALATION) at 08:41

## 2025-01-18 RX ADMIN — INSULIN LISPRO 3 UNITS: 100 INJECTION, SOLUTION INTRAVENOUS; SUBCUTANEOUS at 11:53

## 2025-01-18 RX ADMIN — TRANEXAMIC ACID 1000 MG: 1 INJECTION, SOLUTION INTRAVENOUS at 08:47

## 2025-01-18 RX ADMIN — MEGESTROL ACETATE 20 MG: 20 TABLET ORAL at 09:19

## 2025-01-18 NOTE — CARE COORDINATION
Received call from  on [facility] to notify that upon calling to [facility], the staff have told them the pt cannot return until current pt in room is picked up between 1:30pm-2pm. Pt has been set up for discharge since 9:51am on 1/17/25 for a 1pm  and Yessica Aragon was notified.   Call to vaelnte Juan left requesting call back.

## 2025-01-18 NOTE — PROGRESS NOTES
Nephrology Progress Note    Assessment:  COVID-+  Failure to thrive  Anemia  OHDX S/P CABGX remote  CKD 3a  DM type-2  COPD  Hx CVA  AAA      Plan:awaiting transfer to [facility]    Patient Active Problem List:     Atrial fibrillation ([facility])     High risk medication use     Atherosclerosis of native coronary artery of native heart without angina pectoris     Hypertension     Ischemic myocardial dysfunction     Nonrheumatic aortic valve disorder, unspecified     Aortic valve disorder     Personal history of nicotine dependence     Presence of aortocoronary bypass graft     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[facility])     Vasovagal syncope     Dizziness and giddiness     Cerebrovascular accident ([facility])     Orthostatic dizziness     Ataxia     Vertigo     Meniere disease, bilateral     Benign paroxysmal positional vertigo due to bilateral vestibular disorder     Coronary angioplasty status     Ataxic gait     Kyphoscoliosis     Spinal stenosis of lumbar region with neurogenic claudication     Abdominal aortic aneurysm (AAA) ([facility])     Acute inferior myocardial infarction ([facility])     Carotid bruit     Chronic obstructive pulmonary disease ([facility])     Diabetes mellitus ([facility])     Dyspnea on exertion     Fatigue     First degree atrioventricular block     Hyperlipidemia     Infection of the inner ear     Muscle pain     Underweight     Ventricular premature beats     Weight loss     PVD (peripheral vascular disease) ([facility])     Impaired mobility and activities of daily living -sp Severe PVD s/p AAA repain     Hypotension     Late effects of CVA (cerebrovascular accident)     MARIAM (acute kidney injury) ([facility])     Body mass index (BMI) of 20 to 24     Chest pain     Esophageal dysphagia     Food impaction of esophagus     Esophageal stenosis     Iron deficiency anemia, unspecified     Anemia, unspecified     Iron deficiency     Abnormal electrocardiography     Adverse effect of atorvastatin     Benign essential hypertension     Laceration of

## 2025-01-18 NOTE — PLAN OF CARE
Problem: Chronic Conditions and Co-morbidities  Goal: Patient's chronic conditions and co-morbidity symptoms are monitored and maintained or improved  Outcome: Progressing  Flowsheets (Taken 1/17/2025 1905)  Care Plan - Patient's Chronic Conditions and Co-Morbidity Symptoms are Monitored and Maintained or Improved: Monitor and assess patient's chronic conditions and comorbid symptoms for stability, deterioration, or improvement     Problem: Discharge Planning  Goal: Discharge to home or other facility with appropriate resources  Outcome: Progressing  Flowsheets (Taken 1/17/2025 1905)  Discharge to home or other facility with appropriate resources: Identify barriers to discharge with patient and caregiver     Problem: Pain  Goal: Verbalizes/displays adequate comfort level or baseline comfort level  Outcome: Progressing     Problem: Safety - Adult  Goal: Free from fall injury  Outcome: Progressing     Problem: ABCDS Injury Assessment  Goal: Absence of physical injury  Outcome: Progressing     Problem: Infection - Adult  Goal: Absence of infection at discharge  Outcome: Progressing  Flowsheets (Taken 1/17/2025 1905)  Absence of infection at discharge: Assess and monitor for signs and symptoms of infection  Goal: Absence of infection during hospitalization  Outcome: Progressing  Flowsheets (Taken 1/17/2025 1905)  Absence of infection during hospitalization: Assess and monitor for signs and symptoms of infection  Goal: Absence of fever/infection during anticipated neutropenic period  Outcome: Progressing  Flowsheets (Taken 1/17/2025 1905)  Absence of fever/infection during anticipated neutropenic period: Monitor white blood cell count     Problem: Metabolic/Fluid and Electrolytes - Adult  Goal: Electrolytes maintained within normal limits  Outcome: Progressing  Flowsheets (Taken 1/17/2025 1905)  Electrolytes maintained within normal limits: Monitor labs and assess patient for signs and symptoms of electrolyte

## 2025-01-18 NOTE — PROGRESS NOTES
Patient assessment and vitals complete and per flowsheets. Patient and wife aware of transfer to snf today. No needs at this time. Report called to John at [facility]. All questions answered, aware of 1500  time.

## 2025-01-18 NOTE — PROGRESS NOTES
INPATIENT PROGRESS NOTES    PATIENT NAME: Ruben Gonsalez  MRN: [id number]  SERVICE DATE:  January 18, 2025   SERVICE TIME:  11:11 AM      PRIMARY SERVICE: Pulmonary Disease    CHIEF COMPLAINTS: COPD exacerbation    INTERVAL HPI: Patient seen and examined at bedside, Interval Notes, orders reviewed. Nursing notes noted    Feeling better, still has some shortness of breath and mild coughing but improved, no chest pain, he is on 2 L O2 saturation 99%    New information updated in the note today, rest of the examination did not change compared to yesterday.    Review of system:     GI Abdominal pain No  Skin Rash No    Social History     Tobacco Use    Smoking status: Former     Types: Cigarettes     Passive exposure: Past    Smokeless tobacco: Never    Tobacco comments:     3 cigarettes/month   Substance Use Topics    Alcohol use: No         Problem Relation Age of Onset    Colon Cancer Neg Hx          OBJECTIVE    Body mass index is 22.16 kg/m².    PHYSICAL EXAM:  Vitals:  /72   Pulse 83   Temp 97.7 °F (36.5 °C) (Oral)   Resp 18   Ht 1.727 m (5' 7.99\")   Wt 66.1 kg (145 lb 11.6 oz)   SpO2 99%   BMI 22.16 kg/m²     General: alert, cooperative, no distress  Head: normocephalic, atraumatic  Eyes:No gross abnormalities.  ENT:  MMM no lesions  Neck:  supple and no masses  Chest improved air movement, minimal wheezing, minimal rales, nontender, tympanic  Heart:: Heart sounds are normal.  Regular rate and rhythm without murmur, gallop or rub.  ABD:  symmetric, soft, non-tender  Musculoskeletal : no cyanosis, no clubbing, and no edema  Neuro:  Grossly normal  Skin: No rashes or nodules noted.  Lymph node:  no cervical nodes  Urology: No Ambriz   Psychiatric: appropriate    DATA:   Recent Labs     01/16/25  0458   WBC 5.7   HGB 7.7*   HCT 24.4*   MCV 87.8        No results for input(s): \"NA\", \"K\", \"CL\", \"CO2\", \"BUN\", \"CREATININE\", \"GLUCOSE\", \"CALCIUM\", \"LABALBU\", \"BILITOT\", \"ALKPHOS\", \"AST\", \"ALT\", \"LABGLOM\",  Solu-Medrol  Monitor blood sugar target 1 40-1 80  Continue cardioprotective medication  O2 to keep sat 90 to 92%  Continue Symbicort and Spiriva  DC TXA        Electronically signed by Patrice Lizarraga MD,  FCCP   on 1/18/2025 at 11:11 AM

## 2025-01-18 NOTE — PROGRESS NOTES
Called [facility]. Patient's pickup has been changed to 3:00pm since [facility] patient is not due to be picked up until between 1:30 and 2:00.

## 2025-01-20 ENCOUNTER — APPOINTMENT (OUTPATIENT)
Dept: CARDIOLOGY | Facility: CLINIC | Age: 85
End: 2025-01-20
Payer: MEDICARE

## 2025-01-20 RX ORDER — LOSARTAN POTASSIUM 50 MG/1
50 TABLET ORAL DAILY
COMMUNITY

## 2025-01-21 ENCOUNTER — OFFICE VISIT (OUTPATIENT)
Dept: GERIATRIC MEDICINE | Age: 85
End: 2025-01-21

## 2025-01-21 DIAGNOSIS — U07.1 COVID-19: ICD-10-CM

## 2025-01-21 DIAGNOSIS — M48.062 SPINAL STENOSIS OF LUMBAR REGION WITH NEUROGENIC CLAUDICATION: ICD-10-CM

## 2025-01-21 DIAGNOSIS — I10 PRIMARY HYPERTENSION: ICD-10-CM

## 2025-01-21 DIAGNOSIS — R26.81 UNSTEADINESS: ICD-10-CM

## 2025-01-21 DIAGNOSIS — I48.20 CHRONIC ATRIAL FIBRILLATION (HCC): Primary | ICD-10-CM

## 2025-01-21 DIAGNOSIS — J44.9 CHRONIC OBSTRUCTIVE PULMONARY DISEASE, UNSPECIFIED COPD TYPE (HCC): ICD-10-CM

## 2025-01-21 NOTE — PROGRESS NOTES
Physical Therapy  Facility/Department: [facility] MED SURG W480/W480-01  Physical Therapy Discharge      NAME: Ruben Gonsalez    : 1940 (84 y.o.)  MRN: [id number]    Account: [id number]  Gender: male      Patient has been discharged from acute care hospital. DC patient from current PT program.      Electronically signed by Zoe Cha PT on 25 at 1:42 PM EST

## 2025-01-23 ENCOUNTER — OFFICE VISIT (OUTPATIENT)
Dept: GERIATRIC MEDICINE | Age: 85
End: 2025-01-23

## 2025-01-23 DIAGNOSIS — D63.1 ANEMIA IN STAGE 3 CHRONIC KIDNEY DISEASE, UNSPECIFIED WHETHER STAGE 3A OR 3B CKD (HCC): ICD-10-CM

## 2025-01-23 DIAGNOSIS — N18.30 ANEMIA IN STAGE 3 CHRONIC KIDNEY DISEASE, UNSPECIFIED WHETHER STAGE 3A OR 3B CKD (HCC): ICD-10-CM

## 2025-01-23 DIAGNOSIS — E08.21 DIABETES MELLITUS DUE TO UNDERLYING CONDITION WITH DIABETIC NEPHROPATHY, UNSPECIFIED WHETHER LONG TERM INSULIN USE (HCC): ICD-10-CM

## 2025-01-30 NOTE — DISCHARGE SUMMARY
Discharge Summary    Patient Identification:  Patient: Ruben Gonsalez  : 1940  MRN: [id number]   Account: [id number]     Admit date: 2025  Discharge date: 2025   Attending provider: No att. providers found        Primary care provider: Adilson Dowd DO     History of Present Illness: Covid+ failure to thrive SOB    Admission Diagnoses:  COPD exacerbation (HCC)     Discharge Diagnoses:   Active Hospital Problems    Diagnosis Date Noted    COVID-19 [U07.1] 2025    Secondary spontaneous pneumothorax [J93.12] 2025    COPD exacerbation (HCC) [J44.1] 2025    PVD (peripheral vascular disease) (HCC) [I73.9] 2022    Underweight [R63.6] 2021    Spinal stenosis of lumbar region with neurogenic claudication [M48.062] 2021    Orthostatic dizziness [R42] 2020       Procedures:   none    Consults:   IP CONSULT TO PULMONOLOGY  IP CONSULT TO THORACIC SURGERY  IP CONSULT TO REHAB/TCU ADMISSION COORDINATOR     Examination: on chart      Significant Diagnostics:   Labs: No results found for this or any previous visit (from the past 72 hour(s)).  Radiology: CT HEAD WO CONTRAST    Result Date: 2025  EXAM: CT Head Without Intravenous Contrast EXAM DATE/TIME: 2025 8:36 pm CLINICAL HISTORY: ORDERING SYSTEM PROVIDED HISTORY: gen weakness, ac use  TECHNOLOGIST PROVIDED HISTORY:  Reason for exam:->gen weakness, ac use  Has a \"code stroke\" or \"stroke alert\" been called?->No  Decision Support Exception - unselect if not a suspected or confirmed emergency medical condition->Emergency Medical Condition (MA)  What reading provider will be dictating this exam?->CRC TECHNIQUE: Axial computed tomography images of the head/brain without intravenous contrast.  This CT exam was performed using one or more of the following dose reduction techniques:  automated exposure control, adjustment of the mA and/or kV according to patient size,

## 2025-02-04 ENCOUNTER — APPOINTMENT (OUTPATIENT)
Dept: CARDIOLOGY | Facility: HOSPITAL | Age: 85
End: 2025-02-04
Payer: MEDICARE

## 2025-02-04 ENCOUNTER — APPOINTMENT (OUTPATIENT)
Dept: RADIOLOGY | Facility: HOSPITAL | Age: 85
End: 2025-02-04
Payer: MEDICARE

## 2025-02-04 ENCOUNTER — HOSPITAL ENCOUNTER (EMERGENCY)
Facility: HOSPITAL | Age: 85
Discharge: HOME | End: 2025-02-04
Attending: EMERGENCY MEDICINE
Payer: MEDICARE

## 2025-02-04 VITALS
HEIGHT: 68 IN | DIASTOLIC BLOOD PRESSURE: 75 MMHG | WEIGHT: 139 LBS | TEMPERATURE: 96.8 F | HEART RATE: 88 BPM | SYSTOLIC BLOOD PRESSURE: 119 MMHG | OXYGEN SATURATION: 97 % | BODY MASS INDEX: 21.07 KG/M2 | RESPIRATION RATE: 18 BRPM

## 2025-02-04 DIAGNOSIS — S51.012A SKIN TEAR OF LEFT ELBOW WITHOUT COMPLICATION, INITIAL ENCOUNTER: ICD-10-CM
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
## 2025-02-04 DIAGNOSIS — S09.90XA CLOSED HEAD INJURY, INITIAL ENCOUNTER: ICD-10-CM

## 2025-02-04 DIAGNOSIS — S51.011A SKIN TEAR OF RIGHT ELBOW WITHOUT COMPLICATION, INITIAL ENCOUNTER: ICD-10-CM

## 2025-02-04 LAB
ALBUMIN SERPL BCP-MCNC: 3.6 G/DL (ref 3.4–5)
ALP SERPL-CCNC: 67 U/L (ref 33–136)
ALT SERPL W P-5'-P-CCNC: 11 U/L (ref 10–52)
ANION GAP SERPL CALC-SCNC: 12 MMOL/L (ref 10–20)
AST SERPL W P-5'-P-CCNC: 14 U/L (ref 9–39)
BASOPHILS # BLD AUTO: 0.07 X10*3/UL (ref 0–0.1)
BASOPHILS NFR BLD AUTO: 0.6 %
BILIRUB SERPL-MCNC: 0.5 MG/DL (ref 0–1.2)
BUN SERPL-MCNC: 26 MG/DL (ref 6–23)
CALCIUM SERPL-MCNC: 8.7 MG/DL (ref 8.6–10.3)
CARDIAC TROPONIN I PNL SERPL HS: 12 NG/L (ref 0–20)
CHLORIDE SERPL-SCNC: 107 MMOL/L (ref 98–107)
CO2 SERPL-SCNC: 22 MMOL/L (ref 21–32)
CREAT SERPL-MCNC: 1.51 MG/DL (ref 0.5–1.3)
EGFRCR SERPLBLD CKD-EPI 2021: 45 ML/MIN/1.73M*2
EOSINOPHIL # BLD AUTO: 0.08 X10*3/UL (ref 0–0.4)
EOSINOPHIL NFR BLD AUTO: 0.6 %
ERYTHROCYTE [DISTWIDTH] IN BLOOD BY AUTOMATED COUNT: 14.8 % (ref 11.5–14.5)
GLUCOSE SERPL-MCNC: 179 MG/DL (ref 74–99)
HCT VFR BLD AUTO: 29.8 % (ref 41–52)
HGB BLD-MCNC: 9 G/DL (ref 13.5–17.5)
HOLD SPECIMEN: NORMAL
HOLD SPECIMEN: NORMAL
IMM GRANULOCYTES # BLD AUTO: 0.07 X10*3/UL (ref 0–0.5)
IMM GRANULOCYTES NFR BLD AUTO: 0.6 % (ref 0–0.9)
INR PPP: 1.6 (ref 0.9–1.1)
LYMPHOCYTES # BLD AUTO: 1.11 X10*3/UL (ref 0.8–3)
LYMPHOCYTES NFR BLD AUTO: 8.9 %
MAGNESIUM SERPL-MCNC: 1.9 MG/DL (ref 1.6–2.4)
MCH RBC QN AUTO: 26.4 PG (ref 26–34)
MCHC RBC AUTO-ENTMCNC: 30.2 G/DL (ref 32–36)
MCV RBC AUTO: 87 FL (ref 80–100)
MONOCYTES # BLD AUTO: 0.89 X10*3/UL (ref 0.05–0.8)
MONOCYTES NFR BLD AUTO: 7.1 %
NEUTROPHILS # BLD AUTO: 10.23 X10*3/UL (ref 1.6–5.5)
NEUTROPHILS NFR BLD AUTO: 82.2 %
NRBC BLD-RTO: 0 /100 WBCS (ref 0–0)
PLATELET # BLD AUTO: 356 X10*3/UL (ref 150–450)
POTASSIUM SERPL-SCNC: 5.1 MMOL/L (ref 3.5–5.3)
PROT SERPL-MCNC: 6.6 G/DL (ref 6.4–8.2)
PROTHROMBIN TIME: 18.2 SECONDS (ref 9.8–12.8)
RBC # BLD AUTO: 3.41 X10*6/UL (ref 4.5–5.9)
SODIUM SERPL-SCNC: 136 MMOL/L (ref 136–145)
WBC # BLD AUTO: 12.5 X10*3/UL (ref 4.4–11.3)

## 2025-02-04 PROCEDURE — 2500000005 HC RX 250 GENERAL PHARMACY W/O HCPCS: Performed by: NURSE PRACTITIONER

## 2025-02-04 PROCEDURE — 36415 COLL VENOUS BLD VENIPUNCTURE: CPT | Performed by: NURSE PRACTITIONER

## 2025-02-04 PROCEDURE — 70450 CT HEAD/BRAIN W/O DYE: CPT

## 2025-02-04 PROCEDURE — 93005 ELECTROCARDIOGRAM TRACING: CPT

## 2025-02-04 PROCEDURE — 84484 ASSAY OF TROPONIN QUANT: CPT | Performed by: NURSE PRACTITIONER

## 2025-02-04 PROCEDURE — 80053 COMPREHEN METABOLIC PANEL: CPT | Performed by: NURSE PRACTITIONER

## 2025-02-04 PROCEDURE — 72125 CT NECK SPINE W/O DYE: CPT

## 2025-02-04 PROCEDURE — 72125 CT NECK SPINE W/O DYE: CPT | Performed by: RADIOLOGY

## 2025-02-04 PROCEDURE — G0390 TRAUMA RESPONS W/HOSP CRITI: HCPCS

## 2025-02-04 PROCEDURE — 85610 PROTHROMBIN TIME: CPT | Performed by: NURSE PRACTITIONER

## 2025-02-04 PROCEDURE — 70450 CT HEAD/BRAIN W/O DYE: CPT | Performed by: RADIOLOGY

## 2025-02-04 PROCEDURE — 83735 ASSAY OF MAGNESIUM: CPT | Performed by: NURSE PRACTITIONER

## 2025-02-04 PROCEDURE — 85025 COMPLETE CBC W/AUTO DIFF WBC: CPT | Performed by: NURSE PRACTITIONER

## 2025-02-04 PROCEDURE — 99285 EMERGENCY DEPT VISIT HI MDM: CPT | Mod: 25 | Performed by: EMERGENCY MEDICINE

## 2025-02-04 RX ORDER — BACITRACIN 500 [USP'U]/G
OINTMENT TOPICAL ONCE
Status: COMPLETED | OUTPATIENT
Start: 2025-02-04 | End: 2025-02-04

## 2025-02-04 RX ADMIN — BACITRACIN: 500 OINTMENT TOPICAL at 20:40

## 2025-02-04 ASSESSMENT — LIFESTYLE VARIABLES
HAVE PEOPLE ANNOYED YOU BY CRITICIZING YOUR DRINKING: NO
EVER HAD A DRINK FIRST THING IN THE MORNING TO STEADY YOUR NERVES TO GET RID OF A HANGOVER: NO
TOTAL SCORE: 0
EVER FELT BAD OR GUILTY ABOUT YOUR DRINKING: NO
HAVE YOU EVER FELT YOU SHOULD CUT DOWN ON YOUR DRINKING: NO

## 2025-02-04 ASSESSMENT — PAIN SCALES - GENERAL: PAINLEVEL_OUTOF10: 0 - NO PAIN

## 2025-02-04 ASSESSMENT — PAIN - FUNCTIONAL ASSESSMENT: PAIN_FUNCTIONAL_ASSESSMENT: 0-10

## 2025-02-04 ASSESSMENT — PAIN DESCRIPTION - PROGRESSION: CLINICAL_PROGRESSION: NOT CHANGED

## 2025-02-05 LAB
ATRIAL RATE: 85 BPM
P AXIS: 71 DEGREES
P OFFSET: 120 MS
P ONSET: 96 MS
PR INTERVAL: 238 MS
Q ONSET: 215 MS
QRS COUNT: 14 BEATS
QRS DURATION: 86 MS
QT INTERVAL: 332 MS
QTC CALCULATION(BAZETT): 401 MS
QTC FREDERICIA: 377 MS
R AXIS: 72 DEGREES
T AXIS: 249 DEGREES
T OFFSET: 381 MS
VENTRICULAR RATE: 88 BPM

## 2025-02-05 NOTE — ED PROVIDER NOTES
HPI   Chief Complaint   Patient presents with    Fall     Hit head, + thinners, pt thinks he lost conciousness       84-year-old male presents emergency department, had a witnessed fall while getting into the shower, nurse was assisting him when he fell sideways, striking his head on the counter.  Nurse denied any loss of consciousness.  States the patient has an unsteady gait and does fall frequently.  He is anticoagulated on Eliquis.      History provided by:  Patient and EMS personnel   used: No            Patient History   Past Medical History:   Diagnosis Date    Other conditions influencing health status     Normal weight    Personal history of other specified conditions 10/30/2021    History of syncope    Underweight     Underweight     Past Surgical History:   Procedure Laterality Date    CT ABDOMEN PELVIS ANGIOGRAM W AND/OR WO IV CONTRAST  11/5/2021    CT ABDOMEN PELVIS ANGIOGRAM W AND/OR WO IV CONTRAST 11/5/2021    CT ANGIO NECK  1/21/2021    CT ANGIO NECK 1/21/2021    IR ANGIOGRAM INTRAVASCULAR US Left 4/7/2022    IR ANGIOGRAM INTRAVASCULAR US 4/7/2022 [facility] CLINICAL LEGACY    OTHER SURGICAL HISTORY  10/30/2021    Appendectomy    OTHER SURGICAL HISTORY  10/30/2021    Cardiac event recorder insertion    OTHER SURGICAL HISTORY  10/30/2021    Cataract surgery    OTHER SURGICAL HISTORY  10/30/2021    Loop electrosurgical excision procedure    OTHER SURGICAL HISTORY  05/04/2022    Aneurysmectomy    OTHER SURGICAL HISTORY  12/02/2022    Abdominal aortic aneurysm repair    OTHER SURGICAL HISTORY  02/11/2022    Coronary artery bypass graft     No family history on file.  Social History     Tobacco Use    Smoking status: Former     Types: Cigarettes    Smokeless tobacco: Never   Substance Use Topics    Alcohol use: Not Currently    Drug use: Not Currently       Physical Exam   ED Triage Vitals   Temp Pulse Resp BP   -- -- -- --      SpO2 Temp src Heart Rate Source Patient Position   -- -- -- --       BP Location FiO2 (%)     -- --       Physical Exam  Gen.: Vitals noted no distress. Afebrile   Head: Normocephalic small hematoma forehead. Pupils PERRL EOMI. TMs clear no hemotympanum.   Neck: C-collar in place  Cardiac: Regular rate rhythm no murmur.   Lungs: Clear to auscultation bilaterally with good aeration and no adventitious breath sounds.   Abdomen: Soft nontender nonsurgical. Normoactive bowel sounds.   Back: No midline or paraspinal tenderness.   Extremities: No edema. Negative Homans bilaterally no cords.   Skin:  skin tears noted bilateral elbows/forearms   Neuro: No focal neurologic deficits. GCS is 15.       ED Course & MDM   Diagnoses as of 02/04/25 2027   Fall, initial encounter   Closed head injury, initial encounter   Skin tear of left elbow without complication, initial encounter   Skin tear of right elbow without complication, initial encounter          Labs Reviewed   CBC WITH AUTO DIFFERENTIAL - Abnormal       Result Value    WBC 12.5 (*)     nRBC 0.0      RBC 3.41 (*)     Hemoglobin 9.0 (*)     Hematocrit 29.8 (*)     MCV 87      MCH 26.4      MCHC 30.2 (*)     RDW 14.8 (*)     Platelets 356      Neutrophils % 82.2      Immature Granulocytes %, Automated 0.6      Lymphocytes % 8.9      Monocytes % 7.1      Eosinophils % 0.6      Basophils % 0.6      Neutrophils Absolute 10.23 (*)     Immature Granulocytes Absolute, Automated 0.07      Lymphocytes Absolute 1.11      Monocytes Absolute 0.89 (*)     Eosinophils Absolute 0.08      Basophils Absolute 0.07     COMPREHENSIVE METABOLIC PANEL - Abnormal    Glucose 179 (*)     Sodium 136      Potassium 5.1      Chloride 107      Bicarbonate 22      Anion Gap 12      Urea Nitrogen 26 (*)     Creatinine 1.51 (*)     eGFR 45 (*)     Calcium 8.7      Albumin 3.6      Alkaline Phosphatase 67      Total Protein 6.6      AST 14      Bilirubin, Total 0.5      ALT 11     PROTIME-INR - Abnormal    Protime 18.2 (*)     INR 1.6 (*)    MAGNESIUM - Normal     Magnesium 1.90     TROPONIN I, HIGH SENSITIVITY - Normal    Troponin I, High Sensitivity 12      Narrative:     Less than 99th percentile of normal range cutoff-  Female and children under 18 years old <14 ng/L; Male <21 ng/L: Negative  Repeat testing should be performed if clinically indicated.     Female and children under 18 years old 14-50 ng/L; Male 21-50 ng/L:  Consistent with possible cardiac damage and possible increased clinical   risk. Serial measurements may help to assess extent of myocardial damage.     >50 ng/L: Consistent with cardiac damage, increased clinical risk and  myocardial infarction. Serial measurements may help assess extent of   myocardial damage.      NOTE: Children less than 1 year old may have higher baseline troponin   levels and results should be interpreted in conjunction with the overall   clinical context.     NOTE: Troponin I testing is performed using a different   testing methodology at [facility] than at other   [facility]. Direct result comparisons should only   be made within the same method.   GRAY TOP        CT head wo IV contrast   Final Result   CT HEAD:   1. No acute intracranial abnormality or calvarial fracture.   2. Mild left frontal scalp swelling and possible small hematoma.             CT CERVICAL SPINE:   1. No acute fracture or traumatic malalignment of the cervical spine.   2. Mild-to-moderate multilevel degenerative changes of the cervical   spine. Posterior disc bulge at C3-C4 which appears to result in at   least mild effacement of the ventral aspect of the cord.        MACRO:   None        Signed by: Tevin Mcguire 2/4/2025 7:42 PM   Dictation workstation:   [id number]      CT cervical spine wo IV contrast   Final Result   CT HEAD:   1. No acute intracranial abnormality or calvarial fracture.   2. Mild left frontal scalp swelling and possible small hematoma.             CT CERVICAL SPINE:   1. No acute fracture or traumatic malalignment  of the cervical spine.   2. Mild-to-moderate multilevel degenerative changes of the cervical   spine. Posterior disc bulge at C3-C4 which appears to result in at   least mild effacement of the ventral aspect of the cord.        MACRO:   None        Signed by: Tevin Mcguire 2/4/2025 7:42 PM   Dictation workstation:   [id number]                 No data recorded                           Medical Decision Making  HIA was called given the patient's anticoagulated status and head injury.    To CT for scan of the head and C-spine.    Basic labs obtained as well to help better identify any reason for the fall, the patient has known gait issues.    White cell count mildly elevated 12.5, although this is improved from recent baseline, hemoglobin 9.  Metabolic panel with slightly elevated creatinine compared to baseline at 1.51.  Otherwise unremarkable.  Troponin 12.    EKG at 1953 with ventricular rate of 88, as interpreted by me, shows a sinus rhythm with normal axis, normal intervals, nonspecific ST and T wave patterns, no evidence of acute ischemia with acute findings.    CT imaging of the head and C-spine unremarkable.    Patient does have skin tears bilateral elbows, were dressed with bacitracin, dry sterile dressings by nursing.    Discussed follow-up with primary care, should be cautious, monitor for any worsening symptoms related to head injury, return with any worsening symptoms or additional concerns.     Procedure  Procedures     KENNETH Gibbs-LORNE  02/04/25 2034

## 2025-02-10 ENCOUNTER — HOSPITAL ENCOUNTER (INPATIENT)
Facility: HOSPITAL | Age: 85
LOS: 3 days | Discharge: NURSING, REHAB OR LONG-TERM CARE | DRG: 091 | End: 2025-02-13
Attending: STUDENT IN AN ORGANIZED HEALTH CARE EDUCATION/TRAINING PROGRAM | Admitting: INTERNAL MEDICINE
Payer: MEDICARE

## 2025-02-10 ENCOUNTER — APPOINTMENT (OUTPATIENT)
Dept: RADIOLOGY | Facility: HOSPITAL | Age: 85
DRG: 091 | End: 2025-02-10
Payer: MEDICARE

## 2025-02-10 DIAGNOSIS — R91.8 LUNG MASS: ICD-10-CM

## 2025-02-10 DIAGNOSIS — D18.1 HYGROMA: ICD-10-CM
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
## 2025-02-10 LAB
ABO GROUP (TYPE) IN BLOOD: NORMAL
ALBUMIN SERPL BCP-MCNC: 3.5 G/DL (ref 3.4–5)
ALP SERPL-CCNC: 66 U/L (ref 33–136)
ALT SERPL W P-5'-P-CCNC: 9 U/L (ref 10–52)
ANION GAP SERPL CALC-SCNC: 16 MMOL/L (ref 10–20)
ANTIBODY SCREEN: NORMAL
AST SERPL W P-5'-P-CCNC: 19 U/L (ref 9–39)
BASOPHILS # BLD AUTO: 0.05 X10*3/UL (ref 0–0.1)
BASOPHILS NFR BLD AUTO: 0.4 %
BILIRUB SERPL-MCNC: 0.6 MG/DL (ref 0–1.2)
BUN SERPL-MCNC: 23 MG/DL (ref 6–23)
CALCIUM SERPL-MCNC: 8.7 MG/DL (ref 8.6–10.3)
CARDIAC TROPONIN I PNL SERPL HS: 10 NG/L (ref 0–20)
CARDIAC TROPONIN I PNL SERPL HS: 9 NG/L (ref 0–20)
CHLORIDE SERPL-SCNC: 106 MMOL/L (ref 98–107)
CK SERPL-CCNC: 37 U/L (ref 0–325)
CO2 SERPL-SCNC: 18 MMOL/L (ref 21–32)
CREAT SERPL-MCNC: 1.53 MG/DL (ref 0.5–1.3)
EGFRCR SERPLBLD CKD-EPI 2021: 44 ML/MIN/1.73M*2
EOSINOPHIL # BLD AUTO: 0.04 X10*3/UL (ref 0–0.4)
EOSINOPHIL NFR BLD AUTO: 0.3 %
ERYTHROCYTE [DISTWIDTH] IN BLOOD BY AUTOMATED COUNT: 15.3 % (ref 11.5–14.5)
ETHANOL SERPL-MCNC: <10 MG/DL
FLUAV RNA RESP QL NAA+PROBE: NOT DETECTED
FLUBV RNA RESP QL NAA+PROBE: NOT DETECTED
GLUCOSE SERPL-MCNC: 147 MG/DL (ref 74–99)
HCT VFR BLD AUTO: 30.9 % (ref 41–52)
HGB BLD-MCNC: 9.3 G/DL (ref 13.5–17.5)
HOLD SPECIMEN: NORMAL
IMM GRANULOCYTES # BLD AUTO: 0.1 X10*3/UL (ref 0–0.5)
IMM GRANULOCYTES NFR BLD AUTO: 0.8 % (ref 0–0.9)
INR PPP: 2.9 (ref 0.9–1.1)
LACTATE SERPL-SCNC: 3.5 MMOL/L (ref 0.4–2)
LACTATE SERPL-SCNC: 4 MMOL/L (ref 0.4–2)
LYMPHOCYTES # BLD AUTO: 0.76 X10*3/UL (ref 0.8–3)
LYMPHOCYTES NFR BLD AUTO: 6.3 %
MCH RBC QN AUTO: 26.6 PG (ref 26–34)
MCHC RBC AUTO-ENTMCNC: 30.1 G/DL (ref 32–36)
MCV RBC AUTO: 89 FL (ref 80–100)
MONOCYTES # BLD AUTO: 1.06 X10*3/UL (ref 0.05–0.8)
MONOCYTES NFR BLD AUTO: 8.8 %
NEUTROPHILS # BLD AUTO: 10.03 X10*3/UL (ref 1.6–5.5)
NEUTROPHILS NFR BLD AUTO: 83.4 %
NRBC BLD-RTO: 0 /100 WBCS (ref 0–0)
PLATELET # BLD AUTO: 398 X10*3/UL (ref 150–450)
POTASSIUM SERPL-SCNC: 5.1 MMOL/L (ref 3.5–5.3)
PROT SERPL-MCNC: 6.7 G/DL (ref 6.4–8.2)
PROTHROMBIN TIME: 33.6 SECONDS (ref 9.8–12.8)
RBC # BLD AUTO: 3.49 X10*6/UL (ref 4.5–5.9)
RH FACTOR (ANTIGEN D): NORMAL
RSV RNA RESP QL NAA+PROBE: NOT DETECTED
SARS-COV-2 RNA RESP QL NAA+PROBE: DETECTED
SODIUM SERPL-SCNC: 135 MMOL/L (ref 136–145)
WBC # BLD AUTO: 12 X10*3/UL (ref 4.4–11.3)

## 2025-02-10 PROCEDURE — 71260 CT THORAX DX C+: CPT | Performed by: RADIOLOGY

## 2025-02-10 PROCEDURE — 73590 X-RAY EXAM OF LOWER LEG: CPT | Mod: RT

## 2025-02-10 PROCEDURE — 82550 ASSAY OF CK (CPK): CPT | Performed by: STUDENT IN AN ORGANIZED HEALTH CARE EDUCATION/TRAINING PROGRAM

## 2025-02-10 PROCEDURE — 2550000001 HC RX 255 CONTRASTS: Performed by: STUDENT IN AN ORGANIZED HEALTH CARE EDUCATION/TRAINING PROGRAM

## 2025-02-10 PROCEDURE — 70450 CT HEAD/BRAIN W/O DYE: CPT | Performed by: RADIOLOGY

## 2025-02-10 PROCEDURE — 86901 BLOOD TYPING SEROLOGIC RH(D): CPT | Performed by: STUDENT IN AN ORGANIZED HEALTH CARE EDUCATION/TRAINING PROGRAM

## 2025-02-10 PROCEDURE — 72125 CT NECK SPINE W/O DYE: CPT | Performed by: RADIOLOGY

## 2025-02-10 PROCEDURE — 2500000004 HC RX 250 GENERAL PHARMACY W/ HCPCS (ALT 636 FOR OP/ED): Performed by: STUDENT IN AN ORGANIZED HEALTH CARE EDUCATION/TRAINING PROGRAM

## 2025-02-10 PROCEDURE — 83605 ASSAY OF LACTIC ACID: CPT | Performed by: STUDENT IN AN ORGANIZED HEALTH CARE EDUCATION/TRAINING PROGRAM

## 2025-02-10 PROCEDURE — 74177 CT ABD & PELVIS W/CONTRAST: CPT | Performed by: RADIOLOGY

## 2025-02-10 PROCEDURE — 82077 ASSAY SPEC XCP UR&BREATH IA: CPT | Performed by: STUDENT IN AN ORGANIZED HEALTH CARE EDUCATION/TRAINING PROGRAM

## 2025-02-10 PROCEDURE — 80053 COMPREHEN METABOLIC PANEL: CPT | Performed by: STUDENT IN AN ORGANIZED HEALTH CARE EDUCATION/TRAINING PROGRAM

## 2025-02-10 PROCEDURE — 36415 COLL VENOUS BLD VENIPUNCTURE: CPT | Performed by: STUDENT IN AN ORGANIZED HEALTH CARE EDUCATION/TRAINING PROGRAM

## 2025-02-10 PROCEDURE — 2500000005 HC RX 250 GENERAL PHARMACY W/O HCPCS: Performed by: STUDENT IN AN ORGANIZED HEALTH CARE EDUCATION/TRAINING PROGRAM

## 2025-02-10 PROCEDURE — 1200000002 HC GENERAL ROOM WITH TELEMETRY DAILY

## 2025-02-10 PROCEDURE — 84484 ASSAY OF TROPONIN QUANT: CPT | Performed by: STUDENT IN AN ORGANIZED HEALTH CARE EDUCATION/TRAINING PROGRAM

## 2025-02-10 PROCEDURE — 72125 CT NECK SPINE W/O DYE: CPT

## 2025-02-10 PROCEDURE — G0390 TRAUMA RESPONS W/HOSP CRITI: HCPCS

## 2025-02-10 PROCEDURE — 90715 TDAP VACCINE 7 YRS/> IM: CPT | Performed by: STUDENT IN AN ORGANIZED HEALTH CARE EDUCATION/TRAINING PROGRAM

## 2025-02-10 PROCEDURE — 85610 PROTHROMBIN TIME: CPT | Performed by: STUDENT IN AN ORGANIZED HEALTH CARE EDUCATION/TRAINING PROGRAM

## 2025-02-10 PROCEDURE — 87637 SARSCOV2&INF A&B&RSV AMP PRB: CPT | Performed by: STUDENT IN AN ORGANIZED HEALTH CARE EDUCATION/TRAINING PROGRAM

## 2025-02-10 PROCEDURE — 74177 CT ABD & PELVIS W/CONTRAST: CPT

## 2025-02-10 PROCEDURE — 90471 IMMUNIZATION ADMIN: CPT | Performed by: STUDENT IN AN ORGANIZED HEALTH CARE EDUCATION/TRAINING PROGRAM

## 2025-02-10 PROCEDURE — 99223 1ST HOSP IP/OBS HIGH 75: CPT | Performed by: INTERNAL MEDICINE

## 2025-02-10 PROCEDURE — 73590 X-RAY EXAM OF LOWER LEG: CPT | Mod: RIGHT SIDE | Performed by: RADIOLOGY

## 2025-02-10 PROCEDURE — 85025 COMPLETE CBC W/AUTO DIFF WBC: CPT | Performed by: STUDENT IN AN ORGANIZED HEALTH CARE EDUCATION/TRAINING PROGRAM

## 2025-02-10 PROCEDURE — 70450 CT HEAD/BRAIN W/O DYE: CPT

## 2025-02-10 PROCEDURE — 71045 X-RAY EXAM CHEST 1 VIEW: CPT | Performed by: RADIOLOGY

## 2025-02-10 PROCEDURE — 71045 X-RAY EXAM CHEST 1 VIEW: CPT

## 2025-02-10 PROCEDURE — 99285 EMERGENCY DEPT VISIT HI MDM: CPT | Mod: 25 | Performed by: STUDENT IN AN ORGANIZED HEALTH CARE EDUCATION/TRAINING PROGRAM

## 2025-02-10 RX ORDER — DEXTROSE 50 % IN WATER (D50W) INTRAVENOUS SYRINGE
25
Status: DISCONTINUED | OUTPATIENT
Start: 2025-02-10 | End: 2025-02-13 | Stop reason: HOSPADM

## 2025-02-10 RX ORDER — POLYETHYLENE GLYCOL 3350 17 G/17G
17 POWDER, FOR SOLUTION ORAL DAILY
Status: DISCONTINUED | OUTPATIENT
Start: 2025-02-11 | End: 2025-02-13 | Stop reason: HOSPADM

## 2025-02-10 RX ORDER — PITAVASTATIN CALCIUM 4.18 MG/1
1 TABLET, FILM COATED ORAL NIGHTLY
Status: DISCONTINUED | OUTPATIENT
Start: 2025-02-10 | End: 2025-02-12

## 2025-02-10 RX ORDER — INSULIN LISPRO 100 [IU]/ML
0-10 INJECTION, SOLUTION INTRAVENOUS; SUBCUTANEOUS
Status: DISCONTINUED | OUTPATIENT
Start: 2025-02-11 | End: 2025-02-13 | Stop reason: HOSPADM

## 2025-02-10 RX ORDER — ACETAMINOPHEN 160 MG/5ML
650 SOLUTION ORAL EVERY 4 HOURS PRN
Status: DISCONTINUED | OUTPATIENT
Start: 2025-02-10 | End: 2025-02-13 | Stop reason: HOSPADM

## 2025-02-10 RX ORDER — ACETAMINOPHEN 650 MG/1
650 SUPPOSITORY RECTAL EVERY 4 HOURS PRN
Status: DISCONTINUED | OUTPATIENT
Start: 2025-02-10 | End: 2025-02-13 | Stop reason: HOSPADM

## 2025-02-10 RX ORDER — METOPROLOL SUCCINATE 50 MG/1
50 TABLET, EXTENDED RELEASE ORAL DAILY
Status: DISCONTINUED | OUTPATIENT
Start: 2025-02-11 | End: 2025-02-13 | Stop reason: HOSPADM

## 2025-02-10 RX ORDER — DEXTROSE 50 % IN WATER (D50W) INTRAVENOUS SYRINGE
12.5
Status: DISCONTINUED | OUTPATIENT
Start: 2025-02-10 | End: 2025-02-13 | Stop reason: HOSPADM

## 2025-02-10 RX ORDER — CEFTRIAXONE 1 G/50ML
1 INJECTION, SOLUTION INTRAVENOUS EVERY 24 HOURS
Status: DISCONTINUED | OUTPATIENT
Start: 2025-02-10 | End: 2025-02-13 | Stop reason: HOSPADM

## 2025-02-10 RX ORDER — ACETAMINOPHEN 325 MG/1
650 TABLET ORAL EVERY 4 HOURS PRN
Status: DISCONTINUED | OUTPATIENT
Start: 2025-02-10 | End: 2025-02-13 | Stop reason: HOSPADM

## 2025-02-10 RX ORDER — BACITRACIN 500 [USP'U]/G
OINTMENT TOPICAL ONCE
Status: COMPLETED | OUTPATIENT
Start: 2025-02-10 | End: 2025-02-10

## 2025-02-10 RX ORDER — VALSARTAN 80 MG/1
80 TABLET ORAL DAILY
Status: DISCONTINUED | OUTPATIENT
Start: 2025-02-11 | End: 2025-02-13 | Stop reason: HOSPADM

## 2025-02-10 RX ADMIN — IOHEXOL 100 ML: 350 INJECTION, SOLUTION INTRAVENOUS at 13:23

## 2025-02-10 RX ADMIN — SODIUM CHLORIDE 1000 ML: 9 INJECTION, SOLUTION INTRAVENOUS at 15:18

## 2025-02-10 RX ADMIN — BACITRACIN: 500 OINTMENT TOPICAL at 15:18

## 2025-02-10 RX ADMIN — TETANUS TOXOID, REDUCED DIPHTHERIA TOXOID AND ACELLULAR PERTUSSIS VACCINE, ADSORBED 0.5 ML: 5; 2.5; 8; 8; 2.5 SUSPENSION INTRAMUSCULAR at 15:18

## 2025-02-10 SDOH — HEALTH STABILITY: PHYSICAL HEALTH
HOW OFTEN DO YOU NEED TO HAVE SOMEONE HELP YOU WHEN YOU READ INSTRUCTIONS, PAMPHLETS, OR OTHER WRITTEN MATERIAL FROM YOUR DOCTOR OR PHARMACY?: NEVER

## 2025-02-10 SDOH — HEALTH STABILITY: PHYSICAL HEALTH: ON AVERAGE, HOW MANY DAYS PER WEEK DO YOU ENGAGE IN MODERATE TO STRENUOUS EXERCISE (LIKE A BRISK WALK)?: 0 DAYS

## 2025-02-10 SDOH — HEALTH STABILITY: MENTAL HEALTH: HOW OFTEN DO YOU HAVE SIX OR MORE DRINKS ON ONE OCCASION?: NEVER

## 2025-02-10 SDOH — HEALTH STABILITY: MENTAL HEALTH
DO YOU FEEL STRESS - TENSE, RESTLESS, NERVOUS, OR ANXIOUS, OR UNABLE TO SLEEP AT NIGHT BECAUSE YOUR MIND IS TROUBLED ALL THE TIME - THESE DAYS?: NOT AT ALL

## 2025-02-10 SDOH — ECONOMIC STABILITY: FOOD INSECURITY: HOW HARD IS IT FOR YOU TO PAY FOR THE VERY BASICS LIKE FOOD, HOUSING, MEDICAL CARE, AND HEATING?: NOT HARD AT ALL

## 2025-02-10 SDOH — SOCIAL STABILITY: SOCIAL INSECURITY: ARE YOU MARRIED, WIDOWED, DIVORCED, SEPARATED, NEVER MARRIED, OR LIVING WITH A PARTNER?: MARRIED

## 2025-02-10 SDOH — SOCIAL STABILITY: SOCIAL INSECURITY: ARE THERE ANY APPARENT SIGNS OF INJURIES/BEHAVIORS THAT COULD BE RELATED TO ABUSE/NEGLECT?: NO

## 2025-02-10 SDOH — SOCIAL STABILITY: SOCIAL NETWORK: HOW OFTEN DO YOU ATTEND MEETINGS OF THE CLUBS OR ORGANIZATIONS YOU BELONG TO?: NEVER

## 2025-02-10 SDOH — ECONOMIC STABILITY: HOUSING INSECURITY: IN THE LAST 12 MONTHS, WAS THERE A TIME WHEN YOU WERE NOT ABLE TO PAY THE MORTGAGE OR RENT ON TIME?: NO

## 2025-02-10 SDOH — SOCIAL STABILITY: SOCIAL INSECURITY
WITHIN THE LAST YEAR, HAVE YOU BEEN KICKED, HIT, SLAPPED, OR OTHERWISE PHYSICALLY HURT BY YOUR PARTNER OR EX-PARTNER?: NO

## 2025-02-10 SDOH — HEALTH STABILITY: MENTAL HEALTH: HOW MANY DRINKS CONTAINING ALCOHOL DO YOU HAVE ON A TYPICAL DAY WHEN YOU ARE DRINKING?: PATIENT DOES NOT DRINK

## 2025-02-10 SDOH — ECONOMIC STABILITY: FOOD INSECURITY: WITHIN THE PAST 12 MONTHS, YOU WORRIED THAT YOUR FOOD WOULD RUN OUT BEFORE YOU GOT THE MONEY TO BUY MORE.: NEVER TRUE

## 2025-02-10 SDOH — ECONOMIC STABILITY: INCOME INSECURITY: IN THE PAST 12 MONTHS HAS THE ELECTRIC, GAS, OIL, OR WATER COMPANY THREATENED TO SHUT OFF SERVICES IN YOUR HOME?: NO

## 2025-02-10 SDOH — SOCIAL STABILITY: SOCIAL NETWORK: IN A TYPICAL WEEK, HOW MANY TIMES DO YOU TALK ON THE PHONE WITH FAMILY, FRIENDS, OR NEIGHBORS?: THREE TIMES A WEEK

## 2025-02-10 SDOH — SOCIAL STABILITY: SOCIAL INSECURITY: ARE YOU OR HAVE YOU BEEN THREATENED OR ABUSED PHYSICALLY, EMOTIONALLY, OR SEXUALLY BY ANYONE?: NO

## 2025-02-10 SDOH — HEALTH STABILITY: PHYSICAL HEALTH: ON AVERAGE, HOW MANY MINUTES DO YOU ENGAGE IN EXERCISE AT THIS LEVEL?: 0 MIN

## 2025-02-10 SDOH — HEALTH STABILITY: MENTAL HEALTH: HOW OFTEN DO YOU HAVE A DRINK CONTAINING ALCOHOL?: NEVER

## 2025-02-10 SDOH — ECONOMIC STABILITY: TRANSPORTATION INSECURITY: IN THE PAST 12 MONTHS, HAS LACK OF TRANSPORTATION KEPT YOU FROM MEDICAL APPOINTMENTS OR FROM GETTING MEDICATIONS?: NO

## 2025-02-10 SDOH — SOCIAL STABILITY: SOCIAL INSECURITY: WITHIN THE LAST YEAR, HAVE YOU BEEN AFRAID OF YOUR PARTNER OR EX-PARTNER?: NO

## 2025-02-10 SDOH — SOCIAL STABILITY: SOCIAL NETWORK: HOW OFTEN DO YOU GET TOGETHER WITH FRIENDS OR RELATIVES?: THREE TIMES A WEEK

## 2025-02-10 SDOH — SOCIAL STABILITY: SOCIAL INSECURITY: HAVE YOU HAD THOUGHTS OF HARMING ANYONE ELSE?: NO

## 2025-02-10 SDOH — SOCIAL STABILITY: SOCIAL INSECURITY
WITHIN THE LAST YEAR, HAVE YOU BEEN RAPED OR FORCED TO HAVE ANY KIND OF SEXUAL ACTIVITY BY YOUR PARTNER OR EX-PARTNER?: NO

## 2025-02-10 SDOH — SOCIAL STABILITY: SOCIAL INSECURITY: WITHIN THE LAST YEAR, HAVE YOU BEEN HUMILIATED OR EMOTIONALLY ABUSED IN OTHER WAYS BY YOUR PARTNER OR EX-PARTNER?: NO

## 2025-02-10 SDOH — ECONOMIC STABILITY: FOOD INSECURITY: WITHIN THE PAST 12 MONTHS, THE FOOD YOU BOUGHT JUST DIDN'T LAST AND YOU DIDN'T HAVE MONEY TO GET MORE.: NEVER TRUE

## 2025-02-10 SDOH — ECONOMIC STABILITY: HOUSING INSECURITY: AT ANY TIME IN THE PAST 12 MONTHS, WERE YOU HOMELESS OR LIVING IN A SHELTER (INCLUDING NOW)?: NO

## 2025-02-10 SDOH — SOCIAL STABILITY: SOCIAL INSECURITY: DO YOU FEEL ANYONE HAS EXPLOITED OR TAKEN ADVANTAGE OF YOU FINANCIALLY OR OF YOUR PERSONAL PROPERTY?: NO

## 2025-02-10 SDOH — SOCIAL STABILITY: SOCIAL INSECURITY: DOES ANYONE TRY TO KEEP YOU FROM HAVING/CONTACTING OTHER FRIENDS OR DOING THINGS OUTSIDE YOUR HOME?: NO

## 2025-02-10 SDOH — SOCIAL STABILITY: SOCIAL NETWORK
DO YOU BELONG TO ANY CLUBS OR ORGANIZATIONS SUCH AS CHURCH GROUPS, UNIONS, FRATERNAL OR ATHLETIC GROUPS, OR SCHOOL GROUPS?: NO

## 2025-02-10 SDOH — SOCIAL STABILITY: SOCIAL INSECURITY: WERE YOU ABLE TO COMPLETE ALL THE BEHAVIORAL HEALTH SCREENINGS?: YES

## 2025-02-10 SDOH — ECONOMIC STABILITY: HOUSING INSECURITY: IN THE PAST 12 MONTHS, HOW MANY TIMES HAVE YOU MOVED WHERE YOU WERE LIVING?: 0

## 2025-02-10 SDOH — SOCIAL STABILITY: SOCIAL INSECURITY: HAVE YOU HAD ANY THOUGHTS OF HARMING ANYONE ELSE?: NO

## 2025-02-10 SDOH — SOCIAL STABILITY: SOCIAL INSECURITY: HAS ANYONE EVER THREATENED TO HURT YOUR FAMILY OR YOUR PETS?: NO

## 2025-02-10 SDOH — SOCIAL STABILITY: SOCIAL INSECURITY: ABUSE: ADULT

## 2025-02-10 SDOH — SOCIAL STABILITY: SOCIAL INSECURITY: DO YOU FEEL UNSAFE GOING BACK TO THE PLACE WHERE YOU ARE LIVING?: NO

## 2025-02-10 SDOH — SOCIAL STABILITY: SOCIAL NETWORK: HOW OFTEN DO YOU ATTEND CHURCH OR RELIGIOUS SERVICES?: 1 TO 4 TIMES PER YEAR

## 2025-02-10 ASSESSMENT — COGNITIVE AND FUNCTIONAL STATUS - GENERAL
HELP NEEDED FOR BATHING: A LITTLE
MOBILITY SCORE: 20
TOILETING: A LITTLE
PERSONAL GROOMING: A LITTLE
DRESSING REGULAR LOWER BODY CLOTHING: A LITTLE
STANDING UP FROM CHAIR USING ARMS: A LITTLE
CLIMB 3 TO 5 STEPS WITH RAILING: A LITTLE
DRESSING REGULAR UPPER BODY CLOTHING: A LITTLE
DAILY ACTIVITIY SCORE: 19
PATIENT BASELINE BEDBOUND: NO
MOVING TO AND FROM BED TO CHAIR: A LITTLE
WALKING IN HOSPITAL ROOM: A LITTLE

## 2025-02-10 ASSESSMENT — ACTIVITIES OF DAILY LIVING (ADL)
JUDGMENT_ADEQUATE_SAFELY_COMPLETE_DAILY_ACTIVITIES: YES
HEARING - LEFT EAR: DIFFICULTY WITH NOISE
FEEDING YOURSELF: INDEPENDENT
GROOMING: NEEDS ASSISTANCE
PATIENT'S MEMORY ADEQUATE TO SAFELY COMPLETE DAILY ACTIVITIES?: YES
TOILETING: NEEDS ASSISTANCE
LACK_OF_TRANSPORTATION: NO
DRESSING YOURSELF: NEEDS ASSISTANCE
LACK_OF_TRANSPORTATION: NO
ADEQUATE_TO_COMPLETE_ADL: YES
ASSISTIVE_DEVICE: WALKER
WALKS IN HOME: NEEDS ASSISTANCE
HEARING - RIGHT EAR: DIFFICULTY WITH NOISE
BATHING: NEEDS ASSISTANCE

## 2025-02-10 ASSESSMENT — PAIN SCALES - GENERAL
PAINLEVEL_OUTOF10: 3
PAINLEVEL_OUTOF10: 0 - NO PAIN

## 2025-02-10 ASSESSMENT — LIFESTYLE VARIABLES
EVER FELT BAD OR GUILTY ABOUT YOUR DRINKING: NO
EVER HAD A DRINK FIRST THING IN THE MORNING TO STEADY YOUR NERVES TO GET RID OF A HANGOVER: NO
HOW MANY STANDARD DRINKS CONTAINING ALCOHOL DO YOU HAVE ON A TYPICAL DAY: PATIENT DOES NOT DRINK
SKIP TO QUESTIONS 9-10: 1
AUDIT-C TOTAL SCORE: 0
HAVE PEOPLE ANNOYED YOU BY CRITICIZING YOUR DRINKING: NO
AUDIT-C TOTAL SCORE: 0
HOW OFTEN DO YOU HAVE A DRINK CONTAINING ALCOHOL: NEVER
HAVE YOU EVER FELT YOU SHOULD CUT DOWN ON YOUR DRINKING: NO
AUDIT-C TOTAL SCORE: 0
TOTAL SCORE: 0
HOW OFTEN DO YOU HAVE 6 OR MORE DRINKS ON ONE OCCASION: NEVER
SKIP TO QUESTIONS 9-10: 1

## 2025-02-10 ASSESSMENT — COLUMBIA-SUICIDE SEVERITY RATING SCALE - C-SSRS
6. HAVE YOU EVER DONE ANYTHING, STARTED TO DO ANYTHING, OR PREPARED TO DO ANYTHING TO END YOUR LIFE?: NO
2. HAVE YOU ACTUALLY HAD ANY THOUGHTS OF KILLING YOURSELF?: NO
2. HAVE YOU ACTUALLY HAD ANY THOUGHTS OF KILLING YOURSELF?: NO
6. HAVE YOU EVER DONE ANYTHING, STARTED TO DO ANYTHING, OR PREPARED TO DO ANYTHING TO END YOUR LIFE?: NO
1. IN THE PAST MONTH, HAVE YOU WISHED YOU WERE DEAD OR WISHED YOU COULD GO TO SLEEP AND NOT WAKE UP?: NO
1. IN THE PAST MONTH, HAVE YOU WISHED YOU WERE DEAD OR WISHED YOU COULD GO TO SLEEP AND NOT WAKE UP?: NO

## 2025-02-10 ASSESSMENT — PATIENT HEALTH QUESTIONNAIRE - PHQ9
1. LITTLE INTEREST OR PLEASURE IN DOING THINGS: NOT AT ALL
SUM OF ALL RESPONSES TO PHQ9 QUESTIONS 1 & 2: 0
2. FEELING DOWN, DEPRESSED OR HOPELESS: NOT AT ALL

## 2025-02-10 ASSESSMENT — PAIN DESCRIPTION - LOCATION: LOCATION: RIB CAGE

## 2025-02-10 ASSESSMENT — PAIN - FUNCTIONAL ASSESSMENT
PAIN_FUNCTIONAL_ASSESSMENT: 0-10

## 2025-02-10 ASSESSMENT — PAIN DESCRIPTION - FREQUENCY: FREQUENCY: INTERMITTENT

## 2025-02-10 ASSESSMENT — PAIN DESCRIPTION - PAIN TYPE: TYPE: ACUTE PAIN

## 2025-02-10 ASSESSMENT — PAIN DESCRIPTION - ONSET: ONSET: ONGOING

## 2025-02-10 NOTE — H&P
History Of Present Illness  Jaquan Gambino is a 84 y.o. male with history of type 2 diabetes, hyperlipidemia, hypertension, CKD, history of bypass graft, coronary artery disease, COPD on 2 L at baseline, A-fib on Eliquis who presents for a fall.  The patient states he was at the nursing facility and he had to go to the bathroom but he could not find any help and he attempted to get up and he fell.  The patient hit his forehead as well.  Patient has had recurrent falls.  During the time of interview the patient denies any specific complaints he denies any dizziness lightheadedness headache chest pain nausea vomiting or shortness of breath.  At the moment he is alert and oriented x 3.  Per reports the patient had altered mental status.  The patient was alert and oriented x 3 for EMS as well.  While in ED troponins came back negative EKG not consistent with ACS.  Patient did have lactic acidosis with lactate of 4.0.  The patient also came back positive for COVID. CT C-spine was negative for acute findings. CT head showed a increase in the CSF extra-axial space compared to his previous CT head.  These findings were discussed by ER with neurosurgery at [facility] and they did not feel the patient required transfer.  They recommended a CT scan in the morning and monitor neurologic status for the patient under observation overnight and if there is any change reach out to neurosurgery for further recommendations. CT chest and pelvis showed multiple masslike areas in the lung on the left. There was a questionable endoleak of his graft and lack of perfusion in the left kidney lower pole this was also discussed by the ER physician with vascular surgery and they did not feel there was any emergent intervention needed and recommended to follow with vascular surgery in outpatient settings.  The patient is being admitted for further care and management     Past Medical History  Past Medical History:   Diagnosis Date    Diabetes mellitus  (Multi)     Other conditions influencing health status     Normal weight    Personal history of other specified conditions 10/30/2021    History of syncope    Underweight     Underweight       Surgical History  Past Surgical History:   Procedure Laterality Date    CT ABDOMEN PELVIS ANGIOGRAM W AND/OR WO IV CONTRAST  11/5/2021    CT ABDOMEN PELVIS ANGIOGRAM W AND/OR WO IV CONTRAST 11/5/2021    CT ANGIO NECK  1/21/2021    CT ANGIO NECK 1/21/2021    IR ANGIOGRAM INTRAVASCULAR US Left 4/7/2022    IR ANGIOGRAM INTRAVASCULAR US 4/7/2022 [facility] CLINICAL LEGACY    OTHER SURGICAL HISTORY  10/30/2021    Appendectomy    OTHER SURGICAL HISTORY  10/30/2021    Cardiac event recorder insertion    OTHER SURGICAL HISTORY  10/30/2021    Cataract surgery    OTHER SURGICAL HISTORY  10/30/2021    Loop electrosurgical excision procedure    OTHER SURGICAL HISTORY  05/04/2022    Aneurysmectomy    OTHER SURGICAL HISTORY  12/02/2022    Abdominal aortic aneurysm repair    OTHER SURGICAL HISTORY  02/11/2022    Coronary artery bypass graft        Social History  He reports that he has quit smoking. His smoking use included cigarettes. He has never used smokeless tobacco. He reports that he does not currently use alcohol. He reports that he does not currently use drugs.    Family History  No family history on file.     Allergies  Atorvastatin, Fenofibrate, Fluvastatin, Pravastatin, Simvastatin, and Vitamin e (d-alpha tocopherol)    Review of Systems   Unable to perform ROS: Age   Skin: Negative.    All other systems reviewed and are negative.       Physical Exam  HENT:      Head: Normocephalic and atraumatic.      Nose: Nose normal.      Mouth/Throat:      Mouth: Mucous membranes are dry.   Eyes:      Extraocular Movements: Extraocular movements intact.      Conjunctiva/sclera: Conjunctivae normal.   Cardiovascular:      Rate and Rhythm: Normal rate and regular rhythm.      Pulses: Normal pulses.      Heart sounds: Normal heart sounds.  "  Pulmonary:      Effort: Pulmonary effort is normal.      Breath sounds: Normal breath sounds.   Abdominal:      General: Bowel sounds are normal.      Palpations: Abdomen is soft.   Musculoskeletal:         General: Normal range of motion.      Cervical back: Normal range of motion and neck supple.   Skin:     General: Skin is warm and dry.      Coloration: Skin is pale.      Findings: Bruising present.   Neurological:      Mental Status: He is alert and oriented to person, place, and time. Mental status is at baseline.   Psychiatric:         Mood and Affect: Mood normal.          Last Recorded Vitals  Blood pressure 124/58, pulse 90, temperature 36 °C (96.8 °F), temperature source Temporal, resp. rate 18, height 1.727 m (5' 8\"), weight 67.6 kg (149 lb), SpO2 97%.    Relevant Results        Jaquan Gambino is a 84 y.o. male with history of type 2 diabetes, hyperlipidemia, hypertension, CKD, history of bypass graft, coronary artery disease, COPD on 2 L at baseline, A-fib on Eliquis who presents for a fall.       Assessment/Plan   Assessment & Plan  Fall, initial encounter      Multiple falls    Concerns for hygroma with increase in the CSF extra axial space  -CT head showed a increase in the CSF extra-axial space compared to his previous CT head.  These findings were discussed by ER with neurosurgery at [facility] and they did not feel the patient required transfer.  They recommended a CT scan in the morning and monitor neurologic status for the patient under observation overnight and if there is any change reach out to neurosurgery for further recommendations.   -Repeat CT head for AM is ordered.  -Cont with neuro checks.     UTI  -Was started with ciprofloxacin outpatient settings  -UA concerning for UTI will continue with ceftriaxone follow urine cultures    Lactic acidosis  -Lactic acid on admission was 4.0, improving IV fluid rehydration.    Positive for COVID-19 incidental finding  COPD not in " exacerbation  -Baseline oxygen requirement of 2 L incidental finding monitor for any worsening.  Currently keep the patient off of steroids and continue with home inhalers    Concerns for masses in the lung  -CT chest and pelvis showed multiple masslike areas in the lung on the left.   -Will consult pulmonology for recommendations, patient will need outpatient follow-up as well.    Concerns for endoleak of the graft  - CT chest and pelvis showed multiple masslike areas in the lung on the left. There was a questionable endoleak of his graft and lack of perfusion in the left kidney lower pole this was also discussed by the ER physician with vascular surgery and they did not feel there was any emergent intervention needed and recommended to follow with vascular surgery in outpatient settings.     Diabetes mellitus type 2  -Sliding scale insulin ACHS Accu-Cheks  -Just insulin based upon home insulin requirements.    Atrial fibrillation  -Patient is normally on Eliquis.  Resume Eliquis once CT head is negative tomorrow morning.    Coronary artery disease  -Continue with home medications once verified       I spent 50 minutes in the professional and overall care of this patient.      Mercedes Wright MD

## 2025-02-10 NOTE — ED PROVIDER NOTES
HPI   Chief Complaint   Patient presents with    Fall       HPI: Patient is an 84-year-old male with history of type 2 diabetes, hyperlipidemia, hypertension, COPD on 2 L at baseline, A-fib on Eliquis who presents for a fall.  Patient was reportedly found down at his care facility.  Estimated to be down for 30 minutes, this is unclear.  Patient hit his left forehead.  Patient states he fell when he is try to get to the phone.  Patient is a multiple falls in the past.  Patient states he is currently being treated for UTI, papers from his care facility show he was started on this yesterday, Cipro.  Denies any recent chest pain, shortness of breath, fevers, chills, vomiting, diarrhea, abdominal pain.  Patient was reportedly altered versus his baseline which is A and O x 3.  EMS states that he was slightly confused and he woke up per report from nursing home, however he was alert and oriented x 3 for them.    ROS: Positives and pertinent negatives as per HPI. A complete review of systems was performed and is otherwise negative except as noted in HPI     Primary Survey:  A: intact airway  B: equal breath sounds bilaterally  C: 2+ distal pulses palpable in radials and DPs bilaterally  D: SPARKS x4 without deficit, sensory grossly intact  E: Exposed and covered with blankets.      Secondary Survey:  NEURO: A&O x 2 on initial evaluation, rechecked later he is alert to the year, GCS 14 (4, 4, 6), CN II-XII intact, SPARKS equally, muscle strength 5/5, no sensory deficits  HEAD: NC/AT, No lacerations, healing abrasion over left forehead with scabbed blood, no bony step offs, midface stable.   EENT: PERRL, EOMI. Canals without blood or CSF drainage, external ear without laceration. Nasal septum midline, no crepitus or septal hematoma. Oral mucosa and tongue without lacerations, teeth in place.   NECK: No cervical spine tenderness or step offs, no lacerations or abrasions, tracheal midline.   RESPIRATORY/CHEST: No abrasions,  contusions, crepitus or tenderness to palpation. Non-labored, equal chest expansion, CTAB, no W/R/R.  CV: RRR on monitor.   ABDOMEN: soft, nontender, nondistended. No scars, abrasions or lacerations.  PELVIS: Stable to compression  : nml external genitalia, no blood at urethral meatus  RECTAL: gluteal tone intact with no gross blood noted on exam.  BACK/SPINE: No thoracic midline tenderness, step-offs or deformities. No lumbar midline tenderness, step-offs, or deformities.  No abrasions, hematomas or lacerations noted.   EXTREMITIES: No edema or cyanosis. Nml ROM w/o pain. No deformities, healing skin tears over bilateral elbows.  Grade 1 ulcer on posterior right heel.  Grade 1 ulcers over sacrum, no bleeding or pus.  Approximately 3 cm skin tear over right proximal lateral lower extremity without tenderness palpation, ecchymoses, deformity.              Patient History   Past Medical History:   Diagnosis Date    Diabetes mellitus (Multi)     Other conditions influencing health status     Normal weight    Personal history of other specified conditions 10/30/2021    History of syncope    Underweight     Underweight     Past Surgical History:   Procedure Laterality Date    CT ABDOMEN PELVIS ANGIOGRAM W AND/OR WO IV CONTRAST  11/5/2021    CT ABDOMEN PELVIS ANGIOGRAM W AND/OR WO IV CONTRAST 11/5/2021    CT ANGIO NECK  1/21/2021    CT ANGIO NECK 1/21/2021    IR ANGIOGRAM INTRAVASCULAR US Left 4/7/2022    IR ANGIOGRAM INTRAVASCULAR US 4/7/2022 [facility]ICAL [facility]GAC[facility]    OTHER SURGICAL HISTORY  10/30/2021    Appendectomy    OTHER SURGICAL HISTORY  10/30/2021    Cardiac event recorder insertion    OTHER SURGICAL HISTORY  10/30/2021    Cataract surgery    OTHER SURGICAL HISTORY  10/30/2021    Loop electrosurgical excision procedure    OTHER SURGICAL HISTORY  05/04/2022    Aneurysmectomy    OTHER SURGICAL HISTORY  12/02/2022    Abdominal aortic aneurysm repair    OTHER SURGICAL HISTORY  02/11/2022    Coronary artery bypass  graft     No family history on file.  Social History     Tobacco Use    Smoking status: Former     Types: Cigarettes    Smokeless tobacco: Never   Substance Use Topics    Alcohol use: Not Currently    Drug use: Not Currently       Physical Exam   ED Triage Vitals [02/10/25 1259]   Temperature Heart Rate Respirations BP   36 °C (96.8 °F) 91 (!) 22 105/64      Pulse Ox Temp Source Heart Rate Source Patient Position   99 % Temporal Monitor --      BP Location FiO2 (%)     -- --       Physical Exam      ED Course & MDM   Diagnoses as of 02/10/25 1731   Fall, initial encounter   Hygroma   Lung mass                 No data recorded                                 Medical Decision Making  EKG on my interpretation: Rate 94, rhythm irregular, axis normal, AK unavailable, QRS 82, QTc 472, T waves: Unremarkable, ST segments: No elevations or depressions, interpretation: Atrial fibrillation, no STEMI    Patient is an 84-year-old male with above-stated past medical history who presents for a fall.  Patient's EKG is nonischemic, troponins are negative x 2, low suspicion for ACS.  Patient's lactate is 4.0, repeat is downtrending, IV fluids are in progress.  Patient's CMP shows a creatinine that is near his previous value.  CBC shows a mild leukocytosis, patient is afebrile, low suspicion for sepsis.  Patient is being treated with Cipro for his UTI.  Urinalysis was ordered and is pending.  Influenza, RSV are negative.  COVID is positive, the patient had COVID approximate month ago, I believe this is likely a false positive.  CT C-spine was negative for acute findings.  CT head showed a increase in the CSF extra-axial space compared to his previous CT head.  I discussed this case with neurosurgery at [facility] [place] and they did not feel the patient required transfer.  They did recommend observing the patient overnight and repeating a CAT scan in the morning.  If the patient should have signs of decompensation, neurosurgery should be  recontacted and the patient will be transferred.  CT chest and pelvis showed multiple masslike areas in the lung on the left, patient was informed of this and will follow-up with pulmonology for further evaluation.  There was a questionable endoleak of his graft and lack of perfusion in the left kidney lower pole, I discussed these findings with vascular surgery who did not feel there was any emergent intervention indicated and that the patient to follow-up with his vascular surgeon as an outpatient.  Patient's case was discussed with the medicine service who accepted the patient for admission.    Disclaimer: This note was dictated using speech recognition software. Minor errors in transcription may be present. Please call if questions.     Guerrero Oh MD  [facility]  Contact on [employer]        Problems Addressed:  Fall, initial encounter: acute illness or injury  Hygroma: acute illness or injury  Lung mass: acute illness or injury    Amount and/or Complexity of Data Reviewed  Labs: ordered.  Radiology: ordered.  ECG/medicine tests: ordered and independent interpretation performed.        Procedure  Procedures     Guerrero Oh MD  02/10/25 1800

## 2025-02-11 ENCOUNTER — APPOINTMENT (OUTPATIENT)
Dept: RADIOLOGY | Facility: HOSPITAL | Age: 85
DRG: 091 | End: 2025-02-11
Payer: MEDICARE

## 2025-02-11 ENCOUNTER — APPOINTMENT (OUTPATIENT)
Dept: CARDIOLOGY | Facility: HOSPITAL | Age: 85
DRG: 091 | End: 2025-02-11
Payer: MEDICARE

## 2025-02-11 LAB
ANION GAP SERPL CALC-SCNC: 13 MMOL/L (ref 10–20)
APPEARANCE UR: CLEAR
BILIRUB UR STRIP.AUTO-MCNC: NEGATIVE MG/DL
BUN SERPL-MCNC: 22 MG/DL (ref 6–23)
CALCIUM SERPL-MCNC: 8.4 MG/DL (ref 8.6–10.3)
CHLORIDE SERPL-SCNC: 108 MMOL/L (ref 98–107)
CO2 SERPL-SCNC: 21 MMOL/L (ref 21–32)
COLOR UR: YELLOW
CREAT SERPL-MCNC: 1.36 MG/DL (ref 0.5–1.3)
EGFRCR SERPLBLD CKD-EPI 2021: 51 ML/MIN/1.73M*2
ERYTHROCYTE [DISTWIDTH] IN BLOOD BY AUTOMATED COUNT: 15.3 % (ref 11.5–14.5)
GLUCOSE BLD MANUAL STRIP-MCNC: 105 MG/DL (ref 74–99)
GLUCOSE BLD MANUAL STRIP-MCNC: 140 MG/DL (ref 74–99)
GLUCOSE BLD MANUAL STRIP-MCNC: 148 MG/DL (ref 74–99)
GLUCOSE BLD MANUAL STRIP-MCNC: 188 MG/DL (ref 74–99)
GLUCOSE SERPL-MCNC: 107 MG/DL (ref 74–99)
GLUCOSE UR STRIP.AUTO-MCNC: NORMAL MG/DL
HCT VFR BLD AUTO: 27.4 % (ref 41–52)
HGB BLD-MCNC: 8.3 G/DL (ref 13.5–17.5)
HOLD SPECIMEN: NORMAL
HOLD SPECIMEN: NORMAL
KETONES UR STRIP.AUTO-MCNC: NEGATIVE MG/DL
LACTATE SERPL-SCNC: 1 MMOL/L (ref 0.4–2)
LEUKOCYTE ESTERASE UR QL STRIP.AUTO: ABNORMAL
MCH RBC QN AUTO: 26.2 PG (ref 26–34)
MCHC RBC AUTO-ENTMCNC: 30.3 G/DL (ref 32–36)
MCV RBC AUTO: 86 FL (ref 80–100)
NITRITE UR QL STRIP.AUTO: NEGATIVE
NRBC BLD-RTO: 0 /100 WBCS (ref 0–0)
PH UR STRIP.AUTO: 5.5 [PH]
PLATELET # BLD AUTO: 346 X10*3/UL (ref 150–450)
POTASSIUM SERPL-SCNC: 4.6 MMOL/L (ref 3.5–5.3)
PROT UR STRIP.AUTO-MCNC: ABNORMAL MG/DL
RBC # BLD AUTO: 3.17 X10*6/UL (ref 4.5–5.9)
RBC # UR STRIP.AUTO: NEGATIVE MG/DL
RBC #/AREA URNS AUTO: NORMAL /HPF
SODIUM SERPL-SCNC: 137 MMOL/L (ref 136–145)
SP GR UR STRIP.AUTO: 1.04
UROBILINOGEN UR STRIP.AUTO-MCNC: NORMAL MG/DL
WBC # BLD AUTO: 9.9 X10*3/UL (ref 4.4–11.3)
WBC #/AREA URNS AUTO: NORMAL /HPF

## 2025-02-11 PROCEDURE — 93005 ELECTROCARDIOGRAM TRACING: CPT

## 2025-02-11 PROCEDURE — A9575 INJ GADOTERATE MEGLUMI 0.1ML: HCPCS | Performed by: STUDENT IN AN ORGANIZED HEALTH CARE EDUCATION/TRAINING PROGRAM

## 2025-02-11 PROCEDURE — 82947 ASSAY GLUCOSE BLOOD QUANT: CPT

## 2025-02-11 PROCEDURE — 1200000002 HC GENERAL ROOM WITH TELEMETRY DAILY

## 2025-02-11 PROCEDURE — 70553 MRI BRAIN STEM W/O & W/DYE: CPT | Performed by: RADIOLOGY

## 2025-02-11 PROCEDURE — 99233 SBSQ HOSP IP/OBS HIGH 50: CPT | Performed by: STUDENT IN AN ORGANIZED HEALTH CARE EDUCATION/TRAINING PROGRAM

## 2025-02-11 PROCEDURE — 36415 COLL VENOUS BLD VENIPUNCTURE: CPT | Performed by: INTERNAL MEDICINE

## 2025-02-11 PROCEDURE — 81001 URINALYSIS AUTO W/SCOPE: CPT | Performed by: STUDENT IN AN ORGANIZED HEALTH CARE EDUCATION/TRAINING PROGRAM

## 2025-02-11 PROCEDURE — 2500000004 HC RX 250 GENERAL PHARMACY W/ HCPCS (ALT 636 FOR OP/ED): Performed by: INTERNAL MEDICINE

## 2025-02-11 PROCEDURE — 2500000002 HC RX 250 W HCPCS SELF ADMINISTERED DRUGS (ALT 637 FOR MEDICARE OP, ALT 636 FOR OP/ED): Performed by: INTERNAL MEDICINE

## 2025-02-11 PROCEDURE — 2550000001 HC RX 255 CONTRASTS: Performed by: STUDENT IN AN ORGANIZED HEALTH CARE EDUCATION/TRAINING PROGRAM

## 2025-02-11 PROCEDURE — 83605 ASSAY OF LACTIC ACID: CPT | Performed by: STUDENT IN AN ORGANIZED HEALTH CARE EDUCATION/TRAINING PROGRAM

## 2025-02-11 PROCEDURE — 36415 COLL VENOUS BLD VENIPUNCTURE: CPT | Performed by: STUDENT IN AN ORGANIZED HEALTH CARE EDUCATION/TRAINING PROGRAM

## 2025-02-11 PROCEDURE — 87086 URINE CULTURE/COLONY COUNT: CPT | Mod: ELYLAB | Performed by: STUDENT IN AN ORGANIZED HEALTH CARE EDUCATION/TRAINING PROGRAM

## 2025-02-11 PROCEDURE — 70450 CT HEAD/BRAIN W/O DYE: CPT

## 2025-02-11 PROCEDURE — 85027 COMPLETE CBC AUTOMATED: CPT | Performed by: INTERNAL MEDICINE

## 2025-02-11 PROCEDURE — XW033E5 INTRODUCTION OF REMDESIVIR ANTI-INFECTIVE INTO PERIPHERAL VEIN, PERCUTANEOUS APPROACH, NEW TECHNOLOGY GROUP 5: ICD-10-PCS | Performed by: STUDENT IN AN ORGANIZED HEALTH CARE EDUCATION/TRAINING PROGRAM

## 2025-02-11 PROCEDURE — 80048 BASIC METABOLIC PNL TOTAL CA: CPT | Performed by: INTERNAL MEDICINE

## 2025-02-11 PROCEDURE — 70450 CT HEAD/BRAIN W/O DYE: CPT | Performed by: RADIOLOGY

## 2025-02-11 PROCEDURE — 2500000004 HC RX 250 GENERAL PHARMACY W/ HCPCS (ALT 636 FOR OP/ED): Performed by: STUDENT IN AN ORGANIZED HEALTH CARE EDUCATION/TRAINING PROGRAM

## 2025-02-11 PROCEDURE — 3E0DX3Z INTRODUCTION OF ANTI-INFLAMMATORY INTO MOUTH AND PHARYNX, EXTERNAL APPROACH: ICD-10-PCS | Performed by: STUDENT IN AN ORGANIZED HEALTH CARE EDUCATION/TRAINING PROGRAM

## 2025-02-11 PROCEDURE — 2500000001 HC RX 250 WO HCPCS SELF ADMINISTERED DRUGS (ALT 637 FOR MEDICARE OP): Performed by: INTERNAL MEDICINE

## 2025-02-11 PROCEDURE — 70553 MRI BRAIN STEM W/O & W/DYE: CPT

## 2025-02-11 RX ORDER — GADOTERATE MEGLUMINE 376.9 MG/ML
0.2 INJECTION INTRAVENOUS
Status: COMPLETED | OUTPATIENT
Start: 2025-02-11 | End: 2025-02-11

## 2025-02-11 RX ORDER — SODIUM CHLORIDE 9 MG/ML
60 INJECTION, SOLUTION INTRAVENOUS CONTINUOUS
Status: DISCONTINUED | OUTPATIENT
Start: 2025-02-11 | End: 2025-02-11

## 2025-02-11 RX ORDER — DEXAMETHASONE 6 MG/1
6 TABLET ORAL DAILY
Status: DISCONTINUED | OUTPATIENT
Start: 2025-02-11 | End: 2025-02-13 | Stop reason: HOSPADM

## 2025-02-11 RX ADMIN — CEFTRIAXONE SODIUM 1 G: 1 INJECTION, SOLUTION INTRAVENOUS at 21:44

## 2025-02-11 RX ADMIN — INSULIN LISPRO 2 UNITS: 100 INJECTION, SOLUTION INTRAVENOUS; SUBCUTANEOUS at 17:03

## 2025-02-11 RX ADMIN — SODIUM CHLORIDE 60 ML/HR: 9 INJECTION, SOLUTION INTRAVENOUS at 12:32

## 2025-02-11 RX ADMIN — REMDESIVIR 200 MG: 100 INJECTION, POWDER, LYOPHILIZED, FOR SOLUTION INTRAVENOUS at 13:37

## 2025-02-11 RX ADMIN — METOPROLOL SUCCINATE 50 MG: 50 TABLET, EXTENDED RELEASE ORAL at 08:17

## 2025-02-11 RX ADMIN — CEFTRIAXONE SODIUM 1 G: 1 INJECTION, SOLUTION INTRAVENOUS at 03:01

## 2025-02-11 RX ADMIN — DEXAMETHASONE 6 MG: 6 TABLET ORAL at 12:02

## 2025-02-11 RX ADMIN — VALSARTAN 80 MG: 80 TABLET, FILM COATED ORAL at 08:17

## 2025-02-11 RX ADMIN — GADOTERATE MEGLUMINE 12 ML: 376.9 INJECTION INTRAVENOUS at 20:54

## 2025-02-11 ASSESSMENT — PAIN - FUNCTIONAL ASSESSMENT: PAIN_FUNCTIONAL_ASSESSMENT: 0-10

## 2025-02-11 ASSESSMENT — PAIN SCALES - GENERAL
PAINLEVEL_OUTOF10: 0 - NO PAIN
PAINLEVEL_OUTOF10: 0 - NO PAIN

## 2025-02-11 ASSESSMENT — COGNITIVE AND FUNCTIONAL STATUS - GENERAL
CLIMB 3 TO 5 STEPS WITH RAILING: A LITTLE
HELP NEEDED FOR BATHING: A LITTLE
DRESSING REGULAR UPPER BODY CLOTHING: A LITTLE
TOILETING: A LITTLE
WALKING IN HOSPITAL ROOM: A LITTLE
MOBILITY SCORE: 22
DRESSING REGULAR LOWER BODY CLOTHING: A LITTLE
PERSONAL GROOMING: A LITTLE
DAILY ACTIVITIY SCORE: 19

## 2025-02-11 NOTE — PROGRESS NOTES
"Jaquan Gambino is a 84 y.o. male on day 1 of admission presenting with Fall, initial encounter.    Subjective   Pt says he has SOB. He is requiring 2L NC.        Objective     Physical Exam  Vitals and nursing note reviewed.   Constitutional:       General: He is not in acute distress.     Appearance: He is ill-appearing. He is not toxic-appearing.   HENT:      Head: Normocephalic and atraumatic.      Nose: Nose normal.      Mouth/Throat:      Mouth: Mucous membranes are dry.   Eyes:      Extraocular Movements: Extraocular movements intact.      Conjunctiva/sclera: Conjunctivae normal.      Pupils: Pupils are equal, round, and reactive to light.   Cardiovascular:      Rate and Rhythm: Normal rate and regular rhythm.      Heart sounds: No murmur heard.     No gallop.   Pulmonary:      Effort: Pulmonary effort is normal. No respiratory distress.      Breath sounds: Wheezing and rhonchi present. No rales.   Abdominal:      General: Abdomen is flat. Bowel sounds are normal. There is no distension.      Palpations: Abdomen is soft. There is no mass.      Tenderness: There is no abdominal tenderness.   Musculoskeletal:         General: No swelling or tenderness. Normal range of motion.      Cervical back: Normal range of motion and neck supple.   Skin:     General: Skin is warm and dry.   Neurological:      General: No focal deficit present.      Mental Status: He is alert and oriented to person, place, and time. Mental status is at baseline.   Psychiatric:         Mood and Affect: Mood normal.         Behavior: Behavior normal.         Thought Content: Thought content normal.         Judgment: Judgment normal.         Last Recorded Vitals:  /65 (BP Location: Left arm, Patient Position: Lying)   Pulse 85   Temp 36.1 °C (97 °F)   Resp 20   Ht 1.727 m (5' 8\")   Wt 61.2 kg (134 lb 14.7 oz)   SpO2 94%   BMI 20.51 kg/m²      Scheduled medications:  cefTRIAXone, 1 g, intravenous, q24h  insulin lispro, 0-10 Units, " subcutaneous, TID AC  metoprolol succinate XL, 50 mg, oral, Daily  pitavastatin calcium, 1 tablet, oral, Nightly  polyethylene glycol, 17 g, oral, Daily  valsartan, 80 mg, oral, Daily      Continuous medications:     PRN medications:  PRN medications: acetaminophen **OR** acetaminophen **OR** acetaminophen, dextrose, dextrose, glucagon, glucagon     Relevant Results:  Results for orders placed or performed during the hospital encounter of 02/10/25 (from the past 24 hours)   Lavender Top   Result Value Ref Range    Extra Tube Hold for add-ons.    SST TOP   Result Value Ref Range    Extra Tube Hold for add-ons.    CBC and Auto Differential   Result Value Ref Range    WBC 12.0 (H) 4.4 - 11.3 x10*3/uL    nRBC 0.0 0.0 - 0.0 /100 WBCs    RBC 3.49 (L) 4.50 - 5.90 x10*6/uL    Hemoglobin 9.3 (L) 13.5 - 17.5 g/dL    Hematocrit 30.9 (L) 41.0 - 52.0 %    MCV 89 80 - 100 fL    MCH 26.6 26.0 - 34.0 pg    MCHC 30.1 (L) 32.0 - 36.0 g/dL    RDW 15.3 (H) 11.5 - 14.5 %    Platelets 398 150 - 450 x10*3/uL    Neutrophils % 83.4 40.0 - 80.0 %    Immature Granulocytes %, Automated 0.8 0.0 - 0.9 %    Lymphocytes % 6.3 13.0 - 44.0 %    Monocytes % 8.8 2.0 - 10.0 %    Eosinophils % 0.3 0.0 - 6.0 %    Basophils % 0.4 0.0 - 2.0 %    Neutrophils Absolute 10.03 (H) 1.60 - 5.50 x10*3/uL    Immature Granulocytes Absolute, Automated 0.10 0.00 - 0.50 x10*3/uL    Lymphocytes Absolute 0.76 (L) 0.80 - 3.00 x10*3/uL    Monocytes Absolute 1.06 (H) 0.05 - 0.80 x10*3/uL    Eosinophils Absolute 0.04 0.00 - 0.40 x10*3/uL    Basophils Absolute 0.05 0.00 - 0.10 x10*3/uL   Comprehensive Metabolic Panel   Result Value Ref Range    Glucose 147 (H) 74 - 99 mg/dL    Sodium 135 (L) 136 - 145 mmol/L    Potassium 5.1 3.5 - 5.3 mmol/L    Chloride 106 98 - 107 mmol/L    Bicarbonate 18 (L) 21 - 32 mmol/L    Anion Gap 16 10 - 20 mmol/L    Urea Nitrogen 23 6 - 23 mg/dL    Creatinine 1.53 (H) 0.50 - 1.30 mg/dL    eGFR 44 (L) >60 mL/min/1.73m*2    Calcium 8.7 8.6 - 10.3  mg/dL    Albumin 3.5 3.4 - 5.0 g/dL    Alkaline Phosphatase 66 33 - 136 U/L    Total Protein 6.7 6.4 - 8.2 g/dL    AST 19 9 - 39 U/L    Bilirubin, Total 0.6 0.0 - 1.2 mg/dL    ALT 9 (L) 10 - 52 U/L   Alcohol   Result Value Ref Range    Alcohol <10 <=10 mg/dL   Lactate   Result Value Ref Range    Lactate 4.0 (HH) 0.4 - 2.0 mmol/L   Type And Screen   Result Value Ref Range    ABO TYPE O     Rh TYPE POS     ANTIBODY SCREEN NEG    Troponin I, High Sensitivity, Initial   Result Value Ref Range    Troponin I, High Sensitivity 9 0 - 20 ng/L   Creatine Kinase   Result Value Ref Range    Creatine Kinase 37 0 - 325 U/L   Green Top   Result Value Ref Range    Extra Tube Hold for add-ons.    Sars-CoV-2 and Influenza A/B PCR   Result Value Ref Range    Flu A Result Not Detected Not Detected    Flu B Result Not Detected Not Detected    Coronavirus 2019, PCR Detected (A) Not Detected   RSV PCR   Result Value Ref Range    RSV PCR Not Detected Not Detected   Protime-INR   Result Value Ref Range    Protime 33.6 (H) 9.8 - 12.8 seconds    INR 2.9 (H) 0.9 - 1.1   Troponin, High Sensitivity, 1 Hour   Result Value Ref Range    Troponin I, High Sensitivity 10 0 - 20 ng/L   Lactate   Result Value Ref Range    Lactate 3.5 (H) 0.4 - 2.0 mmol/L   Lavender Top   Result Value Ref Range    Extra Tube Hold for add-ons.    CBC   Result Value Ref Range    WBC 9.9 4.4 - 11.3 x10*3/uL    nRBC 0.0 0.0 - 0.0 /100 WBCs    RBC 3.17 (L) 4.50 - 5.90 x10*6/uL    Hemoglobin 8.3 (L) 13.5 - 17.5 g/dL    Hematocrit 27.4 (L) 41.0 - 52.0 %    MCV 86 80 - 100 fL    MCH 26.2 26.0 - 34.0 pg    MCHC 30.3 (L) 32.0 - 36.0 g/dL    RDW 15.3 (H) 11.5 - 14.5 %    Platelets 346 150 - 450 x10*3/uL   Basic metabolic panel   Result Value Ref Range    Glucose 107 (H) 74 - 99 mg/dL    Sodium 137 136 - 145 mmol/L    Potassium 4.6 3.5 - 5.3 mmol/L    Chloride 108 (H) 98 - 107 mmol/L    Bicarbonate 21 21 - 32 mmol/L    Anion Gap 13 10 - 20 mmol/L    Urea Nitrogen 22 6 - 23 mg/dL     Creatinine 1.36 (H) 0.50 - 1.30 mg/dL    eGFR 51 (L) >60 mL/min/1.73m*2    Calcium 8.4 (L) 8.6 - 10.3 mg/dL   SST TOP   Result Value Ref Range    Extra Tube Hold for add-ons.    POCT GLUCOSE   Result Value Ref Range    POCT Glucose 105 (H) 74 - 99 mg/dL       CT head wo IV contrast    Result Date: 2/11/2025  Interpreted By:  Irma Simmons, STUDY: CT HEAD WO IV CONTRAST;  2/11/2025 7:38 am   INDICATION: Signs/Symptoms:Hygroma, monitor progression.   COMPARISON: 02/10/2025   ACCESSION NUMBER(S): [id number]   ORDERING CLINICIAN: FATMATA LEE   TECHNIQUE: Noncontrast axial CT scan of head was performed. Angled reformats in brain and bone windows were generated. The images were reviewed in bone, brain, blood and soft tissue windows. Coronal and sagittal reformats are provided for review.   FINDINGS: CSF Spaces: The extra-axial collection overlying the right cerebral hemisphere is now somewhat intermediate in attenuation. It measures approximately 1 cm in thickness compared with 1.1 cm on the previous examination. The collection overlying the left cerebral hemisphere also now demonstrates some intermediate attenuating component. It measures approximately 8 mm in thickness, relatively similar from the previous exam. There is otherwise prominence of ventricles and sulci compatible with diffuse parenchymal volume loss. There is mild sulcal effacement, right greater than left and partial effacement of the right lateral ventricle.There is leftward midline shift measuring approximately 7 mm at the septum pellucidum, similar from the previous exam.   Parenchyma: There are nonspecific areas of diminished attenuation in the subcortical and periventricular white matter. There may be involvement of the bruce. There are small hypodensities in the basal ganglia, thalami, and external capsules which could represent lacunar infarcts or perivascular spaces. Otherwise, the gray/white matter differentiation is intact.   Calvarium:  The calvarium is unremarkable.   Paranasal sinuses and mastoids: There are minimal secretions within a few posterior ethmoid air cells. The mastoid air cells are clear.   There is nonspecific soft tissue opacity in the external auditory canals which may represent cerumen or debris.       1. Redemonstration of subdural collections overlying the cerebral hemispheres, right greater than left now with intermediate attenuation. No hyperdense hemorrhage is identified. Leftward midline shift and associated mass effect are very similar from the previous exam. 2. Nonspecific white matter changes most likely represent small-vessel ischemic disease in a patient of this age.     MACRO: None   Signed by: Irma Simmons 2/11/2025 8:30 AM Dictation workstation:   [id number]    XR tibia fibula right 2 views    Result Date: 2/10/2025  Interpreted By:  Suraj Hall, STUDY: XR TIBIA FIBULA RIGHT 2 VIEWS 2/10/2025 1:35 pm   INDICATION: Signs/Symptoms:fall, proximal lateral wound   COMPARISON: None.   ACCESSION NUMBER(S): [id number]   ORDERING CLINICIAN: RAE PERRY   TECHNIQUE: 2 views of the right tibia and fibula including AP and lateral projections were obtained.   FINDINGS: There is no evidence of acute fracture or dislocation identified. The joint spaces are well preserved throughout without significant degenerative changes.       1. No fracture or dislocation.   MACRO: None.   Signed by: Suraj Hall 2/10/2025 2:35 PM Dictation workstation:   [id number]    CT chest abdomen pelvis w IV contrast    Result Date: 2/10/2025  Interpreted By:  Manuela Park, STUDY: CT CHEST ABDOMEN PELVIS W IV CONTRAST;  2/10/2025 1:21 pm   INDICATION: Signs/Symptoms:Trauma.   COMPARISON: None.   ACCESSION NUMBER(S): [id number]   ORDERING CLINICIAN: RAE PERRY   TECHNIQUE: CT of the chest, abdomen, and pelvis was performed following the intravenous administration 100 mL Omnipaque 350. Sagittal and coronal reconstructions were generated.    FINDINGS: CHEST:   LUNG/PLEURA/LARGE AIRWAYS: There are emphysematous changes.   There is a small left pleural effusion. There are bilateral patchy densities in lower lobes.   There is a 2 cm bilobed nodule in the superior segment of the left upper lobe on image 84/461. There are small adjacent nodules. There is a 5 mm nodule in the left major fissure.   There is a 2.7 centimeters low-density mass in the major fissure. A 2nd larger low-density area is noted along the major fissure measuring 64 x 48 mm.   VESSELS: There are atherosclerotic changes of the aorta. The ascending thoracic aorta is enlarged measuring 41 mm. The main pulmonary artery and cava are unremarkable.   HEART: The heart is normal in size. There are coronary artery calcifications. The patient is status post coronary artery bypass surgery.   MEDIASTINUM AND CLAUDIA: Is no significant mediastinal or hilar lymphadenopathy   CHEST WALL AND LOWER NECK: The thyroid is not enlarged.   There are degenerative changes of the spine. Sternal wires are present.   ABDOMEN:   LIVER: Unremarkable   BILE DUCTS: Unremarkable   GALLBLADDER: There are no obvious gallstones.   PANCREAS: Unremarkable   SPLEEN: Unremarkable   ADRENAL GLANDS: There are no adrenal masses.   KIDNEYS AND URETERS: Kidneys are not obstructed. There are no renal calculi.   There is decreased density of the lower half of the cortex of the left kidney. Perhaps this is due to lack of perfusion.   PELVIS:   BLADDER: Unremarkable   REPRODUCTIVE ORGANS: The prostate is visualized.   BOWEL: There is no significant bowel distention. There are sigmoid diverticula. The appendix is not obvious.   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   There is a  collection of contrast along the right lateral aspect the stent.   The cava is unremarkable.   PERITONEUM/RETROPERITONEUM/LYMPH NODES: There is no free air or free fluid.   There is no  significant lymphadenopathy.   BONE AND SOFT TISSUE: There are degenerative changes of the spine. There are degenerative changes of the hips.   COMPARISON OF FINDINGS:       CHEST: Multiple mass-like areas in the lung and along the left major fissure. Further evaluation to exclude neoplasia is suggested. Small left pleural effusion.   COPD artery bypass surgery.   ABDOMEN-PELVIS: Question of endoleak in the proximal portion of the lumbar aortic aneurysm.   Lack of perfusion apparently to the lower pole of the left kidney.   The findings were discussed with Dr. Oh by phone at 2:18 p.m..   Signed by: Manuela Park 2/10/2025 2:20 PM Dictation workstation:   [id number]    XR chest 1 view    Result Date: 2/10/2025  Interpreted By:  Manuela Park, STUDY: XR CHEST 1 VIEW;  2/10/2025 1:35 pm   INDICATION: Signs/Symptoms:Trauma.   COMPARISON: CT chest dated 02/10/2025   ACCESSION NUMBER(S): [id number]   ORDERING CLINICIAN: RAE OH   FINDINGS: The heart is normal in size. The patient status post coronary artery bypass surgery. A device is present on the left.   There are multiple masses in the left lung of uncertain etiology.   There are atherosclerotic changes of the aorta. Sternal wires are present.   COMPARISON OF FINDING:       Left-sided lung mass of uncertain etiology. These are better defined on the patient's current CT scan of the chest.   MACRO: none   Signed by: Manuela Park 2/10/2025 2:11 PM Dictation workstation:   [id number]    CT cervical spine wo IV contrast    Result Date: 2/10/2025  Interpreted By:  Manuela Park, STUDY: CT CERVICAL SPINE WO IV CONTRAST; ;  2/10/2025 1:20 pm   INDICATION: Signs/Symptoms:fall on thinners.   COMPARISON: 02/04/2025   ACCESSION NUMBER(S): [id number]   ORDERING CLINICIAN: RAE OH   TECHNIQUE: 0 axial images of the cervical spine obtained. Sagittal and coronal reconstructions were generated.   FINDINGS: The patient is scoliotic. There is no fracture. There is  vertebral body endplate spurring.   The prevertebral soft tissues are unremarkable.   There are dense carotid artery calcifications.   The spine is similar appearance to the prior exam.       No obvious fracture.     MACRO: None   Signed by: Manuela Park 2/10/2025 1:49 PM Dictation workstation:   [id number]    CT head wo IV contrast    Result Date: 2/10/2025  Interpreted By:  Manuela Park, STUDY: CT HEAD WO IV CONTRAST; ;  2/10/2025 1:20 pm   INDICATION: Signs/Symptoms:HIA,, hit left forehead.   COMPARISON: 02/04/2025   ACCESSION NUMBER(S): [id number]   ORDERING CLINICIAN: RAE OH   TECHNIQUE: Serial axial images of the head were obtained without intravenous contrast. Sagittal and coronal reconstructions were generated.   FINDINGS: The ventricles are midline and minimally prominent.   There is enlargement of the extra-axial spaces.   There are no acute parenchymal abnormalities. There is no acute hemorrhage.   There is paranasal sinus disease. The mastoid air cells unremarkable.   There is no obvious skull fracture. There is no obvious scalp hematoma.   COMPARISON OF FINDINGS: The extra-axial spaces have increased significantly since the prior exam. The brain is otherwise similar.       Significant increase in the size of the extra-axial space since the prior exam. It seems unlikely that the patient could have had bilateral subdurals that are now fluid density in only 6 days. Consultation with neurosurgery may be useful.   There is no obvious acute hemorrhage.   The findings were discussed with Dr. Oh by phone at 1:42 p.m..     MACRO: none   Signed by: Manuela Park 2/10/2025 1:46 PM Dictation workstation:   [id number]                    Assessment/Plan   Assessment & Plan  Fall, initial encounter    Concerns for hygroma with increase in the CSF extra axial space  -CT head showed a increase in the CSF extra-axial space compared to his previous CT head.  These findings were discussed by ER with  neurosurgery at [facility] and they did not feel the patient required transfer.    - Repeat CT head shows re demonstration of subdural collections overlying the cerebral hemispheres  - I spoke with neurosurg, they do not think repeat ct head is showing subdural collection but rather hygroma; they want another repeat CT head in the am. If stable then can start SQ hep but recommended holding off eliquis for 2 weeks and need repeat ct head in 2 weeks to clear for eliquis resumption.   - neuro checks     Concern for UTI  - Was started with ciprofloxacin outpatient settings  - CTX  - need UA/UCx     HUSSEIN  - stop IVF  - nephro following    Lactic acidosis  Resolved     Positive for COVID-19 incidental finding  COPD not in exacerbation  - on home 2L NC  - start decadron/ remdesivir    Concerns for masses in the lung  -CT chest and pelvis showed multiple masslike areas in the lung on the left.   -Will consult pulmonology for recommendations, patient will need outpatient follow-up as well.     Concerns for endoleak of the graft  - CT chest and pelvis showed multiple masslike areas in the lung on the left. There was a questionable endoleak of his graft and lack of perfusion in the left kidney lower pole this was also discussed by the ER physician with vascular surgery and they did not feel there was any emergent intervention needed and recommended to follow with vascular surgery in outpatient settings.     Normocytic anemia  - monitor     Diabetes mellitus type 2  - hold metformin  - ISS     Atrial fibrillation  - metoprolol  - holding eliquis for 2 weeks and will need repeat ct head in 2 weeks for clearance to resume eliquis per neurosurg    HLD  - statin    HTN  - metoprolol  - hold valsartan    Diet: regular  DVT ppx: SCD  Dispo: monitor clinically          Lizette Amezcua MD  Hospitalist

## 2025-02-11 NOTE — CARE PLAN
The patient's goals for the shift include breating better    The clinical goals for the shift include maintain adequate pulse ox    Over the shift, the patient did not make progress toward the following goals. Barriers to progression include none. Recommendations to address these barriers include monitor spO2.

## 2025-02-11 NOTE — CONSULTS
Reason For Consult  Evaluation of abnormal CT scan of the chest, COPD, COVID infection and other pulmonary related issues     History Of Present Illness     Jaquan Gambino is a 84 y.o. male with history of type 2 diabetes, hyperlipidemia, hypertension, CKD, history of bypass graft, coronary artery disease, COPD on 2 L at baseline, A-fib on Eliquis who presents for a fall.  The patient states he was at the nursing facility and he had to go to the bathroom but he could not find any help and he attempted to get up and he fell.  The patient hit his forehead as well.  Patient has had recurrent falls.  During the time of interview the patient denies any specific complaints he denies any dizziness lightheadedness headache chest pain nausea vomiting or shortness of breath.  At the moment he is alert and oriented x 3.  Per reports the patient had altered mental status.  The patient was alert and oriented x 3 for EMS as well.  While in ED troponins came back negative EKG not consistent with ACS.  Patient did have lactic acidosis with lactate of 4.0.  The patient also came back positive for COVID. CT C-spine was negative for acute findings. CT head showed a increase in the CSF extra-axial space compared to his previous CT head.  These findings were discussed by ER with neurosurgery at [facility] and they did not feel the patient required transfer.  They recommended a CT scan in the morning and monitor neurologic status for the patient under observation overnight and if there is any change reach out to neurosurgery for further recommendations. CT chest and pelvis showed multiple masslike areas in the lung on the left. There was a questionable endoleak of his graft and lack of perfusion in the left kidney lower pole this was also discussed by the ER physician with vascular surgery and they did not feel there was any emergent intervention needed and recommended to follow with vascular surgery in outpatient settings.  The patient is  being admitted for further care and management      I was asked to evaluate and follow from a pulmonary perspective especially regards to questionable lung masses in the left lung.  History obtained from review of prior records including records from recent hospitalization at [facility].  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  He is noted to have COPD of unclear severity.  He is on O2 2 L at baseline.  He has multiple other medical problems including CKD stage IIIb DM2 dyslipidemia hypertension history of CABG surgery chronic atrial fibrillation on Eliquis.  He comes in with a fall with abnormal CT findings as noted above and below.  He appears to have had multiple falls recently.  He was recently hospitalized at [facility] with a left-sided pneumothorax with hemothorax as well for which she required a chest tube insertion.  At this time he is isolated because of COVID infection which is unclear if it is acute versus false positive from recent COVID infection about a month ago.  He appears to have COPD which she is not in exacerbation.  He is not in any respiratory distress.  He denies any chest pain, cough, sputum production, wheezing or hemoptysis.  He did have a low-grade hemoptysis recent admission to [facility].     Past Medical History     Medical History     Past Medical History:     Diagnosis Date      Diabetes mellitus (Multi)        Other conditions influencing health status        Normal weight      Personal history of other specified conditions 10/30/2021      History of syncope      Underweight        Underweight                       Surgical History     Surgical History     Past Surgical History:     Procedure Laterality Date      CT ABDOMEN PELVIS ANGIOGRAM W AND/OR WO IV CONTRAST   11/5/2021      CT ABDOMEN PELVIS ANGIOGRAM W AND/OR WO IV CONTRAST 11/5/2021      CT ANGIO NECK   1/21/2021      CT ANGIO NECK 1/21/2021      IR  ANGIOGRAM INTRAVASCULAR US Left 4/7/2022      IR ANGIOGRAM INTRAVASCULAR US 4/7/2022 [facility]      OTHER SURGICAL HISTORY   10/30/2021      Appendectomy      OTHER SURGICAL HISTORY   10/30/2021      Cardiac event recorder insertion      OTHER SURGICAL HISTORY   10/30/2021      Cataract surgery      OTHER SURGICAL HISTORY   10/30/2021      Loop electrosurgical excision procedure      OTHER SURGICAL HISTORY   05/04/2022      Aneurysmectomy      OTHER SURGICAL HISTORY   12/02/2022      Abdominal aortic aneurysm repair      OTHER SURGICAL HISTORY   02/11/2022      Coronary artery bypass graft                       Social History     He reports that he has quit smoking. His smoking use included cigarettes. He has never used smokeless tobacco. He reports that he does not currently use alcohol. He reports that he does not currently use drugs.           Family History     Family History     No family history on file.                 Allergies     Atorvastatin, Fenofibrate, Fluvastatin, Pravastatin, Simvastatin, and Vitamin e (d-alpha tocopherol)           Review of Systems      Unable to perform ROS: Age      Skin: Negative.       All other systems reviewed and are negative.                 Physical Exam     HENT:         Head: Normocephalic and atraumatic.         Nose: Nose normal.         Mouth/Throat:         Mouth: Mucous membranes are dry.      Eyes:         Extraocular Movements: Extraocular movements intact.         Conjunctiva/sclera: Conjunctivae normal.      Cardiovascular:         Rate and Rhythm: Normal rate and regular rhythm.         Pulses: Normal pulses.         Heart sounds: Normal heart sounds.      Pulmonary:         Effort: Pulmonary effort is normal.         Breath sounds: Normal breath sounds.      Abdominal:         General: Bowel sounds are normal.         Palpations: Abdomen is soft.      Musculoskeletal:            General: Normal range of motion.         Cervical back: Normal range of  "motion and neck supple.      Skin:        General: Skin is warm and dry.         Coloration: Skin is pale.         Findings: Bruising present.      Neurological:         Mental Status: He is alert and oriented to person, place, and time. Mental status is at baseline.      Psychiatric:            Mood and Affect: Mood normal.                        Last Recorded Vitals     Blood pressure 124/58, pulse 90, temperature 36 °C (96.8 °F), temperature source Temporal, resp. rate 18, height 1.727 m (5' 8\"), weight 67.6 kg (149 lb), SpO2 97%.           Relevant Results      Jaquan Gambino is a 84 y.o. male with history of type 2 diabetes, hyperlipidemia, hypertension, CKD, history of bypass graft, coronary artery disease, COPD on 2 L at baseline, A-fib on Eliquis who presents for a fall.        Assessment & Plan     Fall, initial encounter      Multiple falls           Concerns for hygroma with increase in the CSF extra axial space     -CT head showed a increase in the CSF extra-axial space compared to his previous CT head.  These findings were discussed by ER with neurosurgery at [facility] and they did not feel the patient required transfer.  They recommended a CT scan in the morning and monitor neurologic status for the patient under observation overnight and if there is any change reach out to neurosurgery for further recommendations.      -Repeat CT head for AM is ordered.     -Cont with neuro checks.            UTI     -Was started with ciprofloxacin outpatient settings     -UA concerning for UTI will continue with ceftriaxone follow urine cultures           Lactic acidosis     -Lactic acid on admission was 4.0, improving IV fluid rehydration.           Positive for COVID-19 incidental finding     COPD not in exacerbation     -Baseline oxygen requirement of 2 L incidental finding monitor for any worsening.  Currently keep the patient off of steroids and continue with home inhalers           Concerns for masses in the lung "     -CT chest and pelvis showed multiple masslike areas in the lung on the left.      -Will consult pulmonology for recommendations, patient will need outpatient follow-up as well.           Concerns for endoleak of the graft     - CT chest and pelvis showed multiple masslike areas in the lung on the left. There was a questionable endoleak of his graft and lack of perfusion in the left kidney lower pole this was also discussed by the ER physician with vascular surgery and they did not feel there was any emergent intervention needed and recommended to follow with vascular surgery in outpatient settings.                 Diabetes mellitus type 2     -Sliding scale insulin ACHS Accu-Cheks     -Just insulin based upon home insulin requirements.           Atrial fibrillation     -Patient is normally on Eliquis.  Resume Eliquis once CT head is negative tomorrow morning.           Coronary artery disease     -Continue with home medications once verified      Pulmonary comments:- Agree with holding off Eliquis at this time and monitoring CNS status with physical exam as well as serial CAT scan of the brain as being done.  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  Patient COPD appears to be stable and agree with bronchodilator inhalers as ordered.  As well as abnormal CT scan of the chest showing masslike lesions, I believe fluid in the fissure as well as parenchyma possibly from recent falls.  However in view of significant smoking history please densities need to be followed up for resolution as patient is indeed high risk for getting lung cancer.  No further workup is needed at this time except for prior follow-up CT scan of the chest in 6 to 8 weeks.  Patient and wife were notified of abnormal findings and need for close follow-up with  [facility] pulmonologist Dr. Oliveira.  I shall continue to monitor this patient at this time while hospitalized here.  Thank you for the referral.      I spent 55 minutes in the professional and overall care of this patient.      Clarence Hernandez MD

## 2025-02-11 NOTE — CONSULTS
[facility] Nephrology  Consult Note           Reason for Consult: Acute kidney injury  Requesting Physician:  Dr. Lizette Amezcua MD      Chief Complaint: S/p mechanical fall     history Obtained From:  patient, electronic medical record    History of Present Ilness:    84 y.o. year old male who presented to the emergency department for further evaluation and management of s/p mechanical fall when he attempted by himself to get out of the toilet the patient is a resident of the nursing facility as he did hit his forehead they sent him to the emergency department where CT chest and pelvis multiple masslike area in the lung questionable endoleak that has been discussed in the emergency department with vascular that did decided no emergent intervention but to follow-up with vascular surgery the CT scan of the chest abdomen and pelvis was with IV contrast  Patient does have chronic comorbidity of dyslipidemia hypercholesterolemia diabetes type 2 essential hypertension coronary artery disease status post CABG COPD A-fib on Eliquis    Past Medical History:    Past Medical History:   Diagnosis Date    Asthma     CHF (congestive heart failure)     COPD (chronic obstructive pulmonary disease) (Multi)     Coronary artery disease     Diabetes mellitus (Multi)     History of transfusion     Hypertension     Other conditions influencing health status     Normal weight    Personal history of other specified conditions 10/30/2021    History of syncope    Underweight     Underweight        Past Surgical History:    Past Surgical History:   Procedure Laterality Date    CT ABDOMEN PELVIS ANGIOGRAM W AND/OR WO IV CONTRAST  11/5/2021    CT ABDOMEN PELVIS ANGIOGRAM W AND/OR WO IV CONTRAST 11/5/2021    CT ANGIO NECK  1/21/2021    CT ANGIO NECK 1/21/2021    IR ANGIOGRAM INTRAVASCULAR US Left 4/7/2022    IR ANGIOGRAM INTRAVASCULAR US 4/7/2022 [facility] CLINICAL LEGACY    OTHER SURGICAL HISTORY  10/30/2021    Appendectomy    OTHER SURGICAL  HISTORY  10/30/2021    Cardiac event recorder insertion    OTHER SURGICAL HISTORY  10/30/2021    Cataract surgery    OTHER SURGICAL HISTORY  10/30/2021    Loop electrosurgical excision procedure    OTHER SURGICAL HISTORY  05/04/2022    Aneurysmectomy    OTHER SURGICAL HISTORY  12/02/2022    Abdominal aortic aneurysm repair    OTHER SURGICAL HISTORY  02/11/2022    Coronary artery bypass graft        Home Medications:    No current facility-administered medications on file prior to encounter.     Current Outpatient Medications on File Prior to Encounter   Medication Sig Dispense Refill    albuterol 90 mcg/actuation inhaler use 1 puff as needed twice daily for 90 days.      apixaban (Eliquis) 5 mg tablet Take 1 tablet (5 mg) by mouth 2 times a day. 180 tablet 3    cyanocobalamin (Vitamin B-12) 1,000 mcg tablet Take 1 tablet (1,000 mcg) by mouth once daily.      magnesium oxide 400 mg magnesium capsule Take 1 tablet by mouth once daily.      metFORMIN (Glucophage) 500 mg tablet 1 tablet (500 mg) 2 times a day.      metoprolol succinate XL (Toprol-XL) 50 mg 24 hr tablet Take 1 tablet (50 mg) by mouth once daily. 90 tablet 3    pitavastatin calcium (Livalo) 4 mg tablet Take 1 tablet by mouth once daily at bedtime. 90 tablet 3    valsartan (Diovan) 80 mg tablet Take 1 tablet (80 mg) by mouth once daily. 90 tablet 3    apixaban (Eliquis) 5 mg tablet Take 1 tablet (5 mg) by mouth 2 times a day. 60 tablet 0    nitroglycerin (Nitrostat) 0.4 mg SL tablet DISSOLVE 1 TABLET UNDER THE TONGUE AS NEEDED FOR CHEST PAIN- MAY REPEAT EVERY 5 MINUTES IF NEEDED ( MAX 3 DOSES.- IF NO RELIEF CALL 911) (Patient not taking: Reported on 2/10/2025) 25 tablet 5       Allergies:  Atorvastatin, Fenofibrate, Fluvastatin, Pravastatin, Simvastatin, and Vitamin e (d-alpha tocopherol)    Social History:    Social History     Socioeconomic History    Marital status:      Spouse name: Not on file    Number of children: Not on file    Years of  education: Not on file    Highest education level: Not on file   Occupational History    Not on file   Tobacco Use    Smoking status: Former     Current packs/day: 1.00     Average packs/day: 1 pack/day for 21.1 years (21.1 ttl pk-yrs)     Types: Cigarettes     Start date: 2004    Smokeless tobacco: Never   Vaping Use    Vaping status: Never Used   Substance and Sexual Activity    Alcohol use: Not Currently    Drug use: Not Currently    Sexual activity: Defer   Other Topics Concern    Not on file   Social History Narrative    Not on file     Social Drivers of Health     Financial Resource Strain: Low Risk  (2/10/2025)    Overall Financial Resource Strain (CARDIA)     Difficulty of Paying Living Expenses: Not hard at all   Food Insecurity: No Food Insecurity (2/10/2025)    Hunger Vital Sign     Worried About Running Out of Food in the Last Year: Never true     Ran Out of Food in the Last Year: Never true   Transportation Needs: No Transportation Needs (2/10/2025)    PRAPARE - Transportation     Lack of Transportation (Medical): No     Lack of Transportation (Non-Medical): No   Physical Activity: Inactive (2/10/2025)    Exercise Vital Sign     Days of Exercise per Week: 0 days     Minutes of Exercise per Session: 0 min   Stress: No Stress Concern Present (2/10/2025)    Citizen of [place] of Occupational Health - Occupational Stress Questionnaire     Feeling of Stress : Not at all   Social Connections: Moderately Integrated (2/10/2025)    Social Connection and Isolation Panel [NHANES]     Frequency of Communication with Friends and Family: Three times a week     Frequency of Social Gatherings with Friends and Family: Three times a week     Attends [employer] Services: 1 to 4 times per year     Active Member of Clubs or Organizations: No     Attends Club or Organization Meetings: Never     Marital Status:    Intimate Partner Violence: Not At Risk (2/10/2025)    Humiliation, Afraid, Rape, and Kick questionnaire      "Fear of Current or Ex-Partner: No     Emotionally Abused: No     Physically Abused: No     Sexually Abused: No   Housing Stability: Low Risk  (2/10/2025)    Housing Stability Vital Sign     Unable to Pay for Housing in the Last Year: No     Number of Times Moved in the Last Year: 0     Homeless in the Last Year: No       Family History:   No family history on file.    Review of Systems:   No fever or chills no productive nonproductive cough no nausea emesis or diarrhea no dysuria no near syncope or syncope and no any other organ system related symptoms      Physical exam:   Constitutional:  VITALS:  /65 (BP Location: Left arm, Patient Position: Lying)   Pulse 85   Temp 36.1 °C (97 °F)   Resp 20   Ht 1.727 m (5' 8\")   Wt 61.2 kg (134 lb 14.7 oz)   SpO2 94%   BMI 20.51 kg/m²      Wt Readings from Last 3 Encounters:   02/10/25 61.2 kg (134 lb 14.7 oz)   02/04/25 63 kg (139 lb)   07/17/24 63 kg (139 lb)      Gen: alert, awake, nad  Head: atraumatic, normocephalic  Skin: no rash, turgor wnl  Heent:  eomi, mmm  Neck: no bruits or jvd noted, thyroid normal  Lungs:  clear to auscultation  Heart:  regular rate and rhythm, no murmurs  Abdomen:  +bs, soft, nt, nd, no hepatosplenomegaly   Extremities: no edema  Psychiatric: mood and affect appropriate; judgement and insight intact  Musculoskeletal:  Rom, muscular strength intact; digits, nails normal    Data/  CBC:   Lab Results   Component Value Date    WBC 9.9 02/11/2025    RBC 3.17 (L) 02/11/2025    HGB 8.3 (L) 02/11/2025    HCT 27.4 (L) 02/11/2025    MCV 86 02/11/2025    MCH 26.2 02/11/2025    MCHC 30.3 (L) 02/11/2025    RDW 15.3 (H) 02/11/2025     02/11/2025     BMP:    Lab Results   Component Value Date     02/11/2025    K 4.6 02/11/2025     (H) 02/11/2025    CO2 21 02/11/2025    BUN 22 02/11/2025    CREATININE 1.36 (H) 02/11/2025    CALCIUM 8.4 (L) 02/11/2025    GLUCOSE 107 (H) 02/11/2025     CT head wo IV contrast  Narrative: Interpreted " By:  Irma Simmons,   STUDY:  CT HEAD WO IV CONTRAST;  2/11/2025 7:38 am      INDICATION:  Signs/Symptoms:Hygroma, monitor progression.      COMPARISON:  02/10/2025      ACCESSION NUMBER(S):  [id number]      ORDERING CLINICIAN:  FATMATA LEE      TECHNIQUE:  Noncontrast axial CT scan of head was performed. Angled reformats in  brain and bone windows were generated. The images were reviewed in  bone, brain, blood and soft tissue windows. Coronal and sagittal  reformats are provided for review.      FINDINGS:  CSF Spaces: The extra-axial collection overlying the right cerebral  hemisphere is now somewhat intermediate in attenuation. It measures  approximately 1 cm in thickness compared with 1.1 cm on the previous  examination. The collection overlying the left cerebral hemisphere  also now demonstrates some intermediate attenuating component. It  measures approximately 8 mm in thickness, relatively similar from the  previous exam. There is otherwise prominence of ventricles and sulci  compatible with diffuse parenchymal volume loss. There is mild sulcal  effacement, right greater than left and partial effacement of the  right lateral ventricle.There is leftward midline shift measuring  approximately 7 mm at the septum pellucidum, similar from the  previous exam.      Parenchyma: There are nonspecific areas of diminished attenuation in  the subcortical and periventricular white matter. There may be  involvement of the bruce. There are small hypodensities in the basal  ganglia, thalami, and external capsules which could represent lacunar  infarcts or perivascular spaces. Otherwise, the gray/white matter  differentiation is intact.      Calvarium: The calvarium is unremarkable.      Paranasal sinuses and mastoids: There are minimal secretions within a  few posterior ethmoid air cells. The mastoid air cells are clear.      There is nonspecific soft tissue opacity in the external auditory  canals which may represent  "cerumen or debris.      Impression: 1. Redemonstration of subdural collections overlying the cerebral  hemispheres, right greater than left now with intermediate  attenuation. No hyperdense hemorrhage is identified. Leftward midline  shift and associated mass effect are very similar from the previous  exam.  2. Nonspecific white matter changes most likely represent  small-vessel ischemic disease in a patient of this age.          MACRO:  None      Signed by: Irma Simmons 2/11/2025 8:30 AM  Dictation workstation:   [id number]    LFT:   Lab Results   Component Value Date    AST 19 02/10/2025    ALT 9 (L) 02/10/2025    ALKPHOS 66 02/10/2025    BILITOT 0.6 02/10/2025      Urinalysis: No results found for: \"IVANNA\", \"PROTUR\", \"GLUCOSEU\", \"BLOODU\", \"KETONESU\", \"BILIRUBINU\", \"NITRITEU\", \"LEUKOCYTESU\", \"UTPCR\"   Imaging: CT head wo IV contrast  Narrative: Interpreted By:  Irma Simmons,   STUDY:  CT HEAD WO IV CONTRAST;  2/11/2025 7:38 am      INDICATION:  Signs/Symptoms:Hygroma, monitor progression.      COMPARISON:  02/10/2025      ACCESSION NUMBER(S):  [id number]      ORDERING CLINICIAN:  FATMATA LEE      TECHNIQUE:  Noncontrast axial CT scan of head was performed. Angled reformats in  brain and bone windows were generated. The images were reviewed in  bone, brain, blood and soft tissue windows. Coronal and sagittal  reformats are provided for review.      FINDINGS:  CSF Spaces: The extra-axial collection overlying the right cerebral  hemisphere is now somewhat intermediate in attenuation. It measures  approximately 1 cm in thickness compared with 1.1 cm on the previous  examination. The collection overlying the left cerebral hemisphere  also now demonstrates some intermediate attenuating component. It  measures approximately 8 mm in thickness, relatively similar from the  previous exam. There is otherwise prominence of ventricles and sulci  compatible with diffuse parenchymal volume loss. There is mild " sulcal  effacement, right greater than left and partial effacement of the  right lateral ventricle.There is leftward midline shift measuring  approximately 7 mm at the septum pellucidum, similar from the  previous exam.      Parenchyma: There are nonspecific areas of diminished attenuation in  the subcortical and periventricular white matter. There may be  involvement of the bruce. There are small hypodensities in the basal  ganglia, thalami, and external capsules which could represent lacunar  infarcts or perivascular spaces. Otherwise, the gray/white matter  differentiation is intact.      Calvarium: The calvarium is unremarkable.      Paranasal sinuses and mastoids: There are minimal secretions within a  few posterior ethmoid air cells. The mastoid air cells are clear.      There is nonspecific soft tissue opacity in the external auditory  canals which may represent cerumen or debris.      Impression: 1. Redemonstration of subdural collections overlying the cerebral  hemispheres, right greater than left now with intermediate  attenuation. No hyperdense hemorrhage is identified. Leftward midline  shift and associated mass effect are very similar from the previous  exam.  2. Nonspecific white matter changes most likely represent  small-vessel ischemic disease in a patient of this age.          MACRO:  None      Signed by: Irma Simmons 2/11/2025 8:30 AM  Dictation workstation:   [id number]           Assessment/  84 y.o. year old male who presented to the emergency department for further evaluation and management of s/p mechanical fall when he attempted by himself to get out of the toilet the patient is a resident of the nursing facility as he did hit his forehead they sent him to the emergency department where CT chest and pelvis multiple masslike area in the lung questionable endoleak that has been discussed in the emergency department with vascular that did decided no emergent intervention but to follow-up  with vascular surgery the CT scan of the chest abdomen and pelvis was with IV contrast  Patient does have chronic comorbidity of dyslipidemia hypercholesterolemia diabetes type 2 essential hypertension coronary artery disease status post CABG COPD A-fib on Eliquis  Chronic kidney disease with baseline creatinine of 1.57 days ago today is 1.5 potassium 5.1 possible prerenal azotemia with some acute component in the observed GFR no major electrolyte imbalance gap metabolic acidosis    Plan/  Gentle hydration as the patient clinically could be considered diminished extracellular fluid  Daily clinical and laboratory assessment and avoid nephrotoxic exposure    Outpatient follow up from renal standpoint: Dr. Valverde    Thank you for the consultation.  Please do not hesitate to call with questions.    Danie Infante MD

## 2025-02-11 NOTE — CONSULTS
"Nutrition Initial Assessment:   Nutrition Assessment         Patient is a 84 y.o. male presenting with fall      Nutrition History:  Energy Intake: Fair 50-75 % (per RN)  Food and Nutrient History: RD consulted for MST = 2. Per EMR, has a history of DM, HTN, HLD, COPD, afib, recent COVID 1 month ago. Currently in isolation for COVID. From [facility], though no diet records available at this time. Here s/p fall, recently diagnosed with UTI. Per provider note, concerns for hygroma with increase in the CSF extra axial space. Also, CT chest and pelvis showed multiple masslike areas in the lung on the left and has concerns for endoleak of graft and lack of perfusion in left kidney lower pole. . A&Ox1 per nursing. Pt unable to answer RD's questions appropriately this morning. No family at bedside to provide nutrition-related history. Will add Glucerna TID.  Vitamin/Herbal Supplement Use: vitamin B12, Mag-Ox       Anthropometrics:  Height: 172.7 cm (5' 8\")   Weight: 61.2 kg (134 lb 14.7 oz)   BMI (Calculated): 20.52             Weight History:     Wt Readings from Last 10 Encounters:   02/10/25 61.2 kg (134 lb 14.7 oz)   02/04/25 63 kg (139 lb)   07/17/24 63 kg (139 lb)   06/03/24 63 kg (139 lb)   12/01/23 65.6 kg (144 lb 9.6 oz)   06/02/23 64.9 kg (143 lb)   12/02/22 65.3 kg (144 lb)   10/25/22 64 kg (141 lb)   05/04/22 66.2 kg (146 lb)   11/03/21 68.5 kg (151 lb)         Weight Change %:  Significant Weight Loss: No    Nutrition Focused Physical Exam Findings:    Subcutaneous Fat Loss:   Defer Subcutaneous Fat Loss Assessment: Defer all  Defer All Reason: pt condition, unable to interact appropriately with RD  Muscle Wasting:  Defer Muscle Wasting Assessment: Defer all  Defer All Reason: pt condition, unable to interact appropriately with RD  Edema:  Edema: none  Physical Findings:  Skin: Positive (venous ulcer R heel, skin tears)    Nutrition Significant Labs:  BMP Trend:   Results from last 7 days   Lab Units " 02/11/25  0625 02/10/25  1316 02/04/25  1915   GLUCOSE mg/dL 107* 147* 179*   CALCIUM mg/dL 8.4* 8.7 8.7   SODIUM mmol/L 137 135* 136   POTASSIUM mmol/L 4.6 5.1 5.1   CO2 mmol/L 21 18* 22   CHLORIDE mmol/L 108* 106 107   BUN mg/dL 22 23 26*   CREATININE mg/dL 1.36* 1.53* 1.51*    , A1C:  Lab Results   Component Value Date    HGBA1C 6.4 (H) 01/09/2025       Nutrition Specific Medications:  Reviewed. On steroids.    I/O:    ;      Dietary Orders (From admission, onward)       Start     Ordered    02/10/25 2119  May Participate in Room Service  ( ROOM SERVICE MAY PARTICIPATE)  Once        Question:  .  Answer:  Yes    02/10/25 2118    02/10/25 2054  Adult diet Regular  Diet effective now        Question:  Diet type  Answer:  Regular    02/10/25 2053                     Estimated Needs:   Total Energy Estimated Needs in 24 hours (kCal): 1840 kCal  Method for Estimating Needs: 30 kcal/kg  Total Protein Estimated Needs in 24 Hours (g): 73 g  Method for Estimating 24 Hour Protein Needs: 1.2 g/kg  Total Fluid Estimated Needs in 24 Hours (mL): 1840 mL  Method for Estimating 24 Hour Fluid Needs: 1 ml/kcal or per MD        Nutrition Diagnosis   Malnutrition Diagnosis  Patient has Malnutrition Diagnosis: No (insufficient data to diagnose)    Nutrition Diagnosis  Patient has Nutrition Diagnosis: Yes  Diagnosis Status (1): New  Nutrition Diagnosis 1: Predicted inadequate energy intake  Related to (1): acute illness  As Evidenced by (1): confusion, need for liberalized diet and oral nutritional supplements       Nutrition Interventions/Recommendations   Nutrition prescription for oral nutrition    Nutrition Recommendations:  Individualized Nutrition Prescription Provided for : Regular diet with ONS. If intakes are good, consider 75g carb controlled diet.    Nutrition Interventions/Goals:   Interventions: Meals and snacks, Medical food supplement  Goal: Consumes 3 meals per day  Medical Food Supplement: Commercial beverage  medical food supplement therapy  Goal: Glucerna TID (provides 220 kcal, 10 g protein per serving)  Coordination of Care with Providers: Nursing      Education Documentation  No documentation found.            Nutrition Monitoring and Evaluation   Food/Nutrient Related History Monitoring  Monitoring and Evaluation Plan: Intake / amount of food, Estimated Energy Intake  Estimated Energy Intake: Energy intake 50 -75% of estimated energy needs  Intake / Amount of food: Consumes at least 50% or more of meals/snacks/supplements    Anthropometric Measurements  Monitoring and Evaluation Plan: Body weight  Body Weight: Body weight - Maintain stable weight    Biochemical Data, Medical Tests and Procedures  Monitoring and Evaluation Plan: Electrolyte/renal panel, Glucose/endocrine profile  Electrolyte and Renal Panel: Electrolytes within normal limits  Glucose/Endocrine Profile: Glucose within normal limits ( mg/dL)    Physical Exam Findings  Monitoring and Evaluation Plan: Skin  Skin Finding: Impaired wound healing - Improved wound healing         Time Spent (min): 45 minutes

## 2025-02-11 NOTE — PROGRESS NOTES
02/11/25 1552   Discharge Planning   Living Arrangements Spouse/significant other   Support Systems Spouse/significant other;Children   Assistance Needed PTA - lives with spouse in a 1 level mobile home with 3 NAGI no handrails. Has tub shower with shower chair and grab bars. Toilet is handicap height with grab bars. Owns a walker and cane. Independent for ambulation in the home, some times uses a walker. Uses walker in the community. Spouse assists with bathing and dressing as well as does all IADLs. Spouse drives.   Type of Residence Private residence   Number of Stairs to Enter Residence 4   Number of Stairs Within Residence 0   Do you have animals or pets at home? No   Home or Post Acute Services Post acute facilities (Rehab/SNF/etc)   Type of Post Acute Facility Services Skilled nursing   Expected Discharge Disposition SNF  ([facility])   Does the patient need discharge transport arranged? Yes   RoundTrip coordination needed? Yes   Patient Choice   Provider Choice list and CMS website (https://medicare.gov/care-compare#search) for post-acute Quality and Resource Measure Data were provided and reviewed with: Patient;Family   Patient / Family choosing to utilize agency / facility established prior to hospitalization Yes   Stroke Family Assessment   Stroke Family Assessment Needed No   Intensity of Service   Intensity of Service 0-30 min     SNF staff found pt on the ground for unknown amount of time. Wears 2L baseline oxygen. Has been at [facility]. Referral sent in [facility]port to confirm. They can accept back once medically ready, no precert needed.

## 2025-02-11 NOTE — CARE PLAN
Problem: Skin  Goal: Decreased wound size/increased tissue granulation at next dressing change  Outcome: Progressing  Flowsheets (Taken 2/11/2025 0626 by Brittney Chong, RN)  Decreased wound size/increased tissue granulation at next dressing change: Promote sleep for wound healing  Goal: Participates in plan/prevention/treatment measures  Outcome: Progressing  Flowsheets (Taken 2/11/2025 1431)  Participates in plan/prevention/treatment measures:   Discuss with provider PT/OT consult   Elevate heels   Increase activity/out of bed for meals  Goal: Prevent/manage excess moisture  Outcome: Progressing  Flowsheets (Taken 2/11/2025 0626 by Brittney Chong, RN)  Prevent/manage excess moisture: Cleanse incontinence/protect with barrier cream  Goal: Prevent/minimize sheer/friction injuries  Outcome: Progressing  Flowsheets (Taken 2/11/2025 1431)  Prevent/minimize sheer/friction injuries: Increase activity/out of bed for meals  Goal: Promote/optimize nutrition  Outcome: Progressing  Flowsheets (Taken 2/11/2025 1431)  Promote/optimize nutrition:   Consume > 50% meals/supplements   Monitor/record intake including meals  Goal: Promote skin healing  Outcome: Progressing  Flowsheets (Taken 2/11/2025 0626 by Brittney Chong, RN)  Promote skin healing: Assess skin/pad under line(s)/device(s)     Problem: Fall/Injury  Goal: Not fall by end of shift  Outcome: Progressing  Goal: Be free from injury by end of the shift  Outcome: Progressing  Goal: Verbalize understanding of personal risk factors for fall in the hospital  Outcome: Progressing  Goal: Verbalize understanding of risk factor reduction measures to prevent injury from fall in the home  Outcome: Progressing  Goal: Use assistive devices by end of the shift  Outcome: Progressing  Goal: Pace activities to prevent fatigue by end of the shift  Outcome: Progressing     Problem: Diabetes  Goal: Achieve decreasing blood glucose levels by end of shift  Outcome: Progressing  Goal: Increase  stability of blood glucose readings by end of shift  Outcome: Progressing  Goal: Decrease in ketones present in urine by end of shift  Outcome: Progressing  Goal: Maintain electrolyte levels within acceptable range throughout shift  Outcome: Progressing  Goal: Maintain glucose levels >70mg/dl to <250mg/dl throughout shift  Outcome: Progressing  Goal: No changes in neurological exam by end of shift  Outcome: Progressing  Goal: Learn about and adhere to nutrition recommendations by end of shift  Outcome: Progressing  Goal: Vital signs within normal range for age by end of shift  Outcome: Progressing  Goal: Increase self care and/or family involovement by end of shift  Outcome: Progressing  Goal: Receive DSME education by end of shift  Outcome: Progressing       The clinical goals for the shift include safety to prevent falls/injury, wean oxygen, reorient to enviroment, and update with plan of care

## 2025-02-12 LAB
ALBUMIN SERPL BCP-MCNC: 3.3 G/DL (ref 3.4–5)
ALP SERPL-CCNC: 60 U/L (ref 33–136)
ALT SERPL W P-5'-P-CCNC: 10 U/L (ref 10–52)
ANION GAP SERPL CALC-SCNC: 13 MMOL/L (ref 10–20)
AST SERPL W P-5'-P-CCNC: 14 U/L (ref 9–39)
BACTERIA UR CULT: NO GROWTH
BASOPHILS # BLD AUTO: 0.01 X10*3/UL (ref 0–0.1)
BASOPHILS NFR BLD AUTO: 0.2 %
BILIRUB SERPL-MCNC: 0.4 MG/DL (ref 0–1.2)
BUN SERPL-MCNC: 24 MG/DL (ref 6–23)
CALCIUM SERPL-MCNC: 8.8 MG/DL (ref 8.6–10.3)
CHLORIDE SERPL-SCNC: 108 MMOL/L (ref 98–107)
CO2 SERPL-SCNC: 22 MMOL/L (ref 21–32)
CREAT SERPL-MCNC: 1.19 MG/DL (ref 0.5–1.3)
EGFRCR SERPLBLD CKD-EPI 2021: 60 ML/MIN/1.73M*2
EOSINOPHIL # BLD AUTO: 0 X10*3/UL (ref 0–0.4)
EOSINOPHIL NFR BLD AUTO: 0 %
ERYTHROCYTE [DISTWIDTH] IN BLOOD BY AUTOMATED COUNT: 15.3 % (ref 11.5–14.5)
GLUCOSE BLD MANUAL STRIP-MCNC: 125 MG/DL (ref 74–99)
GLUCOSE BLD MANUAL STRIP-MCNC: 150 MG/DL (ref 74–99)
GLUCOSE BLD MANUAL STRIP-MCNC: 197 MG/DL (ref 74–99)
GLUCOSE BLD MANUAL STRIP-MCNC: 216 MG/DL (ref 74–99)
GLUCOSE SERPL-MCNC: 143 MG/DL (ref 74–99)
HCT VFR BLD AUTO: 28 % (ref 41–52)
HGB BLD-MCNC: 8.4 G/DL (ref 13.5–17.5)
HOLD SPECIMEN: NORMAL
IMM GRANULOCYTES # BLD AUTO: 0.06 X10*3/UL (ref 0–0.5)
IMM GRANULOCYTES NFR BLD AUTO: 0.9 % (ref 0–0.9)
LYMPHOCYTES # BLD AUTO: 0.95 X10*3/UL (ref 0.8–3)
LYMPHOCYTES NFR BLD AUTO: 15 %
MCH RBC QN AUTO: 25.9 PG (ref 26–34)
MCHC RBC AUTO-ENTMCNC: 30 G/DL (ref 32–36)
MCV RBC AUTO: 86 FL (ref 80–100)
MONOCYTES # BLD AUTO: 0.33 X10*3/UL (ref 0.05–0.8)
MONOCYTES NFR BLD AUTO: 5.2 %
NEUTROPHILS # BLD AUTO: 4.98 X10*3/UL (ref 1.6–5.5)
NEUTROPHILS NFR BLD AUTO: 78.7 %
NRBC BLD-RTO: 0 /100 WBCS (ref 0–0)
PLATELET # BLD AUTO: 358 X10*3/UL (ref 150–450)
POTASSIUM SERPL-SCNC: 4.7 MMOL/L (ref 3.5–5.3)
PROT SERPL-MCNC: 6.5 G/DL (ref 6.4–8.2)
RBC # BLD AUTO: 3.24 X10*6/UL (ref 4.5–5.9)
SODIUM SERPL-SCNC: 138 MMOL/L (ref 136–145)
WBC # BLD AUTO: 6.3 X10*3/UL (ref 4.4–11.3)

## 2025-02-12 PROCEDURE — 2500000004 HC RX 250 GENERAL PHARMACY W/ HCPCS (ALT 636 FOR OP/ED): Performed by: INTERNAL MEDICINE

## 2025-02-12 PROCEDURE — 85025 COMPLETE CBC W/AUTO DIFF WBC: CPT | Performed by: STUDENT IN AN ORGANIZED HEALTH CARE EDUCATION/TRAINING PROGRAM

## 2025-02-12 PROCEDURE — 1200000002 HC GENERAL ROOM WITH TELEMETRY DAILY

## 2025-02-12 PROCEDURE — 82947 ASSAY GLUCOSE BLOOD QUANT: CPT

## 2025-02-12 PROCEDURE — 99232 SBSQ HOSP IP/OBS MODERATE 35: CPT | Performed by: STUDENT IN AN ORGANIZED HEALTH CARE EDUCATION/TRAINING PROGRAM

## 2025-02-12 PROCEDURE — 2500000004 HC RX 250 GENERAL PHARMACY W/ HCPCS (ALT 636 FOR OP/ED): Performed by: STUDENT IN AN ORGANIZED HEALTH CARE EDUCATION/TRAINING PROGRAM

## 2025-02-12 PROCEDURE — 2500000002 HC RX 250 W HCPCS SELF ADMINISTERED DRUGS (ALT 637 FOR MEDICARE OP, ALT 636 FOR OP/ED): Performed by: INTERNAL MEDICINE

## 2025-02-12 PROCEDURE — 2500000001 HC RX 250 WO HCPCS SELF ADMINISTERED DRUGS (ALT 637 FOR MEDICARE OP): Performed by: INTERNAL MEDICINE

## 2025-02-12 PROCEDURE — 80053 COMPREHEN METABOLIC PANEL: CPT | Performed by: STUDENT IN AN ORGANIZED HEALTH CARE EDUCATION/TRAINING PROGRAM

## 2025-02-12 PROCEDURE — 36415 COLL VENOUS BLD VENIPUNCTURE: CPT | Performed by: STUDENT IN AN ORGANIZED HEALTH CARE EDUCATION/TRAINING PROGRAM

## 2025-02-12 PROCEDURE — 2500000001 HC RX 250 WO HCPCS SELF ADMINISTERED DRUGS (ALT 637 FOR MEDICARE OP): Performed by: STUDENT IN AN ORGANIZED HEALTH CARE EDUCATION/TRAINING PROGRAM

## 2025-02-12 RX ORDER — PRAVASTATIN SODIUM 20 MG/1
20 TABLET ORAL NIGHTLY
Status: DISCONTINUED | OUTPATIENT
Start: 2025-02-12 | End: 2025-02-13 | Stop reason: HOSPADM

## 2025-02-12 RX ADMIN — METOPROLOL SUCCINATE 50 MG: 50 TABLET, EXTENDED RELEASE ORAL at 09:41

## 2025-02-12 RX ADMIN — INSULIN LISPRO 2 UNITS: 100 INJECTION, SOLUTION INTRAVENOUS; SUBCUTANEOUS at 14:02

## 2025-02-12 RX ADMIN — PRAVASTATIN SODIUM 20 MG: 20 TABLET ORAL at 21:01

## 2025-02-12 RX ADMIN — CEFTRIAXONE SODIUM 1 G: 1 INJECTION, SOLUTION INTRAVENOUS at 21:01

## 2025-02-12 RX ADMIN — POLYETHYLENE GLYCOL 3350 17 G: 17 POWDER, FOR SOLUTION ORAL at 09:41

## 2025-02-12 RX ADMIN — INSULIN LISPRO 4 UNITS: 100 INJECTION, SOLUTION INTRAVENOUS; SUBCUTANEOUS at 17:58

## 2025-02-12 RX ADMIN — REMDESIVIR 100 MG: 100 INJECTION, POWDER, LYOPHILIZED, FOR SOLUTION INTRAVENOUS at 14:02

## 2025-02-12 RX ADMIN — DEXAMETHASONE 6 MG: 6 TABLET ORAL at 09:41

## 2025-02-12 ASSESSMENT — COGNITIVE AND FUNCTIONAL STATUS - GENERAL
MOVING TO AND FROM BED TO CHAIR: A LITTLE
STANDING UP FROM CHAIR USING ARMS: A LITTLE
CLIMB 3 TO 5 STEPS WITH RAILING: A LITTLE
STANDING UP FROM CHAIR USING ARMS: A LITTLE
DRESSING REGULAR LOWER BODY CLOTHING: A LITTLE
DRESSING REGULAR UPPER BODY CLOTHING: A LITTLE
DRESSING REGULAR LOWER BODY CLOTHING: A LITTLE
DRESSING REGULAR UPPER BODY CLOTHING: A LITTLE
CLIMB 3 TO 5 STEPS WITH RAILING: A LITTLE
DAILY ACTIVITIY SCORE: 19
TOILETING: A LITTLE
WALKING IN HOSPITAL ROOM: A LITTLE
PERSONAL GROOMING: A LITTLE
MOBILITY SCORE: 20
TOILETING: A LITTLE
MOVING TO AND FROM BED TO CHAIR: A LITTLE
HELP NEEDED FOR BATHING: A LITTLE
MOBILITY SCORE: 20
HELP NEEDED FOR BATHING: A LITTLE
WALKING IN HOSPITAL ROOM: A LITTLE
PERSONAL GROOMING: A LITTLE
DAILY ACTIVITIY SCORE: 19

## 2025-02-12 ASSESSMENT — PAIN SCALES - GENERAL: PAINLEVEL_OUTOF10: 0 - NO PAIN

## 2025-02-12 NOTE — PROGRESS NOTES
Medical Group Progress Note  ASSESSMENT & PLAN:        Concerns for hygroma with increase in the CSF extra axial space  -CT head showed a increase in the CSF extra-axial space compared to his previous CT head.  These findings were discussed by ER with neurosurgery at [facility] and they did not feel the patient required transfer.    - Repeat CT head shows re demonstration of subdural collections overlying the cerebral hemispheres  - I spoke with neurosurg, they do not think repeat ct head is showing subdural collection but rather hygroma; they want another repeat CT head in the am. If stable then can start SQ hep but recommended holding off eliquis for 2 weeks and need repeat ct head in 2 weeks to clear for eliquis resumption.   - neuro checks     Concern for UTI  - Was started with ciprofloxacin outpatient settings  - CTX  - need UA/UCx     HUSSEIN  - stop IVF  - nephro following     Lactic acidosis  Resolved     Positive for COVID-19 incidental finding  COPD not in exacerbation  - on home 2L NC  - start decadron/ remdesivir     Concerns for masses in the lung  -CT chest and pelvis showed multiple masslike areas in the lung on the left.   -Will consult pulmonology for recommendations, patient will need outpatient follow-up as well.     Concerns for endoleak of the graft  - CT chest and pelvis showed multiple masslike areas in the lung on the left. There was a questionable endoleak of his graft and lack of perfusion in the left kidney lower pole this was also discussed by the ER physician with vascular surgery and they did not feel there was any emergent intervention needed and recommended to follow with vascular surgery in outpatient settings.      Normocytic anemia  - monitor     Diabetes mellitus type 2  - hold metformin  - ISS     Atrial fibrillation  - metoprolol  - holding eliquis for 2 weeks and will need repeat ct head in 2 weeks for clearance to resume eliquis per neurosurg     HLD  - statin     HTN  -  metoprolol  - hold valsartan     Diet: regular  DVT ppx: SCD  Dispo: monitor clinically     2/12/2025:  - MRI brain with and without contrast reviewed  - Spoke with wife at bedside  - Patient's mentation alert and oriented x 3 today however still not back to baseline per wife  - Plan for SNF possibly in 24 hours  - Continue holding apixaban for at least 2 weeks per previous discussions with neurosurgery by previous provider  - Continue IV antibiotics, dexamethasone as ordered with end date, remdesivir to complete on 2/13  - Spoke with neurosurgery from [facility] - recommend no further work up if asymptomatic, can repeat CT head on day of discharge.  Needs follow up CT head in 2 weeks and NSGY follow-up in 1 month    VTE Prophylaxis: SCDs       ---Of note, this documentation is completed using the Dragon Dictation system (voice recognition software). There may be spelling and/or grammatical errors that were not corrected prior to final submission.---    Donavon Cody MD    SUBJECTIVE     Patient was seen and examined bedside this morning.  Was alert oriented to himself, hospital, month and year.  Wife at bedside states is closer to baseline today but still confused.  Would still like SNF on discharge.    OBJECTIVE:     Last Recorded Vitals:  Vitals:    02/11/25 1232 02/11/25 1441 02/12/25 0023 02/12/25 0738   BP:  151/74 151/82 152/81   BP Location:       Patient Position:       Pulse: 86 88 86    Resp:  20     Temp:  36.6 °C (97.9 °F) 36.3 °C (97.3 °F) 36.2 °C (97.2 °F)   TempSrc:       SpO2: 96% 98% 99% 94%   Weight:       Height:         Last I/O:  I/O last 3 completed shifts:  In: 164 (2.7 mL/kg) [I.V.:64 (1 mL/kg); IV Piggyback:100]  Out: 850 (13.9 mL/kg) [Urine:850 (0.4 mL/kg/hr)]  Weight: 61.2 kg     Physical Exam  Vitals reviewed.   Constitutional:       General: He is not in acute distress.     Appearance: Normal appearance. He is not ill-appearing.   HENT:      Head: Normocephalic and atraumatic.   Eyes:       Extraocular Movements: Extraocular movements intact.      Conjunctiva/sclera: Conjunctivae normal.   Cardiovascular:      Rate and Rhythm: Normal rate and regular rhythm.      Pulses: Normal pulses.      Heart sounds: Normal heart sounds.   Pulmonary:      Effort: Pulmonary effort is normal.      Breath sounds: Normal breath sounds.   Abdominal:      General: Bowel sounds are normal. There is no distension.      Palpations: Abdomen is soft.      Tenderness: There is no abdominal tenderness. There is no guarding.   Musculoskeletal:         General: No swelling or tenderness. Normal range of motion.      Cervical back: Normal range of motion and neck supple.   Neurological:      General: No focal deficit present.      Mental Status: He is alert and oriented to person, place, and time. Mental status is at baseline.   Psychiatric:         Mood and Affect: Mood normal.         Behavior: Behavior normal.     Inpatient Medications:  cefTRIAXone, 1 g, intravenous, q24h  dexAMETHasone, 6 mg, oral, Daily  insulin lispro, 0-10 Units, subcutaneous, TID AC  metoprolol succinate XL, 50 mg, oral, Daily  polyethylene glycol, 17 g, oral, Daily  pravastatin, 20 mg, oral, Nightly  remdesivir, 100 mg, intravenous, q24h  [Held by provider] valsartan, 80 mg, oral, Daily    PRN Medications  PRN medications: acetaminophen **OR** acetaminophen **OR** acetaminophen, dextrose, dextrose, glucagon, glucagon  Continuous Medications:     LABS AND IMAGING:     Labs:  Results from last 7 days   Lab Units 02/12/25  0647 02/11/25  0625 02/10/25  1316   WBC AUTO x10*3/uL 6.3 9.9 12.0*   RBC AUTO x10*6/uL 3.24* 3.17* 3.49*   HEMOGLOBIN g/dL 8.4* 8.3* 9.3*   HEMATOCRIT % 28.0* 27.4* 30.9*   MCV fL 86 86 89   MCH pg 25.9* 26.2 26.6   MCHC g/dL 30.0* 30.3* 30.1*   RDW % 15.3* 15.3* 15.3*   PLATELETS AUTO x10*3/uL 358 346 398     Results from last 7 days   Lab Units 02/12/25  0647 02/11/25  0625 02/10/25  1316   SODIUM mmol/L 138 137 135*   POTASSIUM  mmol/L 4.7 4.6 5.1   CHLORIDE mmol/L 108* 108* 106   CO2 mmol/L 22 21 18*   BUN mg/dL 24* 22 23   CREATININE mg/dL 1.19 1.36* 1.53*   GLUCOSE mg/dL 143* 107* 147*   PROTEIN TOTAL g/dL 6.5  --  6.7   CALCIUM mg/dL 8.8 8.4* 8.7   BILIRUBIN TOTAL mg/dL 0.4  --  0.6   ALK PHOS U/L 60  --  66   AST U/L 14  --  19   ALT U/L 10  --  9*         Results from last 7 days   Lab Units 02/10/25  1349 02/10/25  1316   TROPHS ng/L 10 9     Imaging:  MR brain w and wo IV contrast  Narrative: Interpreted By:  Jaquan Pacheco,   STUDY:  MR BRAIN W AND WO IV CONTRAST;  2/11/2025 9:40 pm      INDICATION:  Signs/Symptoms:hygroma on ct.          COMPARISON:  CT head dated 02/11/2025.      ACCESSION NUMBER(S):  [id number]      ORDERING CLINICIAN:  LUIS MANUEL SANCHEZ      TECHNIQUE:  Standard multiplanar multisequence MR imaging was performed through  the brain prior to and following administration of 12 ML Dotarem  intravenous contrast.      Motion artifact mildly degrades assessment on several sequences.      FINDINGS:  Parenchyma: There is no diffusion restriction abnormality to suggest  acute infarct.  No evidence of recent intraparenchymal hemorrhage.  Suspected tiny remote microhemorrhage within the left corona radiata  (series 7, image 24). No abnormal parenchymal or leptomeningeal  enhancement. There is mild diffuse dural thickening and enhancement  along the bilateral cerebral convexities. There is mild sulcal  effacement along the right cerebral convexity from subdural fluid  collection. There is up to 6 mm right-to-left midline shift as  measured at the septum pellucidum near the foramen of Monro. No  evidence of brainstem sag. Pituitary is unremarkable for age. Mild  burden of scattered T2/FLAIR white matter hyperintensities throughout  the bilateral cerebral hemispheres and central bruce, favor chronic  small vessel ischemic change.      CSF Spaces: Moderate generalized brain atrophy. No hydrocephalus.  Basilar cisterns are patent.       Extra-axial spaces: There is a right subdural fluid collection  measuring up to 11 mm in thickness along the right frontal convexity  (series 5, image 26) and questionable trace component along the left  cerebral convexity, difficult to differentiate from dural thickening.  There are trace elements of GRE hypointensity that could reflect  hemosiderin staining or dural calcification      Intracranial Flow Voids: Patent appearing.      Paranasal Sinuses: Dependent secretions within the right posterior  ethmoidal region. Otherwise mild scattered mucosal thickening of the  paranasal sinuses without opacification.      Mastoids: Trace dependent fluid within the mastoids. Otherwise well  aerated.      Orbits: Bilateral native lens extractions.      Calvarium: No suspicious osseous marrow signal.      Impression: 1. Unchanged size of right convexity subdural fluid collection that  may represent hygroma versus hematoma component measuring up to 11 mm  in maximum thickness. There is mild sulcal effacement along the right  cerebral convexity with unchanged 6 mm right-to-left midline shift.  Questionable trace subdural fluid component along the left cerebral  convexity. Otherwise prominent subarachnoid spaces with moderate  parenchymal volume loss.  2. There is an element of diffuse dural thickening and enhancement  bilaterally, possibly reactive. There is no discrete nodular or  masslike elements of dural thickening/enhancement. No overt stigmata  of intracranial hypotension otherwise identified, correlate with  clinical symptoms.  3. No acute infarct. No abnormal parenchymal enhancement. Mild  chronic small vessel ischemic change.      MACRO:  None      Signed by: Jaquan Pacheco 2/12/2025 8:23 AM  Dictation workstation:   [id number]  ECG 12 lead  Atrial fibrillation  Nonspecific ST abnormality  Abnormal ECG  When compared with ECG of 04-FEB-2025 19:53, (unconfirmed)  Previous ECG has undetermined rhythm, needs  review  Nonspecific T wave abnormality no longer evident in Inferior leads  Nonspecific T wave abnormality, improved in Anterolateral leads  QT has lengthened

## 2025-02-12 NOTE — PROGRESS NOTES
Renal Progress Note  Assessment/  84 y.o. year old male who presented to the emergency department for further evaluation and management of s/p mechanical fall when he attempted by himself to get out of the toilet the patient is a resident of the nursing facility as he did hit his forehead they sent him to the emergency department where CT chest and pelvis multiple masslike area in the lung questionable endoleak that has been discussed in the emergency department with vascular that did decided no emergent intervention but to follow-up with vascular surgery the CT scan of the chest abdomen and pelvis was with IV contrast  Patient does have chronic comorbidity of dyslipidemia hypercholesterolemia diabetes type 2 essential hypertension coronary artery disease status post CABG COPD A-fib on Eliquis  Chronic kidney disease with baseline creatinine of 1.57 days ago today is 1.5 potassium 5.1 possible prerenal azotemia with some acute component in the observed GFR no major electrolyte imbalance gap metabolic acidosis     Plan/  Gentle hydration as the patient clinically could be considered diminished extracellular fluid this did end up improving his GFR now is 60 with no any associated electrolyte or acid-base imbalance he is off IV fluid we will monitor for the next 24 hours if stable we will sign off  Outpatient follow up from renal standpoint: Dr. Valverde      Subjective:   Admit Date: 2/10/2025    Interval History: Patient seen and examined uneventful night no uremic related or fluid volume overload related symptoms did have a good discussion with the patient and wife about drinking and how his kidney has been improved with IV fluids      Medications:   Scheduled Meds:cefTRIAXone, 1 g, intravenous, q24h  dexAMETHasone, 6 mg, oral, Daily  insulin lispro, 0-10 Units, subcutaneous, TID AC  metoprolol succinate XL, 50 mg, oral, Daily  pitavastatin calcium, 1 tablet, oral, Nightly  polyethylene glycol, 17 g, oral,  "Daily  remdesivir, 100 mg, intravenous, q24h  [Held by provider] valsartan, 80 mg, oral, Daily      Continuous Infusions:     CBC:   Lab Results   Component Value Date    HGB 8.4 (L) 02/12/2025    HGB 8.3 (L) 02/11/2025    WBC 6.3 02/12/2025    WBC 9.9 02/11/2025     02/12/2025     02/11/2025      Anemia:  No results found for: \"FERRITIN\", \"IRON\", \"TIBC\"   BMP:    Lab Results   Component Value Date     02/12/2025     02/11/2025    K 4.7 02/12/2025    K 4.6 02/11/2025     (H) 02/12/2025     (H) 02/11/2025    CO2 22 02/12/2025    CO2 21 02/11/2025    BUN 24 (H) 02/12/2025    BUN 22 02/11/2025    CREATININE 1.19 02/12/2025    CREATININE 1.36 (H) 02/11/2025      Bone disease: No results found for: \"PHOS\", \"PTH\", \"VITD25\"   Urinalysis:    Lab Results   Component Value Date    IVANNA 5.5 02/11/2025    PROTUR 20 (TRACE) 02/11/2025    GLUCOSEU Normal 02/11/2025    BLOODU NEGATIVE 02/11/2025    KETONESU NEGATIVE 02/11/2025    BILIRUBINU NEGATIVE 02/11/2025    NITRITEU NEGATIVE 02/11/2025    LEUKOCYTESU 75 Michell/uL (A) 02/11/2025        Objective:   Vitals: /81   Pulse 86   Temp 36.2 °C (97.2 °F)   Resp 20   Ht 1.727 m (5' 8\")   Wt 61.2 kg (134 lb 14.7 oz)   SpO2 94%   BMI 20.51 kg/m²    Wt Readings from Last 3 Encounters:   02/10/25 61.2 kg (134 lb 14.7 oz)   02/04/25 63 kg (139 lb)   07/17/24 63 kg (139 lb)      24HR INTAKE/OUTPUT:    Intake/Output Summary (Last 24 hours) at 2/12/2025 1131  Last data filed at 2/11/2025 2214  Gross per 24 hour   Intake 114 ml   Output 250 ml   Net -136 ml     Admission weight:  Weight: 67.6 kg (149 lb)      Constitutional:  Alert, awake, no apparent distress   Skin:normal, no rash  HEENT:sclera anicteric.  Head atraumatic normocephalic  Neck:supple with no thyromegally  Cardiovascular:  S1, S2 without m/r/g   Respiratory:  CTA B without w/r/r   Abdomen: +bs, soft, nt  Ext: no LE edema  Musculoskeletal:Intact  Neuro:Alert and oriented with no " deficit      Electronically signed by Danie Infante MD on 2/12/2025 at 11:31 AM

## 2025-02-12 NOTE — PROGRESS NOTES
Jaquan Gambino is a 84 y.o. male on day 2 of admission presenting with Fall, initial encounter.  Patient was seen and examined bedside this morning.  Was alert oriented to himself, hospital, month and year.  Wife at bedside states is closer to baseline today but still confused.  Would still like SNF on discharge.     OBJECTIVE:      Last Recorded Vitals:  Vitals          Vitals:     02/11/25 1232 02/11/25 1441 02/12/25 0023 02/12/25 0738   BP:   151/74 151/82 152/81   BP Location:           Patient Position:           Pulse: 86 88 86     Resp:   20       Temp:   36.6 °C (97.9 °F) 36.3 °C (97.3 °F) 36.2 °C (97.2 °F)   TempSrc:           SpO2: 96% 98% 99% 94%   Weight:           Height:                 Last I/O:  I/O last 3 completed shifts:  In: 164 (2.7 mL/kg) [I.V.:64 (1 mL/kg); IV Piggyback:100]  Out: 850 (13.9 mL/kg) [Urine:850 (0.4 mL/kg/hr)]  Weight: 61.2 kg      Physical Exam  Vitals reviewed.   Constitutional:       General: He is not in acute distress.     Appearance: Normal appearance. He is not ill-appearing.   HENT:      Head: Normocephalic and atraumatic.   Eyes:      Extraocular Movements: Extraocular movements intact.      Conjunctiva/sclera: Conjunctivae normal.   Cardiovascular:      Rate and Rhythm: Normal rate and regular rhythm.      Pulses: Normal pulses.      Heart sounds: Normal heart sounds.   Pulmonary:      Effort: Pulmonary effort is normal.      Breath sounds: Normal breath sounds.   Abdominal:      General: Bowel sounds are normal. There is no distension.      Palpations: Abdomen is soft.      Tenderness: There is no abdominal tenderness. There is no guarding.   Musculoskeletal:         General: No swelling or tenderness. Normal range of motion.      Cervical back: Normal range of motion and neck supple.   Neurological:      General: No focal deficit present.      Mental Status: He is alert and oriented to person, place, and time. Mental status is at baseline.   Psychiatric:         Mood  and Affect: Mood normal.         Behavior: Behavior normal.      Inpatient Medications:    Scheduled Medications   cefTRIAXone, 1 g, intravenous, q24h  dexAMETHasone, 6 mg, oral, Daily  insulin lispro, 0-10 Units, subcutaneous, TID AC  metoprolol succinate XL, 50 mg, oral, Daily  polyethylene glycol, 17 g, oral, Daily  pravastatin, 20 mg, oral, Nightly  remdesivir, 100 mg, intravenous, q24h  [Held by provider] valsartan, 80 mg, oral, Daily      PRN Medications  PRN Medications   PRN medications: acetaminophen **OR** acetaminophen **OR** acetaminophen, dextrose, dextrose, glucagon, glucagon     Continuous Medications:  Continuous Medications         LABS AND IMAGING:      Labs:         Results from last 7 days   Lab Units 02/12/25  0647 02/11/25  0625 02/10/25  1316   WBC AUTO x10*3/uL 6.3 9.9 12.0*   RBC AUTO x10*6/uL 3.24* 3.17* 3.49*   HEMOGLOBIN g/dL 8.4* 8.3* 9.3*   HEMATOCRIT % 28.0* 27.4* 30.9*   MCV fL 86 86 89   MCH pg 25.9* 26.2 26.6   MCHC g/dL 30.0* 30.3* 30.1*   RDW % 15.3* 15.3* 15.3*   PLATELETS AUTO x10*3/uL 358 346 398             Results from last 7 days   Lab Units 02/12/25  0647 02/11/25  0625 02/10/25  1316   SODIUM mmol/L 138 137 135*   POTASSIUM mmol/L 4.7 4.6 5.1   CHLORIDE mmol/L 108* 108* 106   CO2 mmol/L 22 21 18*   BUN mg/dL 24* 22 23   CREATININE mg/dL 1.19 1.36* 1.53*   GLUCOSE mg/dL 143* 107* 147*   PROTEIN TOTAL g/dL 6.5  --  6.7   CALCIUM mg/dL 8.8 8.4* 8.7   BILIRUBIN TOTAL mg/dL 0.4  --  0.6   ALK PHOS U/L 60  --  66   AST U/L 14  --  19   ALT U/L 10  --  9*                Results from last 7 days   Lab Units 02/10/25  1349 02/10/25  1316   TROPHS ng/L 10 9      Imaging:  MR brain w and wo IV contrast  Narrative: Interpreted By:  Jaquan Pacheco,   STUDY:  MR BRAIN W AND WO IV CONTRAST;  2/11/2025 9:40 pm      INDICATION:  Signs/Symptoms:hygroma on ct.          COMPARISON:  CT head dated 02/11/2025.      ACCESSION NUMBER(S):  [id number]      ORDERING CLINICIAN:  LUIS MANUEL SANCHEZ       TECHNIQUE:  Standard multiplanar multisequence MR imaging was performed through  the brain prior to and following administration of 12 ML Dotarem  intravenous contrast.      Motion artifact mildly degrades assessment on several sequences.      FINDINGS:  Parenchyma: There is no diffusion restriction abnormality to suggest  acute infarct.  No evidence of recent intraparenchymal hemorrhage.  Suspected tiny remote microhemorrhage within the left corona radiata  (series 7, image 24). No abnormal parenchymal or leptomeningeal  enhancement. There is mild diffuse dural thickening and enhancement  along the bilateral cerebral convexities. There is mild sulcal  effacement along the right cerebral convexity from subdural fluid  collection. There is up to 6 mm right-to-left midline shift as  measured at the septum pellucidum near the foramen of Monro. No  evidence of brainstem sag. Pituitary is unremarkable for age. Mild  burden of scattered T2/FLAIR white matter hyperintensities throughout  the bilateral cerebral hemispheres and central bruce, favor chronic  small vessel ischemic change.      CSF Spaces: Moderate generalized brain atrophy. No hydrocephalus.  Basilar cisterns are patent.      Extra-axial spaces: There is a right subdural fluid collection  measuring up to 11 mm in thickness along the right frontal convexity  (series 5, image 26) and questionable trace component along the left  cerebral convexity, difficult to differentiate from dural thickening.  There are trace elements of GRE hypointensity that could reflect  hemosiderin staining or dural calcification      Intracranial Flow Voids: Patent appearing.      Paranasal Sinuses: Dependent secretions within the right posterior  ethmoidal region. Otherwise mild scattered mucosal thickening of the  paranasal sinuses without opacification.      Mastoids: Trace dependent fluid within the mastoids. Otherwise well  aerated.      Orbits: Bilateral native lens  extractions.      Calvarium: No suspicious osseous marrow signal.      Impression: 1. Unchanged size of right convexity subdural fluid collection that  may represent hygroma versus hematoma component measuring up to 11 mm  in maximum thickness. There is mild sulcal effacement along the right  cerebral convexity with unchanged 6 mm right-to-left midline shift.  Questionable trace subdural fluid component along the left cerebral  convexity. Otherwise prominent subarachnoid spaces with moderate  parenchymal volume loss.  2. There is an element of diffuse dural thickening and enhancement  bilaterally, possibly reactive. There is no discrete nodular or  masslike elements of dural thickening/enhancement. No overt stigmata  of intracranial hypotension otherwise identified, correlate with  clinical symptoms.  3. No acute infarct. No abnormal parenchymal enhancement. Mild  chronic small vessel ischemic change.      MACRO:  None      Signed by: Jaquan Pacheco 2/12/2025 8:23 AM  Dictation workstation:   [id number]  ECG 12 lead  Atrial fibrillation  Nonspecific ST abnormality  Abnormal ECG  When compared with ECG of 04-FEB-2025 19:53, (unconfirmed)  Previous ECG has undetermined rhythm, needs review  Nonspecific T wave abnormality no longer evident in Inferior leads  Nonspecific T wave abnormality, improved in Anterolateral leads  QT has lengthened     ASSESSMENT & PLAN:      Concerns for hygroma with increase in the CSF extra axial space  -CT head showed a increase in the CSF extra-axial space compared to his previous CT head.    Repeat CT head shows re demonstration of subdural collections overlying the cerebral hemispheres  - Conservative F/U per  [facility] NS and no Eliquis x 2 weeks  - neuro checks     Concern for UTI  - Was started with ciprofloxacin outpatient settings  - CTX  - need UA/UCx     HUSSEIN  - stop IVF  - nephro following     Lactic acidosis  Resolved     Positive for COVID-19 incidental finding  COPD not in  exacerbation  - on home 2L NC  - start decadron/ remdesivir     Concerns for masses in the lung  -CT chest and pelvis showed multiple masslike areas in the lung on the left.- Likely collection of fluid/blood infections as well as lung parenchyma  - patient will need outpatient follow-up as well.(Dr. Oliveira- [facility] Pulmonologist     Concerns for endoleak of the graft  - CT chest and pelvis showed multiple masslike areas in the lung on the left. There was a questionable endoleak of his graft and lack of perfusion in the left kidney lower pole this was also discussed by the ER physician with vascular surgery and they did not feel there was any emergent intervention needed and recommended to follow with vascular surgery in outpatient settings.      Normocytic anemia  - monitor     Diabetes mellitus type 2  - hold metformin  - ISS     Atrial fibrillation  - metoprolol  - holding eliquis for 2 weeks and will need repeat ct head in 2 weeks for clearance to resume eliquis per neurosurg     HLD  - statin     HTN  - metoprolol  - hold valsartan     Diet: regular  DVT ppx: SCD  Dispo: monitor clinically      2/12/2025:  -   - Patient's mentation alert and oriented x 3 today however still not back to baseline per wife    - Plan for SNF possibly in 24 hours  - Continue holding apixaban for at least 2 weeks per previous discussions with neurosurgery by previous provider  - Continue IV antibiotics, dexamethasone as ordered with end date, remdesivir to complete on 2/13  - OK to D/C on 02/13 per Pulm.     VTE Prophylaxis: SCDs                I spent 25 minutes in the professional and overall car25e of this patient.      Clarence Hernandez MD

## 2025-02-12 NOTE — CARE PLAN
The patient's goals for the shift include breating better    The clinical goals for the shift include safety    Over the shift, the patient did not make progress toward the following goals. Barriers to progression include . Recommendations to address these barriers include .

## 2025-02-13 ENCOUNTER — OFFICE VISIT (OUTPATIENT)
Dept: GERIATRIC MEDICINE | Age: 85
End: 2025-02-13

## 2025-02-13 ENCOUNTER — APPOINTMENT (OUTPATIENT)
Dept: RADIOLOGY | Facility: HOSPITAL | Age: 85
DRG: 091 | End: 2025-02-13
Payer: MEDICARE

## 2025-02-13 VITALS
DIASTOLIC BLOOD PRESSURE: 70 MMHG | SYSTOLIC BLOOD PRESSURE: 131 MMHG | RESPIRATION RATE: 22 BRPM | BODY MASS INDEX: 20.45 KG/M2 | HEART RATE: 67 BPM | WEIGHT: 134.92 LBS | HEIGHT: 68 IN | OXYGEN SATURATION: 98 % | TEMPERATURE: 97.9 F

## 2025-02-13 DIAGNOSIS — I48.20 CHRONIC ATRIAL FIBRILLATION (HCC): Primary | ICD-10-CM

## 2025-02-13 LAB
ALBUMIN SERPL BCP-MCNC: 3.1 G/DL (ref 3.4–5)
ALP SERPL-CCNC: 55 U/L (ref 33–136)
ALT SERPL W P-5'-P-CCNC: 11 U/L (ref 10–52)
ANION GAP SERPL CALC-SCNC: 11 MMOL/L (ref 10–20)
AST SERPL W P-5'-P-CCNC: 14 U/L (ref 9–39)
ATRIAL RATE: 74 BPM
BASOPHILS # BLD AUTO: 0.01 X10*3/UL (ref 0–0.1)
BASOPHILS NFR BLD AUTO: 0.1 %
BILIRUB SERPL-MCNC: 0.3 MG/DL (ref 0–1.2)
BUN SERPL-MCNC: 30 MG/DL (ref 6–23)
CALCIUM SERPL-MCNC: 8.6 MG/DL (ref 8.6–10.3)
CHLORIDE SERPL-SCNC: 107 MMOL/L (ref 98–107)
CO2 SERPL-SCNC: 23 MMOL/L (ref 21–32)
CREAT SERPL-MCNC: 1.22 MG/DL (ref 0.5–1.3)
EGFRCR SERPLBLD CKD-EPI 2021: 58 ML/MIN/1.73M*2
EOSINOPHIL # BLD AUTO: 0 X10*3/UL (ref 0–0.4)
EOSINOPHIL NFR BLD AUTO: 0 %
ERYTHROCYTE [DISTWIDTH] IN BLOOD BY AUTOMATED COUNT: 15.1 % (ref 11.5–14.5)
GLUCOSE BLD MANUAL STRIP-MCNC: 165 MG/DL (ref 74–99)
GLUCOSE BLD MANUAL STRIP-MCNC: 200 MG/DL (ref 74–99)
GLUCOSE BLD MANUAL STRIP-MCNC: 232 MG/DL (ref 74–99)
GLUCOSE SERPL-MCNC: 152 MG/DL (ref 74–99)
HCT VFR BLD AUTO: 27.8 % (ref 41–52)
HGB BLD-MCNC: 8.3 G/DL (ref 13.5–17.5)
HOLD SPECIMEN: NORMAL
HOLD SPECIMEN: NORMAL
IMM GRANULOCYTES # BLD AUTO: 0.07 X10*3/UL (ref 0–0.5)
IMM GRANULOCYTES NFR BLD AUTO: 0.8 % (ref 0–0.9)
LYMPHOCYTES # BLD AUTO: 0.68 X10*3/UL (ref 0.8–3)
LYMPHOCYTES NFR BLD AUTO: 8.1 %
MCH RBC QN AUTO: 26.1 PG (ref 26–34)
MCHC RBC AUTO-ENTMCNC: 29.9 G/DL (ref 32–36)
MCV RBC AUTO: 87 FL (ref 80–100)
MONOCYTES # BLD AUTO: 0.47 X10*3/UL (ref 0.05–0.8)
MONOCYTES NFR BLD AUTO: 5.6 %
NEUTROPHILS # BLD AUTO: 7.2 X10*3/UL (ref 1.6–5.5)
NEUTROPHILS NFR BLD AUTO: 85.4 %
NRBC BLD-RTO: 0 /100 WBCS (ref 0–0)
PLATELET # BLD AUTO: 311 X10*3/UL (ref 150–450)
POTASSIUM SERPL-SCNC: 4.8 MMOL/L (ref 3.5–5.3)
PROT SERPL-MCNC: 5.9 G/DL (ref 6.4–8.2)
Q ONSET: 218 MS
QRS COUNT: 15 BEATS
QRS DURATION: 82 MS
QT INTERVAL: 378 MS
QTC CALCULATION(BAZETT): 472 MS
QTC FREDERICIA: 439 MS
R AXIS: 61 DEGREES
RBC # BLD AUTO: 3.18 X10*6/UL (ref 4.5–5.9)
SODIUM SERPL-SCNC: 136 MMOL/L (ref 136–145)
T AXIS: 67 DEGREES
T OFFSET: 407 MS
VENTRICULAR RATE: 94 BPM
WBC # BLD AUTO: 8.4 X10*3/UL (ref 4.4–11.3)

## 2025-02-13 PROCEDURE — 36415 COLL VENOUS BLD VENIPUNCTURE: CPT | Performed by: STUDENT IN AN ORGANIZED HEALTH CARE EDUCATION/TRAINING PROGRAM

## 2025-02-13 PROCEDURE — 99239 HOSP IP/OBS DSCHRG MGMT >30: CPT | Performed by: STUDENT IN AN ORGANIZED HEALTH CARE EDUCATION/TRAINING PROGRAM

## 2025-02-13 PROCEDURE — 80053 COMPREHEN METABOLIC PANEL: CPT | Performed by: STUDENT IN AN ORGANIZED HEALTH CARE EDUCATION/TRAINING PROGRAM

## 2025-02-13 PROCEDURE — 2500000004 HC RX 250 GENERAL PHARMACY W/ HCPCS (ALT 636 FOR OP/ED): Performed by: INTERNAL MEDICINE

## 2025-02-13 PROCEDURE — 2500000002 HC RX 250 W HCPCS SELF ADMINISTERED DRUGS (ALT 637 FOR MEDICARE OP, ALT 636 FOR OP/ED): Performed by: INTERNAL MEDICINE

## 2025-02-13 PROCEDURE — 70450 CT HEAD/BRAIN W/O DYE: CPT | Performed by: RADIOLOGY

## 2025-02-13 PROCEDURE — 2500000001 HC RX 250 WO HCPCS SELF ADMINISTERED DRUGS (ALT 637 FOR MEDICARE OP): Performed by: INTERNAL MEDICINE

## 2025-02-13 PROCEDURE — 85025 COMPLETE CBC W/AUTO DIFF WBC: CPT | Performed by: STUDENT IN AN ORGANIZED HEALTH CARE EDUCATION/TRAINING PROGRAM

## 2025-02-13 PROCEDURE — 2500000004 HC RX 250 GENERAL PHARMACY W/ HCPCS (ALT 636 FOR OP/ED): Performed by: STUDENT IN AN ORGANIZED HEALTH CARE EDUCATION/TRAINING PROGRAM

## 2025-02-13 PROCEDURE — 70450 CT HEAD/BRAIN W/O DYE: CPT

## 2025-02-13 PROCEDURE — 82947 ASSAY GLUCOSE BLOOD QUANT: CPT

## 2025-02-13 RX ADMIN — INSULIN LISPRO 4 UNITS: 100 INJECTION, SOLUTION INTRAVENOUS; SUBCUTANEOUS at 12:11

## 2025-02-13 RX ADMIN — REMDESIVIR 100 MG: 100 INJECTION, POWDER, LYOPHILIZED, FOR SOLUTION INTRAVENOUS at 13:23

## 2025-02-13 RX ADMIN — DEXAMETHASONE 6 MG: 6 TABLET ORAL at 10:27

## 2025-02-13 RX ADMIN — METOPROLOL SUCCINATE 50 MG: 50 TABLET, EXTENDED RELEASE ORAL at 10:27

## 2025-02-13 RX ADMIN — INSULIN LISPRO 2 UNITS: 100 INJECTION, SOLUTION INTRAVENOUS; SUBCUTANEOUS at 17:10

## 2025-02-13 RX ADMIN — POLYETHYLENE GLYCOL 3350 17 G: 17 POWDER, FOR SOLUTION ORAL at 10:39

## 2025-02-13 RX ADMIN — INSULIN LISPRO 2 UNITS: 100 INJECTION, SOLUTION INTRAVENOUS; SUBCUTANEOUS at 06:36

## 2025-02-13 ASSESSMENT — PAIN SCALES - GENERAL
PAINLEVEL_OUTOF10: 0 - NO PAIN
PAINLEVEL_OUTOF10: 0 - NO PAIN

## 2025-02-13 ASSESSMENT — COGNITIVE AND FUNCTIONAL STATUS - GENERAL
PERSONAL GROOMING: A LITTLE
EATING MEALS: A LITTLE
DRESSING REGULAR UPPER BODY CLOTHING: A LITTLE
DAILY ACTIVITIY SCORE: 19
MOBILITY SCORE: 20
DRESSING REGULAR LOWER BODY CLOTHING: A LITTLE
WALKING IN HOSPITAL ROOM: A LITTLE
MOBILITY SCORE: 20
HELP NEEDED FOR BATHING: A LITTLE
STANDING UP FROM CHAIR USING ARMS: A LITTLE
HELP NEEDED FOR BATHING: A LITTLE
TOILETING: A LITTLE
DRESSING REGULAR UPPER BODY CLOTHING: A LITTLE
STANDING UP FROM CHAIR USING ARMS: A LITTLE
MOVING TO AND FROM BED TO CHAIR: A LITTLE
CLIMB 3 TO 5 STEPS WITH RAILING: A LITTLE
CLIMB 3 TO 5 STEPS WITH RAILING: A LITTLE
DAILY ACTIVITIY SCORE: 18
TOILETING: A LITTLE
PERSONAL GROOMING: A LITTLE
MOVING TO AND FROM BED TO CHAIR: A LITTLE
DRESSING REGULAR LOWER BODY CLOTHING: A LITTLE
WALKING IN HOSPITAL ROOM: A LITTLE

## 2025-02-13 NOTE — PROGRESS NOTES
02/13/25 1459   Discharge Planning   Home or Post Acute Services Post acute facilities (Rehab/SNF/etc)   Type of Post Acute Facility Services Skilled nursing   Expected Discharge Disposition SNF  (Returning to Critical access hospital)   Does the patient need discharge transport arranged? Yes   RoundTrip coordination needed? Yes   What day is the transport expected? 02/13/25   What time is the transport expected? 1600     Pt medically ready for discharge. Care team and facility updated with discharge details. Pt and spouse updated and are in agreement with plan.

## 2025-02-13 NOTE — PROGRESS NOTES
Renal Progress Note  Assessment/  84 y.o. year old male who presented to the emergency department for further evaluation and management of s/p mechanical fall when he attempted by himself to get out of the toilet the patient is a resident of the nursing facility as he did hit his forehead they sent him to the emergency department where CT chest and pelvis multiple masslike area in the lung questionable endoleak that has been discussed in the emergency department with vascular that did decided no emergent intervention but to follow-up with vascular surgery the CT scan of the chest abdomen and pelvis was with IV contrast  Patient does have chronic comorbidity of dyslipidemia hypercholesterolemia diabetes type 2 essential hypertension coronary artery disease status post CABG COPD A-fib on Eliquis  Chronic kidney disease with baseline creatinine of 1.57 days ago today is 1.5 potassium 5.1 possible prerenal azotemia with some acute component in the observed GFR no major electrolyte imbalance gap metabolic acidosis     Plan/  Resolved acute kidney injury patient at his baseline we will sign off at discharge patient to call for appointment and lab within 1 to 2 weeks  outpatient follow up from renal standpoint: Dr. Valverde      Subjective:   Admit Date: 2/10/2025    Interval History: Uneventful night no apparent pain or distress      Medications:   Scheduled Meds:cefTRIAXone, 1 g, intravenous, q24h  dexAMETHasone, 6 mg, oral, Daily  insulin lispro, 0-10 Units, subcutaneous, TID AC  metoprolol succinate XL, 50 mg, oral, Daily  polyethylene glycol, 17 g, oral, Daily  pravastatin, 20 mg, oral, Nightly  remdesivir, 100 mg, intravenous, q24h  [Held by provider] valsartan, 80 mg, oral, Daily      Continuous Infusions:     CBC:   Lab Results   Component Value Date    HGB 8.3 (L) 02/13/2025    HGB 8.4 (L) 02/12/2025    WBC 8.4 02/13/2025    WBC 6.3 02/12/2025     02/13/2025     02/12/2025      Anemia:  No results  "found for: \"FERRITIN\", \"IRON\", \"TIBC\"   BMP:    Lab Results   Component Value Date     02/13/2025     02/12/2025    K 4.8 02/13/2025    K 4.7 02/12/2025     02/13/2025     (H) 02/12/2025    CO2 23 02/13/2025    CO2 22 02/12/2025    BUN 30 (H) 02/13/2025    BUN 24 (H) 02/12/2025    CREATININE 1.22 02/13/2025    CREATININE 1.19 02/12/2025      Bone disease: No results found for: \"PHOS\", \"PTH\", \"VITD25\"   Urinalysis:    Lab Results   Component Value Date    IVANNA 5.5 02/11/2025    PROTUR 20 (TRACE) 02/11/2025    GLUCOSEU Normal 02/11/2025    BLOODU NEGATIVE 02/11/2025    KETONESU NEGATIVE 02/11/2025    BILIRUBINU NEGATIVE 02/11/2025    NITRITEU NEGATIVE 02/11/2025    LEUKOCYTESU 75 Michell/uL (A) 02/11/2025        Objective:   Vitals: /62 (BP Location: Left arm, Patient Position: Sitting)   Pulse 71   Temp 36.2 °C (97.2 °F) (Temporal)   Resp 22   Ht 1.727 m (5' 8\")   Wt 61.2 kg (134 lb 14.7 oz)   SpO2 97%   BMI 20.51 kg/m²    Wt Readings from Last 3 Encounters:   02/10/25 61.2 kg (134 lb 14.7 oz)   02/04/25 63 kg (139 lb)   07/17/24 63 kg (139 lb)      24HR INTAKE/OUTPUT:    Intake/Output Summary (Last 24 hours) at 2/13/2025 1201  Last data filed at 2/13/2025 0550  Gross per 24 hour   Intake 170 ml   Output 1000 ml   Net -830 ml     Admission weight:  Weight: 67.6 kg (149 lb)      Constitutional:  Alert, awake, no apparent distress   Skin:normal, no rash  HEENT:sclera anicteric.  Head atraumatic normocephalic  Neck:supple with no thyromegally  Cardiovascular:  S1, S2 without m/r/g   Respiratory:  CTA B without w/r/r   Abdomen: +bs, soft, nt  Ext: no LE edema  Musculoskeletal:Intact  Neuro:Alert and oriented with no deficit      Electronically signed by Danie Infante MD on 2/13/2025 at 12:01 PM            "

## 2025-02-13 NOTE — CARE PLAN
The patient's goals for the shift include breating better    The clinical goals for the shift include Hemodynamically stable    Over the shift, the patient did not make progress toward the following goals. Barriers to progression include . Recommendations to address these barriers include .

## 2025-02-13 NOTE — CARE PLAN
The patient's goals for the shift include breating better    The clinical goals for the shift include Hemodynamically stable      Problem: Skin  Goal: Decreased wound size/increased tissue granulation at next dressing change  Outcome: Progressing  Goal: Participates in plan/prevention/treatment measures  Outcome: Progressing  Goal: Prevent/manage excess moisture  Outcome: Progressing  Goal: Prevent/minimize sheer/friction injuries  Outcome: Progressing  Goal: Promote/optimize nutrition  Outcome: Progressing  Goal: Promote skin healing  Outcome: Progressing

## 2025-02-13 NOTE — PROGRESS NOTES
Medical Group Progress Note  ASSESSMENT & PLAN:        Assessments / Plan  UTI - status post fall and mild confusion. Continue antibiotics  COPD - 2L oxygen from home  COVID 19 + - continue remdesivir/decadron  Low Albumin 3.1 - follow up with nutritionist   R/O lung cancer d/t multiple mass-like areas in lungs on CT scan - Following pulmonology   R/O hygroma d/t increase CSF - pending CT scan before discharge. Following neurology  DMII- continue sliding scale per protocol. Hold Metformin  HTN- continue metoprolol  HLD- continue Stating  AFIB - Hold eliquis x2 wks per neurology      VTE Prophylaxis: SCD      This note was completed for educational purpose by Prince KAMILLE Sarah, RN - Student Nurse Practitioner     SUBJECTIVE     Patient states that he's ready to go home to his wife. Denies headache, cough, difficulty breathing and shortness of breath.     OBJECTIVE:     Last Recorded Vitals:  Vitals:    02/12/25 0738 02/12/25 1520 02/13/25 0048 02/13/25 0742   BP: 152/81 118/56 122/60 125/62   BP Location:    Left arm   Patient Position:    Sitting   Pulse:  65  71   Resp:   16 22   Temp: 36.2 °C (97.2 °F) 36.3 °C (97.3 °F) 36 °C (96.8 °F) 36.2 °C (97.2 °F)   TempSrc:    Temporal   SpO2: 94% 98% 99% 97%   Weight:       Height:         Last I/O:  I/O last 3 completed shifts:  In: 220 (3.6 mL/kg) [IV Piggyback:220]  Out: 1000 (16.3 mL/kg) [Urine:1000 (0.5 mL/kg/hr)]  Weight: 61.2 kg     Physical Exam  Vitals and nursing note reviewed.   Constitutional:       Appearance: Normal appearance.   HENT:      Head: Normocephalic and atraumatic.      Nose: Nose normal.      Mouth/Throat:      Mouth: Mucous membranes are moist.   Eyes:      Extraocular Movements: Extraocular movements intact.      Pupils: Pupils are equal, round, and reactive to light.   Cardiovascular:      Rate and Rhythm: Normal rate and regular rhythm.      Pulses: Normal pulses.      Heart sounds: Normal heart sounds.   Pulmonary:      Effort: Pulmonary  effort is normal.      Breath sounds: Normal breath sounds.   Abdominal:      General: Bowel sounds are normal.      Palpations: Abdomen is soft.   Musculoskeletal:         General: Normal range of motion.      Cervical back: Normal range of motion and neck supple.   Skin:     General: Skin is warm and dry.      Capillary Refill: Capillary refill takes less than 2 seconds.   Neurological:      General: No focal deficit present.      Mental Status: He is alert and oriented to person, place, and time. Mental status is at baseline.   Psychiatric:         Mood and Affect: Mood normal.         Behavior: Behavior normal.         Inpatient Medications:  cefTRIAXone, 1 g, intravenous, q24h  dexAMETHasone, 6 mg, oral, Daily  insulin lispro, 0-10 Units, subcutaneous, TID AC  metoprolol succinate XL, 50 mg, oral, Daily  polyethylene glycol, 17 g, oral, Daily  pravastatin, 20 mg, oral, Nightly  remdesivir, 100 mg, intravenous, q24h  [Held by provider] valsartan, 80 mg, oral, Daily    PRN Medications  PRN medications: acetaminophen **OR** acetaminophen **OR** acetaminophen, dextrose, dextrose, glucagon, glucagon  Continuous Medications:     LABS AND IMAGING:     Labs:  Results from last 7 days   Lab Units 02/13/25  0618 02/12/25  0647 02/11/25  0625   WBC AUTO x10*3/uL 8.4 6.3 9.9   RBC AUTO x10*6/uL 3.18* 3.24* 3.17*   HEMOGLOBIN g/dL 8.3* 8.4* 8.3*   HEMATOCRIT % 27.8* 28.0* 27.4*   MCV fL 87 86 86   MCH pg 26.1 25.9* 26.2   MCHC g/dL 29.9* 30.0* 30.3*   RDW % 15.1* 15.3* 15.3*   PLATELETS AUTO x10*3/uL 311 358 346     Results from last 7 days   Lab Units 02/13/25  0702 02/12/25  0647 02/11/25  0625 02/10/25  1316   SODIUM mmol/L 136 138 137 135*   POTASSIUM mmol/L 4.8 4.7 4.6 5.1   CHLORIDE mmol/L 107 108* 108* 106   CO2 mmol/L 23 22 21 18*   BUN mg/dL 30* 24* 22 23   CREATININE mg/dL 1.22 1.19 1.36* 1.53*   GLUCOSE mg/dL 152* 143* 107* 147*   PROTEIN TOTAL g/dL 5.9* 6.5  --  6.7   CALCIUM mg/dL 8.6 8.8 8.4* 8.7   BILIRUBIN  TOTAL mg/dL 0.3 0.4  --  0.6   ALK PHOS U/L 55 60  --  66   AST U/L 14 14  --  19   ALT U/L 11 10  --  9*         Results from last 7 days   Lab Units 02/10/25  1349 02/10/25  1316   TROPHS ng/L 10 9     Imaging:  ECG 12 lead  Atrial fibrillation  Nonspecific ST abnormality  Abnormal ECG  When compared with ECG of 04-FEB-2025 19:53, (unconfirmed)  Previous ECG has undetermined rhythm, needs review  Nonspecific T wave abnormality no longer evident in Inferior leads  Nonspecific T wave abnormality, improved in Anterolateral leads  QT has lengthened  See ED provider note for full interpretation and clinical correlation  Confirmed by Kiah Herbert ([id number]) on 2/13/2025 7:08:31 AM

## 2025-02-13 NOTE — DISCHARGE SUMMARY
Medical Group Discharge Summary  DISCHARGE DIAGNOSIS     Mechanical fall  Complicated UTI  HUSSEIN, resolved  Hygromas versus subdural hematoma  COVID-19  Chronic respiratory failure on home oxygen  COPD not in exacerbation    HOSPITAL COURSE AND DETAILS     This is an 84-year-old male with a significant past medical history of type II DM, hyperlipidemia, hypertension, CAD with post CABG, COPD, chronic respiratory failure on home oxygen, atrial fibrillation, long-term anticoagulation use presented from his long-term care facility after a fall.    Patient was found to have concerns for increased CSF/hygroma versus hematoma in the brain.  Neurosurgery contacted downtown.  Repeat CT head completed on 2/10 2/10 2/13 along with MRI brain on 2/11.  Neurosurgery contacted recommended holding apixaban at least for 2 weeks at this time.  Will need outpatient follow-up with neurosurgery to discuss hospitalization and hygroma follow-up.  CT head repeat ordered for 2 weeks from now.    Patient's mentation remained at his baseline.  COVID-19 positive however remained on his baseline oxygen.    Total time spent on discharge: >30min      ---Of note, this documentation is completed using the Dragon Dictation system (voice recognition software). There may be spelling and/or grammatical errors that were not corrected prior to final submission.---    Donavon Cody MD    DISCHARGE PHYSICAL EXAM     Last Recorded Vitals:  Vitals:    02/12/25 0738 02/12/25 1520 02/13/25 0048 02/13/25 0742   BP: 152/81 118/56 122/60 125/62   BP Location:    Left arm   Patient Position:    Sitting   Pulse:  65  71   Resp:   16 22   Temp: 36.2 °C (97.2 °F) 36.3 °C (97.3 °F) 36 °C (96.8 °F) 36.2 °C (97.2 °F)   TempSrc:    Temporal   SpO2: 94% 98% 99% 97%   Weight:       Height:         Physical Exam  Vitals reviewed.   Constitutional:       General: He is not in acute distress.     Appearance: Normal appearance. He is not ill-appearing.   HENT:      Head:  Normocephalic and atraumatic.   Eyes:      Extraocular Movements: Extraocular movements intact.      Conjunctiva/sclera: Conjunctivae normal.   Cardiovascular:      Rate and Rhythm: Normal rate and regular rhythm.      Pulses: Normal pulses.      Heart sounds: Normal heart sounds.   Pulmonary:      Effort: Pulmonary effort is normal.      Breath sounds: Normal breath sounds.   Abdominal:      General: Bowel sounds are normal. There is no distension.      Palpations: Abdomen is soft.      Tenderness: There is no abdominal tenderness. There is no guarding.   Musculoskeletal:         General: No swelling or tenderness. Normal range of motion.      Cervical back: Normal range of motion and neck supple.   Skin:     Findings: Erythema present.      Comments: Diffuse areas of bruising in the upper extremities   Neurological:      General: No focal deficit present.      Mental Status: He is alert and oriented to person, place, and time. Mental status is at baseline.       DISCHARGE MEDICATIONS        Your medication list        CONTINUE taking these medications        Instructions Last Dose Given Next Dose Due   albuterol 90 mcg/actuation inhaler           cyanocobalamin 1,000 mcg tablet  Commonly known as: Vitamin B-12           magnesium oxide 400 mg magnesium capsule           metFORMIN 500 mg tablet  Commonly known as: Glucophage           metoprolol succinate XL 50 mg 24 hr tablet  Commonly known as: Toprol-XL      Take 1 tablet (50 mg) by mouth once daily.       pitavastatin calcium 4 mg tablet  Commonly known as: Livalo      Take 1 tablet by mouth once daily at bedtime.       valsartan 80 mg tablet  Commonly known as: Diovan      Take 1 tablet (80 mg) by mouth once daily.              STOP taking these medications      apixaban 5 mg tablet  Commonly known as: Eliquis               ASK your doctor about these medications        Instructions Last Dose Given Next Dose Due   nitroglycerin 0.4 mg SL tablet  Commonly known  as: Nitrostat      DISSOLVE 1 TABLET UNDER THE TONGUE AS NEEDED FOR CHEST PAIN- MAY REPEAT EVERY 5 MINUTES IF NEEDED ( MAX 3 DOSES.- IF NO RELIEF CALL 911)                  OUTPATIENT FOLLOW-UP     No future appointments.

## 2025-02-13 NOTE — DOCUMENTATION CLARIFICATION NOTE
"    PATIENT:               KELVIN BRASWELL  ACCT #:                  [id number]  MRN:                       [id number]  :                       1940  ADMIT DATE:       2/10/2025 12:53 PM  DISCH DATE:  RESPONDING PROVIDER #:        [id number]          PROVIDER RESPONSE TEXT:    Chronic Diastolic Congestive Heart Failure    CDI QUERY TEXT:    Clarification    Instruction:    Based on your assessment of the patient and the clinical information, please provide the requested documentation by clicking on the appropriate radio button and enter any additional information if prompted.    Question: Please further clarify the type and acuity of congestive heart failure    When answering this query, please exercise your independent professional judgment. The fact that a question is being asked, does not imply that any particular answer is desired or expected.    The patient's clinical indicators include:  Clinical Information:  * 84 year old male admitted after fall    Clinical Indicators:  * 25 Nephrology consult notes \"CHF'  * 24 historical data ECHO \"CONCLUSIONS:  1. The left ventricular systolic function is mildly decreased, with a visually estimated ejection fraction of 45-50%.  2. There is global hypokinesis of the left ventricle with minor regional variations.  3. RVSP 40 mm Hg.  4. There is normal right ventricular global systolic function.  5. Tr MR.  6. Mildly elevated RVSP.\"    Treatment:  * Maintain daily Toprol -XL 50mg    Risk Factors:  *HTN with CKD- DM ad current Covid +  Options provided:  -- Chronic Systolic Congestive Heart Failure  -- Chronic Diastolic Congestive Heart Failure  -- Chronic combined systolic/diastolic Congestive Heart Failure  -- Other - I will add my own diagnosis  -- Refer to Clinical Documentation Reviewer    Query created by: Oly Gonzalez on 2025 8:57 AM      Electronically signed by:  LILLIAM LOBATO MD 2025 11:20 AM          "

## 2025-02-13 NOTE — DISCHARGE INSTRUCTIONS
FYI for skilled nursing facility staff: Patient will need repeat CT head completed on 2/27 as ordered.  Will hold Eliquis at this time until follow-up with neurosurgery, referral placed in chart for  neurosurgery.

## 2025-02-13 NOTE — PROGRESS NOTES
Jaquan Gambino is a 84 y.o. male on day 3 of admission presenting with Fall, initial encounter.      Patient was seen and examined bedside this morning.  Was alert oriented to himself, hospital, month and year.  Wife at bedside states is closer to baseline today but still confused.  Would still like SNF on discharge.     OBJECTIVE:      Last Recorded Vitals:  Vitals               Vitals:     02/11/25 1232 02/11/25 1441 02/12/25 0023 02/12/25 0738   BP:   151/74 151/82 152/81   BP Location:           Patient Position:           Pulse: 86 88 86     Resp:   20       Temp:   36.6 °C (97.9 °F) 36.3 °C (97.3 °F) 36.2 °C (97.2 °F)   TempSrc:           SpO2: 96% 98% 99% 94%   Weight:           Height:                 Last I/O:  I/O last 3 completed shifts:  In: 164 (2.7 mL/kg) [I.V.:64 (1 mL/kg); IV Piggyback:100]  Out: 850 (13.9 mL/kg) [Urine:850 (0.4 mL/kg/hr)]  Weight: 61.2 kg      Physical Exam  Vitals reviewed.   Constitutional:       General: He is not in acute distress.     Appearance: Normal appearance. He is not ill-appearing.   HENT:      Head: Normocephalic and atraumatic.   Eyes:      Extraocular Movements: Extraocular movements intact.      Conjunctiva/sclera: Conjunctivae normal.   Cardiovascular:      Rate and Rhythm: Normal rate and regular rhythm.      Pulses: Normal pulses.      Heart sounds: Normal heart sounds.   Pulmonary:      Effort: Pulmonary effort is normal.      Breath sounds: Normal breath sounds.   Abdominal:      General: Bowel sounds are normal. There is no distension.      Palpations: Abdomen is soft.      Tenderness: There is no abdominal tenderness. There is no guarding.   Musculoskeletal:         General: No swelling or tenderness. Normal range of motion.      Cervical back: Normal range of motion and neck supple.   Neurological:      General: No focal deficit present.      Mental Status: He is alert and oriented to person, place, and time. Mental status is at baseline.   Psychiatric:          Mood and Affect: Mood normal.         Behavior: Behavior normal.      Inpatient Medications:     Scheduled Medications   cefTRIAXone, 1 g, intravenous, q24h  dexAMETHasone, 6 mg, oral, Daily  insulin lispro, 0-10 Units, subcutaneous, TID AC  metoprolol succinate XL, 50 mg, oral, Daily  polyethylene glycol, 17 g, oral, Daily  pravastatin, 20 mg, oral, Nightly  remdesivir, 100 mg, intravenous, q24h  [Held by provider] valsartan, 80 mg, oral, Daily      PRN Medications  PRN Medications   PRN medications: acetaminophen **OR** acetaminophen **OR** acetaminophen, dextrose, dextrose, glucagon, glucagon      Continuous Medications:  Continuous Medications          LABS AND IMAGING:      Labs:              Results from last 7 days   Lab Units 02/12/25  0647 02/11/25  0625 02/10/25  1316   WBC AUTO x10*3/uL 6.3 9.9 12.0*   RBC AUTO x10*6/uL 3.24* 3.17* 3.49*   HEMOGLOBIN g/dL 8.4* 8.3* 9.3*   HEMATOCRIT % 28.0* 27.4* 30.9*   MCV fL 86 86 89   MCH pg 25.9* 26.2 26.6   MCHC g/dL 30.0* 30.3* 30.1*   RDW % 15.3* 15.3* 15.3*   PLATELETS AUTO x10*3/uL 358 346 398                  Results from last 7 days   Lab Units 02/12/25  0647 02/11/25  0625 02/10/25  1316   SODIUM mmol/L 138 137 135*   POTASSIUM mmol/L 4.7 4.6 5.1   CHLORIDE mmol/L 108* 108* 106   CO2 mmol/L 22 21 18*   BUN mg/dL 24* 22 23   CREATININE mg/dL 1.19 1.36* 1.53*   GLUCOSE mg/dL 143* 107* 147*   PROTEIN TOTAL g/dL 6.5  --  6.7   CALCIUM mg/dL 8.8 8.4* 8.7   BILIRUBIN TOTAL mg/dL 0.4  --  0.6   ALK PHOS U/L 60  --  66   AST U/L 14  --  19   ALT U/L 10  --  9*                    Results from last 7 days   Lab Units 02/10/25  1349 02/10/25  1316   TROPHS ng/L 10 9      Imaging:  MR brain w and wo IV contrast  Narrative: Interpreted By:  Jaquan Pacheco,   STUDY:  MR BRAIN W AND WO IV CONTRAST;  2/11/2025 9:40 pm      INDICATION:  Signs/Symptoms:hygroma on ct.          COMPARISON:  CT head dated 02/11/2025.      ACCESSION NUMBER(S):  [id number]      ORDERING  CLINICIAN:  LUIS MANUEL SANCHEZ      TECHNIQUE:  Standard multiplanar multisequence MR imaging was performed through  the brain prior to and following administration of 12 ML Dotarem  intravenous contrast.      Motion artifact mildly degrades assessment on several sequences.      FINDINGS:  Parenchyma: There is no diffusion restriction abnormality to suggest  acute infarct.  No evidence of recent intraparenchymal hemorrhage.  Suspected tiny remote microhemorrhage within the left corona radiata  (series 7, image 24). No abnormal parenchymal or leptomeningeal  enhancement. There is mild diffuse dural thickening and enhancement  along the bilateral cerebral convexities. There is mild sulcal  effacement along the right cerebral convexity from subdural fluid  collection. There is up to 6 mm right-to-left midline shift as  measured at the septum pellucidum near the foramen of Monro. No  evidence of brainstem sag. Pituitary is unremarkable for age. Mild  burden of scattered T2/FLAIR white matter hyperintensities throughout  the bilateral cerebral hemispheres and central bruce, favor chronic  small vessel ischemic change.      CSF Spaces: Moderate generalized brain atrophy. No hydrocephalus.  Basilar cisterns are patent.      Extra-axial spaces: There is a right subdural fluid collection  measuring up to 11 mm in thickness along the right frontal convexity  (series 5, image 26) and questionable trace component along the left  cerebral convexity, difficult to differentiate from dural thickening.  There are trace elements of GRE hypointensity that could reflect  hemosiderin staining or dural calcification      Intracranial Flow Voids: Patent appearing.      Paranasal Sinuses: Dependent secretions within the right posterior  ethmoidal region. Otherwise mild scattered mucosal thickening of the  paranasal sinuses without opacification.      Mastoids: Trace dependent fluid within the mastoids. Otherwise well  aerated.      Orbits:  Bilateral native lens extractions.      Calvarium: No suspicious osseous marrow signal.      Impression: 1. Unchanged size of right convexity subdural fluid collection that  may represent hygroma versus hematoma component measuring up to 11 mm  in maximum thickness. There is mild sulcal effacement along the right  cerebral convexity with unchanged 6 mm right-to-left midline shift.  Questionable trace subdural fluid component along the left cerebral  convexity. Otherwise prominent subarachnoid spaces with moderate  parenchymal volume loss.  2. There is an element of diffuse dural thickening and enhancement  bilaterally, possibly reactive. There is no discrete nodular or  masslike elements of dural thickening/enhancement. No overt stigmata  of intracranial hypotension otherwise identified, correlate with  clinical symptoms.  3. No acute infarct. No abnormal parenchymal enhancement. Mild  chronic small vessel ischemic change.      MACRO:  None      Signed by: Jaquan Pacheco 2/12/2025 8:23 AM  Dictation workstation:   [id number]  ECG 12 lead  Atrial fibrillation  Nonspecific ST abnormality  Abnormal ECG  When compared with ECG of 04-FEB-2025 19:53, (unconfirmed)  Previous ECG has undetermined rhythm, needs review  Nonspecific T wave abnormality no longer evident in Inferior leads  Nonspecific T wave abnormality, improved in Anterolateral leads  QT has lengthened     ASSESSMENT & PLAN:      Concerns for hygroma with increase in the CSF extra axial space  -CT head showed a increase in the CSF extra-axial space compared to his previous CT head.    Repeat CT head shows re demonstration of subdural collections overlying the cerebral hemispheres  - Conservative F/U per  [facility] and no Eliquis x 2 weeks  - neuro checks     Concern for UTI  - Was started with ciprofloxacin outpatient settings  - CTX  - need UA/UCx     HUSSEIN  - stop IVF  - nephro following     Lactic acidosis  Resolved     Positive for COVID-19 incidental  finding  COPD not in exacerbation  - on home 2L NC  - start decadron/ remdesivir     Concerns for masses in the lung  -CT chest and pelvis showed multiple masslike areas in the lung on the left.- Likely collection of fluid/blood infections as well as lung parenchyma  - patient will need outpatient follow-up as well.(Dr. Oliveira- [facility] Pulmonologist     Concerns for endoleak of the graft  - CT chest and pelvis showed multiple masslike areas in the lung on the left. There was a questionable endoleak of his graft and lack of perfusion in the left kidney lower pole this was also discussed by the ER physician with vascular surgery and they did not feel there was any emergent intervention needed and recommended to follow with vascular surgery in outpatient settings.      Normocytic anemia  - monitor     Diabetes mellitus type 2  - hold metformin  - ISS     Atrial fibrillation  - metoprolol  - holding eliquis for 2 weeks and will need repeat ct head in 2 weeks for clearance to resume eliquis per neurosurg     HLD  - statin     HTN  - metoprolol  - hold valsartan     Diet: regular  DVT ppx: SCD  Dispo: monitor clinically      2/13/2025:  -   - Patient's mentation alert and oriented x 3 today.      - Plan for SNF today  - Continue holding apixaban for at least 2 weeks per previous discussions with neurosurgery by previous provider  - Continue IV antibiotics, dexamethasone as ordered with end date, remdesivir to complete on 2/13 (today)  - OK to D/C per Pulm.  D/W Wife- Patient to F/U with [facility] for Abnormal findings.     VTE Prophylaxis: SCDs            I spent 25 minutes in the professional and overall care of this patient.      Clarence Hernandez MD

## 2025-02-18 ENCOUNTER — OFFICE VISIT (OUTPATIENT)
Dept: GERIATRIC MEDICINE | Age: 85
End: 2025-02-18

## 2025-02-18 DIAGNOSIS — I48.20 CHRONIC ATRIAL FIBRILLATION (HCC): Primary | ICD-10-CM

## 2025-02-18 DIAGNOSIS — I73.9 PVD (PERIPHERAL VASCULAR DISEASE): ICD-10-CM

## 2025-02-18 DIAGNOSIS — J44.9 CHRONIC OBSTRUCTIVE PULMONARY DISEASE, UNSPECIFIED COPD TYPE (HCC): ICD-10-CM

## 2025-02-18 NOTE — DOCUMENTATION CLARIFICATION NOTE
"    PATIENT:               KELVIN BRASWELL  ACCT #:                  [id number]  MRN:                       [id number]  :                       1940  ADMIT DATE:       2/10/2025 12:53 PM  DISCH DATE:        2025 6:29 PM  RESPONDING PROVIDER #:        [id number]          PROVIDER RESPONSE TEXT:    Hygroma with increase in the CSF extra axial space    CDI QUERY TEXT:    Clarification    Instruction:    Based on your assessment of the patient and the clinical information, please provide the requested documentation by clicking on the appropriate radio button and enter any additional information if prompted.    Question: Please further clarify after study the diagnosis of Hygroma vs subdural hematoma location ICH as    When answering this query, please exercise your independent professional judgment. The fact that a question is being asked, does not imply that any particular answer is desired or expected.    The patient's clinical indicators include:  Clinical Information:  * 84 year old male to ED from ECF after fall and hitting his forehead    Clinical Indicators:  * 2/10/25 H&P notes \"CT head showed a increase in the CSF extra-axial space compared to his previous CT head\"  * 25 repeat CT per Neurology recommendations- \"There are bilateral subdural collections. The left is very small an uniformly hypoattenuating. It is unchanged from prior. The right is somewhat larger. It is mostly hypoattenuating and its attenuation has decreased compared to the prior. The size has  decreased also. Midline shift of the septum pellucidum has decreased from 7 mm on the prior exam to 4 mm on this exam. For no overt effacement of sulci is seen\"  * 25 Discharge summary notes \"Hygromas versus subdural hematoma\"    Treatment:  * Admission- Neurology consult-via phone in ED- repeat CT     Risk Factors:  * fall and forehead injury  Options provided:  -- Traumatic subdural  ICH due to fall, Please specify additional " information below  -- Non-Traumatic subdural  ICH, Please specify additional information below  -- Hygroma with increase in the CSF extra axial space  -- Other - I will add my own diagnosis  -- Refer to Clinical Documentation Reviewer    Query created by: Oly Gonzalez on 2/18/2025 10:50 AM      Electronically signed by:  LILLIAM LOBATO MD 2/18/2025 10:56 AM

## 2025-02-20 ASSESSMENT — ENCOUNTER SYMPTOMS: BACK PAIN: 1

## 2025-02-20 NOTE — PROGRESS NOTES
Patient Name: Ruben Gonsalez     YOB: 1940  Medical Record Number: [id number]    History of Present Illness:  84-year-old gentleman is admitted here for recent hospitalization.  Patient had a fall without syncopal episodes or head injury.  Patient had COVID-19 patient had been globally weak patient has a history of chronic kidney disease stage IIIb at baseline does follow-up with nephrologist patient has renal function diabetes known history of aortic aneurysm.  Patient was hospitalized underwent course of evaluation nutrition support skin surveillance.  Patient was also felt to be too weak to return home so was transferred for course rotation prior to return to the home setting.    Review of Systems   Constitutional:  Positive for activity change, appetite change and fatigue.   Musculoskeletal:  Positive for arthralgias and back pain.   Skin:  Positive for pallor.   All other systems reviewed and are negative.      Review of Systems: All 14 review of systems negative other than as stated above    Social History     Tobacco Use    Smoking status: Former     Types: Cigarettes     Passive exposure: Past    Smokeless tobacco: Never    Tobacco comments:     3 cigarettes/month   Vaping Use    Vaping status: Never Used   Substance Use Topics    Alcohol use: No    Drug use: No         Past Medical History:   Diagnosis Date    MARIAM (acute kidney injury) 06/07/2022    Atrial fibrillation (HCC)     CAD (coronary artery disease)     cardiac stents    COPD (chronic obstructive pulmonary disease) (HCC)     Coronary angioplasty status 11/26/2018    Diabetes mellitus (HCC)     Hyperlipidemia     Hypertension     Lumbosacral disc disease     Movement disorder     Osteoarthritis     Pneumonia            Past Surgical History:   Procedure Laterality Date    APPENDECTOMY      COLONOSCOPY N/A 5/17/2024    COLONOSCOPY DIAGNOSTIC performed by Surinder Chavarria MD at [facility]    CORONARY ANGIOPLASTY WITH STENT PLACEMENT

## 2025-02-21 PROBLEM — Q04.6 COLLOID CYST OF THIRD VENTRICLE (HCC): Status: RESOLVED | Noted: 2020-02-03 | Resolved: 2025-02-21

## 2025-02-22 NOTE — PROGRESS NOTES
SUBJECTIVE:        ROS:  The rest of the 14 point ROS negative    PHYSICAL EXAM: VSS per facility record      ASSESSMENT & PLAN:   Diagnosis Orders   1. Diabetes mellitus due to underlying condition with diabetic nephropathy, unspecified whether long term insulin use ([facility])        2. Anemia in stage 3 chronic kidney disease, unspecified whether stage 3a or 3b CKD ([facility])                      Past Medical History:   Diagnosis Date    MARIAM (acute kidney injury) 06/07/2022    Atrial fibrillation ([facility])     CAD (coronary artery disease)     cardiac stents    COPD (chronic obstructive pulmonary disease) ([facility])     Coronary angioplasty status 11/26/2018    Diabetes mellitus ([facility])     Hyperlipidemia     Hypertension     Lumbosacral disc disease     Movement disorder     Osteoarthritis     Pneumonia          Past Surgical History:   Procedure Laterality Date    APPENDECTOMY      COLONOSCOPY N/A 5/17/2024    COLONOSCOPY DIAGNOSTIC performed by Surinder Chavarria MD at [facility]    CORONARY ANGIOPLASTY WITH STENT PLACEMENT      4    CORONARY ARTERY BYPASS GRAFT      triple bypass    EYE SURGERY Bilateral     cataract surgery    INSERTABLE CARDIAC MONITOR Left 12/2018    UPPER GASTROINTESTINAL ENDOSCOPY N/A 10/13/2022    EGD ESOPHAGOGASTRODUODENOSCOPY WITH DILATION performed by Surinder Chavarria MD at [facility]    UPPER GASTROINTESTINAL ENDOSCOPY N/A 9/4/2023    EGD ESOPHAGOGASTRODUODENOSCOPY WITH FOREIGN BODY REMOVAL performed by David Mann MD at [facility]    UPPER GASTROINTESTINAL ENDOSCOPY N/A 5/17/2024    ESOPHAGOGASTRODUODENOSCOPY DIAGNOSTIC ONLY performed by Surinder Chavarria MD at [facility]         Current Outpatient Medications on File Prior to Visit   Medication Sig Dispense Refill    losartan (COZAAR) 50 MG tablet Take 1 tablet by mouth daily      nitroGLYCERIN (NITROSTAT) 0.4 MG SL tablet Place 1 tablet under the tongue every 5 minutes as needed for Chest pain DISSOLVE 1 TABLET UNDER THE TONGUE

## 2025-02-25 ENCOUNTER — OFFICE VISIT (OUTPATIENT)
Dept: GERIATRIC MEDICINE | Age: 85
End: 2025-02-25

## 2025-02-25 DIAGNOSIS — I10 PRIMARY HYPERTENSION: ICD-10-CM

## 2025-02-25 DIAGNOSIS — J44.9 CHRONIC OBSTRUCTIVE PULMONARY DISEASE, UNSPECIFIED COPD TYPE (HCC): ICD-10-CM

## 2025-02-25 DIAGNOSIS — D63.1 ANEMIA IN CHRONIC KIDNEY DISEASE (CODE): Primary | ICD-10-CM

## 2025-02-25 DIAGNOSIS — I48.0 PAROXYSMAL ATRIAL FIBRILLATION (HCC): ICD-10-CM

## 2025-02-27 ENCOUNTER — OFFICE VISIT (OUTPATIENT)
Dept: GERIATRIC MEDICINE | Age: 85
End: 2025-02-27

## 2025-02-27 DIAGNOSIS — D63.1 ANEMIA IN CHRONIC KIDNEY DISEASE (CODE): Primary | ICD-10-CM

## 2025-02-27 DIAGNOSIS — I48.20 CHRONIC ATRIAL FIBRILLATION (HCC): ICD-10-CM

## 2025-02-27 DIAGNOSIS — J44.9 CHRONIC OBSTRUCTIVE PULMONARY DISEASE, UNSPECIFIED COPD TYPE (HCC): ICD-10-CM

## 2025-03-01 LAB
ALT SERPL-CCNC: 31 U/L (ref 0–41)
ANISOCYTOSIS BLD QL SMEAR: ABNORMAL
AST SERPL-CCNC: 23 U/L (ref 0–40)
BASOPHILS # BLD: 0 K/UL (ref 0–0.2)
BASOPHILS NFR BLD: 0.1 %
EOSINOPHIL # BLD: 0 K/UL (ref 0–0.7)
EOSINOPHIL NFR BLD: 0.1 %
ERYTHROCYTE [DISTWIDTH] IN BLOOD BY AUTOMATED COUNT: 15.6 % (ref 11.5–14.5)
HCT VFR BLD AUTO: 30 % (ref 42–52)
HGB BLD-MCNC: 8.9 G/DL (ref 14–18)
HYPOCHROMIA BLD QL SMEAR: ABNORMAL
LYMPHOCYTES # BLD: 0.5 K/UL (ref 1–4.8)
LYMPHOCYTES NFR BLD: 3.5 %
MCH RBC QN AUTO: 25.9 PG (ref 27–31.3)
MCHC RBC AUTO-ENTMCNC: 29.7 % (ref 33–37)
MCV RBC AUTO: 87.5 FL (ref 79–92.2)
MONOCYTES # BLD: 0.5 K/UL (ref 0.2–0.8)
MONOCYTES NFR BLD: 3.2 %
NEUTROPHILS # BLD: 13 K/UL (ref 1.4–6.5)
NEUTS SEG NFR BLD: 91.5 %
OVALOCYTES BLD QL SMEAR: ABNORMAL
PLATELET # BLD AUTO: 302 K/UL (ref 130–400)
PLATELET BLD QL SMEAR: ABNORMAL
POIKILOCYTOSIS BLD QL SMEAR: ABNORMAL
POLYCHROMASIA BLD QL SMEAR: ABNORMAL
RBC # BLD AUTO: 3.43 M/UL (ref 4.7–6.1)
SLIDE REVIEW: ABNORMAL
WBC # BLD AUTO: 14.2 K/UL (ref 4.8–10.8)

## 2025-03-02 ENCOUNTER — OFFICE VISIT (OUTPATIENT)
Dept: GERIATRIC MEDICINE | Age: 85
End: 2025-03-02

## 2025-03-02 DIAGNOSIS — I73.9 PVD (PERIPHERAL VASCULAR DISEASE): Primary | ICD-10-CM

## 2025-03-02 DIAGNOSIS — I10 PRIMARY HYPERTENSION: ICD-10-CM

## 2025-03-02 DIAGNOSIS — R63.4 WEIGHT LOSS: ICD-10-CM

## 2025-03-06 ENCOUNTER — OFFICE VISIT (OUTPATIENT)
Dept: GERIATRIC MEDICINE | Age: 85
End: 2025-03-06

## 2025-03-06 DIAGNOSIS — D63.1 ANEMIA IN CHRONIC KIDNEY DISEASE (CODE): Primary | ICD-10-CM

## 2025-03-06 DIAGNOSIS — I48.91 ATRIAL FIBRILLATION, UNSPECIFIED TYPE (HCC): ICD-10-CM

## 2025-03-06 DIAGNOSIS — I73.9 PVD (PERIPHERAL VASCULAR DISEASE): ICD-10-CM

## 2025-03-06 DIAGNOSIS — J44.9 CHRONIC OBSTRUCTIVE PULMONARY DISEASE, UNSPECIFIED COPD TYPE (HCC): ICD-10-CM

## 2025-03-07 PROBLEM — I25.2 HISTORY OF ACUTE MYOCARDIAL INFARCTION: Status: ACTIVE | Noted: 2021-12-27

## 2025-03-11 ENCOUNTER — OFFICE VISIT (OUTPATIENT)
Dept: GERIATRIC MEDICINE | Age: 85
End: 2025-03-11
Payer: MEDICARE

## 2025-03-11 DIAGNOSIS — N18.30 ANEMIA IN STAGE 3 CHRONIC KIDNEY DISEASE, UNSPECIFIED WHETHER STAGE 3A OR 3B CKD: Primary | ICD-10-CM

## 2025-03-11 DIAGNOSIS — D63.1 ANEMIA IN STAGE 3 CHRONIC KIDNEY DISEASE, UNSPECIFIED WHETHER STAGE 3A OR 3B CKD: Primary | ICD-10-CM

## 2025-03-11 DIAGNOSIS — I10 BENIGN ESSENTIAL HYPERTENSION: ICD-10-CM

## 2025-03-11 DIAGNOSIS — J44.9 CHRONIC OBSTRUCTIVE PULMONARY DISEASE, UNSPECIFIED COPD TYPE (HCC): ICD-10-CM

## 2025-03-11 DIAGNOSIS — I48.0 PAROXYSMAL ATRIAL FIBRILLATION (HCC): ICD-10-CM

## 2025-03-11 PROCEDURE — 1123F ACP DISCUSS/DSCN MKR DOCD: CPT | Performed by: INTERNAL MEDICINE

## 2025-03-11 PROCEDURE — 99309 SBSQ NF CARE MODERATE MDM 30: CPT | Performed by: INTERNAL MEDICINE

## 2025-03-12 ENCOUNTER — OFFICE VISIT (OUTPATIENT)
Dept: GERIATRIC MEDICINE | Age: 85
End: 2025-03-12

## 2025-03-12 DIAGNOSIS — R39.9 UTI SYMPTOMS: Primary | ICD-10-CM

## 2025-03-18 ENCOUNTER — OFFICE VISIT (OUTPATIENT)
Dept: GERIATRIC MEDICINE | Age: 85
End: 2025-03-18

## 2025-03-18 DIAGNOSIS — J44.9 CHRONIC OBSTRUCTIVE PULMONARY DISEASE, UNSPECIFIED COPD TYPE (HCC): ICD-10-CM

## 2025-03-18 DIAGNOSIS — I48.20 CHRONIC ATRIAL FIBRILLATION (HCC): ICD-10-CM

## 2025-03-18 DIAGNOSIS — D63.1 ANEMIA IN CHRONIC KIDNEY DISEASE (CODE): Primary | ICD-10-CM

## 2025-03-19 NOTE — PROGRESS NOTES
SUBJECTIVE:  84-year-old gentleman seen follow-up for functional weak coarse breath sounds coughing intermittently no recent nausea vomiting patient has ongoing slow weight loss functional decline known history of fluid accumulation has undergone prior evaluation for leak of his aortic bypass patient's functional stable      ROS: Coughing  The rest of the 14 point ROS negative    PHYSICAL EXAM: VSS per facility record  Frail-appearing pupils small ballotable wasting oral mucosa dry chest coarse breath sounds cardiovascular showed regular rate abdomen soft nontender    ASSESSMENT & PLAN:   Diagnosis Orders   1. Chronic atrial fibrillation ([facility])        2. Chronic obstructive pulmonary disease, unspecified COPD type ([facility])        3. PVD (peripheral vascular disease)          Notes no endorgan disease monitoring function stable blood pressure stable remains anticoagulated no new bleeding diathesis globally weak            Past Medical History:   Diagnosis Date    Acute inferior myocardial infarction ([facility]) 12/27/2021    MARIAM (acute kidney injury) 06/07/2022    Atrial fibrillation ([facility])     CAD (coronary artery disease)     cardiac stents    COPD (chronic obstructive pulmonary disease) ([facility])     Coronary angioplasty status 11/26/2018    Diabetes mellitus ([facility])     Hyperlipidemia     Hypertension     Lumbosacral disc disease     Movement disorder     Osteoarthritis     Pneumonia          Past Surgical History:   Procedure Laterality Date    APPENDECTOMY      COLONOSCOPY N/A 5/17/2024    COLONOSCOPY DIAGNOSTIC performed by Surinder Chavarria MD at [facility]    CORONARY ANGIOPLASTY WITH STENT PLACEMENT      4    CORONARY ARTERY BYPASS GRAFT      triple bypass    EYE SURGERY Bilateral     cataract surgery    INSERTABLE CARDIAC MONITOR Left 12/2018    UPPER GASTROINTESTINAL ENDOSCOPY N/A 10/13/2022    EGD ESOPHAGOGASTRODUODENOSCOPY WITH DILATION performed by Surinder Chavarria MD at [facility]    UPPER GASTROINTESTINAL

## 2025-03-28 NOTE — PROGRESS NOTES
Physical Activity: Inactive (2/10/2025)    Received from [facility]    Exercise Vital Sign     Days of Exercise per Week: 0 days     Minutes of Exercise per Session: 0 min   Stress: No Stress Concern Present (2/10/2025)    Received from [facility]    [employer] [employer] - Occupational Stress Questionnaire     Feeling of Stress : Not at all   Social Connections: Moderately Integrated (2/10/2025)    Received from [facility]    Social Connection and Isolation Panel [NHANES]     Frequency of Communication with Friends and Family: Three times a week     Frequency of Social Gatherings with Friends and Family: Three times a week     Attends [employer] Services: 1 to 4 times per year     Active Member of Clubs or Organizations: No     Attends Club or Organization Meetings: Never     Marital Status:    Intimate Partner Violence: Not At Risk (2/10/2025)    Received from [facility]    Humiliation, Afraid, Rape, and Kick questionnaire     Fear of Current or Ex-Partner: No     Emotionally Abused: No     Physically Abused: No     Sexually Abused: No   Housing Stability: Low Risk  (2/10/2025)    Received from [facility]    Housing Stability Vital Sign     Unable to Pay for Housing in the Last Year: No     Number of Times Moved in the Last Year: 0     Homeless in the Last Year: No         Lab Results   Component Value Date    LABA1C 6.4 (H) 01/09/2025     Lab Results   Component Value Date     01/09/2025       Lab Results   Component Value Date/Time     03/14/2025 01:26 PM    K 4.8 03/14/2025 01:26 PM    K 4.9 01/12/2025 06:21 PM     03/14/2025 01:26 PM    CO2 27 03/14/2025 01:26 PM    BUN 26 03/14/2025 01:26 PM    CREATININE 1.3 03/14/2025 01:26 PM    GLUCOSE 79 03/14/2025 01:26 PM    GLUCOSE 216 04/11/2022 01:50 PM    CALCIUM 7.8 03/14/2025 01:26 PM        Lab Results

## 2025-04-01 ENCOUNTER — OFFICE VISIT (OUTPATIENT)
Dept: GERIATRIC MEDICINE | Age: 85
End: 2025-04-01

## 2025-04-01 DIAGNOSIS — M48.062 SPINAL STENOSIS OF LUMBAR REGION WITH NEUROGENIC CLAUDICATION: ICD-10-CM

## 2025-04-01 DIAGNOSIS — I48.20 CHRONIC ATRIAL FIBRILLATION (HCC): ICD-10-CM

## 2025-04-01 DIAGNOSIS — J44.9 CHRONIC OBSTRUCTIVE PULMONARY DISEASE, UNSPECIFIED COPD TYPE (HCC): Primary | ICD-10-CM

## 2025-04-01 DIAGNOSIS — I10 PRIMARY HYPERTENSION: ICD-10-CM

## 2025-04-07 ENCOUNTER — OFFICE VISIT (OUTPATIENT)
Dept: GERIATRIC MEDICINE | Age: 85
End: 2025-04-07

## 2025-04-07 DIAGNOSIS — R26.2 DIFFICULTY IN WALKING: ICD-10-CM

## 2025-04-07 DIAGNOSIS — R53.1 WEAKNESS: Primary | ICD-10-CM

## 2025-04-07 DIAGNOSIS — Z86.73 HISTORY OF STROKE: ICD-10-CM

## 2025-04-08 ENCOUNTER — OFFICE VISIT (OUTPATIENT)
Dept: GERIATRIC MEDICINE | Age: 85
End: 2025-04-08

## 2025-04-08 DIAGNOSIS — I10 PRIMARY HYPERTENSION: ICD-10-CM

## 2025-04-08 DIAGNOSIS — J44.9 CHRONIC OBSTRUCTIVE PULMONARY DISEASE, UNSPECIFIED COPD TYPE (HCC): Primary | ICD-10-CM

## 2025-04-08 DIAGNOSIS — I73.9 PVD (PERIPHERAL VASCULAR DISEASE): ICD-10-CM

## 2025-04-12 ENCOUNTER — OFFICE VISIT (OUTPATIENT)
Dept: GERIATRIC MEDICINE | Age: 85
End: 2025-04-12

## 2025-04-12 DIAGNOSIS — J44.9 CHRONIC OBSTRUCTIVE PULMONARY DISEASE, UNSPECIFIED COPD TYPE (HCC): ICD-10-CM

## 2025-04-12 DIAGNOSIS — I10 PRIMARY HYPERTENSION: ICD-10-CM

## 2025-04-12 DIAGNOSIS — I48.20 CHRONIC ATRIAL FIBRILLATION (HCC): Primary | ICD-10-CM

## 2025-04-12 NOTE — PROGRESS NOTES
Smoking status: Former     Types: Cigarettes     Passive exposure: Past    Smokeless tobacco: Never    Tobacco comments:     3 cigarettes/month   Vaping Use    Vaping status: Never Used   Substance and Sexual Activity    Alcohol use: No    Drug use: No    Sexual activity: Not on file   Other Topics Concern    Not on file   Social History Narrative    Lives With: Spouse Kami    Type of Home: House in [address]    Home Layout: One level, Access: Stairs to enter with rails-- Number of Steps: 3- Rails: Both    Bathroom Shower/Tub: Tub/Shower unit,Shower chair with back, Toilet: Handicap height, Equipment: Shower chair (neighbor to install grab bars)    Home Equipment: Rolling walker,Cane    Receives Help From: Family    ADL Assistance: Needs assistance    Bath: Minimal assistance (pt washes self up, wife assisting in drying. Assists also needed to get into shower)    Dressing: Minimal assistance (assist over feet from wife \"I do about 80%\")    Toileting: Independent    Homemaking Assistance: Needs assistance, Responsibilities: Yes (wife completes most IADLs. 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

## 2025-04-15 NOTE — PROGRESS NOTES
05/14/2024    TRIG 96 11/20/2023     Lab Results   Component Value Date    HDL 38 (L) 09/24/2024    HDL 36 (L) 05/14/2024    HDL 37 (L) 11/20/2023     No components found for: \"LDLCHOLESTEROL\", \"LDLCALC\"  No results found for: \"VLDL\"  Lab Results   Component Value Date    CHOLHDLRATIO 3.3 10/15/2012    CHOLHDLRATIO 2.7 04/16/2012    CHOLHDLRATIO 4.9 10/19/2011       Lab Results   Component Value Date    TSH 3.030 09/24/2024       Lab Results   Component Value Date    WBC 7.6 03/14/2025    HGB 7.5 (A) 03/14/2025    HCT 24.0 (A) 03/14/2025    MCV 82.3 03/14/2025     03/14/2025       Please note orders entered on site at facility after discussion with appropriate facility nursing/therapy/ / nutritional staff. Current longstanding medical problems and acute medical issues addressed with staff. Available data and data elements in on site paper chart reviewed and analyzed.  Current external consultant notes reviewed in on site chart. Ordered laboratory testing and imaging will be reviewed when available.

## 2025-04-17 NOTE — PROGRESS NOTES
SUBJECTIVE:  This 84-year-old gentleman seen for follow-up visit intermittent short of breath coughing at times patient has been undergoing course of therapy ongoing functional decline weakness      ROS: Dry cough  The rest of the 14 point ROS negative    PHYSICAL EXAM: VSS per facility record  Pupils are small oral mucosas dry chest no crackles faint extra wheezing cardiovascular showed irregular rate abdomen soft nontender extremity trace edema    ASSESSMENT & PLAN:   Diagnosis Orders   1. Chronic atrial fibrillation ([facility])        2. Chronic obstructive pulmonary disease, unspecified COPD type ([facility])        3. Primary hypertension          Notes new RVR patient remains functionally stable is on a beta-blocker is on metoprolol remains on apixaban no new bleeding diathesis.  Patient is on oral agents for diabetes is on Glucophage monitor renal function            Past Medical History:   Diagnosis Date    Acute inferior myocardial infarction ([facility]) 12/27/2021    MARIAM (acute kidney injury) 06/07/2022    Atrial fibrillation ([facility])     CAD (coronary artery disease)     cardiac stents    COPD (chronic obstructive pulmonary disease) ([facility])     Coronary angioplasty status 11/26/2018    Diabetes mellitus ([facility])     Hyperlipidemia     Hypertension     Lumbosacral disc disease     Movement disorder     Osteoarthritis     Pneumonia          Past Surgical History:   Procedure Laterality Date    APPENDECTOMY      COLONOSCOPY N/A 5/17/2024    COLONOSCOPY DIAGNOSTIC performed by Surinder Chavarria MD at [facility]    CORONARY ANGIOPLASTY WITH STENT PLACEMENT      4    CORONARY ARTERY BYPASS GRAFT      triple bypass    EYE SURGERY Bilateral     cataract surgery    INSERTABLE CARDIAC MONITOR Left 12/2018    UPPER GASTROINTESTINAL ENDOSCOPY N/A 10/13/2022    EGD ESOPHAGOGASTRODUODENOSCOPY WITH DILATION performed by Surinder Chavarria MD at [facility]    UPPER GASTROINTESTINAL ENDOSCOPY N/A 9/4/2023    EGD ESOPHAGOGASTRODUODENOSCOPY

## 2025-04-25 NOTE — PROGRESS NOTES
SUBJECTIVE:  84-year-old gentleman seen follows for COPD hypertension prevascular disease functionally coughing.  No nausea vomiting emesis fever chills no change bilateral coughing at times      ROS: Denies chest pain  The rest of the 14 point ROS negative    PHYSICAL EXAM: VSS per facility record  Alert oral mucosa moist with coarse breath sounds cardiovascular showed a regular abdomen soft tender no rash    ASSESSMENT & PLAN:   Diagnosis Orders   1. Chronic obstructive pulmonary disease, unspecified COPD type (HCC)        2. Primary hypertension        3. PVD (peripheral vascular disease)          Monitoring pressure function stable has been anticoagulated bleeding diathesis.  Nutritional skin surveillance no acute bleed diathesis globally at this time.            Past Medical History:   Diagnosis Date    Acute inferior myocardial infarction (HCC) 12/27/2021    MARIAM (acute kidney injury) 06/07/2022    Atrial fibrillation (HCC)     CAD (coronary artery disease)     cardiac stents    COPD (chronic obstructive pulmonary disease) (HCC)     Coronary angioplasty status 11/26/2018    Diabetes mellitus (HCC)     Hyperlipidemia     Hypertension     Lumbosacral disc disease     Movement disorder     Osteoarthritis     Pneumonia          Past Surgical History:   Procedure Laterality Date    APPENDECTOMY      COLONOSCOPY N/A 5/17/2024    COLONOSCOPY DIAGNOSTIC performed by Surinder Chavarria MD at [facility]    CORONARY ANGIOPLASTY WITH STENT PLACEMENT      4    CORONARY ARTERY BYPASS GRAFT      triple bypass    EYE SURGERY Bilateral     cataract surgery    INSERTABLE CARDIAC MONITOR Left 12/2018    UPPER GASTROINTESTINAL ENDOSCOPY N/A 10/13/2022    EGD ESOPHAGOGASTRODUODENOSCOPY WITH DILATION performed by Surinder Chavarria MD at [facility]    UPPER GASTROINTESTINAL ENDOSCOPY N/A 9/4/2023    EGD ESOPHAGOGASTRODUODENOSCOPY WITH FOREIGN BODY REMOVAL performed by David Mann MD at [facility]    UPPER

## 2025-04-27 PROBLEM — Z86.73 HISTORY OF STROKE: Status: ACTIVE | Noted: 2025-04-27

## 2025-04-30 ENCOUNTER — OFFICE VISIT (OUTPATIENT)
Dept: GERIATRIC MEDICINE | Age: 85
End: 2025-04-30

## 2025-04-30 DIAGNOSIS — R39.9 UTI SYMPTOMS: Primary | ICD-10-CM

## 2025-04-30 DIAGNOSIS — R41.82 ALTERED MENTAL STATUS, UNSPECIFIED ALTERED MENTAL STATUS TYPE: ICD-10-CM

## 2025-05-02 ENCOUNTER — OFFICE VISIT (OUTPATIENT)
Dept: GERIATRIC MEDICINE | Age: 85
End: 2025-05-02
Payer: MEDICARE

## 2025-05-02 DIAGNOSIS — R41.82 ALTERED MENTAL STATUS, UNSPECIFIED ALTERED MENTAL STATUS TYPE: Primary | ICD-10-CM

## 2025-05-02 DIAGNOSIS — N39.0 URINARY TRACT INFECTION WITHOUT HEMATURIA, SITE UNSPECIFIED: ICD-10-CM

## 2025-05-02 PROCEDURE — 99308 SBSQ NF CARE LOW MDM 20: CPT | Performed by: PHYSICIAN ASSISTANT

## 2025-05-02 PROCEDURE — 1123F ACP DISCUSS/DSCN MKR DOCD: CPT | Performed by: PHYSICIAN ASSISTANT

## 2025-05-05 ENCOUNTER — OFFICE VISIT (OUTPATIENT)
Dept: GERIATRIC MEDICINE | Age: 85
End: 2025-05-05

## 2025-05-05 DIAGNOSIS — N30.00 ACUTE CYSTITIS WITHOUT HEMATURIA: Primary | ICD-10-CM

## 2025-05-05 DIAGNOSIS — E44.0 PROTEIN-CALORIE MALNUTRITION, MODERATE: ICD-10-CM

## 2025-05-11 PROBLEM — R63.4 WEIGHT LOSS, ABNORMAL: Status: RESOLVED | Noted: 2025-05-11 | Resolved: 2025-05-11
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
## 2025-05-12 NOTE — PROGRESS NOTES
Ex-Partner: No     Emotionally Abused: No     Physically Abused: No     Sexually Abused: No   Housing Stability: Low Risk  (2/10/2025)    Received from [facility]    Housing Stability Vital Sign     Unable to Pay for Housing in the Last Year: No     Number of Times Moved in the Last Year: 0     Homeless in the Last Year: No     Family History   Problem Relation Age of Onset    Colon Cancer Neg Hx      Allergies   Allergen Reactions    Atorvastatin Other (See Comments)     Body pain    Fenofibrate     Fluvastatin Other (See Comments)    Niacin Other (See Comments)    Niaspan [Niacin Er (Antihyperlipidemic)] Other (See Comments)     Body pain    Pravastatin Other (See Comments)    Simvastatin Other (See Comments)     Sharp body pain    Vitamin E Other (See Comments)     Nose bleeds           Review of Systems   Unable to perform ROS: Dementia       Objective:   VS: See PCC. Reviewed.    Physical Exam  Constitutional:       General: He is not in acute distress.     Appearance: He is not ill-appearing.   HENT:      Head: Normocephalic and atraumatic.      Mouth/Throat:      Mouth: Mucous membranes are moist.      Pharynx: Oropharynx is clear.   Cardiovascular:      Rate and Rhythm: Normal rate and regular rhythm.      Pulses: Normal pulses.   Pulmonary:      Effort: Pulmonary effort is normal.      Breath sounds: Normal breath sounds.   Abdominal:      General: Bowel sounds are normal. There is no distension.      Palpations: Abdomen is soft.      Tenderness: There is no abdominal tenderness. There is no guarding.   Genitourinary:     Comments: DEFERRED  Musculoskeletal:      Comments: Poor ambulation    Skin:     General: Skin is warm and dry.   Neurological:      Mental Status: He is alert. He is disoriented.   Psychiatric:         Mood and Affect: Mood normal.         Behavior: Behavior normal.         Cognition and Memory: Cognition is impaired.         Assessment:       Diagnosis Orders   1.

## 2025-05-13 ENCOUNTER — OFFICE VISIT (OUTPATIENT)
Dept: GERIATRIC MEDICINE | Age: 85
End: 2025-05-13

## 2025-05-13 DIAGNOSIS — I48.20 CHRONIC ATRIAL FIBRILLATION (HCC): Primary | ICD-10-CM

## 2025-05-13 DIAGNOSIS — I10 PRIMARY HYPERTENSION: ICD-10-CM

## 2025-05-13 DIAGNOSIS — J44.9 CHRONIC OBSTRUCTIVE PULMONARY DISEASE, UNSPECIFIED COPD TYPE (HCC): ICD-10-CM

## 2025-05-13 DIAGNOSIS — I73.9 PVD (PERIPHERAL VASCULAR DISEASE): ICD-10-CM

## 2025-05-14 NOTE — PROGRESS NOTES
by David Mann MD at [facility]    UPPER GASTROINTESTINAL ENDOSCOPY N/A 5/17/2024    ESOPHAGOGASTRODUODENOSCOPY DIAGNOSTIC ONLY performed by Surinder Chavarria MD at [facility]     Social History     Socioeconomic History    Marital status:      Spouse name: Kami    Number of children: Not on file    Years of education: Not on file    Highest education level: Not on file   Occupational History    Not on file   Tobacco Use    Smoking status: Former     Types: Cigarettes     Passive exposure: Past    Smokeless tobacco: Never    Tobacco comments:     3 cigarettes/month   Vaping Use    Vaping status: Never Used   Substance and Sexual Activity    Alcohol use: No    Drug use: No    Sexual activity: Not on file   Other Topics Concern    Not on file   Social History Narrative    Lives With: Spouse Kami    Type of Home: House in [address]    Home Layout: One level, Access: Stairs to enter with rails-- Number of Steps: 3- Rails: Both    Bathroom Shower/Tub: Tub/Shower unit,Shower chair with back, Toilet: Handicap height, Equipment: Shower chair (neighbor to install grab bars)    Home Equipment: Rolling walker,Cane    Receives Help From: Family    ADL Assistance: Needs assistance    Bath: Minimal assistance (pt washes self up, wife assisting in drying. Assists also needed to get into shower)    Dressing: Minimal assistance (assist over feet from wife \"I do about 80%\")    Toileting: Independent    Homemaking Assistance: Needs assistance, Responsibilities: Yes (wife completes most IADLs. 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

## 2025-05-22 ENCOUNTER — OFFICE VISIT (OUTPATIENT)
Dept: GERIATRIC MEDICINE | Age: 85
End: 2025-05-22

## 2025-05-22 DIAGNOSIS — I10 PRIMARY HYPERTENSION: ICD-10-CM

## 2025-05-22 DIAGNOSIS — I48.20 CHRONIC ATRIAL FIBRILLATION (HCC): Primary | ICD-10-CM

## 2025-05-22 DIAGNOSIS — J44.9 CHRONIC OBSTRUCTIVE PULMONARY DISEASE, UNSPECIFIED COPD TYPE (HCC): ICD-10-CM

## 2025-05-23 ENCOUNTER — OFFICE VISIT (OUTPATIENT)
Dept: GERIATRIC MEDICINE | Age: 85
End: 2025-05-23
Payer: MEDICARE

## 2025-05-23 DIAGNOSIS — H61.22 IMPACTED CERUMEN OF LEFT EAR: Primary | ICD-10-CM

## 2025-05-23 PROCEDURE — 1123F ACP DISCUSS/DSCN MKR DOCD: CPT | Performed by: PHYSICIAN ASSISTANT

## 2025-05-23 PROCEDURE — 99308 SBSQ NF CARE LOW MDM 20: CPT | Performed by: PHYSICIAN ASSISTANT

## 2025-05-26 NOTE — PROGRESS NOTES
Subjective:      Patient ID: Ruben Gonsalez is a pleasant 84 y.o. male who presents today for:  Chief Complaint   Patient presents with    Other     Impacted cerumen of left ear  (primary encounter diagnosis)         [facility]    Patient is seen today for bilateral cerumen buildup with hx of presbycusis. Pt hearing is very limited but he can still hear and make out words. Complains of left ear fullness. States he's had cerumen impacted before. On exam, does have bilateral ear cerumen buildup with left > right side. No evidence of infection. Unable to visualize either TM's. Plan to add debrox for 4 days and water flush on day #4. Recheck as needed.       Patient Active Problem List   Diagnosis    Atrial fibrillation (HCC)    High risk medication use    Atherosclerosis of native coronary artery of native heart without angina pectoris    Hypertension    Ischemic myocardial dysfunction    Nonrheumatic aortic valve disorder, unspecified    Aortic valve disorder    Personal history of nicotine dependence    Presence of aortocoronary bypass graft    Vasovagal syncope    Dizziness and giddiness    Cerebrovascular accident (HCC)    Orthostatic dizziness    Ataxia    Vertigo    Meniere disease, bilateral    Benign paroxysmal positional vertigo due to bilateral vestibular disorder    Coronary angioplasty status    Ataxic gait    Kyphoscoliosis    Spinal stenosis of lumbar region with neurogenic claudication    Abdominal aortic aneurysm (AAA)    History of acute myocardial infarction    Carotid bruit    Chronic obstructive pulmonary disease (HCC)    Diabetes mellitus (HCC)    Dyspnea on exertion    Fatigue    First degree atrioventricular block    Hyperlipidemia    Infection of the inner ear    Muscle pain    Underweight    Ventricular premature beats    Weight loss    PVD (peripheral vascular disease)    Impaired mobility and activities of daily living -sp Severe PVD s/p AAA repain    Hypotension    Late effects of CVA

## 2025-05-28 ENCOUNTER — OFFICE VISIT (OUTPATIENT)
Dept: GERIATRIC MEDICINE | Age: 85
End: 2025-05-28

## 2025-05-28 DIAGNOSIS — S91.101A OPEN WOUND OF RIGHT GREAT TOE, INITIAL ENCOUNTER: Primary | ICD-10-CM

## 2025-05-29 ENCOUNTER — OFFICE VISIT (OUTPATIENT)
Dept: GERIATRIC MEDICINE | Age: 85
End: 2025-05-29

## 2025-05-29 DIAGNOSIS — S92.405A NONDISPLACED UNSPECIFIED FRACTURE OF LEFT GREAT TOE, INITIAL ENCOUNTER FOR CLOSED FRACTURE: Primary | ICD-10-CM

## 2025-05-29 DIAGNOSIS — S91.209A AVULSION OF TOENAIL OF LEFT FOOT: ICD-10-CM

## 2025-05-30 ENCOUNTER — OFFICE VISIT (OUTPATIENT)
Dept: GERIATRIC MEDICINE | Age: 85
End: 2025-05-30

## 2025-05-30 ENCOUNTER — OFFICE VISIT (OUTPATIENT)
Dept: ORTHOPEDIC SURGERY | Facility: CLINIC | Age: 85
End: 2025-05-30
Payer: MEDICARE

## 2025-05-30 ENCOUNTER — HOSPITAL ENCOUNTER (OUTPATIENT)
Dept: RADIOLOGY | Facility: HOSPITAL | Age: 85
Discharge: HOME | End: 2025-05-30
Payer: MEDICARE

## 2025-05-30 DIAGNOSIS — M48.062 SPINAL STENOSIS OF LUMBAR REGION WITH NEUROGENIC CLAUDICATION: ICD-10-CM

## 2025-05-30 DIAGNOSIS — J44.9 CHRONIC OBSTRUCTIVE PULMONARY DISEASE, UNSPECIFIED COPD TYPE (HCC): ICD-10-CM

## 2025-05-30 DIAGNOSIS — I73.9 PVD (PERIPHERAL VASCULAR DISEASE): ICD-10-CM

## 2025-05-30 DIAGNOSIS — T14.8XXA CRUSH INJURY: ICD-10-CM

## 2025-05-30 DIAGNOSIS — S92.421A CLOSED DISPLACED FRACTURE OF DISTAL PHALANX OF RIGHT GREAT TOE, INITIAL ENCOUNTER: ICD-10-CM

## 2025-05-30 DIAGNOSIS — I10 PRIMARY HYPERTENSION: Primary | ICD-10-CM

## 2025-05-30 DIAGNOSIS — M79.671 FOOT PAIN, RIGHT: ICD-10-CM

## 2025-05-30 PROCEDURE — 73630 X-RAY EXAM OF FOOT: CPT | Mod: RIGHT SIDE | Performed by: STUDENT IN AN ORGANIZED HEALTH CARE EDUCATION/TRAINING PROGRAM

## 2025-05-30 PROCEDURE — 73630 X-RAY EXAM OF FOOT: CPT | Mod: RT

## 2025-05-30 NOTE — PROGRESS NOTES
History of Present Illness  Chief Complaint   Patient presents with    Right Foot - Pain       Patient sustained a contusion/crush injury to his right foot reportedly from his wife while at the nursing facility.  Apparently a cart/tray table fell onto his toe.  This apparently occurred 2 days prior on 5/28/2025 per report.  Patient substantially hard of hearing and all the history was obtained from the wife within the room.    Medical History[1]    Medication Documentation Review Audit       Reviewed by Adán Magana MA (Medical Assistant) on 05/30/25 at 0832      Medication Order Taking? Sig Documenting Provider Last Dose Status   albuterol 90 mcg/actuation inhaler [id number] No use 1 puff as needed twice daily for 90 days. Historical Provider, MD 2/9/2025 Active   cyanocobalamin (Vitamin B-12) 1,000 mcg tablet [id number] No Take 1 tablet (1,000 mcg) by mouth once daily. Historical Provider, MD 2/10/2025 Morning Active   magnesium oxide 400 mg magnesium capsule [id number] No Take 1 tablet by mouth once daily. Historical Provider, MD 2/10/2025 Morning Active   metFORMIN (Glucophage) 500 mg tablet [id number] No 1 tablet (500 mg) 2 times a day. Historical Provider, MD 2/10/2025 Morning Active   metoprolol succinate XL (Toprol-XL) 50 mg 24 hr tablet [id number] No Take 1 tablet (50 mg) by mouth once daily. Celso Pantoja MD 2/10/2025 Morning Active   nitroglycerin (Nitrostat) 0.4 mg SL tablet [id number] No DISSOLVE 1 TABLET UNDER THE TONGUE AS NEEDED FOR CHEST PAIN- MAY REPEAT EVERY 5 MINUTES IF NEEDED ( MAX 3 DOSES.- IF NO RELIEF CALL 911)   Patient not taking: Reported on 2/10/2025    Celso Pantoja MD More than a month Active   pitavastatin calcium (Livalo) 4 mg tablet [id number] No Take 1 tablet by mouth once daily at bedtime. Celso Pantoja MD 2/9/2025 Active   valsartan (Diovan) 80 mg tablet [id number] No Take 1 tablet (80 mg) by mouth once daily. Celso Pantoja MD 2/10/2025 Morning Active                    RX  Allergies[2]    Social History     Socioeconomic History    Marital status:      Spouse name: Not on file    Number of children: Not on file    Years of education: Not on file    Highest education level: Not on file   Occupational History    Not on file   Tobacco Use    Smoking status: Former     Current packs/day: 1.00     Average packs/day: 1 pack/day for 21.4 years (21.4 ttl pk-yrs)     Types: Cigarettes     Start date: 2004    Smokeless tobacco: Never   Vaping Use    Vaping status: Never Used   Substance and Sexual Activity    Alcohol use: Not Currently    Drug use: Not Currently    Sexual activity: Defer   Other Topics Concern    Not on file   Social History Narrative    Not on file     Social Drivers of Health     Financial Resource Strain: Low Risk  (2/10/2025)    Overall Financial Resource Strain (CARDIA)     Difficulty of Paying Living Expenses: Not hard at all   Food Insecurity: No Food Insecurity (2/10/2025)    Hunger Vital Sign     Worried About Running Out of Food in the Last Year: Never true     Ran Out of Food in the Last Year: Never true   Transportation Needs: No Transportation Needs (2/10/2025)    PRAPARE - Transportation     Lack of Transportation (Medical): No     Lack of Transportation (Non-Medical): No   Physical Activity: Inactive (2/10/2025)    Exercise Vital Sign     Days of Exercise per Week: 0 days     Minutes of Exercise per Session: 0 min   Stress: No Stress Concern Present (2/10/2025)    [employer] - Occupational Stress Questionnaire     Feeling of Stress : Not at all   Social Connections: Moderately Integrated (2/10/2025)    Social Connection and Isolation Panel [NHANES]     Frequency of Communication with Friends and Family: Three times a week     Frequency of Social Gatherings with Friends and Family: Three times a week     Attends Church Services: 1 to 4 times per year     Active Member of Clubs or Organizations: No     Attends Club or  Organization Meetings: Never     Marital Status:    Intimate Partner Violence: Not At Risk (2/10/2025)    Humiliation, Afraid, Rape, and Kick questionnaire     Fear of Current or Ex-Partner: No     Emotionally Abused: No     Physically Abused: No     Sexually Abused: No   Housing Stability: Low Risk  (2/10/2025)    Housing Stability Vital Sign     Unable to Pay for Housing in the Last Year: No     Number of Times Moved in the Last Year: 0     Homeless in the Last Year: No       Surgical History[3]         Review of Systems   GENERAL: Negative for malaise, significant weight loss, fever  MUSCULOSKELETAL: see HPI  NEURO:  Negative        Exam  Right foot: Patient demonstrates substantial damage to the nail plate at the level of the proximal portion of the nailbed.  There is no purulence or drainage however there is substantial swelling appreciated about the great toe.  Patient is able to weakly motor the toe.  Nontender palpation otherwise about the right foot.    Imaging  ECG 12 lead  Undetermined rhythm  Nonspecific ST and T wave abnormality  Abnormal ECG  When compared with ECG of 16-MAR-2022 09:16,  Current undetermined rhythm precludes rhythm comparison, needs review  Nonspecific T wave abnormality, worse in Lateral leads  QT has shortened    See ED provider note for full interpretation and clinical correlation  Confirmed by Kiah Herbert ([id number]) on 2/26/2025 1:11:51 PM    AP, oblique and lateral imaging of the right foot was obtained on the date of this exam to evaluate for causes right foot pain and injury    Findings demonstrate comminuted fracture of the distal phalanx of the right great toe consistent with a tuft fracture.     Assessment       Plan  Patient presents with a tuft fracture of the right great toe with associated nail plate damage however the nail plate has remained intact.  Given patient's age and functional status do not feel that operative intervention would be of benefit to patient  at this time.  Instead recommend local wound care and antibiotic coverage with stiff soled shoe utilized for ambulation.  Will follow-up with patient in 2 weeks for repeat clinical assessment.    Recommendations regarding right great toe:    Recommend initiation of clindamycin 600 mg 4 times a day for 7 days.  During this timeframe recommend utilization of a probiotic and monitoring bowel function.  Could alternatively utilize yogurt daily to protect the stomach lining.    Stiff soled shoe utilized for ambulation and transfers however not required if patient can tolerate otherwise.    Recommend daily soaks with half hydrogen peroxide and half water to keep the site clean.    Anticipate eventually the nail plate falling off with regrowth of the nail to occur over the next several months.    May weight-bear as tolerated for transfers and walking short distances.            [1]   Past Medical History:  Diagnosis Date    Asthma     CHF (congestive heart failure)     COPD (chronic obstructive pulmonary disease) (Multi)     Coronary artery disease     Diabetes mellitus (Multi)     History of transfusion     Hypertension     Other conditions influencing health status     Normal weight    Personal history of other specified conditions 10/30/2021    History of syncope    Underweight     Underweight   [2]   Allergies  Allergen Reactions    Atorvastatin Unknown    Fenofibrate Unknown    Fluvastatin Unknown    Pravastatin Unknown    Simvastatin Unknown    Vitamin E (D-Alpha Tocopherol) Unknown   [3]   Past Surgical History:  Procedure Laterality Date    CT ABDOMEN PELVIS ANGIOGRAM W AND/OR WO IV CONTRAST  11/5/2021    CT ABDOMEN PELVIS ANGIOGRAM W AND/OR WO IV CONTRAST 11/5/2021    CT ANGIO NECK  1/21/2021    CT ANGIO NECK 1/21/2021    IR ANGIOGRAM INTRAVASCULAR US Left 4/7/2022    IR ANGIOGRAM INTRAVASCULAR US 4/7/2022 [facility] CLINICAL LEGACY    OTHER SURGICAL HISTORY  10/30/2021    Appendectomy    OTHER SURGICAL HISTORY   10/30/2021    Cardiac event recorder insertion    OTHER SURGICAL HISTORY  10/30/2021    Cataract surgery    OTHER SURGICAL HISTORY  10/30/2021    Loop electrosurgical excision procedure    OTHER SURGICAL HISTORY  05/04/2022    Aneurysmectomy    OTHER SURGICAL HISTORY  12/02/2022    Abdominal aortic aneurysm repair    OTHER SURGICAL HISTORY  02/11/2022    Coronary artery bypass graft

## 2025-05-30 NOTE — PROGRESS NOTES
Subjective:      Patient ID: Ruben Gonsalez is a pleasant 85 y.o. male who presents today for:  No chief complaint on file.      [facility]    Seen today for follow-up on right great toe bleeding and trauma.  Patient as of yesterday had kicked his tray table injuring foot.  Plan to order xray for a.m. Cont analgesia with APAP prn. Dressing changes ordered for bleeding toe. F/u as needed. Area is now wrapped up and no longer bleeding. On inspection was coated in dried blood after having been washed with NS. Large toe nail is beginning to peal away. Large bruise to base of great toe. Pt having no fruther complaints today.    Patient Active Problem List   Diagnosis    Atrial fibrillation (HCC)    High risk medication use    Atherosclerosis of native coronary artery of native heart without angina pectoris    Hypertension    Ischemic myocardial dysfunction    Nonrheumatic aortic valve disorder, unspecified    Aortic valve disorder    Personal history of nicotine dependence    Presence of aortocoronary bypass graft    Vasovagal syncope    Dizziness and giddiness    Cerebrovascular accident (HCC)    Orthostatic dizziness    Ataxia    Vertigo    Meniere disease, bilateral    Benign paroxysmal positional vertigo due to bilateral vestibular disorder    Coronary angioplasty status    Ataxic gait    Kyphoscoliosis    Spinal stenosis of lumbar region with neurogenic claudication    Abdominal aortic aneurysm (AAA)    History of acute myocardial infarction    Carotid bruit    Chronic obstructive pulmonary disease (HCC)    Diabetes mellitus (HCC)    Dyspnea on exertion    Fatigue    First degree atrioventricular block    Hyperlipidemia    Infection of the inner ear    Muscle pain    Underweight    Ventricular premature beats    Weight loss    PVD (peripheral vascular disease)    Impaired mobility and activities of daily living -sp Severe PVD s/p AAA repain    Hypotension    Late effects of CVA (cerebrovascular accident)    MARIAM

## 2025-05-31 NOTE — PROGRESS NOTES
Kick questionnaire     Fear of Current or Ex-Partner: No     Emotionally Abused: No     Physically Abused: No     Sexually Abused: No   Housing Stability: Low Risk  (2/10/2025)    Received from [facility]    Housing Stability Vital Sign     Unable to Pay for Housing in the Last Year: No     Number of Times Moved in the Last Year: 0     Homeless in the Last Year: No     Family History   Problem Relation Age of Onset    Colon Cancer Neg Hx      Allergies   Allergen Reactions    Atorvastatin Other (See Comments)     Body pain    Fenofibrate     Fluvastatin Other (See Comments)    Niacin Other (See Comments)    Niaspan [Niacin Er (Antihyperlipidemic)] Other (See Comments)     Body pain    Pravastatin Other (See Comments)    Simvastatin Other (See Comments)     Sharp body pain    Vitamin E Other (See Comments)     Nose bleeds           Review of Systems   Unable to perform ROS: Dementia   All other systems reviewed and are negative.      Objective:   VS: See PCC. Reviewed.    Physical Exam  Vitals reviewed.   Constitutional:       General: He is not in acute distress.     Appearance: Normal appearance. He is not ill-appearing.   HENT:      Head: Normocephalic and atraumatic.      Right Ear: External ear normal. There is no impacted cerumen.      Left Ear: External ear normal. There is impacted cerumen.      Mouth/Throat:      Mouth: Mucous membranes are moist.      Pharynx: Oropharynx is clear.   Eyes:      Conjunctiva/sclera: Conjunctivae normal.   Cardiovascular:      Rate and Rhythm: Normal rate and regular rhythm.      Pulses: Normal pulses.   Pulmonary:      Effort: Pulmonary effort is normal.      Breath sounds: Normal breath sounds.   Abdominal:      General: Bowel sounds are normal. There is no distension.      Palpations: Abdomen is soft.      Tenderness: There is no abdominal tenderness. There is no guarding.   Genitourinary:     Comments: DEFERRED  Musculoskeletal:      Comments: Poor

## 2025-06-03 ENCOUNTER — OFFICE VISIT (OUTPATIENT)
Dept: GERIATRIC MEDICINE | Age: 85
End: 2025-06-03

## 2025-06-03 DIAGNOSIS — I48.20 CHRONIC ATRIAL FIBRILLATION (HCC): Primary | ICD-10-CM

## 2025-06-03 DIAGNOSIS — M48.062 SPINAL STENOSIS OF LUMBAR REGION WITH NEUROGENIC CLAUDICATION: ICD-10-CM

## 2025-06-03 DIAGNOSIS — I10 PRIMARY HYPERTENSION: ICD-10-CM

## 2025-06-04 ENCOUNTER — OFFICE VISIT (OUTPATIENT)
Dept: GERIATRIC MEDICINE | Age: 85
End: 2025-06-04

## 2025-06-04 DIAGNOSIS — S91.209A AVULSION OF TOENAIL OF RIGHT FOOT: ICD-10-CM

## 2025-06-04 DIAGNOSIS — Z00.00 INITIAL MEDICARE ANNUAL WELLNESS VISIT: Primary | ICD-10-CM

## 2025-06-09 NOTE — PROGRESS NOTES
SUBJECTIVE:  85-year-old gentleman seen for afibrillation COPD prevascular disease hypertension functional nausea vomiting and fevers chills no change open it is coughing      ROS: Denies chest  The rest of the 14 point ROS negative    PHYSICAL EXAM: VSS per facility record  Pupils reactive oral mucosa moist chest with coarse breath send cardiovascular showed irregular rate abdomen soft nontender extremity trace edema    ASSESSMENT & PLAN:   Diagnosis Orders   1. Chronic atrial fibrillation (HCC)        2. Chronic obstructive pulmonary disease, unspecified COPD type ([facility])        3. PVD (peripheral vascular disease)        4. Primary hypertension          Blood pressure stable orthostasis function stable is on metformin functionally stable has been on apixaban and no bleeding diathesis on metoprolol or beta-blocker            Past Medical History:   Diagnosis Date    Acute inferior myocardial infarction ([facility]) 12/27/2021    MARIAM (acute kidney injury) 06/07/2022    Atrial fibrillation ([facility])     CAD (coronary artery disease)     cardiac stents    COPD (chronic obstructive pulmonary disease) ([facility])     Coronary angioplasty status 11/26/2018    Diabetes mellitus ([facility])     Fall 02/10/2025    Hyperlipidemia     Hypertension     Lumbosacral disc disease     Movement disorder     Osteoarthritis     Pneumonia     Weight loss, abnormal 05/11/2025         Past Surgical History:   Procedure Laterality Date    APPENDECTOMY      COLONOSCOPY N/A 5/17/2024    COLONOSCOPY DIAGNOSTIC performed by Surinder Chavarria MD at [facility]    CORONARY ANGIOPLASTY WITH STENT PLACEMENT      4    CORONARY ARTERY BYPASS GRAFT      triple bypass    EYE SURGERY Bilateral     cataract surgery    INSERTABLE CARDIAC MONITOR Left 12/2018    UPPER GASTROINTESTINAL ENDOSCOPY N/A 10/13/2022    EGD ESOPHAGOGASTRODUODENOSCOPY WITH DILATION performed by Surinder Chavarria MD at [facility]    UPPER GASTROINTESTINAL ENDOSCOPY N/A 9/4/2023    EGD

## 2025-06-16 ENCOUNTER — APPOINTMENT (OUTPATIENT)
Dept: ORTHOPEDIC SURGERY | Facility: CLINIC | Age: 85
End: 2025-06-16
Payer: MEDICARE

## 2025-06-21 NOTE — PROGRESS NOTES
SUBJECTIVE:        ROS:  The rest of the 14 point ROS negative    PHYSICAL EXAM: VSS per facility record      ASSESSMENT & PLAN:   Diagnosis Orders   1. Chronic atrial fibrillation (HCC)        2. Chronic obstructive pulmonary disease, unspecified COPD type (HCC)        3. Primary hypertension                      Past Medical History:   Diagnosis Date    Acute inferior myocardial infarction (HCC) 12/27/2021    MARIAM (acute kidney injury) 06/07/2022    Atrial fibrillation ([facility])     CAD (coronary artery disease)     cardiac stents    COPD (chronic obstructive pulmonary disease) ([facility])     Coronary angioplasty status 11/26/2018    Diabetes mellitus ([facility])     Fall 02/10/2025    Hyperlipidemia     Hypertension     Lumbosacral disc disease     Movement disorder     Osteoarthritis     Pneumonia     Weight loss, abnormal 05/11/2025         Past Surgical History:   Procedure Laterality Date    APPENDECTOMY      COLONOSCOPY N/A 5/17/2024    COLONOSCOPY DIAGNOSTIC performed by Surinder Chavarria MD at [facility]    CORONARY ANGIOPLASTY WITH STENT PLACEMENT      4    CORONARY ARTERY BYPASS GRAFT      triple bypass    EYE SURGERY Bilateral     cataract surgery    INSERTABLE CARDIAC MONITOR Left 12/2018    UPPER GASTROINTESTINAL ENDOSCOPY N/A 10/13/2022    EGD ESOPHAGOGASTRODUODENOSCOPY WITH DILATION performed by Surinder Chavarria MD at [facility]    UPPER GASTROINTESTINAL ENDOSCOPY N/A 9/4/2023    EGD ESOPHAGOGASTRODUODENOSCOPY WITH FOREIGN BODY REMOVAL performed by David Mann MD at [facility]    UPPER GASTROINTESTINAL ENDOSCOPY N/A 5/17/2024    ESOPHAGOGASTRODUODENOSCOPY DIAGNOSTIC ONLY performed by Surinder Chavarria MD at [facility]         Current Outpatient Medications on File Prior to Visit   Medication Sig Dispense Refill    losartan (COZAAR) 50 MG tablet Take 1 tablet by mouth daily      nitroGLYCERIN (NITROSTAT) 0.4 MG SL tablet Place 1 tablet under the tongue every 5 minutes as needed for Chest

## 2025-06-24 DIAGNOSIS — G89.29 OTHER CHRONIC PAIN: Primary | ICD-10-CM

## 2025-06-24 RX ORDER — TRAMADOL HYDROCHLORIDE 50 MG/1
50 TABLET ORAL SEE ADMIN INSTRUCTIONS
Qty: 30 TABLET | Refills: 0 | Status: SHIPPED | OUTPATIENT
Start: 2025-06-24 | End: 2025-07-24

## 2025-06-25 NOTE — PROGRESS NOTES
SUBJECTIVE:        ROS:  The rest of the 14 point ROS negative    PHYSICAL EXAM: VSS per facility record      ASSESSMENT & PLAN:   Diagnosis Orders   1. Chronic atrial fibrillation (HCC)        2. Primary hypertension        3. Spinal stenosis of lumbar region with neurogenic claudication                      Past Medical History:   Diagnosis Date    Acute inferior myocardial infarction (HCC) 12/27/2021    MARIAM (acute kidney injury) 06/07/2022    Atrial fibrillation ([facility])     CAD (coronary artery disease)     cardiac stents    COPD (chronic obstructive pulmonary disease) ([facility])     Coronary angioplasty status 11/26/2018    Diabetes mellitus ([facility])     Fall 02/10/2025    Hyperlipidemia     Hypertension     Lumbosacral disc disease     Movement disorder     Osteoarthritis     Pneumonia     Weight loss, abnormal 05/11/2025         Past Surgical History:   Procedure Laterality Date    APPENDECTOMY      COLONOSCOPY N/A 5/17/2024    COLONOSCOPY DIAGNOSTIC performed by Surinder Chavarria MD at [facility]    CORONARY ANGIOPLASTY WITH STENT PLACEMENT      4    CORONARY ARTERY BYPASS GRAFT      triple bypass    EYE SURGERY Bilateral     cataract surgery    INSERTABLE CARDIAC MONITOR Left 12/2018    UPPER GASTROINTESTINAL ENDOSCOPY N/A 10/13/2022    EGD ESOPHAGOGASTRODUODENOSCOPY WITH DILATION performed by Surinder Chavarria MD at [facility]    UPPER GASTROINTESTINAL ENDOSCOPY N/A 9/4/2023    EGD ESOPHAGOGASTRODUODENOSCOPY WITH FOREIGN BODY REMOVAL performed by David Mann MD at [facility]    UPPER GASTROINTESTINAL ENDOSCOPY N/A 5/17/2024    ESOPHAGOGASTRODUODENOSCOPY DIAGNOSTIC ONLY performed by Surinder Chavarria MD at [facility]         Current Outpatient Medications on File Prior to Visit   Medication Sig Dispense Refill    losartan (COZAAR) 50 MG tablet Take 1 tablet by mouth daily      nitroGLYCERIN (NITROSTAT) 0.4 MG SL tablet Place 1 tablet under the tongue every 5 minutes as needed for Chest pain

## (undated) DEVICE — FG-9U-1 RAT TOOTH GRASPING FORCEPS: Brand: GRASPING FORCEPS

## (undated) DEVICE — CONMED SCOPE SAVER BITE BLOCK, 20X27 MM: Brand: SCOPE SAVER

## (undated) DEVICE — GUIDEWIRE ENDO L210CM MRK SPR TIP SAFEGUIDE

## (undated) DEVICE — NAKAO SPIDER-NET RETRIEVAL DEVICE 230 X 2.5 X 5.5 CM: Brand: NAKAO

## (undated) DEVICE — BRUSH ENDO CLN L90.5IN SHTH DIA1.7MM BRIST DIA5-7MM 2-6MM

## (undated) DEVICE — SINGLE PORT MANIFOLD: Brand: NEPTUNE 2

## (undated) DEVICE — Device: Brand: ENDO SMARTCAP

## (undated) DEVICE — TUBE SET 96 MM 64 MM H2O PERISTALTIC STD AUX CHANNEL

## (undated) DEVICE — TUBING, SUCTION, 1/4" X 10', STRAIGHT: Brand: MEDLINE

## (undated) DEVICE — ENDO CARRY-ON PROCEDURE KIT: Brand: ENDO CARRY-ON PROCEDURE KIT

## (undated) DEVICE — BRUSH CLN REUSE FOR INNR LUMN OF DIL SAFEGUIDE

## (undated) DEVICE — GLOVE ORANGE PI 8   MSG9080

## (undated) DEVICE — RETRIEVAL DEVICE: Brand: RESCUENET™